# Patient Record
Sex: MALE | Race: WHITE | Employment: OTHER | URBAN - NONMETROPOLITAN AREA
[De-identification: names, ages, dates, MRNs, and addresses within clinical notes are randomized per-mention and may not be internally consistent; named-entity substitution may affect disease eponyms.]

---

## 2017-02-28 ENCOUNTER — OFFICE VISIT (OUTPATIENT)
Dept: PRIMARY CARE CLINIC | Age: 67
End: 2017-02-28

## 2017-02-28 VITALS
HEIGHT: 67 IN | WEIGHT: 207.8 LBS | RESPIRATION RATE: 14 BRPM | HEART RATE: 105 BPM | DIASTOLIC BLOOD PRESSURE: 70 MMHG | OXYGEN SATURATION: 98 % | TEMPERATURE: 97.5 F | SYSTOLIC BLOOD PRESSURE: 110 MMHG | BODY MASS INDEX: 32.62 KG/M2

## 2017-02-28 DIAGNOSIS — M10.9 ACUTE GOUT INVOLVING TOE OF RIGHT FOOT, UNSPECIFIED CAUSE: Primary | ICD-10-CM

## 2017-02-28 PROCEDURE — 99213 OFFICE O/P EST LOW 20 MIN: CPT | Performed by: FAMILY MEDICINE

## 2017-02-28 PROCEDURE — G8417 CALC BMI ABV UP PARAM F/U: HCPCS | Performed by: FAMILY MEDICINE

## 2017-02-28 PROCEDURE — G8484 FLU IMMUNIZE NO ADMIN: HCPCS | Performed by: FAMILY MEDICINE

## 2017-02-28 PROCEDURE — 3017F COLORECTAL CA SCREEN DOC REV: CPT | Performed by: FAMILY MEDICINE

## 2017-02-28 PROCEDURE — 4040F PNEUMOC VAC/ADMIN/RCVD: CPT | Performed by: FAMILY MEDICINE

## 2017-02-28 PROCEDURE — 1123F ACP DISCUSS/DSCN MKR DOCD: CPT | Performed by: FAMILY MEDICINE

## 2017-02-28 PROCEDURE — G8427 DOCREV CUR MEDS BY ELIG CLIN: HCPCS | Performed by: FAMILY MEDICINE

## 2017-02-28 PROCEDURE — 1036F TOBACCO NON-USER: CPT | Performed by: FAMILY MEDICINE

## 2017-02-28 RX ORDER — PREDNISONE 20 MG/1
TABLET ORAL
Qty: 10 TABLET | Refills: 0 | Status: SHIPPED | OUTPATIENT
Start: 2017-02-28 | End: 2017-06-22 | Stop reason: ALTCHOICE

## 2017-02-28 ASSESSMENT — ENCOUNTER SYMPTOMS
BACK PAIN: 0
GASTROINTESTINAL NEGATIVE: 1
EYES NEGATIVE: 1
COLOR CHANGE: 1
RESPIRATORY NEGATIVE: 1

## 2017-06-22 ENCOUNTER — OFFICE VISIT (OUTPATIENT)
Dept: PRIMARY CARE CLINIC | Age: 67
End: 2017-06-22
Payer: MEDICARE

## 2017-06-22 ENCOUNTER — APPOINTMENT (OUTPATIENT)
Dept: GENERAL RADIOLOGY | Age: 67
End: 2017-06-22
Payer: MEDICARE

## 2017-06-22 ENCOUNTER — HOSPITAL ENCOUNTER (EMERGENCY)
Age: 67
Discharge: HOME | End: 2017-06-22
Attending: EMERGENCY MEDICINE
Payer: MEDICARE

## 2017-06-22 VITALS
HEIGHT: 67 IN | WEIGHT: 209 LBS | SYSTOLIC BLOOD PRESSURE: 112 MMHG | OXYGEN SATURATION: 98 % | HEART RATE: 90 BPM | DIASTOLIC BLOOD PRESSURE: 74 MMHG | BODY MASS INDEX: 32.8 KG/M2 | TEMPERATURE: 98.9 F

## 2017-06-22 VITALS
DIASTOLIC BLOOD PRESSURE: 74 MMHG | TEMPERATURE: 97.9 F | RESPIRATION RATE: 12 BRPM | OXYGEN SATURATION: 98 % | SYSTOLIC BLOOD PRESSURE: 123 MMHG | HEART RATE: 73 BPM | BODY MASS INDEX: 32.58 KG/M2 | WEIGHT: 208 LBS

## 2017-06-22 DIAGNOSIS — I48.91 ATRIAL FIBRILLATION, UNSPECIFIED TYPE (HCC): Primary | ICD-10-CM

## 2017-06-22 DIAGNOSIS — R42 DIZZY: Primary | ICD-10-CM

## 2017-06-22 DIAGNOSIS — I49.9 IRREGULAR HEARTBEAT: ICD-10-CM

## 2017-06-22 LAB
ABSOLUTE EOS #: 0.1 K/UL (ref 0–0.4)
ABSOLUTE LYMPH #: 1.7 K/UL (ref 1–4.8)
ABSOLUTE MONO #: 0.6 K/UL (ref 0.1–1.2)
ANION GAP SERPL CALCULATED.3IONS-SCNC: 14 MMOL/L (ref 9–17)
BASOPHILS # BLD: 0 %
BASOPHILS ABSOLUTE: 0 K/UL (ref 0–0.2)
BNP INTERPRETATION: ABNORMAL
BUN BLDV-MCNC: 19 MG/DL (ref 8–23)
BUN/CREAT BLD: 24 (ref 9–20)
CALCIUM SERPL-MCNC: 9 MG/DL (ref 8.6–10.4)
CHLORIDE BLD-SCNC: 101 MMOL/L (ref 98–107)
CO2: 24 MMOL/L (ref 20–31)
CREAT SERPL-MCNC: 0.78 MG/DL (ref 0.7–1.2)
DIFFERENTIAL TYPE: NORMAL
EOSINOPHILS RELATIVE PERCENT: 2 %
GFR AFRICAN AMERICAN: >60 ML/MIN
GFR NON-AFRICAN AMERICAN: >60 ML/MIN
GFR SERPL CREATININE-BSD FRML MDRD: ABNORMAL ML/MIN/{1.73_M2}
GFR SERPL CREATININE-BSD FRML MDRD: ABNORMAL ML/MIN/{1.73_M2}
GLUCOSE BLD-MCNC: 129 MG/DL (ref 70–99)
HCT VFR BLD CALC: 45.4 % (ref 41–53)
HEMOGLOBIN: 15.5 G/DL (ref 13.5–17.5)
INR BLD: 1
LYMPHOCYTES # BLD: 23 %
MCH RBC QN AUTO: 29.2 PG (ref 26–34)
MCHC RBC AUTO-ENTMCNC: 34.2 G/DL (ref 31–37)
MCV RBC AUTO: 85.5 FL (ref 80–100)
MONOCYTES # BLD: 8 %
MYOGLOBIN: 81 NG/ML (ref 28–72)
MYOGLOBIN: 93 NG/ML (ref 28–72)
PARTIAL THROMBOPLASTIN TIME: 24.8 SEC (ref 27–35)
PDW BLD-RTO: 13.6 % (ref 11–14.5)
PLATELET # BLD: 170 K/UL (ref 140–450)
PLATELET ESTIMATE: NORMAL
PMV BLD AUTO: 7.7 FL (ref 6–12)
POTASSIUM SERPL-SCNC: 4 MMOL/L (ref 3.7–5.3)
PRO-BNP: 473 PG/ML
PROTHROMBIN TIME: 10.3 SEC (ref 9.4–11.3)
RBC # BLD: 5.31 M/UL (ref 4.5–5.9)
RBC # BLD: NORMAL 10*6/UL
SEG NEUTROPHILS: 67 %
SEGMENTED NEUTROPHILS ABSOLUTE COUNT: 4.8 K/UL (ref 1.8–7.7)
SODIUM BLD-SCNC: 139 MMOL/L (ref 135–144)
TROPONIN INTERP: NORMAL
TROPONIN INTERP: NORMAL
TROPONIN T: <0.03 NG/ML
TROPONIN T: <0.03 NG/ML
WBC # BLD: 7.3 K/UL (ref 3.5–11)
WBC # BLD: NORMAL 10*3/UL

## 2017-06-22 PROCEDURE — 36415 COLL VENOUS BLD VENIPUNCTURE: CPT

## 2017-06-22 PROCEDURE — 84484 ASSAY OF TROPONIN QUANT: CPT

## 2017-06-22 PROCEDURE — 1036F TOBACCO NON-USER: CPT | Performed by: NURSE PRACTITIONER

## 2017-06-22 PROCEDURE — G8427 DOCREV CUR MEDS BY ELIG CLIN: HCPCS | Performed by: NURSE PRACTITIONER

## 2017-06-22 PROCEDURE — 99212 OFFICE O/P EST SF 10 MIN: CPT | Performed by: NURSE PRACTITIONER

## 2017-06-22 PROCEDURE — 1123F ACP DISCUSS/DSCN MKR DOCD: CPT | Performed by: NURSE PRACTITIONER

## 2017-06-22 PROCEDURE — G8417 CALC BMI ABV UP PARAM F/U: HCPCS | Performed by: NURSE PRACTITIONER

## 2017-06-22 PROCEDURE — 80048 BASIC METABOLIC PNL TOTAL CA: CPT

## 2017-06-22 PROCEDURE — 99285 EMERGENCY DEPT VISIT HI MDM: CPT

## 2017-06-22 PROCEDURE — 83874 ASSAY OF MYOGLOBIN: CPT

## 2017-06-22 PROCEDURE — 4040F PNEUMOC VAC/ADMIN/RCVD: CPT | Performed by: NURSE PRACTITIONER

## 2017-06-22 PROCEDURE — 71020 XR CHEST STANDARD TWO VW: CPT | Performed by: RADIOLOGY

## 2017-06-22 PROCEDURE — 85610 PROTHROMBIN TIME: CPT

## 2017-06-22 PROCEDURE — 85730 THROMBOPLASTIN TIME PARTIAL: CPT

## 2017-06-22 PROCEDURE — 83880 ASSAY OF NATRIURETIC PEPTIDE: CPT

## 2017-06-22 PROCEDURE — 71020 XR CHEST STANDARD TWO VW: CPT

## 2017-06-22 PROCEDURE — 3017F COLORECTAL CA SCREEN DOC REV: CPT | Performed by: NURSE PRACTITIONER

## 2017-06-22 PROCEDURE — 93005 ELECTROCARDIOGRAM TRACING: CPT

## 2017-06-22 PROCEDURE — 85025 COMPLETE CBC W/AUTO DIFF WBC: CPT

## 2017-06-22 ASSESSMENT — ENCOUNTER SYMPTOMS
EYE DISCHARGE: 0
SHORTNESS OF BREATH: 0
CONSTIPATION: 0
EYE REDNESS: 0
BACK PAIN: 0
CHEST TIGHTNESS: 0
DIARRHEA: 0
SORE THROAT: 0
NAUSEA: 0
COUGH: 0
WHEEZING: 0
ABDOMINAL PAIN: 0
EYE PAIN: 0
COLOR CHANGE: 0
RHINORRHEA: 0

## 2017-06-26 LAB
EKG ATRIAL RATE: 125 BPM
EKG ATRIAL RATE: 416 BPM
EKG Q-T INTERVAL: 372 MS
EKG Q-T INTERVAL: 392 MS
EKG QRS DURATION: 78 MS
EKG QRS DURATION: 78 MS
EKG QTC CALCULATION (BAZETT): 425 MS
EKG QTC CALCULATION (BAZETT): 434 MS
EKG R AXIS: -13 DEGREES
EKG R AXIS: -20 DEGREES
EKG T AXIS: 13 DEGREES
EKG T AXIS: 25 DEGREES
EKG VENTRICULAR RATE: 71 BPM
EKG VENTRICULAR RATE: 82 BPM

## 2017-06-28 ENCOUNTER — OFFICE VISIT (OUTPATIENT)
Dept: CARDIOLOGY | Age: 67
End: 2017-06-28
Payer: MEDICARE

## 2017-06-28 VITALS
WEIGHT: 206 LBS | HEIGHT: 67 IN | SYSTOLIC BLOOD PRESSURE: 140 MMHG | DIASTOLIC BLOOD PRESSURE: 80 MMHG | HEART RATE: 83 BPM | BODY MASS INDEX: 32.33 KG/M2

## 2017-06-28 DIAGNOSIS — I48.91 ATRIAL FIBRILLATION, UNSPECIFIED TYPE (HCC): Primary | ICD-10-CM

## 2017-06-28 DIAGNOSIS — R06.02 SOB (SHORTNESS OF BREATH): ICD-10-CM

## 2017-06-28 DIAGNOSIS — R94.31 ABNORMAL EKG: ICD-10-CM

## 2017-06-28 DIAGNOSIS — R42 DIZZINESS: ICD-10-CM

## 2017-06-28 PROCEDURE — 1036F TOBACCO NON-USER: CPT | Performed by: INTERNAL MEDICINE

## 2017-06-28 PROCEDURE — 3017F COLORECTAL CA SCREEN DOC REV: CPT | Performed by: INTERNAL MEDICINE

## 2017-06-28 PROCEDURE — G8427 DOCREV CUR MEDS BY ELIG CLIN: HCPCS | Performed by: INTERNAL MEDICINE

## 2017-06-28 PROCEDURE — 4040F PNEUMOC VAC/ADMIN/RCVD: CPT | Performed by: INTERNAL MEDICINE

## 2017-06-28 PROCEDURE — G8417 CALC BMI ABV UP PARAM F/U: HCPCS | Performed by: INTERNAL MEDICINE

## 2017-06-28 PROCEDURE — 93000 ELECTROCARDIOGRAM COMPLETE: CPT | Performed by: INTERNAL MEDICINE

## 2017-06-28 PROCEDURE — 99204 OFFICE O/P NEW MOD 45 MIN: CPT | Performed by: INTERNAL MEDICINE

## 2017-06-28 PROCEDURE — 1123F ACP DISCUSS/DSCN MKR DOCD: CPT | Performed by: INTERNAL MEDICINE

## 2017-07-06 ENCOUNTER — TELEPHONE (OUTPATIENT)
Dept: CARDIOLOGY | Age: 67
End: 2017-07-06

## 2017-07-06 ENCOUNTER — PROCEDURE VISIT (OUTPATIENT)
Dept: CARDIOLOGY | Age: 67
End: 2017-07-06
Payer: MEDICARE

## 2017-07-06 DIAGNOSIS — I48.91 ATRIAL FIBRILLATION, UNSPECIFIED TYPE (HCC): Primary | ICD-10-CM

## 2017-07-06 LAB
LEFT VENTRICULAR EJECTION FRACTION HIGH VALUE: NORMAL %
LEFT VENTRICULAR EJECTION FRACTION MODE: NORMAL
LV EF: 55 %
LV EF: 55 %
LVEF MODALITY: NORMAL

## 2017-07-06 PROCEDURE — 93306 TTE W/DOPPLER COMPLETE: CPT | Performed by: INTERNAL MEDICINE

## 2017-07-11 ENCOUNTER — PROCEDURE VISIT (OUTPATIENT)
Dept: CARDIOLOGY | Age: 67
End: 2017-07-11
Payer: MEDICARE

## 2017-07-11 DIAGNOSIS — I48.91 ATRIAL FIBRILLATION, UNSPECIFIED TYPE (HCC): Primary | ICD-10-CM

## 2017-07-11 PROCEDURE — 93015 CV STRESS TEST SUPVJ I&R: CPT | Performed by: INTERNAL MEDICINE

## 2017-07-11 RX ORDER — AMINOPHYLLINE DIHYDRATE 25 MG/ML
100 INJECTION, SOLUTION INTRAVENOUS ONCE
Status: COMPLETED | OUTPATIENT
Start: 2017-07-11 | End: 2017-07-11

## 2017-07-11 RX ADMIN — AMINOPHYLLINE DIHYDRATE 100 MG: 25 INJECTION, SOLUTION INTRAVENOUS at 09:42

## 2017-07-12 ENCOUNTER — TELEPHONE (OUTPATIENT)
Dept: CARDIOLOGY | Age: 67
End: 2017-07-12

## 2017-07-13 DIAGNOSIS — R42 DIZZINESS: ICD-10-CM

## 2017-07-13 DIAGNOSIS — R06.02 SOB (SHORTNESS OF BREATH): ICD-10-CM

## 2017-07-13 DIAGNOSIS — I48.91 ATRIAL FIBRILLATION, UNSPECIFIED TYPE (HCC): ICD-10-CM

## 2017-07-13 DIAGNOSIS — R94.31 ABNORMAL EKG: ICD-10-CM

## 2018-01-02 ENCOUNTER — OFFICE VISIT (OUTPATIENT)
Dept: PRIMARY CARE CLINIC | Age: 68
End: 2018-01-02
Payer: MEDICARE

## 2018-01-02 VITALS
OXYGEN SATURATION: 98 % | HEART RATE: 88 BPM | WEIGHT: 209.8 LBS | SYSTOLIC BLOOD PRESSURE: 118 MMHG | TEMPERATURE: 97.7 F | DIASTOLIC BLOOD PRESSURE: 82 MMHG | BODY MASS INDEX: 32.93 KG/M2 | HEIGHT: 67 IN

## 2018-01-02 DIAGNOSIS — J20.9 BRONCHITIS WITH BRONCHOSPASM: Primary | ICD-10-CM

## 2018-01-02 DIAGNOSIS — R05.9 COUGH: ICD-10-CM

## 2018-01-02 PROCEDURE — 99213 OFFICE O/P EST LOW 20 MIN: CPT | Performed by: NURSE PRACTITIONER

## 2018-01-02 RX ORDER — PREDNISONE 20 MG/1
20 TABLET ORAL DAILY
Qty: 5 TABLET | Refills: 0 | Status: SHIPPED | OUTPATIENT
Start: 2018-01-02 | End: 2018-01-07

## 2018-01-02 RX ORDER — AZITHROMYCIN 250 MG/1
TABLET, FILM COATED ORAL
Qty: 1 PACKET | Refills: 0 | Status: SHIPPED | OUTPATIENT
Start: 2018-01-02 | End: 2018-01-12

## 2018-01-02 RX ORDER — DEXTROMETHORPHAN HYDROBROMIDE AND PROMETHAZINE HYDROCHLORIDE 15; 6.25 MG/5ML; MG/5ML
5 SYRUP ORAL 4 TIMES DAILY PRN
Qty: 180 ML | Refills: 0 | Status: SHIPPED | OUTPATIENT
Start: 2018-01-02 | End: 2018-11-27

## 2018-01-02 ASSESSMENT — ENCOUNTER SYMPTOMS
VOMITING: 0
COUGH: 1
SINUS PRESSURE: 1
DIARRHEA: 0
NAUSEA: 0
RHINORRHEA: 1
SORE THROAT: 1
ABDOMINAL PAIN: 0
WHEEZING: 1
VOICE CHANGE: 1
SHORTNESS OF BREATH: 0

## 2018-01-02 NOTE — PROGRESS NOTES
smoked. He has never used smokeless tobacco.      Objective:    Vitals:    01/02/18 1116   BP: 118/82   Pulse: 88   Temp: 97.7 °F (36.5 °C)   SpO2: 98%     Body mass index is 32.86 kg/m². Review of Systems   Constitutional: Positive for appetite change, chills and fever. HENT: Positive for congestion, postnasal drip, rhinorrhea, sinus pressure, sore throat and voice change. Respiratory: Positive for cough and wheezing. Negative for shortness of breath. Cardiovascular: Negative. Gastrointestinal: Negative for abdominal pain, diarrhea, nausea and vomiting. Skin: Negative for rash. Neurological: Positive for headaches. Physical Exam   Constitutional: He is oriented to person, place, and time. He appears well-developed and well-nourished. HENT:   Head: Normocephalic. Right Ear: Tympanic membrane is scarred. Left Ear: Tympanic membrane is scarred. Nose: Mucosal edema and rhinorrhea present. Right sinus exhibits frontal sinus tenderness. Right sinus exhibits no maxillary sinus tenderness. Left sinus exhibits frontal sinus tenderness. Left sinus exhibits no maxillary sinus tenderness. Mouth/Throat: Uvula is midline and mucous membranes are normal. Posterior oropharyngeal erythema present. Eyes: Pupils are equal, round, and reactive to light. Neck: Normal range of motion. Neck supple. Cardiovascular: Normal rate, regular rhythm and normal heart sounds. Pulmonary/Chest: Effort normal and breath sounds normal.   Lymphadenopathy:        Head (right side): Tonsillar adenopathy present. Head (left side): Tonsillar adenopathy present. Neurological: He is alert and oriented to person, place, and time. Skin: Skin is warm and dry. Psychiatric: He has a normal mood and affect. His behavior is normal. Thought content normal.   Nursing note and vitals reviewed. Assessment and Plan:    1.  Bronchitis with bronchospasm  azithromycin (ZITHROMAX) 250 MG tablet    predniSONE (DELTASONE) 20 MG tablet   2. Cough  promethazine-dextromethorphan (PROMETHAZINE-DM) 6.25-15 MG/5ML syrup     Take full course of antibiotic and prednisone. Take Promethazine-DM as needed for cough at bedtime. I recommended that he use mucinex to help with congestion and cough. I also recommended Flonase and an antihistamine for sinus symptoms. he was also encouraged to use tylenol or ibuprofen for fever. Increase water intake. Use cool mist humidifier at bedtime. Use nasal saline flush as needed. Good hand hygiene. he was instructed to return if there is no improvement or symptoms worsen.        Electronically signed by Devika Romero NP on 1/2/18 at 11:46 AM

## 2018-11-27 ENCOUNTER — OFFICE VISIT (OUTPATIENT)
Dept: PRIMARY CARE CLINIC | Age: 68
End: 2018-11-27
Payer: MEDICARE

## 2018-11-27 VITALS
OXYGEN SATURATION: 98 % | HEIGHT: 67 IN | BODY MASS INDEX: 33.4 KG/M2 | WEIGHT: 212.8 LBS | SYSTOLIC BLOOD PRESSURE: 132 MMHG | DIASTOLIC BLOOD PRESSURE: 78 MMHG | HEART RATE: 80 BPM | TEMPERATURE: 97.8 F

## 2018-11-27 DIAGNOSIS — M10.9 ACUTE GOUT INVOLVING TOE OF RIGHT FOOT, UNSPECIFIED CAUSE: Primary | ICD-10-CM

## 2018-11-27 PROCEDURE — 99213 OFFICE O/P EST LOW 20 MIN: CPT | Performed by: NURSE PRACTITIONER

## 2018-11-27 RX ORDER — PREDNISONE 20 MG/1
20 TABLET ORAL 2 TIMES DAILY
Qty: 10 TABLET | Refills: 0 | Status: SHIPPED | OUTPATIENT
Start: 2018-11-27 | End: 2018-12-02

## 2018-11-27 ASSESSMENT — PATIENT HEALTH QUESTIONNAIRE - PHQ9
1. LITTLE INTEREST OR PLEASURE IN DOING THINGS: 0
SUM OF ALL RESPONSES TO PHQ9 QUESTIONS 1 & 2: 0
SUM OF ALL RESPONSES TO PHQ QUESTIONS 1-9: 0
2. FEELING DOWN, DEPRESSED OR HOPELESS: 0
SUM OF ALL RESPONSES TO PHQ QUESTIONS 1-9: 0

## 2018-11-27 ASSESSMENT — ENCOUNTER SYMPTOMS: RESPIRATORY NEGATIVE: 1

## 2018-12-21 ENCOUNTER — HOSPITAL ENCOUNTER (OUTPATIENT)
Age: 68
Setting detail: SPECIMEN
Discharge: HOME | End: 2018-12-21
Payer: MEDICARE

## 2018-12-21 ENCOUNTER — OFFICE VISIT (OUTPATIENT)
Dept: DERMATOLOGY | Age: 68
End: 2018-12-21
Payer: MEDICARE

## 2018-12-21 ENCOUNTER — OFFICE VISIT (OUTPATIENT)
Dept: PRIMARY CARE CLINIC | Age: 68
End: 2018-12-21

## 2018-12-21 VITALS
BODY MASS INDEX: 33.94 KG/M2 | RESPIRATION RATE: 16 BRPM | WEIGHT: 216.27 LBS | DIASTOLIC BLOOD PRESSURE: 88 MMHG | SYSTOLIC BLOOD PRESSURE: 134 MMHG | HEIGHT: 67 IN | TEMPERATURE: 96.9 F | HEART RATE: 88 BPM

## 2018-12-21 VITALS
HEART RATE: 90 BPM | HEIGHT: 67 IN | DIASTOLIC BLOOD PRESSURE: 88 MMHG | RESPIRATION RATE: 18 BRPM | WEIGHT: 216.2 LBS | TEMPERATURE: 96.9 F | SYSTOLIC BLOOD PRESSURE: 134 MMHG | OXYGEN SATURATION: 98 % | BODY MASS INDEX: 33.93 KG/M2

## 2018-12-21 DIAGNOSIS — D48.5 NEOPLASM OF UNCERTAIN BEHAVIOR OF SKIN: Primary | ICD-10-CM

## 2018-12-21 DIAGNOSIS — L91.8 SKIN TAG: ICD-10-CM

## 2018-12-21 PROCEDURE — 11100 PR BIOPSY OF SKIN LESION: CPT | Performed by: DERMATOLOGY

## 2018-12-21 NOTE — PATIENT INSTRUCTIONS

## 2018-12-26 ENCOUNTER — TELEPHONE (OUTPATIENT)
Dept: DERMATOLOGY | Age: 68
End: 2018-12-26

## 2018-12-26 LAB — DERMATOLOGY PATHOLOGY REPORT: NORMAL

## 2019-09-07 ENCOUNTER — OFFICE VISIT (OUTPATIENT)
Dept: PRIMARY CARE CLINIC | Age: 69
End: 2019-09-07
Payer: MEDICARE

## 2019-09-07 VITALS
DIASTOLIC BLOOD PRESSURE: 74 MMHG | TEMPERATURE: 98 F | OXYGEN SATURATION: 98 % | BODY MASS INDEX: 32.23 KG/M2 | SYSTOLIC BLOOD PRESSURE: 126 MMHG | HEART RATE: 74 BPM | WEIGHT: 205.8 LBS

## 2019-09-07 DIAGNOSIS — S05.01XA ABRASION OF RIGHT CORNEA, INITIAL ENCOUNTER: Primary | ICD-10-CM

## 2019-09-07 DIAGNOSIS — T15.91XA FOREIGN BODY OF RIGHT EYE, INITIAL ENCOUNTER: ICD-10-CM

## 2019-09-07 PROCEDURE — 99213 OFFICE O/P EST LOW 20 MIN: CPT | Performed by: FAMILY MEDICINE

## 2019-09-07 PROCEDURE — 65220 REMOVE FOREIGN BODY FROM EYE: CPT | Performed by: FAMILY MEDICINE

## 2019-09-07 RX ORDER — CIPROFLOXACIN HYDROCHLORIDE 3.5 MG/ML
1 SOLUTION/ DROPS TOPICAL EVERY 4 HOURS
Qty: 1 BOTTLE | Refills: 0 | Status: SHIPPED | OUTPATIENT
Start: 2019-09-07 | End: 2019-09-14

## 2019-09-07 ASSESSMENT — ENCOUNTER SYMPTOMS
BLURRED VISION: 0
DOUBLE VISION: 0
EYE REDNESS: 1
FOREIGN BODY SENSATION: 1
EYE DISCHARGE: 1
EYE PAIN: 1

## 2019-09-07 NOTE — PATIENT INSTRUCTIONS
your injured eye. Rubbing can make it worse. · Use the prescribed eyedrops or ointment as directed. Be sure the dropper or bottle tip is clean. · To put in eyedrops or ointment:  ? Tilt your head back, and pull your lower eyelid down with one finger. ? Drop or squirt the medicine inside the lower lid. ? Close your eye for 30 to 60 seconds to let the drops or ointment move around. ? Do not touch the ointment or dropper tip to your eyelashes or any other surface. · Do not use a contact lens in your hurt eye until your doctor says you can. Also, do not wear eye makeup until your eye heals. · Do not drive if you are wearing an eye patch. You cannot  distances well. · For the first 24 to 48 hours, limit reading and other tasks that require a lot of eye movement. · Bright light may hurt. It may help to wear dark glasses. · To prevent eye injuries in the future, wear safety glasses or goggles when you work with machines or tools, mow the lawn, or ride a bike or motorcycle. When should you call for help? Call 911 anytime you think you may need emergency care. For example, call if:    · You suddenly cannot see or can barely see.    Call your doctor now or seek immediate medical care if:    · You have signs of infection in the eye, such as:  ? Pus or thick discharge coming from the eye.  ? Redness or swelling around the eye.  ? A fever.     · You have new or increasing eye pain.     · It feels like sand is in your eye when you blink.    Watch closely for changes in your health, and be sure to contact your doctor if:    · You are not getting better after 1 day (24 hours). Where can you learn more? Go to https://[employer]Free. org and sign in to your [employer] account. Enter G051 in the FarmLink box to learn more about \"Object in the Eye: Care Instructions. \"     If you do not have an account, please click on the \"Sign Up Now\" link.   Current as of: September 23, 2018  Content Version: 12.1  © 0573-0071 Healthwise, Incorporated. Care instructions adapted under license by [facility] ([facility]). If you have questions about a medical condition or this instruction, always ask your healthcare professional. [address] any warranty or liability for your use of this information. Patient Education        Corneal Scratches: Care Instructions  Your Care Instructions    The cornea is the clear surface that covers the front of the eye. When a speck of dirt, a wood chip, an insect, or another object flies into your eye, it can cause a painful scratch on the cornea. Wearing contact lenses too long or rubbing your eyes can also scratch the cornea. Small scratches usually heal in a day or two. Deeper scratches may take longer. If you have had a foreign object removed from your eye or you have a corneal scratch, you will need to watch for infection and vision problems while your eye heals. Follow-up care is a key part of your treatment and safety. Be sure to make and go to all appointments, and call your doctor if you are having problems. It's also a good idea to know your test results and keep a list of the medicines you take. How can you care for yourself at home? · The doctor probably used a medicine during your exam to numb your eye. When it wears off in 30 to 60 minutes, your eye pain may come back. Take pain medicines exactly as directed. ? If the doctor gave you a prescription medicine for pain, take it as prescribed. ? If you are not taking a prescription pain medicine, ask your doctor if you can take an over-the-counter medicine. ? Do not take two or more pain medicines at the same time unless the doctor told you to. Many pain medicines have acetaminophen, which is Tylenol. Too much acetaminophen (Tylenol) can be harmful. · Do not rub your injured eye. Rubbing can make it worse. · Use the prescribed eyedrops or ointment as directed.  Be sure the dropper or bottle tip

## 2019-09-07 NOTE — PROGRESS NOTES
[address]  [address], [address] [place]  [phone]      Oscar Fisher is a 78 y.o. male who is c/o of Eye Pain (right eye thinks metal piece in eye few days ago )      HPI:     Eye Pain    The right eye is affected. This is a new problem. The current episode started in the past 7 days (2-3 days ago). The problem has been gradually worsening. Injury mechanism: Was welding/grinding a couple days ago, and feels he got something in his eye. The pain is mild. He does not wear contacts. Associated symptoms include an eye discharge (watering), eye redness (mild) and a foreign body sensation. Pertinent negatives include no blurred vision or double vision. Treatments tried: flushing his eye, OTC eye drops. Subjective:      Past Medical History:   Diagnosis Date    Chronic kidney disease     Hyperlipidemia     PTSD (post-traumatic stress disorder)       Past Surgical History:   Procedure Laterality Date    APPENDECTOMY         Social History     Tobacco Use    Smoking status: Never Smoker    Smokeless tobacco: Never Used   Substance Use Topics    Alcohol use: Yes     Alcohol/week: 0.0 standard drinks     Comment: occas      Current Outpatient Medications   Medication Sig Dispense Refill    Fish Oil-Cholecalciferol (FISH OIL + D3 PO) Take by mouth      aspirin 325 MG EC tablet Take 325 mg by mouth daily      vitamin D (ERGOCALCIFEROL) 61554 UNITS CAPS capsule Take 50,000 Units by mouth once a week      VITAMIN B1-B12 IM Inject  into the muscle.        Current Facility-Administered Medications   Medication Dose Route Frequency Provider Last Rate Last Dose    phenylephrine (ALAINA-SYNEPHRINE) 10 % ophthalmic solution 1 drop  1 drop Both Eyes Once Sukumar Birmingham MD        tropicamide (MYDRIACYL) 1 % ophthalmic solution 1 drop  1 drop Both Eyes Once Sukumar Birmingham MD         No Known Allergies    Review of Systems   Eyes: Positive for pain, discharge (watering)

## 2019-09-09 ENCOUNTER — OFFICE VISIT (OUTPATIENT)
Dept: OPHTHALMOLOGY | Age: 69
End: 2019-09-09
Payer: MEDICARE

## 2019-09-09 DIAGNOSIS — T15.01XA CORNEAL FOREIGN BODY, RIGHT, INITIAL ENCOUNTER: Primary | ICD-10-CM

## 2019-09-09 PROCEDURE — 99203 OFFICE O/P NEW LOW 30 MIN: CPT | Performed by: OPHTHALMOLOGY

## 2019-09-09 PROCEDURE — 65222 REMOVE FOREIGN BODY FROM EYE: CPT | Performed by: OPHTHALMOLOGY

## 2019-09-09 RX ORDER — ERYTHROMYCIN 5 MG/G
OINTMENT OPHTHALMIC
Qty: 1 TUBE | Refills: 1 | Status: SHIPPED | OUTPATIENT
Start: 2019-09-09 | End: 2019-09-19

## 2019-09-09 ASSESSMENT — TONOMETRY
OS_IOP_MMHG: 22
IOP_METHOD: NON-CONTACT AIR PUFF
OD_IOP_MMHG: 19

## 2019-09-09 ASSESSMENT — VISUAL ACUITY
OS_CC+: 2
CORRECTION_TYPE: GLASSES
OS_CC: 20/20
OD_PH_CC: 20/20
METHOD: SNELLEN - LINEAR

## 2019-09-09 ASSESSMENT — SLIT LAMP EXAM - LIDS: COMMENTS: MILD BLEPHARITIS, MILD MGD

## 2019-09-18 ENCOUNTER — OFFICE VISIT (OUTPATIENT)
Dept: OPHTHALMOLOGY | Age: 69
End: 2019-09-18
Payer: MEDICARE

## 2019-09-18 DIAGNOSIS — T15.01XA CORNEAL FOREIGN BODY, RIGHT, INITIAL ENCOUNTER: Primary | ICD-10-CM

## 2019-09-18 DIAGNOSIS — H43.813 DEGENERATION OF POSTERIOR VITREOUS BODY OF BOTH EYES: ICD-10-CM

## 2019-09-18 DIAGNOSIS — H40.003 GLAUCOMA SUSPECT OF BOTH EYES: ICD-10-CM

## 2019-09-18 DIAGNOSIS — H25.813 COMBINED FORMS OF AGE-RELATED CATARACT OF BOTH EYES: ICD-10-CM

## 2019-09-18 PROCEDURE — 99214 OFFICE O/P EST MOD 30 MIN: CPT | Performed by: OPHTHALMOLOGY

## 2019-09-18 PROCEDURE — 92250 FUNDUS PHOTOGRAPHY W/I&R: CPT | Performed by: OPHTHALMOLOGY

## 2019-09-18 RX ORDER — PHENYLEPHRINE HYDROCHLORIDE 100 MG/ML
1 SOLUTION/ DROPS OPHTHALMIC ONCE
Status: DISCONTINUED | OUTPATIENT
Start: 2019-09-18 | End: 2020-01-14 | Stop reason: ALTCHOICE

## 2019-09-18 RX ORDER — TROPICAMIDE 10 MG/ML
1 SOLUTION/ DROPS OPHTHALMIC ONCE
Status: DISCONTINUED | OUTPATIENT
Start: 2019-09-18 | End: 2020-01-14 | Stop reason: ALTCHOICE

## 2019-09-18 ASSESSMENT — TONOMETRY
IOP_METHOD: NON-CONTACT AIR PUFF
OS_IOP_MMHG: 22
OD_IOP_MMHG: 19

## 2019-09-18 ASSESSMENT — VISUAL ACUITY
METHOD: SNELLEN - LINEAR
OS_CC: 20/20
OS_CC+: -1
CORRECTION_TYPE: GLASSES
OD_CC+: -1

## 2019-09-18 ASSESSMENT — SLIT LAMP EXAM - LIDS
COMMENTS: MILD BLEPHARITIS. MILD MGD
COMMENTS: MILD BLEPHARITIS. MILD MGD

## 2019-09-18 ASSESSMENT — CONF VISUAL FIELD
OD_NORMAL: 1
OS_NORMAL: 1

## 2019-09-18 ASSESSMENT — ENCOUNTER SYMPTOMS
RESPIRATORY NEGATIVE: 0
EYES NEGATIVE: 0
ALLERGIC/IMMUNOLOGIC NEGATIVE: 0
GASTROINTESTINAL NEGATIVE: 0

## 2019-09-18 NOTE — PROGRESS NOTES
caution while operating a   motor vehicle or performing other critical visual tasks. Follow.     Electronically signed by Obdulio Valerio MD on 9/18/2019 at 4:36 PM

## 2019-11-15 ENCOUNTER — OFFICE VISIT (OUTPATIENT)
Dept: PRIMARY CARE CLINIC | Age: 69
End: 2019-11-15
Payer: MEDICARE

## 2019-11-15 VITALS
HEIGHT: 67 IN | HEART RATE: 100 BPM | SYSTOLIC BLOOD PRESSURE: 122 MMHG | DIASTOLIC BLOOD PRESSURE: 74 MMHG | BODY MASS INDEX: 32.65 KG/M2 | WEIGHT: 208 LBS | OXYGEN SATURATION: 98 % | TEMPERATURE: 97.5 F

## 2019-11-15 DIAGNOSIS — J01.90 ACUTE BACTERIAL SINUSITIS: Primary | ICD-10-CM

## 2019-11-15 DIAGNOSIS — R05.9 COUGH: ICD-10-CM

## 2019-11-15 DIAGNOSIS — J02.9 SORE THROAT: ICD-10-CM

## 2019-11-15 DIAGNOSIS — B96.89 ACUTE BACTERIAL SINUSITIS: Primary | ICD-10-CM

## 2019-11-15 LAB — S PYO AG THROAT QL: NORMAL

## 2019-11-15 PROCEDURE — 87880 STREP A ASSAY W/OPTIC: CPT | Performed by: NURSE PRACTITIONER

## 2019-11-15 PROCEDURE — 99213 OFFICE O/P EST LOW 20 MIN: CPT | Performed by: NURSE PRACTITIONER

## 2019-11-15 RX ORDER — AMOXICILLIN 500 MG/1
500 CAPSULE ORAL 3 TIMES DAILY
Qty: 30 CAPSULE | Refills: 0 | Status: SHIPPED | OUTPATIENT
Start: 2019-11-15 | End: 2020-01-14 | Stop reason: ALTCHOICE

## 2019-11-15 ASSESSMENT — ENCOUNTER SYMPTOMS
SINUS PRESSURE: 1
SORE THROAT: 0
SINUS COMPLAINT: 1
GASTROINTESTINAL NEGATIVE: 1
RHINORRHEA: 1
COUGH: 1
SINUS PAIN: 1

## 2019-11-15 ASSESSMENT — PATIENT HEALTH QUESTIONNAIRE - PHQ9
SUM OF ALL RESPONSES TO PHQ QUESTIONS 1-9: 0
2. FEELING DOWN, DEPRESSED OR HOPELESS: 0
1. LITTLE INTEREST OR PLEASURE IN DOING THINGS: 0
SUM OF ALL RESPONSES TO PHQ9 QUESTIONS 1 & 2: 0
SUM OF ALL RESPONSES TO PHQ QUESTIONS 1-9: 0

## 2020-01-10 ENCOUNTER — APPOINTMENT (OUTPATIENT)
Dept: CT IMAGING | Age: 70
End: 2020-01-10
Payer: MEDICARE

## 2020-01-10 ENCOUNTER — HOSPITAL ENCOUNTER (EMERGENCY)
Age: 70
Discharge: ANOTHER HOSPITAL | End: 2020-01-11
Attending: EMERGENCY MEDICINE
Payer: MEDICARE

## 2020-01-10 LAB
ABSOLUTE EOS #: 0.13 K/UL (ref 0–0.44)
ABSOLUTE IMMATURE GRANULOCYTE: 0.03 K/UL (ref 0–0.3)
ABSOLUTE LYMPH #: 2.67 K/UL (ref 1.1–3.7)
ABSOLUTE MONO #: 0.55 K/UL (ref 0.1–1.2)
ALBUMIN SERPL-MCNC: 4.1 G/DL (ref 3.5–5.2)
ALBUMIN/GLOBULIN RATIO: 1.3 (ref 1–2.5)
ALP BLD-CCNC: 79 U/L (ref 40–129)
ALT SERPL-CCNC: 25 U/L (ref 5–41)
ANION GAP SERPL CALCULATED.3IONS-SCNC: 17 MMOL/L (ref 9–17)
AST SERPL-CCNC: 29 U/L
BASOPHILS # BLD: 0 % (ref 0–2)
BASOPHILS ABSOLUTE: 0.03 K/UL (ref 0–0.2)
BILIRUB SERPL-MCNC: 0.44 MG/DL (ref 0.3–1.2)
BUN BLDV-MCNC: 15 MG/DL (ref 8–23)
BUN/CREAT BLD: 14 (ref 9–20)
CALCIUM SERPL-MCNC: 9.2 MG/DL (ref 8.6–10.4)
CHLORIDE BLD-SCNC: 100 MMOL/L (ref 98–107)
CO2: 21 MMOL/L (ref 20–31)
CREAT SERPL-MCNC: 1.09 MG/DL (ref 0.7–1.2)
DIFFERENTIAL TYPE: ABNORMAL
EOSINOPHILS RELATIVE PERCENT: 2 % (ref 1–4)
GFR AFRICAN AMERICAN: >60 ML/MIN
GFR NON-AFRICAN AMERICAN: >60 ML/MIN
GFR SERPL CREATININE-BSD FRML MDRD: ABNORMAL ML/MIN/{1.73_M2}
GFR SERPL CREATININE-BSD FRML MDRD: ABNORMAL ML/MIN/{1.73_M2}
GLUCOSE BLD-MCNC: 150 MG/DL (ref 70–99)
HCT VFR BLD CALC: 44 % (ref 40.7–50.3)
HEMOGLOBIN: 15.5 G/DL (ref 13–17)
IMMATURE GRANULOCYTES: 0 %
INR BLD: 1
LYMPHOCYTES # BLD: 39 % (ref 24–43)
MCH RBC QN AUTO: 29.4 PG (ref 25.2–33.5)
MCHC RBC AUTO-ENTMCNC: 35.2 G/DL (ref 25.2–33.5)
MCV RBC AUTO: 83.3 FL (ref 82.6–102.9)
MONOCYTES # BLD: 8 % (ref 3–12)
NRBC AUTOMATED: 0 PER 100 WBC
PARTIAL THROMBOPLASTIN TIME: 23.7 SEC (ref 27–35)
PDW BLD-RTO: 12.7 % (ref 11.8–14.4)
PLATELET # BLD: 172 K/UL (ref 138–453)
PLATELET ESTIMATE: ABNORMAL
PMV BLD AUTO: 9.6 FL (ref 8.1–13.5)
POTASSIUM SERPL-SCNC: 3.7 MMOL/L (ref 3.7–5.3)
PROTHROMBIN TIME: 10.4 SEC (ref 9.4–11.3)
RBC # BLD: 5.28 M/UL (ref 4.21–5.77)
RBC # BLD: ABNORMAL 10*6/UL
SEG NEUTROPHILS: 50 % (ref 36–65)
SEGMENTED NEUTROPHILS ABSOLUTE COUNT: 3.45 K/UL (ref 1.5–8.1)
SODIUM BLD-SCNC: 138 MMOL/L (ref 135–144)
TOTAL PROTEIN: 7.2 G/DL (ref 6.4–8.3)
TROPONIN INTERP: NORMAL
TROPONIN T: NORMAL NG/ML
TROPONIN, HIGH SENSITIVITY: 18 NG/L (ref 0–22)
WBC # BLD: 6.9 K/UL (ref 3.5–11.3)
WBC # BLD: ABNORMAL 10*3/UL

## 2020-01-10 PROCEDURE — 84484 ASSAY OF TROPONIN QUANT: CPT

## 2020-01-10 PROCEDURE — 96376 TX/PRO/DX INJ SAME DRUG ADON: CPT

## 2020-01-10 PROCEDURE — 85025 COMPLETE CBC W/AUTO DIFF WBC: CPT

## 2020-01-10 PROCEDURE — 85610 PROTHROMBIN TIME: CPT

## 2020-01-10 PROCEDURE — 6360000002 HC RX W HCPCS

## 2020-01-10 PROCEDURE — 96365 THER/PROPH/DIAG IV INF INIT: CPT

## 2020-01-10 PROCEDURE — 80053 COMPREHEN METABOLIC PANEL: CPT

## 2020-01-10 PROCEDURE — 99291 CRITICAL CARE FIRST HOUR: CPT

## 2020-01-10 PROCEDURE — 93005 ELECTROCARDIOGRAM TRACING: CPT | Performed by: EMERGENCY MEDICINE

## 2020-01-10 PROCEDURE — 85730 THROMBOPLASTIN TIME PARTIAL: CPT

## 2020-01-10 PROCEDURE — 36415 COLL VENOUS BLD VENIPUNCTURE: CPT

## 2020-01-10 PROCEDURE — 70450 CT HEAD/BRAIN W/O DYE: CPT

## 2020-01-10 PROCEDURE — 6360000002 HC RX W HCPCS: Performed by: EMERGENCY MEDICINE

## 2020-01-10 RX ORDER — NICARDIPINE HYDROCHLORIDE 2.5 MG/ML
INJECTION INTRAVENOUS
Status: DISCONTINUED
Start: 2020-01-10 | End: 2020-01-11 | Stop reason: HOSPADM

## 2020-01-10 RX ORDER — 0.9 % SODIUM CHLORIDE 0.9 %
50 INTRAVENOUS SOLUTION INTRAVENOUS ONCE
Status: DISCONTINUED | OUTPATIENT
Start: 2020-01-10 | End: 2020-01-11 | Stop reason: HOSPADM

## 2020-01-10 RX ORDER — SODIUM CHLORIDE 0.9 % (FLUSH) 0.9 %
10 SYRINGE (ML) INJECTION PRN
Status: DISCONTINUED | OUTPATIENT
Start: 2020-01-10 | End: 2020-01-11 | Stop reason: HOSPADM

## 2020-01-10 RX ORDER — DEXTROSE MONOHYDRATE 25 G/50ML
12.5 INJECTION, SOLUTION INTRAVENOUS
Status: ACTIVE | OUTPATIENT
Start: 2020-01-10 | End: 2020-01-10

## 2020-01-10 RX ORDER — SODIUM CHLORIDE 0.9 % (FLUSH) 0.9 %
10 SYRINGE (ML) INJECTION EVERY 12 HOURS SCHEDULED
Status: DISCONTINUED | OUTPATIENT
Start: 2020-01-10 | End: 2020-01-11 | Stop reason: HOSPADM

## 2020-01-10 RX ADMIN — ALTEPLASE 9 MG: KIT at 23:45

## 2020-01-10 RX ADMIN — ALTEPLASE 80.8 MG: KIT at 23:50

## 2020-01-11 ENCOUNTER — APPOINTMENT (OUTPATIENT)
Dept: MRI IMAGING | Age: 70
DRG: 024 | End: 2020-01-11
Payer: MEDICARE

## 2020-01-11 ENCOUNTER — ANESTHESIA EVENT (OUTPATIENT)
Dept: INTERVENTIONAL RADIOLOGY/VASCULAR | Age: 70
DRG: 024 | End: 2020-01-11
Payer: MEDICARE

## 2020-01-11 ENCOUNTER — APPOINTMENT (OUTPATIENT)
Dept: CT IMAGING | Age: 70
End: 2020-01-11
Payer: MEDICARE

## 2020-01-11 ENCOUNTER — APPOINTMENT (OUTPATIENT)
Dept: CT IMAGING | Age: 70
DRG: 024 | End: 2020-01-11
Payer: MEDICARE

## 2020-01-11 ENCOUNTER — HOSPITAL ENCOUNTER (INPATIENT)
Dept: INTERVENTIONAL RADIOLOGY/VASCULAR | Age: 70
Discharge: HOME | DRG: 024 | End: 2020-01-13
Payer: MEDICARE

## 2020-01-11 ENCOUNTER — ANESTHESIA (OUTPATIENT)
Dept: INTERVENTIONAL RADIOLOGY/VASCULAR | Age: 70
DRG: 024 | End: 2020-01-11
Payer: MEDICARE

## 2020-01-11 ENCOUNTER — HOSPITAL ENCOUNTER (INPATIENT)
Age: 70
LOS: 2 days | Discharge: HOME | DRG: 024 | End: 2020-01-13
Attending: EMERGENCY MEDICINE | Admitting: PSYCHIATRY & NEUROLOGY
Payer: MEDICARE

## 2020-01-11 VITALS — SYSTOLIC BLOOD PRESSURE: 116 MMHG | DIASTOLIC BLOOD PRESSURE: 78 MMHG | TEMPERATURE: 93.7 F | OXYGEN SATURATION: 95 %

## 2020-01-11 VITALS
BODY MASS INDEX: 34.46 KG/M2 | RESPIRATION RATE: 16 BRPM | HEART RATE: 105 BPM | WEIGHT: 220 LBS | TEMPERATURE: 98.1 F | SYSTOLIC BLOOD PRESSURE: 164 MMHG | OXYGEN SATURATION: 96 % | DIASTOLIC BLOOD PRESSURE: 84 MMHG

## 2020-01-11 PROBLEM — I63.9 ACUTE CEREBROVASCULAR ACCIDENT (CVA) (HCC): Status: ACTIVE | Noted: 2020-01-11

## 2020-01-11 LAB
ANION GAP SERPL CALCULATED.3IONS-SCNC: 16 MMOL/L (ref 9–17)
BUN BLDV-MCNC: 13 MG/DL (ref 8–23)
BUN/CREAT BLD: ABNORMAL (ref 9–20)
CALCIUM SERPL-MCNC: 8.1 MG/DL (ref 8.6–10.4)
CHLORIDE BLD-SCNC: 106 MMOL/L (ref 98–107)
CHOLESTEROL/HDL RATIO: 3.6
CHOLESTEROL: 137 MG/DL
CO2: 19 MMOL/L (ref 20–31)
CREAT SERPL-MCNC: 0.81 MG/DL (ref 0.7–1.2)
GFR AFRICAN AMERICAN: >60 ML/MIN
GFR NON-AFRICAN AMERICAN: >60 ML/MIN
GFR SERPL CREATININE-BSD FRML MDRD: ABNORMAL ML/MIN/{1.73_M2}
GFR SERPL CREATININE-BSD FRML MDRD: ABNORMAL ML/MIN/{1.73_M2}
GLUCOSE BLD-MCNC: 157 MG/DL (ref 70–99)
HCT VFR BLD CALC: 43 % (ref 40.7–50.3)
HDLC SERPL-MCNC: 38 MG/DL
HEMOGLOBIN: 14.8 G/DL (ref 13–17)
LDL CHOLESTEROL: 75 MG/DL (ref 0–130)
MCH RBC QN AUTO: 29.5 PG (ref 25.2–33.5)
MCHC RBC AUTO-ENTMCNC: 34.4 G/DL (ref 28.4–34.8)
MCV RBC AUTO: 85.8 FL (ref 82.6–102.9)
MRSA, DNA, NASAL: NORMAL
NRBC AUTOMATED: 0 PER 100 WBC
PDW BLD-RTO: 12.9 % (ref 11.8–14.4)
PLATELET # BLD: 225 K/UL (ref 138–453)
PMV BLD AUTO: 9.8 FL (ref 8.1–13.5)
POTASSIUM SERPL-SCNC: 4.1 MMOL/L (ref 3.7–5.3)
RBC # BLD: 5.01 M/UL (ref 4.21–5.77)
SODIUM BLD-SCNC: 141 MMOL/L (ref 135–144)
SPECIMEN DESCRIPTION: NORMAL
TRIGL SERPL-MCNC: 118 MG/DL
VLDLC SERPL CALC-MCNC: ABNORMAL MG/DL (ref 1–30)
WBC # BLD: 11.2 K/UL (ref 3.5–11.3)

## 2020-01-11 PROCEDURE — C1760 CLOSURE DEV, VASC: HCPCS

## 2020-01-11 PROCEDURE — 2500000003 HC RX 250 WO HCPCS: Performed by: NURSE ANESTHETIST, CERTIFIED REGISTERED

## 2020-01-11 PROCEDURE — 87086 URINE CULTURE/COLONY COUNT: CPT

## 2020-01-11 PROCEDURE — 2000000003 HC NEURO ICU R&B

## 2020-01-11 PROCEDURE — 93005 ELECTROCARDIOGRAM TRACING: CPT | Performed by: STUDENT IN AN ORGANIZED HEALTH CARE EDUCATION/TRAINING PROGRAM

## 2020-01-11 PROCEDURE — 97530 THERAPEUTIC ACTIVITIES: CPT

## 2020-01-11 PROCEDURE — 3700000001 HC ADD 15 MINUTES (ANESTHESIA)

## 2020-01-11 PROCEDURE — 80061 LIPID PANEL: CPT

## 2020-01-11 PROCEDURE — 2580000003 HC RX 258: Performed by: PSYCHIATRY & NEUROLOGY

## 2020-01-11 PROCEDURE — 6360000004 HC RX CONTRAST MEDICATION: Performed by: EMERGENCY MEDICINE

## 2020-01-11 PROCEDURE — 99292 CRITICAL CARE ADDL 30 MIN: CPT | Performed by: PSYCHIATRY & NEUROLOGY

## 2020-01-11 PROCEDURE — 92523 SPEECH SOUND LANG COMPREHEN: CPT

## 2020-01-11 PROCEDURE — 2709999900 HC NON-CHARGEABLE SUPPLY

## 2020-01-11 PROCEDURE — 70496 CT ANGIOGRAPHY HEAD: CPT

## 2020-01-11 PROCEDURE — 85027 COMPLETE CBC AUTOMATED: CPT

## 2020-01-11 PROCEDURE — C1887 CATHETER, GUIDING: HCPCS

## 2020-01-11 PROCEDURE — 0042T CT BRAIN PERFUSION: CPT

## 2020-01-11 PROCEDURE — 70551 MRI BRAIN STEM W/O DYE: CPT

## 2020-01-11 PROCEDURE — 2580000003 HC RX 258: Performed by: FAMILY MEDICINE

## 2020-01-11 PROCEDURE — 80048 BASIC METABOLIC PNL TOTAL CA: CPT

## 2020-01-11 PROCEDURE — 97162 PT EVAL MOD COMPLEX 30 MIN: CPT

## 2020-01-11 PROCEDURE — 6360000002 HC RX W HCPCS: Performed by: NURSE ANESTHETIST, CERTIFIED REGISTERED

## 2020-01-11 PROCEDURE — 92610 EVALUATE SWALLOWING FUNCTION: CPT

## 2020-01-11 PROCEDURE — 6370000000 HC RX 637 (ALT 250 FOR IP): Performed by: PSYCHIATRY & NEUROLOGY

## 2020-01-11 PROCEDURE — 99285 EMERGENCY DEPT VISIT HI MDM: CPT

## 2020-01-11 PROCEDURE — 2500000003 HC RX 250 WO HCPCS: Performed by: PSYCHIATRY & NEUROLOGY

## 2020-01-11 PROCEDURE — 2580000003 HC RX 258: Performed by: STUDENT IN AN ORGANIZED HEALTH CARE EDUCATION/TRAINING PROGRAM

## 2020-01-11 PROCEDURE — 6370000000 HC RX 637 (ALT 250 FOR IP): Performed by: STUDENT IN AN ORGANIZED HEALTH CARE EDUCATION/TRAINING PROGRAM

## 2020-01-11 PROCEDURE — C1769 GUIDE WIRE: HCPCS

## 2020-01-11 PROCEDURE — 3700000000 HC ANESTHESIA ATTENDED CARE

## 2020-01-11 PROCEDURE — 03CG3ZZ EXTIRPATION OF MATTER FROM INTRACRANIAL ARTERY, PERCUTANEOUS APPROACH: ICD-10-PCS | Performed by: PSYCHIATRY & NEUROLOGY

## 2020-01-11 PROCEDURE — 83036 HEMOGLOBIN GLYCOSYLATED A1C: CPT

## 2020-01-11 PROCEDURE — C1757 CATH, THROMBECTOMY/EMBOLECT: HCPCS

## 2020-01-11 PROCEDURE — 70450 CT HEAD/BRAIN W/O DYE: CPT

## 2020-01-11 PROCEDURE — C1894 INTRO/SHEATH, NON-LASER: HCPCS

## 2020-01-11 PROCEDURE — 61645 PERQ ART M-THROMBECT &/NFS: CPT | Performed by: PSYCHIATRY & NEUROLOGY

## 2020-01-11 PROCEDURE — 2580000003 HC RX 258: Performed by: NURSE ANESTHETIST, CERTIFIED REGISTERED

## 2020-01-11 PROCEDURE — 2500000003 HC RX 250 WO HCPCS

## 2020-01-11 PROCEDURE — 87641 MR-STAPH DNA AMP PROBE: CPT

## 2020-01-11 PROCEDURE — 6360000004 HC RX CONTRAST MEDICATION: Performed by: PSYCHIATRY & NEUROLOGY

## 2020-01-11 PROCEDURE — 99291 CRITICAL CARE FIRST HOUR: CPT | Performed by: PSYCHIATRY & NEUROLOGY

## 2020-01-11 PROCEDURE — 99222 1ST HOSP IP/OBS MODERATE 55: CPT | Performed by: PSYCHIATRY & NEUROLOGY

## 2020-01-11 RX ORDER — ACETAMINOPHEN 325 MG/1
650 TABLET ORAL EVERY 4 HOURS PRN
Status: CANCELLED | OUTPATIENT
Start: 2020-01-11

## 2020-01-11 RX ORDER — SODIUM CHLORIDE 9 MG/ML
INJECTION, SOLUTION INTRAVENOUS CONTINUOUS
Status: CANCELLED | OUTPATIENT
Start: 2020-01-11

## 2020-01-11 RX ORDER — 0.9 % SODIUM CHLORIDE 0.9 %
1000 INTRAVENOUS SOLUTION INTRAVENOUS ONCE
Status: COMPLETED | OUTPATIENT
Start: 2020-01-11 | End: 2020-01-11

## 2020-01-11 RX ORDER — PHENYLEPHRINE HYDROCHLORIDE 100 MG/ML
1 SOLUTION/ DROPS OPHTHALMIC ONCE
Status: COMPLETED | OUTPATIENT
Start: 2020-01-11 | End: 2020-01-11

## 2020-01-11 RX ORDER — SODIUM CHLORIDE 9 MG/ML
INJECTION, SOLUTION INTRAVENOUS CONTINUOUS
Status: DISCONTINUED | OUTPATIENT
Start: 2020-01-11 | End: 2020-01-12

## 2020-01-11 RX ORDER — SODIUM CHLORIDE 0.9 % (FLUSH) 0.9 %
10 SYRINGE (ML) INJECTION PRN
Status: DISCONTINUED | OUTPATIENT
Start: 2020-01-11 | End: 2020-01-13 | Stop reason: HOSPADM

## 2020-01-11 RX ORDER — TROPICAMIDE 10 MG/ML
1 SOLUTION/ DROPS OPHTHALMIC ONCE
Status: COMPLETED | OUTPATIENT
Start: 2020-01-11 | End: 2020-01-11

## 2020-01-11 RX ORDER — SODIUM CHLORIDE 0.9 % (FLUSH) 0.9 %
10 SYRINGE (ML) INJECTION EVERY 12 HOURS SCHEDULED
Status: DISCONTINUED | OUTPATIENT
Start: 2020-01-11 | End: 2020-01-13 | Stop reason: HOSPADM

## 2020-01-11 RX ORDER — ONDANSETRON 2 MG/ML
4 INJECTION INTRAMUSCULAR; INTRAVENOUS EVERY 8 HOURS PRN
Status: CANCELLED | OUTPATIENT
Start: 2020-01-11

## 2020-01-11 RX ORDER — ASPIRIN 81 MG/1
81 TABLET, CHEWABLE ORAL DAILY
Status: DISCONTINUED | OUTPATIENT
Start: 2020-01-11 | End: 2020-01-13 | Stop reason: HOSPADM

## 2020-01-11 RX ORDER — ACETAMINOPHEN 325 MG/1
650 TABLET ORAL EVERY 4 HOURS PRN
Status: DISCONTINUED | OUTPATIENT
Start: 2020-01-11 | End: 2020-01-13 | Stop reason: HOSPADM

## 2020-01-11 RX ORDER — METOPROLOL TARTRATE 5 MG/5ML
INJECTION INTRAVENOUS
Status: COMPLETED
Start: 2020-01-11 | End: 2020-01-11

## 2020-01-11 RX ORDER — CEFAZOLIN SODIUM 1 G/3ML
INJECTION, POWDER, FOR SOLUTION INTRAMUSCULAR; INTRAVENOUS PRN
Status: DISCONTINUED | OUTPATIENT
Start: 2020-01-11 | End: 2020-01-11 | Stop reason: SDUPTHER

## 2020-01-11 RX ORDER — ASPIRIN 81 MG/1
81 TABLET, CHEWABLE ORAL DAILY
Status: DISCONTINUED | OUTPATIENT
Start: 2020-01-11 | End: 2020-01-11

## 2020-01-11 RX ORDER — IODIXANOL 270 MG/ML
72 INJECTION, SOLUTION INTRAVASCULAR
Status: COMPLETED | OUTPATIENT
Start: 2020-01-11 | End: 2020-01-11

## 2020-01-11 RX ORDER — METOPROLOL TARTRATE 5 MG/5ML
INJECTION INTRAVENOUS PRN
Status: DISCONTINUED | OUTPATIENT
Start: 2020-01-11 | End: 2020-01-11 | Stop reason: SDUPTHER

## 2020-01-11 RX ORDER — ONDANSETRON 2 MG/ML
4 INJECTION INTRAMUSCULAR; INTRAVENOUS EVERY 8 HOURS PRN
Status: DISCONTINUED | OUTPATIENT
Start: 2020-01-11 | End: 2020-01-13 | Stop reason: HOSPADM

## 2020-01-11 RX ORDER — DEXTROSE MONOHYDRATE 25 G/50ML
12.5 INJECTION, SOLUTION INTRAVENOUS
Status: ACTIVE | OUTPATIENT
Start: 2020-01-11 | End: 2020-01-11

## 2020-01-11 RX ORDER — SODIUM CHLORIDE, SODIUM LACTATE, POTASSIUM CHLORIDE, CALCIUM CHLORIDE 600; 310; 30; 20 MG/100ML; MG/100ML; MG/100ML; MG/100ML
INJECTION, SOLUTION INTRAVENOUS CONTINUOUS PRN
Status: DISCONTINUED | OUTPATIENT
Start: 2020-01-11 | End: 2020-01-11 | Stop reason: SDUPTHER

## 2020-01-11 RX ORDER — FENTANYL CITRATE 50 UG/ML
INJECTION, SOLUTION INTRAMUSCULAR; INTRAVENOUS PRN
Status: DISCONTINUED | OUTPATIENT
Start: 2020-01-11 | End: 2020-01-11 | Stop reason: SDUPTHER

## 2020-01-11 RX ADMIN — SODIUM CHLORIDE: 9 INJECTION, SOLUTION INTRAVENOUS at 08:42

## 2020-01-11 RX ADMIN — SODIUM CHLORIDE 5 MG/HR: 9 INJECTION, SOLUTION INTRAVENOUS at 06:30

## 2020-01-11 RX ADMIN — FENTANYL CITRATE 25 MCG: 50 INJECTION INTRAMUSCULAR; INTRAVENOUS at 03:46

## 2020-01-11 RX ADMIN — SODIUM CHLORIDE, POTASSIUM CHLORIDE, SODIUM LACTATE AND CALCIUM CHLORIDE: 600; 310; 30; 20 INJECTION, SOLUTION INTRAVENOUS at 03:08

## 2020-01-11 RX ADMIN — METOPROLOL TARTRATE 25 MG: 25 TABLET ORAL at 21:03

## 2020-01-11 RX ADMIN — METOPROLOL TARTRATE 2.5 MG: 1 INJECTION, SOLUTION INTRAVENOUS at 04:58

## 2020-01-11 RX ADMIN — FENTANYL CITRATE 50 MCG: 50 INJECTION INTRAMUSCULAR; INTRAVENOUS at 03:14

## 2020-01-11 RX ADMIN — IOPAMIDOL 75 ML: 755 INJECTION, SOLUTION INTRAVENOUS at 00:37

## 2020-01-11 RX ADMIN — SODIUM CHLORIDE 1000 ML: 9 INJECTION, SOLUTION INTRAVENOUS at 02:48

## 2020-01-11 RX ADMIN — PHENYLEPHRINE HYDROCHLORIDE 1 DROP: 100 SOLUTION/ DROPS OPHTHALMIC at 09:06

## 2020-01-11 RX ADMIN — SODIUM CHLORIDE, PRESERVATIVE FREE 10 ML: 5 INJECTION INTRAVENOUS at 21:04

## 2020-01-11 RX ADMIN — CEFAZOLIN 2000 MG: 1 INJECTION, POWDER, FOR SOLUTION INTRAMUSCULAR; INTRAVENOUS at 03:35

## 2020-01-11 RX ADMIN — ASPIRIN 81 MG: 81 TABLET, CHEWABLE ORAL at 21:03

## 2020-01-11 RX ADMIN — TROPICAMIDE 1 DROP: 10 SOLUTION/ DROPS OPHTHALMIC at 09:07

## 2020-01-11 RX ADMIN — METOPROLOL TARTRATE 2.5 MG: 1 INJECTION, SOLUTION INTRAVENOUS at 04:55

## 2020-01-11 RX ADMIN — SODIUM CHLORIDE 4 MG/HR: 9 INJECTION, SOLUTION INTRAVENOUS at 10:38

## 2020-01-11 RX ADMIN — IODIXANOL 72 ML: 270 INJECTION, SOLUTION INTRAVASCULAR at 04:51

## 2020-01-11 RX ADMIN — IOHEXOL 50 ML: 350 INJECTION, SOLUTION INTRAVENOUS at 02:35

## 2020-01-11 RX ADMIN — SODIUM CHLORIDE, PRESERVATIVE FREE 10 ML: 5 INJECTION INTRAVENOUS at 09:07

## 2020-01-11 RX ADMIN — METOPROLOL TARTRATE 5 MG: 5 INJECTION, SOLUTION INTRAVENOUS at 05:45

## 2020-01-11 RX ADMIN — METOPROLOL TARTRATE 25 MG: 25 TABLET ORAL at 12:40

## 2020-01-11 RX ADMIN — FENTANYL CITRATE 25 MCG: 50 INJECTION INTRAMUSCULAR; INTRAVENOUS at 03:30

## 2020-01-11 ASSESSMENT — PULMONARY FUNCTION TESTS
PIF_VALUE: 0
PIF_VALUE: 1
PIF_VALUE: 0
PIF_VALUE: 1
PIF_VALUE: 0
PIF_VALUE: 1
PIF_VALUE: 0
PIF_VALUE: 1
PIF_VALUE: 0

## 2020-01-11 ASSESSMENT — ENCOUNTER SYMPTOMS: SHORTNESS OF BREATH: 0

## 2020-01-11 ASSESSMENT — PAIN SCALES - GENERAL: PAINLEVEL_OUTOF10: 0

## 2020-01-11 NOTE — ED PROVIDER NOTES
diagnosis with the patient and or their family/guardian. I have answered their questions and givendischarge instructions. They voiced understanding of these instructions and did not have any further questions or complaints. DIAGNOSTIC RESULTS     EKG: All EKG's are interpreted by the Emergency Department Physician who either signs or Co-signs this chart inthe absence of a cardiologist.    Twelve-lead EKG shows atrial fibrillation with a rapid ventricular response of 114 bpm.  Normal QRS duration normal QTC. Normal axis. No acute ST elevations depressions or T wave inversions interpretation is a new onset atrial fibrillation with rapid ventricular response. RADIOLOGY:   I directly visualized the following plain film images and reviewed the radiologistinterpretations of radiologic studies:  Ct Head Wo Contrast    Result Date: 1/11/2020  No acute intracranial abnormality. Ct Head Wo Contrast    Result Date: 1/10/2020  EXAMINATION: CT OF THE HEAD WITHOUT CONTRAST  1/10/2020 10:52 pm TECHNIQUE: CT of the head was performed without the administration of intravenous contrast. Dose modulation, iterative reconstruction, and/or weight based adjustment of the mA/kV was utilized to reduce the radiation dose to as low as reasonably achievable. COMPARISON: None HISTORY: ORDERING SYSTEM PROVIDED HISTORY: stroke TECHNOLOGIST PROVIDED HISTORY: stroke FINDINGS: BRAIN/VENTRICLES: There is no acute intracranial hemorrhage, mass effect or midline shift. No abnormal extra-axial fluid collection. The gray-white differentiation is maintained without evidence of an acute infarct. There is no evidence of hydrocephalus. ORBITS: The visualized portion of the orbits demonstrate no acute abnormality. SINUSES: The visualized paranasal sinuses and mastoid air cells demonstrate no acute abnormality. SOFT TISSUES/SKULL:  No acute abnormality of the visualized skull or soft tissues. No acute intracranial abnormality.  Critical Protein 7.2 6.4 - 8.3 g/dL    Alb 4.1 3.5 - 5.2 g/dL    Albumin/Globulin Ratio 1.3 1.0 - 2.5    GFR Non-African American >60 >60 mL/min    GFR African American >60 >60 mL/min    GFR Comment          GFR Staging NOT REPORTED    Protime-INR   Result Value Ref Range    Protime 10.4 9.4 - 11.3 sec    INR 1.0    APTT   Result Value Ref Range    PTT 23.7 (L) 27.0 - 35.0 sec   Troponin   Result Value Ref Range    Troponin, High Sensitivity 18 0 - 22 ng/L    Troponin T NOT REPORTED <0.03 ng/mL    Troponin Interp NOT REPORTED    EKG 12 Lead   Result Value Ref Range    Ventricular Rate 114 BPM    Atrial Rate 115 BPM    QRS Duration 74 ms    Q-T Interval 328 ms    QTc Calculation (Bazett) 452 ms    R Axis 15 degrees    T Axis 34 degrees       EMERGENCY DEPARTMENT COURSE:   Vitals:    Vitals:    01/10/20 2353 01/11/20 0001 01/11/20 0008 01/11/20 0032   BP: (!) 176/99 (!) 175/85 (!) 164/84    Pulse: 108 104 105    Resp: 16 16 16 16   Temp:       TempSrc:       SpO2: 98% 97% 96% 96%   Weight:         -------------------------  BP: (!) 164/84, Temp: 98.1 °F (36.7 °C), Pulse: 105, Resp: 16      CONSULTS:  Interventional neurology, radiologist x2, accepting emergency medicine physician    PROCEDURES:  None    CRITICAL CARE: There was a high probability of clinically significant/life threatening deterioration in this patient's condition which required my urgent intervention. Total critical care time was 60 minutes. This excludes any time for separately reportable procedures. FINAL IMPRESSION      1. Arterial ischemic stroke, MCA (middle cerebral artery), left, acute ([facility] [address]    2. New onset atrial fibrillation [facility])          DISPOSITION/PLAN   DISPOSITION Decision To Transfer 01/10/2020 11:26:12 PM      PATIENT REFERRED TO:  No follow-up provider specified.     DISCHARGE MEDICATIONS:  Discharge Medication List as of 1/11/2020  1:01 AM          Control    (Please note that portions of this note were completed with avoice

## 2020-01-11 NOTE — ANESTHESIA PRE PROCEDURE
Department of Anesthesiology  Preprocedure Note       Name:  Ángela Yip   Age:  78 y.o.  :  1950                                          MRN:  [id number]         Date:  2020      Surgeon: * No surgeons listed *    Procedure: IR ANGIOGRAM CAROTID CEREBRAL BILATERAL    Medications prior to admission:   Prior to Admission medications    Medication Sig Start Date End Date Taking? Authorizing Provider   amoxicillin (AMOXIL) 500 MG capsule Take 1 capsule by mouth 3 times daily 11/15/19   Ashlee Chavez, APRN - CNP   Fish Oil-Cholecalciferol (FISH OIL + D3 PO) Take by mouth    Historical Provider, MD   aspirin 325 MG EC tablet Take 325 mg by mouth daily    Historical Provider, MD   vitamin D (ERGOCALCIFEROL) 26380 UNITS CAPS capsule Take 50,000 Units by mouth once a week    Historical Provider, MD   VITAMIN B1-B12 IM Inject  into the muscle. Historical Provider, MD       Current medications:    No current facility-administered medications for this encounter. Current Outpatient Medications   Medication Sig Dispense Refill    amoxicillin (AMOXIL) 500 MG capsule Take 1 capsule by mouth 3 times daily 30 capsule 0    Fish Oil-Cholecalciferol (FISH OIL + D3 PO) Take by mouth      aspirin 325 MG EC tablet Take 325 mg by mouth daily      vitamin D (ERGOCALCIFEROL) 05572 UNITS CAPS capsule Take 50,000 Units by mouth once a week      VITAMIN B1-B12 IM Inject  into the muscle.        Facility-Administered Medications Ordered in Other Encounters   Medication Dose Route Frequency Provider Last Rate Last Dose    0.9 % sodium chloride bolus  1,000 mL Intravenous Once David Rueda  mL/hr at 20 0248 1,000 mL at 20 0248    sodium chloride flush 0.9 % injection 10 mL  10 mL Intravenous 2 times per day Ernestina Miser, DO        sodium chloride flush 0.9 % injection 10 mL  10 mL Intravenous PRN Ernestina Miser, DO        dextrose 50 % IV solution  12.5 g Intravenous Once PRN Ernestina Miser, DO        fentaNYL (SUBLIMAZE) injection    PRN JIMBO Pantoja - CRNA   50 mcg at 01/11/20 0314       Allergies:  No Known Allergies    Problem List:    Patient Active Problem List   Diagnosis Code    Skin tag L91.8    Corneal foreign body, right, initial encounter T15. 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    Combined forms of age-related cataract of both eyes H25.813    Acute cerebrovascular accident (CVA) ([facility] [address] I63.9       Past Medical History:        Diagnosis Date    Chronic kidney disease     Hyperlipidemia     PTSD (post-traumatic stress disorder)        Past Surgical History:        Procedure Laterality Date    APPENDECTOMY         Social History:    Social History     Tobacco Use    Smoking status: Never Smoker    Smokeless tobacco: Never Used   Substance Use Topics    Alcohol use: Yes     Alcohol/week: 0.0 standard drinks     Comment: occas                                Counseling given: Not Answered      Vital Signs (Current): There were no vitals filed for this visit.                                            BP Readings from Last 3 Encounters:   01/11/20 (!) 170/98   01/11/20 (!) 156/88   01/11/20 (!) 164/84       NPO Status:                                                                                 BMI:   Wt Readings from Last 3 Encounters:   01/11/20 220 lb 0.3 oz (99.8 kg)   01/10/20 220 lb (99.8 kg)   11/15/19 208 lb (94.3 kg)     There is no height or weight on file to calculate BMI.    CBC:   Lab Results   Component Value Date    WBC 6.9 01/10/2020    RBC 5.28 01/10/2020    HGB 15.5 01/10/2020    HCT 44.0 01/10/2020    MCV 83.3 01/10/2020    RDW 12.7 01/10/2020     01/10/2020       CMP:   Lab Results   Component Value Date     01/10/2020    K 3.7 01/10/2020     01/10/2020    CO2 21 01/10/2020    BUN 15 01/10/2020    CREATININE 1.09 01/10/2020    GFRAA >60 01/10/2020    LABGLOM Mildly dilated right ventricle with normal function. 6.  Aortic root diameter is at the upper limit of normal (4.0 cm). 7.  Thickened mitral valve leaflets. Mild to moderate mitral regurgitation. 8.  Sclerotic aortic valve with mild aortic regurgitation. 9.  Mild tricuspid regurgitation. RVSP is 39 mm Hg. 10. No pericardial effusion. Anesthesia Plan      MAC and general     ASA 3 - emergent     (MAC, GA backup)  Induction: intravenous. Anesthetic plan and risks discussed with patient and Unable to obtain due to emergent nature.       Plan discussed with CRNA and surgical team.                  Geraldine Sewell MD   1/11/2020

## 2020-01-11 NOTE — ANESTHESIA POSTPROCEDURE EVALUATION
Department of Anesthesiology  Postprocedure Note    Patient: Héctor Up  MRN: [id number]  YOB: 1950  Date of evaluation: 1/11/2020  Time:  6:46 AM     Procedure Summary     Date:  01/11/20 Room / Location:  [facility]    Anesthesia Start:  0308 Anesthesia Stop:  1411    Procedure:  IR ANGIOGRAM CAROTID CEREBRAL BILATERAL Diagnosis:  (emergent thrombectomy with anesthesia)    Scheduled Providers:   Responsible Provider:  Dana Jarvis MD    Anesthesia Type:  MAC, general ASA Status:  3 - Emergent          Anesthesia Type: No value filed. Merlyn Phase I:      Merlyn Phase II:      Last vitals: Reviewed and per EMR flowsheets.        Anesthesia Post Evaluation    Patient location during evaluation: ICU  Patient participation: complete - patient participated  Level of consciousness: awake and alert  Pain score: 0  Airway patency: patent  Nausea & Vomiting: no nausea and no vomiting  Complications: no  Cardiovascular status: hypertensive  Respiratory status: acceptable and spontaneous ventilation  Hydration status: stable

## 2020-01-11 NOTE — FLOWSHEET NOTE
[facility] CARE DEPARTMENT - [address]     Emergency/Trauma Note    PATIENT NAME: Khushboo Stark    Shift date: 1/10/2020    Shift day: Friday   Shift # 3    Room # ED13  Name: Khushboo Stark            Age: 78 y.o. Gender: male          Spiritism: No Temple on file   Place of Jew: Unknown    Trauma/Incident type: Stroke Alert  Admit Date & Time: 1/11/2020  1:53 AM  TRAUMA NAME: N/A    PATIENT/EVENT DESCRIPTION:  Khushboo Stark is a 78 y.o. male who arrived as a transfer from [facility] and was paged out as a \"Stroke Alert. \" Patient was awake and talking when  visited. While  was in ED16, patient followed commands, however, was unable to lift his right arm and was only able to mumble words when attempting to speak. Patient was taken to IR Lab for procedure. Pt to be admitted to 0522/0522-01. SPIRITUAL ASSESSMENT/INTERVENTION:   responded to page and gathered patient information outside room.  visited patient in room and met his spouse, Lavell Edwards.  provided comfort, support, and hospitality to her in room.  escorted patient's adult children to hospital room.  assisted family in receiving information about patient's condition and his upcoming procedure from care team.  escorted patient's family members to IR Waiting Room and informed staff of their location.  made follow-up visit and escorted family to bedside in [address]. Patient's family members were receptive and thankful for 's support and care. PATIENT BELONGINGS:  No belongings noted    ANY BELONGINGS OF SIGNIFICANT VALUE NOTED:  N/A    REGISTRATION STAFF NOTIFIED? Yes      WHAT IS YOUR SPIRITUAL CARE PLAN FOR THIS PATIENT?:  [facility] can make follow-up visit, per request. Juan J Blankenship can be reached 24/7 via [employer].     Electronically signed by Catracho Alexander on 1/11/2020 at 5:54 AM.  [address] [place]  [phone]

## 2020-01-11 NOTE — VIRTUAL HEALTH
Limb Ataxia:  1  8. Sensory:  1  9. Best Language:  2  10. Dysarthria:  2  11. Extinction and Inattention: 0     Total: 11      Imaging:  Images were personally reviewed including:  CT brain without contrast: No acute bleeding noted. Assessment  Complete ED by Aiden Pham  Differential DDx:  1. Acute ischemic .prepistaxis    Recommendations:  1. NIH 11  2. Recommend emergent aspect of [place]    Patient is an IV-tPA candidate:    No history of recent bleeding, no history of brain bleed/aneurysm/brain tumor, or recent surgery/trauma. IV-tPA Consent: I have informed the patient and/or family of all the associated risks including 6% of sich/death and 1-3% of angioedema, benefits, and alternatives to IV t-PA. The patient and/or family voluntarily consent to the administration of IV t-PA. Infuse 0.9 mg/kg (maximum dose 90 mg) over 60 minutes, with 10% of the dose given as a bolus over 1 minute. --Transfer patient to [place].  --If the patient develops severe headache, acute hypertension, nausea, or vomiting or has a worsening neurological examination, discontinue the infusion (if IV rtPA is being administered) and obtain emergent CT scan. --Measure blood pressure and perform neurological assessments every 15 minutes during and after IV rtPA infusion for 2 hours, then every 30 minutes for 6 hours, then hourly until 24 hours after IV rtPA treatment. --Increase the frequency of blood pressure measurements if systolic blood pressure is >180 mmHg or if diastolic blood pressure is >105 mmHg; administer antihypertensive medications to maintain blood pressure at or below these levels  --Delay placement of nasogastric tubes, indwelling bladder catheters, or intra-arterial pressure catheters if the patient can be safely managed without them. --Obtain a follow-up CT or MRI scan at 24 hours after IV rtPA before starting anticoagulants or antiplatelet agents.   No antiplatelet agent personally, reviewing the chart, perform high complexity decision making and speaking with the nursing staff regarding recommendations      Adelaida Armstrong MD   Stroke, Neurocritical Care And/or [address] [place] [address] Stroke Network  [address] [place]  Electronically signed 1/11/2020 at 12:02 AM

## 2020-01-11 NOTE — H&P
Financial resource strain: Not on file    Food insecurity:     Worry: Not on file     Inability: Not on file    Transportation needs:     Medical: Not on file     Non-medical: Not on file   Tobacco Use    Smoking status: Never Smoker    Smokeless tobacco: Never Used   Substance and Sexual Activity    Alcohol use: Yes     Alcohol/week: 0.0 standard drinks     Comment: occas    Drug use: No    Sexual activity: Yes   Lifestyle    Physical activity:     Days per week: Not on file     Minutes per session: Not on file    Stress: Not on file   Relationships    Social connections:     Talks on phone: Not on file     Gets together: Not on file     Attends Druze service: Not on file     Active member of club or organization: Not on file     Attends meetings of clubs or organizations: Not on file     Relationship status: Not on file    Intimate partner violence:     Fear of current or ex partner: Not on file     Emotionally abused: Not on file     Physically abused: Not on file     Forced sexual activity: Not on file   Other Topics Concern    Not on file   Social History Narrative    Not on file       Family History:   No family history on file. Allergies:    Patient has no known allergies. Medications Prior to Admission:    Not in a hospital admission.     Current Medications:  Current Facility-Administered Medications: 0.9 % sodium chloride bolus, 1,000 mL, Intravenous, Once  sodium chloride flush 0.9 % injection 10 mL, 10 mL, Intravenous, 2 times per day  sodium chloride flush 0.9 % injection 10 mL, 10 mL, Intravenous, PRN  dextrose 50 % IV solution, 12.5 g, Intravenous, Once PRN  phenylephrine (ALAINA-SYNEPHRINE) 10 % ophthalmic solution 1 drop, 1 drop, Both Eyes, Once  tropicamide (MYDRIACYL) 1 % ophthalmic solution 1 drop, 1 drop, Both Eyes, Once    REVIEW OF SYSTEMS     CONSTITUTIONAL: negative for fatigue and malaise   EYES: negative for double vision and photophobia    HEENT: negative for tinnitus XII: Tongue protrusion full and midline    Sensation:   Right Upper Extremity:  abnormal - decreased  Left Upper Extremity:  normal  Right Lower Extremity:  abnormal - decreased  Left Lower Extremity:  normal  Cerebellar: Heel to shin intact bilaterally       DTRs:  +2/4 Left Bicep, Triceps, Brachioradialis  + 2/4 Right Bicep, Triceps, Brachioradialis  + 2 / 4 Left Patellar, Achilles  + 2/ 4 Right Patellar, Achillis   Plantar Response Left : downgoing   Plantar Response Right downgoing   Clonus: absent   Izaguirre: Left hand    Muscle Strength:  + 5 / 5 Strength to LUE flexion, Extension  + 0 / 5 Strength to RUE flexion , Extension  + 5 / 5 Strength to flexion & extension of Left Hip leg plantar and dorsi flexion  + 4 / 5 Strength to flexion & extension of Right Hip leg plantar and dorsi flexion  Drift:  absent  Tone:  Normal on LUE and LL    SKIN: no rash, no erythema, cap refill < 2 sec  ---------------------------------------------------------------------------------------------------------------------------------------------------------------------------------------------------------------------------------------------  NIH Stroke Scale Total (if not done complete detailed one below):  11  1a. Level of consciousness:  0 - alert; keenly responsive  1b. Level of consciousness questions:  0 - answers both questions correctly  1c. Level of consciousness questions:  0 - performs both tasks correctly  2. Best Gaze:  0 - normal  3. Visual:  0 - no visual loss  4. Facial Palsy:  2 - partial paralysis (total or near total paralysis of the lower face)  5a. Motor left arm:  0 - no drift, limb holds 90 (or 45) degrees for full 10 seconds  5b. Motor right arm:  4 - no movement  6a. Motor left le - no drift; leg holds 30 degree position for full 5 seconds  6b. Motor right le - no drift; leg holds 30 degree position for full 5 seconds  7. Limb Ataxia:  0 - absent  8.     Sensory:  1 - mild to moderate [id number] revascularization    Patient care will be discussed with attending, will reevaluate patient along with attending. PLAN/MEDICAL DECISION MAKING:    NEUROLOGIC:  1. CVA s/p tPA   L M2 MCA occlusion s/p MT and TPA  - NIH 11  - NELLIE for stenting   - Imaging CT head 11 am; MRI  - AEDs none  - Goal -140 SBP  - Neuro checks per protocol  S/P TPA---be cautious for bleeding risk    CARDIOVASCULAR:  - Goal -140 SBP  - Continue telemetry  --Cardene  Question of new-onset Afib  Echo  In atrial fibrillation with RVR at outside facility---new onset  Start [facility] when appropriate  Cardiology consultation?      PULMONARY:  - Vent Settings:  - Daily ABG:     RENAL/FLUID/ELECTROLYTE:  - BUN 15/ Creatinine 1.09  - Urine output monitor ml/kg/hr  - IVF: 100 ns /hr  - Replace electrolytes PRN  - Daily BMP    GI/NUTRITION:  NUTRITION:  Diet NPO Effective Now  - Bowel regimen:   - GI prophylaxis:      ID:  - Tmax afebrile  - Continue to monitor for fevers  - Daily CBC    HEME:   - H&H 15.5  - Platelets 823  - Daily CBC    ENDOCRINE:  - Continue to monitor blood glucose, goal <180  -     OTHER:  - PT/OT/ST     PROPHYLAXIS:  Stress ulcer: none    DVT PROPHYLAXIS:  - SCD sleeves - Thigh High   - NATALI stockings - Thigh High  -restart aspirin and AC 24 hr post TPA      DISPOSITION: Faye Arango  Neuro Critical Care Service   Pager [phone]  1/11/2020     2:56 AM

## 2020-01-11 NOTE — CONSULTS
Department of Endovascular Neurosurgery                                         Resident Consult Note    Reason for Consult: Aphasia, right-sided weakness  Requesting Physician: Dr. Wilson [facility]  Endovascular Neurosurgeon:   []Dr. Luis Fernando Flanagan  []Dr. Fiona Joy  []Dr. Wendi Obrien  []Dr. Frida Slaughter  [x]Dr. Marlin Jones    History Obtained From:  patient, family member - spouse, electronic medical record, staff    CHIEF COMPLAINT:       Aphasia, right-sided weakness    HISTORY OF PRESENT ILLNESS:       The patient is a 78 y.o. male who presents with aphasia and right-sided weakness with last known well at 31 75 62. Patient was taken to [place] ER where CT brain was done was negative. Patient was given IV TPA and was transferred to [facility] for further evaluation and management. Patient SBP 160s, glucose 150. Patient was found to have L M2 occlusion . Patient was taken for mechanical thrombectomy. PAST MEDICAL HISTORY :       Past Medical History:        Diagnosis Date    Chronic kidney disease     Hyperlipidemia     PTSD (post-traumatic stress disorder)        Past Surgical History:        Procedure Laterality Date    APPENDECTOMY         Social History:   Social History     Socioeconomic History    Marital status:      Spouse name: Not on file    Number of children: Not on file    Years of education: Not on file    Highest education level: Not on file   Occupational History    Not on file   Social Needs    Financial resource strain: Not on file    Food insecurity:     Worry: Not on file     Inability: Not on file    Transportation needs:     Medical: Not on file     Non-medical: Not on file   Tobacco Use    Smoking status: Never Smoker    Smokeless tobacco: Never Used   Substance and Sexual Activity    Alcohol use:  Yes     Alcohol/week: 0.0 standard drinks     Comment: occas    Drug use: No    Sexual activity: Yes   Lifestyle    Physical activity:     Days per week: Not on file nausea, vomiting   GENITOURINARY: negative for incontinence   MUSCULOSKELETAL: negative for neck or back pain   NEUROLOGICAL: negative for seizures   PSYCHIATRIC: negative for fatigue     Review of systems otherwise negative. PHYSICAL EXAM:       BP (!) 170/98   Pulse 102   Temp 98.8 °F (37.1 °C) (Oral)   Resp 21   Wt 220 lb 0.3 oz (99.8 kg)   SpO2 98%   BMI 34.46 kg/m²     CONSTITUTIONAL:  Well developed, well nourished, alert and oriented x 3, in no acute distress. GCS 15, nontoxic. + dysarthria, +aphasia.  EOMI   HEAD:  normocephalic, atraumatic    EYES:  PERRLA, EOMI.   ENT:  moist mucous membranes   NECK:  supple, symmetric, no midline tenderness to palpation    BACK:  without midline tenderness, step-offs or deformities    LUNGS:  Equal air entry bilaterally   CARDIOVASCULAR:  normal s1 / s2   ABDOMEN:  Soft, no rigidity   NEUROLOGIC:    Mental status   Alert and oriented; intact memory with no confusion, speech or language problems; no hallucinations or delusions     Cranial nerves   II - visual fields intact to confrontation                                                III, IV, VI - extra-ocular muscles full: no pupillary defect; no HARSH, no nystagmus, no ptosis                                                                      V - normal facial sensation                                                               VII - normal facial symmetry                                                             VIII - intact hearing                                                                             IX, X - symmetrical palate                                                                  XI - symmetrical shoulder shrug                                                       XII - midline tongue without atrophy or fasciculation     Motor function  Normal muscle bulk and tone; normal power 5/5  RUE 1/5  RLE 4+/5     Sensory function Intact to touch, pin, vibration, proprioception  Decreased 01/10/2020     01/10/2020    MCV 83.3 01/10/2020    MCH 29.4 01/10/2020    MCHC 35.2 01/10/2020    RDW 12.7 01/10/2020    LYMPHOPCT 39 01/10/2020    MONOPCT 8 01/10/2020    BASOPCT 0 01/10/2020    MONOSABS 0.55 01/10/2020    LYMPHSABS 2.67 01/10/2020    EOSABS 0.13 01/10/2020    BASOSABS 0.03 01/10/2020    DIFFTYPE NOT REPORTED 01/10/2020     BMP:    Lab Results   Component Value Date     01/10/2020    K 3.7 01/10/2020     01/10/2020    CO2 21 01/10/2020    BUN 15 01/10/2020    LABALBU 4.1 01/10/2020    CREATININE 1.09 01/10/2020    CALCIUM 9.2 01/10/2020    GFRAA >60 01/10/2020    LABGLOM >60 01/10/2020    GLUCOSE 150 01/10/2020       Radiology Review:  AS ABOVE      ASSESSMENT AND PLAN:       Patient Active Problem List   Diagnosis    Skin tag    Corneal foreign body, right, initial encounter    Glaucoma suspect of both eyes    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 male who presents with expressive aphasia, sided weakness. DDx: L frontal stroke secondary to L MCA occlusion    - Discussed with Dr. Sho Rollins  -Patient was taken for mechanical thrombectomy for left MCA M2 occlusion. 1.  S/p IV tPA  2. -170 mm Hg  3. Glucose  with accucheck q 6h x 24  4. No anticoagulation, DVT med prophlyaxis, antiplatelets, no ASA or Plavix x 24 hours  5. Repeat CT or MRI 24 hours post IV tPA  6. CTA head and neck-completed in ED, no need for carotid US  7. Cardiac Echo  8. Fasting Lipid Panel  9. Hypercoag work up  10. DVT prophylaxis  11. GI prophylaxis  12. PT/OT/ST and PMR consult  13. IVF  14. Admit to the neurocritical care unit for Intravenous Medication to Control the Blood pressure monitoring  15.  Continuous cardiac, respiratory, hemodynamic and neurological monitoring              - Hydrate with 1000cc bolus then IVF NS @ 75cc/hr    - Telemetry    - Neuro checks per protocol  - We recommend SBP <180/105  - Blood glucose goal less than 180  - Please avoid dextrose containing solutions    Additional recommendations may follow    Please contact [facility] with any changes in patients neurologic status. Thank you for your consult.        Edis Larsen MD   1/11/2020  2:52 AM

## 2020-01-11 NOTE — PROGRESS NOTES
dry and hard foods at home. However, due to dysarthria and weakness it is recommended that pt remain on soft diet at this time. Recommend small sips and bites, only feed when alert and awake and upright at 90 degrees for all PO intake. Recommend close monitoring for overt/clinical s/s of aspiration and D/C PO intake and complete Modified Barium Swallow Study should they occur. Results and recommendations reported to RN. Dysphagia Diagnosis: Swallow function appears grossly intact  Dysphagia Outcome Severity Scale: Level 6: Within functional limits/Modified independence     Treatment Plan  Requires SLP Intervention: Yes  Duration/Frequency of Treatment: 1-2 x week  D/C Recommendations: To be determined       Recommended Diet and Intervention  Diet Solids Recommendation: Dysphagia Soft and Bite-Sized (Dysphagia III)  Liquid Consistency Recommendation: Thin  Recommended Form of Meds: PO     Therapeutic Interventions: Diet tolerance monitoring;Patient/Family education    Compensatory Swallowing Strategies  Compensatory Swallowing Strategies: Small bites/sips;Eat/Feed slowly;Upright as possible for all oral intake    Treatment/Goals  Dysphagia Goals: The patient will tolerate recommended diet without observed clinical signs of aspiration    General  Chart Reviewed: Yes  Behavior/Cognition: Alert; Cooperative  Temperature Spikes Noted: No  Respiratory Status: O2 via nasual cannula  O2 Device: Nasal cannula  Communication Observation: Functional  Dentition: Edentulous  Patient Positioning: Upright in bed  Consistencies Administered: Dysphagia Soft and Bite-Sized (Dysphagia III); Nectar - teaspoon;Nectar - straw; Thin - straw;Dysphagia Pureed (Dysphagia I)           Vision/Hearing  Vision  Vision: Within Functional Limits  Hearing  Hearing: Within functional limits    Oral Motor Deficits  Oral/Motor  Oral Motor: Exceptions to [facility]  Labial ROM: Reduced right  Labial Symmetry: Abnormal symmetry right  Labial Strength:

## 2020-01-11 NOTE — ED NOTES
Pt transfer from [place] ED via [facility]. LKW around 31 75 62. Pt had left sided facial droop, RUE and RLE weakness, slurred speech, and expressive aphasia. Pt was given 9 mg tPa bolus followed by 80.9 mg infusion that was finished at 0055. NIH was 7. Speech and RUE weakness improving PTA. Pt has new onset Afib.       Sigifredo Ballesteros RN  01/11/20 2081

## 2020-01-11 NOTE — ANESTHESIA PROCEDURE NOTES
Arterial Line:    An arterial line was placed using surface landmarks, in the OR for the following indication(s): continuous blood pressure monitoring. A 20 gauge (size), 1 and 1/4 inch (length), Arrow (type) catheter was placed, Seldinger technique used, into the left radial artery, secured by tape and Tegaderm. Anesthesia type: Local    Events:  patient tolerated procedure well with no complications.   1/11/2020 3:16 AM1/11/2020 3:18 AM  Anesthesiologist: Chen Mann MD  Performed: Anesthesiologist   Preanesthetic Checklist  Completed: patient identified, site marked, surgical consent, pre-op evaluation, timeout performed, IV checked, risks and benefits discussed, monitors and equipment checked, anesthesia consent given, oxygen available and patient being monitored

## 2020-01-11 NOTE — SEDATION DOCUMENTATION
Post Procedure Transfer from IR Table  [x] Tubes and Lines intact     Post Procedure Neuro-Checks/NIHSS/Vitals  [x] Completed post procedure  [x] Completed bedside handoff  [x] Frequency ordered  [x] Verbal communication of frequency      Post Procedure Puncture Site Checks  [x] Completed post procedure  [x] Completed bedside handoff  [x] Frequency ordered  [x] Verbal communication of frequency    Post Procedure Pulse  Checks  [x] Completed post procedure  [x] Completed bedside handoff  [x] Frequency ordered  [x] Verbal communication of frequency    Order Set  [x] Post Neuro-Endo Procedure  [] Stroke  [] t-PA     B/P control  [x] Verbal Communication 100-130 with pressure more 120 or less   [x] Order in Care Path    Medication Review  [x] Given during procedure  [x] Current drips/meds/fluids    [x] Physician Notified of All changes in Assessment  Some improvement noted post procedure, pt able to lift right arm slightly better. Speech continues slurred and getting better sentances but still stumbles on some words. Family  [x] Location Post Procedure   spoke to wife and pt children  Showed pictures of procedure.

## 2020-01-11 NOTE — SEDATION DOCUMENTATION
Pt awake oriented, slurred and expressive speech, paresis to RUE and drift to RLE, reports intact sensation, feet cool to touch with strong pulses on right and weaker pulse on left foot. Pt is emotional, no difficulty's maintaining airway. -160 range.

## 2020-01-11 NOTE — ED PROVIDER NOTES
service: Not on file     Active member of club or organization: Not on file     Attends meetings of clubs or organizations: Not on file     Relationship status: Not on file    Intimate partner violence:     Fear of current or ex partner: Not on file     Emotionally abused: Not on file     Physically abused: Not on file     Forced sexual activity: Not on file   Other Topics Concern    Not on file   Social History Narrative    Not on file       No family history on file. Allergies:  Patient has no known allergies. Home Medications:  Prior to Admission medications    Medication Sig Start Date End Date Taking? Authorizing Provider   amoxicillin (AMOXIL) 500 MG capsule Take 1 capsule by mouth 3 times daily 11/15/19   Ashlee Chavez, APRN - CNP   Fish Oil-Cholecalciferol (FISH OIL + D3 PO) Take by mouth    Historical Provider, MD   aspirin 325 MG EC tablet Take 325 mg by mouth daily    Historical Provider, MD   vitamin D (ERGOCALCIFEROL) 06539 UNITS CAPS capsule Take 50,000 Units by mouth once a week    Historical Provider, MD   VITAMIN B1-B12 IM Inject  into the muscle. Historical Provider, MD       REVIEW OF SYSTEMS    (2-9 systems for level 4, 10 or more for level 5)      Review of Systems   Constitutional: Negative for chills and fever. HENT: Negative for sore throat and trouble swallowing. Eyes: Negative for photophobia. Respiratory: Negative for cough, chest tightness, shortness of breath and wheezing. Cardiovascular: Negative for chest pain and palpitations. Gastrointestinal: Negative for abdominal pain, nausea and vomiting. Endocrine: Negative for polyuria. Genitourinary: Negative for dysuria and flank pain. Musculoskeletal: Negative for back pain and neck pain. Skin: Negative for rash and wound. Neurological: Positive for speech difficulty. Negative for syncope, weakness, light-headedness and headaches.         Right hemiparesis   Psychiatric/Behavioral: Negative for agitation and confusion. PHYSICAL EXAM   (up to 7 for level 4, 8 or more for level 5)      INITIAL VITALS:   BP (!) 141/89   Pulse 73   Temp 97.7 °F (36.5 °C) (Oral)   Resp 19   Wt 220 lb 0.3 oz (99.8 kg)   SpO2 97%   BMI 34.46 kg/m²       CONSTITUTIONAL: awake, alert, cooperative, no apparent distress  HEAD: atraumatic, normocephalic  EYES: sclera clear, pupils equal and reactive to light  ENT: ears are symmetric, nares patent   HEENT: moist mucous membranes  NECK: Supple, symmetrical, trachea midline  LUNGS: no respiratory distress, no audible wheezing  CARDIOVASCULAR: +S1/S2  ABDOMEN: soft, nontender, nondistended, no guarding, no rebound tenderness   MUSCULOSKELETAL: full range of motion noted  NEUROLOGIC: Awake, alert, oriented to name, place and time. Right upper extremity drift with 2 out of 5 muscle strength. 3 out of 5 muscle strength in right lower extremity. Impaired finger-to-nose and heel-to-shin due to weakness of the right upper and lower extremities. 4-5 muscle strength in left upper and left lower extremity. Pupils equal and reactive bilaterally. No obvious gaze preference or visual defect. Dysarthria and aphasia present. Right-sided facial droop present. Sensation intact in all extremities. Slight inattention with difficulty performing more complicated tests.   EXTREMITIES: peripheral pulses normal, no pedal edema, no calf tenderness  SKIN: normal coloration and turgor    DIFFERENTIAL  DIAGNOSIS     PLAN (LABS / IMAGING / EKG):  Orders Placed This Encounter   Procedures    MRSA DNA Probe, Nasal    Urine culture via catheter    CT Head WO Contrast    CT BRAIN PERFUSION    IR ANGIOGRAM CAROTID C EREBRAL BILATERAL    MRI BRAIN WO CONTRAST    CT HEAD WO CONTRAST    Basic metabolic panel    CBC    Hemoglobin A1C    Lipid Panel    HEMOGLOBIN AND HEMATOCRIT, BLOOD    Basic Metabolic Panel    CBC    BASIC METABOLIC PANEL    DIET DENTAL SOFT; Dental Soft    Vital signs during and post alteplase (tPA)    Telemetry monitoring    Notify physician prior to alteplase (tPA)    Notify physician during and post alteplase (tPA)    Notify physician during and post alteplase (tPA)    Bedrest during and post alteplase (tPA)    Elevate HOB during and post alteplase (tPA)    Adv Diet as Anthony (nurse communication)    NIH Stroke Scale (NIHSS) during and post alteplase (tPA)    Implement alteplase (tPA) hemorrhage/bleeding precautions    Avoid antiplatelet and antithrombotic medications for 24 hours post administration of alteplase (tPA)    No Anticoagulants for 24 hours after alteplase (tPA)    Implement suspected intracerebral hemorrhage (ICH) guidelines    Pulse Oximetry    Assess  Arterial/Venous Catheter Site and Distal Pulses    Avoid Active Movement in Bed    AVOID antiplatelet and antithrombotic medications for 24 hours after administration of IV thrombolytic infusion    AVOID arterial sticks and venipuncture during the first 24 hours after IV thrombolytic infusion    AVOID indwelling urethral catheterization during IV thrombolytic infusion and for at least 30 minutes after infusion    AVOID nasogastric tube insertion during the first 24 hours after IV thrombolytic infusion    Bedrest    Bleeding precautions    Do not bend leg with puncture site    Implement Hemorrhage Precautions    Implement suspected intracerebral hemorrhage (ICH) guidelines    Intake and output    Neuro checks    Neuro checks    No lifting over 10 lbs for 24 hours    Notify physician    Stroke education    Swallow assessment by nursing before diet started    Telemetry monitoring    Vital signs (specify frequency)    Full code    Inpatient consult to Stroke Team    Inpatient consult to [facility] to Dose: Other - See Comments:  The pharmacist will review this patient's medication profile to evaluate IV medications and change all base solutions to 0.9% sodium chloride if possible based on compatibility and product availability. This. ..    Inpatient consult to Cardiology    Inpatient consult to PM&R - Physiatry    OT eval and treat    PT eval and treat    Initiate Oxygen Therapy Protocol    Initiate Oxygen Therapy Protocol    Pulse Oximetry Spot Check    Assess for aphasia/dysphagia/severe dysarthria/brainstem stroke    EKG 12 Lead    Echocardiogram complete 2D with doppler with color    Initiate minimum 2 IV lines for alteplase (tPA) infusion    PATIENT STATUS (FROM ED OR OR/PROCEDURAL) Inpatient    Transfer patient    Fall precautions    Fall precautions       MEDICATIONS ORDERED:  Orders Placed This Encounter   Medications    DISCONTD: iohexol (OMNIPAQUE 350) solution 75 mL    0.9 % sodium chloride bolus    iohexol (OMNIPAQUE 350) solution 50 mL    sodium chloride flush 0.9 % injection 10 mL    sodium chloride flush 0.9 % injection 10 mL    dextrose 50 % IV solution    metoprolol (LOPRESSOR) 5 MG/5ML injection     Sherlyn Pereira: cabinet override    DISCONTD: niCARdipine (CARDENE) 20 mg in 0.9 % sodium chloride 200 mL infusion    DISCONTD: niCARdipine (CARDENE) 25 mg in sodium chloride 0.9 % 250 mL infusion    DISCONTD: 0.9 % sodium chloride infusion    acetaminophen (TYLENOL) tablet 650 mg    ondansetron (ZOFRAN) injection 4 mg    phenylephrine (ALAINA-SYNEPHRINE) 10 % ophthalmic solution 1 drop    tropicamide (MYDRIACYL) 1 % ophthalmic solution 1 drop    metoprolol tartrate (LOPRESSOR) tablet 25 mg    DISCONTD: aspirin chewable tablet 81 mg    aspirin chewable tablet 81 mg    atorvastatin (LIPITOR) tablet 40 mg    enoxaparin (LOVENOX) injection 40 mg         DIAGNOSTIC RESULTS / EMERGENCY DEPARTMENT COURSE / MDM     LABS:  Results for orders placed or performed during the hospital encounter of 01/11/20   MRSA DNA Probe, Nasal   Result Value Ref Range    Specimen Description . NASAL SWAB     MRSA, DNA, Nasal  NEGATIVE:  MRSA DNA not 17 mmol/L    GFR Non-African American >60 >60 mL/min    GFR African American >60 >60 mL/min    GFR Comment          GFR Staging NOT REPORTED    EKG 12 Lead   Result Value Ref Range    Ventricular Rate 90 BPM    Atrial Rate 102 BPM    QRS Duration 86 ms    Q-T Interval 362 ms    QTc Calculation (Bazett) 442 ms    R Axis 2 degrees    T Axis 28 degrees         RADIOLOGY:  CT HEAD WO CONTRAST   Final Result   Left insular cortex region infarct. No hemorrhagic transformation following   tPA. MRI BRAIN WO CONTRAST   Final Result   1. Multiple small areas of acute stroke in the posterior left middle   cerebral artery territory involving the left frontal and parietal lobes, most   pronounced in the frontal insular region. Additional punctate focus of acute   stroke in the right centrum semiovale. 2.  Small focus of gradient blooming in the left insula could represent trace   petechial hemorrhage. No large acute intracranial hemorrhage. No mass   effect or midline shift. 3.  Mild chronic small vessel ischemic disease. IR ANGIOGRAM CAROTID C EREBRAL BILATERAL   Final Result      CT BRAIN PERFUSION   Final Result   No perfusion mismatch. Findings were discussed with Dr. Leeanna Parkinson At 2:50 am on 1/11/2020. CT Head WO Contrast   Final Result   No acute intracranial abnormality. EMERGENCY DEPARTMENT COURSE:   Patient status post TPA for right-sided deficits. Patient transferred from outlying facility. Vital signs stable, blood pressure 140s over 80s on arrival.  No continuous infusion of blood pressure medication on arrival.  No change in mental status. CTAs performed prior to arrival.  Patient was sent to CT scan for perfusion study and plan to possibly take patient to endovascular lab. Patient awaiting transfer to OR. DISPOSITION: admission under neuro critical care service.       PROCEDURES:  None    CONSULTS:  IP CONSULT TO STROKE TEAM  IP CONSULT TO CRITICAL CARE  IP CONSULT TO PHARMACY  IP CONSULT TO CARDIOLOGY  IP CONSULT TO PHYSICAL MEDICINE REHAB    CRITICAL CARE:  None    FINAL IMPRESSION      1.  Cerebrovascular accident (CVA), unspecified mechanism ([facility] [address]          DISPOSITION / PLAN     DISPOSITION Admitted 01/11/2020 02:56:05 AM      PATIENT REFERRED TO:  Alveda Mohs, DO [address]  [place] [address]  [phone]            DISCHARGE MEDICATIONS:  Current Discharge Medication List          Darryl Pham MD  Emergency Medicine Resident    (Please note that portions of thisnote were completed with a voice recognition program.  Efforts were made to edit the dictations but occasionally words are mis-transcribed.)       Darryl Pham MD  01/13/20 [id number]

## 2020-01-11 NOTE — OP NOTE
[facility]    NEUROENDOVASCULAR SERVICE: POST-OP NOTE: 1/11/2020    Pt Name: Abbie Romero  MRN: [id number]  YOB: 1950  Date of Procedure: 1/11/2020  Primary Care Physician: No primary care provider on file. Pre-Procedural Diagnosis: Right-sided weakness  Post-Procedural Diagnosis: Left MCA M2 occlusion      Procedure Performed:Cerebral angiogram with mechanical thrombectomy of the left MCA. Surgeon:   Bridgett Davis MD    Fellow:  Eri Delgadillo MD     1st Assistant:  Ester Gallardo    PRE-PROCEDURAL EXAM:  Prestroke baseline mRS MODIFIED DIMAS SCORE: 0 - No symptoms at all. Neurological exam performed and unchanged from initial H&P or consult  CT head ASPECT score: 9    Anesthesia: MAC anesthesia  Complications: none    Intra-Operative EXAM:  Neurological exam performed and unchanged from initial H&P or consult    EBL: < Minimal      Cc            Specimens: Were not Obtained  Contrast:     Visipaque 270 low osmolar 72 Cc             Fluoro: 19.8 min    Findings:  Please see dictated Radiology note for further details  1. Left middle cerebral artery M2 occlusion. 2. This was treated with Tiger stent retriever for 2 passes (16 and 21), and Trevo 3x20 mm stent retriever with 1 pass resulted in TICI 2C recanalization. TICI score: 2c      POST-PROCEDURAL EXAM :   Stable neurological Exam  Neurological exam has performed a slightly improved compared to prior procedure. He is moving right upper extremity against gravity. Closure:  right Angioseal 8   F        POST-PROCEDURAL MONITORING : see orders  Disposition: Neuro ICU      Recommendations:  1. Obtain CT head tomorrow at 10 AM  3. Back to NSICU. 4. Do not bend right leg for 3 hours. 5. Groin checks per protocol. 6. Neuro checks per icu protocol. 7. Peripheral pulse checks per protocol. 8. ICU management per NSICU team.   9. SBP goal 110-140  10.  Please start aspirin 325 mg daily at 24 hours after stability CT head or MRI brain. 11. Upon discharge follow up with Dr. Ramez Vigil in 2 weeks and Dr. Brain Rice in 3 months  12. Patient was enrolled into [place] study.       MD Guero Beatty MD   Pager [phone]  Stroke, [facility]  [address]  Electronically signed 1/11/2020 at 5:22 AM

## 2020-01-11 NOTE — PLAN OF CARE
DATE: 01/11/20      AWAKE & FOLLOWING COMMANDS:  [] No   [x] Yes    INTUBATED:   [x] No   [] Yes    SEDATION/ANALGESIA:    [] Propofol gtt  [] Precedex gtt [] Versed gtt   [x] No Sedation or Not Applicable  Pain medications:     VASOPRESSORS:  [x] No    [] Yes  [] Levophed [] Dopamine [] Vasopressin  [] Dobutamine [] Phenylephrine [] Epinephrine    CENTRAL LINES:  [x] No    [] Yes:  Location: , Date placed: , Indication:    ONEAL CATHETER: [x] No    [] Yes:  Location: urethral, Date placed: 1/11/20, Indication:     FEEDING: Able to take PO?  [] No:  [] NG/OG [] PEG  [] NPO for:   [] Tube Feeds    [x] Yes:  Diet: general    DVT Prophylaxis:  [] Lovenox   [] Heparin subQ   [] Anticoagulation   [x] Contraindicated secondary to:     Stress Ulcer Prophylaxis:  [] PPI  [] H2 Receptor Blocker  [] Not indicated    Blood Glucose:  [x] Blood glucose <180 consistently  [] Insulin sliding scale   [] Home Medications addressed    HOB >30 Degrees:  [x] Yes  [] No, contraindicated due to     Cassandra Thornton MD, [facility]  PGY-2 Neurology   1/11/2020 at 11:53 AM

## 2020-01-11 NOTE — PROGRESS NOTES
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    Date of Eval: 2020   Evaluating Therapist: ESTHER Christianson      Primary Complaint: The patient is a 78 y.o. male presented as a transfer from [facility]. Presented there with slurred speech and right upper extremity and right lower extremity weakness, slurred speech, expressive aphasia with an onset at 22:25 tonight. Was given tPA that completed at 0055 with minimal improvement from initial NIH of 11, to current NIH of 9. Decision made by NELLIE team to take patient to lab as well for mechanical thrombectomy. He has a PMH of CKD, HLD, HTN, Neuropathy, and new onset AFIB.       CTA read as occlusion of the left middle cerebral artery proximal to the mid and branch at the level of the sylvian fissure. CT head and CT brain perfusion without perfusion mismatch or abnormality. Pain: 0/10       Assessment: Pt presents with mild cognitive deficits characterized by impaired recall and word generation. Pt. Presents with moderate dysarthria characterized by slurred speech, moderate O/M deficits at this time. ST to follow up and provide treatment to address noted deficits. Education provided. ST recommends speech therapy 3-5 x week at this time. Recommendations:  Requires SLP Intervention: Yes  Duration/Frequency of Treatment: 3-5 x week  D/C Recommendations: To be determined       Plan:   Goals:  Short-term Goals  Goal 1: Pt will recall 3-5 units with distractions in 4/5 opportunities given minimal prompts.   Goal 2: Pt will be provided memory compensatory strategies to aid in recall. Goal 3: Pt will complete concrete and abstract word generation tasks with 90% accuracy given minimal prompts. Goal 4: Pt will repeat 6-7 word sentences utilizing clear speech strategies to increase intelligibility. Goal 5: Pt will complete o/m program for dysarthria. Patient/family involved in developing goals and treatment plan: yes    Subjective:    General  Chart Reviewed: Yes  Family / Caregiver Present: Yes(wife at bedside at the end, provided education)  Social/Functional History  Lives With: Spouse  Vision  Vision: Within Functional Limits  Hearing  Hearing: Within functional limits           Objective:     Oral/Motor  Oral Motor: Exceptions to [facility]  Labial ROM: Reduced right  Labial Symmetry: Abnormal symmetry right  Labial Strength: Reduced              Expression  Primary Mode of Expression: Verbal              Motor Speech  Motor Speech: Exceptions to [facility]  Intelligibility: Moderate  Dysarthria : Moderate         Cognition:      Orientation  Overall Orientation Status: Within Normal Limits  Attention  Attention: Within Functional Limits  Memory  Memory: Exceptions to [facility]  Short-term Memory: Mild  Abstract Reasoning  Abstract Reasoning: Exceptions to [facility]  Divergent Thinking: Moderate  Safety/Judgement  Safety/Judgement: Within Functional Limits   Verbal Sequencing: WFL  Thought Organization: WFL  Word Generation: Moderate         Prognosis:  Speech Therapy Prognosis  Prognosis: Good  Individuals consulted  Consulted and agree with results and recommendations: Patient; Family member;RN  Family member consulted: wife at bedside at the end    Education:  Patient Education: yes  Patient Education Response: Verbalizes understanding  Safety Devices in place: Yes       Therapy Time:   Individual Concurrent Group Co-treatment   Time In 0820         Time Out 0830         Minutes [address]  1/11/2020 9:05 AM

## 2020-01-11 NOTE — CONSULTS
Endovascular Neurosurgery Consult    Pt Name: Gayle Gaviria  MRN: [id number]  YOB: 1950  Date of evaluation: 1/11/2020  Primary Care Physician: No primary care provider on file. Reason for evaluation: Right-sided weakness    SUBJECTIVE:   History of Chief Complaint:    Gayle Gaviria is a 78 y.o. male who presented with speech difficulty/slurred speech/right-sided weakness witnessed by family with onset of symptoms at 10:25 PM.     His systolic blood pressure at 160s, glucose at 150. He received TPA in outside hospital.    He underwent vessel images showing left M2 occlusion. His NIH stroke scale before the procedure as be detailed below was at 14.     Allergies  has No Known Allergies. Medications  Prior to Admission medications    Medication Sig Start Date End Date Taking? Authorizing Provider   amoxicillin (AMOXIL) 500 MG capsule Take 1 capsule by mouth 3 times daily 11/15/19   JIMBO Singh - CNP   Fish Oil-Cholecalciferol (FISH OIL + D3 PO) Take by mouth    Historical Provider, MD   aspirin 325 MG EC tablet Take 325 mg by mouth daily    Historical Provider, MD   vitamin D (ERGOCALCIFEROL) 63593 UNITS CAPS capsule Take 50,000 Units by mouth once a week    Historical Provider, MD   VITAMIN B1-B12 IM Inject  into the muscle. Historical Provider, MD    Scheduled Meds:   sodium chloride flush  10 mL Intravenous 2 times per day    phenylephrine  1 drop Both Eyes Once    tropicamide  1 drop Both Eyes Once     Continuous Infusions:  PRN Meds:.sodium chloride flush, dextrose  Past Medical History   has a past medical history of Chronic kidney disease, Hyperlipidemia, and PTSD (post-traumatic stress disorder). Past Surgical History   has a past surgical history that includes Appendectomy. Social History   reports that he has never smoked. He has never used smokeless tobacco.   reports current alcohol use. reports no history of drug use.   Family History  family history is not on file.    Review of Systems:  CONSTITUTIONAL:  negative for fevers, chills, fatigue and malaise    EYES:  negative for double vision, blurred vision and photophobia     HEENT:  negative for tinnitus, epistaxis and sore throat    RESPIRATORY:  negative for cough, shortness of breath, wheezing    CARDIOVASCULAR:  negative for chest pain, palpitations, syncope, edema    GASTROINTESTINAL:  negative for nausea, vomiting    GENITOURINARY:  negative for incontinence    MUSCULOSKELETAL:  negative for neck or back pain    NEUROLOGICAL:  Negative for weakness and tingling  negative for headaches and dizziness    PSYCHIATRIC:  negative for anxiety      Review of systems otherwise negative. OBJECTIVE:   Vitals: BP (!) 170/98   Pulse 102   Temp 98.8 °F (37.1 °C) (Oral)   Resp 21   Wt 220 lb 0.3 oz (99.8 kg)   SpO2 98%   BMI 34.46 kg/m²     NIH Stroke Scale Total:  1a.  Level of consciousness:  0  1b. Level of consciousness questions: 2   1c. Level of consciousness questions:  0   2. Best Gaze:  0   3. Visual:  0   4. Facial Palsy:  1   5a. Motor left arm:  0  5b. Motor right arm:  4  6a. Motor left le  6b. Motor right le  7. Limb Ataxia:  0   8. Sensory: 2  9. Best Language:  2  10. Dysarthria:  2  11. Extinction and Inattention: 1     Total: 14    LABS:     Recent Labs     01/10/20  2302   WBC 6.9   HGB 15.5   HCT 44.0         K 3.7      CO2 21   BUN 15   CREATININE 1.09   CALCIUM 9.2   INR 1.0   AST 29   ALT 25   BILITOT 0.44     Recent Labs     01/10/20  2302   ALKPHOS 79   ALT 25   AST 29   BILITOT 0.44   LABALBU 4.1       RADIOLOGY:   Images were personally reviewed including:  CT brain without contrast:ASPECT at 9. CTA imaging: Left MCA M2 occlusion      IMPRESSIONS:     Acute ischemic stroke status post left TPA. PLANS:   Keep n.p.o.  1 L fluid. Proceed for emergent thrombectomy. Cerebral angiogram with IV moderate sedation.  Risks and benefits discussed including but not limited to bruising, stroke, sah, death, retroperitoneal hematoma, femoral pseudoaneurysm, lower ext and renal as well as peripheral vasc compromise discussed - informed consent obtained at bedside from the patient. Thank you for the interesting evaluation.        Soren Bowman MD  Pager [phone]  Stroke, [facility]  [address]  Electronically signed 1/11/2020 at 5:18 AM

## 2020-01-11 NOTE — ED NOTES
Dr. Lupillo Mera on phone with interventionalist at [address].       Tomas Nicholas RN  01/10/20 1179

## 2020-01-12 ENCOUNTER — APPOINTMENT (OUTPATIENT)
Dept: CT IMAGING | Age: 70
DRG: 024 | End: 2020-01-12
Payer: MEDICARE

## 2020-01-12 LAB
ANION GAP SERPL CALCULATED.3IONS-SCNC: 11 MMOL/L (ref 9–17)
BUN BLDV-MCNC: 12 MG/DL (ref 8–23)
BUN/CREAT BLD: ABNORMAL (ref 9–20)
CALCIUM SERPL-MCNC: 8.4 MG/DL (ref 8.6–10.4)
CHLORIDE BLD-SCNC: 106 MMOL/L (ref 98–107)
CO2: 20 MMOL/L (ref 20–31)
CREAT SERPL-MCNC: 0.7 MG/DL (ref 0.7–1.2)
CULTURE: NO GROWTH
EKG ATRIAL RATE: 115 BPM
EKG Q-T INTERVAL: 328 MS
EKG QRS DURATION: 74 MS
EKG QTC CALCULATION (BAZETT): 452 MS
EKG R AXIS: 15 DEGREES
EKG T AXIS: 34 DEGREES
EKG VENTRICULAR RATE: 114 BPM
ESTIMATED AVERAGE GLUCOSE: 97 MG/DL
GFR AFRICAN AMERICAN: >60 ML/MIN
GFR NON-AFRICAN AMERICAN: >60 ML/MIN
GFR SERPL CREATININE-BSD FRML MDRD: ABNORMAL ML/MIN/{1.73_M2}
GFR SERPL CREATININE-BSD FRML MDRD: ABNORMAL ML/MIN/{1.73_M2}
GLUCOSE BLD-MCNC: 101 MG/DL (ref 70–99)
HBA1C MFR BLD: 5 % (ref 4–6)
HCT VFR BLD CALC: 39.9 % (ref 40.7–50.3)
HEMOGLOBIN: 13.6 G/DL (ref 13–17)
Lab: NORMAL
POTASSIUM SERPL-SCNC: 3.8 MMOL/L (ref 3.7–5.3)
SODIUM BLD-SCNC: 137 MMOL/L (ref 135–144)
SPECIMEN DESCRIPTION: NORMAL

## 2020-01-12 PROCEDURE — 6370000000 HC RX 637 (ALT 250 FOR IP): Performed by: STUDENT IN AN ORGANIZED HEALTH CARE EDUCATION/TRAINING PROGRAM

## 2020-01-12 PROCEDURE — 2580000003 HC RX 258: Performed by: STUDENT IN AN ORGANIZED HEALTH CARE EDUCATION/TRAINING PROGRAM

## 2020-01-12 PROCEDURE — 6360000002 HC RX W HCPCS: Performed by: STUDENT IN AN ORGANIZED HEALTH CARE EDUCATION/TRAINING PROGRAM

## 2020-01-12 PROCEDURE — 85018 HEMOGLOBIN: CPT

## 2020-01-12 PROCEDURE — 97530 THERAPEUTIC ACTIVITIES: CPT

## 2020-01-12 PROCEDURE — 2060000000 HC ICU INTERMEDIATE R&B

## 2020-01-12 PROCEDURE — 99233 SBSQ HOSP IP/OBS HIGH 50: CPT | Performed by: PSYCHIATRY & NEUROLOGY

## 2020-01-12 PROCEDURE — 97162 PT EVAL MOD COMPLEX 30 MIN: CPT

## 2020-01-12 PROCEDURE — 80048 BASIC METABOLIC PNL TOTAL CA: CPT

## 2020-01-12 PROCEDURE — 70450 CT HEAD/BRAIN W/O DYE: CPT

## 2020-01-12 PROCEDURE — 2000000003 HC NEURO ICU R&B

## 2020-01-12 PROCEDURE — 97110 THERAPEUTIC EXERCISES: CPT

## 2020-01-12 PROCEDURE — 85014 HEMATOCRIT: CPT

## 2020-01-12 RX ORDER — ATORVASTATIN CALCIUM 40 MG/1
40 TABLET, FILM COATED ORAL NIGHTLY
Status: DISCONTINUED | OUTPATIENT
Start: 2020-01-12 | End: 2020-01-13 | Stop reason: HOSPADM

## 2020-01-12 RX ADMIN — ASPIRIN 81 MG: 81 TABLET, CHEWABLE ORAL at 09:56

## 2020-01-12 RX ADMIN — SODIUM CHLORIDE, PRESERVATIVE FREE 10 ML: 5 INJECTION INTRAVENOUS at 19:56

## 2020-01-12 RX ADMIN — ENOXAPARIN SODIUM 40 MG: 40 INJECTION SUBCUTANEOUS at 11:10

## 2020-01-12 RX ADMIN — METOPROLOL TARTRATE 25 MG: 25 TABLET ORAL at 09:59

## 2020-01-12 RX ADMIN — DESMOPRESSIN ACETATE 40 MG: 0.2 TABLET ORAL at 19:54

## 2020-01-12 RX ADMIN — SODIUM CHLORIDE, PRESERVATIVE FREE 10 ML: 5 INJECTION INTRAVENOUS at 09:57

## 2020-01-12 RX ADMIN — METOPROLOL TARTRATE 25 MG: 25 TABLET ORAL at 19:54

## 2020-01-12 ASSESSMENT — PAIN SCALES - GENERAL
PAINLEVEL_OUTOF10: 0
PAINLEVEL_OUTOF10: 0

## 2020-01-12 NOTE — PROGRESS NOTES
79 20 --   01/11/20 2306 -- 76 22 --   01/11/20 2305 -- 73 19 --   01/11/20 2304 -- 78 15 --   01/11/20 2303 -- 77 14 --   01/11/20 2302 132/87 80 23 --   01/11/20 2301 -- 77 15 --   01/11/20 2300 -- 80 18 --   01/11/20 2259 -- 77 17 --   01/11/20 2258 -- 78 17 --   01/11/20 2257 -- 78 19 --   01/11/20 2256 -- 76 19 --   01/11/20 2255 -- 78 23 (!) 81 %   01/11/20 2254 -- 80 18 --   01/11/20 2253 -- 83 16 --   01/11/20 2252 -- 78 17 --   01/11/20 2251 -- 80 15 --   01/11/20 2250 -- 80 17 --   01/11/20 2249 -- 77 12 --   01/11/20 2248 -- 79 15 --   01/11/20 2247 -- 77 16 --   01/11/20 2246 -- 78 14 --   01/11/20 2245 -- 82 19 --   01/11/20 2244 -- 77 16 --   01/11/20 2243 (!) 95/52 80 19 --   01/11/20 2242 -- 78 12 --   01/11/20 2241 -- 78 13 --   01/11/20 2240 -- 79 13 --   01/11/20 2239 -- 76 19 --   01/11/20 2238 -- 80 16 --   01/11/20 2237 -- 76 15 --   01/11/20 2236 -- 78 19 --   01/11/20 2235 -- 76 17 --   01/11/20 2234 -- 75 20 --   01/11/20 2233 -- 76 23 --   01/11/20 2232 -- 75 21 --   01/11/20 2231 -- 77 21 --   01/11/20 2230 -- 77 22 --   01/11/20 2229 -- 76 19 --   01/11/20 2228 -- 75 17 --   01/11/20 2227 -- 77 20 --   01/11/20 2226 -- 80 18 --   01/11/20 2225 -- 78 19 --   01/11/20 2224 -- 80 19 --   01/11/20 2223 117/67 79 20 --   01/11/20 2222 -- 78 24 --   01/11/20 2221 -- 78 16 --   01/11/20 2220 -- 77 25 --   01/11/20 2219 -- 79 21 --   01/11/20 2218 -- 80 25 --   01/11/20 2217 -- 79 22 --   01/11/20 2216 -- 78 24 --   01/11/20 2215 -- 82 21 --   01/11/20 2214 -- 78 21 --   01/11/20 2213 -- 78 22 --   01/11/20 2212 -- 76 24 --   01/11/20 2211 -- 76 19 --   01/11/20 2210 -- 78 22 --   01/11/20 2209 -- 76 19 --   01/11/20 2208 -- 78 19 --   01/11/20 2207 -- 75 17 --   01/11/20 2206 -- 79 23 --   01/11/20 2205 -- 76 20 --   01/11/20 2204 -- 78 21 --   01/11/20 2203 127/65 78 14 --   01/11/20 2202 -- 78 18 --   01/11/20 2201 -- 79 18 --   01/11/20 2200 -- 77 19 --   01/11/20 2159 -- 78 17 --   01/11/20 2158 -- 76 22 --   01/11/20 2157 -- 78 17 --   01/11/20 2156 -- 80 16 --   01/11/20 2155 -- 78 15 --   01/11/20 2154 -- 79 16 --   01/11/20 2153 -- 81 16 --   01/11/20 2152 -- 80 15 --   01/11/20 2151 -- 83 15 --   01/11/20 2150 -- 80 15 --   01/11/20 2149 -- 82 17 --   01/11/20 2148 (!) 142/64 84 17 --   01/11/20 2147 -- 82 18 --   01/11/20 2146 -- 86 21 --   01/11/20 2145 -- 83 14 --   01/11/20 2144 -- 84 20 --   01/11/20 2143 -- 82 17 --   01/11/20 2142 -- 84 20 --   01/11/20 2141 -- 84 15 --   01/11/20 2140 -- 83 15 --   01/11/20 2139 -- 84 17 --   01/11/20 2138 -- 84 17 --   01/11/20 2137 -- 86 17 --   01/11/20 2136 -- 87 16 --   01/11/20 2135 -- 86 18 --   01/11/20 2134 -- 88 17 --   01/11/20 2133 135/67 86 20 --   01/11/20 2131 -- 89 18 --   01/11/20 2130 -- 88 16 --   01/11/20 2129 -- 91 18 --   01/11/20 2128 -- 91 16 --   01/11/20 2127 -- 93 13 --   01/11/20 2126 -- 92 18 --   01/11/20 2125 -- 89 16 --   01/11/20 2124 -- 91 17 --   01/11/20 2123 -- 90 20 --   01/11/20 2122 -- 90 20 --   01/11/20 2121 -- 88 20 --   01/11/20 2120 -- 86 18 --   01/11/20 2119 -- 89 17 --   01/11/20 2118 -- 90 20 --   01/11/20 2117 128/81 93 20 --   01/11/20 2116 -- 95 17 --   01/11/20 2115 -- 93 20 --   01/11/20 2114 -- 96 16 --   01/11/20 2113 -- 93 17 --   01/11/20 2112 -- 93 20 --   01/11/20 2111 -- 92 20 --   01/11/20 2110 -- 93 21 --   01/11/20 2109 -- 92 19 --   01/11/20 2108 -- 94 16 --   01/11/20 2107 -- 97 25 --   01/11/20 2106 -- 92 19 --   01/11/20 2105 -- 88 20 --   01/11/20 2104 -- 88 20 --   01/11/20 2103 126/63 86 21 --   01/11/20 2102 116/74 89 18 --   01/11/20 2101 -- 87 16 --   01/11/20 2100 -- 88 15 --   01/11/20 2059 -- 90 16 --   01/11/20 2058 -- 88 17 --   01/11/20 2057 -- 93 15 --   01/11/20 2056 -- 86 12 --   01/11/20 2055 -- 86 14 --   01/11/20 2054 -- 86 16 --   01/11/20 2053 -- 86 18 --   01/11/20 2052 -- 86 16 --   01/11/20 2051 -- 86 15 --   01/11/20 2050 -- 87 18 --   01/11/20 2049 -- 87 17 -- 01/11/20 2048 117/73 87 13 --   01/11/20 2047 -- 86 15 --   01/11/20 2046 -- 88 12 --   01/11/20 2045 -- 89 12 --   01/11/20 2044 -- 85 11 --   01/11/20 2043 -- 88 14 --   01/11/20 2042 -- 89 17 --   01/11/20 2041 -- 88 14 --   01/11/20 2040 -- 87 14 --   01/11/20 2039 -- 85 13 --   01/11/20 2038 -- 87 15 --   01/11/20 2037 -- 89 15 --   01/11/20 2036 -- 88 14 --   01/11/20 2035 -- 88 12 --   01/11/20 2034 -- 90 16 --   01/11/20 2033 119/76 91 15 --   01/11/20 2032 -- 89 11 --   01/11/20 2031 -- 89 12 --   01/11/20 2030 -- 87 12 --   01/11/20 2029 -- 100 23 --   01/11/20 2028 -- 91 16 --   01/11/20 2027 -- 90 14 --   01/11/20 2026 -- 87 15 --   01/11/20 2025 -- 88 16 --   01/11/20 2024 -- 86 17 --   01/11/20 2023 -- 87 14 --   01/11/20 2022 -- 87 15 --   01/11/20 2021 -- 89 21 --   01/11/20 2020 -- 88 14 --   01/11/20 2019 -- 88 16 --   01/11/20 2018 (!) 114/54 91 18 --   01/11/20 2017 -- 90 18 --   01/11/20 2016 -- 87 17 --   01/11/20 2015 -- 87 21 --   01/11/20 2014 -- 88 21 --   01/11/20 2013 -- 89 16 --   01/11/20 2012 -- 88 21 --   01/11/20 2011 -- 87 19 --   01/11/20 2010 -- 86 20 --   01/11/20 2009 -- 87 22 --   01/11/20 2008 -- 84 22 --   01/11/20 2007 -- 87 18 --   01/11/20 2006 -- 87 12 --   01/11/20 2005 -- 89 17 --   01/11/20 2004 -- 92 19 --   01/11/20 2003 (!) 102/46 89 16 --     Estimated body mass index is 34.46 kg/m² as calculated from the following:    Height as of 11/15/19: 5' 7\" (1.702 m).     Weight as of this encounter: 220 lb 0.3 oz (99.8 kg).  []<16 Severe malnutrition  []16-16.99 Moderate malnutrition  []17-18.49 Mild malnutrition   []18.5-24.9 Normal  [x]25-29.9 Overweight (not obese)  []30-34.9 Obese class 1 (Low Risk)  []35-39.9 Obese class 2 (Moderate Risk)  []?40 Obese class 3 (High Risk)    RECENT LABS:   Lab Results   Component Value Date    WBC 11.2 01/11/2020    HGB 14.8 01/11/2020    HCT 43.0 01/11/2020     01/11/2020    CHOL 137 01/11/2020    TRIG 118 01/11/2020    HDL 38 (L) 01/11/2020    ALT 25 01/10/2020    AST 29 01/10/2020     01/11/2020    K 4.1 01/11/2020     01/11/2020    CREATININE 0.81 01/11/2020    BUN 13 01/11/2020    CO2 19 (L) 01/11/2020    INR 1.0 01/10/2020     24 HOUR INTAKE/OUTPUT:    Intake/Output Summary (Last 24 hours) at 1/12/2020 0802  Last data filed at 1/12/2020 0630  Gross per 24 hour   Intake 1758 ml   Output 200 ml   Net 1558 ml       IMAGING:   Ct Head Wo Contrast    Result Date: 1/12/2020  EXAMINATION: CT OF THE HEAD WITHOUT CONTRAST  1/11/2020 2:14 am TECHNIQUE: CT of the head was performed without the administration of intravenous contrast. Dose modulation, iterative reconstruction, and/or weight based adjustment of the mA/kV was utilized to reduce the radiation dose to as low as reasonably achievable. COMPARISON: Noncontrast CT head 1/10/2020 HISTORY: ORDERING SYSTEM PROVIDED HISTORY: stroke TECHNOLOGIST PROVIDED HISTORY: stroke Reason for Exam: post TPA Acuity: Unknown Type of Exam: Unknown FINDINGS: BRAIN/VENTRICLES: No acute intracranial hemorrhage, mass effect or midline shift. No abnormal extra-axial fluid collection. Gray-white differentiation is maintained without evidence of an acute infarct. No evidence of hydrocephalus. Basal cisterns are patent. ORBITS: Visualized portion of the orbits demonstrate no acute abnormality. SINUSES: Visualized paranasal sinuses and mastoid air cells demonstrate no acute abnormality. SOFT TISSUES/SKULL:  No acute abnormality of the visualized skull or soft tissues. No acute intracranial abnormality. Ct Head Wo Contrast    Result Date: 1/12/2020  EXAMINATION: CT OF THE HEAD WITHOUT CONTRAST  1/12/2020 6:06 am TECHNIQUE: CT of the head was performed without the administration of intravenous contrast. Dose modulation, iterative reconstruction, and/or weight based adjustment of the mA/kV was utilized to reduce the radiation dose to as low as reasonably achievable.  COMPARISON: MRI 01/11/2020 HISTORY: ORDERING SYSTEM PROVIDED HISTORY: post 24hr tpa TECHNOLOGIST PROVIDED HISTORY: post 24hr tpa FINDINGS: BRAIN/VENTRICLES: Mild ventricular prominence age-related. Unchanged. No midline shift. Basal cisterns normally outlined. There is left insular cortex region area of hypoattenuation corresponding to infarcts depicted on prior MRI. No hemorrhagic transformation. ORBITS: The visualized portion of the orbits demonstrate no acute abnormality. SINUSES: The visualized paranasal sinuses and mastoid air cells demonstrate no acute abnormality. SOFT TISSUES/SKULL:  No acute abnormality of the visualized skull or soft tissues. Left insular cortex region infarct. No hemorrhagic transformation following tPA. Ct Head Wo Contrast    Result Date: 1/10/2020  EXAMINATION: CT OF THE HEAD WITHOUT CONTRAST  1/10/2020 10:52 pm TECHNIQUE: CT of the head was performed without the administration of intravenous contrast. Dose modulation, iterative reconstruction, and/or weight based adjustment of the mA/kV was utilized to reduce the radiation dose to as low as reasonably achievable. COMPARISON: None HISTORY: ORDERING SYSTEM PROVIDED HISTORY: stroke TECHNOLOGIST PROVIDED HISTORY: stroke FINDINGS: BRAIN/VENTRICLES: There is no acute intracranial hemorrhage, mass effect or midline shift. No abnormal extra-axial fluid collection. The gray-white differentiation is maintained without evidence of an acute infarct. There is no evidence of hydrocephalus. ORBITS: The visualized portion of the orbits demonstrate no acute abnormality. SINUSES: The visualized paranasal sinuses and mastoid air cells demonstrate no acute abnormality. SOFT TISSUES/SKULL:  No acute abnormality of the visualized skull or soft tissues. No acute intracranial abnormality. Critical results were called by Dr. Benigno Mauro to [address] [place] [address] on 1/10/2020 at 23:07.      Ir Angiogram Carotid C Erebral Bilateral    Result Date: 1/11/2020  Date of procedure:1/11/2019 Patient arrived to the angio suite at:0308 Puncture obtained at:0346 Vascular access was removed: 0502 Diagnosis: Left MCA M2 occlusion Procedures performed: 1. Diagnostic cerebral angiography. 2. Emergent mechanical thrombectomy for the treatment of acute ischemic stroke. 3. Selective microcatheter angiography of the left MCA x 4. 4. Slow IA continuous infusion of nicardipine over 1 hr. Vessels catheterized: 1. Left common carotid artery 2. Left internal carotid artery 3. Left middle cerebral artery 4. Right common femoral artery Neurointerventionist: Roney Ramirez MD [place]: Sharon Gallardo MD Fluoro time:  19.8 minutes Contrast amount: 72 cc of Visipaque-270 Consent: After explaining the risks and benefits to the patient and family, including but not limited to stroke, vessel injury,dissection, tear, X-ray dye allergic reaction, an informed consent was obtained. Indication and Clinical History: 65-year-old man who presented with right-sided weakness/dyspnea/hypertension through. He is status post TPA. CTA had and neck showed left M2 occlusion. He is undergoing emergent thrombectomy. Patient was involved into TIGER study. Anesthesia: Local anesthesia with lidocaine. MAC anesthesia. Procedure and Findings: The patient's right groin was prepped and draped in standard sterile fashion, and under monitored anesthesia care, an 8 [place] intravascular 10 cm sheath was placed within the right common femoral artery,establishing arterial access. An 8 Fr 95cm Flowgate  balloon guide catheter was triaxially advanced over an 035 guidewire and 6 Fr Berenstein catheter to the level of aortic arch, and used to selectively catheterize the left common carotid artery. Digital subtraction angiography of the left carotid bifurcation was performed in frontal and lateral projections. Arterial phase images revealed normal carotid bifurcation with no hemodynamically significant stenosis. There was normal antegrade all catheters, digital subtraction angiography of the right common femoral artery was performed in a frontal oblique projection via injection of contrast through the intravascular sheath. Arterial phase images revealed a puncture site above the femoral bifurcation. 8 Amharic Angio-Seal was used to close the groin. No complications were experienced, and the patient was discharged from the neuro interventional suite to the neurological intensive care unit for further management. Impression: 1. Left superior division M2 MCA occlusion consistent with acute thromboembolism. This is also accompanied with 2 MCA M3 distal emboli noted. 2.  The above mentioned occlusion was treated with 2 passes of mechanical thrombectomy using the TIGERTRIEVER revascularization device and proximal flow arrest using Balloon Guide Catheter (BGC) (1nd and 2rd pass was in conjunction of contact aspiration using manual aspiration 60 cc syringe). The third pas using TREVO stent retriever 4 MCA M3 branch. This resulted into TICI 2C revascularization. 3.   Right fetal PCA noted as normal variant Dr. Dany Alva dictated this invasive procedure. Karen Bob was present for all procedural and imaging components of this case. Examination was reviewed and reported findings confirmed and edited by Karen Bob.  supervised and   interpreted this procedure. Ct Brain Perfusion    Result Date: 1/12/2020  EXAMINATION: CTA OF THE HEAD WITH CONTRAST WITH PERFUSION 1/11/2020 2:14 am TECHNIQUE: CTA of the head/brain was performed with the administration of intravenous contrast. Multiplanar reformatted images are provided for review. MIP images are provided for review. Dose modulation, iterative reconstruction, and/or weight based adjustment of the mA/kV was utilized to reduce the radiation dose to as low as reasonably achievable.  COMPARISON: CT and CTA head and neck 1/11/2020 HISTORY: ORDERING SYSTEM PROVIDED HISTORY: stroke TECHNOLOGIST PROVIDED HISTORY: stroke Reason for Exam: post TPA Acuity: Unknown Type of Exam: Unknown FINDINGS: Mildly elevated mean transit time involving the left frontal, parietal, and temporal lobes, in the left MCA vascular territory distribution. Relative cerebral blood flow and relative cerebral blood volume are maintained. No perfusion mismatch. No perfusion mismatch. Findings were discussed with Dr. Richy Foreman At 2:50 am on 1/11/2020. Cta Head Neck W Contrast    Result Date: 1/12/2020  EXAMINATION: CTA OF THE HEAD AND NECK WITH CONTRAST 1/11/2020 12:36 am: TECHNIQUE: CTA of the head and neck was performed with the administration of intravenous contrast. Multiplanar reformatted images are provided for review. MIP images are provided for review. Stenosis of the internal carotid arteries measured using NASCET criteria. Dose modulation, iterative reconstruction, and/or weight based adjustment of the mA/kV was utilized to reduce the radiation dose to as low as reasonably achievable. COMPARISON: Noncontrast CT head 1/10/2020 HISTORY: ORDERING SYSTEM PROVIDED HISTORY: stroke TECHNOLOGIST PROVIDED HISTORY: stroke Reason for Exam: Stroke, TPA in process Acuity: Acute Type of Exam: Subsequent/Follow-up FINDINGS: CTA NECK: AORTIC ARCH/ARCH VESSELS: Normal, three-vessel aortic arch anatomy. Calcified and noncalcified atherosclerotic plaque of the aortic arch and proximal arch vessels. No dissection or arterial injury. No significant stenosis of the brachiocephalic or subclavian arteries. CAROTID ARTERIES: Scattered, noncalcified atherosclerotic plaque of the mid to distal common carotid arteries without focal stenosis. Calcified and noncalcified atherosclerotic plaque of the right carotid bifurcation and proximal right internal carotid artery. Mild, less than 50%, stenosis of the right internal carotid artery by NASCET criteria.   Calcified and noncalcified atherosclerotic plaque of the left carotid bifurcation and proximal left internal carotid artery. Mild, less than 50%, stenosis of the left internal carotid artery by NASCET criteria. No arterial injury or dissection. VERTEBRAL ARTERIES: Vertebral arteries arise from their respective subclavian arteries. Left vertebral artery is dominant with a developmentally hypoplastic right vertebral artery. Mild narrowing of the left vertebral artery origin. SOFT TISSUES: Lung apices are clear. No cervical or superior mediastinal lymphadenopathy. Larynx and pharynx are unremarkable. No acute abnormality of the salivary and thyroid glands. BONES: No acute osseous abnormality. CTA HEAD: ANTERIOR CIRCULATION: Atherosclerotic calcification of the cavernous internal carotid arteries without focal stenosis. Occlusion of a left middle cerebral artery proximal to mid M2 branch, at the level of the sylvian fissure. No significant stenosis of the intracranial internal carotid, anterior cerebral, or right middle cerebral arteries. No aneurysm. POSTERIOR CIRCULATION: Intracranial dominance of the left vertebral artery. No focal stenosis of the intracranial vertebral arteries or basilar artery. No flow-limiting stenosis of the posterior cerebral arteries. Left P1 segment is hypoplastic with fetal type origin of the left posterior cerebral artery. No aneurysm. OTHER: No dural venous sinus thrombosis on this non-dedicated study. BRAIN: No mass effect or midline shift. No extra-axial fluid collection. Gray-white differentiation is maintained. Mild, less than 50%, stenosis of the internal carotid arteries by NASCET criteria. Occlusion of a left middle cerebral artery proximal to mid M2 branch, at the level of the sylvian fissure. Findings were discussed with Geoffrey Grijalva at 1:26 a.m. on 01/11/2020.      Mri Brain Wo Contrast    Result Date: 1/11/2020  EXAMINATION: MRI OF THE BRAIN WITHOUT CONTRAST  1/11/2020 4:47 pm TECHNIQUE: Multiplanar multisequence MRI of the brain was performed without the administration

## 2020-01-12 NOTE — PLAN OF CARE
DATE: 01/12/20      AWAKE & FOLLOWING COMMANDS:  [] No   [x] Yes    INTUBATED:   [x] No   [] Yes    SEDATION/ANALGESIA:    [] Propofol gtt  [] Precedex gtt [] Versed gtt   [x] No Sedation or Not Applicable  Pain medications:     VASOPRESSORS:  [x] No    [] Yes  [] Levophed [] Dopamine [] Vasopressin  [] Dobutamine [] Phenylephrine [] Epinephrine    CENTRAL LINES:  [x] No    [] Yes:  Location: , Date placed: , Indication:    ONEAL CATHETER: [x] No    [] Yes:  Location: urethral, Date placed: 1/11/20, Indication:     FEEDING: Able to take PO?  [] No:  [] NG/OG [] PEG  [] NPO for:   [] Tube Feeds    [x] Yes:  Diet: general    DVT Prophylaxis:  [x] Lovenox   [] Heparin subQ   [] Anticoagulation   [] Contraindicated secondary to:     Stress Ulcer Prophylaxis:  [] PPI  [] H2 Receptor Blocker  [x] Not indicated    Blood Glucose:  [x] Blood glucose <180 consistently  [] Insulin sliding scale   [] Home Medications addressed    HOB >30 Degrees:  [x] Yes  [] No, contraindicated due to     Roberta Terrazas MD, [facility]  PGY-2 Neurology   1/12/2020 at 9:55 AM

## 2020-01-12 NOTE — PROGRESS NOTES
01/10/20  2302 01/11/20  0831 01/12/20  0919   WBC 6.9 11.2  --    HGB 15.5 14.8 13.6    225  --      BMP:    Recent Labs     01/10/20  2302 01/11/20  0831 01/12/20  0919    141 137   K 3.7 4.1 3.8    106 106   CO2 21 19* 20   BUN 15 13 12   CREATININE 1.09 0.81 0.70   GLUCOSE 150* 157* 101*     Hepatic:   Recent Labs     01/10/20  2302   AST 29   ALT 25   BILITOT 0.44   ALKPHOS 79     Troponin: No results for input(s): TROPONINI in the last 72 hours. BNP: No results for input(s): BNP in the last 72 hours. Lipids:   Recent Labs     01/11/20 0831   CHOL 137   HDL 38*     INR:   Recent Labs     01/10/20  2302   INR 1.0       Assessment and Recommendations: This is a 65-year-old male with past medical history including chronic kidney disease, hyperlipidemia, PTSD. He presented to outside hospital with speech difficulty/aphasia and right-sided weakness. He was given TPA in outside hospital and transferred for large vessel occlusion/left M2 occlusion. He subsequently underwent emergent thrombectomy. He was enrolled to \[facility] [place] [facility]\"" study. S/p cerebral angiogram in January 11, 2020  Impression:   1.  Left superior division M2 MCA occlusion consistent with acute thromboembolism. This is also accompanied with 2 MCA M3 distal emboli noted. 2.  The above mentioned occlusion was treated with 2 passes of mechanical thrombectomy using the TIGERTRIEVER revascularization device and proximal flow arrest using Balloon Guide Catheter (BGC) (1nd and 2rd pass was in conjunction of contact aspiration    using manual aspiration 60 cc syringe). The third pas using TREVO stent retriever 4 MCA M3 branch. This resulted into TICI 2C revascularization. 3.   Right fetal PCA noted as normal variant         MRI brain obtained. There is acute ischemic stroke noted in left MCA territory. He is doing good this morning. No new weakness. His speech difficulty and weakness has improved.     1. Continue aspirin 81 mg once daily.  2. Continue atorvastatin 40 mg once daily. 3. DVT prophylaxis. 4. Evaluate for cardioembolic etiology for stroke. 5. Follow-up in neuro endovascular clinic in 2 weeks with Dr. Leobardo Dawn and in 3 months with Dr. Rick Trujillo.     Please contact neuro endovascular team for any questions or concerns    Soren Bowman MD  Stroke, [facility]  [id number] [address] [place]  Electronically signed 1/12/2020 at 1:27 PM

## 2020-01-12 NOTE — PROGRESS NOTES
Physical Therapy    Facility/Department: [address]  Initial Assessment    NAME: Nick Ludn  : 1950  MRN: [id number]    Date of Service: 2020     CHIEF COMPLAINT      CVA     HPI    History Obtained From: patient and chart review     The patient is a 78 y.o. male presented as a transfer from [facility]. Presented there with slurred speech and right upper extremity and right lower extremity weakness, slurred speech, expressive aphasia with an onset at 22:25 tonight. Was given tPA that completed at 0055 with minimal improvement from initial NIH of 11, to current NIH of 9. Decision made by [facility] team to take patient to lab as well for mechanical thrombectomy. He has a PMH of CKD, HLD, HTN, Neuropathy, and new onset AFIB.       CTA read as occlusion of the left middle cerebral artery proximal to the mid and branch at the level of the sylvian fissure. CT head and CT brain perfusion without perfusion mismatch or abnormality.     Patient taken for angiography earlier this morning, found to have left superior division M2 MCA occlusion consistent with acute thromboembolism, multiple passes of mechanical thrombectomy with TICI 2c revascularization. Repeat CT head at 11 AM for concern of bleeding risk. Plan for MRI today,, do not bend leg for 3 hours, SBP goal 1  per  Neuro endovascular surgery     Discharge Recommendations:  Patient would benefit from continued therapy after discharge   PT Equipment Recommendations  Equipment Needed: No    Assessment   Body structures, Functions, Activity limitations: Decreased functional mobility ; Decreased endurance;Decreased balance  Assessment: Pt required Min A to chair. Pt exhibited slight decrease in strength and balance. Specific instructions for Next Treatment: Gait and balance activities.   Prognosis: Good  PT Education: Plan of Care;General Safety;PT Role  Barriers to Learning: No  REQUIRES PT FOLLOW UP: Yes  Activity Tolerance  Activity Tolerance:

## 2020-01-13 VITALS
SYSTOLIC BLOOD PRESSURE: 123 MMHG | TEMPERATURE: 98.4 F | WEIGHT: 220.02 LBS | DIASTOLIC BLOOD PRESSURE: 78 MMHG | RESPIRATION RATE: 20 BRPM | BODY MASS INDEX: 34.46 KG/M2 | OXYGEN SATURATION: 95 % | HEART RATE: 64 BPM

## 2020-01-13 LAB
ANION GAP SERPL CALCULATED.3IONS-SCNC: 15 MMOL/L (ref 9–17)
BUN BLDV-MCNC: 16 MG/DL (ref 8–23)
BUN/CREAT BLD: ABNORMAL (ref 9–20)
CALCIUM SERPL-MCNC: 8.7 MG/DL (ref 8.6–10.4)
CHLORIDE BLD-SCNC: 105 MMOL/L (ref 98–107)
CO2: 20 MMOL/L (ref 20–31)
CREAT SERPL-MCNC: 0.8 MG/DL (ref 0.7–1.2)
EKG ATRIAL RATE: 102 BPM
EKG Q-T INTERVAL: 362 MS
EKG QRS DURATION: 86 MS
EKG QTC CALCULATION (BAZETT): 442 MS
EKG R AXIS: 2 DEGREES
EKG T AXIS: 28 DEGREES
EKG VENTRICULAR RATE: 90 BPM
GFR AFRICAN AMERICAN: >60 ML/MIN
GFR NON-AFRICAN AMERICAN: >60 ML/MIN
GFR SERPL CREATININE-BSD FRML MDRD: ABNORMAL ML/MIN/{1.73_M2}
GFR SERPL CREATININE-BSD FRML MDRD: ABNORMAL ML/MIN/{1.73_M2}
GLUCOSE BLD-MCNC: 122 MG/DL (ref 70–99)
HCT VFR BLD CALC: 43 % (ref 40.7–50.3)
HEMOGLOBIN: 14.7 G/DL (ref 13–17)
MCH RBC QN AUTO: 29.8 PG (ref 25.2–33.5)
MCHC RBC AUTO-ENTMCNC: 34.2 G/DL (ref 28.4–34.8)
MCV RBC AUTO: 87.2 FL (ref 82.6–102.9)
NRBC AUTOMATED: 0 PER 100 WBC
PDW BLD-RTO: 12.8 % (ref 11.8–14.4)
PLATELET # BLD: 197 K/UL (ref 138–453)
PMV BLD AUTO: 9.6 FL (ref 8.1–13.5)
POTASSIUM SERPL-SCNC: 3.7 MMOL/L (ref 3.7–5.3)
RBC # BLD: 4.93 M/UL (ref 4.21–5.77)
SODIUM BLD-SCNC: 140 MMOL/L (ref 135–144)
WBC # BLD: 8 K/UL (ref 3.5–11.3)

## 2020-01-13 PROCEDURE — 99233 SBSQ HOSP IP/OBS HIGH 50: CPT | Performed by: PSYCHIATRY & NEUROLOGY

## 2020-01-13 PROCEDURE — 6370000000 HC RX 637 (ALT 250 FOR IP): Performed by: STUDENT IN AN ORGANIZED HEALTH CARE EDUCATION/TRAINING PROGRAM

## 2020-01-13 PROCEDURE — 92507 TX SP LANG VOICE COMM INDIV: CPT

## 2020-01-13 PROCEDURE — 6360000002 HC RX W HCPCS: Performed by: STUDENT IN AN ORGANIZED HEALTH CARE EDUCATION/TRAINING PROGRAM

## 2020-01-13 PROCEDURE — 2580000003 HC RX 258: Performed by: STUDENT IN AN ORGANIZED HEALTH CARE EDUCATION/TRAINING PROGRAM

## 2020-01-13 PROCEDURE — 93306 TTE W/DOPPLER COMPLETE: CPT

## 2020-01-13 PROCEDURE — 80048 BASIC METABOLIC PNL TOTAL CA: CPT

## 2020-01-13 PROCEDURE — 97110 THERAPEUTIC EXERCISES: CPT

## 2020-01-13 PROCEDURE — 36415 COLL VENOUS BLD VENIPUNCTURE: CPT

## 2020-01-13 PROCEDURE — 85027 COMPLETE CBC AUTOMATED: CPT

## 2020-01-13 PROCEDURE — 93010 ELECTROCARDIOGRAM REPORT: CPT | Performed by: INTERNAL MEDICINE

## 2020-01-13 PROCEDURE — 99239 HOSP IP/OBS DSCHRG MGMT >30: CPT | Performed by: PSYCHIATRY & NEUROLOGY

## 2020-01-13 PROCEDURE — 97116 GAIT TRAINING THERAPY: CPT

## 2020-01-13 RX ORDER — ATORVASTATIN CALCIUM 40 MG/1
40 TABLET, FILM COATED ORAL NIGHTLY
Qty: 30 TABLET | Refills: 3 | Status: SHIPPED | OUTPATIENT
Start: 2020-01-13 | End: 2021-02-03 | Stop reason: SDUPTHER

## 2020-01-13 RX ADMIN — SODIUM CHLORIDE, PRESERVATIVE FREE 10 ML: 5 INJECTION INTRAVENOUS at 08:01

## 2020-01-13 RX ADMIN — ASPIRIN 81 MG: 81 TABLET, CHEWABLE ORAL at 07:59

## 2020-01-13 RX ADMIN — METOPROLOL TARTRATE 25 MG: 25 TABLET ORAL at 07:59

## 2020-01-13 RX ADMIN — RIVAROXABAN 20 MG: 20 TABLET, FILM COATED ORAL at 18:22

## 2020-01-13 RX ADMIN — ENOXAPARIN SODIUM 40 MG: 40 INJECTION SUBCUTANEOUS at 07:59

## 2020-01-13 ASSESSMENT — ENCOUNTER SYMPTOMS
SHORTNESS OF BREATH: 0
PHOTOPHOBIA: 0
VOMITING: 0
CHEST TIGHTNESS: 0
COUGH: 0
TROUBLE SWALLOWING: 0
NAUSEA: 0
ABDOMINAL PAIN: 0
WHEEZING: 0
SORE THROAT: 0
BACK PAIN: 0

## 2020-01-13 NOTE — DISCHARGE SUMMARY
earlier this morning, found to have left superior division M2 MCA occlusion consistent with acute thromboembolism, multiple passes of mechanical thrombectomy with TICI 2c revascularization. 1/11-patient did well overnight, MRI showed multiple areas of stroke in the posterior left middle cerebral artery territory, small focus of gradient blooming left insula possible trace petechial hemorrhage. 1/13: Start Xarelto in the evening, discontinue antiplatelets        Last 24h:    No acute events overnight. CT showed no bleeding on repeat 24-hour post TPA. MRI as above, blood pressures within goal range, afebrile. Patient believes he is speaking more clearly, with less right upper extremity weakness. PROCEDURES:    MT with TICI 2C    PHYSICAL EXAMINATION        Discharge Vitals:  weight is 220 lb 0.3 oz (99.8 kg). His oral temperature is 98.4 °F (36.9 °C). His blood pressure is 123/78 and his pulse is 64. His respiration is 20 and oxygen saturation is 95%. CONSTITUTIONAL:  Well developed, well nourished, alert and oriented x 3, in no acute distress. GCS 15. Nontoxic. Mild dysarthria no expressive or receptive aphasia   HEAD:  normocephalic, atraumatic    EYES:  PERRLA, EOMI.   ENT:  moist mucous membranes   NECK:  supple, symmetric   LUNGS:  Equal air entry bilaterally   CARDIOVASCULAR:  normal s1 / s2, RRR, distal pulses intact   ABDOMEN:  Soft, no rigidity   NEUROLOGIC:  Mental Status:  A & O x3,awake             Cranial Nerves:    cranial nerves II-XII are grossly intact with mild nasolabial fold flattening on the right side.   Motor Exam:    Drift:  absent  Tone:  normal     Motor exam is symmetrical 5 out of 5 all extremities bilaterally with exception of mild weakness of RUE     Sensory:    Touch:    Right Upper Extremity:  normal  Left Upper Extremity:  normal  Right Lower Extremity:  normal  Left Lower Extremity:  normal            LABS/IMAGING     Recent Labs     01/10/20  2302 01/11/20  0831 Procedures performed: 1. Diagnostic cerebral angiography. 2. Emergent mechanical thrombectomy for the treatment of acute ischemic stroke. 3. Selective microcatheter angiography of the left MCA x 4. 4. Slow IA continuous infusion of nicardipine over 1 hr. Vessels catheterized: 1. Left common carotid artery 2. Left internal carotid artery 3. Left middle cerebral artery 4. Right common femoral artery Neurointerventionist: Guero Cage MD [place]: Maame Hay MD Fluoro time:  19.8 minutes Contrast amount: 72 cc of Visipaque-270 Consent: After explaining the risks and benefits to the patient and family, including but not limited to stroke, vessel injury, dissection, tear, X-ray dye allergic reaction, an informed consent was obtained. Indication and Clinical History: 34-year-old man who presented with right-sided weakness/dyspnea/hypertension through. He is status post TPA. CTA had and neck showed left M2 occlusion. He is undergoing emergent thrombectomy. Patient was enrolled into TIGER study. Anesthesia: Local anesthesia with lidocaine. MAC anesthesia. Procedure and Findings: The patient's right groin was prepped and draped in standard sterile fashion, and under monitored anesthesia care, an 8 [place] intravascular 10 cm sheath was placed within the right common femoral artery, establishing arterial access. An 8 Fr 95cm Flowgate balloon guide catheter was triaxially advanced over an 035 guidewire and 6 Fr Berenstein catheter to the level of aortic arch, and used to selectively catheterize the left common carotid artery. Digital subtraction angiography of the left carotid bifurcation was performed in frontal and lateral projections. Arterial phase images revealed normal carotid bifurcation with no hemodynamically significant stenosis. There was normal antegrade opacification of the carotid bifurcation and a normal  course and caliber of both the external and internal carotid arteries.  Under roadmap guidance, the left ICA was selectively catheterized; the Linda Rakers was removed with the FlowGate positioned in the proximal ICA. DSA imaging was performed in frontal and lateral views. 10mg nicardipine was added to the guide catheter's heparin/saline bag to reduce the likelihood of vasospasm which was later on discontinued secondary to dropping of blood pressure below the specified goal. The superior division of the MCA was noted to be occluded, TICI score of zero with 2 distal occlusions noted. First pass of mechanical thrombectomy:(using Tiger-retriever 17) Following angiographic evaluation, a Rebar 18 0.021 microcatheter was coaxially introduced through the 8 [place] BGC over a synchro 2 0.014 microwire, and advanced through the M2 MCA thrombus. Digital subtraction angiography was then performed through the  microcatheter, confirming its position within the inferior division of the left MCA. Jessenia Bucy 0.17revascularization device was then deployed across the occlusion spanning the left M2 MCA and left proximal MCA. The stent retriever was allowed to integrate the thrombus for 5 minutes with massage technique. After 5 minutes, the Flowgate Balloon Guide was inflated; manual aspiration was applied from the Flowgate and the Jessenia Bucy revascularization device was removed from the body. No thrombus was visualized on the stent retriever. Post-treatment control angiography of the intracranial left internal resulting into a TICI I flow restoration. Second pass of mechanical thrombectomy:(using TIGERTRIEVER 0.21) A Rebar 0.021 microcatheter  was coaxially introduced through the 8 [place] BGC over a synchro 2 0.014 microwire, and advanced through the M2 MCA thrombus. Digital subtraction angiography was then performed through the microcatheter, confirming its position within the inferior division of the left MCA . A TIGERTRIEVER revascularization device was then deployed across the occlusion spanning the left M2 MCA and left proximal MCA.  The TECHNOLOGIST PROVIDED HISTORY: stroke Reason for Exam: post TPA Acuity: Unknown Type of Exam: Unknown FINDINGS: Mildly elevated mean transit time involving the left frontal, parietal, and temporal lobes, in the left MCA vascular territory distribution. Relative cerebral blood flow and relative cerebral blood volume are maintained. No perfusion mismatch. No perfusion mismatch. Findings were discussed with Dr. Brii Ybarra At 2:50 am on 1/11/2020. Cta Head Neck W Contrast    Result Date: 1/12/2020  EXAMINATION: CTA OF THE HEAD AND NECK WITH CONTRAST 1/11/2020 12:36 am: TECHNIQUE: CTA of the head and neck was performed with the administration of intravenous contrast. Multiplanar reformatted images are provided for review. MIP images are provided for review. Stenosis of the internal carotid arteries measured using NASCET criteria. Dose modulation, iterative reconstruction, and/or weight based adjustment of the mA/kV was utilized to reduce the radiation dose to as low as reasonably achievable. COMPARISON: Noncontrast CT head 1/10/2020 HISTORY: ORDERING SYSTEM PROVIDED HISTORY: stroke TECHNOLOGIST PROVIDED HISTORY: stroke Reason for Exam: Stroke, TPA in process Acuity: Acute Type of Exam: Subsequent/Follow-up FINDINGS: CTA NECK: AORTIC ARCH/ARCH VESSELS: Normal, three-vessel aortic arch anatomy. Calcified and noncalcified atherosclerotic plaque of the aortic arch and proximal arch vessels. No dissection or arterial injury. No significant stenosis of the brachiocephalic or subclavian arteries. CAROTID ARTERIES: Scattered, noncalcified atherosclerotic plaque of the mid to distal common carotid arteries without focal stenosis. Calcified and noncalcified atherosclerotic plaque of the right carotid bifurcation and proximal right internal carotid artery. Mild, less than 50%, stenosis of the right internal carotid artery by NASCET criteria.   Calcified and noncalcified atherosclerotic plaque of the left carotid bifurcation and proximal left internal carotid artery. Mild, less than 50%, stenosis of the left internal carotid artery by NASCET criteria. No arterial injury or dissection. VERTEBRAL ARTERIES: Vertebral arteries arise from their respective subclavian arteries. Left vertebral artery is dominant with a developmentally hypoplastic right vertebral artery. Mild narrowing of the left vertebral artery origin. SOFT TISSUES: Lung apices are clear. No cervical or superior mediastinal lymphadenopathy. Larynx and pharynx are unremarkable. No acute abnormality of the salivary and thyroid glands. BONES: No acute osseous abnormality. CTA HEAD: ANTERIOR CIRCULATION: Atherosclerotic calcification of the cavernous internal carotid arteries without focal stenosis. Occlusion of a left middle cerebral artery proximal to mid M2 branch, at the level of the sylvian fissure. No significant stenosis of the intracranial internal carotid, anterior cerebral, or right middle cerebral arteries. No aneurysm. POSTERIOR CIRCULATION: Intracranial dominance of the left vertebral artery. No focal stenosis of the intracranial vertebral arteries or basilar artery. No flow-limiting stenosis of the posterior cerebral arteries. Left P1 segment is hypoplastic with fetal type origin of the left posterior cerebral artery. No aneurysm. OTHER: No dural venous sinus thrombosis on this non-dedicated study. BRAIN: No mass effect or midline shift. No extra-axial fluid collection. Gray-white differentiation is maintained. Mild, less than 50%, stenosis of the internal carotid arteries by NASCET criteria. Occlusion of a left middle cerebral artery proximal to mid M2 branch, at the level of the sylvian fissure. Findings were discussed with Mak Rodriguez at 1:26 a.m. on 01/11/2020.      Mri Brain Wo Contrast    Result Date: 1/11/2020  EXAMINATION: MRI OF THE BRAIN WITHOUT CONTRAST  1/11/2020 4:47 pm TECHNIQUE: Multiplanar multisequence MRI of the brain was performed without the administration of intravenous contrast. COMPARISON: CT angiogram head and neck done earlier same day. No prior brain MRI available for comparison. HISTORY: ORDERING SYSTEM PROVIDED HISTORY: stroke TECHNOLOGIST PROVIDED HISTORY: stroke FINDINGS: INTRACRANIAL STRUCTURES/VENTRICLES: Multiple small areas of acute stroke in the posterior left middle cerebral artery territory involving the left frontal and parietal lobes, most pronounced in the frontal insular region. There is an additional punctate focus of acute stroke in the right centrum semiovale. Small focus of gradient blooming in the left insula could represent trace petechial hemorrhage. Generalized cerebral and cerebellar volume loss. The axial FLAIR images demonstrate bilateral periventricular and deep white matter signal hyperintensities, commonly seen in the setting of chronic small vessel ischemic disease. Punctate focus of gradient blooming in the left parietal deep white matter demonstrates no correlating diffusion restriction and may represent a small cavernoma or amyloid angiopathy. No mass effect or midline shift. No evidence of a large acute intracranial hemorrhage. The ventricles and sulci are normal in size and configuration. The sellar/suprasellar regions appear unremarkable. The normal signal voids within the major intracranial vessels appear maintained. ORBITS: The visualized portion of the orbits demonstrate no acute abnormality. SINUSES: The paranasal sinuses are clear. Small amount of fluid in the bilateral mastoid air cells. BONES/SOFT TISSUES: The bone marrow signal intensity appears normal. The soft tissues demonstrate no acute abnormality. 1.  Multiple small areas of acute stroke in the posterior left middle cerebral artery territory involving the left frontal and parietal lobes, most pronounced in the frontal insular region. Additional punctate focus of acute stroke in the right centrum semiovale.  2. Small focus of gradient blooming in the left insula could represent trace petechial hemorrhage. No large acute intracranial hemorrhage. No mass effect or midline shift. 3.  Mild chronic small vessel ischemic disease.          DISCHARGE INSTRUCTIONS     Discharge Medications:        Medication List      START taking these medications    atorvastatin 40 MG tablet  Commonly known as:  LIPITOR  Take 1 tablet by mouth nightly     rivaroxaban 20 MG Tabs tablet  Commonly known as:  XARELTO  Take 1 tablet by mouth daily (with breakfast)        CONTINUE taking these medications    amoxicillin 500 MG capsule  Commonly known as:  AMOXIL  Take 1 capsule by mouth 3 times daily     FISH OIL + D3 PO     VITAMIN B1-B12 IM     vitamin D 1.25 MG (19273 UT) Caps capsule  Commonly known as:  ERGOCALCIFEROL        STOP taking these medications    aspirin 325 MG EC tablet           Where to Get Your Medications      These medications were sent to [facility]  Andressa Castro [phone]  [address] [place], [place] [id number]    Phone:  [phone]   · atorvastatin 40 MG tablet  · rivaroxaban 20 MG Tabs tablet       Diet: DIET DENTAL SOFT; Dental Soft diet as tolerated  Activity: Provided in AVS  Follow-up:  Neurology, endovascular   Time Spent for discharge: 30 minutes    Nery Avalos MD  1/13/2020, 6:00 PM

## 2020-01-13 NOTE — PROGRESS NOTES
Physical Therapy  Facility/Department: [address]  Daily Treatment Note  NAME: Aaron Black  : 1950  MRN: [id number]    Date of Service: 2020    Discharge Recommendations:  Patient would benefit from continued therapy after discharge   PT Equipment Recommendations  Equipment Needed: No    Assessment   Body structures, Functions, Activity limitations: Decreased functional mobility ; Decreased endurance;Decreased balance  Assessment: Pt able to amb 40ft x2 with RW and MIN A for safety, unsteady 1 LOB noted , distance limited by fatigue. Pt demo poor safety awareness throughout and is very impulsive. Pt will continue to require assist with functional mobility to prevent fall. He would benefit from more PT to address deficit. Specific instructions for Next Treatment: Gait and balance activities. Prognosis: Good  PT Education: General Safety;Gait Training;Functional Mobility Training;Transfer Training;Home Exercise Program  Barriers to Learning: No  REQUIRES PT FOLLOW UP: Yes  Activity Tolerance  Activity Tolerance: Patient Tolerated treatment well;Patient limited by fatigue     Patient Diagnosis(es): The encounter diagnosis was Cerebrovascular accident (CVA), unspecified mechanism ([facility] [address] has a past medical history of Chronic kidney disease, Hyperlipidemia, and PTSD (post-traumatic stress disorder). has a past surgical history that includes Appendectomy. Restrictions  Restrictions/Precautions  Restrictions/Precautions: Fall Risk  Required Braces or Orthoses?: No  Subjective   General  Response To Previous Treatment: Patient with no complaints from previous session. Family / Caregiver Present: Yes((wife)  Subjective  Subjective: RN and pt agreeable to PT. Pt alert in bed upon arrival eager to get OOB. denies pain, pleasant and cooperatuive throughout. General Comment  Comments: Pt retired to [employer] with call light in reach, breafast set before him.   Pain Screening  Patient Currently in Pain: Denies  Vital Signs  Patient Currently in Pain: Denies       Orientation  Orientation  Overall Orientation Status: Within Normal Limits  Cognition      Objective   Bed mobility  Rolling to Right: Stand by assistance  Supine to Sit: Contact guard assistance  Sit to Supine: (Pt retired to chair.)  Scooting: Stand by assistance  Comment: Pt very impulsive ; Cues provided for safety. Transfers  Sit to Stand: Minimal Assistance  Stand to sit: Minimal Assistance  Bed to Chair: Minimal assistance  Ambulation  Ambulation?: Yes  Ambulation 1  Surface: level tile  Device: Rolling Walker  Assistance: Minimal assistance  Quality of Gait: Slightly unsteady with 1 LOB at begining . Leans to right. Gait Deviations: Slow Yi  Distance: 40ft x2  Comments: Limited by fatigue and decrease endurance. Balance  Sitting - Static: Good  Sitting - Dynamic: Good;-  Standing - Static: Fair;+  Standing - Dynamic: Fair  Comments: Standing balance with rolling walker  Exercises  Comments: Seated LE exercise program: Long Arc Quads, hip abduction/adduction, heel/toe raises, and marches. Reps: 15      Goals  Short term goals  Time Frame for Short term goals: 14 visits  Short term goal 1: Independent bed mobility  Short term goal 2: Independent transfers  Short term goal 3: Independent ambulation with least restricitve device 300 ft  Short term goal 4: Pt to improve dynamic balance to Good. Short term goal 5: Pt to tolerate 45 minutes of activity to improve endurance and safety with mobility  Patient Goals   Patient goals : Go home soon. Plan    Plan  Times per week: 5-6x/week  Specific instructions for Next Treatment: Gait and balance activities.   Current Treatment Recommendations: Strengthening, Gait Training, Balance Training, Neuromuscular Re-education, Functional Mobility Training, Endurance Training, Transfer Training, Safety Education & Training  Safety Devices  Type of devices: Left in chair, Call light within reach, Chair alarm in place, Nurse notified, Gait belt, Patient at risk for falls     Therapy Time   Individual Concurrent Group Co-treatment   Time In 0845         Time Out 0909         Minutes 24                 Dony Serrato, CEDRIC

## 2020-01-13 NOTE — RESEARCH
[address]    Patient Name: Abbie Romero  MRN: [id number]  [place]: 1950  Date of evaluation: 01/12/2020  Time of evaluation:1327  Reason for evaluation: 24-Hour Post-Procedure Follow-Up    Protocol:  Protocol No: CLN-TI-001  NCT: [id number]  SLIME: [id number]  IRB: 2018-15  Site PI: Riley Sarkar MD    24 hour post procedure assessment done by Dr. Dariel Maria and Dr. Waldo Correa.    NIHSS per them is 1. Questions:  Please contact the Research Nurse at [phone] or Dr. Waldo Correa via [employer] with any questions.     Yan Wagner RN  Clinical Research Nurse  Clinical Research Services  [address] [place] [address]  P: [phone]  F: [phone]

## 2020-01-13 NOTE — FLOWSHEET NOTE
Assessment:  Patient was being taken for some basic tests to see if he is ready to go home. Family is very pleased with the progression of the patient and how well he was taken care of. Family is very hopeful that everything will improve and patient will be able to continue on the road to recovery.  prayed with family. Intervention:   provided space for family to express feelings, thoughts, and concerns. Determined that support is available. Outcome:  Family hopeful and pleased with care. 01/13/20 1600   Encounter Summary   Services provided to: Family   Referral/Consult From: [facility] Family members   Place of [address] [place] [address] Visiting   ([phone])   Complexity of Encounter Moderate   Length of Encounter 15 minutes   Routine   Type Initial   Assessment Calm; Approachable;Coping; Hopeful   Intervention Active listening;Explored feelings, thoughts, concerns;Explored coping resources; Discussed illness/injury and it's impact; Discussed belief system/Rastafarian practices/debbie;Prayer   Outcome Expressed gratitude;Expressed feelings/needs/concerns     Electronically signed by Dion Hughes on 1/13/2020 at 5:14 PM

## 2020-01-13 NOTE — CARE COORDINATION
Discharge [facility] [place], [place] Case Management Department  Written by: Hilary Olivas RN    Patient Name: Noe Burdick  Attending Provider: Sin James MD  Admit Date: 2020  1:53 AM  MRN: [id number]  Account: [de-identified]                     : 1950  Discharge Date: 20      Disposition: home with Oni Briones RN

## 2020-01-13 NOTE — CARE COORDINATION
Met with patient and wife to discuss transition planning. Pt would like to return home with home care. [place] of choice given, [facility], notified of planned discharge today.

## 2020-01-13 NOTE — RESEARCH
[address]    Patient Name: Liat Tyson  MRN: [id number]  [place]: 1950  Date of evaluation: 2020  Time of evaluation: 1352  Reason for evaluation: 48-Hour Post-Procedure Follow-Up and Discharge    Protocol:  Protocol No: CLN-TI-001  NCT: [id number]  SLIME: [id number]  IRB: 2018-15  Site PI: Octaviano Oro MD    Pt is sitting in chair at bedside. Pt is awake and alert, respiration even and unlabored. Pt's family present. Pt denies any current problems other than some slurred speech. Pt states that he has been walking around unit without any assistance. Discussed the TIGER study again with pt and family. Pt states that he wishes to stay in study. Discussed follow up and stated that either myself or another Research Nurse will be at his scheduled follow up appointments for the study. Provided pt and family my business card to contact if there is any questions or concerns related to the study. Pt states that there is a possibility of him being discharged home today pending he gets his Echocardiogram today. My Assessment:   Pt is awake, alert and oriented to person, place and time. NIH Stroke Scale:    1a.  Level of consciousness:  0 - alert; keenly responsive   1b. Level of consciousness questions:  0 - answers both questions correctly   1c. Level of consciousness questions:  0 - performs both tasks correctly   2. Best Gaze:  0 - normal   3. Visual:  0 - no visual loss   4. Facial Palsy:  0 - normal symmetric movement   5a. Motor left arm:  0 - no drift, limb holds 90 (or 45) degrees for full 10 seconds   5b. Motor right arm:  0 - no drift, limb holds 90 (or 45) degrees for full 10 seconds   6a. Motor left le - no drift; leg holds 30 degree position for full 5 seconds    6b. Motor right le - no drift; leg holds 30 degree position for full 5 seconds   7. Limb Ataxia:  0 - absent   8. Sensory:  0 - normal; no sensory loss   9.     Best Language:  0 - no

## 2020-01-13 NOTE — PROGRESS NOTES
Speech Language Pathology  Speech Language Pathology  [facility]    Speech Language Treatment Note    Date: 1/13/2020  Patients Name: Didier Pelletier  MRN: [id number]  Diagnosis:   Patient Active Problem List   Diagnosis Code    Skin tag L91.8    Corneal foreign body, right, initial encounter T15. 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    Combined forms of age-related cataract of both eyes H25.813    Acute cerebrovascular accident (CVA) ([address] I63.9    Acute ischemic left MCA stroke (HCC) F11.575    History of atrial fibrillation Z86.79    Tissue plasminogen activator (tPA) administered at other facility within 24 hours before current admission Z92.82    Nihss score 10 R29.710       Pain: 0/10    Speech and Language Treatment  Treatment time: 9:40-9:55    Subjective: [x] Alert [x] Cooperative     [] Confused     [] Agitated    [] Lethargic      Objective/Assessment:    Verbal Expression: Abstract word generation: 100% accuracy x4 exercises     Speech: O/m exercises for dysarthria completed x10 reps x1 with min prompts. Facial droop no longer present, mild slurred speech observed. Pt and wife both agree that mild slurred speech is still present. Verbal and written education provided re: facial exercises and clear speech strategies   6 word sentences utilizing clear speech strategies x9/10 with min prompts    Recall: Immediate recall of up to 5 units: 5/5, 5/5 with min prompts  Delayed recall of 3 units: 1/3, 2/3 with mod prmpts    Plan:  [x] Continue  services    [] Discharge from [address] [place] [address] Dr:      Discharge recommendations: [] Inpatient Rehab   [] [place]   [] Outpatient Therapy  [] Follow up at trauma clinic   [x] Other: Home independently      Treatment completed by: Elliot Bailey M.A.  CCC-SLP

## 2020-01-13 NOTE — PROGRESS NOTES
ENDOVASCULAR NEUROSURGERY PROGRESS NOTE  2020 8:34 AM  Subjective:   Admit Date: 2020  PCP: No primary care provider on file. Patient is doing well this am. No new symptoms. CT head yesterday stable no hemorrhagic transformation     Objective:   Vitals: BP (!) 141/89   Pulse 73   Temp 97.7 °F (36.5 °C) (Oral)   Resp 19   Wt 220 lb 0.3 oz (99.8 kg)   SpO2 97%   BMI 34.46 kg/m²   General appearance: He was sitting on the bed, NAD. HEENT: Atraumatic. Neck: Neck is supple. Lungs: No respiratory distress noted. Abdomen: Soft nontender. Extremities: No lower limb edema noted. Groin: Groin looks clean, no hematoma noted. General appearance: Lying in bed, NAD,  Lungs: CTAB  Heart: RRR  Abdomen: soft, NTND, bowel sounds normal    Neuro exam: Follows simple commands. CN II-XII: no gross facial assymmetry, dysarthria, pupils 2 mm reactive to light bilaterally, EOMI, VFF. Dylan spontaneously against gravity. NIH Stroke Scale Total:  1a.  Level of consciousness:  0  1b. Level of consciousness questions:  0   1c. Level of consciousness questions:  0   2. Best Gaze:  0   3. Visual:  0   4. Facial Palsy:  0   5a. Motor left arm:  0  5b. Motor right arm:  0  6a. Motor left le  6b. Motor right le  7. Limb Ataxia:  0   8. Sensory:  0   9. Best Language:  0  10. Dysarthria:  1  11.   Extinction and Inattention: 0     Total: 1      MRS : 1    Medications and labs:   Scheduled Meds:   atorvastatin  40 mg Oral Nightly    enoxaparin  40 mg Subcutaneous Daily    sodium chloride flush  10 mL Intravenous 2 times per day    metoprolol tartrate  25 mg Oral BID    aspirin  81 mg Oral Daily     Continuous Infusions:  CBC:   Recent Labs     01/10/20  2302 20  0831 20  0919   WBC 6.9 11.2  --    HGB 15.5 14.8 13.6    225  --      BMP:    Recent Labs     01/10/20  2302 20  0831 20  0919    141 137   K 3.7 4.1 3.8    106 106   CO2 Tameka Caldera    Please contact neuro endovascular team for any questions or concerns    Alla Swift MD  Stroke, [facility]  [id number]  Electronically signed 1/13/2020 at 8:34 AM

## 2020-01-13 NOTE — RESEARCH
[address]    Patient Name: Donis Guillen  MRN: [id number]  [place]: 1950  Date of evaluation: 1/10/2020  Time of evaluation: 2900  Reason for evaluation: Ischemic Stroke; TIGER: Treatment with Intent to Generate Endovascular Reperfusion    Protocol:  Protocol No: CLN-TI-001  NCT: [id number]  SLIME: [id number]  IRB: 2018-15  Site PI: Crispin Boast, MD    Inclusion/Exclusion Criteria:  Donis Guillen met initial inclusion and exclusion criteria and all required standard of care tests have been performed per Dr. Isaías De La Torre. Treatment/Alternatives/Risks:  Dr. Isaías De La Torre verified the study treatments, risks, alternative treatments, and expected outcomes with the patient. Consent:  Pt and family were provided with a copy of the informed consent form for review by Dr. Isaías De La Torre and Dr. Mouna Sheffield. Pt and wife read the consent and all of their questions were answered by Dr. Isaías De La Torre. Pt and LAR wishes to participate in the study. Informed consent signed by LAR at [facility] and a copy of the signed consent form was given to Jesus Sharpe (Wife and LAR) for their records. NIH Stroke Scale Total: 11    Pre-Stroke Modified Whitney Scale:  0: No symptoms at all   1: No significant disability despite symptoms; able to carry out all usual duties and activities  2: Slight disability; unable to carry out all previous activities, but able to look after own affairs without assistance  3:Moderate disability; requiring some help, but able to walk without assistance  4: Moderately severe disability; unable to walk and attend to bodily needs without assistance  5:Severe disability; bedridden, incontinent and requiring constant nursing care and attention  *Pre-Stroke Modified Whitney Scale determined by pt's wife.     MRS Score: 0    Enrollment:  Subjects are considered enrolled in the TIGER Study only after having met all inclusion/exclusion criteria, being properly consented, and having a Tigertriever device first introduced through the

## 2020-01-13 NOTE — PROGRESS NOTES
care unit for further management. Impression: 1. Left superior division M2 MCA occlusion consistent with acute thromboembolism. This is also accompanied with 2 MCA M3 distal emboli noted. 2.  The above mentioned occlusion was treated with 2 passes of mechanical thrombectomy using the TIGERTRIEVER revascularization device and proximal flow arrest using Balloon Guide Catheter (BGC) (1nd and 2rd pass was in conjunction of contact aspiration using manual aspiration 60 cc syringe). The third pas using TREVO stent retriever 4 MCA M3 branch. This resulted into TICI 2C revascularization. 3.   Right fetal PCA noted as normal variant Dr. Kristina Collins dictated this invasive procedure. Christoph Courtney was present for all procedural and imaging components of this case. Examination was reviewed and reported findings confirmed and edited by Christoph Courtney.  supervised and   interpreted this procedure. Ct Brain Perfusion    Result Date: 1/12/2020  EXAMINATION: CTA OF THE HEAD WITH CONTRAST WITH PERFUSION 1/11/2020 2:14 am TECHNIQUE: CTA of the head/brain was performed with the administration of intravenous contrast. Multiplanar reformatted images are provided for review. MIP images are provided for review. Dose modulation, iterative reconstruction, and/or weight based adjustment of the mA/kV was utilized to reduce the radiation dose to as low as reasonably achievable. COMPARISON: CT and CTA head and neck 1/11/2020 HISTORY: ORDERING SYSTEM PROVIDED HISTORY: stroke TECHNOLOGIST PROVIDED HISTORY: stroke Reason for Exam: post TPA Acuity: Unknown Type of Exam: Unknown FINDINGS: Mildly elevated mean transit time involving the left frontal, parietal, and temporal lobes, in the left MCA vascular territory distribution. Relative cerebral blood flow and relative cerebral blood volume are maintained. No perfusion mismatch. No perfusion mismatch. Findings were discussed with Dr. Dipesh Araujo At 2:50 am on 1/11/2020.      Frances Coronado frontal insular region. There is an additional punctate focus of acute stroke in the right centrum semiovale. Small focus of gradient blooming in the left insula could represent trace petechial hemorrhage. Generalized cerebral and cerebellar volume loss. The axial FLAIR images demonstrate bilateral periventricular and deep white matter signal hyperintensities, commonly seen in the setting of chronic small vessel ischemic disease. Punctate focus of gradient blooming in the left parietal deep white matter demonstrates no correlating diffusion restriction and may represent a small cavernoma or amyloid angiopathy. No mass effect or midline shift. No evidence of a large acute intracranial hemorrhage. The ventricles and sulci are normal in size and configuration. The sellar/suprasellar regions appear unremarkable. The normal signal voids within the major intracranial vessels appear maintained. ORBITS: The visualized portion of the orbits demonstrate no acute abnormality. SINUSES: The paranasal sinuses are clear. Small amount of fluid in the bilateral mastoid air cells. BONES/SOFT TISSUES: The bone marrow signal intensity appears normal. The soft tissues demonstrate no acute abnormality. 1.  Multiple small areas of acute stroke in the posterior left middle cerebral artery territory involving the left frontal and parietal lobes, most pronounced in the frontal insular region. Additional punctate focus of acute stroke in the right centrum semiovale. 2.  Small focus of gradient blooming in the left insula could represent trace petechial hemorrhage. No large acute intracranial hemorrhage. No mass effect or midline shift. 3.  Mild chronic small vessel ischemic disease. Labs and Images reviewed with:  [x] Dr. Gibran Hernandez. Rayna    [] Dr. Massiel Pizarro  [] Dr. Emely Fitch  [] There are no new interval images to review.      PHYSICAL EXAM       CONSTITUTIONAL:  Well developed, well nourished, alert and oriented x 3, work up not indicated  - Continue to monitor for fevers  - Daily CBC    HEME:   - H&H repeat today  - Platelets stable yesterday  - Daily CBC    ENDOCRINE:  - Continue to monitor blood glucose, goal <180      OTHER:  - PT/OT/ST PMR consulted  - Code Status: FULL    PROPHYLAXIS:  Stress ulcer: not indicated    DVT PROPHYLAXIS:  - SCD sleeves - Thigh High   - NATALI stockings - Thigh High  - Hold AC and AP until confirmed that MRI is not showing significant bleed    -  DISPOSITION:  [] To remain ICU:   [] OK for out of ICU from Neuro Critical Care standpoint    We will continue to follow along. For any changes in exam or patient status please contact Neuro Critical Care.       Romario Mccabe MD  Neuro Critical Care  Pager [phone]  1/13/2020     1:59 PM no

## 2020-01-13 NOTE — DISCHARGE INSTR - COC
Continuity of Care Form    Patient Name: Thalia Casarez   :  1950  MRN:  [id number]    Admit date:  2020  Discharge date:  ***    Code Status Order: Full Code   Advance Directives:     Admitting Physician:  Jose Rolon MD  PCP: No primary care provider on file. Discharging Nurse: Frandy Cho Unit/Room#: 0522/0522-01  Discharging Unit Phone Number: [phone]    Emergency Contact:   Extended Emergency Contact Information  Primary Emergency Contact: Kamille Worthy  Address: [address] [place] [address], [facility] Yadiel Jones [address] Phone: [phone]  Relation: Spouse  Secondary Emergency Contact: Sabino Mueller, [facility] Phone: [phone]  Relation: Child    Past Surgical History:  Past Surgical History:   Procedure Laterality Date    APPENDECTOMY         Immunization History: There is no immunization history on file for this patient. Active Problems:  Patient Active Problem List   Diagnosis Code    Skin tag L91.8    Corneal foreign body, right, initial encounter T15. 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    Combined forms of age-related cataract of both eyes H25.813    Acute cerebrovascular accident (CVA) ([address] I63.9    Acute ischemic left MCA stroke (HCC) L50.238    History of atrial fibrillation Z86.79    Tissue plasminogen activator (tPA) administered at other facility within 24 hours before current admission Z92.82    Nihss score 10 R29.710       Isolation/Infection:   Isolation          No Isolation        Patient Infection Status     None to display          Nurse Assessment:  Last Vital Signs: /78   Pulse 64   Temp 98.4 °F (36.9 °C) (Oral)   Resp 20   Wt 220 lb 0.3 oz (99.8 kg)   SpO2 95%   BMI 34.46 kg/m²     Last documented pain score (0-10 scale): Pain Level: 0  Last Weight:   Wt Readings from Last 1 Encounters:   20 220 lb 0.3 oz (99.8 kg) Mental Status:  oriented, alert, coherent, logical, thought processes intact and able to concentrate and follow conversation    IV Access:  - None    Nursing Mobility/ADLs:  Walking   Assisted  Transfer  Independent  Bathing  Independent  Dressing  Independent  [address]  Independent  Med Delivery   whole    Wound Care Documentation and Therapy:        Elimination:  Continence:   · Bowel: Yes  · Bladder: Yes  Urinary Catheter: None   Colostomy/Ileostomy/Ileal Conduit: No       Date of Last BM: 1/13/2020      Intake/Output Summary (Last 24 hours) at 1/13/2020 1815  Last data filed at 1/13/2020 1200  Gross per 24 hour   Intake 480 ml   Output --   Net 480 ml     I/O last 3 completed shifts: In: 480 [P.O.:480]  Out: -     Safety Concerns:     None    Impairments/Disabilities:      None    Nutrition Therapy:  Current Nutrition Therapy:   - Oral Diet:  General    Routes of Feeding: Oral  Liquids: No Restrictions  Daily Fluid Restriction: no  Last Modified Barium Swallow with Video (Video Swallowing Test): not done    Treatments at the Time of Hospital Discharge:   Respiratory Treatments: none  Oxygen Therapy:  is not on home oxygen therapy.   Ventilator:    - No ventilator support    Rehab Therapies: Physical Therapy and Occupational Therapy  Weight Bearing Status/Restrictions: No weight bearing restirctions  Other Medical Equipment (for information only, NOT a DME order):  walker  Other Treatments: na    Patient's personal belongings (please select all that are sent with patient):  Bonita    RN SIGNATURE:  Electronically signed by Luz Dallas RN on 1/13/20 at 6:33 PM    CASE MANAGEMENT/SOCIAL WORK SECTION    Inpatient Status Date: ***    Readmission Risk Assessment Score:  Readmission Risk              Risk of Unplanned Readmission:        9           Discharging to Facility/ Agency   · Name: [address] [place] [id number]         Phone:

## 2020-01-14 ENCOUNTER — TELEPHONE (OUTPATIENT)
Dept: FAMILY MEDICINE CLINIC | Age: 70
End: 2020-01-14

## 2020-01-14 ENCOUNTER — TELEPHONE (OUTPATIENT)
Dept: PHARMACY | Facility: CLINIC | Age: 70
End: 2020-01-14

## 2020-01-14 PROCEDURE — 1111F DSCHRG MED/CURRENT MED MERGE: CPT | Performed by: PHARMACIST

## 2020-01-14 RX ORDER — CHLORAL HYDRATE 500 MG
1000 CAPSULE ORAL 2 TIMES DAILY
COMMUNITY
End: 2021-02-03 | Stop reason: SDUPTHER

## 2020-01-14 NOTE — TELEPHONE ENCOUNTER
CLINICAL PHARMACY NOTE  Post-Discharge Transitions of Care (SYD)    Non-face-to-face services provided:  Assessment and support for treatment adherence and medication management- Medication reconciliation completed      Subjective/Objective:  Abbie Romero is a 78 y.o. y.o. male. Patient was discharged from [facility] on 1/13/2020 with a diagnosis of CVA. Patient outreach to review discharge medications and provide medication review and management. Spoke with daughter [facility]    No Known Allergies     Discharge Medications (as per discharging medication list found in AVS): There are NEW medications for you. START taking them after you leave the hospital  Medication Sig Notes    apixaban (ELIQUIS) 5 MG TABS tablet Take 1 tablet by mouth 2 times daily Picking up today, just got filled due to insurance issue    atorvastatin (LIPITOR) 40 MG tablet Take 1 tablet by mouth nightly      You told us you were taking these medications at home, but the amount or how often you take this medication has CHANGED  N/a    These are medications you told us you were taking at home, CONTINUE taking them after you leave the hospital   Medication Sig Notes    amoxicillin (AMOXIL) 500 MG capsule Take 1 capsule by mouth 3 times daily Finished in hospital    Fish Oil-Cholecalciferol (FISH OIL + D3 PO) Take by mouth     vitamin D (ERGOCALCIFEROL) 86554 UNITS CAPS capsule Take 50,000 Units by mouth once a week     VITAMIN B1-B12 IM Inject  into the muscle. These are the medications you have told us you were taking at home, STOP taking them after you leave the hospital   Medication Notes    Aspirin 325 mg Replaced with xarelto     Additional Medications:  N/a     Estimated Creatinine Clearance: 97 mL/min (based on SCr of 0.8 mg/dL). Assessment/Plan:  - Medication reconciliation completed. Number of medications reviewed: 6    - Pt is taking medications as directed by discharging physician.    Number of discrepancies: 1. Instructions per discharge list provided except per below documentation. Identified medication discrepancies/issues:   · Category 1 (0):  1.   · Category 2 (0):  1.   · Category 3 (0):  1.   · Category 4 (1):  1.  Reviewed/updated medication list    - [facility] active medication list updated:  · Medications Added (0):    · Medications Removed (0):    · Medications Changed (0):      - Identified Potential Medication Interactions: No clinically significant interactions identified via Lexicomp Interaction Analysis as category D or higher.    - Renal Dosing: No renal adjustments necessary.    - Follow up appointment date (7 days for more severe illness, 14 days for others):    · Patient reminded of upcoming appointment    Future Appointments   Date Time Provider Alexandria Kaur   1/17/2020  8:20 AM [facility]   2/7/2020  1:30 PM Hayden Angel MD Neuro Endo [facility]   2/13/2020  5:04 AM [facility]   4/16/2020  2:00 PM Diana Bal MD Neuro Endo [facility]     Thank you,  Felice Shrestha, PharmD  [address]  Phone: [phone], or toll free [phone], option 7     CLINICAL PHARMACY NOTE   POST-DISCHARGE TELEPHONE FOLLOW-UP ADDENDUM  For Pharmacy Admin Tracking Only  TCM Call Made?: Yes  [facility] Select Patient?: Yes  Total # of Interventions Recommended: 1 - Updated Order #: 1  Total # Interventions Accepted: 1  Intervention Severity:   - Level 1 Intervention Present?: No   - Level 2 #: 0   - Level 3 #: 0  Outreach Status: Review Complete  Care Coordinator Outreach to Patient?: No  Provider Contacted?: No  Time Spent (min): 15

## 2020-01-15 NOTE — RESEARCH
[address]    Patient Name: Good Walls  MRN: [id number]  [place]: 1950  Date of evaluation: 1/15/2020  Time of evaluation: ~0320 by Dr. Evy Ignacio and Dr. Maeve Ponce  Reason for evaluation: Ischemic Stroke; TIGER: Treatment with Intent to Generate Endovascular Reperfusion    Protocol:  Protocol No: CLN-TI-001  NCT: [id number]  SLIME: [id number]  IRB: 2018-15  Site PI: Heriberto Lowe MD    Inclusion/Exclusion Criteria:  Good Walls met initial inclusion and exclusion criteria and all required standard of care tests have been performed. Treatment/Alternatives/Risks:  Dr. Maeve Ponce verified the study treatments, risks, alternative treatments, and expected outcomes with the patient. Consent:  Pt and wife was provided with a copy of the informed consent form and provided ample time to read and review it. They read the consent and all of their questions were answered. Pt and his wife wish to participate in the study. Informed consent signed by pt's wife (LAR) and a copy of the signed consent form was given to wife for their records. NIH Stroke Scale: (obtained by Dr. Evy Ignacio)  1a. Level of consciousness:  0 - alert; keenly responsive  1b. Level of consciousness questions:  2 - answers neither question correctly  1c. Level of consciousness questions:  0 - performs both tasks correctly  2. Best Gaze:  0 - normal  3. Visual:  0 - no visual loss  4. Facial Palsy:  1 - minor paralysis (flattened nasolabial fold, asymmetric on smiling)  5a. Motor left arm:  0 - no drift, limb holds 90 (or 45) degrees for full 10 seconds  5b. Motor right arm:  4 - no movement  6a. Motor left le - no drift; leg holds 30 degree position for full 5 seconds  6b. Motor right le - no drift; leg holds 30 degree position for full 5 seconds  7. Limb Ataxia:  0 - absent  8. Sensory:  2 - severe to total sensory loss; patient is not aware of being touched in face, arm, leg  9.     Best Language:  2 - severe

## 2020-01-15 NOTE — CARE COORDINATION
..Stroke Follow Up Phone Call    Called phone number on record for discharge follow up - No answer.      [x]Attempt #1    []Attempt #2 (final attempt)      Electronically signed by James Randall RN on 1/15/20 at 3:08 PM

## 2020-01-16 ENCOUNTER — APPOINTMENT (OUTPATIENT)
Dept: MRI IMAGING | Age: 70
DRG: 065 | End: 2020-01-16
Payer: MEDICARE

## 2020-01-16 ENCOUNTER — APPOINTMENT (OUTPATIENT)
Dept: CT IMAGING | Age: 70
End: 2020-01-16
Payer: OTHER GOVERNMENT

## 2020-01-16 ENCOUNTER — APPOINTMENT (OUTPATIENT)
Dept: GENERAL RADIOLOGY | Age: 70
End: 2020-01-16
Payer: OTHER GOVERNMENT

## 2020-01-16 ENCOUNTER — HOSPITAL ENCOUNTER (EMERGENCY)
Age: 70
Discharge: ANOTHER HOSPITAL | End: 2020-01-16
Attending: EMERGENCY MEDICINE
Payer: OTHER GOVERNMENT

## 2020-01-16 ENCOUNTER — HOSPITAL ENCOUNTER (INPATIENT)
Age: 70
LOS: 4 days | Discharge: HOME HEALTH | DRG: 065 | End: 2020-01-20
Attending: EMERGENCY MEDICINE | Admitting: PSYCHIATRY & NEUROLOGY
Payer: MEDICARE

## 2020-01-16 VITALS
HEART RATE: 81 BPM | TEMPERATURE: 97.7 F | DIASTOLIC BLOOD PRESSURE: 84 MMHG | SYSTOLIC BLOOD PRESSURE: 133 MMHG | RESPIRATION RATE: 18 BRPM | HEIGHT: 67 IN | WEIGHT: 218 LBS | BODY MASS INDEX: 34.21 KG/M2 | OXYGEN SATURATION: 97 %

## 2020-01-16 PROBLEM — I63.9 STROKE DETERMINED BY CLINICAL ASSESSMENT (HCC): Status: ACTIVE | Noted: 2020-01-16

## 2020-01-16 LAB
ABSOLUTE EOS #: 0.08 K/UL (ref 0–0.44)
ABSOLUTE IMMATURE GRANULOCYTE: 0 K/UL (ref 0–0.3)
ABSOLUTE LYMPH #: 1.64 K/UL (ref 1.1–3.7)
ABSOLUTE MONO #: 0.62 K/UL (ref 0.1–1.2)
ANION GAP SERPL CALCULATED.3IONS-SCNC: 12 MMOL/L (ref 9–17)
BASOPHILS # BLD: 1 % (ref 0–2)
BASOPHILS ABSOLUTE: 0.08 K/UL (ref 0–0.2)
BUN BLDV-MCNC: 17 MG/DL (ref 8–23)
BUN/CREAT BLD: 18 (ref 9–20)
CALCIUM SERPL-MCNC: 9.5 MG/DL (ref 8.6–10.4)
CHLORIDE BLD-SCNC: 105 MMOL/L (ref 98–107)
CO2: 24 MMOL/L (ref 20–31)
CREAT SERPL-MCNC: 0.94 MG/DL (ref 0.7–1.2)
DIFFERENTIAL TYPE: ABNORMAL
EKG ATRIAL RATE: 129 BPM
EKG Q-T INTERVAL: 380 MS
EKG QRS DURATION: 76 MS
EKG QTC CALCULATION (BAZETT): 441 MS
EKG R AXIS: 10 DEGREES
EKG T AXIS: -4 DEGREES
EKG VENTRICULAR RATE: 81 BPM
EOSINOPHILS RELATIVE PERCENT: 1 % (ref 1–4)
GFR AFRICAN AMERICAN: >60 ML/MIN
GFR NON-AFRICAN AMERICAN: >60 ML/MIN
GFR SERPL CREATININE-BSD FRML MDRD: ABNORMAL ML/MIN/{1.73_M2}
GFR SERPL CREATININE-BSD FRML MDRD: ABNORMAL ML/MIN/{1.73_M2}
GLUCOSE BLD-MCNC: 120 MG/DL (ref 70–99)
HCT VFR BLD CALC: 43.1 % (ref 40.7–50.3)
HCT VFR BLD CALC: 43.5 % (ref 40.7–50.3)
HCT VFR BLD CALC: 44.7 % (ref 40.7–50.3)
HEMOGLOBIN: 14.5 G/DL (ref 13–17)
HEMOGLOBIN: 14.7 G/DL (ref 13–17)
HEMOGLOBIN: 15.1 G/DL (ref 13–17)
IMMATURE GRANULOCYTES: 0 %
INR BLD: 1.1
LYMPHOCYTES # BLD: 21 % (ref 24–43)
MCH RBC QN AUTO: 29.1 PG (ref 25.2–33.5)
MCH RBC QN AUTO: 29.2 PG (ref 25.2–33.5)
MCH RBC QN AUTO: 29.8 PG (ref 25.2–33.5)
MCHC RBC AUTO-ENTMCNC: 32.9 G/DL (ref 28.4–34.8)
MCHC RBC AUTO-ENTMCNC: 33.6 G/DL (ref 25.2–33.5)
MCHC RBC AUTO-ENTMCNC: 34.7 G/DL (ref 28.4–34.8)
MCV RBC AUTO: 86 FL (ref 82.6–102.9)
MCV RBC AUTO: 86.9 FL (ref 82.6–102.9)
MCV RBC AUTO: 88.3 FL (ref 82.6–102.9)
MONOCYTES # BLD: 8 % (ref 3–12)
NRBC AUTOMATED: 0 PER 100 WBC
PARTIAL THROMBOPLASTIN TIME: 22.6 SEC (ref 20.5–30.5)
PARTIAL THROMBOPLASTIN TIME: 25.5 SEC (ref 27–35)
PARTIAL THROMBOPLASTIN TIME: 27.8 SEC (ref 20.5–30.5)
PDW BLD-RTO: 13.1 % (ref 11.8–14.4)
PDW BLD-RTO: 13.1 % (ref 11.8–14.4)
PDW BLD-RTO: 13.2 % (ref 11.8–14.4)
PLATELET # BLD: 191 K/UL (ref 138–453)
PLATELET # BLD: 201 K/UL (ref 138–453)
PLATELET # BLD: 206 K/UL (ref 138–453)
PLATELET ESTIMATE: ABNORMAL
PMV BLD AUTO: 10.1 FL (ref 8.1–13.5)
PMV BLD AUTO: 10.2 FL (ref 8.1–13.5)
PMV BLD AUTO: 9.9 FL (ref 8.1–13.5)
POTASSIUM SERPL-SCNC: 4.5 MMOL/L (ref 3.7–5.3)
PROTHROMBIN TIME: 11 SEC (ref 9.4–11.3)
RBC # BLD: 4.96 M/UL (ref 4.21–5.77)
RBC # BLD: 5.06 M/UL (ref 4.21–5.77)
RBC # BLD: 5.06 M/UL (ref 4.21–5.77)
RBC # BLD: ABNORMAL 10*6/UL
SEG NEUTROPHILS: 69 % (ref 36–65)
SEGMENTED NEUTROPHILS ABSOLUTE COUNT: 5.38 K/UL (ref 1.5–8.1)
SODIUM BLD-SCNC: 141 MMOL/L (ref 135–144)
TROPONIN INTERP: NORMAL
TROPONIN INTERP: NORMAL
TROPONIN T: NORMAL NG/ML
TROPONIN T: NORMAL NG/ML
TROPONIN, HIGH SENSITIVITY: 14 NG/L (ref 0–22)
TROPONIN, HIGH SENSITIVITY: 15 NG/L (ref 0–22)
WBC # BLD: 7 K/UL (ref 3.5–11.3)
WBC # BLD: 7.7 K/UL (ref 3.5–11.3)
WBC # BLD: 7.8 K/UL (ref 3.5–11.3)
WBC # BLD: ABNORMAL 10*3/UL

## 2020-01-16 PROCEDURE — 93005 ELECTROCARDIOGRAM TRACING: CPT | Performed by: EMERGENCY MEDICINE

## 2020-01-16 PROCEDURE — 82803 BLOOD GASES ANY COMBINATION: CPT

## 2020-01-16 PROCEDURE — 85025 COMPLETE CBC W/AUTO DIFF WBC: CPT

## 2020-01-16 PROCEDURE — 6360000004 HC RX CONTRAST MEDICATION: Performed by: EMERGENCY MEDICINE

## 2020-01-16 PROCEDURE — 99285 EMERGENCY DEPT VISIT HI MDM: CPT

## 2020-01-16 PROCEDURE — 80048 BASIC METABOLIC PNL TOTAL CA: CPT

## 2020-01-16 PROCEDURE — 85730 THROMBOPLASTIN TIME PARTIAL: CPT

## 2020-01-16 PROCEDURE — 82565 ASSAY OF CREATININE: CPT

## 2020-01-16 PROCEDURE — 85027 COMPLETE CBC AUTOMATED: CPT

## 2020-01-16 PROCEDURE — 83605 ASSAY OF LACTIC ACID: CPT

## 2020-01-16 PROCEDURE — 82435 ASSAY OF BLOOD CHLORIDE: CPT

## 2020-01-16 PROCEDURE — 93005 ELECTROCARDIOGRAM TRACING: CPT

## 2020-01-16 PROCEDURE — 70498 CT ANGIOGRAPHY NECK: CPT

## 2020-01-16 PROCEDURE — 70551 MRI BRAIN STEM W/O DYE: CPT

## 2020-01-16 PROCEDURE — 1200000000 HC SEMI PRIVATE

## 2020-01-16 PROCEDURE — 85014 HEMATOCRIT: CPT

## 2020-01-16 PROCEDURE — 84484 ASSAY OF TROPONIN QUANT: CPT

## 2020-01-16 PROCEDURE — 36415 COLL VENOUS BLD VENIPUNCTURE: CPT

## 2020-01-16 PROCEDURE — 6360000002 HC RX W HCPCS: Performed by: EMERGENCY MEDICINE

## 2020-01-16 PROCEDURE — 85610 PROTHROMBIN TIME: CPT

## 2020-01-16 PROCEDURE — 82947 ASSAY GLUCOSE BLOOD QUANT: CPT

## 2020-01-16 PROCEDURE — 84295 ASSAY OF SERUM SODIUM: CPT

## 2020-01-16 PROCEDURE — 2580000003 HC RX 258: Performed by: STUDENT IN AN ORGANIZED HEALTH CARE EDUCATION/TRAINING PROGRAM

## 2020-01-16 PROCEDURE — 70450 CT HEAD/BRAIN W/O DYE: CPT

## 2020-01-16 PROCEDURE — 99231 SBSQ HOSP IP/OBS SF/LOW 25: CPT | Performed by: PSYCHIATRY & NEUROLOGY

## 2020-01-16 PROCEDURE — 6370000000 HC RX 637 (ALT 250 FOR IP): Performed by: EMERGENCY MEDICINE

## 2020-01-16 PROCEDURE — 82330 ASSAY OF CALCIUM: CPT

## 2020-01-16 PROCEDURE — 84132 ASSAY OF SERUM POTASSIUM: CPT

## 2020-01-16 PROCEDURE — 99222 1ST HOSP IP/OBS MODERATE 55: CPT | Performed by: PSYCHIATRY & NEUROLOGY

## 2020-01-16 PROCEDURE — 71045 X-RAY EXAM CHEST 1 VIEW: CPT

## 2020-01-16 RX ORDER — ATORVASTATIN CALCIUM 80 MG/1
40 TABLET, FILM COATED ORAL NIGHTLY
Status: DISCONTINUED | OUTPATIENT
Start: 2020-01-16 | End: 2020-01-20 | Stop reason: HOSPADM

## 2020-01-16 RX ORDER — SODIUM CHLORIDE 0.9 % (FLUSH) 0.9 %
10 SYRINGE (ML) INJECTION PRN
Status: DISCONTINUED | OUTPATIENT
Start: 2020-01-16 | End: 2020-01-20 | Stop reason: HOSPADM

## 2020-01-16 RX ORDER — HEPARIN SODIUM 10000 [USP'U]/100ML
10.12 INJECTION, SOLUTION INTRAVENOUS CONTINUOUS
Status: DISCONTINUED | OUTPATIENT
Start: 2020-01-16 | End: 2020-01-20

## 2020-01-16 RX ORDER — SODIUM CHLORIDE 9 MG/ML
INJECTION, SOLUTION INTRAVENOUS CONTINUOUS
Status: DISCONTINUED | OUTPATIENT
Start: 2020-01-16 | End: 2020-01-19

## 2020-01-16 RX ORDER — SODIUM CHLORIDE 0.9 % (FLUSH) 0.9 %
10 SYRINGE (ML) INJECTION EVERY 12 HOURS SCHEDULED
Status: DISCONTINUED | OUTPATIENT
Start: 2020-01-16 | End: 2020-01-20 | Stop reason: HOSPADM

## 2020-01-16 RX ORDER — HEPARIN SODIUM 1000 [USP'U]/ML
4000 INJECTION, SOLUTION INTRAVENOUS; SUBCUTANEOUS PRN
Status: DISCONTINUED | OUTPATIENT
Start: 2020-01-16 | End: 2020-01-17

## 2020-01-16 RX ORDER — ASPIRIN 325 MG
325 TABLET ORAL DAILY
Status: DISCONTINUED | OUTPATIENT
Start: 2020-01-16 | End: 2020-01-16 | Stop reason: HOSPADM

## 2020-01-16 RX ORDER — HEPARIN SODIUM 1000 [USP'U]/ML
2000 INJECTION, SOLUTION INTRAVENOUS; SUBCUTANEOUS PRN
Status: DISCONTINUED | OUTPATIENT
Start: 2020-01-16 | End: 2020-01-16

## 2020-01-16 RX ORDER — ASPIRIN 300 MG/1
300 SUPPOSITORY RECTAL DAILY
Status: DISCONTINUED | OUTPATIENT
Start: 2020-01-17 | End: 2020-01-20 | Stop reason: HOSPADM

## 2020-01-16 RX ORDER — HEPARIN SODIUM 1000 [USP'U]/ML
2000 INJECTION, SOLUTION INTRAVENOUS; SUBCUTANEOUS PRN
Status: DISCONTINUED | OUTPATIENT
Start: 2020-01-16 | End: 2020-01-17

## 2020-01-16 RX ORDER — HEPARIN SODIUM 10000 [USP'U]/100ML
15 INJECTION, SOLUTION INTRAVENOUS CONTINUOUS
Status: DISCONTINUED | OUTPATIENT
Start: 2020-01-16 | End: 2020-01-16

## 2020-01-16 RX ORDER — LABETALOL HYDROCHLORIDE 5 MG/ML
5 INJECTION, SOLUTION INTRAVENOUS EVERY 10 MIN PRN
Status: DISCONTINUED | OUTPATIENT
Start: 2020-01-16 | End: 2020-01-20 | Stop reason: HOSPADM

## 2020-01-16 RX ORDER — ASPIRIN 81 MG/1
81 TABLET ORAL DAILY
Status: DISCONTINUED | OUTPATIENT
Start: 2020-01-17 | End: 2020-01-20 | Stop reason: HOSPADM

## 2020-01-16 RX ORDER — HEPARIN SODIUM 1000 [USP'U]/ML
4000 INJECTION, SOLUTION INTRAVENOUS; SUBCUTANEOUS PRN
Status: DISCONTINUED | OUTPATIENT
Start: 2020-01-16 | End: 2020-01-16

## 2020-01-16 RX ADMIN — HEPARIN SODIUM AND DEXTROSE 15 UNITS/KG/HR: 10000; 5 INJECTION INTRAVENOUS at 19:46

## 2020-01-16 RX ADMIN — SODIUM CHLORIDE: 9 INJECTION, SOLUTION INTRAVENOUS at 21:55

## 2020-01-16 RX ADMIN — ASPIRIN 325 MG: 325 TABLET ORAL at 13:11

## 2020-01-16 RX ADMIN — SODIUM CHLORIDE, PRESERVATIVE FREE 10 ML: 5 INJECTION INTRAVENOUS at 21:55

## 2020-01-16 RX ADMIN — IOPAMIDOL 100 ML: 755 INJECTION, SOLUTION INTRAVENOUS at 13:58

## 2020-01-16 ASSESSMENT — ENCOUNTER SYMPTOMS
ABDOMINAL PAIN: 0
SHORTNESS OF BREATH: 0
SORE THROAT: 0

## 2020-01-16 NOTE — VIRTUAL HEALTH
level: Not on file   Occupational History    Not on file   Social Needs    Financial resource strain: Not on file    Food insecurity:     Worry: Not on file     Inability: Not on file    Transportation needs:     Medical: Not on file     Non-medical: Not on file   Tobacco Use    Smoking status: Never Smoker    Smokeless tobacco: Never Used   Substance and Sexual Activity    Alcohol use: Yes     Alcohol/week: 0.0 standard drinks     Comment: occas    Drug use: No    Sexual activity: Yes   Lifestyle    Physical activity:     Days per week: Not on file     Minutes per session: Not on file    Stress: Not on file   Relationships    Social connections:     Talks on phone: Not on file     Gets together: Not on file     Attends Druze service: Not on file     Active member of club or organization: Not on file     Attends meetings of clubs or organizations: Not on file     Relationship status: Not on file    Intimate partner violence:     Fear of current or ex partner: Not on file     Emotionally abused: Not on file     Physically abused: Not on file     Forced sexual activity: Not on file   Other Topics Concern    Not on file   Social History Narrative    Not on file     Family History  History reviewed. No pertinent family history. OBJECTIVE  BP (!) 148/71   Pulse 79   Temp 97.7 °F (36.5 °C) (Tympanic)   Resp 18   Ht 5' 7\" (1.702 m)   Wt 218 lb (98.9 kg)   SpO2 99%   BMI 34.14 kg/m²     NIH Stroke Scale  Level of Consciousness (1a. ): Alert  LOC Questions (1b. ):  Answers both correctly  LOC Commands (1c. ): Performs both tasks correctly  Best Gaze (2. ): Normal  Visual (3. ): (!) Complete hemianopia  Facial Palsy (4. ): Normal symmetrical movement  Motor Arm, Left (5a. ): No drift  Motor Arm, Right (5b. ): No drift  Motor Leg, Left (6a. ): No drift  Motor Leg, Right (6b. ): No drift  Limb Ataxia (7. ): Absent  Sensory (8. ): Normal  Best Language (9. ): No aphasia  Dysarthria (10. ):

## 2020-01-16 NOTE — ED NOTES
Pt to ed from home, [facility] transfer, patient lkw at 1115 today with onset of blurred vision, worse on the left and dizziness. Pt arrived to ed alert and oriented, pt denies any dizziness or blurred vision at this time. Pt denies chest pain or sob. Pt denies any numbness or tingling. Patient walks normally with a walker. Patient reports he was here on 1/10/20 when they removed a blood clot from his brain and had TPA administration.       Renetta Shannon RN  01/16/20 [address] Elenita Dickerson  01/16/20 5013

## 2020-01-16 NOTE — ED NOTES
Bed: 16  Expected date:   Expected time:   Means of arrival:   Comments:  K&P     Cher Pruett RN  01/16/20 9851

## 2020-01-16 NOTE — ED NOTES
Patient prepped from MRI, patient family updated on plan of care. Going to get dinner and will be back.       Karie Connelly RN  01/16/20 0789

## 2020-01-16 NOTE — CONSULTS
below):    1a.  Level of consciousness:  0 - alert; keenly responsive  1b. Level of consciousness questions:  0 - answers both questions correctly  1c. Level of consciousness questions:  0 - performs both tasks correctly  2. Best Gaze:  0 - normal  3. Visual:  0 - no visual loss  4. Facial Palsy:  0 - normal symmetric movement  5a. Motor left arm:  0 - no drift, limb holds 90 (or 45) degrees for full 10 seconds  5b. Motor right arm:  0 - no drift, limb holds 90 (or 45) degrees for full 10 seconds  6a. Motor left le - no drift; leg holds 30 degree position for full 5 seconds  6b. Motor right le - no drift; leg holds 30 degree position for full 5 seconds  7. Limb Ataxia:  0 - absent  8. Sensory:  0 - normal; no sensory loss  9. Best Language:  0 - no aphasia, normal  10. Dysarthria:  1 - mild to moderate, patient slurs at least some words and at worst, can be understood with some difficulty  11.   Extinction and Inattention:  0 - no abnormality    NIH SS is one      PAST MEDICAL HISTORY :       Past Medical History:        Diagnosis Date    Chronic kidney disease     Hyperlipidemia     Ischemic stroke ([facility] [address] 01/10/2020    PTSD (post-traumatic stress disorder)     Research study patient 2020    Study = [place]    Stroke [facility])        Past Surgical History:        Procedure Laterality Date    APPENDECTOMY         Social History:   Social History     Socioeconomic History    Marital status:      Spouse name: Not on file    Number of children: Not on file    Years of education: Not on file    Highest education level: Not on file   Occupational History    Not on file   Social Needs    Financial resource strain: Not on file    Food insecurity:     Worry: Not on file     Inability: Not on file    Transportation needs:     Medical: Not on file     Non-medical: Not on file   Tobacco Use    Smoking status: Never Smoker    Smokeless tobacco: Never Used   Substance and Sexual Activity    Alcohol use: Yes     Alcohol/week: 0.0 standard drinks     Comment: occas    Drug use: No    Sexual activity: Yes   Lifestyle    Physical activity:     Days per week: Not on file     Minutes per session: Not on file    Stress: Not on file   Relationships    Social connections:     Talks on phone: Not on file     Gets together: Not on file     Attends Orthodox service: Not on file     Active member of club or organization: Not on file     Attends meetings of clubs or organizations: Not on file     Relationship status: Not on file    Intimate partner violence:     Fear of current or ex partner: Not on file     Emotionally abused: Not on file     Physically abused: Not on file     Forced sexual activity: Not on file   Other Topics Concern    Not on file   Social History Narrative    Not on file       Family History:   History reviewed. No pertinent family history. Allergies:  Patient has no known allergies. Home Medications:  Prior to Admission medications    Medication Sig Start Date End Date Taking? Authorizing Provider   apixaban (ELIQUIS) 5 MG TABS tablet Take 1 tablet by mouth 2 times daily 1/14/20   Aries Enriquez MD   Omega-3 Fatty Acids (FISH OIL) 1000 MG CAPS Take 1,000 mg by mouth 2 times daily    Historical Provider, MD   atorvastatin (LIPITOR) 40 MG tablet Take 1 tablet by mouth nightly 1/13/20   Jane Cardoza MD   vitamin D (ERGOCALCIFEROL) 71299 UNITS CAPS capsule Take 50,000 Units by mouth once a week    Historical Provider, MD   VITAMIN B1-B12 IM Inject into the muscle every 30 days     Historical Provider, MD       Current Medications:   No current facility-administered medications for this encounter.      REVIEW OF SYSTEMS:       CONSTITUTIONAL: negative for fatigue and malaise   EYES: negative for double vision and photophobia    HEENT: negative for tinnitus and sore throat   RESPIRATORY: negative for cough, shortness of breath   CARDIOVASCULAR: negative for chest pain, palpitations   GASTROINTESTINAL: negative for nausea, vomiting   GENITOURINARY: negative for incontinence   MUSCULOSKELETAL: negative for neck or back pain   NEUROLOGICAL: negative for seizures   PSYCHIATRIC: negative for fatigue     Review of systems otherwise negative.     PHYSICAL EXAM:       BP (!) 156/93   Pulse 67   Temp 97.9 °F (36.6 °C) (Oral)   Resp 13   Ht 5' 7\" (1.702 m)   Wt 218 lb (98.9 kg)   SpO2 97%   BMI 34.14 kg/m²     NEUROLOGIC EXAMINATION  GENERAL  Appears comfortable and in no distress   HEENT  NC/ AT   cardiovascular  S1 and S2 heard; palpation of pulses: radial pulse    NECK  Supple and no bruits heard   MENTAL STATUS:  Alert, oriented, intact memory, no confusion, mild dysarthria, normal language, no hallucination or delusion   CRANIAL NERVES: II     -      PERRLA,   III,IV,VI -  EOMs full, no afferent defect, no HARSH, no ptosis  V     -     Normal facial sensation   VII    -     Normal facial symmetry  VIII   -     Intact hearing   IX,X -     Symmetrical palate  XI    -     Symmetrical shoulder shrug  XII   -     Midline tongue, no atrophy    MOTOR FUNCTION:  significant for good strength of grade 5/5 in bilateral proximal and distal muscle groups of both upper and lower extremities with normal bulk, normal tone and no involuntary movements, no tremor   SENSORY FUNCTION:  Normal touch, normal pin, normal vibration, normal proprioception   CEREBELLAR FUNCTION:  Intact fine motor control over upper limbs   REFLEX FUNCTION:  Symmetric, no perverted reflex, no Babinski sign   STATION and GAIT  Not tested       LABS AND IMAGING:     CBC with Differential:    Lab Results   Component Value Date    WBC 7.8 01/16/2020    RBC 4.96 01/16/2020    HGB 14.5 01/16/2020    HCT 43.1 01/16/2020     01/16/2020    MCV 86.9 01/16/2020    MCH 29.2 01/16/2020    MCHC 33.6 01/16/2020    RDW 13.1 01/16/2020    LYMPHOPCT 21 01/16/2020    MONOPCT 8 01/16/2020    BASOPCT 1 01/16/2020    MONOSABS 0.62 aspirin 81 mg daily   -Lipitor 40 mg nightly    - PT, OT, Speech eval    - Hydrate with  IVF NS @ 75cc/hr    - Telemetry    - Neuro checks per protocol  - We recommend   - Blood glucose goal less than 180  - Please avoid dextrose containing solutions    The patient will be admitted to the hospital for anticoagulation for A. fib, cardiology consult for RAUL. Additional recommendations may follow  Please contact [facility] with any changes in patients neurologic status. Thank you for your consult.        Jean Claude Richmond MD   1/16/2020  5:39 PM

## 2020-01-16 NOTE — H&P
Department of Neurology                                           Resident H&P Note        History Obtained From:  patient, electronic medical record, medical staff    CHIEF COMPLAINT:       Dizziness, dysarthria, left-sided hemianopsia. HISTORY OF PRESENT ILLNESS:       The patient is a 78 y.o. male who presents with Dizziness, dysarthria, left-sided hemianopsia. Last known well at 9 AM.  NIHSS of 1 for mild dysarthria. On Eliquis and aspirin for anticoagulation. Not candidate for TPA because of anticoagulation. CT head was negative for acute changes. CTA showed possible right PCA P2 occlusion. The patient was loaded with aspirin 324 mg. The patient stated that he started to have sudden dizziness associated with dysarthria and left blurry vision. The patient has past medical history of hyperlipidemia, hypertension, chronic kidney disease and new onset A. fib on Eliquis. Patient was admitted to the hospital recently 1 week ago due to right-sided weakness secondary to left MCA thrombus s/p TPA and mechanical thrombectomy with TICI 2c the patient was discharged with only mild dysarthria. LDL was 75 on Lipitor 40 mg. CTA showed left MCA M2 occlusion. MRI of the brain showed multiple areas of stroke in the left middle cerebral artery territory . Hemoglobin A1c of 5. Echo with ejection fraction 55% within normal limits. No RAUL was done. New onset atrial fibrillation, was started on Eliquis 5 mg twice daily. Will get MRI of the brain without contrast.  Will discontinue Eliquis and start heparin drip for possible stroke on Eliquis. We will continue aspirin. Will consult cardiology for anticoagulation and RAUL.    NIH Stroke Scale Total (if not done complete detailed one below):    1a.  Level of consciousness:  0 - alert; keenly responsive  1b. Level of consciousness questions:  0 - answers both questions correctly  1c.   Level of consciousness questions:  0 - performs both tasks correctly  2. Best Gaze:  0 - normal  3. Visual:  0 - no visual loss  4. Facial Palsy:  0 - normal symmetric movement  5a. Motor left arm:  0 - no drift, limb holds 90 (or 45) degrees for full 10 seconds  5b. Motor right arm:  0 - no drift, limb holds 90 (or 45) degrees for full 10 seconds  6a. Motor left le - no drift; leg holds 30 degree position for full 5 seconds  6b. Motor right le - no drift; leg holds 30 degree position for full 5 seconds  7. Limb Ataxia:  0 - absent  8. Sensory:  0 - normal; no sensory loss  9. Best Language:  0 - no aphasia, normal  10. Dysarthria:  1 - mild to moderate, patient slurs at least some words and at worst, can be understood with some difficulty  11. Extinction and Inattention:  0 - no abnormality    NIH SS is one      PAST MEDICAL HISTORY :       Past Medical History:        Diagnosis Date    Chronic kidney disease     Hyperlipidemia     Ischemic stroke ([facility] [address] 01/10/2020    PTSD (post-traumatic stress disorder)     Research study patient 2020    Study = [place]    Stroke [facility])        Past Surgical History:        Procedure Laterality Date    APPENDECTOMY         Social History:   Social History     Socioeconomic History    Marital status:      Spouse name: Not on file    Number of children: Not on file    Years of education: Not on file    Highest education level: Not on file   Occupational History    Not on file   Social Needs    Financial resource strain: Not on file    Food insecurity:     Worry: Not on file     Inability: Not on file    Transportation needs:     Medical: Not on file     Non-medical: Not on file   Tobacco Use    Smoking status: Never Smoker    Smokeless tobacco: Never Used   Substance and Sexual Activity    Alcohol use:  Yes     Alcohol/week: 0.0 standard drinks     Comment: occas    Drug use: No    Sexual activity: Yes   Lifestyle    Physical activity:     Days per week: 01/16/2020    K 4.5 01/16/2020     01/16/2020    CO2 24 01/16/2020    BUN 17 01/16/2020    LABALBU 4.1 01/10/2020    CREATININE 0.94 01/16/2020    CALCIUM 9.5 01/16/2020    GFRAA >60 01/16/2020    LABGLOM >60 01/16/2020    GLUCOSE 120 01/16/2020       Radiology Review:  [facility]  CTA h&N  MRI brain  MRA H/N      ASSESSMENT AND PLAN:       Patient Active Problem List   Diagnosis    Skin tag    Corneal foreign body, right, initial encounter    Glaucoma suspect of both eyes    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 Last Known Well (date and time): 9 AM    2. Candidate for IV tPA therapy     Yes []     No  [x] due to the following exclusion criteria: On anticoagulation    3. Candidate for Thrombectomy    Yes []      No [x] due to the following exclusion criteria: Low NIH of 1. The patient is a 78 y.o. male who presents with Dizziness, dysarthria, left-sided hemianopsia. Last known well at 9 AM.  NIHSS of 1 for mild dysarthria. On Eliquis and aspirin for anticoagulation. Not candidate for TPA because of anticoagulation. CT head was negative for acute changes. CTA showed possible right PCA P2 occlusion.   The patient was loaded with aspirin 324 mg.    - Discussed with Dr Thompson [place] was unremarkable  - CTA H/N right PCA P2 occlusion   - MRI Brain WO pending   -Cardiology consult for RAUL and anticoagulation(stroke on Eliquis)              -We will start heparin drip   -Continue aspirin 81 mg daily   -Lipitor 40 mg nightly    - PT, OT, Speech eval    - Hydrate with  IVF NS @ 75cc/hr    - Telemetry    - Neuro checks per protocol  - We recommend   - Blood glucose goal less than 180  - Please avoid dextrose containing solutions    The patient will be admitted to the hospital for anticoagulation for A. fib, cardiology consult for RAUL. Additional recommendations may follow after discussing the case with attending.         Leandra Carson MD   1/16/2020  5:52 PM

## 2020-01-16 NOTE — ED NOTES
Patient  States not having vision as blurry but still having to looking to the left to be able to read.      Bolivar Hart RN  01/16/20 [id number]

## 2020-01-16 NOTE — ED PROVIDER NOTES
[facility]  [address]  [place] [address]  Phone: [phone]      Pt Name: Abbie Romero  [id number]  Edna 1950  Date of evaluation: 2020      CHIEF COMPLAINT       Chief Complaint   Patient presents with    Eye Problem     blurry if head rotated to left side    Dizziness       HISTORY OF PRESENT ILLNESS   69-year-old male presents to the emergency department today after acute onset of vision deficit as well as dizziness which she describes as a lightheaded sensation. He feels like his vision is blurry as the way that he describes it. It started acutely at 1115. He did complain of a headache in route but denies 1 now. No neck stiffness. No weakness. No problems with speech. He was recently transferred to [address] [place] [address]. [facility] after he sustained a left MCA stroke. He had an intervention done at [address] [place] [address]. [facility] where he now does not have any deficits. He is on Eliquis. REVIEW OF SYSTEMS     Review of Systems   All other systems reviewed and are negative. PAST MEDICAL HISTORY    has a past medical history of Chronic kidney disease, Hyperlipidemia, Ischemic stroke ([facility] [address] PTSD (post-traumatic stress disorder), Research study patient, and Stroke ([facility] [address] SURGICAL HISTORY      has a past surgical history that includes Appendectomy. CURRENT MEDICATIONS       Previous Medications    APIXABAN (ELIQUIS) 5 MG TABS TABLET    Take 1 tablet by mouth 2 times daily    ATORVASTATIN (LIPITOR) 40 MG TABLET    Take 1 tablet by mouth nightly    OMEGA-3 FATTY ACIDS (FISH OIL) 1000 MG CAPS    Take 1,000 mg by mouth 2 times daily    VITAMIN B1-B12 IM    Inject into the muscle every 30 days     VITAMIN D (ERGOCALCIFEROL) 34363 UNITS CAPS CAPSULE    Take 50,000 Units by mouth once a week       ALLERGIES     has No Known Allergies. FAMILY HISTORY     He indicated that his mother is . He indicated that his father is . family history is not on file.     SOCIAL showing atrial fibrillation with a rate of 81. Initially looks like A. fib flutter to me. No acute ST segment changes. Rate axis and intervals are otherwise normal.    RADIOLOGY:   Ct Head Wo Contrast    Result Date: 1/16/2020  EXAMINATION: CT OF THE HEAD WITHOUT CONTRAST  1/16/2020 11:55 am TECHNIQUE: CT of the head was performed without the administration of intravenous contrast. Dose modulation, iterative reconstruction, and/or weight based adjustment of the mA/kV was utilized to reduce the radiation dose to as low as reasonably achievable. COMPARISON: Head CT 01/12/2020, MRI brain 01/11/2020, head CT 01/10/2020 HISTORY: ORDERING SYSTEM PROVIDED HISTORY: vision loss TECHNOLOGIST PROVIDED HISTORY: vision loss Reason for Exam: 1/10/2020 patient came to ER with stroke like symptoms, he was sent to [facility] for thrombectomy today Patient has vision blurring and dizziness FINDINGS: BRAIN/VENTRICLES: Continued evolution of subacute infarction in the left frontal lobe and left insular cortex with loss of gray-white matter differentiation. There is small area of low attenuation within left parietal lobe subcortical white matter. No associated hemorrhage. Diffuse parenchymal volume loss with enlargement of the ventricles and cerebral sulci. No abnormal extra-axial fluid collection. No hydrocephalus. Mild periventricular white matter low attenuation compatible with chronic microvascular ischemic changes. ORBITS: The visualized portion of the orbits demonstrate no acute abnormality. SINUSES: The visualized paranasal sinuses and mastoid air cells demonstrate no acute abnormality. SOFT TISSUES/SKULL: No acute abnormality of the visualized skull or soft tissues. 1. Continued evolution of subacute infarctions in the left MCA territory involving the left lateral frontal lobe and insular cortex as well as left parietal lobe subcortical white matter. No hemorrhagic transformation. 2. Diffuse parenchymal volume loss. Mild chronic microvascular ischemic changes. Findings were discussed with Mehran Johns at 12:16 pm on 1/16/2020. Ct Head Wo Contrast    Result Date: 1/12/2020  EXAMINATION: CT OF THE HEAD WITHOUT CONTRAST  1/11/2020 2:14 am TECHNIQUE: CT of the head was performed without the administration of intravenous contrast. Dose modulation, iterative reconstruction, and/or weight based adjustment of the mA/kV was utilized to reduce the radiation dose to as low as reasonably achievable. COMPARISON: Noncontrast CT head 1/10/2020 HISTORY: ORDERING SYSTEM PROVIDED HISTORY: stroke TECHNOLOGIST PROVIDED HISTORY: stroke Reason for Exam: post TPA Acuity: Unknown Type of Exam: Unknown FINDINGS: BRAIN/VENTRICLES: No acute intracranial hemorrhage, mass effect or midline shift. No abnormal extra-axial fluid collection. Gray-white differentiation is maintained without evidence of an acute infarct. No evidence of hydrocephalus. Basal cisterns are patent. ORBITS: Visualized portion of the orbits demonstrate no acute abnormality. SINUSES: Visualized paranasal sinuses and mastoid air cells demonstrate no acute abnormality. SOFT TISSUES/SKULL:  No acute abnormality of the visualized skull or soft tissues. No acute intracranial abnormality. Ct Head Wo Contrast    Result Date: 1/12/2020  EXAMINATION: CT OF THE HEAD WITHOUT CONTRAST  1/12/2020 6:06 am TECHNIQUE: CT of the head was performed without the administration of intravenous contrast. Dose modulation, iterative reconstruction, and/or weight based adjustment of the mA/kV was utilized to reduce the radiation dose to as low as reasonably achievable. COMPARISON: MRI 01/11/2020 HISTORY: ORDERING SYSTEM PROVIDED HISTORY: post 24hr tpa TECHNOLOGIST PROVIDED HISTORY: post 24hr tpa FINDINGS: BRAIN/VENTRICLES: Mild ventricular prominence age-related. Unchanged. No midline shift. Basal cisterns normally outlined.   There is left insular cortex region area of hypoattenuation microcatheter angiography of the left MCA x 4. 4. Slow IA continuous infusion of nicardipine over 1 hr. Vessels catheterized: 1. Left common carotid artery 2. Left internal carotid artery 3. Left middle cerebral artery 4. Right common femoral artery Neurointerventionist: Marijo Goldmann, MD [place]: Briana Yun MD Fluoro time:  19.8 minutes Contrast amount: 72 cc of Visipaque-270 Consent: After explaining the risks and benefits to the patient and family, including but not limited to stroke, vessel injury, dissection, tear, X-ray dye allergic reaction, an informed consent was obtained. Indication and Clinical History: 54-year-old man who presented with right-sided weakness/dyspnea/hypertension through. He is status post TPA. CTA had and neck showed left M2 occlusion. He is undergoing emergent thrombectomy. Patient was enrolled into TIGER study. Anesthesia: Local anesthesia with lidocaine. MAC anesthesia. Procedure and Findings: The patient's right groin was prepped and draped in standard sterile fashion, and under monitored anesthesia care, an 8 [place] intravascular 10 cm sheath was placed within the right common femoral artery, establishing arterial access. An 8 Fr 95cm Flowgate balloon guide catheter was triaxially advanced over an 035 guidewire and 6 Fr Berenstein catheter to the level of aortic arch, and used to selectively catheterize the left common carotid artery. Digital subtraction angiography of the left carotid bifurcation was performed in frontal and lateral projections. Arterial phase images revealed normal carotid bifurcation with no hemodynamically significant stenosis. There was normal antegrade opacification of the carotid bifurcation and a normal  course and caliber of both the external and internal carotid arteries. Under roadmap guidance, the left ICA was selectively catheterized; the Berenstein was removed with the FlowGate positioned in the proximal ICA.  DSA imaging was performed in frontal and was met. The Flowgate Balloon Guide was inflated; continuous manual aspiration was applied from the [address]; the [place] was removed from the body. Small red thrombus was visualized on the stent retriever. Post-treatment control angiography of the intracranial left internal carotid circulation through the Flowgate catheter revealed recanalization of the left M2 MCA and its distal branches to a TICI IIb flow restoration. There were persistent m3/m4 occlusions previously seen on the bypass run that were still present on the followup run Third pass of mechanical thrombectomy:(Using Trevo stent retriever) A Trevo XP microcatheter  was coaxially introduced through the 8 [place] BGC over a synchro 2 0.014 microwire, and advanced through the M3 MCA thrombus (distal occlusion at M3) Digital subtraction angiography was then performed through the microcatheter, confirming its position within the superior division angular branch of the left MCA . A Trevo revascularization device was then deployed across the occlusion spanning the left M3 MCA and left proximal MCA. The stent retriever was allowed to integrate the  thrombus for 5 minutes. After 5 minutes, the Trevo stent retriever was gently pulled back until resistance was met. The Flowgate Balloon Guide was inflated; continuous manual aspiration was applied from the Flowgate; the Trevo was removed from the body. Small red thrombus was visualized on the stent retriever. Post-treatment control angiography of the intracranial left internal carotid circulation through the Flowgate catheter revealed recanalization of the left M3 MCA and its distal branches to a TICI  IIc flow restoration. There is increased blush noted after recanalization in the left MCA parietal territory concerning for increased flow/hyperperfusion.  Following removal of all catheters, digital subtraction angiography of the right common femoral artery was performed in a frontal oblique projection via injection of contrast through the intravascular sheath. Arterial phase images revealed a puncture site above the femoral bifurcation. 8 [place] Angio-Seal was used to close the groin. No complications were experienced, and the patient was discharged from the neuro interventional suite to the neurological intensive care unit for further management. Impression: 1. Left superior division M2 MCA occlusion consistent with acute thromboembolism. This is also accompanied with 2 MCA M3/m4 distal emboli noted. 2.  The above mentioned occlusion was treated with 1 pass of mechanical thrombectomy using the TIGERTRIEVER 17 revascularization device and 2nd pass TIGERTRIEVER 21 and proximal flow arrest using Balloon Guide Catheter (BGC). The third pass using TREVO stent retriever MCA M3 branch. This resulted into TICI 2C revascularization. 3.   Right fetal PCA noted as normal variant Dr. Dori Quan dictated this invasive procedure. Dr. Sotero Hurtado was present for all procedural and imaging components of this case. Examination was reviewed and reported findings confirmed and edited by Dr. Sotero Hurtado. Dr. Sotero Hurtado supervised and   interpreted this procedure. Kim Mcclain MD Final report electronically signed by Kim Mcclain M.D. on 1/13/2020 12:12 AM    Xr Chest Portable    Result Date: 1/16/2020  EXAMINATION: ONE XRAY VIEW OF THE CHEST 1/16/2020 12:21 pm COMPARISON: 06/22/2017 HISTORY: ORDERING SYSTEM PROVIDED HISTORY: stroke TECHNOLOGIST PROVIDED HISTORY: stroke Reason for Exam: Stroke like symptoms, dizziness. Acuity: Acute Type of Exam: Initial FINDINGS: Cardiomediastinal silhouette and pulmonary vasculature are within normal limits. No focal airspace consolidation, pneumothorax, or pleural effusion. Right pleural thickening again noted. No free air beneath the diaphragm. No acute osseous abnormality. No acute intrathoracic process.      Ct Brain Perfusion    Result Date: 1/12/2020  EXAMINATION: CTA OF THE HEAD WITH CONTRAST WITH PERFUSION 1/11/2020 2:14 am TECHNIQUE: CTA of the head/brain was performed with the administration of intravenous contrast. Multiplanar reformatted images are provided for review. MIP images are provided for review. Dose modulation, iterative reconstruction, and/or weight based adjustment of the mA/kV was utilized to reduce the radiation dose to as low as reasonably achievable. COMPARISON: CT and CTA head and neck 1/11/2020 HISTORY: ORDERING SYSTEM PROVIDED HISTORY: stroke TECHNOLOGIST PROVIDED HISTORY: stroke Reason for Exam: post TPA Acuity: Unknown Type of Exam: Unknown FINDINGS: Mildly elevated mean transit time involving the left frontal, parietal, and temporal lobes, in the left MCA vascular territory distribution. Relative cerebral blood flow and relative cerebral blood volume are maintained. No perfusion mismatch. No perfusion mismatch. Findings were discussed with Dr. Nestor Grayson At 2:50 am on 1/11/2020. Cta Head Neck W Contrast    Result Date: 1/12/2020  EXAMINATION: CTA OF THE HEAD AND NECK WITH CONTRAST 1/11/2020 12:36 am: TECHNIQUE: CTA of the head and neck was performed with the administration of intravenous contrast. Multiplanar reformatted images are provided for review. MIP images are provided for review. Stenosis of the internal carotid arteries measured using NASCET criteria. Dose modulation, iterative reconstruction, and/or weight based adjustment of the mA/kV was utilized to reduce the radiation dose to as low as reasonably achievable. COMPARISON: Noncontrast CT head 1/10/2020 HISTORY: ORDERING SYSTEM PROVIDED HISTORY: stroke TECHNOLOGIST PROVIDED HISTORY: stroke Reason for Exam: Stroke, TPA in process Acuity: Acute Type of Exam: Subsequent/Follow-up FINDINGS: CTA NECK: AORTIC ARCH/ARCH VESSELS: Normal, three-vessel aortic arch anatomy. Calcified and noncalcified atherosclerotic plaque of the aortic arch and proximal arch vessels. No dissection or arterial injury.   No significant stenosis of the sinuses are clear. Small amount of fluid in the bilateral mastoid air cells. BONES/SOFT TISSUES: The bone marrow signal intensity appears normal. The soft tissues demonstrate no acute abnormality. 1.  Multiple small areas of acute stroke in the posterior left middle cerebral artery territory involving the left frontal and parietal lobes, most pronounced in the frontal insular region. Additional punctate focus of acute stroke in the right centrum semiovale. 2.  Small focus of gradient blooming in the left insula could represent trace petechial hemorrhage. No large acute intracranial hemorrhage. No mass effect or midline shift. 3.  Mild chronic small vessel ischemic disease.        LABS:  Results for orders placed or performed during the hospital encounter of 01/16/20   CBC Auto Differential   Result Value Ref Range    WBC 7.8 3.5 - 11.3 k/uL    RBC 4.96 4.21 - 5.77 m/uL    Hemoglobin 14.5 13.0 - 17.0 g/dL    Hematocrit 43.1 40.7 - 50.3 %    MCV 86.9 82.6 - 102.9 fL    MCH 29.2 25.2 - 33.5 pg    MCHC 33.6 (H) 25.2 - 33.5 g/dL    RDW 13.1 11.8 - 14.4 %    Platelets 233 045 - 398 k/uL    MPV 10.2 8.1 - 13.5 fL    NRBC Automated 0.0 0.0 per 100 WBC    Differential Type NOT REPORTED     WBC Morphology NOT REPORTED     RBC Morphology NOT REPORTED     Platelet Estimate NOT REPORTED     Monocytes 8 3 - 12 %    Lymphocytes 21 (L) 24 - 43 %    Seg Neutrophils 69 (H) 36 - 65 %    Eosinophils % 1 1 - 4 %    Basophils 1 0 - 2 %    Immature Granulocytes 0 0 %    Absolute Mono # 0.62 0.10 - 1.20 k/uL    Absolute Lymph # 1.64 1.10 - 3.70 k/uL    Segs Absolute 5.38 1.50 - 8.10 k/uL    Absolute Eos # 0.08 0.00 - 0.44 k/uL    Basophils Absolute 0.08 0.00 - 0.20 k/uL    Absolute Immature Granulocyte 0.00 0.00 - 0.30 k/uL   Basic Metabolic Panel w/ Reflex to MG   Result Value Ref Range    Glucose 120 (H) 70 - 99 mg/dL    BUN 17 8 - 23 mg/dL    CREATININE 0.94 0.70 - 1.20 mg/dL    Bun/Cre Ratio 18 9 - 20    Calcium 9.5 8.6 - 10.4 procedures. PROCEDURES:  None    FINAL IMPRESSION      1. Bilateral hemianopia          DISPOSITION/PLAN   DISPOSITION Decision To Transfer 01/16/2020 12:44:05 PM      Condition on Disposition  Guarded    PATIENT REFERRED TO:  No follow-up provider specified.     DISCHARGE MEDICATIONS:  [unfilled]    (Please note that portions of this note were completed with a voice recognitionprogram.  Efforts were made to edit the dictations but occasionally words are mis-transcribed.)    Calixto Rinaldi MD, F.A.C.E.P, F.A.A.E.M  Emergency Physician Attending          Calixto Rinaldi MD  01/16/20 [id number]

## 2020-01-16 NOTE — ED PROVIDER NOTES
[facility] ED  Emergency Department Encounter  Emergency Medicine Resident     Patient Name: Carla Mera  MRN: [id number]  [place]: 1950  Date of evaluation: 1/16/20  PCP:  No primary care provider on file. CHIEF COMPLAINT       Chief Complaint   Patient presents with    Cerebrovascular Accident       HISTORY OF PRESENT ILLNESS  (Location/Symptom, Timing/Onset, Context/Setting, Quality, Duration, Modifying Factors, Severity.)      Carla Mera is a 78 y.o. male who presents as a transfer from [facility] as a stroke alert. Patient's last known well was 11:15 this morning. Patient went to the emergency department because he was experiencing dizziness and blurred vision. Patient reports that his blurry vision has resolved. Patient has no complaints at this time. Patient is on Eliquis daily. Prior to transfer, patient was given 325 mg of aspirin but was not started on heparin. Patient was not given TPA. Patient had a recent CVA on 1/11/2020 and was admitted to the neurosurgical ICU for which he received TPA and had a mechanical thrombectomy. Patient was found to have new onset A. fib at that time. PAST MEDICAL / SURGICAL / SOCIAL / FAMILY HISTORY      has a past medical history of Chronic kidney disease, Hyperlipidemia, Ischemic stroke ([facility] [address] PTSD (post-traumatic stress disorder), Research study patient, and Stroke ([facility] [address] has a past surgical history that includes Appendectomy.     Social History     Socioeconomic History    Marital status:      Spouse name: Not on file    Number of children: Not on file    Years of education: Not on file    Highest education level: Not on file   Occupational History    Not on file   Social Needs    Financial resource strain: Not on file    Food insecurity:     Worry: Not on file     Inability: Not on file    Transportation needs:     Medical: Not on file     Non-medical: Not on file   Tobacco Use    Smoking status: Never Smoker    Smokeless tobacco: Never Used   Substance and Sexual Activity    Alcohol use: Yes     Alcohol/week: 0.0 standard drinks     Comment: occas    Drug use: No    Sexual activity: Yes   Lifestyle    Physical activity:     Days per week: Not on file     Minutes per session: Not on file    Stress: Not on file   Relationships    Social connections:     Talks on phone: Not on file     Gets together: Not on file     Attends Mandaeism service: Not on file     Active member of club or organization: Not on file     Attends meetings of clubs or organizations: Not on file     Relationship status: Not on file    Intimate partner violence:     Fear of current or ex partner: Not on file     Emotionally abused: Not on file     Physically abused: Not on file     Forced sexual activity: Not on file   Other Topics Concern    Not on file   Social History Narrative    Not on file       History reviewed. No pertinent family history. Allergies:  Patient has no known allergies. Home Medications:  Prior to Admission medications    Medication Sig Start Date End Date Taking? Authorizing Provider   apixaban (ELIQUIS) 5 MG TABS tablet Take 1 tablet by mouth 2 times daily 1/14/20   Tan Kelsey MD   Omega-3 Fatty Acids (FISH OIL) 1000 MG CAPS Take 1,000 mg by mouth 2 times daily    Historical Provider, MD   atorvastatin (LIPITOR) 40 MG tablet Take 1 tablet by mouth nightly 1/13/20   Rajesh Ríos MD   vitamin D (ERGOCALCIFEROL) 17339 UNITS CAPS capsule Take 50,000 Units by mouth once a week    Historical Provider, MD   VITAMIN B1-B12 IM Inject into the muscle every 30 days     Historical Provider, MD       REVIEW OF SYSTEMS    (2-9 systems for level 4, 10 or more for level 5)      Review of Systems   Constitutional: Negative for chills and fever. HENT: Negative for sore throat. Eyes: Negative for visual disturbance. Respiratory: Negative for shortness of breath. Cardiovascular: Negative for chest pain. attentive, follows simple and complex commands, no aphasia, mild dysarthria  Cranial nerves:  no gross visual field deficit bilaterally  visual acuity grossly intact bilaterally  PERRL, no afferent pupillary defect  no ptosis, no nystagmus, EOMI  facial sensation grossly intact and symmetric bilaterally  symmetric facial expressions b/l, no facial droop  hearing grossly intact b/l  symmetric movement of the palate b/l  sternocleidomastoid and trapezius muscle strength symmetric and strong b/l  symmetric tongue movement b/l  Motor: strength testing 5/5 in b/l biceps, triceps, hand interossei, iliopsoas, hamstrings, ankle plantar and dorsiflexors. No pronator drift in b/l upper or lower extremities. Coordination: dysmetria noted on finger to nose testing b/l, right worse than left. Reflexes: b/l brachioradialis and patellar 2+/4 and equal b/l  Gait: testing deferred  Sensation: light touch intact to all four distal limbs    Psychiatric:         Speech: Speech normal.         Behavior: Behavior is cooperative. WORK-UP     PLAN (LABS / IMAGING /EKG):  No orders of the defined types were placed in this encounter. MEDICATIONS ORDERED:  No orders of the defined types were placed in this encounter. DIAGNOSTIC RESULTS / EMERGENCY DEPARTMENT COURSE /MDM / DIFFERENTIAL DIAGNOSIS     LABS:  No results found for this visit on 01/16/20. RADIOLOGY:  Ct Head Wo Contrast    Result Date: 1/16/2020  EXAMINATION: CT OF THE HEAD WITHOUT CONTRAST  1/16/2020 11:55 am TECHNIQUE: CT of the head was performed without the administration of intravenous contrast. Dose modulation, iterative reconstruction, and/or weight based adjustment of the mA/kV was utilized to reduce the radiation dose to as low as reasonably achievable.  COMPARISON: Head CT 01/12/2020, MRI brain 01/11/2020, head CT 01/10/2020 HISTORY: ORDERING SYSTEM PROVIDED HISTORY: vision loss TECHNOLOGIST PROVIDED HISTORY: vision loss Reason for Exam: 1/10/2020 patient came to ER with stroke like symptoms, he was sent to [facility] for thrombectomy today Patient has vision blurring and dizziness FINDINGS: BRAIN/VENTRICLES: Continued evolution of subacute infarction in the left frontal lobe and left insular cortex with loss of gray-white matter differentiation. There is small area of low attenuation within left parietal lobe subcortical white matter. No associated hemorrhage. Diffuse parenchymal volume loss with enlargement of the ventricles and cerebral sulci. No abnormal extra-axial fluid collection. No hydrocephalus. Mild periventricular white matter low attenuation compatible with chronic microvascular ischemic changes. ORBITS: The visualized portion of the orbits demonstrate no acute abnormality. SINUSES: The visualized paranasal sinuses and mastoid air cells demonstrate no acute abnormality. SOFT TISSUES/SKULL: No acute abnormality of the visualized skull or soft tissues. 1. Continued evolution of subacute infarctions in the left MCA territory involving the left lateral frontal lobe and insular cortex as well as left parietal lobe subcortical white matter. No hemorrhagic transformation. 2. Diffuse parenchymal volume loss. Mild chronic microvascular ischemic changes. Findings were discussed with Kiersten Hilario at 12:16 pm on 1/16/2020. Xr Chest Portable    Result Date: 1/16/2020  EXAMINATION: ONE XRAY VIEW OF THE CHEST 1/16/2020 12:21 pm COMPARISON: 06/22/2017 HISTORY: ORDERING SYSTEM PROVIDED HISTORY: stroke TECHNOLOGIST PROVIDED HISTORY: stroke Reason for Exam: Stroke like symptoms, dizziness. Acuity: Acute Type of Exam: Initial FINDINGS: Cardiomediastinal silhouette and pulmonary vasculature are within normal limits. No focal airspace consolidation, pneumothorax, or pleural effusion. Right pleural thickening again noted. No free air beneath the diaphragm. No acute osseous abnormality. No acute intrathoracic process.      Hunzepad 139 patent status post previous thrombectomy. No aneurysm. POSTERIOR CIRCULATION: No significant stenosis of the vertebral, basilar, or left posterior cerebral arteries. New occlusion of distal right P2 branch (series 2, images 212-213). No aneurysm. Left posterior communicating artery is present. OTHER: No dural venous sinus thrombosis on this non-dedicated study. BRAIN: No mass effect or midline shift. No extra-axial fluid collection. The gray-white differentiation is maintained. 1. New occlusion of distal P2 branch of the right posterior cerebral artery. 2. Previously demonstrated left M2 branch occlusion is now patent status post recent thrombectomy. 3. No hemodynamically significant stenosis or occlusion in the cervical arterial circulation. Mild less than 50% stenosis of the internal carotid arteries by NASCET criteria bilaterally. Critical results were called by Dr. Alicia Powell MD to Justin Geiger on 1/16/2020 at 14:35. EKG  None    All EKG's are interpreted by the Emergency Department Physician who either signs or Co-signs this chart in the absence of a cardiologist.    NIH Stroke Scale  Interval: Baseline  Level of Consciousness (1a. ): Alert  LOC Questions (1b. ): Answers both correctly  LOC Commands (1c. ): Performs both tasks correctly  Best Gaze (2. ): Normal  Visual (3. ): No visual loss  Facial Palsy (4. ): Normal symmetrical movement  Motor Arm, Left (5a. ): No drift  Motor Arm, Right (5b. ): No drift  Motor Leg, Left (6a. ): No drift  Motor Leg, Right (6b. ): No drift  Limb Ataxia (7. ): (!) Present in one limb  Sensory (8. ): Normal  Best Language (9. ): No aphasia  Dysarthria (10. ): Mild to moderate dysarthria  Extinction and Inattention (11): No abnormality  Total: 2      DIFFERENTIAL DIAGNOSIS:  CVA, TIA, complex migraine    EMERGENCY DEPARTMENT COURSE & MDM:  78 y.o. male presents with a chief complaint of blurry vision and dizziness. Vitals are stable.   Patient is

## 2020-01-16 NOTE — ED TRIAGE NOTES
Patient with vision blurring and dizziness patient turns head to left and can see object straight on board.  Patient using walker because  Tends to lean ti rt sie which has been since stroke last week

## 2020-01-16 NOTE — FLOWSHEET NOTE
Assessment: Patient is calm but wife is present and anxious but in control. Intervention: Engaged in conversation. Patient expressed gratitude for visit prayer. Plan: Chaplains remain available for spiritual and emotional support. 01/16/20 1207   Encounter Summary   Services provided to: Patient and family together   Referral/Consult From: [address] [place] [address]; Children   Continue Visiting   (1/16/20)   Complexity of Encounter High   Length of Encounter 15 minutes   Crisis   Type Stroke Alert   Assessment Approachable; Anxious   Intervention Active listening;Prayer   Outcome Comfort;Expressed gratitude;Engaged in conversation

## 2020-01-16 NOTE — ED NOTES
Patient family at bedside , requesting updated. Patient ambulatory to bathroom with steady gait.       Garcia Cabrera RN  01/16/20 0215

## 2020-01-17 ENCOUNTER — TELEPHONE (OUTPATIENT)
Dept: NEUROLOGY | Age: 70
End: 2020-01-17

## 2020-01-17 ENCOUNTER — APPOINTMENT (OUTPATIENT)
Dept: CARDIAC CATH/INVASIVE PROCEDURES | Age: 70
DRG: 065 | End: 2020-01-17
Payer: MEDICARE

## 2020-01-17 ENCOUNTER — TELEPHONE (OUTPATIENT)
Dept: FAMILY MEDICINE CLINIC | Age: 70
End: 2020-01-17

## 2020-01-17 LAB
ALLEN TEST: ABNORMAL
ANION GAP: NORMAL MMOL/L (ref 7–16)
FIO2: ABNORMAL
GFR NON-AFRICAN AMERICAN: NORMAL ML/MIN
GFR SERPL CREATININE-BSD FRML MDRD: NORMAL ML/MIN
GFR SERPL CREATININE-BSD FRML MDRD: NORMAL ML/MIN/{1.73_M2}
GLUCOSE BLD-MCNC: 95 MG/DL (ref 74–100)
HCO3 VENOUS: 28.4 MMOL/L (ref 22–29)
LV EF: 55 %
LVEF MODALITY: NORMAL
MODE: ABNORMAL
NEGATIVE BASE EXCESS, VEN: ABNORMAL (ref 0–2)
O2 DEVICE/FLOW/%: ABNORMAL
O2 SAT, VEN: 57 % (ref 60–85)
PARTIAL THROMBOPLASTIN TIME: 49.5 SEC (ref 20.5–30.5)
PARTIAL THROMBOPLASTIN TIME: 63 SEC (ref 20.5–30.5)
PARTIAL THROMBOPLASTIN TIME: 80.9 SEC (ref 20.5–30.5)
PATIENT TEMP: ABNORMAL
PCO2, VEN: 40.9 MM HG (ref 41–51)
PH VENOUS: 7.45 (ref 7.32–7.43)
PO2, VEN: 28.4 MM HG (ref 30–50)
POC CHLORIDE: 99 MMOL/L (ref 98–107)
POC CREATININE: NORMAL MG/DL (ref 0.51–1.19)
POC HEMATOCRIT: NORMAL % (ref 41–53)
POC HEMOGLOBIN: NORMAL G/DL (ref 13.5–17.5)
POC IONIZED CALCIUM: 1.25 MMOL/L (ref 1.15–1.33)
POC LACTIC ACID: 1.17 MMOL/L (ref 0.56–1.39)
POC PCO2 TEMP: ABNORMAL MM HG
POC PH TEMP: ABNORMAL
POC PO2 TEMP: ABNORMAL MM HG
POC POTASSIUM: NORMAL MMOL/L (ref 3.5–4.5)
POC SODIUM: NORMAL MMOL/L (ref 138–146)
POSITIVE BASE EXCESS, VEN: 4 (ref 0–3)
SAMPLE SITE: ABNORMAL
TOTAL CO2, VENOUS: 30 MMOL/L (ref 23–30)

## 2020-01-17 PROCEDURE — 97530 THERAPEUTIC ACTIVITIES: CPT

## 2020-01-17 PROCEDURE — 2060000000 HC ICU INTERMEDIATE R&B

## 2020-01-17 PROCEDURE — 85730 THROMBOPLASTIN TIME PARTIAL: CPT

## 2020-01-17 PROCEDURE — 97535 SELF CARE MNGMENT TRAINING: CPT | Performed by: OCCUPATIONAL THERAPIST

## 2020-01-17 PROCEDURE — 6360000002 HC RX W HCPCS

## 2020-01-17 PROCEDURE — 6370000000 HC RX 637 (ALT 250 FOR IP): Performed by: STUDENT IN AN ORGANIZED HEALTH CARE EDUCATION/TRAINING PROGRAM

## 2020-01-17 PROCEDURE — B24BZZ4 ULTRASONOGRAPHY OF HEART WITH AORTA, TRANSESOPHAGEAL: ICD-10-PCS | Performed by: INTERNAL MEDICINE

## 2020-01-17 PROCEDURE — 93312 ECHO TRANSESOPHAGEAL: CPT

## 2020-01-17 PROCEDURE — 36415 COLL VENOUS BLD VENIPUNCTURE: CPT

## 2020-01-17 PROCEDURE — 6370000000 HC RX 637 (ALT 250 FOR IP): Performed by: PSYCHIATRY & NEUROLOGY

## 2020-01-17 PROCEDURE — 97162 PT EVAL MOD COMPLEX 30 MIN: CPT

## 2020-01-17 PROCEDURE — 93325 DOPPLER ECHO COLOR FLOW MAPG: CPT

## 2020-01-17 PROCEDURE — 97166 OT EVAL MOD COMPLEX 45 MIN: CPT | Performed by: OCCUPATIONAL THERAPIST

## 2020-01-17 PROCEDURE — 92523 SPEECH SOUND LANG COMPREHEN: CPT

## 2020-01-17 PROCEDURE — 2709999900 HC NON-CHARGEABLE SUPPLY

## 2020-01-17 PROCEDURE — 99223 1ST HOSP IP/OBS HIGH 75: CPT | Performed by: PSYCHIATRY & NEUROLOGY

## 2020-01-17 PROCEDURE — 6360000002 HC RX W HCPCS: Performed by: STUDENT IN AN ORGANIZED HEALTH CARE EDUCATION/TRAINING PROGRAM

## 2020-01-17 RX ORDER — BUTALBITAL, ACETAMINOPHEN AND CAFFEINE 50; 325; 40 MG/1; MG/1; MG/1
1 TABLET ORAL EVERY 8 HOURS PRN
Status: DISCONTINUED | OUTPATIENT
Start: 2020-01-17 | End: 2020-01-20 | Stop reason: HOSPADM

## 2020-01-17 RX ADMIN — ATORVASTATIN CALCIUM 40 MG: 80 TABLET, FILM COATED ORAL at 19:58

## 2020-01-17 RX ADMIN — HEPARIN SODIUM AND DEXTROSE 17 UNITS/KG/HR: 10000; 5 INJECTION INTRAVENOUS at 09:38

## 2020-01-17 RX ADMIN — BUTALBITAL, ACETAMINOPHEN AND CAFFEINE 1 TABLET: 50; 325; 40 TABLET ORAL at 22:38

## 2020-01-17 RX ADMIN — ATORVASTATIN CALCIUM 40 MG: 80 TABLET, FILM COATED ORAL at 00:45

## 2020-01-17 ASSESSMENT — PAIN SCALES - GENERAL
PAINLEVEL_OUTOF10: 0
PAINLEVEL_OUTOF10: 2

## 2020-01-17 ASSESSMENT — PAIN DESCRIPTION - PAIN TYPE: TYPE: ACUTE PAIN

## 2020-01-17 NOTE — TELEPHONE ENCOUNTER
Nurse called from [address] wanting to know if you will be the provider following the patient's care?

## 2020-01-17 NOTE — PROGRESS NOTES
warmth. Pulses 2+ and symmetric   Skin: Skin  turgor normal, no rashes or lesions     Mental status  Speech Alert. Oriented to person, place, and time. Speech is fluent without paraphasic errors  Good repetition and naming  Can do 1 step, 2 step, and cross-body commands  Can spell world backwards. Language appropriate. No hallucinations or delusions. No SI/HI. Cranial nerves   II - VFF, visual threat intact  III, IV, VI - extra-ocular muscles full. No nystagmus. Pupils symmetric and responsive.    V - sensation symmetric         VII -  No facial droop or asymmetric NLF  VIII - intact hearing to conversational tone          IX, X - symmetrical palate elevation   XI - 5/5 strength symmetric  XII - tongue midline   Motor function  Strength: grossly 5/5 in b/l              Deltoid, biceps, triceps, wrist flexion, wrist extension             Hip flexion/extension, knee flexion/extension, plantar flexion  Bulk: grossly normal no atrophy  Tone: symmetric b/l arms and legs  Abnormal movements: No abnormal movements or tremor   Sensory function Symmetric to touch in all extremities bilaterally   Cerebellar No dysmetria or dysdiadochocinesia    Reflex function DTR:        2+ b/l symmetric in biceps, brachioradialis, patellar, calcaneal  Babinski b/l plantar downgoing   Gait                  Not assessed     DATA      Lab Results   Component Value Date    WBC 7.0 01/16/2020    HGB 14.7 01/16/2020    HCT 44.7 01/16/2020     01/16/2020    CHOL 137 01/11/2020    TRIG 118 01/11/2020    HDL 38 (L) 01/11/2020    ALT 25 01/10/2020    AST 29 01/10/2020     01/16/2020    K 4.5 01/16/2020     01/16/2020    CREATININE 0.94 01/16/2020    BUN 17 01/16/2020    CO2 24 01/16/2020    INR 1.1 01/16/2020    LABA1C 5.0 01/11/2020         ASSESSMENT:  78 y.o.  male presented with dizziness, dysarthria, left-sided hemianopsia, LK W: 9 AM 1/16 , NIH SS: 1 for mild dysarthria, on Eliquis and aspirin, not candidate for

## 2020-01-17 NOTE — RESEARCH
[address]    Patient Name: Didier Pelletier  MRN: [id number]  [place]: 1950  Date of evaluation: 2020  Time of evaluation: 200  Reason for evaluation: Unscheduled Visit    Protocol:  Protocol No: CLN-TI-001  NCT: [id number]  SLIME: [id number]  IRB: 2018-15  Site PI: Ld Verduzco MD  Treating: Dr. Barney Sandifer, MD     Modified [place] Scale:  0: No symptoms at all   1: No significant disability despite symptoms; able to carry out all usual duties and activities  2: Slight disability; unable to carry out all previous activities, but able to look after own affairs without assistance  3:Moderate disability; requiring some help, but able to walk without assistance  4: Moderately severe disability; unable to walk and attend to bodily needs without assistance  5:Severe disability; bedridden, incontinent and requiring constant nursing care and attention      MRS Score: 3 Patient has been walking with a walk to prevent falls. NIH Stroke Scale:   1a.  Level of consciousness:  0 - alert; keenly responsive  1b. Level of consciousness questions:  0 - answers both questions correctly    1c. Level of consciousness questions:  0 - performs both tasks correctly  2. Best Gaze:  0 - normal  3. Visual:  1 - partial hemianopia  4. Facial Palsy:  0 - normal symmetric movement  5a. Motor left arm:  0 - no drift, limb holds 90 (or 45) degrees for full 10 seconds  5b. Motor right arm:  0 - no drift, limb holds 90 (or 45) degrees for full 10 seconds  6a. Motor left le - no drift; leg holds 30 degree position for full 5 seconds  6b. Motor right le - no drift; leg holds 30 degree position for full 5 seconds  7. Limb Ataxia:  0 - absent  8. Sensory:  0 - normal; no sensory loss  9. Best Language:  0 - no aphasia, normal  10. Dysarthria:  0 - normal  11. Extinction and Inattention:  0 - no abnormality    NIH Stroke Scale Total: 1    .      Continued Enrollment:  Subject wishes to remain involved in the study at this time. Questions:  Please contact the Research Nurse at [phone] or Dr. Yury Pool or Ana Courtney via [address] [place] [address] with any questions.     Nicki Noriega RN  Clinical Research Nurse  Clinical Research Services  [address] [place] [address],  S Navid Zhao  P: [phone]  F: [phone]

## 2020-01-17 NOTE — ED NOTES
Spoke with Dr Devi Galloway about heparin verification, neuro/endovascular repaged. Patient resting on cot, NAD noted.       Jeanette Winston RN  01/16/20 3650

## 2020-01-17 NOTE — PROGRESS NOTES
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 [address] on their problem list.      Date of Eval: 2020   Evaluating Therapist: ESTHER Hong    RECENT RESULTS  CT OF HEAD/MRI:   Impression   New right PCA distribution infarct.  No hemorrhagic conversion.       Multifocal areas of evolving left MCA distribution infarct, there are few   scattered new foci of restricted diffusion identified suggestion of mild   progression of the left MCA infarct.       Mild chronic small ischemic disease and age related involutional change. Primary Complaint: The patient is a 78 y.o. male who presents with Dizziness, dysarthria, left-sided hemianopsia. Last known well at 9 AM.  NIHSS of 1 for mild dysarthria. On Eliquis and aspirin for anticoagulation. Not candidate for TPA because of anticoagulation. CT head was negative for acute changes. CTA showed possible right PCA P2 occlusion. The patient was loaded with aspirin 324 mg. The patient stated that he started to have sudden dizziness associated with dysarthria and left blurry vision. The patient has past medical history of hyperlipidemia, hypertension, chronic kidney disease and new onset A. fib on Eliquis.   Patient was admitted to the hospital recently 1 week ago due to right-sided weakness secondary to left MCA

## 2020-01-17 NOTE — CARE COORDINATION
Case Management Initial Discharge Plan  Aida Pink,           Pt off unit for testing, pt's wife Rika Sadler at bedside. Met with:spouse/SO to discuss discharge plans. Information verified: address, contacts, phone number, , insurance Yes  PCP: JIMBO Calderon CNP  Date of last visit: was due to see today. CM called to cancel appt. Spoke with [employer] Provider[facility]    Discharge Planning    Living Arrangements:    Lives with wife  Support Systems:   Spouse, family members    Home has 1 stories  1 stairs to climb to get into front door, no stairs to climb to reach second floor  Location of bedroom/bathroom in home main    Patient able to perform ADL's:Independent    Current Services (outpatient & in home) 1 Kathrine Blake black/ [facility] Denis  DME equipment: Rolator (currently uses), Cane, w/c (not currently using)  DME provider: n/a      Potential Assistance Needed:   [address] [place] [address]    Patient agreeable to home care: Yes Current with [facility] would like to continue   Freedom of choice provided:  yes    Prior SNF/Rehab Placement and Facility: None  Agreeable to SNF/Rehab: No  [place] of choice provided: n/a   Evaluation: no    Expected Discharge date:     Patient expects to be discharged to:   Home  Follow Up Appointment: Best Day/ Time:  anytime or day per pt's wife    Transportation provider: Self  Transportation arrangements needed for discharge: No Pt's wife informs she will provide    Readmission Risk              Risk of Unplanned Readmission:        11             Does patient have a readmission risk score greater than 14?: No  If yes, follow-up appointment must be made within 7 days of discharge. Goals of Care: Further stroke prevention      Discharge Plan: home independently, lives with wife. Pt's PCP appt cancelled for today rescheduled for 2020, spoke with Deacon Seymour at [facility].     Pt's wife informs they are agreeable to resume home care if needed. She informs current with [facility] but had recently been d/c'd by both PT/OT, continues with nurse visits. Await PT/OT marisabel  Pt's wife informs they had a challenge obtaining Eliquis at d/c as their home Rx was not able to fill because the med required a PA. She informs he was d/c late in the day after 1900 and received a dose at d/c. They then went to [facility] the next morning and was able to receive the Eliquis. Encouraged pt's wife to request meds to be filled prior to d/c at [facility] to ensure PA can be initiated if needed to prevent delays in obtaining Rx. Spoke with Diane at [facility] confirms pt is current with their services, informed pt has been admitted and would like to resume services if needed at d/c.   E-referral faxed to Sarika via [facility] Pt's wife provides [facility] card [id number], faxed to registration. 30-day readmit: previous adm. 1/11/2020  due to right-sided weakness secondary to left MCA thrombus s/p TPA and mechanical thrombectomy with TICI 2c the patient was discharged with only mild dysarthria on Eliquis. Readmitted  1/16/2020 with dizziness, dysarthria and left-sided hemianopsia. Consult cardiology for anticoagulation and RAUL.             Electronically signed by Cesilia Tidwell RN on 1/17/20 at 10:52 AM

## 2020-01-17 NOTE — PROGRESS NOTES
PATIENT HEALTH QUESTIONNAIRE-9                                            (PHQ-9)    Over the past 2 weeks, how often have you been bothered by any of the following problems?     -------------------------------------------------------------------------------------------------------------------  1. Little interest or pleasure in doing things   0x 1 2 3  -------------------------------------------------------------------------------------------------------------------  2. Feeling down, depressed or hopeless   0x 1 2 3  -------------------------------------------------------------------------------------------------------------------  3. Trouble falling or staying asleep, or sleeping too much  0x 1 2 3  -------------------------------------------------------------------------------------------------------------------  4. Feeling tired or having little energy   0x 1 2 3  -------------------------------------------------------------------------------------------------------------------  5. Poor appetite or overeating    0x 1 2 3  -------------------------------------------------------------------------------------------------------------------  6. Feeling bad about yourself--or that you are      A failure or have let yourself or family down. 0x 1 2 3   ------------------------------------------------------------------------------------------------------------------  7. Trouble concentrating on things, such as reading        the newspaper or watching T.V.     0x 1 2 3  ------------------------------------------------------------------------------------------------------------------  8. Moving or speaking so slowly that other people could noticed?         Or the opposite-- being so fidgety or restless that you have been  0x 1 2 3      moving around a lot more than

## 2020-01-17 NOTE — TELEPHONE ENCOUNTER
Patient should be followed by Primary care physician.   He is back in the hospital for new posterior circulation stroke from afib despite being on  Anticoagulation  Since 1-    Shabnam Sierra MD

## 2020-01-17 NOTE — PROGRESS NOTES
Occupational Therapy   Occupational Therapy Initial Assessment  Date: 2020   Patient Name: Maik Bazzi  MRN: [id number]     : 1950    Date of Service: 2020    Discharge Recommendations:  Further therapy recommended at discharge. Assessment   Performance deficits / Impairments: Decreased functional mobility ; Decreased endurance;Decreased balance;Decreased ADL status; Decreased high-level IADLs  Prognosis: Good  Decision Making: Medium Complexity  OT Education: OT Role;Plan of Care;Family Education  REQUIRES OT FOLLOW UP: Yes  Activity Tolerance  Activity Tolerance: Patient Tolerated treatment well  Safety Devices  Safety Devices in place: Yes  Type of devices: Left in chair;Gait belt;Call light within reach; Chair alarm in place; All fall risk precautions in place  Restraints  Initially in place: No         Patient Diagnosis(es): The encounter diagnosis was Cerebrovascular accident (CVA), unspecified mechanism ([facility] has a past medical history of Atrial fibrillation ([facility] Chronic kidney disease, CKD (chronic kidney disease), Hyperlipidemia, Ischemic stroke ([facility] PTSD (post-traumatic stress disorder), Research study patient, Stroke [facility]), and Stroke ([facility] has a past surgical history that includes Appendectomy and transesophageal echocardiogram (2020).          Restrictions  Restrictions/Precautions  Restrictions/Precautions: General Precautions, Fall Risk, Up as Tolerated  Required Braces or Orthoses?: No    Subjective   General  Patient assessed for rehabilitation services?: Yes  Family / Caregiver Present: Yes(wife)  Patient Currently in Pain: Denies  Vital Signs  Patient Currently in Pain: Denies     Social/Functional History  Social/Functional History  Lives With: Spouse  Type of Home: House  Home Layout: One level  Home Access: Stairs to enter without rails  Entrance Stairs - Number of Steps: 1  Bathroom Shower/Tub: Tub/Shower unit, Walk-in shower  Bathroom Accessibility: Modifier : CK (01/17/20 1505)    Goals  Short term goals  Time Frame for Short term goals: By discharge pt willNatalia Doan term goal 1: demo independent ADL routine   Short term goal 2: demo independent bed mobility/transfers  Short term goal 3: tolerate 10+ minutes of standing balance to engage in functional tasks with supervision  Short term goal 4: demo functional mobility with use of AD prn with supervision       Therapy Time   Individual Concurrent Group Co-treatment   Time In  1:35         Time Out  1:55         Minutes  20      co eval with PT         Hodge Manny, OTR/L

## 2020-01-17 NOTE — ED NOTES
Pt to mri with tech via cot, NAD noted. Okay for patient to go to MRI per Dr. Phipps May. Report given to [facility] Questions answered.       Cipriano Yin RN  01/16/20 3450

## 2020-01-17 NOTE — PROGRESS NOTES
awareness  Assessment: Pt performed transfers with stand by assistance. Pt ambulated 200 ft with contact guard assistance and RW. Pt would benefit from continued skilled physical therapy to improve gait, balance, and endurance. Prognosis: Good  Decision Making: Medium Complexity  PT Education: Goals;PT Role;General Safety;Plan of Care;Transfer Training;Functional Mobility Training;Gait Training  REQUIRES PT FOLLOW UP: Yes  Activity Tolerance  Activity Tolerance: Patient limited by fatigue;Patient limited by endurance       Patient Diagnosis(es): The encounter diagnosis was Cerebrovascular accident (CVA), unspecified mechanism ([facility] has a past medical history of [facility] Chronic kidney disease, CKD (chronic kidney disease), Hyperlipidemia, Ischemic stroke ([facility] PTSD (post-traumatic stress disorder), Research study patient, Stroke [facility]), and Stroke ([facility] has a past surgical history that includes Appendectomy and transesophageal echocardiogram (01/17/2020). Restrictions  Restrictions/Precautions  Restrictions/Precautions: General Precautions, Fall Risk, Up as Tolerated  Required Braces or Orthoses?: No  Vision/Hearing  Vision: Within Functional Limits  Vision Exceptions: Wears glasses at all times  Hearing: Within functional limits     Subjective  General  Patient assessed for rehabilitation services?: Yes  Family / Caregiver Present: Yes(Wife)  Follows Commands: Within Functional Limits  General Comment  Comments: Pt is sitting EOB eating lunch upon arrival. Pt is agreeable to physical therapy.    Pain Screening  Patient Currently in Pain: Denies  Vital Signs  Patient Currently in Pain: Denies       Orientation  Orientation  Overall Orientation Status: Within Functional Limits  Social/Functional History  Social/Functional History  Lives With: Spouse  Type of Home: House  Home Layout: One level  Home Access: Stairs to enter without rails  Entrance Stairs - Number of Steps: 1  Bathroom Good  Sitting - Dynamic: Good  Standing - Static: Good  Standing - Dynamic: Good-  Comments: Balance assessed with RW        Plan   Plan  Times per week: 5 times per week   Current Treatment Recommendations: Strengthening, Gait Training, Patient/Caregiver Education & Training, Stair training, Balance Training, Neuromuscular Re-education, Functional Mobility Training, Endurance Training, Transfer Training, Safety Education & Training  Safety Devices  Type of devices: All fall risk precautions in place, Left in chair, Call light within reach, Chair alarm in place, Gait belt                                                 AM-PAC Score  AM-PAC Inpatient Mobility Raw Score : 19 (01/17/20 1429)  AM-PAC Inpatient T-Scale Score : 45.44 (01/17/20 1429)  Mobility Inpatient CMS 0-100% Score: 41.77 (01/17/20 1429)  Mobility Inpatient CMS G-Code Modifier : CK (01/17/20 1429)          Goals  Short term goals  Time Frame for Short term goals: 10 visits   Short term goal 1: Pt will be able to perform bed mobility independently. Short term goal 2: Pt will be able to perform transfers independently. Short term goal 3: Pt will be able to ambulate 300 ft independently with least restrictive device. Short term goal 4: Pt will be able to tolerate 30 minutes of activity to improve endurance. Patient Goals   Patient goals : To go home       Therapy Time   Individual Concurrent Group Co-treatment   Time In 1330         Time Out 1355         Minutes 25         Timed Code Treatment Minutes: 12 Minutes       GINGER [place] This treatment/evaluation completed by signing SPT. Signing PT agrees with treatment and documentation.

## 2020-01-17 NOTE — PROGRESS NOTES
[address]  Occupational Therapy Not Seen Note     Patient not available for Occupational Therapy due to:     [x] Testing: RAUL     [] Hemodialysis     [] Blood Transfusion in Progress     []Refusal by Patient:      [] Surgery/Procedure:     [] Strict Bedrest     [] Sedation     [] Spine Precautions      [] Pt being transferred to palliative care at this time. Spoke with pt/family and OT services to be defered.     [] Pt independent with functional mobility and functional tasks.  Pt with no OT acute care needs at this time, will defer OT eval.     [] Other    Next Scheduled Treatment: 1/17/2020 PM, or 1/18/2020     Signature: Kaelyn Reed, OTR/L

## 2020-01-18 LAB
PARTIAL THROMBOPLASTIN TIME: 55.2 SEC (ref 20.5–30.5)
PARTIAL THROMBOPLASTIN TIME: 68.5 SEC (ref 20.5–30.5)
PARTIAL THROMBOPLASTIN TIME: 69.2 SEC (ref 20.5–30.5)
PLATELET # BLD: 169 K/UL (ref 138–453)

## 2020-01-18 PROCEDURE — 97116 GAIT TRAINING THERAPY: CPT

## 2020-01-18 PROCEDURE — 2580000003 HC RX 258: Performed by: STUDENT IN AN ORGANIZED HEALTH CARE EDUCATION/TRAINING PROGRAM

## 2020-01-18 PROCEDURE — 6370000000 HC RX 637 (ALT 250 FOR IP): Performed by: STUDENT IN AN ORGANIZED HEALTH CARE EDUCATION/TRAINING PROGRAM

## 2020-01-18 PROCEDURE — 2060000000 HC ICU INTERMEDIATE R&B

## 2020-01-18 PROCEDURE — 6360000002 HC RX W HCPCS: Performed by: STUDENT IN AN ORGANIZED HEALTH CARE EDUCATION/TRAINING PROGRAM

## 2020-01-18 PROCEDURE — 6370000000 HC RX 637 (ALT 250 FOR IP): Performed by: PSYCHIATRY & NEUROLOGY

## 2020-01-18 PROCEDURE — 36415 COLL VENOUS BLD VENIPUNCTURE: CPT

## 2020-01-18 PROCEDURE — 97110 THERAPEUTIC EXERCISES: CPT

## 2020-01-18 PROCEDURE — 99233 SBSQ HOSP IP/OBS HIGH 50: CPT | Performed by: PSYCHIATRY & NEUROLOGY

## 2020-01-18 PROCEDURE — 85049 AUTOMATED PLATELET COUNT: CPT

## 2020-01-18 PROCEDURE — 85730 THROMBOPLASTIN TIME PARTIAL: CPT

## 2020-01-18 RX ORDER — CALCIUM CARBONATE 200(500)MG
500 TABLET,CHEWABLE ORAL 3 TIMES DAILY PRN
Status: DISCONTINUED | OUTPATIENT
Start: 2020-01-18 | End: 2020-01-20 | Stop reason: HOSPADM

## 2020-01-18 RX ADMIN — ANTACID TABLETS 500 MG: 500 TABLET, CHEWABLE ORAL at 20:05

## 2020-01-18 RX ADMIN — SODIUM CHLORIDE 1000 ML: 9 INJECTION, SOLUTION INTRAVENOUS at 18:18

## 2020-01-18 RX ADMIN — ATORVASTATIN CALCIUM 40 MG: 80 TABLET, FILM COATED ORAL at 20:06

## 2020-01-18 RX ADMIN — Medication 81 MG: at 09:04

## 2020-01-18 RX ADMIN — HEPARIN SODIUM AND DEXTROSE 15 UNITS/KG/HR: 10000; 5 INJECTION INTRAVENOUS at 18:20

## 2020-01-18 RX ADMIN — BUTALBITAL, ACETAMINOPHEN AND CAFFEINE 1 TABLET: 50; 325; 40 TABLET ORAL at 09:04

## 2020-01-18 RX ADMIN — SODIUM CHLORIDE, PRESERVATIVE FREE 10 ML: 5 INJECTION INTRAVENOUS at 09:05

## 2020-01-18 RX ADMIN — HEPARIN SODIUM AND DEXTROSE 14.96 UNITS/KG/HR: 10000; 5 INJECTION INTRAVENOUS at 01:29

## 2020-01-18 ASSESSMENT — PAIN SCALES - GENERAL
PAINLEVEL_OUTOF10: 0
PAINLEVEL_OUTOF10: 3
PAINLEVEL_OUTOF10: 0

## 2020-01-18 NOTE — PLAN OF CARE
Problem: HEMODYNAMIC STATUS  Goal: Patient has stable vital signs and fluid balance  1/18/2020 1621 by Shruthi Castillo RN  Outcome: Ongoing  1/18/2020 0443 by Jesus Ray RN  Outcome: Ongoing     Problem: ACTIVITY INTOLERANCE/IMPAIRED MOBILITY  Goal: Mobility/activity is maintained at optimum level for patient  1/18/2020 1621 by Shruthi Castillo RN  Outcome: Ongoing  1/18/2020 0443 by Jesus Ray RN  Outcome: Ongoing     Problem: COMMUNICATION IMPAIRMENT  Goal: Ability to express needs and understand communication  1/18/2020 1621 by Shruthi Castillo RN  Outcome: Ongoing  1/18/2020 0443 by Jesus Ray RN  Outcome: Ongoing

## 2020-01-18 NOTE — PROGRESS NOTES
NEUROLOGY INPATIENT PROGRESS NOTE- ADDENDUM    1/18/2020         I have discussed the case of Toby Becerril including pertinent history and exam findings with the resident, John Mclain MD. I have seen and examined the patient and the key elements of the encounter have been performed by me. I agree with the assessment, plan and orders as documented by the resident, John Mclain MD with changes made to the note. Briefly, this is a  78 y.o. male with hx of HTN, HLD, CKD, new onset A Fib; recently discharged after rx for Lt MCA infarct sec to Lt MCA thrombus s/p iv TPA & [facility] with TICI 2c (1/11/2020) was admitted on 1/16/2020 with c/o blurred vision, dizziness, dysarthria with the last known well at 9 AM on 1/16/2020. CT head negative for acute changes. Patient was evaluated by stroke team.  Ganesh Singer not a candidate for TPA as he was already on Eliquis and also recent history of TPA. Patient was loaded with aspirin 324 mg. Stroke team evaluated the patient. Recommended stopping Eliquis and starting heparin drip for possible Eliquis failure. Cardiology consulted for anticoagulation and RAUL.     RAUL done today with EF 55%. No thrombus are valvular vegetation identified. Tiny PFO without any hemodynamic significance. Mild to moderate MR and AI.        CT head 1/16/2020: Continued evolution of subacute infarctions in left MCA territory involving left lateral frontal lobe and insular cortex as well as left parietal lobe subcortical white matter. No hemorrhagic transformation.     CTA head and neck 1/16/2020: New occlusion of distal P2 branch of right PCA. Previously demonstrated left M2 branch occlusion is now patent status post recent thrombectomy. No significant stenosis or occlusion in cervical arterial circulation.     MRI brain 1/16/2020: New right PCA distribution infarct. No hemorrhagic conversion.   Multifocal areas of evolving left MCA distribution infarct, there are few scattered new

## 2020-01-18 NOTE — PROGRESS NOTES
 Stroke [facility]) 01/16/2020    MRI Brain WO: New right PCA distribution infarct, multifocal left MCA distribution infarcts    Stroke ([facility] 01/11/2020    large vessel occlusion/left M2 occlusion. He subsequently underwent emergent thrombectomy.  /  S/P TPA DONE       Past Surgical History:   Procedure Laterality Date    APPENDECTOMY      TRANSESOPHAGEAL ECHOCARDIOGRAM  01/17/2020       PHYSICAL EXAM:      Blood pressure 137/73, pulse 100, temperature 97.9 °F (36.6 °C), temperature source Oral, resp. rate 20, height 5' 7\" (1.702 m), weight 218 lb (98.9 kg), SpO2 99 %. General Examination    General Resting comfortably in bed   Head Normocephalic, without obvious abnormality. Edentulous     Neck Supple, symmetrical. Good ROM. Lungs Respirations unlabored, no wheezing   Chest Wall No deformity   Heart RRR, no murmur   Abdomen Soft. Non-tender, non-distended   Extremities No cyanosis or edema or warmth. Pulses 2+ and symmetric   Skin: Skin  turgor normal, no rashes or lesions     Mental status  Speech Alert. Oriented to person, place, and time. Speech is fluent without paraphasic errors  Good repetition and naming  Language appropriate. No hallucinations or delusions. No SI/HI. Cranial nerves   II - VFF, visual threat intact  III, IV, VI - extra-ocular muscles full. No nystagmus. Pupils symmetric and responsive.    V - sensation symmetric         VII -  No facial droop or asymmetric NLF  VIII - intact hearing to conversational tone          IX, X - symmetrical palate elevation   XI - 5/5 strength symmetric  XII - tongue midline   Motor function  Strength: grossly 5/5 in b/l              Deltoid, biceps, triceps, wrist flexion, wrist extension             Hip flexion/extension, knee flexion/extension, plantar flexion  Bulk: grossly normal no atrophy  Tone: symmetric b/l arms and legs  Abnormal movements: No abnormal movements or tremor   Sensory function Symmetric to touch in all extremities bilaterally male with: Acute right PCA distribution ischemic infarct, new onset A. Fib. PLAN:    1. Continue heparin infusion, Aspirin, lipitor  2. Awaiting Cardiology recommendation for anticoagulation  3. PT OT  4. Discharge planning.  Home with home care      Marilyn Maynard MD  PGY-2, Internal medicine resident  [place], [place]  1/18/2020 9:39 AM

## 2020-01-19 LAB
EKG ATRIAL RATE: 340 BPM
EKG Q-T INTERVAL: 394 MS
EKG QRS DURATION: 86 MS
EKG QTC CALCULATION (BAZETT): 449 MS
EKG R AXIS: -10 DEGREES
EKG T AXIS: 2 DEGREES
EKG VENTRICULAR RATE: 78 BPM
PARTIAL THROMBOPLASTIN TIME: 61.3 SEC (ref 20.5–30.5)

## 2020-01-19 PROCEDURE — 6370000000 HC RX 637 (ALT 250 FOR IP): Performed by: STUDENT IN AN ORGANIZED HEALTH CARE EDUCATION/TRAINING PROGRAM

## 2020-01-19 PROCEDURE — 6370000000 HC RX 637 (ALT 250 FOR IP): Performed by: PSYCHIATRY & NEUROLOGY

## 2020-01-19 PROCEDURE — 36415 COLL VENOUS BLD VENIPUNCTURE: CPT

## 2020-01-19 PROCEDURE — 85730 THROMBOPLASTIN TIME PARTIAL: CPT

## 2020-01-19 PROCEDURE — 2580000003 HC RX 258: Performed by: STUDENT IN AN ORGANIZED HEALTH CARE EDUCATION/TRAINING PROGRAM

## 2020-01-19 PROCEDURE — 99233 SBSQ HOSP IP/OBS HIGH 50: CPT | Performed by: PSYCHIATRY & NEUROLOGY

## 2020-01-19 PROCEDURE — 2060000000 HC ICU INTERMEDIATE R&B

## 2020-01-19 PROCEDURE — 6360000002 HC RX W HCPCS: Performed by: STUDENT IN AN ORGANIZED HEALTH CARE EDUCATION/TRAINING PROGRAM

## 2020-01-19 RX ORDER — ACETAMINOPHEN 325 MG/1
650 TABLET ORAL EVERY 6 HOURS PRN
Status: DISCONTINUED | OUTPATIENT
Start: 2020-01-19 | End: 2020-01-20 | Stop reason: HOSPADM

## 2020-01-19 RX ADMIN — Medication 81 MG: at 08:16

## 2020-01-19 RX ADMIN — SODIUM CHLORIDE, PRESERVATIVE FREE 10 ML: 5 INJECTION INTRAVENOUS at 08:16

## 2020-01-19 RX ADMIN — SODIUM CHLORIDE: 9 INJECTION, SOLUTION INTRAVENOUS at 05:30

## 2020-01-19 RX ADMIN — HEPARIN SODIUM AND DEXTROSE 15 UNITS/KG/HR: 10000; 5 INJECTION INTRAVENOUS at 13:00

## 2020-01-19 RX ADMIN — BUTALBITAL, ACETAMINOPHEN AND CAFFEINE 1 TABLET: 50; 325; 40 TABLET ORAL at 18:02

## 2020-01-19 RX ADMIN — SODIUM CHLORIDE, PRESERVATIVE FREE 10 ML: 5 INJECTION INTRAVENOUS at 20:53

## 2020-01-19 RX ADMIN — ATORVASTATIN CALCIUM 40 MG: 80 TABLET, FILM COATED ORAL at 20:51

## 2020-01-19 ASSESSMENT — PAIN SCALES - GENERAL
PAINLEVEL_OUTOF10: 0
PAINLEVEL_OUTOF10: 3
PAINLEVEL_OUTOF10: 0

## 2020-01-19 NOTE — PROGRESS NOTES
2020       Medications:     atorvastatin  40 mg Oral Nightly    sodium chloride flush  10 mL Intravenous 2 times per day    aspirin  81 mg Oral Daily    Or    aspirin  300 mg Rectal Daily     PRN Meds include: calcium carbonate, butalbital-acetaminophen-caffeine, sodium chloride flush, magnesium hydroxide, labetalol    Objective:   BP (!) 152/92   Pulse 97   Temp 98.7 °F (37.1 °C) (Oral)   Resp 22   Ht 5' 7\" (1.702 m)   Wt 218 lb (98.9 kg)   SpO2 100%   BMI 34.14 kg/m²     Blood pressure range: Systolic (99NPA), NGX:561 , Min:119 , TYK:771   ; Diastolic (61IUP), UE, Min:73, Max:92      ROS:  CONSTITUTIONAL: negative for fatigue and malaise   EYES: negative for double vision and photophobia    HEENT: negative for tinnitus and sore throat   RESPIRATORY: negative for cough, shortness of breath   CARDIOVASCULAR: negative for chest pain, palpitations, or syncope   GASTROINTESTINAL: negative for abdominal pain, nausea, vomiting, diarrhea, or constipation    GENITOURINARY: negative for incontinence or retention    MUSCULOSKELETAL: negative for neck or back pain, negative for extremity pain   NEUROLOGICAL: Negative for seizures, headaches, weakness, numbness, confusion, aphasia, dysarthria   PSYCHIATRIC: negative for agitation, hallucination, SI/HI   SKIN Negative for spontaneous contusions, rashes, or lesions        NEUROLOGIC EXAMINATION  GENERAL  Appears comfortable and in no distress   HEENT  NC/ AT   HEART  S1 and S2 heard; palpation of pulses: radial pulse    NECK  Supple and no bruits heard   MENTAL STATUS:  Alert, oriented, intact memory, no confusion, normal speech, normal language, no hallucination or delusion   CRANIAL NERVES: II     -      Visual fields intact to confrontation  III,IV,VI -  PERR, EOMs full, no ptosis  V     -     Normal facial sensation   VII    -     Normal facial symmetry  VIII   -     Intact hearing   IX,X -     Symmetrical palate  XI    -     Symmetrical shoulder shrug  XII   -     Midline tongue, no atrophy    MOTOR FUNCTION: RUE: Significant for good strength of grade 5/5 in proximal and distal muscle groups   LUE: Significant for good strength of grade 5/5 in proximal and distal muscle groups   RLE: Significant for good strength of grade 5/5 in proximal and distal muscle groups   LLE: Significant for good strength of grade 5/5 in proximal and distal muscle groups      Normal bulk, normal tone and no involuntary movements, no tremor   SENSORY FUNCTION:  Normal touch, normal pin, normal vibration, normal proprioception   CEREBELLAR FUNCTION:  Intact fine motor control over upper limbs and lower limbs   REFLEX FUNCTION:  Symmetric in upper and lower extremities, no Babinski sign   STATION and GAIT  Not tested     Data:    Lab Results:   CBC:   Recent Labs     01/16/20  1218 01/16/20  1810 01/16/20  2229 01/18/20  0518   WBC 7.8 7.7 7.0  --    HGB 14.5 15.1 14.7  --     206 201 169     BMP:    Recent Labs     01/16/20  1218 01/16/20  1556     --    K 4.5  --      --    CO2 24  --    BUN 17  --    CREATININE 0.94 CANNOT BE CALCULATED   GLUCOSE 120*  --          Lab Results   Component Value Date    CHOL 137 01/11/2020    LDLCHOLESTEROL 75 01/11/2020    HDL 38 (L) 01/11/2020    TRIG 118 01/11/2020    ALT 25 01/10/2020    AST 29 01/10/2020    INR 1.1 01/16/2020    LABA1C 5.0 01/11/2020       No results found for: PHENYTOIN, PHENYTOIN, VALPROATE, CBMZ    IMAGING    MRI brain without contrast, 1/16/2020    New right PCA distribution infarct.  No hemorrhagic conversion.       Multifocal areas of evolving left MCA distribution infarct, there are few   scattered new foci of restricted diffusion identified suggestion of mild   progression of the left MCA infarct.       Mild chronic small ischemic disease and age related involutional change. CT head neck with contrast, 1/16/2020  1. New occlusion of distal P2 branch of the right posterior cerebral artery.    2. Previously demonstrated left M2 branch occlusion is now patent status post   recent thrombectomy. 3. No hemodynamically significant stenosis or occlusion in the cervical   arterial circulation.  Mild less than 50% stenosis of the internal carotid   arteries by NASCET criteria bilaterally. MRI brain without contrast, 1/11/2020  1.  Multiple small areas of acute stroke in the posterior left middle   cerebral artery territory involving the left frontal and parietal lobes, most   pronounced in the frontal insular region.  Additional punctate focus of acute   stroke in the right centrum semiovale.       2.  Small focus of gradient blooming in the left insula could represent trace   petechial hemorrhage.  No large acute intracranial hemorrhage.  No mass   effect or midline shift.       3.  Mild chronic small vessel ischemic disease. RAUL.  EF 55%, no thrombus or valvular vegetation, tiny PFO without any hemodynamic significance. Assessment and Plan    39-year-old male with acute right PCA ischemic stroke, right P2 occlusion, left MCA stroke 1 week ago was discharged on Eliquis. Eliquis failure, currently on heparin and aspirin, cardiology recommending Coumadin along with aspirin. RAUL no vegetation or thrombus, with tiny PFO, venous Dopplers ordered. LDL 75, HbA1c 5. On Lipitor 40. Patient and family want to be educated more on Coumadin, will provide information and discuss with cardiology.     Fawad Jackson MD  Neurology Resident PGY-2  1/19/2020  6:54 AM

## 2020-01-19 NOTE — PLAN OF CARE
Problem: HEMODYNAMIC STATUS  Goal: Patient has stable vital signs and fluid balance  Outcome: Ongoing     Problem: ACTIVITY INTOLERANCE/IMPAIRED MOBILITY  Goal: Mobility/activity is maintained at optimum level for patient  1/19/2020 1049 by Alyson Shah RN  Outcome: Ongoing  1/19/2020 0533 by Loyda Vieira RN  Outcome: Met This Shift     Problem: COMMUNICATION IMPAIRMENT  Goal: Ability to express needs and understand communication  1/19/2020 1049 by Alyson Shah RN  Outcome: Ongoing  1/19/2020 0533 by Loyda Vieira RN  Outcome: Met This Shift

## 2020-01-19 NOTE — CONSULTS
[facility]  In Patient Cardiology Consult             Cardiology   Consult Note          History Obtained From:  patient    HISTORY OF PRESENT ILLNESS:      The patient is a 78 y.o. male seen for CVA, A/C recommendations. He has history of atrial fibrillation and refused A/C and took only ASA until recently when he had L MCA infarct sec to Lt MCA thrombus s/p iv TPA & [facility] thrombectomy on (1/11/2020). And was placed on Eliquis with compliance according to pt's wife. He presented again on 1/16/2020 with c/o blurred vision, dizziness, dysarthria. CT head negative for acute changes. Patient was evaluated by stroke team.  [place] not a candidate for TPA.  Patient was loaded with aspirin 324 mg and started on  heparin drip for possible Eliquis failure. RAUL on 1/17/20 showed no clots or vegetations with very  small PFO. Past Medical History:    Past Medical History:   Diagnosis Date    [facility] ([facility] 01/2020    Chronic kidney disease     CKD (chronic kidney disease)     Hyperlipidemia     Ischemic stroke ([facility] 01/10/2020    PTSD (post-traumatic stress disorder)     Research study patient 01/11/2020    Study = [place]    Stroke [facility]) 01/16/2020    MRI Brain WO: New right PCA distribution infarct, multifocal left MCA distribution infarcts    Stroke ([facility] 01/11/2020    large vessel occlusion/left M2 occlusion. He subsequently underwent emergent thrombectomy.  /  S/P TPA DONE     Past Surgical History:    Past Surgical History:   Procedure Laterality Date    APPENDECTOMY      TRANSESOPHAGEAL ECHOCARDIOGRAM  01/17/2020     Home Medications:  Prior to Admission medications    Medication Sig Start Date End Date Taking?  Authorizing Provider   apixaban (ELIQUIS) 5 MG TABS tablet Take 1 tablet by mouth 2 times daily 1/14/20   Teresa Sánchez MD   Omega-3 Fatty Acids (FISH OIL) 1000 MG CAPS Take 1,000 mg by mouth 2 times daily    Historical Provider, MD   atorvastatin (LIPITOR) 40 MG tablet Take 1 tablet by mouth nightly 1/13/20   Monty Hoskins MD   vitamin D (ERGOCALCIFEROL) 84331 UNITS CAPS capsule Take 50,000 Units by mouth once a week    Historical Provider, MD   VITAMIN B1-B12 IM Inject into the muscle every 30 days     Historical Provider, MD     Current Inpatient Medications:   atorvastatin  40 mg Oral Nightly    sodium chloride flush  10 mL Intravenous 2 times per day    aspirin  81 mg Oral Daily    Or    aspirin  300 mg Rectal Daily     Allergies:  Patient has no known allergies. Social History:    Social History     Socioeconomic History    Marital status:      Spouse name: Not on file    Number of children: Not on file    Years of education: Not on file    Highest education level: Not on file   Occupational History    Not on file   Social Needs    Financial resource strain: Not on file    Food insecurity:     Worry: Not on file     Inability: Not on file    Transportation needs:     Medical: Not on file     Non-medical: Not on file   Tobacco Use    Smoking status: Never Smoker    Smokeless tobacco: Never Used   Substance and Sexual Activity    Alcohol use:  Yes     Alcohol/week: 0.0 standard drinks     Comment: occas    Drug use: No    Sexual activity: Yes   Lifestyle    Physical activity:     Days per week: Not on file     Minutes per session: Not on file    Stress: Not on file   Relationships    Social connections:     Talks on phone: Not on file     Gets together: Not on file     Attends Druze service: Not on file     Active member of club or organization: Not on file     Attends meetings of clubs or organizations: Not on file     Relationship status: Not on file    Intimate partner violence:     Fear of current or ex partner: Not on file     Emotionally abused: Not on file     Physically abused: Not on file     Forced sexual activity: Not on file   Other Topics Concern    Not on file   Social History Narrative    Not on file     Family History:

## 2020-01-19 NOTE — PROGRESS NOTES
Writer had thorough discussion with the patient and his wife concerning their concerns over their [place] care going forward. They [facility] diagnosis, and therapy going forward. The patient is requesting that Neurology gets in contact with his primary care out of [place], [place] and discusses his needs. Pt's PCP is Glenn Arnold in [address] [phone].   This will be passed on to day shift

## 2020-01-19 NOTE — PROGRESS NOTES
Patient, his wife, and his daughter stating they do not want coumadin for anti-coagulation, they originally requested pradaxa this AM, and are now requesting Eliquis.

## 2020-01-19 NOTE — PROGRESS NOTES
Received call from nursing staff that the patient/family has concerns regarding starting coumadin and maybe considering Pradaxa. Coumadin was recommended due to possible failure of Eliquis and for easier monitoring of therapeutic levels. Pradaxa can be considered as well, however, we cannot monitor its efficacy and it is a similar class to Eliquis. If the patient/family do not want to start Coumadin, alternatively can start Pradaxa 150 mg bid.      Discussed with Dr. Rosalia Chua and Leandro Peterson, [address] [address]  Cardiology Fellow

## 2020-01-20 VITALS
DIASTOLIC BLOOD PRESSURE: 99 MMHG | WEIGHT: 218 LBS | SYSTOLIC BLOOD PRESSURE: 130 MMHG | RESPIRATION RATE: 21 BRPM | OXYGEN SATURATION: 96 % | BODY MASS INDEX: 34.21 KG/M2 | HEART RATE: 80 BPM | TEMPERATURE: 97.8 F | HEIGHT: 67 IN

## 2020-01-20 LAB
PARTIAL THROMBOPLASTIN TIME: 55.1 SEC (ref 20.5–30.5)
PLATELET # BLD: 159 K/UL (ref 138–453)

## 2020-01-20 PROCEDURE — 85049 AUTOMATED PLATELET COUNT: CPT

## 2020-01-20 PROCEDURE — 97110 THERAPEUTIC EXERCISES: CPT

## 2020-01-20 PROCEDURE — 97116 GAIT TRAINING THERAPY: CPT

## 2020-01-20 PROCEDURE — 6360000002 HC RX W HCPCS: Performed by: STUDENT IN AN ORGANIZED HEALTH CARE EDUCATION/TRAINING PROGRAM

## 2020-01-20 PROCEDURE — 6370000000 HC RX 637 (ALT 250 FOR IP): Performed by: STUDENT IN AN ORGANIZED HEALTH CARE EDUCATION/TRAINING PROGRAM

## 2020-01-20 PROCEDURE — 36415 COLL VENOUS BLD VENIPUNCTURE: CPT

## 2020-01-20 PROCEDURE — 2580000003 HC RX 258: Performed by: STUDENT IN AN ORGANIZED HEALTH CARE EDUCATION/TRAINING PROGRAM

## 2020-01-20 PROCEDURE — 93970 EXTREMITY STUDY: CPT

## 2020-01-20 PROCEDURE — 99239 HOSP IP/OBS DSCHRG MGMT >30: CPT | Performed by: STUDENT IN AN ORGANIZED HEALTH CARE EDUCATION/TRAINING PROGRAM

## 2020-01-20 PROCEDURE — 99233 SBSQ HOSP IP/OBS HIGH 50: CPT | Performed by: STUDENT IN AN ORGANIZED HEALTH CARE EDUCATION/TRAINING PROGRAM

## 2020-01-20 PROCEDURE — 85730 THROMBOPLASTIN TIME PARTIAL: CPT

## 2020-01-20 RX ORDER — ASPIRIN 81 MG/1
81 TABLET ORAL DAILY
Qty: 30 TABLET | Refills: 3 | Status: ON HOLD | OUTPATIENT
Start: 2020-01-21 | End: 2020-08-10 | Stop reason: HOSPADM

## 2020-01-20 RX ORDER — DABIGATRAN ETEXILATE 150 MG/1
150 CAPSULE, COATED PELLETS ORAL 2 TIMES DAILY
Qty: 60 CAPSULE | Refills: 0 | Status: ON HOLD | OUTPATIENT
Start: 2020-01-20 | End: 2020-08-10 | Stop reason: HOSPADM

## 2020-01-20 RX ADMIN — HEPARIN SODIUM AND DEXTROSE 14.66 UNITS/KG/HR: 10000; 5 INJECTION INTRAVENOUS at 04:25

## 2020-01-20 RX ADMIN — ACETAMINOPHEN 650 MG: 325 TABLET ORAL at 17:14

## 2020-01-20 RX ADMIN — Medication 81 MG: at 08:54

## 2020-01-20 RX ADMIN — SODIUM CHLORIDE, PRESERVATIVE FREE 10 ML: 5 INJECTION INTRAVENOUS at 08:54

## 2020-01-20 ASSESSMENT — PAIN SCALES - GENERAL: PAINLEVEL_OUTOF10: 3

## 2020-01-20 NOTE — PROGRESS NOTES
[facility]  Progress Note                   Date:   1/20/2020  Patient name: Didier Pelletier  Date of admission:  1/16/2020  3:46 PM  MRN:   [id number]  YOB: 1950  PCP: JIMBO Mosqueda CNP    Reason for Admission: Stroke determined by clinical assessment [facility]) [I63.9]  Stroke determined by clinical assessment ([facility] [address] [I63.9]    Subjective:       Clinical Changes /Abnormalities: Patient seen and examined in room with wife present. Denies chest pain or SOB. Reports he had his vascular testing completed today awaiting results    Review of Systems    Medications:   Scheduled Meds:   atorvastatin  40 mg Oral Nightly    sodium chloride flush  10 mL Intravenous 2 times per day    aspirin  81 mg Oral Daily    Or    aspirin  300 mg Rectal Daily     Continuous Infusions:   heparin (porcine) 14.661 Units/kg/hr (01/20/20 0551)     CBC:   Recent Labs     01/18/20  0518 01/20/20  0459    159     BMP:  No results for input(s): NA, K, CL, CO2, BUN, CREATININE, GLUCOSE in the last 72 hours. Hepatic:No results for input(s): AST, ALT, ALB, BILITOT, ALKPHOS in the last 72 hours. Troponin: No results for input(s): TROPHS in the last 72 hours. BNP: No results for input(s): BNP in the last 72 hours. Lipids: No results for input(s): CHOL, HDL in the last 72 hours. Invalid input(s): LDLCALCU  INR: No results for input(s): INR in the last 72 hours. Echo 1/13/2020  Summary  Normal LV size and wall thickness. No obvious wall motion abnormality seen. Normal LV systolic function with LVEF >55%. Dilated RV with preserved function function. Moderately dilated LA and RA.  AV is sclerotic opes well. Mild AR  Normal aortic root dimension. No significant pericardial effusion. No obvious intra-cardiac mass or shunt noted. RAUL 1/17/2020  Summary  The study was performed by the attending, fellow, and the sonographer. A RAUL was performed without complications.   Left ventricle is normal in size with normal systolic function. Estimated LVEF is 55%. No valvular vegetations or thombus identified. Tiny PFO without any hemodynamic significance. Mild to moderate MR and AI. Mild tricuspid regurgitation. Aortic arch shows mild plaque formation. Objective:   Vitals: /72   Pulse 84   Temp 97 °F (36.1 °C) (Oral)   Resp 17   Ht 5' 7\" (1.702 m)   Wt 218 lb (98.9 kg)   SpO2 97%   BMI 34.14 kg/m²   General appearance: alert and cooperative with exam  HEENT: Head: Normocephalic, no lesions, without obvious abnormality. Neck:no JVD, trachea midline, no adenopathy  Lungs: Clear to auscultation  Heart: Iregular rate and rhythm, s1/s2 auscultated, no murmurs Afib 60's- 70's  Abdomen: soft, non-tender, bowel sounds active  Extremities: no edema  Neurologic: not done        Assessment / Acute Cardiac Problems:   1. A Fib  2. CVA  3. Possible Eliquis failure  4. 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     Combined forms of age-related cataract of both eyes     Acute cerebrovascular accident (CVA) ([address]     Cerebrovascular accident (CVA) ([address]     History of atrial fibrillation     Tissue plasminogen activator (tPA) administered at other facility within 24 hours before current admission     Nihss score 10     Stroke determined by clinical assessment [facility])     Atrial fibrillation ([facility] [address]     Acute ischemic stroke ([facility] [address]     History of ischemic stroke in prior three months     Left homonymous hemianopsia      Plan of Treatment:   1. A Fib persistent rate controlled. Patient not agreeable to coumadin but agrees to Pradaxa. Risk benefits reviewed. Patient concerns regarding coumadin stem from his mother's bad experience  2. Awaiting results of LE venous scans per Dr Alaina Plascencia note. If DVT will consider closure of PFO.     3. If scan is negative okay to discharge from cardiology standpoint on oral AC.   4. Follow in [facility] in 2-3 weeks.     Electronically signed by JIMBO Carpio CNP on 1/20/2020 at 12:00 PM  [address]  [phone]

## 2020-01-20 NOTE — CARE COORDINATION
Discharge [facility] Case Management Department  Written by: Anthony Peguero RN    Patient Name: Didier Pelletier  Attending Provider: Jagjit Harrington,*  Admit Date: 2020  3:46 PM  MRN: [id number]  Account: [de-identified]                     : 1950  Discharge Date: 20      Disposition: home with Trent Ordonez RN

## 2020-01-20 NOTE — CARE COORDINATION
Met with patient and wife regarding planned discharge today. Plan remains home with Adeel, voicemail left with intake at [facility] notifying of planned d/c today. Pt is also discharging on Pradaxa and is able to afford the cost of the medication, it is being filled at the patients home pharmacy.

## 2020-01-20 NOTE — DISCHARGE INSTR - COC
Continuity of Care Form    Patient Name: Good Walls   :  1950  MRN:  [id number]    Admit date:  2020  Discharge date:  ***    Code Status Order: Full Code   Advance Directives:     Admitting Physician:  Lisha Navarro MD  PCP: Shaji White, APRN - CNP    Discharging Nurse: [facility] Unit/Room#: 9133/6113-47  Discharging Unit Phone Number: ***    Emergency Contact:   Extended Emergency Contact Information  Primary Emergency Contact: Kamille Worthy  Address: [address] [place] [address], [facility] Yadiel Jones [address] Phone: [phone]  Relation: Spouse  Secondary Emergency Contact: Lyndsay Fisher, [facility] Phone: [phone]  Relation: Child    Past Surgical History:  Past Surgical History:   Procedure Laterality Date    APPENDECTOMY      TRANSESOPHAGEAL ECHOCARDIOGRAM  2020       Immunization History: There is no immunization history on file for this patient. Active Problems:  Patient Active Problem List   Diagnosis Code    Skin tag L91.8    Corneal foreign body, right, initial encounter T15. 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 H43.813    Combined forms of age-related cataract of both eyes H25.813    Acute cerebrovascular accident (CVA) ([address] I63.9    Cerebrovascular accident (CVA) ([address] I63.9    History of atrial fibrillation Z86.79    Tissue plasminogen activator (tPA) administered at other facility within 24 hours before current admission Z92.82    Nihss score 10 R29.710    Stroke determined by clinical assessment ([facility] [address] [address] I63.9    Atrial fibrillation (HCC) I48.91    Acute ischemic stroke (HCC) I63.9    History of ischemic stroke in prior three months Z86.73    Left homonymous hemianopsia H53.462       Isolation/Infection:   Isolation          No Isolation        Patient Infection Status     None to display          Nurse Assessment:  Last Vital Signs: BP (!) 130/99 Pulse 80   Temp 97.8 °F (36.6 °C) (Oral)   Resp 21   Ht 5' 7\" (1.702 m)   Wt 218 lb (98.9 kg)   SpO2 96%   BMI 34.14 kg/m²     Last documented pain score (0-10 scale): Pain Level: 0  Last Weight:   Wt Readings from Last 1 Encounters:   01/16/20 218 lb (98.9 kg)     Mental Status:  oriented and alert    IV Access:  - None    Nursing Mobility/ADLs:  Walking   Assisted  Transfer  Assisted  Bathing  Assisted  Dressing  Assisted  Toileting  Assisted  Feeding  Independent  Med Admin  Assisted  Med Delivery   whole    Wound Care Documentation and Therapy:        Elimination:  Continence:   · Bowel: Yes  · Bladder: Yes  Urinary Catheter: None   Colostomy/Ileostomy/Ileal Conduit: No       Date of Last BM: ***    Intake/Output Summary (Last 24 hours) at 1/20/2020 1625  Last data filed at 1/20/2020 0551  Gross per 24 hour   Intake 149.5 ml   Output --   Net 149.5 ml     I/O last 3 completed shifts: In: 149.5 [I.V.:149.5]  Out: -     Safety Concerns:     None    Impairments/Disabilities:      None    Nutrition Therapy:  Current Nutrition Therapy:   - Oral Diet:  General    Routes of Feeding: Oral  Liquids: Thin Liquids  Daily Fluid Restriction: no  Last Modified Barium Swallow with Video (Video Swallowing Test): not done    Treatments at the Time of Hospital Discharge:   Respiratory Treatments: ***  Oxygen Therapy:  is not on home oxygen therapy.   Ventilator:    - No ventilator support    Rehab Therapies: Physical Therapy, Occupational Therapy and Speech/Language Therapy  Weight Bearing Status/Restrictions: No weight bearing restirctions  Other Medical Equipment (for information only, NOT a DME order):  walker  Other Treatments: ***    Patient's personal belongings (please select all that are sent with patient):  None    RN SIGNATURE:  Electronically signed by Kendy Torrez RN on 1/20/20 at 4:41 PM    CASE MANAGEMENT/SOCIAL WORK SECTION    Inpatient Status Date: ***    Readmission Risk Assessment Score:  Readmission

## 2020-01-20 NOTE — PROGRESS NOTES
Concurrent Group Co-treatment   Time In 0322         Time Out 0348         Minutes 815 Replaced by [facility], [facility]

## 2020-01-20 NOTE — PROGRESS NOTES
NEUROLOGY INPATIENT PROGRESS NOTE    1/20/2020         Subjective: Jose Maria Barajas is a  78 y.o. male admitted on 1/16/2020 with Stroke determined by clinical assessment [facility]) [I63.9]  Stroke determined by clinical assessment [facility]) [I63.9]    Briefly, this is a  78 y.o. male admitted on 1/16/2020 with right PCA stroke, started on heparin due to Eliquis failure. Pmh left mca stroke one week ago, afib, ptsd, glaucoma. Today patient is seen and chart reviewed, no acute events overnight. no nausea vomiting, tolerating diet, working with therapy. Patient and family deciding against Coumadin, would prefer Pradaxa instead. Cardiology updated. No current facility-administered medications on file prior to encounter. Current Outpatient Medications on File Prior to Encounter   Medication Sig Dispense Refill    apixaban (ELIQUIS) 5 MG TABS tablet Take 1 tablet by mouth 2 times daily 60 tablet 5    Omega-3 Fatty Acids (FISH OIL) 1000 MG CAPS Take 1,000 mg by mouth 2 times daily      atorvastatin (LIPITOR) 40 MG tablet Take 1 tablet by mouth nightly 30 tablet 3    vitamin D (ERGOCALCIFEROL) 76433 UNITS CAPS capsule Take 50,000 Units by mouth once a week      VITAMIN B1-B12 IM Inject into the muscle every 30 days          Allergies: Tyler MAGGY Worthy has No Known Allergies. Past Medical History:   Diagnosis Date    Atrial fibrillation ([facility] 01/2020    Chronic kidney disease     CKD (chronic kidney disease)     Hyperlipidemia     Ischemic stroke ([facility] 01/10/2020    PTSD (post-traumatic stress disorder)     Research study patient 01/11/2020    Study = TIGER    Stroke [facility]) 01/16/2020    MRI Brain WO: New right PCA distribution infarct, multifocal left MCA distribution infarcts    Stroke ([facility] 01/11/2020    large vessel occlusion/left M2 occlusion.   He subsequently underwent emergent thrombectomy.  /  S/P TPA DONE       Past Surgical History:   Procedure Laterality Date    APPENDECTOMY      stenosis or occlusion in the cervical   arterial circulation.  Mild less than 50% stenosis of the internal carotid   arteries by NASCET criteria bilaterally. MRI brain without contrast, 1/11/2020  1.  Multiple small areas of acute stroke in the posterior left middle   cerebral artery territory involving the left frontal and parietal lobes, most   pronounced in the frontal insular region.  Additional punctate focus of acute   stroke in the right centrum semiovale.       2.  Small focus of gradient blooming in the left insula could represent trace   petechial hemorrhage.  No large acute intracranial hemorrhage.  No mass   effect or midline shift.       3.  Mild chronic small vessel ischemic disease. RAUL.  EF 55%, no thrombus or valvular vegetation, tiny PFO without any hemodynamic significance. Assessment and Plan    78-year-old male with acute right PCA ischemic stroke, right P2 occlusion, left MCA stroke 1 week ago was discharged on Eliquis. Eliquis failure, currently on heparin and aspirin, cardiology initially recommending Coumadin along with aspirin. However patient and family hesitant. They would prefer pradaxa instead, cardiology updated, see their note. Patient and family informed pradaxa is in the same class as eliquis. RAUL no vegetation or thrombus, with tiny PFO, venous Dopplers ordered. LDL 75, HbA1c 5. On Lipitor 40. Plan for discharge today on one month pradaxa supply once le venous dopplers result.      Benjamin Wilson MD  Neurology Resident PGY-2  1/20/2020  9:01 AM

## 2020-01-21 ENCOUNTER — CARE COORDINATION (OUTPATIENT)
Dept: CASE MANAGEMENT | Age: 70
End: 2020-01-21

## 2020-01-21 ENCOUNTER — TELEPHONE (OUTPATIENT)
Dept: PHARMACY | Facility: CLINIC | Age: 70
End: 2020-01-21

## 2020-01-21 ENCOUNTER — TELEPHONE (OUTPATIENT)
Dept: NEUROSURGERY | Age: 70
End: 2020-01-21

## 2020-01-21 PROCEDURE — 1111F DSCHRG MED/CURRENT MED MERGE: CPT | Performed by: PHARMACIST

## 2020-01-24 ENCOUNTER — OFFICE VISIT (OUTPATIENT)
Dept: FAMILY MEDICINE CLINIC | Age: 70
End: 2020-01-24
Payer: MEDICARE

## 2020-01-24 VITALS
HEIGHT: 67 IN | WEIGHT: 201 LBS | DIASTOLIC BLOOD PRESSURE: 88 MMHG | HEART RATE: 106 BPM | BODY MASS INDEX: 31.55 KG/M2 | TEMPERATURE: 97 F | OXYGEN SATURATION: 98 % | SYSTOLIC BLOOD PRESSURE: 136 MMHG

## 2020-01-24 PROCEDURE — 1111F DSCHRG MED/CURRENT MED MERGE: CPT | Performed by: NURSE PRACTITIONER

## 2020-01-24 PROCEDURE — 99495 TRANSJ CARE MGMT MOD F2F 14D: CPT | Performed by: NURSE PRACTITIONER

## 2020-01-24 ASSESSMENT — ENCOUNTER SYMPTOMS
COUGH: 0
RESPIRATORY NEGATIVE: 1
GASTROINTESTINAL NEGATIVE: 1
SHORTNESS OF BREATH: 0

## 2020-01-24 ASSESSMENT — PATIENT HEALTH QUESTIONNAIRE - PHQ9
2. FEELING DOWN, DEPRESSED OR HOPELESS: 0
SUM OF ALL RESPONSES TO PHQ9 QUESTIONS 1 & 2: 0
1. LITTLE INTEREST OR PLEASURE IN DOING THINGS: 0
SUM OF ALL RESPONSES TO PHQ QUESTIONS 1-9: 0
SUM OF ALL RESPONSES TO PHQ QUESTIONS 1-9: 0

## 2020-01-24 NOTE — PROGRESS NOTES
(ERGOCALCIFEROL) 08047 UNITS CAPS capsule  Take 50,000 Units by mouth once a week                   Medications marked \"taking\" at this time  Outpatient Medications Marked as Taking for the 1/24/20 encounter (Office Visit) with JIMBO Alston - CNP   Medication Sig Dispense Refill    dabigatran (PRADAXA) 150 MG capsule Take 1 capsule by mouth 2 times daily 60 capsule 0    aspirin 81 MG EC tablet Take 1 tablet by mouth daily 30 tablet 3    Omega-3 Fatty Acids (FISH OIL) 1000 MG CAPS Take 1,000 mg by mouth 2 times daily      atorvastatin (LIPITOR) 40 MG tablet Take 1 tablet by mouth nightly 30 tablet 3    vitamin D (ERGOCALCIFEROL) 08148 UNITS CAPS capsule Take 50,000 Units by mouth once a week      VITAMIN B1-B12 IM Inject into the muscle every 30 days           Medications patient taking as of now reconciled against medications ordered at time of hospital discharge: Yes    Chief Complaint   Patient presents with    [facility] [phone] [facility] [address]       HPI patient is here for trans care visit for CVA. I have reviewed the patient's medical history in detail and updated the computerized patient record. He present to the ER on 1/16/2019 with visual disturbance and dizziness. Prior to him coming to the ER he has recently been discharged from [facility] for CVA. Head CT revealed that He had another stroke and was then admitted. Labs were stable along with chest X-ray. He was discharged on Monday. He was already on Eliquis from his previous stay at [facility]. He had no deficits from his strokes. He is able to move all his extremities. He is supposed to be setting up OT and PT. They  Are not sure if he has speech. He does not have his teeth in today. They state they would like him to have this. They states that he has not been eating or drink as much. Wife is here with him today. She states that he sees a PCP at the [place].  They already have an appmt with his PCP alert and oriented to person, place, and time. Cranial Nerves: Cranial nerves are intact. Motor: Motor function is intact. No weakness. Comments: He is here in a wheelchair today. He uses a walker at home. Psychiatric:         Behavior: Behavior normal.         Thought Content: Thought content normal.         Judgment: Judgment normal.             Assessment/Plan:  1. Cerebrovascular accident (CVA), unspecified mechanism ([facility] [address]    - ID DISCHARGE MEDS RECONCILED W/ CURRENT OUTPATIENT MED LIST      Follow up with all appmts. Answered all of the patient's questions. Agrees with plan of care. Follow up PRN.      Medical Decision Making: moderate complexity

## 2020-01-28 ENCOUNTER — CARE COORDINATION (OUTPATIENT)
Dept: CASE MANAGEMENT | Age: 70
End: 2020-01-28

## 2020-02-04 ENCOUNTER — CARE COORDINATION (OUTPATIENT)
Dept: CASE MANAGEMENT | Age: 70
End: 2020-02-04

## 2020-02-04 NOTE — CARE COORDINATION
[facility] Transitions Follow Up Call    2020    Patient: Liat Tyson  Patient : 1950   MRN: [id number]  Reason for Admission: CVA  Discharge Date: 20 RARS: Readmission Risk Score: 12         Spoke with: Liat Tyson and wife Pedro West    Was able to contact [place] for final outreach. He stated that he was doing well. He said that he accidentally drank out of a glass without a straw. He stated he has not had any shortness of breath, cough or fever. Informed him to [facility] for s/s of chest infection. He then handed the phone to his wife Pedro West. She stated he seems to be getting a little stronger. PT to continue. She has not noticed any confusion, increase weakness, or complaints of numbness or tingling. Informed of final outreach and she was in agreement. Episode ended. Care Transitions Subsequent and Final Call    Subsequent and Final Calls  Do you have any ongoing symptoms?:  No  Have your medications changed?:  No  Do you have any questions related to your medications?:  No  Do you currently have any active services?:  Yes  Are you currently active with any services?:  Home Health  Do you have any needs or concerns that I can assist you with?:  No  Care Transitions Interventions                          Other Interventions:             Follow Up  Future Appointments   Date Time Provider Alexandria Kaur   2020  1:30 PM Ruiz Vegas MD Neuro Endo [facility]   2020  3:24 AM Apolonia Morales MD [facility]   2020  2:00 PM Carlos Mcginnis MD Neuro Endo Emre Saldana RN

## 2020-02-07 ENCOUNTER — OFFICE VISIT (OUTPATIENT)
Dept: NEUROLOGY | Age: 70
End: 2020-02-07
Payer: MEDICARE

## 2020-02-07 VITALS
HEIGHT: 67 IN | OXYGEN SATURATION: 98 % | SYSTOLIC BLOOD PRESSURE: 113 MMHG | HEART RATE: 82 BPM | DIASTOLIC BLOOD PRESSURE: 72 MMHG | WEIGHT: 201 LBS | BODY MASS INDEX: 31.55 KG/M2

## 2020-02-07 PROCEDURE — 99214 OFFICE O/P EST MOD 30 MIN: CPT | Performed by: STUDENT IN AN ORGANIZED HEALTH CARE EDUCATION/TRAINING PROGRAM

## 2020-02-07 NOTE — PROGRESS NOTES
medical history of Atrial fibrillation ([facility] [address] Chronic kidney disease, CKD (chronic kidney disease), Hyperlipidemia, Ischemic stroke ([facility] [address] PTSD (post-traumatic stress disorder), Research study patient, Stroke [facility]), and Stroke ([facility] [address] Past Surgical History   has a past surgical history that includes Appendectomy and transesophageal echocardiogram (01/17/2020). Social History   reports that he has never smoked. He has never used smokeless tobacco.   reports current alcohol use. reports no history of drug use. Family History  family history is not on file. Review of Systems:  CONSTITUTIONAL:  negative for fevers, chills, fatigue and malaise    EYES:  negative for double vision, blurred vision and photophobia     HEENT:  negative for tinnitus, epistaxis and sore throat    RESPIRATORY:  negative for cough, shortness of breath, wheezing    CARDIOVASCULAR:  negative for chest pain, palpitations, syncope, edema    GASTROINTESTINAL:  negative for nausea, vomiting    GENITOURINARY:  negative for incontinence    MUSCULOSKELETAL:  negative for neck or back pain    NEUROLOGICAL:  Negative for weakness and tingling  negative for headaches and dizziness    PSYCHIATRIC:  negative for anxiety      Review of systems otherwise negative. OBJECTIVE:   Vitals: /72 (Site: Left Upper Arm, Position: Sitting, Cuff Size: Large Adult)   Pulse 82   Ht 5' 7\" (1.702 m)   Wt 201 lb (91.2 kg)   SpO2 98%   BMI 31.48 kg/m²   General appearance: Lying in bed, NAD. HEENT: Atraumatic. Neck: Neck is supple. Lungs: No respiratory distress noted. Abdomen: Soft nontender. Extremities: No lower limb edema noted. Neurologic: He is awake, following simple commands appropriately, able to name simple objects, intact comprehension, no dysarthria noted.   CN: He has intact extraocular muscles movements, no facial droop noted, intact sensation on the face on trigeminal distribution bilaterally, palate is symmetric, tongue is midline upon protrusion. MOTOR: He has good strength in both upper and lower extremities, moving both upper and lower extremities against gravity with no drift. SENSORY: Intact sensation to simple touch bilaterally in both upper and lower extremities. COORDINATION: Intact finger-nose test bilaterally with no dysmetria noted. NIH Stroke Scale Total:  1a.  Level of consciousness:  0  1b. Level of consciousness questions:  0   1c. Level of consciousness questions:  0   2. Best Gaze:  0   3. Visual:  0   4. Facial Palsy:  0   5a. Motor left arm:  0  5b. Motor right arm:  0  6a. Motor left le  6b. Motor right le  7. Limb Ataxia:  0 - absent  8. Sensory:  0 - normal; no sensory loss  9. Best Language: 0  10. Dysarthria: 0  11. Extinction and Inattention: 0     Total: 0    LABS:   No results for input(s): WBC, HGB, HCT, PLT, NA, K, CL, CO2, BUN, CREATININE, MG, PHOS, CALCIUM, PTT, INR, AST, ALT, BILITOT, BILIDIR, NITRU, COLORU, BACTERIA in the last 72 hours. Invalid input(s): PT, WBCU, RBCU, LEUKOCYTESUA  No results for input(s): ALKPHOS, ALT, AST, BILITOT, BILIDIR, LABALBU, AMYLASE, LIPASE in the last 72 hours. RADIOLOGY:   Images were personally reviewed including:    Angiogram in 2020   Impression:   1.  Left superior division M2 MCA occlusion consistent with acute thromboembolism. This is also accompanied with 2 MCA M3/m4 distal emboli noted. 2.  The above mentioned occlusion was treated with 1 pass of mechanical thrombectomy using the TIGERTRIEVER 17 revascularization device and 2nd pass TIGERTRIEVER 21 and proximal flow arrest using Balloon Guide Catheter (BGC). The third pass using TREVO    stent retriever MCA M3 branch. This resulted into TICI 2C revascularization.    3.   Right fetal PCA noted as normal variant       MRI brain in 2020  New right PCA distribution infarct.  No hemorrhagic conversion.       Multifocal areas of evolving left MCA distribution infarct, there are few   scattered new foci of restricted diffusion identified suggestion of mild   progression of the left MCA infarct.       Mild chronic small ischemic disease and age related involutional change.           IMPRESSIONS:     Left MCA and right PCA embolic strokes. Currently patient is on dabigatran 150 mg twice daily. Stroke etiology is likely cardioembolic. PLANS:   Continue dabigatran 150 mg twice daily. Continue aspirin 81 mg once daily. Atorvastatin 40 mg once daily. [place] [address] [place] [facility] study. Follow-up with Dr. Rick Trujillo in April 2020. Thank you for the interesting evaluation. Soren Bowman MD  Pager [phone]  Stroke, [facility] Stroke [facility]  [address]  Electronically signed 2/7/2020 at 1:55 PM         I reviewed the [address] [place] [address] note and agree with the documented findings and plan of care. Any areas of disagreement are noted on the chart. I agree with the chief complaint, past medical history, past surgical history, allergies, medications, social and family history as documented unless otherwise noted below. I have personally seen and evaluated the patient and images. I find the patient's history and physical exam are consistent with the [address] [place] [address] documentation. I agree with the care provided, treatment rendered, disposition and follow-up plan. 77 yo gentleman with afib, ckd and htn with iv tpa and mechanical thrombectomy of left m2 mca occlusion with tici 2c recanalization with good recovery nih 0. Followup with Dr. Rick Trujillo 4-. Cont pradaxa and statins    Marijo Goldmann, MD  Office [phone].  Cell [phone]  Stroke, [facility]roke [facility]  [facility]

## 2020-02-13 ENCOUNTER — OFFICE VISIT (OUTPATIENT)
Dept: NEUROLOGY | Age: 70
End: 2020-02-13
Payer: MEDICARE

## 2020-02-13 VITALS
WEIGHT: 197.4 LBS | HEART RATE: 80 BPM | DIASTOLIC BLOOD PRESSURE: 64 MMHG | BODY MASS INDEX: 30.98 KG/M2 | SYSTOLIC BLOOD PRESSURE: 120 MMHG | OXYGEN SATURATION: 98 % | HEIGHT: 67 IN

## 2020-02-13 PROBLEM — I65.23 BILATERAL CAROTID ARTERY STENOSIS: Status: ACTIVE | Noted: 2020-02-13

## 2020-02-13 PROBLEM — I63.9 ISCHEMIC STROKE (HCC): Status: ACTIVE | Noted: 2020-01-10

## 2020-02-13 PROBLEM — Z86.73 H/O ISCHEMIC LEFT MCA STROKE: Status: ACTIVE | Noted: 2020-02-13

## 2020-02-13 PROBLEM — Z91.89 AT HIGH RISK FOR STROKE: Status: ACTIVE | Noted: 2020-02-13

## 2020-02-13 PROBLEM — I63.9 STROKE (HCC): Status: ACTIVE | Noted: 2020-01-16

## 2020-02-13 PROBLEM — I63.431 CEREBROVASCULAR ACCIDENT (CVA) DUE TO EMBOLISM OF RIGHT POSTERIOR CEREBRAL ARTERY (HCC): Status: ACTIVE | Noted: 2020-02-13

## 2020-02-13 PROBLEM — R26.89 BALANCE PROBLEM: Status: ACTIVE | Noted: 2020-02-13

## 2020-02-13 PROBLEM — R26.9 GAIT DIFFICULTY: Status: ACTIVE | Noted: 2020-02-13

## 2020-02-13 PROBLEM — R41.3 MEMORY PROBLEM: Status: ACTIVE | Noted: 2020-02-13

## 2020-02-13 PROBLEM — I67.82 CHRONIC CEREBRAL ISCHEMIA: Status: ACTIVE | Noted: 2020-02-13

## 2020-02-13 PROBLEM — E78.5 HYPERLIPIDEMIA: Status: ACTIVE | Noted: 2020-02-13

## 2020-02-13 PROBLEM — F43.10 PTSD (POST-TRAUMATIC STRESS DISORDER): Status: ACTIVE | Noted: 2020-02-13

## 2020-02-13 PROBLEM — Z91.81 RISK FOR FALLS: Status: ACTIVE | Noted: 2020-02-13

## 2020-02-13 PROCEDURE — 99205 OFFICE O/P NEW HI 60 MIN: CPT | Performed by: PSYCHIATRY & NEUROLOGY

## 2020-02-13 PROCEDURE — 99214 OFFICE O/P EST MOD 30 MIN: CPT | Performed by: PSYCHIATRY & NEUROLOGY

## 2020-02-13 PROCEDURE — 93880 EXTRACRANIAL BILAT STUDY: CPT

## 2020-02-13 ASSESSMENT — ENCOUNTER SYMPTOMS
VOMITING: 0
EYE ITCHING: 0
CONSTIPATION: 0
BOWEL INCONTINENCE: 0
FACIAL SWELLING: 0
VISUAL CHANGE: 0
COUGH: 0
BACK PAIN: 0
EYE REDNESS: 0
WHEEZING: 0
EYE DISCHARGE: 0
SINUS PRESSURE: 0
DIARRHEA: 0
ABDOMINAL DISTENTION: 0
COLOR CHANGE: 0
SHORTNESS OF BREATH: 0
CHEST TIGHTNESS: 0
ABDOMINAL PAIN: 0
BLOOD IN STOOL: 0
APNEA: 0
SORE THROAT: 0
VOICE CHANGE: 0
CHANGE IN BOWEL HABIT: 0
SWOLLEN GLANDS: 0
CHOKING: 0
PHOTOPHOBIA: 0
EYE PAIN: 0
NAUSEA: 0
TROUBLE SWALLOWING: 0

## 2020-02-13 NOTE — PROGRESS NOTES
[facility]  Neurology  [address] [address]  XAXEK:[phone]   Fax: [phone]    SUBJECTIVE:     PATIENT ID:  Camden Wild is a  RIGHT  HANDED 78 y.o. male. Neurologic Problem   The patient's primary symptoms include clumsiness, a loss of balance and memory loss. The patient's pertinent negatives include no altered mental status, focal sensory loss, focal weakness, near-syncope, slurred speech, syncope, visual change or weakness. This is a recurrent problem. The current episode started more than 1 month ago. The neurological problem developed suddenly. The problem is unchanged. There was no focality noted. Associated symptoms include dizziness and light-headedness. Pertinent negatives include no abdominal pain, auditory change, aura, back pain, bladder incontinence, bowel incontinence, chest pain, confusion, diaphoresis, fatigue, fever, headaches, nausea, neck pain, palpitations, shortness of breath, vertigo or vomiting. Past treatments include bed rest, sleep and aspirin. The treatment provided no relief. His past medical history is significant for a CVA and dementia. There is no history of a bleeding disorder, a clotting disorder, head trauma, liver disease, mood changes or seizures. Cerebrovascular Accident   This is a new problem. Episode onset: RECURRENT    LEFT  MCA   AND   RIGHT  PCA    EMBOLIC  STROKES  IN  JAN. 2020. The problem has been gradually improving. Pertinent negatives include no abdominal pain, anorexia, arthralgias, change in bowel habit, chest pain, chills, congestion, coughing, diaphoresis, fatigue, fever, headaches, joint swelling, myalgias, nausea, neck pain, numbness, rash, sore throat, swollen glands, urinary symptoms, vertigo, visual change, vomiting or weakness. Nothing aggravates the symptoms. Treatments tried: ASA,  PRADAXA   The treatment provided moderate relief.              History obtained from  The patient    AND      HIS  WIFE and other  available   medical  records   were  Also  reviewed. The  Duration,  Quality,  Severity,  Location,  Timing,  Context,  Modifying  Factors   Of   The   Chief   Complaint   And  Present  Illness       Was   Reviewed   In   Chronological   [facility].                                             PATIENT'S  MAIN  CONCERNS INCLUDE :                     1)     H/O    RECURRENCE     EMBOLIC   STROKES  IN     JAN. 2020                                   AND  HOSPITALIZATION     IN     [place]                                         -  PATIENT  ALSO  RECEIVED  tPA                                                                2)          MRI  BRAIN    ON    1/11/2020      SHOWED                           A)    MULTIPLE     STROKES  IN  LEFT  FRONTAL    AND  PARIETAL  LOBES                          B)        CHRONIC  CEREBRAL  ISCHEMIA                          3)    MRI    BRAIN   ON     1/16/ 2020        SHOWED                                ADDITIONAL      NEW     RIGHT  PCA  STROKE                                     AND  MANAGEMENT   OF   THE  SAME                                               3)      CTA  BRAIN    JAN. 2020     SHOWED                              A)       H/O    LEFT   MCA   OCCULUSION                              B)       RIGHT  PCA  OCCULUSION                               C)   BILATERAL  CAROTID  STENOSIS      50  %                                     -   NEEDS  MONITORING                        4)        CHRONIC   ATRIAL   FIBRILLATION                                   -    PATIENT  TO  FOLLOW  WITH  CARDIOLOGY                      5)        PATIENT      DISCHARGED  ON     ASA    81  MG       DAILY                                    AND  PRADAXA                            6)     HIGH  RISK  FOR  TIA /   STROKE                      7)      PATIENT  RECOVERED   WELL   FROM    HIS   RECENT                           RECURRENT  STROKES                       8)     POST  STROKE RESIDUAL      SYMPTOMS    INCLUDE                           A)    GAIT  DIFFICULTY                           B)  BALANCE  PROBLEMS                                -   PATIENT  USING  WALKER                              -   GETTING  THERAPY                            C)    RESIDUAL   LEFT  VISUAL  FIELD  DEFECTS                          D)    MEMORY  PROBLEMS                                        -   NEEDS  MONITORING                     9)      HIGH  RISK  FOR       FALLS                10)     MULTIPLE  CO  MORBID  MEDICAL  CONDITIONS                             -   TO  FOLLOW  WITH  HIS PCP                             11)      VARIOUS  RISK   FACTORS   WERE  REVIEWED   AND   DISCUSSED. PATIENT   HAS  MULTIPLE   MEDICAL, NEUROLOGICAL                      PROBLEMS . PATIENT'S   MANAGEMENT  IS  CHALLENGING.                12)                         IN  VIEW  OF  THE  PATIENT'S    MULTIPLE   NEUROLOGICAL                           SYMPTOMS  AND  CONCERNS    FOR  PROLONGED   DURATION,                           AND    MULTIPLE   CO MORBID  MEDICAL  CONDITIONS,                           PATIENT    NEEDS     FOLLOW  UP   NEURO  DIAGNOSTIC  EVALUATIONS                     AS  CLINICALLY      INDICATED.                                         PRECIPITATING  FACTORS: including  fever/infection, exertion/relaxation, position change, stress, weather change,                        medications/alcohol, time of day/darkness/light  Are  absent                                                              MODIFYING  FACTORS:  fever/infection, exertion/relaxation, position change, stress, weather change,                        medications/alcohol, time of day/darkness/light  Are  absent             Patient   Indicates   The  Presence   And  The  Absence  Of  The  Following    Associated  And   Additional  Neurological    Symptoms:                                Balance  And coordination   problems present           Gait problems     present            Headaches      absent              Migraines           absent           Memory problemspresent              Confusion        absent            Paresthesia   numbness          absent           Seizures  And  Starring  Episodes           absent           Syncope,  Near  syncopal episodes         absent           Speech   problems           absent             Swallowing   Problems      absent            Dizziness,  Light headedness           present              Vertigo        absent             Generalized   Weakness    absent              focal  Weakness     absent             Tremors         absent              Sleep  Problems     absent             History  Of   Recent  Head  Injury     absent             History  Of   Recent  TIA     absent             History  Of   Recent    Stroke     present             Neck  Pain   and   Neck muscle  Spasms  absent               Radiating  down   And   Weakness           absent            Lower back   Pain  And     Spasms  absent              Radiating    Down   And   Weakness          absent                H/OFALLS        absent               History  Of   Visual  Symptoms    absent                  Associated   Diplopia       absent                                               Also   Additional   Symptoms   Present    As  Documented    In   The   detailed                  Review  Of  Systems   And    Please   Refer   To    Them for   Additional    Information. Any components  That are either  Unobtainable  Or  Limited  In   HPI, ROS  And/or PFSH   Are                   Due   ToPatient's  Medical  Problems,  Clinical  Condition   and/or lack of                                other    Alternate   resources.           RECORDS   REVIEWED:    historical medical records       INFORMATION   REVIEWED:     MEDICAL   HISTORY,SURGICAL   HISTORY,     MEDICATIONS   LIST,   ALLERGIES AND  DRUG  INTOLERANCES, Use    Smoking status: Never Smoker    Smokeless tobacco: Never Used   Substance and Sexual Activity    Alcohol use:  Yes     Alcohol/week: 0.0 standard drinks     Comment: occas    Drug use: No    Sexual activity: Yes   Lifestyle    Physical activity:     Days per week: Not on file     Minutes per session: Not on file    Stress: Not on file   Relationships    Social connections:     Talks on phone: Not on file     Gets together: Not on file     Attends Hinduism service: Not on file     Active member of club or organization: Not on file     Attends meetings of clubs or organizations: Not on file     Relationship status: Not on file    Intimate partner violence:     Fear of current or ex partner: Not on file     Emotionally abused: Not on file     Physically abused: Not on file     Forced sexual activity: Not on file   Other Topics Concern    Not on file   Social History Narrative    Not on file       Vitals:    02/13/20 0945   BP: 120/64   Pulse: 80   SpO2: 98%         Wt Readings from Last 3 Encounters:   02/13/20 197 lb 6.4 oz (89.5 kg)   02/07/20 201 lb (91.2 kg)   01/24/20 201 lb (91.2 kg)         BP Readings from Last 3 Encounters:   02/13/20 120/64   02/07/20 113/72   01/24/20 136/88       Hematology and Coagulation  Lab Results   Component Value Date    WBC 7.0 01/16/2020    RBC 5.06 01/16/2020    HGB 14.7 01/16/2020    HCT 44.7 01/16/2020    MCV 88.3 01/16/2020    MCH 29.1 01/16/2020    MCHC 32.9 01/16/2020    RDW 13.2 01/16/2020     01/20/2020    MPV 9.9 01/16/2020         Chemistries  Lab Results   Component Value Date     01/16/2020    K 4.5 01/16/2020     01/16/2020    CO2 24 01/16/2020    BUN 17 01/16/2020    CREATININE CANNOT BE CALCULATED 01/16/2020    CREATININE 0.94 01/16/2020    CALCIUM 9.5 01/16/2020    PROT 7.2 01/10/2020    LABALBU 4.1 01/10/2020    BILITOT 0.44 01/10/2020    ALKPHOS 79 01/10/2020    AST 29 01/10/2020    ALT 25 01/10/2020     Lab Results   Component facial asymmetry, speech difficulty, weakness, numbness and headaches. Hematological: Negative for adenopathy. Does not bruise/bleed easily. Psychiatric/Behavioral: Positive for decreased concentration and memory loss. Negative for agitation, behavioral problems, confusion, dysphoric mood, hallucinations, self-injury, sleep disturbance and suicidal ideas. The patient is not nervous/anxious and is not hyperactive. OBJECTIVE:    Physical Exam  Constitutional:       Appearance: He is well-developed. HENT:      Head: Normocephalic and atraumatic. No raccoon eyes or Monte's sign. Right Ear: External ear normal.      Left Ear: External ear normal.      Nose: Nose normal.   Eyes:      Conjunctiva/sclera: Conjunctivae normal.      Pupils: Pupils are equal, round, and reactive to light. Neck:      Musculoskeletal: Normal range of motion and neck supple. Normal range of motion. No neck rigidity or muscular tenderness. Thyroid: No thyroid mass or thyromegaly. Vascular: No carotid bruit. Trachea: No tracheal deviation. Meningeal: Brudzinski's sign and Kernig's sign absent. Cardiovascular:      Rate and Rhythm: Normal rate and regular rhythm. Pulmonary:      Effort: Pulmonary effort is normal.   Musculoskeletal:         General: No tenderness. Skin:     General: Skin is warm. Coloration: Skin is not pale. Findings: No erythema or rash. Nails: There is no clubbing. Psychiatric:         Attention and Perception: He is attentive. Mood and Affect: Mood is not anxious or depressed. Affect is not labile, blunt or inappropriate. Speech: He is communicative. Speech is not rapid and pressured, delayed, slurred or tangential.         Behavior: Behavior is not agitated, slowed, aggressive, withdrawn, hyperactive or combative. Behavior is cooperative. Thought Content:  Thought content is not paranoid or delusional. Thought content does not include No rigidity. No  Spasticity. Bradykinesia   absent               No  Asterixis. Sustention  Tremor , Resting   Tremor   absent                    No   other  Abnormal  Movements noted           D) SENSORY :               Light   touch, pinprick,   position  And  Vibration DECREASED       E) REFLEXES:                   Deep  Tendon  Reflexes   DECREASED                   No  pathological  Reflexes  Bilaterally.                                   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    Combined forms of age-related cataract of both eyes    Acute cerebrovascular accident (CVA) ([address]    Cerebrovascular accident (CVA) ([address]    History of atrial fibrillation    Tissue plasminogen activator (tPA) administered at other facility within 24 hours before current admission    Nihss score 10    Stroke determined by clinical assessment ([address]    Atrial fibrillation ([address]    Acute ischemic stroke ([address]    History of ischemic stroke in prior three months    Left homonymous hemianopsia    Stroke ([address]    PTSD (post-traumatic stress disorder)    Ischemic stroke ([address]    Hyperlipidemia    H/O ischemic left MCA stroke    Bilateral carotid artery stenosis    Chronic cerebral ischemia    Memory problem    Gait difficulty    Balance problem    At high risk for stroke    Risk for falls    Cerebrovascular accident (CVA) due to embolism of right posterior cerebral artery ([address]             MRI OF THE BRAIN WITHOUT CONTRAST  1/16/2020 7:13 pm       TECHNIQUE:   Multiplanar multisequence MRI of the brain was performed without the   administration of intravenous contrast.       COMPARISON:   CT head neck 01/16/2020, MRI brain performed 01/11/2020       HISTORY:   ORDERING SYSTEM PROVIDED HISTORY: Dysarthria, CTA with R P2 occlusion   TECHNOLOGIST PROVIDED HISTORY:   Dysarthria, CTA with R P2 occlusion       FINDINGS:   INTRACRANIAL STRUCTURES/VENTRICLES: Redemonstration of the evolving acute   strokes identified involving left MCA distribution involving the left   posterior insular region.  Multifocal areas of left MCA infarct identified   left frontal lobe cortex left parietal cortex and scattered foci involving   subcortical white matter left parietal lobe which have mildly progressed   since the previous examination with a few new foci of infarct.  Focus of   restricted diffusion identified involving right posterior frontal centrum   semiovale minimally progressed when compared to previous examination.  New   predominant cortical based restricted diffusion identified involving the   right PCA distribution.  Many of these infarcts demonstrate mild T2   prolongation.       No hemorrhagic conversion identified.  Mild generalized involutional changes   with prominence of ventricles and sulci.  Mild periventricular subcortical T2   prolongation suggestive chronic small vessel ischemic disease.       No shift of midline structures.  Basal cisterns are patent.       ORBITS: The visualized portion of the orbits demonstrate no acute abnormality.       SINUSES: Mild ethmoid sinus mucosal thickening.       BONES/SOFT TISSUES: The bone marrow signal intensity appears normal. The soft   tissues demonstrate no acute abnormality.           Impression   New right PCA distribution infarct.  No hemorrhagic conversion.       Multifocal areas of evolving left MCA distribution infarct, there are few   scattered new foci of restricted diffusion identified suggestion of mild   progression of the left MCA infarct.       Mild chronic small ischemic disease and age related involutional change.             CT OF THE HEAD WITHOUT CONTRAST  1/16/2020 11:55 am       TECHNIQUE:   CT of the head was performed without the administration of intravenous   contrast. Dose modulation, iterative reconstruction, and/or weight based   adjustment of the mA/kV was utilized to reduce the radiation dose to as low   as reasonably achievable.       COMPARISON:   Head CT 01/12/2020, MRI brain 01/11/2020, head CT 01/10/2020       HISTORY:   ORDERING SYSTEM PROVIDED HISTORY: vision loss   TECHNOLOGIST PROVIDED HISTORY:   vision loss       Reason for Exam: 1/10/2020 patient came to ER with stroke like symptoms, he   was sent to [facility] for thrombectomy today Patient has vision blurring and   dizziness       FINDINGS:   BRAIN/VENTRICLES: Continued evolution of subacute infarction in the left   frontal lobe and left insular cortex with loss of gray-white matter   differentiation.  There is small area of low attenuation within left parietal   lobe subcortical white matter.  No associated hemorrhage.       Diffuse parenchymal volume loss with enlargement of the ventricles and   cerebral sulci.  No abnormal extra-axial fluid collection.  No hydrocephalus. Mild periventricular white matter low attenuation compatible with chronic   microvascular ischemic changes.       ORBITS: The visualized portion of the orbits demonstrate no acute abnormality.       SINUSES: The visualized paranasal sinuses and mastoid air cells demonstrate   no acute abnormality.       SOFT TISSUES/SKULL: No acute abnormality of the visualized skull or soft   tissues.           Impression   1. Continued evolution of subacute infarctions in the left MCA territory   involving the left lateral frontal lobe and insular cortex as well as left   parietal lobe subcortical white matter.  No hemorrhagic transformation. 2. Diffuse parenchymal volume loss.  Mild chronic microvascular ischemic   changes.    Findings were posterior cerebral arteries.  Left P1   segment is hypoplastic with fetal type origin of the left posterior cerebral   artery.  No aneurysm.       OTHER: No dural venous sinus thrombosis on this non-dedicated study.       BRAIN: No mass effect or midline shift. No extra-axial fluid collection. Gray-white differentiation is maintained.           Impression   Mild, less than 50%, stenosis of the internal carotid arteries by NASCET   criteria.       Occlusion of a left middle cerebral artery proximal to mid M2 branch, at the   level of the sylvian fissure.              CTA OF THE HEAD AND NECK WITH CONTRAST 1/16/2020 1:42 pm:       TECHNIQUE:   CTA of the head and neck was performed with the administration of intravenous   contrast. Multiplanar reformatted images are provided for review.  MIP images   are provided for review. Stenosis of the internal carotid arteries measured   using NASCET criteria.  Dose modulation, iterative reconstruction, and/or   weight based adjustment of the mA/kV was utilized to reduce the radiation   dose to as low as reasonably achievable.       COMPARISON:   Head CT 01/16/2020, 01/12/2020.  CTA head 01/11/2020.       HISTORY:   ORDERING SYSTEM PROVIDED HISTORY: stroke   TECHNOLOGIST PROVIDED HISTORY:   stroke   Reason for Exam: vision disturbance today, recent 1/10/2020 had stoke like   symptoms was transported to [facility] to do a thrombectomy       FINDINGS:       CTA NECK:       AORTIC ARCH/ARCH VESSELS: No dissection or arterial injury.  No significant   stenosis of the brachiocephalic or subclavian arteries.       CAROTID ARTERIES: Mild noncalcified atherosclerotic plaque in the left common   carotid artery without significant stenosis.  The right common carotid artery   is without significant stenosis.  Calcified atherosclerotic plaque in the   carotid bulbs eccentrically resulting in less than 50% stenosis by NASCET   criteria.  No dissection.       VERTEBRAL ARTERIES: No dissection, INSUFFICIENCY ,         *   COGNITIVE  &   MEMORY PROBLEMS  AND  DEMENTIAS        *  GAIT  DIFFICULTIES  &   BALANCE PROBLMES   AND  FALL                VARIOUS  RISK   FACTORS   WERE  REVIEWED   AND   DISCUSSED. *  PATIENT   HAS  MULTIPLE   MEDICAL, NEUROLOGICAL                 PROBLEMS . PATIENT'S   MANAGEMENT  IS  CHALLENGING. PLAN:                         Rachelle Flaming  Of  The  Diagnoses,  The  Management & Treatment  Options            AND    Care  plan  Were          Reviewed and   Discussed   With  patient. * Goals  And  Expectations  Of  The  Therapy  Discussed   And  Reviewed. *   Benefits   And   Side  Effect  Profile  Of  Medication/s   Were   Discussed                * Need   For  Further   Follow up For  The  Various  Problems Were  discussed. * Results  Of  The  Previous  Diagnostic tests were reviewed and  discussed                 Medical  Decision  Making  Was  Made  Based on the   Complexity  Of  Above  Mentioned  Diagnoses,    Data reviewed             And    Risk  Of  Significant   Co morbidities and   complicating   Factors. Medical  Decision  Was   High     Complexity   Due   To  The  Patient's  Multiple  Symptoms,  Advancing   Disease,  Complex  Treatment  Regimen,  Multiple medications           and   Risk  Of   Side  Effects,  Difficulty  In  Medication  Management  And  Diagnostic  Challenges   In  View  Of  The  Associated   Co  Morbid  Conditions   And  Problems. * FALL   PRECAUTIONS. THESE  REVIEWED   AND  DISCUSSED    *  USE   WALKING  ASSISTANCE  DEVICES      /   WALKER          *    SUPERVISED   CARE    *   ABSOLUTELY   NO  DRIVING      *   BE  CAREFUL  WITH  ACTIVITIES           *   ADEQUATE   FLUIDINTAKE   AND  ELECTROLYTE  BALANCE             * KEEP  DAIRY  OF   THE  NEUROLOGICAL  SYMPTOMS        RECORDING THE    DURATION  AND  FREQUENCY.       *  AVOID    CONDITIONS  AND  FACTORS   THAT MAKE                  NEUROLOGICAL  SYMPTOMS  WORSE.              *TO  MAINTAIN  REGULAR  SLEEP  WAKE  CYCLES. *   TO  HAVE  ADEQUATE  REST  AND   SLEEP    HOURS.              *    AVOID  ANY USAGE OF    TOBACCO,              EXCESSIVE  ALCOHOL  AND   ILLEGAL   SUBSTANCES        *  CONTINUE   MEDICATIONS    PRESCRIBED      AS    RECOMMENDED       *   Compliance   With  Medications   And  Instructions        * CURRENTLY    TOLERATING  THE  PRESCRIBED   MEDICATIONS. WITHOUT  ANY  SIGNIFICANT  SIDE  EFFECTS   &  GETTING BENEFIT. *    Antiplatelet  therapy    As   Recommended  Was   Discussed      *    Prophylactic  Use   Of     Vitamin   B   Complex,  Folic  Acid,    Vitamin  B12    Multivitamin,       Calcium  With  magnesium  And  Vit D    Supplementations   Over  The  Counter  Discussed                   *  PATIENT  IS  ALSO   COUNSELED   TO  KEEP    ACTIVITIES:         A)   SIMPLE      B)  ORGANIZED      C)  WRITEDOWN                 *  EVALUATIONS  AND  FOLLOW UP:                * PHYSICAL  THERAPY   / OCCUPATIONAL THERAPY                                  *CARDIOLOGY              *   OPTHALMOLOGY                        IN  VIEW  OF  THE  PATIENT'S    MULTIPLE   NEUROLOGICAL                           SYMPTOMS  AND  CONCERNS    FOR  PROLONGED   DURATION,                           AND    MULTIPLE   CO MORBID  MEDICAL  CONDITIONS,                           PATIENT    NEEDS     FOLLOW  UP   NEURO  DIAGNOSTIC  EVALUATIONS                     AS  CLINICALLY      INDICATED.                                 Orders Placed This Encounter   Procedures    US Transcranial Doppler Complete   Viry Morgan MD, Cardiology, Kristian Dias MD, Internal Medicine, [place]                               *  PATIENT  TO  RETURN  THE  CLINIC  AFTER   ABOVE,                       [address] , INCLUDING  MEDICAL  MANAGEMENT,                        AS   CLINICALLY    INDICATED. *PATIENT   TO  FOLLOW  UP  WITH   PRIMARY  CARE         OTHER  CONSULTANTS  AS  BEFORE. *  Maintain   Healthy  Life Style    With   Periodic  Monitoring  Of      Any  Medical  Conditions  Including   Elevated  Blood  Pressure,  Lipid  Profile,     Blood  Sugar levels  AndHeart  Disease. *   Period   Screening  For  Cancers  Involving  Breast,  Colon,    lungs  And  Other  Organs  As  Applicable,  In consultation   With  Your  Primary Care Providers. *Second  Neurological  Opinion  And  Evaluations  In  [facility]  Setting  If  Patient  Is  Interested. * Please   Contact   Neurology  Clinic   Early   If   Are  Any  New  Neurological   And  Any neurological  Concerns. *  If  The  Patient remains  Neurologically  Stable   Return   To  [facility] Neurology Department   IN     1-2      MONTHS  TIME   FOR  FURTHER              FOLLOW UP.                       *   The  Neurological   Findings,  Possible  Diagnosis,  Differential diagnoses                    And  Options  For    Further   Investigations                   And  management   Are  Discussed  Comprehensively. Medications   And  Prescription   Risks  And  Side effects  Are   Also  Discussed. *  If   There is  Any  Significant  Worsening   Of  Current  Symptoms  And  Or                  If patient  Develops   Any additional  New  NeurologicalSymptoms                  Or  Significant  Concerns   Should  Call  911 or  Go  To  Emergency  Department  For  Further  Immediate  Evaluation. The   Above  Were  Reviewed  With  patient   And   HIS  WIFE                          questions  Answered  In  Detail.                       More   Than  50% of face  To face Time Was  Spent  On  Counseling                    And   Coordination  Of  Care   Of   Patient's  multiple   Neurological  Problems                         And   Comorbid  Medical   Conditions. Electronically signed by Jackie Armas MD    Board Certified in  Neurology &  In  [facility] ([facility])      DISCLAIMER:   Although every effort was made to ensure the accuracy of this  electronictranscription, some errors in transcription may have occurred. GENERAL PATIENT INSTRUCTIONS:      A Healthy Lifestyle: Care Instructions   Your Care Instructions   A healthy lifestyle can help you feel good, stay at ahealthy weight, and have plenty of energy for both work and play. A healthy lifestyle is something you can share with your whole family.  A healthy lifestyle also can lower your risk for serious health problems, such ashigh blood pressure, heart disease, and diabetes.  You can follow a few steps listed below to improve your health and the health of your family.  Follow-up careis a key part of your treatment and safety. Be sure to make and go to all appointments, and call your doctor if you are having problems. Its also a good idea to know your test results and keep a list of the medicines you take.  How can you care for yourself at home?  Do not eat too much sugar, fat, or fast foods. You can still have dessert and treats nowand then. The goal is moderation.  Start small to improve your eating habits. Pay attention to portion sizes, drink less juice and soda pop, and eat more fruits and vegetables.  Eat a healthy amount of food. A 3-ounce serving of meat, for example, is about the size of a deck of cards. Fill the rest of your plate with vegetables and whole grains.  Limit theamount of soda and sports drinks you have every day. Drink more water when you are thirsty.  Eat at least 5 servings of fruits and vegetables every day.  It may seem like a lot, but it is not hard to reach this goal. Aserving or helping is 1 piece of fruit, 1 cup of vegetables, or 2 cups of leafy, raw vegetables. Have an apple or some carrot sticks as an afternoon snack instead of a candy bar. Try to have fruits and/or vegetables at everymeal.   Make exercise part of your daily routine. You may want to start with simple activities, such as walking, bicycling, or slow swimming. Try marietta active 30 to 60 minutes every day. You do not need to do all 30 to 60 minutes all at once. For example, you can exercise 3 times a day for 10 or 20 minutes. Moderate exercise is safe for most people, but it is always agood idea to talk to your doctor before starting an exercise program.   Keep moving. Noreene Screws the lawn, work in the garden, or [employer]). Take the stairs instead of the elevator at work.  If you smoke, quit. Peoplewho smoke have an increased risk for heart attack, stroke, cancer, and other lung illnesses. Quitting is hard, but there are ways to boost your chance of quitting tobacco for good.  Use nicotine gum, patches, or lozenges.  Ask your doctor about stop-smoking programs and medicines.  Keep trying.  In addition to reducing your risk of diseases in the future, you will notice some benefits soon after you stop using tobacco. If you have shortness of breath or asthma symptoms, they will likely getbetter within a few weeks after you quit.  Limit how much alcohol you drink. Moderate amounts of alcohol (up to 2 drinks a day for men, 1drink a day for women) are okay. But drinking too much can lead to liver problems, high blood pressure, and other health problems.  health   If you have a family, there are many things you can do together to improve your health.  Eat meals together as a family as often as possible.  Eat healthy foods. This includes fruits, vegetables, lean meats and dairy, and whole grains.  Include your family in your fitness plan.  Most peoplethink of activities such as jogging or tennis as the way to fitness, but there are many ways you and your family can be more active. Anything that makes you breathe hard and gets your heart pumping is exercise. Here are sometips:   Walk to do errands or to take your child to school or the bus.  Go for a family bike ride after dinner instead of watching TV.  Where can you learn more?  Go [email or web address] and sign in to your [employer] account. Enter W442 in the Search HealthInformation box to learn more about \"A Healthy Lifestyle: Care Instructions. \"     If you do not have anaccount, please click on the \"Sign Up Now\" link.  Current as of: July 26, 2016   Content Version: 11.2   © 6862-7484 [employer]. Care instructions adapted under license by [facility] ([facility]). If you have questions about a medical condition or this instruction, always ask your healthcare professional. [employer] disclaims any warranty or liability for your use of this information.

## 2020-02-13 NOTE — PATIENT INSTRUCTIONS
[facility] NEUROLOGY    Due to the complex nature of our neurological testing, It is the policy of the Neurology Department not to release the results of your testing over the phone. Once all testing is completed and we have all the results back, Dr. Gennaro Nance will then personally go over all the results with you and answer any questions that you may have during a follow up appointment. If you are unable to keep this appointment, please notify the [address] [place] [address] @ [phone], as soon as possible. Please bring your prescription bottles to all appointments. * FALL   PRECAUTIONS. *  USE   WALKING  ASSISTANCE  DEVICES     QUAD  CANE  /   WALKER    *      WEIGHT   LOSS. *   ADEQUATE   FLUID  INTAKE   AND  ELECTROLYTE  BALANCE           * KEEP  DAIRY  OF   THE  NEUROLOGICAL  SYMPTOMS          *  TO  MAINTAIN  REGULAR  SLEEP  WAKE  CYCLES. *   TO  HAVE  ADEQUATE  REST  AND   SLEEP    HOURS.        *    AVOID  USAGE OF   TOBACCO,  EXCESSIVE  ALCOHOL                AND   ILLEGAL   SUBSTANCES,  IF  ANY          *  Maintain   Healthy  Life Style    With   Periodic  Monitoring  Of      Any  Medical  Conditions  Including   Elevated  Blood  Pressure,  Lipid  Profile,     Blood  Sugar levels  And   Heart  Disease. *   Period   Screening  For  Cancers  Involving  Breast,  Colon,   lungs  And  Other  Organs  As  Applicable,  In consultation   With  Your  Primary Care Providers. *  If   There is  Any  Significant  Worsening   Of  Current  Symptoms  And  Or  If    Any additional  New  Neurological  Symptoms                 Or  Significant  Concerns   Should  Call  911 or  Go  To  Emergency  Department  For  Further  Immediate  Evaluation.

## 2020-02-21 ENCOUNTER — HOSPITAL ENCOUNTER (OUTPATIENT)
Dept: NEUROLOGY | Age: 70
Discharge: HOME | End: 2020-02-21
Payer: MEDICARE

## 2020-02-21 PROCEDURE — 93892 TCD EMBOLI DETECT W/O INJ: CPT

## 2020-02-21 PROCEDURE — 93886 INTRACRANIAL COMPLETE STUDY: CPT

## 2020-02-21 NOTE — PROGRESS NOTES
TCD Completed with [facility]. PCP: No primary care provider on file. Ordering: Ruiz Salter Neurologist    Interpreting Physician: Ruiz Lutz.  Ann Marie Salter    Electronically signed by Nichole Florentino RN on 2/21/2020 at 9:31 AM

## 2020-02-26 ENCOUNTER — OFFICE VISIT (OUTPATIENT)
Dept: CARDIOLOGY | Age: 70
End: 2020-02-26
Payer: MEDICARE

## 2020-02-26 ENCOUNTER — HOSPITAL ENCOUNTER (EMERGENCY)
Age: 70
Discharge: ANOTHER HOSPITAL | End: 2020-02-27
Attending: EMERGENCY MEDICINE
Payer: MEDICARE

## 2020-02-26 VITALS
SYSTOLIC BLOOD PRESSURE: 120 MMHG | DIASTOLIC BLOOD PRESSURE: 80 MMHG | HEART RATE: 103 BPM | HEIGHT: 67 IN | BODY MASS INDEX: 31.55 KG/M2 | WEIGHT: 201 LBS

## 2020-02-26 PROCEDURE — 93005 ELECTROCARDIOGRAM TRACING: CPT | Performed by: INTERNAL MEDICINE

## 2020-02-26 PROCEDURE — 99214 OFFICE O/P EST MOD 30 MIN: CPT

## 2020-02-26 PROCEDURE — 99285 EMERGENCY DEPT VISIT HI MDM: CPT

## 2020-02-26 PROCEDURE — 99214 OFFICE O/P EST MOD 30 MIN: CPT | Performed by: INTERNAL MEDICINE

## 2020-02-26 PROCEDURE — 93000 ELECTROCARDIOGRAM COMPLETE: CPT | Performed by: INTERNAL MEDICINE

## 2020-02-27 ENCOUNTER — APPOINTMENT (OUTPATIENT)
Dept: CT IMAGING | Age: 70
End: 2020-02-27
Payer: MEDICARE

## 2020-02-27 ENCOUNTER — HOSPITAL ENCOUNTER (INPATIENT)
Age: 70
LOS: 1 days | Discharge: HOME | DRG: 392 | End: 2020-02-27
Attending: FAMILY MEDICINE | Admitting: INTERNAL MEDICINE
Payer: MEDICARE

## 2020-02-27 VITALS
BODY MASS INDEX: 30.28 KG/M2 | TEMPERATURE: 97.7 F | HEIGHT: 67 IN | WEIGHT: 192.9 LBS | HEART RATE: 82 BPM | DIASTOLIC BLOOD PRESSURE: 80 MMHG | SYSTOLIC BLOOD PRESSURE: 120 MMHG | RESPIRATION RATE: 20 BRPM

## 2020-02-27 VITALS
TEMPERATURE: 98.6 F | HEART RATE: 73 BPM | DIASTOLIC BLOOD PRESSURE: 71 MMHG | OXYGEN SATURATION: 99 % | RESPIRATION RATE: 17 BRPM | SYSTOLIC BLOOD PRESSURE: 147 MMHG

## 2020-02-27 PROBLEM — R13.10 DYSPHAGIA: Status: ACTIVE | Noted: 2020-02-27

## 2020-02-27 LAB
ABSOLUTE EOS #: 0.14 K/UL (ref 0–0.44)
ABSOLUTE IMMATURE GRANULOCYTE: <0.03 K/UL (ref 0–0.3)
ABSOLUTE LYMPH #: 1.72 K/UL (ref 1.1–3.7)
ABSOLUTE MONO #: 0.48 K/UL (ref 0.1–1.2)
ALBUMIN SERPL-MCNC: 4.1 G/DL (ref 3.5–5.2)
ALBUMIN/GLOBULIN RATIO: 1.3 (ref 1–2.5)
ALP BLD-CCNC: 82 U/L (ref 40–129)
ALT SERPL-CCNC: 17 U/L (ref 5–41)
ANION GAP SERPL CALCULATED.3IONS-SCNC: 13 MMOL/L (ref 9–17)
AST SERPL-CCNC: 23 U/L
BASOPHILS # BLD: 0 % (ref 0–2)
BASOPHILS ABSOLUTE: <0.03 K/UL (ref 0–0.2)
BILIRUB SERPL-MCNC: 0.86 MG/DL (ref 0.3–1.2)
BUN BLDV-MCNC: 14 MG/DL (ref 8–23)
BUN/CREAT BLD: 17 (ref 9–20)
CALCIUM SERPL-MCNC: 9.1 MG/DL (ref 8.6–10.4)
CHLORIDE BLD-SCNC: 104 MMOL/L (ref 98–107)
CO2: 24 MMOL/L (ref 20–31)
CREAT SERPL-MCNC: 0.82 MG/DL (ref 0.7–1.2)
DIFFERENTIAL TYPE: ABNORMAL
EKG ATRIAL RATE: 69 BPM
EKG Q-T INTERVAL: 376 MS
EKG QRS DURATION: 72 MS
EKG QTC CALCULATION (BAZETT): 417 MS
EKG R AXIS: -13 DEGREES
EKG T AXIS: 79 DEGREES
EKG VENTRICULAR RATE: 74 BPM
EOSINOPHILS RELATIVE PERCENT: 2 % (ref 1–4)
GFR AFRICAN AMERICAN: >60 ML/MIN
GFR NON-AFRICAN AMERICAN: >60 ML/MIN
GFR SERPL CREATININE-BSD FRML MDRD: ABNORMAL ML/MIN/{1.73_M2}
GFR SERPL CREATININE-BSD FRML MDRD: ABNORMAL ML/MIN/{1.73_M2}
GLUCOSE BLD-MCNC: 114 MG/DL (ref 70–99)
HCT VFR BLD CALC: 40.9 % (ref 40.7–50.3)
HEMOGLOBIN: 14 G/DL (ref 13–17)
IMMATURE GRANULOCYTES: 0 %
LYMPHOCYTES # BLD: 29 % (ref 24–43)
MCH RBC QN AUTO: 29.4 PG (ref 25.2–33.5)
MCHC RBC AUTO-ENTMCNC: 34.2 G/DL (ref 25.2–33.5)
MCV RBC AUTO: 85.7 FL (ref 82.6–102.9)
MONOCYTES # BLD: 8 % (ref 3–12)
NRBC AUTOMATED: 0 PER 100 WBC
PDW BLD-RTO: 13.3 % (ref 11.8–14.4)
PLATELET # BLD: 159 K/UL (ref 138–453)
PLATELET ESTIMATE: ABNORMAL
PMV BLD AUTO: 9.6 FL (ref 8.1–13.5)
POTASSIUM SERPL-SCNC: 4.2 MMOL/L (ref 3.7–5.3)
RBC # BLD: 4.77 M/UL (ref 4.21–5.77)
RBC # BLD: ABNORMAL 10*6/UL
SEG NEUTROPHILS: 60 % (ref 36–65)
SEGMENTED NEUTROPHILS ABSOLUTE COUNT: 3.55 K/UL (ref 1.5–8.1)
SODIUM BLD-SCNC: 141 MMOL/L (ref 135–144)
TOTAL PROTEIN: 7.2 G/DL (ref 6.4–8.3)
TROPONIN INTERP: NORMAL
TROPONIN INTERP: NORMAL
TROPONIN T: NORMAL NG/ML
TROPONIN T: NORMAL NG/ML
TROPONIN, HIGH SENSITIVITY: 13 NG/L (ref 0–22)
TROPONIN, HIGH SENSITIVITY: 15 NG/L (ref 0–22)
WBC # BLD: 5.9 K/UL (ref 3.5–11.3)
WBC # BLD: ABNORMAL 10*3/UL

## 2020-02-27 PROCEDURE — 84484 ASSAY OF TROPONIN QUANT: CPT

## 2020-02-27 PROCEDURE — 2060000000 HC ICU INTERMEDIATE R&B

## 2020-02-27 PROCEDURE — 99222 1ST HOSP IP/OBS MODERATE 55: CPT

## 2020-02-27 PROCEDURE — 36415 COLL VENOUS BLD VENIPUNCTURE: CPT

## 2020-02-27 PROCEDURE — 97535 SELF CARE MNGMENT TRAINING: CPT

## 2020-02-27 PROCEDURE — 2580000003 HC RX 258: Performed by: NURSE PRACTITIONER

## 2020-02-27 PROCEDURE — G0378 HOSPITAL OBSERVATION PER HR: HCPCS

## 2020-02-27 PROCEDURE — 97530 THERAPEUTIC ACTIVITIES: CPT

## 2020-02-27 PROCEDURE — 97166 OT EVAL MOD COMPLEX 45 MIN: CPT

## 2020-02-27 PROCEDURE — 93005 ELECTROCARDIOGRAM TRACING: CPT | Performed by: EMERGENCY MEDICINE

## 2020-02-27 PROCEDURE — 96372 THER/PROPH/DIAG INJ SC/IM: CPT

## 2020-02-27 PROCEDURE — 6360000002 HC RX W HCPCS: Performed by: NURSE PRACTITIONER

## 2020-02-27 PROCEDURE — 6370000000 HC RX 637 (ALT 250 FOR IP): Performed by: NURSE PRACTITIONER

## 2020-02-27 PROCEDURE — 99235 HOSP IP/OBS SAME DATE MOD 70: CPT | Performed by: INTERNAL MEDICINE

## 2020-02-27 PROCEDURE — 85025 COMPLETE CBC W/AUTO DIFF WBC: CPT

## 2020-02-27 PROCEDURE — 92610 EVALUATE SWALLOWING FUNCTION: CPT

## 2020-02-27 PROCEDURE — 70450 CT HEAD/BRAIN W/O DYE: CPT

## 2020-02-27 PROCEDURE — 97162 PT EVAL MOD COMPLEX 30 MIN: CPT

## 2020-02-27 PROCEDURE — 93880 EXTRACRANIAL BILAT STUDY: CPT

## 2020-02-27 PROCEDURE — 70490 CT SOFT TISSUE NECK W/O DYE: CPT

## 2020-02-27 PROCEDURE — 80053 COMPREHEN METABOLIC PANEL: CPT

## 2020-02-27 RX ORDER — ONDANSETRON 2 MG/ML
4 INJECTION INTRAMUSCULAR; INTRAVENOUS EVERY 6 HOURS PRN
Status: DISCONTINUED | OUTPATIENT
Start: 2020-02-27 | End: 2020-02-27 | Stop reason: HOSPADM

## 2020-02-27 RX ORDER — PROMETHAZINE HYDROCHLORIDE 25 MG/1
12.5 TABLET ORAL EVERY 6 HOURS PRN
Status: DISCONTINUED | OUTPATIENT
Start: 2020-02-27 | End: 2020-02-27 | Stop reason: HOSPADM

## 2020-02-27 RX ORDER — ASPIRIN 300 MG/1
300 SUPPOSITORY RECTAL DAILY
Status: DISCONTINUED | OUTPATIENT
Start: 2020-02-27 | End: 2020-02-27 | Stop reason: HOSPADM

## 2020-02-27 RX ORDER — ASPIRIN 81 MG/1
81 TABLET ORAL DAILY
Status: DISCONTINUED | OUTPATIENT
Start: 2020-02-27 | End: 2020-02-27 | Stop reason: HOSPADM

## 2020-02-27 RX ORDER — SODIUM CHLORIDE 0.9 % (FLUSH) 0.9 %
10 SYRINGE (ML) INJECTION PRN
Status: DISCONTINUED | OUTPATIENT
Start: 2020-02-27 | End: 2020-02-27 | Stop reason: HOSPADM

## 2020-02-27 RX ORDER — ATORVASTATIN CALCIUM 40 MG/1
40 TABLET, FILM COATED ORAL NIGHTLY
Status: DISCONTINUED | OUTPATIENT
Start: 2020-02-27 | End: 2020-02-27 | Stop reason: HOSPADM

## 2020-02-27 RX ORDER — SODIUM CHLORIDE 0.9 % (FLUSH) 0.9 %
10 SYRINGE (ML) INJECTION EVERY 12 HOURS SCHEDULED
Status: DISCONTINUED | OUTPATIENT
Start: 2020-02-27 | End: 2020-02-27 | Stop reason: HOSPADM

## 2020-02-27 RX ADMIN — SODIUM CHLORIDE, PRESERVATIVE FREE 10 ML: 5 INJECTION INTRAVENOUS at 09:21

## 2020-02-27 RX ADMIN — ENOXAPARIN SODIUM 40 MG: 40 INJECTION SUBCUTANEOUS at 08:39

## 2020-02-27 RX ADMIN — ASPIRIN 300 MG: 300 SUPPOSITORY RECTAL at 08:39

## 2020-02-27 ASSESSMENT — ENCOUNTER SYMPTOMS
BLOOD IN STOOL: 0
NAUSEA: 0
EYE DISCHARGE: 0
VOICE CHANGE: 0
NAUSEA: 0
COLOR CHANGE: 0
ABDOMINAL PAIN: 0
VOMITING: 0
SHORTNESS OF BREATH: 0
SHORTNESS OF BREATH: 0
BACK PAIN: 0
VOMITING: 0
WHEEZING: 0
TROUBLE SWALLOWING: 1

## 2020-02-27 ASSESSMENT — PAIN DESCRIPTION - LOCATION
LOCATION: HEAD
LOCATION: HEAD

## 2020-02-27 ASSESSMENT — PAIN DESCRIPTION - PAIN TYPE: TYPE: ACUTE PAIN

## 2020-02-27 ASSESSMENT — PAIN SCALES - GENERAL
PAINLEVEL_OUTOF10: 2
PAINLEVEL_OUTOF10: 2

## 2020-02-27 NOTE — PROGRESS NOTES
Physical Therapy    Facility/Department: [facility] 4B STEPDOWN  Initial Assessment    NAME: Aaron Black  : 1950  MRN: [id number]    Date of Service: 2020    Discharge Recommendations:  Patient would benefit from continued therapy after discharge   Assessment   Body structures, Functions, Activity limitations: Decreased functional mobility ; Decreased endurance;Decreased balance;Decreased strength  Assessment: The pt ambulated 75 ft with a RW x min assist., He was somewhat compulsive during mobilization requiring verbal cues throughout  Prognosis: Good  Decision Making: Medium Complexity  PT Education: Goals;PT Role;Plan of Care  REQUIRES PT FOLLOW UP: Yes  Activity Tolerance  Activity Tolerance: Patient limited by fatigue     Patient Diagnosis(es): There were no encounter diagnoses. has a past medical history of Atrial fibrillation ([facility] [address] Chronic kidney disease, CKD (chronic kidney disease), Hyperlipidemia, Ischemic stroke ([facility] [address] PTSD (post-traumatic stress disorder), Research study patient, Stroke [facility]), and Stroke ([facility] [address] has a past surgical history that includes Appendectomy and transesophageal echocardiogram (2020).     Restrictions  Restrictions/Precautions  Restrictions/Precautions: Up as Tolerated  Vision/Hearing  Vision: Impaired  Vision Exceptions: Wears glasses at all times  Hearing: Within functional limits     Subjective  General  Patient assessed for rehabilitation services?: Yes  Response To Previous Treatment: Not applicable  Family / Caregiver Present: Yes(wife present)  Follows Commands: Within Functional Limits  Pain Screening  Patient Currently in Pain: Yes  Pain Assessment  Pain Assessment: 0-10  Pain Level: 2  Pain Location: Head  Vital Signs  Patient Currently in Pain: Yes    Orientation  Orientation  Overall Orientation Status: Within Functional Limits  Social/Functional History  Social/Functional History  Lives With: Spouse  Type of Home: House  Home Layout: One level  Home Score  AM-[facility] Inpatient Mobility Raw Score : 16 (02/27/20 1453)  AM-PAC Inpatient T-Scale Score : 40.78 (02/27/20 1453)  Mobility Inpatient CMS 0-100% Score: 54.16 (02/27/20 1453)  Mobility Inpatient CMS G-Code Modifier : CK (02/27/20 1453)       Goals  Short term goals  Time Frame for Short term goals: 10 visits  Short term goal 1: transfers with SBA  Short term goal 2: amb 250 ft with a RW x SBA  Short term goal 3: ascend/descend 4 steps x SBA  Short term goal 4: exercise program x SBA  Patient Goals   Patient goals : Return home       Therapy Time   Individual Concurrent Group Co-treatment   Time In 1350         Time Out 1413         Minutes 23             1 of [address] [facility] [place], PT

## 2020-02-27 NOTE — DISCHARGE SUMMARY
Tere Mesa MD  2/27/2020  2:55 PM      Thank you Dr. Valdovinos Croatian primary care provider on file. for the opportunity to be involved in this patient's care.

## 2020-02-27 NOTE — CONSULTS
mg, Rectal, Daily  atorvastatin (LIPITOR) tablet 40 mg, 40 mg, Oral, Nightly  enoxaparin (LOVENOX) injection 40 mg, 40 mg, Subcutaneous, Daily  magnesium hydroxide (MILK OF MAGNESIA) 400 MG/5ML suspension 30 mL, 30 mL, Oral, Daily PRN  ondansetron (ZOFRAN) injection 4 mg, 4 mg, Intravenous, Q6H PRN  promethazine (PHENERGAN) tablet 12.5 mg, 12.5 mg, Oral, Q6H PRN  sodium chloride flush 0.9 % injection 10 mL, 10 mL, Intravenous, 2 times per day  sodium chloride flush 0.9 % injection 10 mL, 10 mL, Intravenous, PRN  Allergies:  Patient has no known allergies. Social History:  TOBACCO:   reports that he has never smoked. He has never used smokeless tobacco.  ETOH:   reports current alcohol use. DRUGS:   reports no history of drug use. No family history on file. REVIEW OF SYSTEMS:  CONSTITUTIONAL:  negative  EYES:  positive for  glasses and glaucoma  HEENT:  negative  RESPIRATORY:  negative  CARDIOVASCULAR:  negative  GASTROINTESTINAL:  negative  GENITOURINARY:  negative  MUSCULOSKELETAL:  negative  NEUROLOGICAL:  positive for dizziness-described as room spinning   BEHAVIOR/PSYCH:  positive for PTSD    PHYSICAL EXAM:    Vitals:  /80   Pulse 82   Temp 97.7 °F (36.5 °C) (Oral)   Resp 20   Ht 5' 7\" (1.702 m)   Wt 192 lb 14.4 oz (87.5 kg)   BMI 30.21 kg/m²      General Appearance:  Well developed, well nourished.  No acute distress     Mental Status Exam:             Level of Alertness:   awake            Orientation:   person, place, time            Memory:   normal            Fund of Knowledge:  normal            Attention/Concentration:  normal            Language:  normal    Cranial Nerves        Cranial nerve II           Visual acuity:  normal           Visual fields:  normal      Cranial nerve III           Pupils:  equal, round, reactive to light      Cranial nerves III, IV, VI           Extraocular Movements: intact      Cranial nerve V           Facial sensation:  intact      Cranial nerve VII Facial strength: intact      Cranial nerve VIII           Hearing:  intact      Cranial nerve IX           Palate:  intact      Cranial nerve XI         Shoulder shrug:  intact      Cranial nerve XII          Tongue movement:  normal    Motor:    Drift:  absent  Motor exam is symmetrical 5 out of 5 all extremities bilaterally  Tone:  normal  Abnormal Movements:  absent            Sensory:        Pinprick             Right Upper Extremity:  normal             Left Upper Extremity:  normal             Right Lower Extremity:  normal             Left Lower Extremity:  normal            Touch              Right Upper Extremity:  normal              Left Upper Extremity:  normal              Right Lower Extremity:  normal              Left Lower Extremity:  normal         Coordination:           Finger/Nose   Right:  normal              Left:  normal          Heel-Knee-Shin                Right:  normal              Left:  normal          Rapid Alternating Movements              Right:  normal              Left:  normal          Gait:                       Casual:  normal  Reflexes:             Deep Tendon Reflexes:    Right Bicep:  2+  Left Bicep:  2+  Right Brachioradialis:  2+  Left Brachioradialis:  2+  Right Knee:  2+  Left Knee:  2+  Right Ankle:  2+  Left Ankle:  2+           Plantar response:                Right:  downgoing               Left:  downgoing    DATA  Lab Results:   CBC:   Recent Labs     02/27/20  0022   WBC 5.9   HGB 14.0        BMP:    Recent Labs     02/27/20  0022      K 4.2      CO2 24   BUN 14   CREATININE 0.82   GLUCOSE 114*         Lab Results   Component Value Date    CHOL 137 01/11/2020    LDLCHOLESTEROL 75 01/11/2020    HDL 38 (L) 01/11/2020    TRIG 118 01/11/2020    ALT 17 02/27/2020    AST 23 02/27/2020    INR 1.1 01/16/2020    LABA1C 5.0 01/11/2020       No results found for: PHENYTOIN, PHENYTOIN, VALPROATE, CBMZ     IMAGING:  CT Head w/o Contrast 2/27/2020:  Generalized atrophy, and encephalomalacial changes within the right temporal occipital region, and posterior left frontal lobe, related to the  recent infarcts.  No acute intraparenchymal hemorrhage, intra-axial mass, or acute territorial infarct is present. CT Soft Tissue Neck w/o Contrast 2/27/2020:  No acute abnormality of the soft tissue structures of the neck.   No foreign body visualized. IMPRESSION/RECOMMENDATIONS:   This is a 78year old male with past medical history significant for CVAs and a.fib on pradaxa. He presented to the ED following an episode of dry mouth and dysphagia following eating a jalapeno popper. Concern for stroke is very low. CT shows no acute infarcts. Patient has no focal neurological deficits. This patient is okay to be discharged from a neurology perspective.      Claudette Levine  2/27/2020

## 2020-02-27 NOTE — PROGRESS NOTES
Occupational Therapy   Occupational Therapy Initial Assessment  Date: 2020   Patient Name: Jose Maria Barajas  MRN: [id number]     : 1950    Copied from H&P:  Jose Maria Barajas is a 78 y.o. male who presents today to the emergency department for evaluation of new onset difficulty swallowing. The patient states that around 4:00 he started to notice the problem. He tried to drink some soda at that time. It did not have much other symptoms. Subsequently he did eat dinner at [email or web address]. He continues to have difficulty swallowing. He did not have any associated vomiting with this. He was able to eat jalapeno poppers as well as a hot dog. He denies any shortness of breath. He is able to handle secretions and is not drooling or having to spit. No history of similar. He has not had any fevers. No history of angioedema. Patient is known to me from previous emergency department visit. Significant for history of new onset atrial fibrillation and subsequent left middle cerebral artery occlusion. Patient underwent TPA therapy and subsequent mechanical thrombectomy with TICI 2c revascularization. Subsequent MRI showed multiple areas of stroke in the left posterior middle cerebral artery territory. Patient was subsequently discharged home. Patient returned to the emergency department on 2020 with new onset vision changes and was subsequently diagnosed with a right-sided PCA stroke. Patient had previously been on Eliquis. Patient was switched to Pradaxa during that hospitalization. He is taking 150 mg twice daily he has not had any doses. He also takes aspirin 81 mg daily. Date of Service: 2020    Discharge Recommendations:  Patient would benefit from continued therapy after discharge       Assessment   Performance deficits / Impairments: Decreased functional mobility ; Decreased ADL status; Decreased endurance  Assessment: Pt would benefit from continued acute care and post acute care OT to address minimal deficits in ADL/ functional activities, endurance and functional transfers/ mobility following admission. Pt required Vcs for safety and to decrease speed throughout session. Prognosis: Good  Decision Making: Medium Complexity  OT Education: OT Role;Plan of Care  REQUIRES OT FOLLOW UP: Yes  Activity Tolerance  Activity Tolerance: Patient Tolerated treatment well  Safety Devices  Safety Devices in place: Yes  Type of devices: Left in bed;Call light within reach;Nurse notified;Gait belt;Patient at risk for falls  Restraints  Initially in place: No         Patient Diagnosis(es): There were no encounter diagnoses. has a past medical history of Atrial fibrillation ([facility] [address] Chronic kidney disease, CKD (chronic kidney disease), Hyperlipidemia, Ischemic stroke ([facility] PTSD (post-traumatic stress disorder), Research study patient, Stroke [facility]), and Stroke ([facility] has a past surgical history that includes Appendectomy and transesophageal echocardiogram (01/17/2020). Restrictions  Restrictions/Precautions  Restrictions/Precautions: Up as Tolerated    Subjective   General  Patient assessed for rehabilitation services?: Yes  Family / Caregiver Present: Yes(wife)  Patient Currently in Pain: Yes  Pain Assessment  Pain Assessment: 0-10  Pain Level: 2  Pain Type: Acute pain  Pain Location: Head  Vital Signs  Patient Currently in Pain: Yes     Social/Functional History  Social/Functional History  Lives With: Spouse  Type of Home: House  Home Layout: One level  Home Access: Stairs to enter without rails  Entrance Stairs - Number of Steps: 1 ZOE  Bathroom Shower/Tub: Walk-in shower  Bathroom Toilet: Standard  Bathroom Equipment: Grab bars in shower, Built-in shower seat(grab bar needs placed in shower )  Home Equipment: 4 wheeled walker, Quad cane(rollator walker )  Ambulation Assistance: Independent(Pt reports cane in home, occassionally no device.  Reports use of rollator in community )  Transfer Assistance:

## 2020-02-27 NOTE — H&P
[facility]    HISTORY AND PHYSICAL EXAMINATION            Date:   2/27/2020  Patient name:  Shelby Grayson  Date of admission:  2/27/2020  5:29 AM  MRN:   [id number]  Account:  [de-identified]  YOB: 1950  PCP:    No primary care provider on file. Room:   [address] [place] [address]  Code Status:    Full Code    Chief Complaint:     Difficulty swallowing     History Obtained From:     patient, spouse    History of Present Illness:   Shelby Grayson is a 78 y.o. male who presents today to the emergency department for evaluation of new onset difficulty swallowing. Problem started around 4 pm after dinner at [employer] which include jalapeno poppers as well as a hot dog. He denies choking. He was able to handle secretions. He denies any arm or leg weakness or slurring of speech. He has history of CVA in the past and denies any similar symptoms. He denies history of angioedema. Patient is on Pradaxa and report adherence. He also takes aspirin 81 mg daily. Symptom resolved on arrival to the ED. He passed swallow study. He is able to eat with no problem. He has no new complain during my evaluation. Past Medical History:     Past Medical History:   Diagnosis Date    Atrial fibrillation ([address] 01/2020    Chronic kidney disease     CKD (chronic kidney disease)     Hyperlipidemia     Ischemic stroke ([address] 01/10/2020    PTSD (post-traumatic stress disorder)     Research study patient 01/11/2020    Study = [place]    Stroke [facility]) 01/16/2020    MRI Brain WO: New right PCA distribution infarct, multifocal left MCA distribution infarcts    Stroke ([facility] 01/11/2020    large vessel occlusion/left M2 occlusion.   He subsequently underwent emergent thrombectomy.  /  S/P TPA DONE        Past Surgical History:     Past Surgical History:   Procedure Laterality Date    APPENDECTOMY      TRANSESOPHAGEAL ECHOCARDIOGRAM  01/17/2020        Medications Prior to Admission:     Prior to Admission medications    Medication Sig Start Date End Date Taking? Authorizing Provider   Cholecalciferol (VITAMIN D3) 25 MCG (1000 UT) CAPS Take 2,000 Units by mouth daily    Historical Provider, MD   dabigatran (PRADAXA) 150 MG capsule Take 1 capsule by mouth 2 times daily 1/20/20   Sonia Valdez MD   aspirin 81 MG EC tablet Take 1 tablet by mouth daily 1/21/20   Sonia Valdez MD   Omega-3 Fatty Acids (FISH OIL) 1000 MG CAPS Take 1,000 mg by mouth 2 times daily    Historical Provider, MD   atorvastatin (LIPITOR) 40 MG tablet Take 1 tablet by mouth nightly 1/13/20   Sonia Valdez MD   VITAMIN B1-B12 IM Inject into the muscle every 30 days     Historical Provider, MD        Allergies:     Patient has no known allergies. Social History:     Tobacco:    reports that he has never smoked. He has never used smokeless tobacco.  Alcohol:      reports current alcohol use. Drug Use:  reports no history of drug use. Family History:     No family history on file. Review of Systems:     Positive and Negative as described in HPI. Review of Systems   Constitutional: Negative for appetite change and fever. HENT: Negative for congestion and ear discharge. Eyes: Negative for discharge and visual disturbance. Respiratory: Negative for shortness of breath and wheezing. Cardiovascular: Negative for chest pain, palpitations and leg swelling. Gastrointestinal: Negative for abdominal pain, blood in stool, nausea and vomiting. Endocrine: Negative for cold intolerance and heat intolerance. Genitourinary: Negative for dysuria and frequency. Musculoskeletal: Negative for arthralgias, back pain and joint swelling. Skin: Negative for color change and rash. Neurological: Negative for dizziness, tremors, seizures and light-headedness. Psychiatric/Behavioral: Negative for agitation and confusion.        Physical Exam:   /80   Pulse 82   Temp 97.7 °F (36.5 °C) (Oral) Resp 20   Ht 5' 7\" (1.702 m)   Wt 192 lb 14.4 oz (87.5 kg)   BMI 30.21 kg/m²   Temp (24hrs), Av.2 °F (36.8 °C), Min:97.7 °F (36.5 °C), Max:98.6 °F (37 °C)    No results for input(s): POCGLU in the last 72 hours. No intake or output data in the 24 hours ending 20 1441    Physical Exam  Vitals signs reviewed. Constitutional:       General: He is not in acute distress. Appearance: Normal appearance. He is not ill-appearing or diaphoretic. HENT:      Head: Normocephalic and atraumatic. Nose: No congestion or rhinorrhea. Eyes:      Extraocular Movements: Extraocular movements intact. Conjunctiva/sclera: Conjunctivae normal.      Pupils: Pupils are equal, round, and reactive to light. Neck:      Musculoskeletal: Normal range of motion and neck supple. Cardiovascular:      Rate and Rhythm: Normal rate and regular rhythm. Pulses: Normal pulses. Heart sounds: Normal heart sounds. No murmur. Pulmonary:      Effort: Pulmonary effort is normal.      Breath sounds: Normal breath sounds. No wheezing or rales. Abdominal:      General: Bowel sounds are normal. There is no distension. Palpations: Abdomen is soft. Tenderness: There is no abdominal tenderness. Musculoskeletal: Normal range of motion. General: No swelling or tenderness. Skin:     General: Skin is warm and dry. Findings: No rash. Neurological:      General: No focal deficit present. Mental Status: He is alert and oriented to person, place, and time. Psychiatric:         Mood and Affect: Mood normal.         Thought Content:  Thought content normal.         Judgment: Judgment normal.         Investigations:      Laboratory Testing:  Recent Results (from the past 24 hour(s))   EKG 12 Lead    Collection Time: 20 12:16 AM   Result Value Ref Range    Ventricular Rate 74 BPM    Atrial Rate 69 BPM    QRS Duration 72 ms    Q-T Interval 376 ms    QTc Calculation (Bazett) 417 ms    R Axis

## 2020-02-27 NOTE — ED PROVIDER NOTES
[facility] ED  [address]  Phone: [phone]  eMERGENCY dEPARTMENT eNCOUnter      Pt Name: Didier Pelletier  MRN: [id number]  [place] 1950  Date of evaluation: 2/27/20      CHIEF COMPLAINT     Chief Complaint   Patient presents with    Dysphagia     pt ate jalepeno poppers at [employer] around 1600 and now having difficulty swallowing. Pt denies having any airway obstruction. HISTORY OF PRESENT ILLNESS    Didier Pelletier is a 78 y.o. male who presents today to the emergency department for evaluation of new onset difficulty swallowing. The patient states that around 4:00 he started to notice the problem. He tried to drink some soda at that time. It did not have much other symptoms. Subsequently he did eat dinner at [employer]. He continues to have difficulty swallowing. He did not have any associated vomiting with this. He was able to eat jalapeno poppers as well as a hot dog. He denies any shortness of breath. He is able to handle secretions and is not drooling or having to spit. No history of similar. He has not had any fevers. No history of angioedema. Patient is known to me from previous emergency department visit. Significant for history of new onset atrial fibrillation and subsequent left middle cerebral artery occlusion. Patient underwent TPA therapy and subsequent mechanical thrombectomy with TICI 2c revascularization. Subsequent MRI showed multiple areas of stroke in the left posterior middle cerebral artery territory. Patient was subsequently discharged home. Patient returned to the emergency department on 1/16/2020 with new onset vision changes and was subsequently diagnosed with a right-sided PCA stroke. Patient had previously been on Eliquis. Patient was switched to Pradaxa during that hospitalization. He is taking 150 mg twice daily he has not had any doses. He also takes aspirin 81 mg daily.     REVIEW OF SYSTEMS     Review of Systems Constitutional: Negative for fever. HENT: Positive for trouble swallowing. Negative for congestion, drooling and voice change. Respiratory: Negative for shortness of breath. Cardiovascular: Negative for chest pain. Gastrointestinal: Negative for nausea and vomiting. Neurological: Negative for syncope. Psychiatric/Behavioral: Negative for confusion. All other systems reviewed and are negative. PAST MEDICAL HISTORY    has a past medical history of Atrial fibrillation ([facility] Chronic kidney disease, CKD (chronic kidney disease), Hyperlipidemia, Ischemic stroke ([facility] PTSD (post-traumatic stress disorder), Research study patient, Stroke [facility]), and Stroke ([facility] SURGICAL HISTORY      has a past surgical history that includes Appendectomy and transesophageal echocardiogram (2020). CURRENT MEDICATIONS       Previous Medications    ASPIRIN 81 MG EC TABLET    Take 1 tablet by mouth daily    ATORVASTATIN (LIPITOR) 40 MG TABLET    Take 1 tablet by mouth nightly    CHOLECALCIFEROL (VITAMIN D3) 25 MCG (1000 UT) CAPS    Take 2,000 Units by mouth daily    DABIGATRAN (PRADAXA) 150 MG CAPSULE    Take 1 capsule by mouth 2 times daily    OMEGA-3 FATTY ACIDS (FISH OIL) 1000 MG CAPS    Take 1,000 mg by mouth 2 times daily    VITAMIN B1-B12 IM    Inject into the muscle every 30 days        ALLERGIES     has No Known Allergies. FAMILY HISTORY     He indicated that his mother is . He indicated that his father is . family history is not on file. SOCIAL HISTORY      reports that he has never smoked. He has never used smokeless tobacco. He reports current alcohol use. He reports that he does not use drugs. PHYSICAL EXAM     INITIAL VITALS:  tympanic temperature is 98.6 °F (37 °C). His blood pressure is 147/71 (abnormal) and his pulse is 73. His respiration is 17 and oxygen saturation is 99%. Physical Exam  Vitals signs reviewed.    Constitutional:       Appearance: Normal an EKG and basic labs. Patient will be monitored closely for oxygen saturation. CT of the brain did not show any acute findings. No evidence of airway compromise or swelling or mass or foreign body on the soft tissue neck survey. Patient has a normal white count as well. On clinical assessment I feel that infection is not likely likely the cause. Given patient's significant previous stroke history I discussed the case with the interventional neurologist at [facility] Dr. Dang Crabtree who agrees that this could be be a neurological presentation especially given his recent posterior circulation stroke. At this time he wants to continue the Pradaxa 150 mg twice daily as well as the aspirin. He did not request any further anticoagulants. Patient's blood pressure is well controlled at this time. He requested the patient can be admitted directly to the floor but that we discussed this with the hospitalist service. Also states if stroke causes ruled out GI could subsequently be consulted. I discussed the case with the hospitalist nurse practitioner Domo Mera, CNP was kind enough to accept the patient on behalf of Dr. Caro Matthews. Patient remained in stable condition while in the emergency department. I have reviewed the disposition diagnosis with the patient and or their family/guardian. I have answered their questions and givendischarge instructions. They voiced understanding of these instructions and did not have any further questions or complaints. DIAGNOSTIC RESULTS     EKG: All EKG's are interpreted by the Emergency Department Physician who either signs or Co-signs this chart inthe absence of a cardiologist.    Lead EKG shows atrial fibrillation with ventricular rate of 74 bpm.  Normal QRS and QTc. Normal axis. Underlying motion artifact noted in leads V3 and V4.   No acute ST elevations depressions or T wave inversions interpretation is rate controlled atrial fibrillation. RADIOLOGY:   I directly visualized the following plain film images and reviewed the radiologistinterpretations of radiologic studies:  Ct Head Wo Contrast    Result Date: 2/27/2020  EXAMINATION: CT OF THE HEAD WITHOUT CONTRAST  2/27/2020 12:49 am TECHNIQUE: CT of the head was performed without the administration of intravenous contrast. Dose modulation, iterative reconstruction, and/or weight based adjustment of the mA/kV was utilized to reduce the radiation dose to as low as reasonably achievable. COMPARISON: MR brain dated January 16 2020 HISTORY: ORDERING SYSTEM PROVIDED HISTORY: dysphagia, recent stroke TECHNOLOGIST PROVIDED HISTORY: dysphagia, recent stroke Reason for Exam: Trouble swallowing after eating dinner, hx of stroke Acuity: Acute Type of Exam: Initial FINDINGS: BRAIN/VENTRICLES: There is no acute intracranial hemorrhage, mass effect or midline shift. No abnormal extra-axial fluid collection. The gray-white differentiation is maintained without evidence of an acute infarct. There is no evidence of hydrocephalus. Encephalomalacial changes are seen involving the posterior left frontal lobe, and right temporal occipital lobe, related to the recent infarcts. Lacunar infarcts are seen within the right periventricular white matter region, related to the recent infarct. No new areas of acute infarct are demonstrated. Generalized atrophy and senescent white matter changes are present. ORBITS: The visualized portion of the orbits demonstrate no acute abnormality. SINUSES: The visualized paranasal sinuses and mastoid air cells demonstrate no acute abnormality. SOFT TISSUES/SKULL:  No acute abnormality of the visualized skull or soft tissues. 1. Generalized atrophy, and encephalomalacial changes within the right temporal occipital region, and posterior left frontal lobe, related to the recent infarcts.   No acute intraparenchymal hemorrhage, intra-axial mass, or acute territorial infarct hospital encounter of 02/26/20   CBC Auto Differential   Result Value Ref Range    WBC 5.9 3.5 - 11.3 k/uL    RBC 4.77 4.21 - 5.77 m/uL    Hemoglobin 14.0 13.0 - 17.0 g/dL    Hematocrit 40.9 40.7 - 50.3 %    MCV 85.7 82.6 - 102.9 fL    MCH 29.4 25.2 - 33.5 pg    MCHC 34.2 (H) 25.2 - 33.5 g/dL    RDW 13.3 11.8 - 14.4 %    Platelets 278 081 - 705 k/uL    MPV 9.6 8.1 - 13.5 fL    NRBC Automated 0.0 0.0 per 100 WBC    Differential Type NOT REPORTED     WBC Morphology NOT REPORTED     RBC Morphology NOT REPORTED     Platelet Estimate NOT REPORTED     Seg Neutrophils 60 36 - 65 %    Lymphocytes 29 24 - 43 %    Monocytes 8 3 - 12 %    Eosinophils % 2 1 - 4 %    Basophils 0 0 - 2 %    Immature Granulocytes 0 0 %    Segs Absolute 3.55 1.50 - 8.10 k/uL    Absolute Lymph # 1.72 1.10 - 3.70 k/uL    Absolute Mono # 0.48 0.10 - 1.20 k/uL    Absolute Eos # 0.14 0.00 - 0.44 k/uL    Basophils Absolute <0.03 0.00 - 0.20 k/uL    Absolute Immature Granulocyte <0.03 0.00 - 0.30 k/uL   Comprehensive Metabolic Panel   Result Value Ref Range    Glucose 114 (H) 70 - 99 mg/dL    BUN 14 8 - 23 mg/dL    CREATININE 0.82 0.70 - 1.20 mg/dL    Bun/Cre Ratio 17 9 - 20    Calcium 9.1 8.6 - 10.4 mg/dL    Sodium 141 135 - 144 mmol/L    Potassium 4.2 3.7 - 5.3 mmol/L    Chloride 104 98 - 107 mmol/L    CO2 24 20 - 31 mmol/L    Anion Gap 13 9 - 17 mmol/L    Alkaline Phosphatase 82 40 - 129 U/L    ALT 17 5 - 41 U/L    AST 23 <40 U/L    Total Bilirubin 0.86 0.3 - 1.2 mg/dL    Total Protein 7.2 6.4 - 8.3 g/dL    Alb 4.1 3.5 - 5.2 g/dL    Albumin/Globulin Ratio 1.3 1.0 - 2.5    GFR Non-African American >60 >60 mL/min    GFR African American >60 >60 mL/min    GFR Comment          GFR Staging NOT REPORTED    Troponin   Result Value Ref Range    Troponin, High Sensitivity 15 0 - 22 ng/L    Troponin T NOT REPORTED <0.03 ng/mL    Troponin Interp NOT REPORTED    EKG 12 Lead   Result Value Ref Range    Ventricular Rate 74 BPM    Atrial Rate 69 BPM    QRS Duration 72 ms    Q-T Interval 376 ms    QTc Calculation (Bazett) 417 ms    R Axis -13 degrees    T Axis 79 degrees       EMERGENCY DEPARTMENT COURSE:   Vitals:    Vitals:    02/27/20 0209 02/27/20 0239 02/27/20 0336 02/27/20 0338   BP:  (!) 161/87  (!) 147/71   Pulse: 68 62 73    Resp:   17    Temp:       TempSrc:       SpO2: 98% 97% 99%      -------------------------  BP: (!) 147/71, Temp: 98.6 °F (37 °C), Pulse: 73, Resp: 17      CONSULTS:  Interventional neurology/stroke team, hospitalist service    PROCEDURES:  None    FINAL IMPRESSION      1. Dysphagia, unspecified type          DISPOSITION/PLAN   DISPOSITION Decision To Transfer 02/27/2020 01:40:07 AM      PATIENT REFERRED TO:  No follow-up provider specified.     DISCHARGE MEDICATIONS:  New Prescriptions    No medications on file         (Please note that portions of this note were completed with avoice recognition program.  Efforts were made to edit the dictations but occasionally words are mis-transcribed.)    Magnus Rudd MD, [facility]  Attending Emergency Medicine Physician        Magnus Rudd MD  02/27/20 8252

## 2020-02-27 NOTE — PROGRESS NOTES
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; Combined forms of age-related cataract of both eyes; Acute cerebrovascular accident (CVA) ([address] Cerebrovascular accident (CVA) ([address] History of atrial fibrillation; Tissue plasminogen activator (tPA) administered at other facility within 24 hours before current admission; Nihss score 10; Stroke determined by clinical assessment ([facility] Atrial fibrillation ([facility] Acute ischemic stroke ([facility] History of ischemic stroke in prior three months; Left homonymous hemianopsia; Stroke [facility]); PTSD (post-traumatic stress disorder); Ischemic stroke ([facility] Hyperlipidemia; H/O ischemic left MCA stroke; Bilateral carotid artery stenosis; Chronic cerebral ischemia; Memory problem; Gait difficulty; Balance problem; At high risk for stroke; Risk for falls; Cerebrovascular accident (CVA) due to embolism of right posterior cerebral artery ([address] Bilateral hemianopia; and Dysphagia on their problem list.      Date of Eval: 2020  Evaluating Therapist: Tiana Marr    Current Diet level:  Current Diet : NPO  Current Liquid Diet : NPO      Primary Complaint Khushboo Stark is a 78 y.o. male who presents today to the emergency department for evaluation of new onset difficulty swallowing. The patient states that around 4:00 he started to notice the problem. He tried to drink some soda at that time. It did not have much other symptoms. Subsequently he did eat dinner at [employer]. He continues to have difficulty swallowing. He did not have any associated vomiting with this. He was able to eat jalapeno poppers as well as a hot dog. He denies any shortness of breath.   He is able to handle secretions and is not drooling or having to spit. No history of similar. He has not had any fevers. No history of angioedema. Patient is known to me from previous emergency department visit. Significant for history of new onset atrial fibrillation and subsequent left middle cerebral artery occlusion. Patient underwent TPA therapy and subsequent mechanical thrombectomy with TICI 2c revascularization. Subsequent MRI showed multiple areas of stroke in the left posterior middle cerebral artery territory. Patient was subsequently discharged home. Patient returned to the emergency department on 1/16/2020 with new onset vision changes and was subsequently diagnosed with a right-sided PCA stroke. Patient had previously been on Eliquis. Patient was switched to Pradaxa during that hospitalization. He is taking 150 mg twice daily he has not had any doses. He also takes aspirin 81 mg daily. Pain:  Pain Assessment  Pain Assessment: 0-10  Pain Level: 2  Pain Type: Acute pain  Pain Location: Head    Reason for Referral  Didier Pelletier was referred for a bedside swallow evaluation to assess the efficiency of his swallow function, identify signs and symptoms of aspiration and make recommendations regarding safe dietary consistencies, effective compensatory strategies, and safe eating environment. Impression  Pt. Presents with inconsistent latent dry cough with thin liquid. No s/s of aspiration with nectar thick liquid, puree, soft and regular solids. Pt. Is edentulous. Mild extended mastication noted with solids. Spoke with wife, pt. Has been noted to cough after only water at home. ST recommends regular with thin liquid diet. Monitor closely for s/s of aspiration. If they occur, d/c all PO and recommend MBSS to r/o/confirm aspiration. Results and recommendations reported to RN.             Treatment Plan  Requires SLP Intervention: Yes  Duration/Frequency of Treatment: 2-3 x week   D/C Therapy Time  SLP Individual Minutes  Time In: 5172  Time Out: 1330  Minutes: [address] Bl[address]imir Mustafa M.A.  [facility]-SLP  2/27/2020 2:14 PM

## 2020-02-28 ENCOUNTER — CARE COORDINATION (OUTPATIENT)
Dept: CASE MANAGEMENT | Age: 70
End: 2020-02-28

## 2020-02-28 PROCEDURE — 1111F DSCHRG MED/CURRENT MED MERGE: CPT | Performed by: NURSE PRACTITIONER

## 2020-02-28 NOTE — CARE COORDINATION
[facility] Transitions Initial Follow Up Call    Call within 2 business days of discharge: Yes    Patient: Khushboo Stark Patient : 1950   MRN: [id number]  Reason for Admission: Dysphagia  Discharge Date: 20 RARS: Readmission Risk Score: 13      Last Discharge [employer]       Complaint Diagnosis Description Type Department Provider    20   Admission (Discharged) KIRK 4B Thom Larsen MD           [facility] [place]: STADELAIDA    Was able to contact [facility] for initial transitional outreach. He stated he was doing \"not to bad\". He stated that he ate breakfast today and had no difficulties with swallowing. He denied shortness of breath or chest pain. When met with wife , she was voicing that she would like Tyler to got to out patient therapy since home therapy was completed. Told Tyler to talk with PCP on 3/3 about ordering out patient therapy. [facility]    Reviewed medications and he stated all medications in home. 1111F order completed. [facility] called and updated Kayy Meekslisa about admission and discharge. Re explained CTN role and he was receptive to further outreach. He is familiar with transitions. Contact information verified.     Non-face-to-face services provided:  Scheduled appointment with PCP-3/3  Scheduled appointment with Specialist-3/30 neuro  Obtained and reviewed discharge summary and/or continuity of care documents  Assessment and support for treatment adherence and medication management-reviewed    Care Transitions 24 Hour Call    Do you have any ongoing symptoms?:  No  Do you have a copy of your discharge instructions?:  Yes  Do you have all of your prescriptions and are they filled?:  Yes  Have you been contacted by a [facility] Pharmacist?:  No  Have you scheduled your follow up appointment?:  Yes  How are you going to get to your appointment?:  Car - family or friend to transport  Were you discharged with any Home Care or Post Acute Services:  Yes  Post Acute

## 2020-03-03 ENCOUNTER — OFFICE VISIT (OUTPATIENT)
Dept: INTERNAL MEDICINE | Age: 70
End: 2020-03-03
Payer: MEDICARE

## 2020-03-03 ENCOUNTER — TELEPHONE (OUTPATIENT)
Dept: INTERNAL MEDICINE | Age: 70
End: 2020-03-03

## 2020-03-03 VITALS
RESPIRATION RATE: 16 BRPM | BODY MASS INDEX: 30.92 KG/M2 | WEIGHT: 197 LBS | HEART RATE: 78 BPM | HEIGHT: 67 IN | DIASTOLIC BLOOD PRESSURE: 68 MMHG | SYSTOLIC BLOOD PRESSURE: 120 MMHG

## 2020-03-03 PROCEDURE — 99204 OFFICE O/P NEW MOD 45 MIN: CPT

## 2020-03-03 PROCEDURE — 99204 OFFICE O/P NEW MOD 45 MIN: CPT | Performed by: INTERNAL MEDICINE

## 2020-03-03 RX ORDER — EPINEPHRINE 0.3 MG/.3ML
0.3 INJECTION SUBCUTANEOUS ONCE
Qty: 0.3 ML | Refills: 0 | Status: SHIPPED | OUTPATIENT
Start: 2020-03-03 | End: 2022-03-04 | Stop reason: ALTCHOICE

## 2020-03-03 NOTE — PROGRESS NOTES
Outpatient Medications:     Cholecalciferol (VITAMIN D3) 25 MCG (1000 UT) CAPS, Take 2,000 Units by mouth daily, Disp: , Rfl:     dabigatran (PRADAXA) 150 MG capsule, Take 1 capsule by mouth 2 times daily, Disp: 60 capsule, Rfl: 0    aspirin 81 MG EC tablet, Take 1 tablet by mouth daily, Disp: 30 tablet, Rfl: 3    Omega-3 Fatty Acids (FISH OIL) 1000 MG CAPS, Take 1,000 mg by mouth 2 times daily, Disp: , Rfl:     atorvastatin (LIPITOR) 40 MG tablet, Take 1 tablet by mouth nightly, Disp: 30 tablet, Rfl: 3    VITAMIN B1-B12 IM, Inject into the muscle every 30 days , Disp: , Rfl:     EPINEPHrine (EPIPEN 2-FARAZ) 0.3 MG/0.3ML SOAJ injection, Inject 0.3 mLs into the muscle once for 1 dose Use as directed for allergic reaction, Disp: 0.3 mL, Rfl: 0    The patient has No Known Allergies. Past Medical History  Jessica Fowler  has a past medical history of Atrial fibrillation ([facility] [address] Chronic kidney disease, CKD (chronic kidney disease), Hyperlipidemia, Ischemic stroke ([facility] [address] PTSD (post-traumatic stress disorder), Research study patient, Stroke [facility]), and Stroke ([facility] [address] Past Surgical History  The patient  has a past surgical history that includes Appendectomy and transesophageal echocardiogram (01/17/2020). Family History  This patient's family history is not on file. Social History  Jessica Fowler  reports that he has never smoked. He has never used smokeless tobacco. He reports current alcohol use. He reports that he does not use drugs.     Health Maintenance:    Health Maintenance   Topic Date Due    Hepatitis C screen  1950    DTaP/Tdap/Td vaccine (1 - Tdap) 07/06/1960    Shingles Vaccine (1 of 2) 07/06/1999    Colon cancer screen colonoscopy  07/06/1999    Pneumococcal 65+ years Vaccine (1 of 1 - PPSV23) 07/06/2014    Annual Wellness Visit (AWV)  06/19/2019    Flu vaccine (1) 09/01/2019    Lipid screen  01/11/2021    Diabetes screen  01/11/2023    Hepatitis A vaccine  Aged Out    Hepatitis B vaccine  Aged 6/3/2020). Patient given educational materials - see patient instructions. Discussed use, benefit, and side effects of prescribed medications. All patient questions answered. Pt voiced understanding. Reviewed health maintenance. Electronically signed Casie Sánchez MD on 3/3/2020 at 4:03 PM      This note has been created using the Epic electronic health record, and dictated in part by [employer] dictation system. Despite the documenting physician's best efforts, there may be errors in spelling, grammar or syntax.

## 2020-03-03 NOTE — TELEPHONE ENCOUNTER
Patients wife called and stated that they forgot to ask for a Rx for an epipen in case of a reaction.      ROSENDO

## 2020-03-04 ENCOUNTER — CARE COORDINATION (OUTPATIENT)
Dept: CASE MANAGEMENT | Age: 70
End: 2020-03-04

## 2020-03-04 NOTE — CARE COORDINATION
[facility] Transitions Follow Up Call    3/4/2020    Patient: Didier Pelletier  Patient : 1950   MRN: [id number]  Reason for Admission: Dysphagia  Discharge Date: 20 RARS: Readmission Risk Score: 13         1st attempt to reach patient for Care Transitions. [facility] requesting return call. Contact information provided.  [phone]    Follow Up  Future Appointments   Date Time Provider Alexandria Kaur   3/30/2020 06:49 AM Rodriguez Kumar MD [facility]   2020  2:00 PM [facility]   2020  9:00 AM SCHEDULE, Clinton Lucero [facility] 112 LAB [facility] [place]   2020 11:00 AM Claudean Butters, MD D[facility]   2020 11:15 AM [facility]       Lien Shirley RN

## 2020-03-06 ENCOUNTER — CARE COORDINATION (OUTPATIENT)
Dept: CASE MANAGEMENT | Age: 70
End: 2020-03-06

## 2020-03-06 NOTE — CARE COORDINATION
[facility] Transition  3/6/2020    Patient: Carla Mera  Patient : 1950   MRN: [id number]       Incoming call from spouse inquiring if patient could take [employer] while on Pradaxa. Instructed her to check with their local pharmacy who has access to all medications patient is on in order to check for any drug interaction. She verbalizes that she will call [facility] to check.       Follow Up  Future Appointments   Date Time Provider Alexandria Kaur   3/30/2020 29:25 AM [facility]   [facility]  2:00 PM [facility]   2020  9:00 AM SCHEDULE, [place] [address] LAB [id number] [facility] LAB [place]   2020 11:00 AM Mini Duggan MD [facility]   2020 11:15 AM MD HAYES Larson [facility]       Devang Butler RN

## 2020-03-09 ENCOUNTER — CARE COORDINATION (OUTPATIENT)
Dept: CASE MANAGEMENT | Age: 70
End: 2020-03-09

## 2020-03-16 ENCOUNTER — CARE COORDINATION (OUTPATIENT)
Dept: CASE MANAGEMENT | Age: 70
End: 2020-03-16

## 2020-03-27 ENCOUNTER — TELEPHONE (OUTPATIENT)
Dept: RESEARCH | Age: 70
End: 2020-03-27

## 2020-04-16 ENCOUNTER — TELEMEDICINE (OUTPATIENT)
Dept: NEUROLOGY | Age: 70
End: 2020-04-16
Payer: MEDICARE

## 2020-04-16 VITALS — HEIGHT: 67 IN | BODY MASS INDEX: 29.82 KG/M2 | WEIGHT: 190 LBS

## 2020-04-16 PROCEDURE — 99214 OFFICE O/P EST MOD 30 MIN: CPT | Performed by: PSYCHIATRY & NEUROLOGY

## 2020-04-16 RX ORDER — LATANOPROST 50 UG/ML
1 SOLUTION/ DROPS OPHTHALMIC NIGHTLY
COMMUNITY
Start: 2020-04-15

## 2020-04-16 ASSESSMENT — ENCOUNTER SYMPTOMS
PHOTOPHOBIA: 0
VOICE CHANGE: 0
COLOR CHANGE: 0
NAUSEA: 0
SHORTNESS OF BREATH: 0
COUGH: 0
VOMITING: 0

## 2020-04-16 NOTE — PROGRESS NOTES
benefit, and side effects of prescribed medications. Personally reviewed imaging with patients and all questions answered. Pt voiced understanding. Patient agreed with treatment plan. Follow up as directed above. If you have any questions, please do not hesitate to call me. I look forward to following Tyler Worthy      Sincerely,    Goldie Jessica  Electronically signed by Goldie Jessica MD on 4/16/2020 at 2:00 PM     This is a telehealth visit that was performed with the originating site at Patient Location: Patient Home and Provider Location of [place], [place]. Verbal consent to participate in video visit was obtained. Pursuant to the emergency declaration under the [facility], [facility] waiver authority and the [employer] Act, this Virtual Visit was conducted, with patient's consent, to reduce the patient's risk of exposure to COVID-19 and provide continuity of care for an established/new patient. Services were provided through a video synchronous discussion virtually to substitute for in-person clinic visit. I discussed with the patient the nature of our telehealth visits via interactive/real-time audio/video that:  - I would evaluate the patient and recommend diagnostics and treatments based on my assessment  - Our sessions are not being recorded and that personal health information is protected  - Our team would provide follow up care in person if/when the patient needs it.

## 2020-05-20 ENCOUNTER — HOSPITAL ENCOUNTER (OUTPATIENT)
Dept: LAB | Age: 70
Discharge: HOME | End: 2020-05-20
Payer: MEDICARE

## 2020-05-20 LAB
ABSOLUTE EOS #: 0.19 K/UL (ref 0–0.44)
ABSOLUTE IMMATURE GRANULOCYTE: <0.03 K/UL (ref 0–0.3)
ABSOLUTE LYMPH #: 1.99 K/UL (ref 1.1–3.7)
ABSOLUTE MONO #: 0.57 K/UL (ref 0.1–1.2)
ALBUMIN SERPL-MCNC: 4 G/DL (ref 3.5–5.2)
ALBUMIN/GLOBULIN RATIO: 1.3 (ref 1–2.5)
ALP BLD-CCNC: 104 U/L (ref 40–129)
ALT SERPL-CCNC: 19 U/L (ref 5–41)
ANION GAP SERPL CALCULATED.3IONS-SCNC: 12 MMOL/L (ref 9–17)
ANION GAP SERPL CALCULATED.3IONS-SCNC: 12 MMOL/L (ref 9–17)
AST SERPL-CCNC: 21 U/L
BASOPHILS # BLD: 1 % (ref 0–2)
BASOPHILS ABSOLUTE: 0.04 K/UL (ref 0–0.2)
BILIRUB SERPL-MCNC: 0.53 MG/DL (ref 0.3–1.2)
BUN BLDV-MCNC: 15 MG/DL (ref 8–23)
BUN BLDV-MCNC: 15 MG/DL (ref 8–23)
BUN/CREAT BLD: 20 (ref 9–20)
BUN/CREAT BLD: 20 (ref 9–20)
CALCIUM SERPL-MCNC: 8.8 MG/DL (ref 8.6–10.4)
CALCIUM SERPL-MCNC: 8.8 MG/DL (ref 8.6–10.4)
CHLORIDE BLD-SCNC: 108 MMOL/L (ref 98–107)
CHLORIDE BLD-SCNC: 108 MMOL/L (ref 98–107)
CHOLESTEROL/HDL RATIO: 2.5
CHOLESTEROL/HDL RATIO: 2.5
CHOLESTEROL: 96 MG/DL
CHOLESTEROL: 96 MG/DL
CO2: 26 MMOL/L (ref 20–31)
CO2: 26 MMOL/L (ref 20–31)
CREAT SERPL-MCNC: 0.76 MG/DL (ref 0.7–1.2)
CREAT SERPL-MCNC: 0.76 MG/DL (ref 0.7–1.2)
DIFFERENTIAL TYPE: ABNORMAL
EOSINOPHILS RELATIVE PERCENT: 3 % (ref 1–4)
ESTIMATED AVERAGE GLUCOSE: 94 MG/DL
GFR AFRICAN AMERICAN: >60 ML/MIN
GFR AFRICAN AMERICAN: >60 ML/MIN
GFR NON-AFRICAN AMERICAN: >60 ML/MIN
GFR NON-AFRICAN AMERICAN: >60 ML/MIN
GFR SERPL CREATININE-BSD FRML MDRD: ABNORMAL ML/MIN/{1.73_M2}
GLUCOSE BLD-MCNC: 95 MG/DL (ref 70–99)
GLUCOSE BLD-MCNC: 95 MG/DL (ref 70–99)
HBA1C MFR BLD: 4.9 % (ref 4.8–5.9)
HCT VFR BLD CALC: 44.1 % (ref 40.7–50.3)
HCT VFR BLD CALC: 44.1 % (ref 40.7–50.3)
HDLC SERPL-MCNC: 39 MG/DL
HDLC SERPL-MCNC: 39 MG/DL
HEMOGLOBIN: 14.8 G/DL (ref 13–17)
HEMOGLOBIN: 14.8 G/DL (ref 13–17)
IMMATURE GRANULOCYTES: 0 %
LDL CHOLESTEROL: 34 MG/DL (ref 0–130)
LDL CHOLESTEROL: 34 MG/DL (ref 0–130)
LYMPHOCYTES # BLD: 27 % (ref 24–43)
MCH RBC QN AUTO: 29.5 PG (ref 25.2–33.5)
MCH RBC QN AUTO: 29.5 PG (ref 25.2–33.5)
MCHC RBC AUTO-ENTMCNC: 33.6 G/DL (ref 25.2–33.5)
MCHC RBC AUTO-ENTMCNC: 33.6 G/DL (ref 25.2–33.5)
MCV RBC AUTO: 87.8 FL (ref 82.6–102.9)
MCV RBC AUTO: 87.8 FL (ref 82.6–102.9)
MONOCYTES # BLD: 8 % (ref 3–12)
NRBC AUTOMATED: 0 PER 100 WBC
NRBC AUTOMATED: 0 PER 100 WBC
PDW BLD-RTO: 13.8 % (ref 11.8–14.4)
PDW BLD-RTO: 13.8 % (ref 11.8–14.4)
PLATELET # BLD: 168 K/UL (ref 138–453)
PLATELET # BLD: 168 K/UL (ref 138–453)
PLATELET ESTIMATE: ABNORMAL
PMV BLD AUTO: 10.1 FL (ref 8.1–13.5)
PMV BLD AUTO: 10.1 FL (ref 8.1–13.5)
POTASSIUM SERPL-SCNC: 4.1 MMOL/L (ref 3.7–5.3)
POTASSIUM SERPL-SCNC: 4.1 MMOL/L (ref 3.7–5.3)
PROSTATE SPECIFIC ANTIGEN: 3.93 UG/L
RBC # BLD: 5.02 M/UL (ref 4.21–5.77)
RBC # BLD: 5.02 M/UL (ref 4.21–5.77)
RBC # BLD: ABNORMAL 10*6/UL
SEG NEUTROPHILS: 61 % (ref 36–65)
SEGMENTED NEUTROPHILS ABSOLUTE COUNT: 4.49 K/UL (ref 1.5–8.1)
SODIUM BLD-SCNC: 146 MMOL/L (ref 135–144)
SODIUM BLD-SCNC: 146 MMOL/L (ref 135–144)
TOTAL PROTEIN: 7.1 G/DL (ref 6.4–8.3)
TRIGL SERPL-MCNC: 114 MG/DL
TRIGL SERPL-MCNC: 114 MG/DL
TSH SERPL DL<=0.05 MIU/L-ACNC: 3.23 MIU/L (ref 0.3–5)
VITAMIN D 25-HYDROXY: 32.8 NG/ML (ref 30–100)
VLDLC SERPL CALC-MCNC: ABNORMAL MG/DL (ref 1–30)
VLDLC SERPL CALC-MCNC: ABNORMAL MG/DL (ref 1–30)
WBC # BLD: 7.3 K/UL (ref 3.5–11.3)
WBC # BLD: 7.3 K/UL (ref 3.5–11.3)
WBC # BLD: ABNORMAL 10*3/UL

## 2020-05-20 PROCEDURE — 85027 COMPLETE CBC AUTOMATED: CPT

## 2020-05-20 PROCEDURE — 85025 COMPLETE CBC W/AUTO DIFF WBC: CPT

## 2020-05-20 PROCEDURE — 80053 COMPREHEN METABOLIC PANEL: CPT

## 2020-05-20 PROCEDURE — 80061 LIPID PANEL: CPT

## 2020-05-20 PROCEDURE — 80048 BASIC METABOLIC PNL TOTAL CA: CPT

## 2020-05-20 PROCEDURE — G0103 PSA SCREENING: HCPCS

## 2020-05-20 PROCEDURE — 84443 ASSAY THYROID STIM HORMONE: CPT

## 2020-05-20 PROCEDURE — 84153 ASSAY OF PSA TOTAL: CPT

## 2020-05-20 PROCEDURE — 36415 COLL VENOUS BLD VENIPUNCTURE: CPT

## 2020-05-20 PROCEDURE — 82306 VITAMIN D 25 HYDROXY: CPT

## 2020-05-20 PROCEDURE — 83036 HEMOGLOBIN GLYCOSYLATED A1C: CPT

## 2020-06-04 ENCOUNTER — OFFICE VISIT (OUTPATIENT)
Dept: INTERNAL MEDICINE | Age: 70
End: 2020-06-04
Payer: MEDICARE

## 2020-06-04 VITALS
DIASTOLIC BLOOD PRESSURE: 72 MMHG | SYSTOLIC BLOOD PRESSURE: 126 MMHG | WEIGHT: 191 LBS | HEART RATE: 78 BPM | RESPIRATION RATE: 16 BRPM | TEMPERATURE: 96.7 F | HEIGHT: 67 IN | BODY MASS INDEX: 29.98 KG/M2

## 2020-06-04 PROCEDURE — 99214 OFFICE O/P EST MOD 30 MIN: CPT | Performed by: INTERNAL MEDICINE

## 2020-06-04 PROCEDURE — 99214 OFFICE O/P EST MOD 30 MIN: CPT

## 2020-06-17 ENCOUNTER — OFFICE VISIT (OUTPATIENT)
Dept: NEUROLOGY | Age: 70
End: 2020-06-17
Payer: MEDICARE

## 2020-06-17 VITALS
SYSTOLIC BLOOD PRESSURE: 120 MMHG | HEIGHT: 67 IN | TEMPERATURE: 97.8 F | WEIGHT: 191.8 LBS | HEART RATE: 68 BPM | DIASTOLIC BLOOD PRESSURE: 80 MMHG | BODY MASS INDEX: 30.1 KG/M2 | OXYGEN SATURATION: 98 %

## 2020-06-17 PROCEDURE — 99212 OFFICE O/P EST SF 10 MIN: CPT | Performed by: PSYCHIATRY & NEUROLOGY

## 2020-06-17 PROCEDURE — 99215 OFFICE O/P EST HI 40 MIN: CPT | Performed by: PSYCHIATRY & NEUROLOGY

## 2020-06-17 ASSESSMENT — ENCOUNTER SYMPTOMS
SORE THROAT: 0
CHEST TIGHTNESS: 0
EYE PAIN: 0
TROUBLE SWALLOWING: 0
VOMITING: 0
BACK PAIN: 0
CONSTIPATION: 0
VOICE CHANGE: 0
BLOOD IN STOOL: 0
BOWEL INCONTINENCE: 0
SHORTNESS OF BREATH: 0
PHOTOPHOBIA: 0
VISUAL CHANGE: 0
COLOR CHANGE: 0
COUGH: 0
EYE DISCHARGE: 0
SINUS PRESSURE: 0
CHANGE IN BOWEL HABIT: 0
APNEA: 0
ABDOMINAL PAIN: 0
EYE REDNESS: 0
WHEEZING: 0
SWOLLEN GLANDS: 0
ABDOMINAL DISTENTION: 0
EYE ITCHING: 0
FACIAL SWELLING: 0
DIARRHEA: 0
CHOKING: 0
NAUSEA: 0

## 2020-06-17 NOTE — PROGRESS NOTES
[facility]  Neurology  [address] [place] [address]  EJZOT:[phone]   Fax: [phone]    SUBJECTIVE:     PATIENT ID:  Leyda Grace is a  RIGHT  HANDED 78 y.o. male. Neurologic Problem   The patient's primary symptoms include clumsiness, a loss of balance and memory loss. The patient's pertinent negatives include no altered mental status, focal sensory loss, focal weakness, near-syncope, slurred speech, syncope, visual change or weakness. This is a recurrent problem. The current episode started more than 1 month ago. The neurological problem developed suddenly. The problem is unchanged. There was no focality noted. Associated symptoms include dizziness and light-headedness. Pertinent negatives include no abdominal pain, auditory change, aura, back pain, bladder incontinence, bowel incontinence, chest pain, confusion, diaphoresis, fatigue, fever, headaches, nausea, neck pain, palpitations, shortness of breath, vertigo or vomiting. Past treatments include bed rest, sleep and aspirin. The treatment provided no relief. His past medical history is significant for a CVA and dementia. There is no history of a bleeding disorder, a clotting disorder, head trauma, liver disease, mood changes or seizures. Cerebrovascular Accident   This is a new problem. Episode onset: RECURRENT    LEFT  MCA   AND   RIGHT  PCA    EMBOLIC  STROKES  IN  JAN. 2020. The problem has been gradually improving. Pertinent negatives include no abdominal pain, anorexia, arthralgias, change in bowel habit, chest pain, chills, congestion, coughing, diaphoresis, fatigue, fever, headaches, joint swelling, myalgias, nausea, neck pain, numbness, rash, sore throat, swollen glands, urinary symptoms, vertigo, visual change, vomiting or weakness. Nothing aggravates the symptoms. Treatments tried: ASA,  PRADAXA   The treatment provided moderate relief.              History obtained from  The patient    AND      HIS  WIFE and other  available   medical  records   were  Also  reviewed. The  Duration,  Quality,  Severity,  Location,  Timing,  Context,  Modifying  Factors   Of   The   Chief   Complaint   And  Present  Illness       Was   Reviewed   In   Chronological   [place].                                             PATIENT'S  MAIN  CONCERNS INCLUDE :                     1)     H/O    RECURRENCE     EMBOLIC   STROKES  IN     JAN. 2020                                   AND  HOSPITALIZATION     IN     [place]                                         -  PATIENT  ALSO  RECEIVED  tPA                                                                2)          MRI  BRAIN    ON    1/11/2020      SHOWED                           A)    MULTIPLE     STROKES  IN  LEFT  FRONTAL    AND  PARIETAL  LOBES                          B)        CHRONIC  CEREBRAL  ISCHEMIA                          3)    MRI    BRAIN   ON     1/16/ 2020        SHOWED                                ADDITIONAL      NEW     RIGHT  PCA  STROKE                                     AND  MANAGEMENT   OF   THE  SAME                                               3)      CTA  BRAIN    JAN. 2020     SHOWED                              A)       H/O    LEFT   MCA   OCCULUSION                              B)       RIGHT  PCA  OCCULUSION                               C)   BILATERAL  CAROTID  STENOSIS      50  %                                     -   NEEDS  MONITORING                        4)        CHRONIC   ATRIAL   FIBRILLATION                                   -    PATIENT  TO  FOLLOW  WITH  CARDIOLOGY                      5)        PATIENT       ON     ASA    81  MG       DAILY                                    AND  PRADAXA                            6)     HIGH  RISK  FOR  TIA /   STROKE                      7)      PATIENT  RECOVERED   WELL   FROM    HIS   RECENT                           RECURRENT  STROKES                       8)     POST  STROKE          SYMPTOMS K 4.1 05/20/2020     05/20/2020     05/20/2020    CO2 26 05/20/2020    CO2 26 05/20/2020    BUN 15 05/20/2020    BUN 15 05/20/2020    CREATININE 0.76 05/20/2020    CREATININE 0.76 05/20/2020    CALCIUM 8.8 05/20/2020    CALCIUM 8.8 05/20/2020    PROT 7.1 05/20/2020    LABALBU 4.0 05/20/2020    BILITOT 0.53 05/20/2020    ALKPHOS 104 05/20/2020    AST 21 05/20/2020    ALT 19 05/20/2020     Lab Results   Component Value Date    ALKPHOS 104 05/20/2020    ALT 19 05/20/2020    AST 21 05/20/2020    PROT 7.1 05/20/2020    BILITOT 0.53 05/20/2020    LABALBU 4.0 05/20/2020     Lab Results   Component Value Date    BUN 15 05/20/2020    BUN 15 05/20/2020    CREATININE 0.76 05/20/2020    CREATININE 0.76 05/20/2020     Lab Results   Component Value Date    CALCIUM 8.8 05/20/2020    CALCIUM 8.8 05/20/2020     Lab Results   Component Value Date    AST 21 05/20/2020    ALT 19 05/20/2020       Lab Results   Component Value Date    URICACID 7.7 05/22/2016         Review of Systems   Constitutional: Negative for appetite change, chills, diaphoresis, fatigue, fever and unexpected weight change. HENT: Negative for congestion, dental problem, drooling, ear discharge, ear pain, facial swelling, hearing loss, mouth sores, nosebleeds, postnasal drip, sinus pressure, sore throat, tinnitus, trouble swallowing and voice change. Eyes: Negative for photophobia, pain, discharge, redness, itching and visual disturbance. Respiratory: Negative for apnea, cough, choking, chest tightness, shortness of breath and wheezing. Cardiovascular: Negative for chest pain, palpitations, leg swelling and near-syncope. Gastrointestinal: Negative for abdominal distention, abdominal pain, anorexia, blood in stool, bowel incontinence, change in bowel habit, constipation, diarrhea, nausea and vomiting. Endocrine: Negative for cold intolerance, heat intolerance, polydipsia, polyphagia and polyuria.    Genitourinary: Negative for bladder  Internal carotid artery has a mild, <50% stenosis based on velocities.    The vertebral artery is patent with antegrade flow.        Signature        ----------------------------------------------------------------    Electronically signed by Ashkan Tan RVT(Sonographer)    on 02/27/2020 12:00 PM    ----------------------------------------------------------------        ----------------------------------------------------------------    Electronically signed by Riccardo Cano(Interpreting    physician) on 02/27/2020 04:10 PM    ----------------------------------------------------------------       Findings:        Right Impression:                     Left Impression:    The common carotid artery is normal.  The common carotid artery is normal.        The carotid bulb has a smooth         The carotid bulb is normal.    fibrocalcific plaque causing a <50%    stenosis.                              The internal carotid artery has a                                          smooth calcific plaque causing a    The internal carotid artery has a     <50% stenosis based on velocities.    smooth fibrocalcific plaque causing a    <50% stenosis based on velocities.    The external carotid artery is                                          normal.    The external carotid artery is    normal.                               The vertebral artery is patent with                                          antegrade flow.    The vertebral artery is patent with    antegrade flow.             MRI OF THE BRAIN WITHOUT CONTRAST  1/16/2020 7:13 pm       TECHNIQUE:   Multiplanar multisequence MRI of the brain was performed without the   administration of intravenous contrast.       COMPARISON:   CT head neck 01/16/2020, MRI brain performed 01/11/2020       HISTORY:   ORDERING SYSTEM PROVIDED HISTORY: Dysarthria, CTA with R P2 occlusion   TECHNOLOGIST PROVIDED HISTORY:   Dysarthria, CTA with R P2 occlusion       FINDINGS: INTRACRANIAL STRUCTURES/VENTRICLES: Redemonstration of the evolving acute   strokes identified involving left MCA distribution involving the left   posterior insular region.  Multifocal areas of left MCA infarct identified   left frontal lobe cortex left parietal cortex and scattered foci involving   subcortical white matter left parietal lobe which have mildly progressed   since the previous examination with a few new foci of infarct.  Focus of   restricted diffusion identified involving right posterior frontal centrum   semiovale minimally progressed when compared to previous examination.  New   predominant cortical based restricted diffusion identified involving the   right PCA distribution.  Many of these infarcts demonstrate mild T2   prolongation.       No hemorrhagic conversion identified.  Mild generalized involutional changes   with prominence of ventricles and sulci.  Mild periventricular subcortical T2   prolongation suggestive chronic small vessel ischemic disease.       No shift of midline structures.  Basal cisterns are patent.       ORBITS: The visualized portion of the orbits demonstrate no acute abnormality.       SINUSES: Mild ethmoid sinus mucosal thickening.       BONES/SOFT TISSUES: The bone marrow signal intensity appears normal. The soft   tissues demonstrate no acute abnormality.           Impression   New right PCA distribution infarct.  No hemorrhagic conversion.       Multifocal areas of evolving left MCA distribution infarct, there are few   scattered new foci of restricted diffusion identified suggestion of mild   progression of the left MCA infarct.       Mild chronic small ischemic disease and age related involutional change.             CT OF THE HEAD WITHOUT CONTRAST  1/16/2020 11:55 am       TECHNIQUE:   CT of the head was performed without the administration of intravenous   contrast. Dose modulation, iterative reconstruction, and/or weight based   adjustment of the mA/kV was utilized to reduce the radiation dose to as low   as reasonably achievable.       COMPARISON:   Head CT 01/12/2020, MRI brain 01/11/2020, head CT 01/10/2020       HISTORY:   ORDERING SYSTEM PROVIDED HISTORY: vision loss   TECHNOLOGIST PROVIDED HISTORY:   vision loss       Reason for Exam: 1/10/2020 patient came to ER with stroke like symptoms, he   was sent to [facility] for thrombectomy today Patient has vision blurring and   dizziness       FINDINGS:   BRAIN/VENTRICLES: Continued evolution of subacute infarction in the left   frontal lobe and left insular cortex with loss of gray-white matter   differentiation.  There is small area of low attenuation within left parietal   lobe subcortical white matter.  No associated hemorrhage.       Diffuse parenchymal volume loss with enlargement of the ventricles and   cerebral sulci.  No abnormal extra-axial fluid collection.  No hydrocephalus. Mild periventricular white matter low attenuation compatible with chronic   microvascular ischemic changes.       ORBITS: The visualized portion of the orbits demonstrate no acute abnormality.       SINUSES: The visualized paranasal sinuses and mastoid air cells demonstrate   no acute abnormality.       SOFT TISSUES/SKULL: No acute abnormality of the visualized skull or soft   tissues.           Impression   1. Continued evolution of subacute infarctions in the left MCA territory   involving the left lateral frontal lobe and insular cortex as well as left   parietal lobe subcortical white matter.  No hemorrhagic transformation. 2. Diffuse parenchymal volume loss.  Mild chronic microvascular ischemic   changes. Findings were discussed with Sarah Artis at 12:16 pm on 1/16/2020.               CTA OF THE HEAD WITH CONTRAST WITH PERFUSION 1/11/2020 2:14 am       TECHNIQUE:   CTA of the head/brain was performed with the administration of intravenous   contrast. Multiplanar reformatted images are provided for review.   MIP images   are plaque of the mid   to distal common carotid arteries without focal stenosis.  Calcified and   noncalcified atherosclerotic plaque of the right carotid bifurcation and   proximal right internal carotid artery.  Mild, less than 50%, stenosis of the   right internal carotid artery by NASCET criteria.  Calcified and noncalcified   atherosclerotic plaque of the left carotid bifurcation and proximal left   internal carotid artery.  Mild, less than 50%, stenosis of the left internal   carotid artery by NASCET criteria.  No arterial injury or dissection.       VERTEBRAL ARTERIES: Vertebral arteries arise from their respective subclavian   arteries.  Left vertebral artery is dominant with a developmentally   hypoplastic right vertebral artery.  Mild narrowing of the left vertebral   artery origin.       SOFT TISSUES: Lung apices are clear.  No cervical or superior mediastinal   lymphadenopathy.  Larynx and pharynx are unremarkable.  No acute abnormality   of the salivary and thyroid glands.       BONES: No acute osseous abnormality.           CTA HEAD:       ANTERIOR CIRCULATION: Atherosclerotic calcification of the cavernous internal   carotid arteries without focal stenosis.  Occlusion of a left middle cerebral   artery proximal to mid M2 branch, at the level of the sylvian fissure.  No   significant stenosis of the intracranial internal carotid, anterior cerebral,   or right middle cerebral arteries. No aneurysm.       POSTERIOR CIRCULATION: Intracranial dominance of the left vertebral artery. No focal stenosis of the intracranial vertebral arteries or basilar artery. No flow-limiting stenosis of the posterior cerebral arteries.  Left P1   segment is hypoplastic with fetal type origin of the left posterior cerebral   artery.  No aneurysm.       OTHER: No dural venous sinus thrombosis on this non-dedicated study.       BRAIN: No mass effect or midline shift. No extra-axial fluid collection.    Gray-white differentiation is maintained.           Impression   Mild, less than 50%, stenosis of the internal carotid arteries by NASCET   criteria.       Occlusion of a left middle cerebral artery proximal to mid M2 branch, at the   level of the sylvian fissure.              CTA OF THE HEAD AND NECK WITH CONTRAST 1/16/2020 1:42 pm:       TECHNIQUE:   CTA of the head and neck was performed with the administration of intravenous   contrast. Multiplanar reformatted images are provided for review.  MIP images   are provided for review. Stenosis of the internal carotid arteries measured   using NASCET criteria. Dose modulation, iterative reconstruction, and/or   weight based adjustment of the mA/kV was utilized to reduce the radiation   dose to as low as reasonably achievable.       COMPARISON:   Head CT 01/16/2020, 01/12/2020.  CTA head 01/11/2020.       HISTORY:   ORDERING SYSTEM PROVIDED HISTORY: stroke   TECHNOLOGIST PROVIDED HISTORY:   stroke   Reason for Exam: vision disturbance today, recent 1/10/2020 had stoke like   symptoms was transported to [facility] to do a thrombectomy       FINDINGS:       CTA NECK:       AORTIC ARCH/ARCH VESSELS: No dissection or arterial injury.  No significant   stenosis of the brachiocephalic or subclavian arteries.       CAROTID ARTERIES: Mild noncalcified atherosclerotic plaque in the left common   carotid artery without significant stenosis.  The right common carotid artery   is without significant stenosis.  Calcified atherosclerotic plaque in the   carotid bulbs eccentrically resulting in less than 50% stenosis by NASCET   criteria.  No dissection.       VERTEBRAL ARTERIES: No dissection, arterial injury, or significant stenosis.    Mild stenosis at the origin of the left vertebral artery.  The left vertebral   artery is dominant.  Hypoplastic right vertebral artery.       SOFT TISSUES: The lung apices are clear.  No cervical or superior mediastinal   lymphadenopathy.  The larynx and pharynx parietal lobes, most pronounced in the frontal insular region. There is an additional punctate focus of acute stroke in the right centrum   semiovale.  Small focus of gradient blooming in the left insula could   represent trace petechial hemorrhage. Generalized cerebral and cerebellar   volume loss.  The axial FLAIR images demonstrate bilateral periventricular   and deep white matter signal hyperintensities, commonly seen in the setting   of chronic small vessel ischemic disease.  Punctate focus of gradient   blooming in the left parietal deep white matter demonstrates no correlating   diffusion restriction and may represent a small cavernoma or amyloid   angiopathy.       No mass effect or midline shift. No evidence of a large acute intracranial   hemorrhage.  The ventricles and sulci are normal in size and configuration. The sellar/suprasellar regions appear unremarkable.  The normal signal voids   within the major intracranial vessels appear maintained.       ORBITS: The visualized portion of the orbits demonstrate no acute abnormality.       SINUSES: The paranasal sinuses are clear.  Small amount of fluid in the   bilateral mastoid air cells.       BONES/SOFT TISSUES: The bone marrow signal intensity appears normal. The soft   tissues demonstrate no acute abnormality.           Impression   1.  Multiple small areas of acute stroke in the posterior left middle   cerebral artery territory involving the left frontal and parietal lobes, most   pronounced in the frontal insular region.  Additional punctate focus of acute   stroke in the right centrum semiovale.       2.  Small focus of gradient blooming in the left insula could represent trace   petechial hemorrhage.  No large acute intracranial hemorrhage.  No mass   effect or midline shift.       3.  Mild chronic small vessel ischemic disease.                 VISITING DIAGNOSIS:      ICD-10-CM    1.  Cerebrovascular accident (CVA) due to embolism of right diagnoses                    And  Options  For    Further   Investigations                   And  management   Are  Discussed  Comprehensively. Medications   And  Prescription   Risks  And  Side effects  Are   Also  Discussed. *  If   There is  Any  Significant  Worsening   Of  Current  Symptoms  And  Or                  If patient  Develops   Any additional  New  NeurologicalSymptoms                  Or  Significant  Concerns   Should  Call  911 or  Go  To  Emergency  Department  For  Further  Immediate  Evaluation. The   Above  Were  Reviewed  With  patient   And   HIS  WIFE                          questions  Answered  In  Detail. More   Than  50% of face  To face Time   Was  Spent  On  Counseling                    And   Coordination  Of  Care   Of   Patient's  multiple   Neurological  Problems                         And   Comorbid  Medical   Conditions. Electronically signed by Yazmin Loyola MD    Board Certified in  Neurology &  In  [employer] ([employer])      DISCLAIMER:   Although every effort was made to ensure the accuracy of this  electronictranscription, some errors in transcription may have occurred. GENERAL PATIENT INSTRUCTIONS:      A Healthy Lifestyle: Care Instructions   Your Care Instructions   A healthy lifestyle can help you feel good, stay at ahealthy weight, and have plenty of energy for both work and play. A healthy lifestyle is something you can share with your whole family.  A healthy lifestyle also can lower your risk for serious health problems, such ashigh blood pressure, heart disease, and diabetes.  You can follow a few steps listed below to improve your health and the health of your family.  Follow-up careis a key part of your treatment and safety.  Be sure to make and go to all appointments, and call your doctor if you

## 2020-06-17 NOTE — PATIENT INSTRUCTIONS
[facility] health department says you can return to your normal activities. Stay home except to get medical care  People who are mildly ill with COVID-19 are able to isolate at home during their illness. You should restrict activities outside your home, except for getting medical care. Do not go to work, school, or public areas. Avoid using public transportation, ride-sharing, or taxis. Separate yourself from other people and animals in your home  People: As much as possible, you should stay in a specific room and away from other people in your home. Also, you should use a separate bathroom, if available. Animals: You should restrict contact with pets and other animals while you are sick with COVID-19, just like you would around other people. Although there have not been reports of pets or other animals becoming sick with COVID-19, it is still recommended that people sick with COVID-19 limit contact with animals until more information is known about the virus. When possible, have another member of your household care for your animals while you are sick. If you are sick with COVID-19, avoid contact with your pet, including petting, snuggling, being kissed or licked, and sharing food. If you must care for your pet or be around animals while you are sick, wash your hands before and after you interact with pets and wear a facemask. Call ahead before visiting your doctor  If you have a medical appointment, call the healthcare provider and tell them that you have or may have COVID-19. This will help the healthcare providers office take steps to keep other people from getting infected or exposed. Wear a facemask  You should wear a facemask when you are around other people (e.g., sharing a room or vehicle) or pets and before you enter a healthcare providers office.  If you are not able to wear a facemask (for example, because it causes trouble breathing), then people who live with you should not stay in the same room with you, or they should wear a facemask if they enter your room. Cover your coughs and sneezes  Cover your mouth and nose with a tissue when you cough or sneeze. Throw used tissues in a lined trash can. Immediately wash your hands with soap and water for at least 20 seconds or, if soap and water are not available, clean your hands with an alcohol-based hand  that contains at least 60% alcohol. Clean your hands often  Wash your hands often with soap and water for at least 20 seconds, especially after blowing your nose, coughing, or sneezing; going to the bathroom; and before eating or preparing food. If soap and water are not readily available, use an alcohol-based hand  with at least 60% alcohol, covering all surfaces of your hands and rubbing them together until they feel dry. Soap and water are the best option if hands are visibly dirty. Avoid touching your eyes, nose, and mouth with unwashed hands. Avoid sharing personal household items  You should not share dishes, drinking glasses, cups, eating utensils, towels, or bedding with other people or pets in your home. After using these items, they should be washed thoroughly with soap and water. Clean all high-touch surfaces everyday  High touch surfaces include counters, tabletops, doorknobs, bathroom fixtures, toilets, phones, keyboards, tablets, and bedside tables. Also, clean any surfaces that may have blood, stool, or body fluids on them. Use a household cleaning spray or wipe, according to the label instructions. Labels contain instructions for safe and effective use of the cleaning product including precautions you should take when applying the product, such as wearing gloves and making sure you have good ventilation during use of the product. Monitor your symptoms  Seek prompt medical attention if your illness is worsening (e.g., difficulty breathing).  Before seeking care, call your healthcare provider and tell them that you have, or are being evaluated for, COVID-19. Put on a facemask before you enter the facility. These steps will help the healthcare providers office to keep other people in the office or waiting room from getting infected or exposed. Ask your healthcare provider to call the local or state health department. Persons who are placed under active monitoring or facilitated self-monitoring should follow instructions provided by their local health department or occupational health professionals, as appropriate. When working with your local health department check their available hours. If you have a medical emergency and need to call 911, notify the dispatch personnel that you have, or are being evaluated for COVID-19. If possible, put on a facemask before emergency medical services arrive. Discontinuing home isolation  Patients with confirmed COVID-19 should remain under home isolation precautions until the risk of secondary transmission to others is thought to be low. The decision to discontinue home isolation precautions should be made on a case-by-case basis, in consultation with healthcare providers and [facility] and [facility] health departments. * FALL   PRECAUTIONS. *   ADEQUATE   FLUID  INTAKE   AND  ELECTROLYTE  BALANCE           * KEEP  DAIRY  OF   THE  NEUROLOGICAL  SYMPTOMS          *  TO  MAINTAIN  REGULAR  SLEEP  WAKE  CYCLES. *   TO  HAVE  ADEQUATE  REST  AND   SLEEP    HOURS.        *    AVOID  USAGE OF   TOBACCO,  EXCESSIVE  ALCOHOL                AND   ILLEGAL   SUBSTANCES,  IF  ANY          *  Maintain   Healthy  Life Style    With   Periodic  Monitoring  Of      Any  Medical  Conditions  Including   Elevated  Blood  Pressure,  Lipid  Profile,     Blood  Sugar levels  And   Heart  Disease. *   Period   Screening  For  Cancers  Involving  Breast,  Colon,   lungs  And  Other  Organs  As  Applicable,  In consultation   With  Your  Primary Care Providers.                     *  If   There is  Any  Significant  Worsening   Of  Current  Symptoms  And  Or  If    Any additional  New  Neurological  Symptoms                 Or  Significant  Concerns   Should  Call  911 or  Go  To  Emergency  Department  For  Further  Immediate  Evaluation.

## 2020-07-11 ENCOUNTER — HOSPITAL ENCOUNTER (EMERGENCY)
Age: 70
Discharge: HOME | End: 2020-07-11
Attending: EMERGENCY MEDICINE
Payer: MEDICARE

## 2020-07-11 ENCOUNTER — APPOINTMENT (OUTPATIENT)
Dept: CT IMAGING | Age: 70
End: 2020-07-11
Payer: MEDICARE

## 2020-07-11 VITALS
DIASTOLIC BLOOD PRESSURE: 63 MMHG | HEART RATE: 68 BPM | BODY MASS INDEX: 29.98 KG/M2 | OXYGEN SATURATION: 98 % | TEMPERATURE: 97.3 F | WEIGHT: 191 LBS | SYSTOLIC BLOOD PRESSURE: 122 MMHG | RESPIRATION RATE: 18 BRPM | HEIGHT: 67 IN

## 2020-07-11 LAB
-: NORMAL
ABSOLUTE EOS #: 0.08 K/UL (ref 0–0.44)
ABSOLUTE IMMATURE GRANULOCYTE: <0.03 K/UL (ref 0–0.3)
ABSOLUTE LYMPH #: 0.96 K/UL (ref 1.1–3.7)
ABSOLUTE MONO #: 0.39 K/UL (ref 0.1–1.2)
AMORPHOUS: NORMAL
ANION GAP SERPL CALCULATED.3IONS-SCNC: 14 MMOL/L (ref 9–17)
BACTERIA: NORMAL
BASOPHILS # BLD: 0 % (ref 0–2)
BASOPHILS ABSOLUTE: <0.03 K/UL (ref 0–0.2)
BILIRUBIN URINE: NEGATIVE
BUN BLDV-MCNC: 15 MG/DL (ref 8–23)
BUN/CREAT BLD: 23 (ref 9–20)
CALCIUM SERPL-MCNC: 9 MG/DL (ref 8.6–10.4)
CASTS UA: NORMAL /LPF (ref 0–2)
CHLORIDE BLD-SCNC: 100 MMOL/L (ref 98–107)
CO2: 24 MMOL/L (ref 20–31)
COLOR: ABNORMAL
COMMENT UA: ABNORMAL
CREAT SERPL-MCNC: 0.66 MG/DL (ref 0.7–1.2)
CRYSTALS, UA: NORMAL /HPF
DIFFERENTIAL TYPE: ABNORMAL
EOSINOPHILS RELATIVE PERCENT: 1 % (ref 1–4)
EPITHELIAL CELLS UA: NORMAL /HPF (ref 0–5)
GFR AFRICAN AMERICAN: >60 ML/MIN
GFR NON-AFRICAN AMERICAN: >60 ML/MIN
GFR SERPL CREATININE-BSD FRML MDRD: ABNORMAL ML/MIN/{1.73_M2}
GFR SERPL CREATININE-BSD FRML MDRD: ABNORMAL ML/MIN/{1.73_M2}
GLUCOSE BLD-MCNC: 110 MG/DL (ref 70–99)
GLUCOSE URINE: NEGATIVE
HCT VFR BLD CALC: 38.6 % (ref 40.7–50.3)
HEMOGLOBIN: 13.6 G/DL (ref 13–17)
IMMATURE GRANULOCYTES: 0 %
KETONES, URINE: NEGATIVE
LEUKOCYTE ESTERASE, URINE: ABNORMAL
LYMPHOCYTES # BLD: 17 % (ref 24–43)
MCH RBC QN AUTO: 30 PG (ref 25.2–33.5)
MCHC RBC AUTO-ENTMCNC: 35.2 G/DL (ref 25.2–33.5)
MCV RBC AUTO: 85 FL (ref 82.6–102.9)
MONOCYTES # BLD: 7 % (ref 3–12)
MUCUS: NORMAL
NITRITE, URINE: NEGATIVE
NRBC AUTOMATED: 0 PER 100 WBC
OTHER OBSERVATIONS UA: NORMAL
PDW BLD-RTO: 13 % (ref 11.8–14.4)
PH UA: 6 (ref 5–6)
PLATELET # BLD: 147 K/UL (ref 138–453)
PLATELET ESTIMATE: ABNORMAL
PMV BLD AUTO: 10.1 FL (ref 8.1–13.5)
POTASSIUM SERPL-SCNC: 3.5 MMOL/L (ref 3.7–5.3)
PROTEIN UA: NEGATIVE
RBC # BLD: 4.54 M/UL (ref 4.21–5.77)
RBC # BLD: ABNORMAL 10*6/UL
RBC UA: NORMAL /HPF (ref 0–4)
RENAL EPITHELIAL, UA: NORMAL /HPF
SEG NEUTROPHILS: 75 % (ref 36–65)
SEGMENTED NEUTROPHILS ABSOLUTE COUNT: 4.34 K/UL (ref 1.5–8.1)
SODIUM BLD-SCNC: 138 MMOL/L (ref 135–144)
SPECIFIC GRAVITY UA: 1.01 (ref 1.01–1.02)
TRICHOMONAS: NORMAL
TURBIDITY: ABNORMAL
URINE HGB: ABNORMAL
UROBILINOGEN, URINE: NORMAL
WBC # BLD: 5.8 K/UL (ref 3.5–11.3)
WBC # BLD: ABNORMAL 10*3/UL
WBC UA: NORMAL /HPF (ref 0–4)
YEAST: NORMAL

## 2020-07-11 PROCEDURE — 6370000000 HC RX 637 (ALT 250 FOR IP): Performed by: EMERGENCY MEDICINE

## 2020-07-11 PROCEDURE — 85025 COMPLETE CBC W/AUTO DIFF WBC: CPT

## 2020-07-11 PROCEDURE — 74176 CT ABD & PELVIS W/O CONTRAST: CPT

## 2020-07-11 PROCEDURE — 80048 BASIC METABOLIC PNL TOTAL CA: CPT

## 2020-07-11 PROCEDURE — 51702 INSERT TEMP BLADDER CATH: CPT

## 2020-07-11 PROCEDURE — 81001 URINALYSIS AUTO W/SCOPE: CPT

## 2020-07-11 PROCEDURE — 99283 EMERGENCY DEPT VISIT LOW MDM: CPT

## 2020-07-11 PROCEDURE — 36415 COLL VENOUS BLD VENIPUNCTURE: CPT

## 2020-07-11 RX ORDER — TAMSULOSIN HYDROCHLORIDE 0.4 MG/1
0.4 CAPSULE ORAL ONCE
Status: COMPLETED | OUTPATIENT
Start: 2020-07-11 | End: 2020-07-11

## 2020-07-11 RX ORDER — TAMSULOSIN HYDROCHLORIDE 0.4 MG/1
0.4 CAPSULE ORAL DAILY
Qty: 7 CAPSULE | Refills: 0 | Status: ON HOLD | OUTPATIENT
Start: 2020-07-11 | End: 2020-07-13 | Stop reason: HOSPADM

## 2020-07-11 RX ADMIN — TAMSULOSIN HYDROCHLORIDE 0.4 MG: 0.4 CAPSULE ORAL at 08:27

## 2020-07-11 ASSESSMENT — PAIN SCALES - GENERAL: PAINLEVEL_OUTOF10: 10

## 2020-07-11 ASSESSMENT — PAIN DESCRIPTION - PAIN TYPE: TYPE: ACUTE PAIN

## 2020-07-11 ASSESSMENT — ENCOUNTER SYMPTOMS
VOMITING: 0
NAUSEA: 0
SHORTNESS OF BREATH: 0

## 2020-07-11 ASSESSMENT — PAIN DESCRIPTION - LOCATION: LOCATION: GROIN

## 2020-07-11 NOTE — ED PROVIDER NOTES
[facility] ED  [address]  Phone: [phone]  eMERGENCY dEPARTMENT eNCOUnter      Pt Name: Carmen Lala  MRN: [id number]  [place] 1950  Date of evaluation: 7/11/20      CHIEF COMPLAINT     Chief Complaint   Patient presents with    Urinary Retention     pt states unable to urinate since 0400, woke up with groin pain. Pt has hx of kidney stones and 2 strokes in january. HISTORY OF PRESENT ILLNESS    Carmen Lala is a 69 y.o. male who presents today with complaints of of groin pain and inability to urinate. Has had a history of kidney stones in the past which caused him to have urinary obstruction at that time. He states that he tried to go and and best up the stones but he has a congenital horseshoe kidney and this caused some difficulty with that procedure. This was many years ago. Patient does also endorse a history of strokes back in January. Patient states that he tried to get into the shower to try and loosen up his back and to be able to void without any LOC. Has not noticed any fevers no chest pain or shortness of breath no nausea or vomiting. REVIEW OF SYSTEMS     Review of Systems   Constitutional: Positive for diaphoresis. Negative for fever. Respiratory: Negative for shortness of breath. Cardiovascular: Negative for chest pain. Gastrointestinal: Negative for nausea and vomiting. Genitourinary: Positive for difficulty urinating. Neurological: Negative for syncope. All other systems reviewed and are negative. PAST MEDICAL HISTORY    has a past medical history of Atrial fibrillation ([facility] [address] Chronic kidney disease, CKD (chronic kidney disease), Hyperlipidemia, Ischemic stroke ([facility] [address] PTSD (post-traumatic stress disorder), Stroke [facility]), and Stroke ([facility] [address] SURGICAL HISTORY      has a past surgical history that includes Appendectomy and transesophageal echocardiogram (01/17/2020).     CURRENT MEDICATIONS       Previous Medications ASPIRIN 81 MG EC TABLET    Take 1 tablet by mouth daily    ATORVASTATIN (LIPITOR) 40 MG TABLET    Take 1 tablet by mouth nightly    CHOLECALCIFEROL (VITAMIN D3) 25 MCG (1000 UT) CAPS    Take 2,000 Units by mouth daily    DABIGATRAN (PRADAXA) 150 MG CAPSULE    Take 1 capsule by mouth 2 times daily    EPINEPHRINE (EPIPEN 2-FARAZ) 0.3 MG/0.3ML SOAJ INJECTION    Inject 0.3 mLs into the muscle once for 1 dose Use as directed for allergic reaction    LATANOPROST (XALATAN) 0.005 % OPHTHALMIC SOLUTION    Place 1 drop into both eyes nightly    OMEGA-3 FATTY ACIDS (FISH OIL) 1000 MG CAPS    Take 1,000 mg by mouth 2 times daily    VITAMIN B1-B12 IM    Inject into the muscle every 30 days        ALLERGIES     has No Known Allergies. FAMILY HISTORY     He indicated that his mother is . He indicated that his father is . family history is not on file. SOCIAL HISTORY      reports that he has never smoked. He has never used smokeless tobacco. He reports current alcohol use. He reports that he does not use drugs. PHYSICAL EXAM     INITIAL VITALS:  height is 5' 7\" (1.702 m) and weight is 191 lb (86.6 kg). His tympanic temperature is 97.3 °F (36.3 °C). His pulse is 103. His respiration is 18 and oxygen saturation is 99%. Physical Exam  Vitals signs reviewed. Constitutional:       Appearance: Normal appearance. Comments: I am seeing the patient after Feng catheter has been placed. He is in no acute distress he states he feels much more comfortable. HENT:      Head: Normocephalic and atraumatic. Cardiovascular:      Rate and Rhythm: Normal rate and regular rhythm. Pulses: Normal pulses. Heart sounds: Normal heart sounds. Pulmonary:      Effort: Pulmonary effort is normal.      Breath sounds: Normal breath sounds. Abdominal:      Palpations: Abdomen is soft. Tenderness: There is no abdominal tenderness. There is no guarding or rebound.    Genitourinary:     Comments: [facility] nursing staff present for  examination. Feng catheter is in place. Testicles are non-tender non-erythemic no crepitus over the scrotum. No palpable hernia. Musculoskeletal: Normal range of motion. Right lower leg: No edema. Left lower leg: No edema. Skin:     General: Skin is warm and dry. Capillary Refill: Capillary refill takes less than 2 seconds. Findings: No rash. Neurological:      Mental Status: He is alert and oriented to person, place, and time. Mental status is at baseline. Psychiatric:         Mood and Affect: Mood normal.         Behavior: Behavior normal.       DIFFERENTIAL DIAGNOSIS / MDM / EMERGENCY DEPARTMENT COURSE:     Patient bladder scan showed over 700 mL's. Feng catheter was placed by nursing staff patient had relief of symptoms. Patient without evidence of UTI on urinalysis however there are RBCs. This may be traumatic placement from the Feng catheter however given his history of previous obstruction and  anomaly risks and benefits of CT scan renal survey were discussed. Patient is agreeable to proceed. Case signed out to the oncoming physician at approximately 8:00 in the morning due to change of shift. Awaiting CT results. I have reviewed the disposition diagnosis with the patient and or their family/guardian. I have answered their questions and givendischarge instructions. They voiced understanding of these instructions and did not have any further questions or complaints. DIAGNOSTIC RESULTS     EKG: All EKG's are interpreted by the Emergency Department Physician who either signs or Co-signs this chart inthe absence of a cardiologist.        RADIOLOGY:   I directly visualized the following plain film images and reviewed the radiologistinterpretations of radiologic studies:    [address]    (Results Pending)        No results found.       LABS:  Results for orders placed or performed during the hospital encounter of Esterase, Urine TRACE (A) NEGATIVE    Urinalysis Comments NOT REPORTED    Microscopic Urinalysis   Result Value Ref Range    -          WBC, UA 0 TO 4 0 - 4 /HPF    RBC, UA 10 TO 25 0 - 4 /HPF    Casts UA NOT REPORTED 0 - 2 /LPF    Crystals, UA NOT REPORTED None /HPF    Epithelial Cells UA None 0 - 5 /HPF    Renal Epithelial, UA NOT REPORTED 0 /HPF    Bacteria, UA None None    Mucus, UA NOT REPORTED None    Trichomonas, UA NOT REPORTED None    Amorphous, UA NOT REPORTED None    Other Observations UA NOT REPORTED NOT REQ. Yeast, UA NOT REPORTED None       EMERGENCY DEPARTMENT COURSE:   Vitals:    Vitals:    07/11/20 0640 07/11/20 0642   Pulse: 103    Resp: 18    Temp:  97.3 °F (36.3 °C)   TempSrc:  Tympanic   SpO2: 99%    Weight: 191 lb (86.6 kg)    Height: 5' 7\" (1.702 m)      -------------------------   , Temp: 97.3 °F (36.3 °C), Pulse: 103, Resp: 18      CONSULTS:  None    PROCEDURES:  None    FINAL IMPRESSION      1.  Acute urinary retention          DISPOSITION/PLAN   DISPOSITION Discharge - Pending Orders Complete 07/11/2020 08:02:01 AM      PATIENT REFERRED TO:  [facility]  [address] [phone]  In 2 days        DISCHARGE MEDICATIONS:  New Prescriptions    TAMSULOSIN (FLOMAX) 0.4 MG CAPSULE    Take 1 capsule by mouth daily for 7 doses         (Please note that portions of this note were completed with avoice recognition program.  Efforts were made to edit the dictations but occasionally words are mis-transcribed.)    Jewel Councilman, MD, [facility]  Attending Emergency Medicine Physician       Jewel Councilman, MD  07/11/20 6711

## 2020-07-12 ENCOUNTER — HOSPITAL ENCOUNTER (INPATIENT)
Age: 70
LOS: 1 days | Discharge: HOME | DRG: 726 | End: 2020-07-15
Attending: EMERGENCY MEDICINE | Admitting: FAMILY MEDICINE
Payer: OTHER GOVERNMENT

## 2020-07-12 PROBLEM — R31.9 HEMATURIA: Status: ACTIVE | Noted: 2020-07-12

## 2020-07-12 LAB
-: ABNORMAL
ABSOLUTE EOS #: 0.03 K/UL (ref 0–0.44)
ABSOLUTE IMMATURE GRANULOCYTE: <0.03 K/UL (ref 0–0.3)
ABSOLUTE LYMPH #: 1.22 K/UL (ref 1.1–3.7)
ABSOLUTE MONO #: 0.5 K/UL (ref 0.1–1.2)
AMORPHOUS: ABNORMAL
ANION GAP SERPL CALCULATED.3IONS-SCNC: 14 MMOL/L (ref 9–17)
BACTERIA: ABNORMAL
BASOPHILS # BLD: 0 % (ref 0–2)
BASOPHILS ABSOLUTE: <0.03 K/UL (ref 0–0.2)
BILIRUBIN URINE: NEGATIVE
BUN BLDV-MCNC: 14 MG/DL (ref 8–23)
BUN/CREAT BLD: 22 (ref 9–20)
CALCIUM SERPL-MCNC: 8.9 MG/DL (ref 8.6–10.4)
CASTS UA: ABNORMAL /LPF (ref 0–2)
CASTS UA: ABNORMAL /LPF (ref 0–2)
CHLORIDE BLD-SCNC: 101 MMOL/L (ref 98–107)
CO2: 22 MMOL/L (ref 20–31)
COLOR: ABNORMAL
COMMENT UA: ABNORMAL
CREAT SERPL-MCNC: 0.65 MG/DL (ref 0.7–1.2)
CRYSTALS, UA: ABNORMAL /HPF
DIFFERENTIAL TYPE: ABNORMAL
EOSINOPHILS RELATIVE PERCENT: 0 % (ref 1–4)
EPITHELIAL CELLS UA: ABNORMAL /HPF (ref 0–5)
GFR AFRICAN AMERICAN: >60 ML/MIN
GFR NON-AFRICAN AMERICAN: >60 ML/MIN
GFR SERPL CREATININE-BSD FRML MDRD: ABNORMAL ML/MIN/{1.73_M2}
GFR SERPL CREATININE-BSD FRML MDRD: ABNORMAL ML/MIN/{1.73_M2}
GLUCOSE BLD-MCNC: 126 MG/DL (ref 70–99)
GLUCOSE URINE: NEGATIVE
HCT VFR BLD CALC: 40.7 % (ref 40.7–50.3)
HEMOGLOBIN: 14.3 G/DL (ref 13–17)
IMMATURE GRANULOCYTES: 0 %
INR BLD: 1.1
KETONES, URINE: NEGATIVE
LEUKOCYTE ESTERASE, URINE: NEGATIVE
LYMPHOCYTES # BLD: 14 % (ref 24–43)
MCH RBC QN AUTO: 29.7 PG (ref 25.2–33.5)
MCHC RBC AUTO-ENTMCNC: 35.1 G/DL (ref 25.2–33.5)
MCV RBC AUTO: 84.6 FL (ref 82.6–102.9)
MONOCYTES # BLD: 6 % (ref 3–12)
MUCUS: ABNORMAL
NITRITE, URINE: NEGATIVE
NRBC AUTOMATED: 0 PER 100 WBC
OTHER OBSERVATIONS UA: ABNORMAL
PARTIAL THROMBOPLASTIN TIME: 30.3 SEC (ref 23.9–33.8)
PDW BLD-RTO: 12.9 % (ref 11.8–14.4)
PH UA: 6 (ref 5–6)
PLATELET # BLD: 146 K/UL (ref 138–453)
PLATELET ESTIMATE: ABNORMAL
PMV BLD AUTO: 9.6 FL (ref 8.1–13.5)
POTASSIUM SERPL-SCNC: 3.7 MMOL/L (ref 3.7–5.3)
PROTEIN UA: ABNORMAL
PROTHROMBIN TIME: 14 SEC (ref 11.5–14.2)
RBC # BLD: 4.81 M/UL (ref 4.21–5.77)
RBC # BLD: ABNORMAL 10*6/UL
RBC UA: >100 /HPF (ref 0–4)
RENAL EPITHELIAL, UA: ABNORMAL /HPF
SEG NEUTROPHILS: 80 % (ref 36–65)
SEGMENTED NEUTROPHILS ABSOLUTE COUNT: 7.07 K/UL (ref 1.5–8.1)
SODIUM BLD-SCNC: 137 MMOL/L (ref 135–144)
SPECIFIC GRAVITY UA: 1.03 (ref 1.01–1.02)
TRICHOMONAS: ABNORMAL
TURBIDITY: ABNORMAL
URINE HGB: ABNORMAL
UROBILINOGEN, URINE: NORMAL
WBC # BLD: 8.9 K/UL (ref 3.5–11.3)
WBC # BLD: ABNORMAL 10*3/UL
WBC UA: ABNORMAL /HPF (ref 0–4)
YEAST: ABNORMAL

## 2020-07-12 PROCEDURE — 6370000000 HC RX 637 (ALT 250 FOR IP): Performed by: EMERGENCY MEDICINE

## 2020-07-12 PROCEDURE — 2580000003 HC RX 258: Performed by: SPECIALIST

## 2020-07-12 PROCEDURE — 81001 URINALYSIS AUTO W/SCOPE: CPT

## 2020-07-12 PROCEDURE — 6370000000 HC RX 637 (ALT 250 FOR IP): Performed by: NURSE PRACTITIONER

## 2020-07-12 PROCEDURE — 36415 COLL VENOUS BLD VENIPUNCTURE: CPT

## 2020-07-12 PROCEDURE — G0378 HOSPITAL OBSERVATION PER HR: HCPCS

## 2020-07-12 PROCEDURE — 6370000000 HC RX 637 (ALT 250 FOR IP)

## 2020-07-12 PROCEDURE — 85610 PROTHROMBIN TIME: CPT

## 2020-07-12 PROCEDURE — 51702 INSERT TEMP BLADDER CATH: CPT

## 2020-07-12 PROCEDURE — 85025 COMPLETE CBC W/AUTO DIFF WBC: CPT

## 2020-07-12 PROCEDURE — 85730 THROMBOPLASTIN TIME PARTIAL: CPT

## 2020-07-12 PROCEDURE — 2580000003 HC RX 258: Performed by: NURSE PRACTITIONER

## 2020-07-12 PROCEDURE — 99284 EMERGENCY DEPT VISIT MOD MDM: CPT

## 2020-07-12 PROCEDURE — 80048 BASIC METABOLIC PNL TOTAL CA: CPT

## 2020-07-12 RX ORDER — TAMSULOSIN HYDROCHLORIDE 0.4 MG/1
0.4 CAPSULE ORAL DAILY
Status: DISCONTINUED | OUTPATIENT
Start: 2020-07-13 | End: 2020-07-15 | Stop reason: HOSPADM

## 2020-07-12 RX ORDER — LIDOCAINE HYDROCHLORIDE 20 MG/ML
JELLY TOPICAL
Status: COMPLETED
Start: 2020-07-12 | End: 2020-07-12

## 2020-07-12 RX ORDER — SODIUM CHLORIDE 9 MG/ML
1000 INJECTION, SOLUTION INTRAVENOUS CONTINUOUS
Status: DISCONTINUED | OUTPATIENT
Start: 2020-07-12 | End: 2020-07-13

## 2020-07-12 RX ORDER — NICOTINE 21 MG/24HR
1 PATCH, TRANSDERMAL 24 HOURS TRANSDERMAL DAILY PRN
Status: DISCONTINUED | OUTPATIENT
Start: 2020-07-12 | End: 2020-07-15 | Stop reason: HOSPADM

## 2020-07-12 RX ORDER — PHENAZOPYRIDINE HYDROCHLORIDE 100 MG/1
200 TABLET, FILM COATED ORAL ONCE
Status: COMPLETED | OUTPATIENT
Start: 2020-07-12 | End: 2020-07-12

## 2020-07-12 RX ORDER — SODIUM CHLORIDE 9 MG/ML
INJECTION, SOLUTION INTRAVENOUS CONTINUOUS
Status: DISCONTINUED | OUTPATIENT
Start: 2020-07-12 | End: 2020-07-15 | Stop reason: HOSPADM

## 2020-07-12 RX ORDER — ONDANSETRON 2 MG/ML
4 INJECTION INTRAMUSCULAR; INTRAVENOUS EVERY 6 HOURS PRN
Status: DISCONTINUED | OUTPATIENT
Start: 2020-07-12 | End: 2020-07-15 | Stop reason: HOSPADM

## 2020-07-12 RX ORDER — ATORVASTATIN CALCIUM 40 MG/1
40 TABLET, FILM COATED ORAL NIGHTLY
Status: DISCONTINUED | OUTPATIENT
Start: 2020-07-12 | End: 2020-07-15 | Stop reason: HOSPADM

## 2020-07-12 RX ORDER — SODIUM CHLORIDE 0.9 % (FLUSH) 0.9 %
10 SYRINGE (ML) INJECTION EVERY 12 HOURS SCHEDULED
Status: DISCONTINUED | OUTPATIENT
Start: 2020-07-12 | End: 2020-07-15 | Stop reason: HOSPADM

## 2020-07-12 RX ORDER — SODIUM CHLORIDE 0.9 % (FLUSH) 0.9 %
10 SYRINGE (ML) INJECTION PRN
Status: DISCONTINUED | OUTPATIENT
Start: 2020-07-12 | End: 2020-07-15 | Stop reason: HOSPADM

## 2020-07-12 RX ORDER — ACETAMINOPHEN 650 MG/1
650 SUPPOSITORY RECTAL EVERY 6 HOURS PRN
Status: DISCONTINUED | OUTPATIENT
Start: 2020-07-12 | End: 2020-07-15 | Stop reason: HOSPADM

## 2020-07-12 RX ORDER — PROMETHAZINE HYDROCHLORIDE 25 MG/1
12.5 TABLET ORAL EVERY 6 HOURS PRN
Status: DISCONTINUED | OUTPATIENT
Start: 2020-07-12 | End: 2020-07-15 | Stop reason: HOSPADM

## 2020-07-12 RX ORDER — LIDOCAINE HYDROCHLORIDE 20 MG/ML
JELLY TOPICAL PRN
Status: DISCONTINUED | OUTPATIENT
Start: 2020-07-12 | End: 2020-07-15 | Stop reason: HOSPADM

## 2020-07-12 RX ORDER — POLYETHYLENE GLYCOL 3350 17 G/17G
17 POWDER, FOR SOLUTION ORAL DAILY PRN
Status: DISCONTINUED | OUTPATIENT
Start: 2020-07-12 | End: 2020-07-15 | Stop reason: HOSPADM

## 2020-07-12 RX ORDER — CHLORAL HYDRATE 500 MG
1000 CAPSULE ORAL 2 TIMES DAILY
Status: DISCONTINUED | OUTPATIENT
Start: 2020-07-12 | End: 2020-07-15 | Stop reason: HOSPADM

## 2020-07-12 RX ORDER — LATANOPROST 50 UG/ML
1 SOLUTION/ DROPS OPHTHALMIC NIGHTLY
Status: DISCONTINUED | OUTPATIENT
Start: 2020-07-12 | End: 2020-07-15 | Stop reason: HOSPADM

## 2020-07-12 RX ORDER — ACETAMINOPHEN 325 MG/1
650 TABLET ORAL EVERY 6 HOURS PRN
Status: DISCONTINUED | OUTPATIENT
Start: 2020-07-12 | End: 2020-07-15 | Stop reason: HOSPADM

## 2020-07-12 RX ORDER — VITAMIN B COMPLEX
2000 TABLET ORAL DAILY
Status: DISCONTINUED | OUTPATIENT
Start: 2020-07-13 | End: 2020-07-15 | Stop reason: HOSPADM

## 2020-07-12 RX ADMIN — PHENAZOPYRIDINE HYDROCHLORIDE 200 MG: 100 TABLET ORAL at 19:06

## 2020-07-12 RX ADMIN — LATANOPROST 1 DROP: 50 SOLUTION OPHTHALMIC at 23:05

## 2020-07-12 RX ADMIN — ACETAMINOPHEN 650 MG: 325 TABLET ORAL at 23:05

## 2020-07-12 RX ADMIN — SODIUM CHLORIDE: 9 INJECTION, SOLUTION INTRAVENOUS at 23:01

## 2020-07-12 RX ADMIN — LIDOCAINE HYDROCHLORIDE: 20 JELLY TOPICAL at 18:30

## 2020-07-12 RX ADMIN — SODIUM CHLORIDE 1000 ML: 9 INJECTION, SOLUTION INTRAVENOUS at 21:48

## 2020-07-12 RX ADMIN — ATORVASTATIN CALCIUM 40 MG: 40 TABLET, FILM COATED ORAL at 23:05

## 2020-07-12 ASSESSMENT — PAIN SCALES - GENERAL
PAINLEVEL_OUTOF10: 7
PAINLEVEL_OUTOF10: 0
PAINLEVEL_OUTOF10: 2

## 2020-07-12 ASSESSMENT — PAIN DESCRIPTION - PROGRESSION: CLINICAL_PROGRESSION: NOT CHANGED

## 2020-07-12 ASSESSMENT — ENCOUNTER SYMPTOMS
CONSTIPATION: 0
TROUBLE SWALLOWING: 0
VOMITING: 0
WHEEZING: 0
NAUSEA: 0
SHORTNESS OF BREATH: 0
ABDOMINAL PAIN: 0
BLOOD IN STOOL: 0
SORE THROAT: 0
BACK PAIN: 0
DIARRHEA: 0

## 2020-07-12 ASSESSMENT — PAIN DESCRIPTION - LOCATION
LOCATION: HEAD
LOCATION: ABDOMEN

## 2020-07-12 ASSESSMENT — PAIN DESCRIPTION - PAIN TYPE
TYPE: ACUTE PAIN
TYPE: ACUTE PAIN

## 2020-07-12 ASSESSMENT — PAIN DESCRIPTION - ONSET: ONSET: ON-GOING

## 2020-07-12 ASSESSMENT — PAIN DESCRIPTION - DESCRIPTORS
DESCRIPTORS: HEADACHE
DESCRIPTORS: PRESSURE

## 2020-07-12 ASSESSMENT — PAIN DESCRIPTION - ORIENTATION: ORIENTATION: LOWER;MID

## 2020-07-12 ASSESSMENT — PAIN DESCRIPTION - FREQUENCY: FREQUENCY: CONTINUOUS

## 2020-07-12 NOTE — ED PROVIDER NOTES
[facility]  eMERGENCY dEPARTMENT eNCOUnter      Pt Name: Rachel Talbot  MRN: [id number]  [place] 1950  Date of evaluation: 7/12/2020      CHIEF COMPLAINT       Chief Complaint   Patient presents with    Urinary Catheter Problem     ? plugged    Hematuria         HISTORY OF PRESENT ILLNESS    Rachel Talbot is a 69 y.o. male who presents with chief complaint of a decreased urine output from his Feng cath was placed yesterday I am he did have some hematuria his labs including scan were unremarkable at that time patient is on aspirin and Pradaxa for previous strokes. There is been no fevers chills no nausea vomiting or diarrhea  Patient does have a history of A. fib    REVIEW OF SYSTEMS         Review of Systems   Constitutional: Negative for chills and fever. HENT: Negative for congestion, dental problem, sore throat and trouble swallowing. Eyes: Negative for visual disturbance. Respiratory: Negative for shortness of breath and wheezing. Cardiovascular: Negative for chest pain, palpitations and leg swelling. Gastrointestinal: Negative for abdominal pain, blood in stool, constipation, diarrhea, nausea and vomiting. Genitourinary: Positive for difficulty urinating and hematuria. Negative for dysuria and testicular pain. Musculoskeletal: Negative for back pain, joint swelling and neck pain. Skin: Negative for rash. Neurological: Negative for dizziness, syncope, weakness and headaches. Hematological: Negative for adenopathy. Does not bruise/bleed easily. Psychiatric/Behavioral: Negative for confusion and suicidal ideas. PAST MEDICAL HISTORY    has a past medical history of Atrial fibrillation ([facility] [address] Chronic kidney disease, CKD (chronic kidney disease), Hyperlipidemia, Ischemic stroke ([facility] [address] PTSD (post-traumatic stress disorder), Stroke [facility]), and Stroke ([facility] [address]     SURGICAL HISTORY      has a past surgical history that includes Appendectomy and transesophageal Abdominal:      General: Bowel sounds are normal.      Palpations: Abdomen is soft. Genitourinary:     Comments: Patient has a Feng catheter in place with a little bit of blood in the catheter is not draining testes are nontender penis is otherwise unremarkable  Musculoskeletal: Normal range of motion. Skin:     General: Skin is warm and dry. Neurological:      Mental Status: He is alert and oriented to person, place, and time. Psychiatric:         Behavior: Behavior normal.           DIFFERENTIAL DIAGNOSIS/ MDM:     Clogged Feng and most likely secondary to bleeding will attempt to irrigate that is unsuccessful replaced with a larger Feng    DIAGNOSTIC RESULTS     EKG: All EKG's are interpreted by the Emergency Department Physician who either signs or Co-signs this chart in the absence of a cardiologist.        RADIOLOGY:   I directly visualized the following  images and reviewed the radiologist interpretations:          ED BEDSIDE ULTRASOUND:       LABS:  Labs Reviewed   URINE RT REFLEX TO CULTURE           EMERGENCY DEPARTMENT COURSE:   Vitals:    Vitals:    07/12/20 1755 07/12/20 1815   BP: (!) 152/77 136/63   Pulse: 95 106   Resp: 18 18   Temp: 99 °F (37.2 °C)    SpO2: 96% 95%   Weight: 191 lb (86.6 kg)    Height: 5' 7\" (1.702 m)      -------------------------  BP: 136/63, Temp: 99 °F (37.2 °C), Pulse: 106, Resp: 18        Re-evaluation Notes    Feng is draining bloody urine patient is more comfortable urinalysis sent for study  CRITICAL CARE:   None        CONSULTS:      PROCEDURES:  None    FINAL IMPRESSION    No diagnosis found. DISPOSITION/PLAN   DISPOSITION care is passed to the oncoming physician at the end of my shift in stable condition    Condition on Disposition    Stable    PATIENT REFERRED TO:  No follow-up provider specified.     DISCHARGE MEDICATIONS:  New Prescriptions    No medications on file       (Please note that portions of this note were completed with a voice recognition program.  Efforts were made to edit the dictations but occasionally words are mis-transcribed.)    Soto MD, F.A.A.E.M.   Attending Emergency Physician                          Chanelle Esquivel MD  07/12/20 3730

## 2020-07-12 NOTE — ED TRIAGE NOTES
Patient had a recent kumari catheter inserted last night. Patient has had bleeding around catheter. feeling of severe pressure in lower abdomen. Patient with bloody urine.

## 2020-07-13 PROBLEM — F43.10 PTSD (POST-TRAUMATIC STRESS DISORDER): Status: RESOLVED | Noted: 2020-02-13 | Resolved: 2020-07-13

## 2020-07-13 PROBLEM — N20.0 KIDNEY STONE ON LEFT SIDE: Status: ACTIVE | Noted: 2020-07-13

## 2020-07-13 PROBLEM — L91.8 SKIN TAG: Status: RESOLVED | Noted: 2018-12-21 | Resolved: 2020-07-13

## 2020-07-13 PROBLEM — Q63.1 HORSESHOE KIDNEY: Status: ACTIVE | Noted: 2020-07-13

## 2020-07-13 LAB
ANION GAP SERPL CALCULATED.3IONS-SCNC: 14 MMOL/L (ref 9–17)
BUN BLDV-MCNC: 10 MG/DL (ref 8–23)
BUN/CREAT BLD: 16 (ref 9–20)
CALCIUM SERPL-MCNC: 8.9 MG/DL (ref 8.6–10.4)
CHLORIDE BLD-SCNC: 105 MMOL/L (ref 98–107)
CO2: 25 MMOL/L (ref 20–31)
CREAT SERPL-MCNC: 0.61 MG/DL (ref 0.7–1.2)
GFR AFRICAN AMERICAN: >60 ML/MIN
GFR NON-AFRICAN AMERICAN: >60 ML/MIN
GFR SERPL CREATININE-BSD FRML MDRD: ABNORMAL ML/MIN/{1.73_M2}
GFR SERPL CREATININE-BSD FRML MDRD: ABNORMAL ML/MIN/{1.73_M2}
GLUCOSE BLD-MCNC: 119 MG/DL (ref 70–99)
HCT VFR BLD CALC: 42.2 % (ref 40.7–50.3)
HEMOGLOBIN: 14.8 G/DL (ref 13–17)
INR BLD: 1.1
MCH RBC QN AUTO: 29.9 PG (ref 25.2–33.5)
MCHC RBC AUTO-ENTMCNC: 35.1 G/DL (ref 25.2–33.5)
MCV RBC AUTO: 85.3 FL (ref 82.6–102.9)
NRBC AUTOMATED: 0 PER 100 WBC
PDW BLD-RTO: 12.9 % (ref 11.8–14.4)
PLATELET # BLD: 152 K/UL (ref 138–453)
PMV BLD AUTO: 9.7 FL (ref 8.1–13.5)
POTASSIUM SERPL-SCNC: 3.8 MMOL/L (ref 3.7–5.3)
PROTHROMBIN TIME: 14.2 SEC (ref 11.5–14.2)
RBC # BLD: 4.95 M/UL (ref 4.21–5.77)
SODIUM BLD-SCNC: 144 MMOL/L (ref 135–144)
WBC # BLD: 9.8 K/UL (ref 3.5–11.3)

## 2020-07-13 PROCEDURE — G0378 HOSPITAL OBSERVATION PER HR: HCPCS

## 2020-07-13 PROCEDURE — 36415 COLL VENOUS BLD VENIPUNCTURE: CPT

## 2020-07-13 PROCEDURE — 6360000002 HC RX W HCPCS

## 2020-07-13 PROCEDURE — 6360000002 HC RX W HCPCS: Performed by: FAMILY MEDICINE

## 2020-07-13 PROCEDURE — 6370000000 HC RX 637 (ALT 250 FOR IP): Performed by: UROLOGY

## 2020-07-13 PROCEDURE — 6370000000 HC RX 637 (ALT 250 FOR IP)

## 2020-07-13 PROCEDURE — 93005 ELECTROCARDIOGRAM TRACING: CPT | Performed by: NURSE PRACTITIONER

## 2020-07-13 PROCEDURE — 6370000000 HC RX 637 (ALT 250 FOR IP): Performed by: FAMILY MEDICINE

## 2020-07-13 PROCEDURE — 2580000003 HC RX 258: Performed by: NURSE PRACTITIONER

## 2020-07-13 PROCEDURE — 96375 TX/PRO/DX INJ NEW DRUG ADDON: CPT

## 2020-07-13 PROCEDURE — 99223 1ST HOSP IP/OBS HIGH 75: CPT | Performed by: FAMILY MEDICINE

## 2020-07-13 PROCEDURE — 80048 BASIC METABOLIC PNL TOTAL CA: CPT

## 2020-07-13 PROCEDURE — 85610 PROTHROMBIN TIME: CPT

## 2020-07-13 PROCEDURE — 6370000000 HC RX 637 (ALT 250 FOR IP): Performed by: NURSE PRACTITIONER

## 2020-07-13 PROCEDURE — 85027 COMPLETE CBC AUTOMATED: CPT

## 2020-07-13 PROCEDURE — APPSS30 APP SPLIT SHARED TIME 16-30 MINUTES: Performed by: NURSE PRACTITIONER

## 2020-07-13 PROCEDURE — 96374 THER/PROPH/DIAG INJ IV PUSH: CPT

## 2020-07-13 PROCEDURE — 94761 N-INVAS EAR/PLS OXIMETRY MLT: CPT

## 2020-07-13 RX ORDER — LIDOCAINE HYDROCHLORIDE 20 MG/ML
JELLY TOPICAL ONCE
Status: COMPLETED | OUTPATIENT
Start: 2020-07-13 | End: 2020-07-13

## 2020-07-13 RX ORDER — KETOROLAC TROMETHAMINE 30 MG/ML
30 INJECTION, SOLUTION INTRAMUSCULAR; INTRAVENOUS ONCE
Status: COMPLETED | OUTPATIENT
Start: 2020-07-13 | End: 2020-07-13

## 2020-07-13 RX ORDER — MORPHINE SULFATE 2 MG/ML
2 INJECTION, SOLUTION INTRAMUSCULAR; INTRAVENOUS EVERY 4 HOURS PRN
Status: DISCONTINUED | OUTPATIENT
Start: 2020-07-13 | End: 2020-07-13

## 2020-07-13 RX ORDER — PHENAZOPYRIDINE HYDROCHLORIDE 100 MG/1
TABLET, FILM COATED ORAL
Status: DISPENSED
Start: 2020-07-13 | End: 2020-07-13

## 2020-07-13 RX ORDER — KETOROLAC TROMETHAMINE 30 MG/ML
INJECTION, SOLUTION INTRAMUSCULAR; INTRAVENOUS
Status: COMPLETED
Start: 2020-07-13 | End: 2020-07-13

## 2020-07-13 RX ORDER — ATROPA BELLADONNA AND OPIUM 16.2; 3 MG/1; MG/1
30 SUPPOSITORY RECTAL EVERY 6 HOURS SCHEDULED
Status: DISCONTINUED | OUTPATIENT
Start: 2020-07-13 | End: 2020-07-15 | Stop reason: HOSPADM

## 2020-07-13 RX ORDER — PHENAZOPYRIDINE HYDROCHLORIDE 100 MG/1
200 TABLET, FILM COATED ORAL ONCE
Status: COMPLETED | OUTPATIENT
Start: 2020-07-13 | End: 2020-07-13

## 2020-07-13 RX ORDER — PHENAZOPYRIDINE HYDROCHLORIDE 100 MG/1
TABLET, FILM COATED ORAL
Status: COMPLETED
Start: 2020-07-13 | End: 2020-07-13

## 2020-07-13 RX ORDER — TAMSULOSIN HYDROCHLORIDE 0.4 MG/1
0.4 CAPSULE ORAL DAILY
Qty: 30 CAPSULE | Refills: 11 | Status: SHIPPED | OUTPATIENT
Start: 2020-07-13 | End: 2020-08-31

## 2020-07-13 RX ORDER — MORPHINE SULFATE 2 MG/ML
2 INJECTION, SOLUTION INTRAMUSCULAR; INTRAVENOUS
Status: DISCONTINUED | OUTPATIENT
Start: 2020-07-13 | End: 2020-07-15 | Stop reason: HOSPADM

## 2020-07-13 RX ORDER — CEPHALEXIN 500 MG/1
500 CAPSULE ORAL 3 TIMES DAILY
Qty: 9 CAPSULE | Refills: 0 | Status: SHIPPED | OUTPATIENT
Start: 2020-07-13 | End: 2020-07-16

## 2020-07-13 RX ORDER — ASPIRIN 81 MG/1
81 TABLET ORAL DAILY
Status: DISCONTINUED | OUTPATIENT
Start: 2020-07-13 | End: 2020-07-15 | Stop reason: HOSPADM

## 2020-07-13 RX ORDER — DABIGATRAN ETEXILATE 75 MG/1
150 CAPSULE, COATED PELLETS ORAL 2 TIMES DAILY
Status: DISCONTINUED | OUTPATIENT
Start: 2020-07-13 | End: 2020-07-15 | Stop reason: HOSPADM

## 2020-07-13 RX ORDER — ATROPA BELLADONNA AND OPIUM 16.2; 6 MG/1; MG/1
60 SUPPOSITORY RECTAL EVERY 8 HOURS PRN
Status: DISCONTINUED | OUTPATIENT
Start: 2020-07-13 | End: 2020-07-13

## 2020-07-13 RX ORDER — ATROPA BELLADONNA AND OPIUM 16.2; 3 MG/1; MG/1
30 SUPPOSITORY RECTAL EVERY 8 HOURS PRN
Status: DISCONTINUED | OUTPATIENT
Start: 2020-07-13 | End: 2020-07-13

## 2020-07-13 RX ADMIN — PHENAZOPYRIDINE HYDROCHLORIDE 200 MG: 100 TABLET, FILM COATED ORAL at 09:28

## 2020-07-13 RX ADMIN — DABIGATRAN ETEXILATE MESYLATE 150 MG: 75 CAPSULE ORAL at 21:50

## 2020-07-13 RX ADMIN — ATORVASTATIN CALCIUM 40 MG: 40 TABLET, FILM COATED ORAL at 21:50

## 2020-07-13 RX ADMIN — VITAMIN D, TAB 1000IU (100/BT) 2000 UNITS: 25 TAB at 08:45

## 2020-07-13 RX ADMIN — ACETAMINOPHEN 650 MG: 325 TABLET ORAL at 05:05

## 2020-07-13 RX ADMIN — ASPIRIN 81 MG: 81 TABLET, COATED ORAL at 21:50

## 2020-07-13 RX ADMIN — KETOROLAC TROMETHAMINE 30 MG: 30 INJECTION, SOLUTION INTRAMUSCULAR; INTRAVENOUS at 14:23

## 2020-07-13 RX ADMIN — LATANOPROST 1 DROP: 50 SOLUTION OPHTHALMIC at 21:50

## 2020-07-13 RX ADMIN — LIDOCAINE HYDROCHLORIDE: 20 JELLY TOPICAL at 13:17

## 2020-07-13 RX ADMIN — SODIUM CHLORIDE: 9 INJECTION, SOLUTION INTRAVENOUS at 05:06

## 2020-07-13 RX ADMIN — MORPHINE SULFATE 2 MG: 2 INJECTION, SOLUTION INTRAMUSCULAR; INTRAVENOUS at 11:21

## 2020-07-13 RX ADMIN — SODIUM CHLORIDE: 9 INJECTION, SOLUTION INTRAVENOUS at 22:09

## 2020-07-13 RX ADMIN — SODIUM CHLORIDE: 9 INJECTION, SOLUTION INTRAVENOUS at 14:29

## 2020-07-13 RX ADMIN — KETOROLAC TROMETHAMINE 30 MG: 30 INJECTION, SOLUTION INTRAMUSCULAR at 14:23

## 2020-07-13 RX ADMIN — ATROPA BELLADONNA AND OPIUM 30 MG: 16.2; 3 SUPPOSITORY RECTAL at 17:32

## 2020-07-13 RX ADMIN — PHENAZOPYRIDINE HYDROCHLORIDE 200 MG: 100 TABLET ORAL at 09:28

## 2020-07-13 RX ADMIN — ATROPA BELLADONNA AND OPIUM 30 MG: 16.2; 3 SUPPOSITORY RECTAL at 13:17

## 2020-07-13 RX ADMIN — TAMSULOSIN HYDROCHLORIDE 0.4 MG: 0.4 CAPSULE ORAL at 08:45

## 2020-07-13 ASSESSMENT — PAIN SCALES - GENERAL
PAINLEVEL_OUTOF10: 0
PAINLEVEL_OUTOF10: 8
PAINLEVEL_OUTOF10: 0
PAINLEVEL_OUTOF10: 4
PAINLEVEL_OUTOF10: 4
PAINLEVEL_OUTOF10: 8

## 2020-07-13 ASSESSMENT — PAIN DESCRIPTION - PROGRESSION

## 2020-07-13 ASSESSMENT — PAIN DESCRIPTION - FREQUENCY: FREQUENCY: CONTINUOUS

## 2020-07-13 ASSESSMENT — PAIN DESCRIPTION - PAIN TYPE: TYPE: ACUTE PAIN

## 2020-07-13 ASSESSMENT — PAIN DESCRIPTION - ONSET: ONSET: ON-GOING

## 2020-07-13 ASSESSMENT — PAIN DESCRIPTION - LOCATION: LOCATION: PENIS

## 2020-07-13 ASSESSMENT — PAIN DESCRIPTION - DESCRIPTORS: DESCRIPTORS: DISCOMFORT;SHARP

## 2020-07-13 NOTE — FLOWSHEET NOTE
Assessment: Patient is being released today. He is looking forward to being at home. His wife is present for support. Patient is an [employer]  having served 14 months in [employer] [facility]. He was given an appreciation pin. Intervention: Engaged in conversation. Patient expressed appreciation for visit and offer of continued prayer.     Plan: Chaplains are available on site or on call 24/7 for spiritual and emotional support.     07/13/20 1152   Encounter Summary   Services provided to: Patient and family together   Referral/Consult From: [address] [place] [address] Family members   Continue Visiting   (7/13/20)   Complexity of Encounter Moderate   Length of Encounter 15 minutes   Spiritual/Hinduism   Type Spiritual support   Assessment Approachable   Intervention Active listening;Sustaining presence/ Ministry of presence   Outcome Expressed gratitude;Engaged in conversation

## 2020-07-13 NOTE — ED NOTES
Continuous irrigation initated. Aniya urine appearing in catheter. Patient states he is still having some pressure.       Boyce Homans, RN  07/12/20 2045

## 2020-07-13 NOTE — H&P
HOSPITALIST ADMISSION H&P      REASON FOR ADMISSION:  Hematuria, urinary retention  ESTIMATED LENGTH OF STAY:<2 midnights, 1-2 days    ATTENDING/ADMITTING PHYSICIAN: Bill Reynolds MD  PCP: Sultana Lemus MD    HISTORY OF PRESENT ILLNESS:      The patient is a 69 y.o. male patient of Margo LOVE MD who presents from the ER with c/o low urine output and hematuria through his kumari catheter which was placed in ER on 07/11/2020 due to acute urinary retention. He c/o associated suprapubic pressure. He denies nausea, emesis, or fevers/chills. He had a CT of the abdomen and pelvis done in ER on 07/11/2020, which showed horseshoe kidney with left-sided nephrolithiasis. No evidence for urinary obstruction. Prostatomegaly. Generalized bladder wall thickening likely secondary to outlet obstruction. In ER, urinalysis showed hematuria without acute infection. His kumari catheter was changed out and continuous bladder irrigation was initiated. His pradaxa and aspirin were held overnight. Afebrile, WBC 8.9. Hemoglobin stable at 14.3. Permanent atrial fibrillation, had refused anticoagulation previously, then and multiple ischemic strokes in January 2020 --> pradaxa and aspirin    Wounds and LDAs present prior to admission: triple lumen kumari catheter placed in ER     See below for additional PMH. Patient qqrv-[id number]-[id number]-available records reviewed, including, but not limited to ER records, imaging results, lab results, office records, personal records, and OARRS -- no signs of abuse or diversion.      Past Medical History:   Diagnosis Date    Atrial fibrillation ([facility] [address] [address] 01/2020    Chronic kidney disease     CKD (chronic kidney disease)     Hyperlipidemia     Ischemic stroke ([address] 01/10/2020    PTSD (post-traumatic stress disorder)     Stroke ([address] 01/16/2020    MRI Brain WO: New right PCA distribution infarct, multifocal left MCA distribution infarcts    Stroke ([facility] [address] 01/11/2020    large vessel occlusion/left M2 occlusion. He subsequently underwent emergent thrombectomy.  /  S/P TPA DONE         Past Surgical History:   Procedure Laterality Date    APPENDECTOMY      TRANSESOPHAGEAL ECHOCARDIOGRAM  01/17/2020       Medications Prior to Admission:    Medications Prior to Admission: tamsulosin (FLOMAX) 0.4 MG capsule, Take 1 capsule by mouth daily for 7 doses  latanoprost (XALATAN) 0.005 % ophthalmic solution, Place 1 drop into both eyes nightly  Cholecalciferol (VITAMIN D3) 25 MCG (1000 UT) CAPS, Take 2,000 Units by mouth daily  dabigatran (PRADAXA) 150 MG capsule, Take 1 capsule by mouth 2 times daily  aspirin 81 MG EC tablet, Take 1 tablet by mouth daily  Omega-3 Fatty Acids (FISH OIL) 1000 MG CAPS, Take 1,000 mg by mouth 2 times daily  atorvastatin (LIPITOR) 40 MG tablet, Take 1 tablet by mouth nightly  VITAMIN B1-B12 IM, Inject into the muscle every 30 days   EPINEPHrine (EPIPEN 2-FARAZ) 0.3 MG/0.3ML SOAJ injection, Inject 0.3 mLs into the muscle once for 1 dose Use as directed for allergic reaction    Allergies:    Patient has no known allergies. Social History:    reports that he has never smoked. He has never used smokeless tobacco. He reports current alcohol use. He reports that he does not use drugs. Family History:   family history is not on file. REVIEW OF SYSTEMS:  See HPI and problem list; otherwise no other new complaints with respect to eyes, ENT, neck, pulmonary, coronary, chest, GI, , endocrine, musculoskeletal, immune system/connective tissue disease, hematologic, neurologic, psychiatric, skin, lymphatics, or malignancies. Code status discussed with patient/family--wishes for Full Code at this time.     PHYSICAL EXAM:  Vitals:  /62   Pulse 77   Temp 98.1 °F (36.7 °C) (Tympanic)   Resp 16   Ht 5' 7\" (1.702 m)   Wt 191 lb (86.6 kg)   SpO2 96%   BMI 29.91 kg/m²     General: awake, alert and cooperative  HEENT: Mucosa Pink, Moist, External nose normal, Normocephalic, Atraumatic and Neck with full ROM  Neck: Supple, Carotid Pulses Present, No Masses, Tenderness, Nodularity and No Lymphadenopathy  Chest/Lungs: Clear to Auscultation without Rales, Rhonchi, or Wheezes and Respirations even and unlabored  Cardiac: rate controlled, Irregularly Irregular and Pedal Pulses Palpable Bilaterally  GI/Abdomen: no CVA tenderness, Bowel Sounds Present and Soft, Non-tender, without Guarding or Rebound Tenderness  : urine dark yellow with mild sediment, Not examined and kumari catheter present  Extremities/Musculoskeletal: All four extremities without edema  Skin: No Cyanosis, No rash and Skin warm and dry  Neuro: previous CVAs, Alert and Oriented, to Person, to Time, to Place and to Situation  Psychiatric: Normal mood and affect and anxious      LABS:    CBC with Differential:    Lab Results   Component Value Date    WBC 9.8 07/13/2020    RBC 4.95 07/13/2020    HGB 14.8 07/13/2020    HCT 42.2 07/13/2020     07/13/2020    MCV 85.3 07/13/2020    MCH 29.9 07/13/2020    MCHC 35.1 07/13/2020    RDW 12.9 07/13/2020    LYMPHOPCT 14 07/12/2020    MONOPCT 6 07/12/2020    BASOPCT 0 07/12/2020    MONOSABS 0.50 07/12/2020    LYMPHSABS 1.22 07/12/2020    EOSABS 0.03 07/12/2020    BASOSABS <0.03 07/12/2020    DIFFTYPE NOT REPORTED 07/12/2020     CMP:    Lab Results   Component Value Date     07/13/2020    K 3.8 07/13/2020     07/13/2020    CO2 25 07/13/2020    BUN 10 07/13/2020    CREATININE 0.61 07/13/2020    GFRAA >60 07/13/2020    LABGLOM >60 07/13/2020    GLUCOSE 119 07/13/2020    PROT 7.1 05/20/2020    LABALBU 4.0 05/20/2020    CALCIUM 8.9 07/13/2020    BILITOT 0.53 05/20/2020    ALKPHOS 104 05/20/2020    AST 21 05/20/2020    ALT 19 05/20/2020       ASSESSMENT:      Patient Active Problem List   Diagnosis    Skin tag    Corneal foreign body, right, initial encounter    Glaucoma suspect of both eyes    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    Combined forms of age-related cataract of both eyes    [facility] ([facility]A) [facility] [address] [facility]    History of atrial fibrillation    Tissue plasminogen activator (tPA) administered at other facility within 24 hours before current admission    Nihss score 10    Atrial fibrillation (HCC)    Acute ischemic stroke ([facility] [address]    History of ischemic stroke in prior three months    Left homonymous hemianopsia    PTSD (post-traumatic stress disorder)    Hyperlipidemia    Stroke, recent, without late effect    Bilateral carotid artery stenosis    Chronic cerebral ischemia    Memory problem    Gait difficulty    Balance problem    At high risk for stroke    Risk for falls    Cerebrovascular accident (CVA) due to embolism of right posterior cerebral artery (HCC)    Bilateral hemianopia    Dysphagia    Hematuria       PLAN:    1. Urinary retention -- improved with kumari change out in ER -- continuous bladder irrigation to prevent clotting, urology consulted   2. Hematuria -- pradaxa and aspirin held, continuous bladder irrigation, monitor hemoglobin, need to resume home pradaxa and aspirin as soon as okay with urology  3. Home medications reviewed  4. See orders     Addendum created for attending physician to add their attestation.      Note that over 50 minutes was spent in evaluation of the patient, review of the chart and pertinent records, discussion with family/staff, etc    JIMBO Dominguez, FNP-BC  7:52 AM  7/13/2020

## 2020-07-13 NOTE — PLAN OF CARE
Problem: Pain:  Goal: Pain level will decrease  Description: Pain level will decrease  Outcome: Ongoing  Goal: Control of acute pain  Description: Control of acute pain  Outcome: Ongoing  Goal: Control of chronic pain  Description: Control of chronic pain  Outcome: Ongoing     Problem: SAFETY  Goal: Free from accidental physical injury  Outcome: Ongoing  Goal: Free from intentional harm  Outcome: Ongoing     Problem: DAILY CARE  Goal: Daily care needs are met  Outcome: Ongoing

## 2020-07-13 NOTE — CONSULTS
club or organization: Not on file     Attends meetings of clubs or organizations: Not on file     Relationship status: Not on file    Intimate partner violence     Fear of current or ex partner: Not on file     Emotionally abused: Not on file     Physically abused: Not on file     Forced sexual activity: Not on file   Other Topics Concern    Not on file   Social History Narrative    Not on file       Family History:  History reviewed. No pertinent family history. REVIEW OF SYSTEMS:  Constitutional: negative  Eyes: negative  Respiratory: negative  Cardiovascular: negative  Gastrointestinal: negative  Genitourinary: see HPI  Musculoskeletal: negative  Skin: negative   Neurological: negative  Hematological/Lymphatic: negative  Psychological: negative        Physical Exam:      Patient Vitals for the past 24 hrs:   BP Temp Temp src Pulse Resp SpO2 Height Weight   07/13/20 0941 -- -- -- -- 16 98 % -- --   07/13/20 0742 128/62 98.1 °F (36.7 °C) Tympanic 77 16 96 % -- --   07/13/20 0641 -- -- -- -- -- -- -- 191 lb (86.6 kg)   07/13/20 0347 (!) 147/73 96.6 °F (35.9 °C) Tympanic 96 16 95 % -- --   07/12/20 2255 (!) 173/90 97.2 °F (36.2 °C) Tympanic 100 16 96 % 5' 7\" (1.702 m) 188 lb (85.3 kg)   07/12/20 2153 (!) 172/98 -- -- 98 16 96 % -- --   07/12/20 2040 (!) 161/72 -- -- 113 18 99 % -- --   07/12/20 1930 (!) 122/58 -- -- 83 18 97 % -- --   07/12/20 1815 136/63 -- -- 106 18 95 % -- --   07/12/20 1755 (!) 152/77 99 °F (37.2 °C) -- 95 18 96 % 5' 7\" (1.702 m) 191 lb (86.6 kg)     Constitutional: Patient in no acute distress; Neuro: alert and oriented to person place and time. Psych: Mood and affect normal.  Skin: Normal  Lungs: Respiratory effort normal, CTA  Cardiovascular:  Normal peripheral pulses; Regular rate   Abdomen: Soft, non-tender, non-distended with no CVA, flank pain, hepatosplenomegaly or hernia. Kidneys normal.  Bladder non-tender and not distended.   Lymphatics: no palpable lymphadenopathy  Minimal/no edema in bilateral lower extremities        LABS:   Recent Labs     07/11/20  0701 07/12/20  2101 07/13/20  0509   WBC 5.8 8.9 9.8   HGB 13.6 14.3 14.8   HCT 38.6* 40.7 42.2   MCV 85.0 84.6 85.3    146 152     Recent Labs     07/11/20  0701 07/12/20  2101 07/13/20  0509    137 144   K 3.5* 3.7 3.8    101 105   CO2 24 22 25   BUN 15 14 10   CREATININE 0.66* 0.65* 0.61*     Lab Results   Component Value Date    PSA 3.93 05/20/2020       Additional Lab/Culture results: none    Urinalysis:   Recent Labs     07/12/20  1859   COLORU NOT REPORTED   PHUR 6.0   WBCUA None   RBCUA >100   MUCUS 2+*   TRICHOMONAS NOT REPORTED   YEAST NOT REPORTED   BACTERIA None   SPECGRAV 1.030*   LEUKOCYTESUR NEGATIVE   UROBILINOGEN Normal   BILIRUBINUR NEGATIVE        -----------------------------------------------------------------  Imaging Reviewed during this encounter:     Horacio Cruz MD independently reviewed the images and verified the radiology reports from:    Horseshoe kidney.  Left-sided nephrolithiasis.  No evidence for urinary    obstruction.         Prostatomegaly.  Generalized bladder wall thickening likely secondary to    outlet obstruction.           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

## 2020-07-13 NOTE — ED PROVIDER NOTES
ADDENDUM:      Care of this patient was assumed from Dr. Sukhdev Baeza. The patient was seen for Urinary Catheter Problem (? plugged) and Hematuria  . The patient's initial evaluation and plan have been discussed with the prior provider who initially evaluated the patient. Nursing Notes, Past Medical Hx, Past Surgical Hx, Social Hx, Allergies, and Family Hx were all reviewed. Diagnostic Results     EKG   None obtained    RADIOLOGY:   Non-plain film images such as CT, Ultrasound and MRI are read by the radiologist. [place] radiographic images are visualized and the radiologist interpretations are reviewed as follows:     Ct Abdomen Pelvis Wo Contrast    Result Date: 7/11/2020  EXAMINATION: CT OF THE ABDOMEN AND PELVIS WITHOUT CONTRAST 7/11/2020 8:11 am TECHNIQUE: CT of the abdomen and pelvis was performed without the administration of intravenous contrast. Multiplanar reformatted images are provided for review. Dose modulation, iterative reconstruction, and/or weight based adjustment of the mA/kV was utilized to reduce the radiation dose to as low as reasonably achievable. COMPARISON: None. HISTORY: ORDERING SYSTEM PROVIDED HISTORY: Pain TECHNOLOGIST PROVIDED HISTORY: Pain Reason for Exam: General abdomen discomfort. Urinary retention. Patient has a history of kidney stones in the past, with lithotripsy. No GI related symptoms. Appendectomy. Acuity: Acute Type of Exam: Initial FINDINGS: Lower Chest: Visualized lung bases are clear without focal airspace consolidation, sizeable effusion, or pneumothorax. No definite pulmonary nodules or masses. Base of the heart is normal in size without pericardial fluid collection. Organs: The liver, gallbladder, pancreas, and spleen are grossly within normal limits. No evidence for intrahepatic or extrahepatic biliary ductal dilatation. GI/Bowel: There is no evidence for bowel obstruction or inflammation. No free intraperitoneal air or fluid.   Small bowel loops are normal in caliber. No evidence for hiatal hernia. Appendix is absent. Pelvis: No evidence for free fluid. Peritoneum/Retroperitoneum: Adrenal glands are normal in size and configuration. Horseshoe kidney. Left-sided nephrolithiasis. Urinary bladder is thick-walled and partially decompressed by Feng catheter. The prostate is enlarged. Generalized bladder wall thickening is likely secondary to outlet obstruction. Vasculature: The aorta is normal in caliber. No definite lymphadenopathy identified. Bones/Soft Tissues: No evidence for acute fracture or dislocation. No lytic or blastic lesions. Horseshoe kidney. Left-sided nephrolithiasis. No evidence for urinary obstruction. Prostatomegaly. Generalized bladder wall thickening likely secondary to outlet obstruction. LABS:   Results for orders placed or performed during the hospital encounter of 07/12/20   Urinalysis Reflex to Culture    Specimen: Urine, straight catheter   Result Value Ref Range    Color, UA NOT REPORTED YELLOW    Turbidity UA NOT REPORTED CLEAR    Glucose, Ur NEGATIVE NEGATIVE    Bilirubin Urine NEGATIVE NEGATIVE    Ketones, Urine NEGATIVE NEGATIVE    Specific Gravity, UA 1.030 (H) 1.010 - 1.025    Urine Hgb 3+ (A) NEGATIVE    pH, UA 6.0 5.0 - 6.0    Protein, UA 3+ (A) NEGATIVE    Urobilinogen, Urine Normal Normal    Nitrite, Urine NEGATIVE NEGATIVE    Leukocyte Esterase, Urine NEGATIVE NEGATIVE    Urinalysis Comments NOT REPORTED    Microscopic Urinalysis   Result Value Ref Range    -          WBC, UA None 0 - 4 /HPF    RBC, UA >100 0 - 4 /HPF    Casts UA 5 TO 10 0 - 2 /LPF    Casts UA HYALINE 0 - 2 /LPF    Crystals, UA NOT REPORTED None /HPF    Epithelial Cells UA 5 TO 10 0 - 5 /HPF    Renal Epithelial, UA NOT REPORTED 0 /HPF    Bacteria, UA None None    Mucus, UA 2+ (A) None    Trichomonas, UA NOT REPORTED None    Amorphous, UA 4+ (A) None    Other Observations UA NOT REPORTED NOT REQ.     Yeast, UA NOT REPORTED None   CBC Auto mg    0.9 % sodium chloride infusion    atorvastatin (LIPITOR) tablet 40 mg    Vitamin D (CHOLECALCIFEROL) tablet 2,000 Units    latanoprost (XALATAN) 0.005 % ophthalmic solution 1 drop    fish oil capsule 1,000 mg    tamsulosin (FLOMAX) capsule 0.4 mg    0.9 % sodium chloride infusion    sodium chloride flush 0.9 % injection 10 mL    sodium chloride flush 0.9 % injection 10 mL    OR Linked Order Group     acetaminophen (TYLENOL) tablet 650 mg     acetaminophen (TYLENOL) suppository 650 mg    polyethylene glycol (GLYCOLAX) packet 17 g    OR Linked Order Group     promethazine (PHENERGAN) tablet 12.5 mg     ondansetron (ZOFRAN) injection 4 mg    nicotine (NICODERM CQ) 21 MG/24HR 1 patch     During the emergency department course, patient was given Pyridium 200 mg orally and Feng catheter was changed to the larger size after which patient was able to void freely and irrigation was also performed. Medical Decision Making      Patient had recurrence of urinary retention and three-way Feng catheter irrigation was performed until his hematuria resolved. Patient is feeling much better and resting comfortably. IV normal saline infusion was started. Patient will benefit from a hospitalization. CBC and basic metabolic panel were obtained. I discussed the case with Yo Vazquez CNP covering for hospitalist group and she kindly accepted the patient to be admitted on medical floor and consult urology as an inpatient. Disposition     FINAL IMPRESSION      1. Acute urinary retention    2.  Gross hematuria          DISPOSITION/PLAN   DISPOSITION Admitted 07/12/2020 09:57:53 PM      PATIENT REFERRED TO:  Elliot Garcia MD  [address] [place] [address]  [phone]            DISCHARGE MEDICATIONS:  Current Discharge Medication List                (Please note that portions of this note were completed with a voice recognition program.  Efforts were made to edit the dictations but occasionally words are mis-transcribed.)    Carballo MD, F.A.C.E.P.   Attending Emergency Medicine Physician               Tez Ordaz MD  07/13/20 [id number]

## 2020-07-13 NOTE — PLAN OF CARE
Problem: Pain:  Goal: Pain level will decrease  Description: Pain level will decrease  7/13/2020 1945 by Julius Bran RN  Outcome: Ongoing  7/13/2020 1632 by Joce Flaherty RN  Outcome: Ongoing  Goal: Control of acute pain  Description: Control of acute pain  7/13/2020 1945 by Julius Bran RN  Outcome: Ongoing  7/13/2020 1632 by Joce Flaherty RN  Outcome: Ongoing  Goal: Control of chronic pain  Description: Control of chronic pain  7/13/2020 1945 by Julius Bran RN  Outcome: Ongoing  7/13/2020 1632 by Joce Flaherty RN  Outcome: Ongoing     Problem: SAFETY  Goal: Free from accidental physical injury  7/13/2020 1945 by Julius Bran RN  Outcome: Ongoing  7/13/2020 1632 by Joce Flaherty RN  Outcome: Ongoing  Goal: Free from intentional harm  7/13/2020 1945 by Julius Bran RN  Outcome: Ongoing  7/13/2020 1632 by Joce Flaherty RN  Outcome: Ongoing     Problem: DAILY CARE  Goal: Daily care needs are met  7/13/2020 1945 by Julius Bran RN  Outcome: Ongoing  7/13/2020 1632 by Joce Flaherty RN  Outcome: Ongoing     Problem: Infection:  Goal: Will remain free from infection  Description: Will remain free from infection  7/13/2020 1945 by Julius Bran RN  Outcome: Ongoing  7/13/2020 1632 by Joce Flaherty RN  Outcome: Ongoing     Problem: Skin Integrity:  Goal: Skin integrity will stabilize  Description: Skin integrity will stabilize  7/13/2020 1945 by Julius Bran RN  Outcome: Ongoing  7/13/2020 1632 by Joce Flaherty RN  Outcome: Ongoing     Problem: Discharge Planning:  Goal: Patients continuum of care needs are met  Description: Patients continuum of care needs are met  7/13/2020 1945 by Julius Bran RN  Outcome: Ongoing  7/13/2020 1632 by Joce Flaherty RN  Outcome: Ongoing

## 2020-07-14 LAB
ABSOLUTE EOS #: 0.09 K/UL (ref 0–0.44)
ABSOLUTE IMMATURE GRANULOCYTE: 0.03 K/UL (ref 0–0.3)
ABSOLUTE LYMPH #: 1.1 K/UL (ref 1.1–3.7)
ABSOLUTE MONO #: 0.76 K/UL (ref 0.1–1.2)
ANION GAP SERPL CALCULATED.3IONS-SCNC: 11 MMOL/L (ref 9–17)
BASOPHILS # BLD: 0 % (ref 0–2)
BASOPHILS ABSOLUTE: <0.03 K/UL (ref 0–0.2)
BUN BLDV-MCNC: 10 MG/DL (ref 8–23)
BUN/CREAT BLD: 14 (ref 9–20)
CALCIUM SERPL-MCNC: 8.2 MG/DL (ref 8.6–10.4)
CHLORIDE BLD-SCNC: 107 MMOL/L (ref 98–107)
CO2: 26 MMOL/L (ref 20–31)
CREAT SERPL-MCNC: 0.72 MG/DL (ref 0.7–1.2)
DIFFERENTIAL TYPE: ABNORMAL
EKG ATRIAL RATE: 111 BPM
EKG Q-T INTERVAL: 346 MS
EKG QRS DURATION: 76 MS
EKG QTC CALCULATION (BAZETT): 430 MS
EKG R AXIS: 0 DEGREES
EKG T AXIS: 8 DEGREES
EKG VENTRICULAR RATE: 93 BPM
EOSINOPHILS RELATIVE PERCENT: 1 % (ref 1–4)
GFR AFRICAN AMERICAN: >60 ML/MIN
GFR NON-AFRICAN AMERICAN: >60 ML/MIN
GFR SERPL CREATININE-BSD FRML MDRD: ABNORMAL ML/MIN/{1.73_M2}
GFR SERPL CREATININE-BSD FRML MDRD: ABNORMAL ML/MIN/{1.73_M2}
GLUCOSE BLD-MCNC: 93 MG/DL (ref 70–99)
HCT VFR BLD CALC: 36.2 % (ref 40.7–50.3)
HEMOGLOBIN: 12.3 G/DL (ref 13–17)
IMMATURE GRANULOCYTES: 0 %
LYMPHOCYTES # BLD: 13 % (ref 24–43)
MCH RBC QN AUTO: 29.9 PG (ref 25.2–33.5)
MCHC RBC AUTO-ENTMCNC: 34 G/DL (ref 25.2–33.5)
MCV RBC AUTO: 87.9 FL (ref 82.6–102.9)
MONOCYTES # BLD: 9 % (ref 3–12)
NRBC AUTOMATED: 0 PER 100 WBC
PDW BLD-RTO: 13 % (ref 11.8–14.4)
PLATELET # BLD: 117 K/UL (ref 138–453)
PLATELET ESTIMATE: ABNORMAL
PMV BLD AUTO: 9.7 FL (ref 8.1–13.5)
POTASSIUM SERPL-SCNC: 4 MMOL/L (ref 3.7–5.3)
RBC # BLD: 4.12 M/UL (ref 4.21–5.77)
RBC # BLD: ABNORMAL 10*6/UL
SEG NEUTROPHILS: 77 % (ref 36–65)
SEGMENTED NEUTROPHILS ABSOLUTE COUNT: 6.64 K/UL (ref 1.5–8.1)
SODIUM BLD-SCNC: 144 MMOL/L (ref 135–144)
WBC # BLD: 8.6 K/UL (ref 3.5–11.3)
WBC # BLD: ABNORMAL 10*3/UL

## 2020-07-14 PROCEDURE — 80048 BASIC METABOLIC PNL TOTAL CA: CPT

## 2020-07-14 PROCEDURE — 6370000000 HC RX 637 (ALT 250 FOR IP): Performed by: NURSE PRACTITIONER

## 2020-07-14 PROCEDURE — 6370000000 HC RX 637 (ALT 250 FOR IP): Performed by: UROLOGY

## 2020-07-14 PROCEDURE — 6360000002 HC RX W HCPCS: Performed by: FAMILY MEDICINE

## 2020-07-14 PROCEDURE — 85025 COMPLETE CBC W/AUTO DIFF WBC: CPT

## 2020-07-14 PROCEDURE — 2580000003 HC RX 258: Performed by: NURSE PRACTITIONER

## 2020-07-14 PROCEDURE — 94760 N-INVAS EAR/PLS OXIMETRY 1: CPT

## 2020-07-14 PROCEDURE — 99231 SBSQ HOSP IP/OBS SF/LOW 25: CPT | Performed by: FAMILY MEDICINE

## 2020-07-14 PROCEDURE — 96376 TX/PRO/DX INJ SAME DRUG ADON: CPT

## 2020-07-14 PROCEDURE — 36415 COLL VENOUS BLD VENIPUNCTURE: CPT

## 2020-07-14 PROCEDURE — G0378 HOSPITAL OBSERVATION PER HR: HCPCS

## 2020-07-14 RX ORDER — OXYBUTYNIN CHLORIDE 5 MG/1
10 TABLET, EXTENDED RELEASE ORAL DAILY
Status: DISCONTINUED | OUTPATIENT
Start: 2020-07-14 | End: 2020-07-15 | Stop reason: HOSPADM

## 2020-07-14 RX ORDER — TROSPIUM CHLORIDE 20 MG/1
20 TABLET, FILM COATED ORAL
Status: DISCONTINUED | OUTPATIENT
Start: 2020-07-14 | End: 2020-07-15 | Stop reason: HOSPADM

## 2020-07-14 RX ORDER — TROSPIUM CHLORIDE ER 60 MG/1
60 CAPSULE ORAL DAILY
Status: DISCONTINUED | OUTPATIENT
Start: 2020-07-14 | End: 2020-07-14 | Stop reason: DRUGHIGH

## 2020-07-14 RX ADMIN — ATROPA BELLADONNA AND OPIUM 30 MG: 16.2; 3 SUPPOSITORY RECTAL at 16:36

## 2020-07-14 RX ADMIN — TAMSULOSIN HYDROCHLORIDE 0.4 MG: 0.4 CAPSULE ORAL at 09:38

## 2020-07-14 RX ADMIN — ACETAMINOPHEN 650 MG: 325 TABLET ORAL at 02:52

## 2020-07-14 RX ADMIN — LATANOPROST 1 DROP: 50 SOLUTION OPHTHALMIC at 21:59

## 2020-07-14 RX ADMIN — ATROPA BELLADONNA AND OPIUM 30 MG: 16.2; 3 SUPPOSITORY RECTAL at 00:50

## 2020-07-14 RX ADMIN — ACETAMINOPHEN 650 MG: 325 TABLET ORAL at 16:36

## 2020-07-14 RX ADMIN — Medication 10 ML: at 21:57

## 2020-07-14 RX ADMIN — MORPHINE SULFATE 2 MG: 2 INJECTION, SOLUTION INTRAMUSCULAR; INTRAVENOUS at 21:29

## 2020-07-14 RX ADMIN — SODIUM CHLORIDE: 9 INJECTION, SOLUTION INTRAVENOUS at 13:10

## 2020-07-14 RX ADMIN — OXYBUTYNIN CHLORIDE 10 MG: 5 TABLET, EXTENDED RELEASE ORAL at 12:00

## 2020-07-14 RX ADMIN — DABIGATRAN ETEXILATE MESYLATE 150 MG: 75 CAPSULE ORAL at 21:57

## 2020-07-14 RX ADMIN — TROSPIUM CHLORIDE 20 MG: 20 TABLET, FILM COATED ORAL at 16:36

## 2020-07-14 RX ADMIN — DABIGATRAN ETEXILATE MESYLATE 150 MG: 75 CAPSULE ORAL at 09:38

## 2020-07-14 RX ADMIN — ATROPA BELLADONNA AND OPIUM 30 MG: 16.2; 3 SUPPOSITORY RECTAL at 06:31

## 2020-07-14 RX ADMIN — MORPHINE SULFATE 2 MG: 2 INJECTION, SOLUTION INTRAMUSCULAR; INTRAVENOUS at 13:10

## 2020-07-14 RX ADMIN — ATORVASTATIN CALCIUM 40 MG: 40 TABLET, FILM COATED ORAL at 21:57

## 2020-07-14 RX ADMIN — VITAMIN D, TAB 1000IU (100/BT) 2000 UNITS: 25 TAB at 09:38

## 2020-07-14 RX ADMIN — ASPIRIN 81 MG: 81 TABLET, COATED ORAL at 09:38

## 2020-07-14 RX ADMIN — ATROPA BELLADONNA AND OPIUM 30 MG: 16.2; 3 SUPPOSITORY RECTAL at 23:07

## 2020-07-14 RX ADMIN — SODIUM CHLORIDE: 9 INJECTION, SOLUTION INTRAVENOUS at 19:21

## 2020-07-14 RX ADMIN — SODIUM CHLORIDE: 9 INJECTION, SOLUTION INTRAVENOUS at 05:23

## 2020-07-14 RX ADMIN — ATROPA BELLADONNA AND OPIUM 30 MG: 16.2; 3 SUPPOSITORY RECTAL at 12:01

## 2020-07-14 ASSESSMENT — PAIN DESCRIPTION - DESCRIPTORS
DESCRIPTORS: SPASM
DESCRIPTORS: SHARP
DESCRIPTORS: SPASM
DESCRIPTORS: SPASM

## 2020-07-14 ASSESSMENT — PAIN DESCRIPTION - FREQUENCY
FREQUENCY: INTERMITTENT

## 2020-07-14 ASSESSMENT — PAIN SCALES - GENERAL
PAINLEVEL_OUTOF10: 0
PAINLEVEL_OUTOF10: 4
PAINLEVEL_OUTOF10: 10
PAINLEVEL_OUTOF10: 1
PAINLEVEL_OUTOF10: 4
PAINLEVEL_OUTOF10: 7
PAINLEVEL_OUTOF10: 2
PAINLEVEL_OUTOF10: 5
PAINLEVEL_OUTOF10: 6
PAINLEVEL_OUTOF10: 0
PAINLEVEL_OUTOF10: 3
PAINLEVEL_OUTOF10: 10
PAINLEVEL_OUTOF10: 0

## 2020-07-14 ASSESSMENT — PAIN DESCRIPTION - PROGRESSION
CLINICAL_PROGRESSION: NOT CHANGED

## 2020-07-14 ASSESSMENT — PAIN DESCRIPTION - LOCATION
LOCATION: OTHER (COMMENT)
LOCATION: OTHER (COMMENT)
LOCATION: GROIN
LOCATION: OTHER (COMMENT)

## 2020-07-14 ASSESSMENT — PAIN DESCRIPTION - PAIN TYPE
TYPE: ACUTE PAIN

## 2020-07-14 ASSESSMENT — PAIN DESCRIPTION - ORIENTATION: ORIENTATION: RIGHT;LEFT

## 2020-07-14 ASSESSMENT — PAIN DESCRIPTION - ONSET: ONSET: ON-GOING

## 2020-07-14 ASSESSMENT — PAIN - FUNCTIONAL ASSESSMENT: PAIN_FUNCTIONAL_ASSESSMENT: ACTIVITIES ARE NOT PREVENTED

## 2020-07-14 NOTE — PROGRESS NOTES
Writer continues to message Dr Vladislav Davis to keep updated with pt's symptoms. Pt continues to have severe intermittent bladder spasms that have no responded to any current meds or treatments such as warm compress, standing/walking, and/or repositioning. Orders received to start [address] and to discontinue kumari catheter at 0800 tomorrow. Pt aware of new orders.  Electronically signed by Eunice Putnam RN on 7/14/2020 at 3:10 PM

## 2020-07-14 NOTE — FLOWSHEET NOTE
Dr Stacey Greenfield updated with urine color and pt's frequent bladder spasms. Orders received to stop CBI and update Dr in a couple of hours.

## 2020-07-14 NOTE — PROGRESS NOTES
(1.702 m)   Wt 192 lb 3.2 oz (87.2 kg)   SpO2 98%   BMI 30.10 kg/m²   General appearance: alert, appears stated age and cooperative  Skin: Skin color, texture, turgor normal. No rashes or lesions  Lungs: clear to auscultation bilaterally  Heart: regular rate and rhythm, S1, S2 normal, no murmur, click, rub or gallop  Abdomen: soft, non-tender; bowel sounds normal; no masses,  no organomegaly  Extremities: extremities normal, atraumatic, no cyanosis or edema  Neurologic: Mental status: Alert, oriented, thought content appropriate    Prophylaxis:   DVT with  [] lovenox        [] heparin        [] Scd        [x] none:     Radiology:  No results found. Assessment :   1. Hematuria/enlarged prostate/ bladder irrigation/add  oxybutrin  2. Continue bladder irrigation     Plan:   1. See order  2.       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     Combined forms of age-related cataract of both eyes     Acute cerebrovascular accident (CVA) ([address]     Tissue plasminogen activator (tPA) administered at other facility within 24 hours before current admission     Permanent atrial fibrillation     Acute ischemic stroke ([address]     Left homonymous hemianopsia     Hyperlipidemia     Stroke, recent, without late effect     Bilateral carotid artery stenosis     Chronic cerebral ischemia     Memory problem     Gait difficulty     Balance problem     At high risk for stroke     Risk for falls     Cerebrovascular accident (CVA) due to embolism of right posterior cerebral artery ([address]     Bilateral hemianopia     Dysphagia     Hematuria     PTSD (post-traumatic stress disorder)     Acute urinary retention     Horseshoe kidney     Kidney stone on left side      Anticipated Disposition upon discharge: [] Home                                                                         [] Home with Home Health [] [facility]                                                                         [] [address] ,[address]      Patient is admitted as inpatient status because of co-morbidities listed above, severity of signs and symptoms as outlined, requirement for current medical therapies and most importantly because of direct risk to patient if care not provided in a hospital setting.           Dwayne Camargo MD  Rounding Hospitalist

## 2020-07-14 NOTE — PLAN OF CARE
Problem: Pain:  Goal: Pain level will decrease  Description: Pain level will decrease  Outcome: Ongoing  Goal: Control of acute pain  Description: Control of acute pain  Outcome: Ongoing  Goal: Control of chronic pain  Description: Control of chronic pain  Outcome: Ongoing     Problem: SAFETY  Goal: Free from accidental physical injury  Outcome: Ongoing  Goal: Free from intentional harm  Outcome: Ongoing     Problem: DAILY CARE  Goal: Daily care needs are met  Outcome: Ongoing     Problem: Infection:  Goal: Will remain free from infection  Description: Will remain free from infection  Outcome: Ongoing     Problem: Skin Integrity:  Goal: Skin integrity will stabilize  Description: Skin integrity will stabilize  Outcome: Ongoing     Problem: Discharge Planning:  Goal: Patients continuum of care needs are met  Description: Patients continuum of care needs are met  Outcome: Ongoing     Problem: Urinary Elimination:  Goal: Signs and symptoms of infection will decrease  Description: Signs and symptoms of infection will decrease  Outcome: Ongoing  Goal: Complications related to the disease process, condition or treatment will be avoided or minimized  Description: Complications related to the disease process, condition or treatment will be avoided or minimized  Outcome: Ongoing

## 2020-07-15 VITALS
SYSTOLIC BLOOD PRESSURE: 128 MMHG | DIASTOLIC BLOOD PRESSURE: 69 MMHG | HEART RATE: 94 BPM | BODY MASS INDEX: 30.01 KG/M2 | TEMPERATURE: 98.1 F | OXYGEN SATURATION: 96 % | RESPIRATION RATE: 16 BRPM | HEIGHT: 67 IN | WEIGHT: 191.2 LBS

## 2020-07-15 LAB
ABSOLUTE EOS #: 0.12 K/UL (ref 0–0.44)
ABSOLUTE IMMATURE GRANULOCYTE: 0.03 K/UL (ref 0–0.3)
ABSOLUTE LYMPH #: 1.09 K/UL (ref 1.1–3.7)
ABSOLUTE MONO #: 0.56 K/UL (ref 0.1–1.2)
ANION GAP SERPL CALCULATED.3IONS-SCNC: 9 MMOL/L (ref 9–17)
BASOPHILS # BLD: 0 % (ref 0–2)
BASOPHILS ABSOLUTE: <0.03 K/UL (ref 0–0.2)
BUN BLDV-MCNC: 9 MG/DL (ref 8–23)
BUN/CREAT BLD: 14 (ref 9–20)
CALCIUM SERPL-MCNC: 8.6 MG/DL (ref 8.6–10.4)
CHLORIDE BLD-SCNC: 110 MMOL/L (ref 98–107)
CO2: 26 MMOL/L (ref 20–31)
CREAT SERPL-MCNC: 0.64 MG/DL (ref 0.7–1.2)
DIFFERENTIAL TYPE: ABNORMAL
EOSINOPHILS RELATIVE PERCENT: 2 % (ref 1–4)
GFR AFRICAN AMERICAN: >60 ML/MIN
GFR NON-AFRICAN AMERICAN: >60 ML/MIN
GFR SERPL CREATININE-BSD FRML MDRD: ABNORMAL ML/MIN/{1.73_M2}
GFR SERPL CREATININE-BSD FRML MDRD: ABNORMAL ML/MIN/{1.73_M2}
GLUCOSE BLD-MCNC: 82 MG/DL (ref 70–99)
HCT VFR BLD CALC: 33.8 % (ref 40.7–50.3)
HEMOGLOBIN: 11.3 G/DL (ref 13–17)
IMMATURE GRANULOCYTES: 0 %
LYMPHOCYTES # BLD: 15 % (ref 24–43)
MCH RBC QN AUTO: 29.5 PG (ref 25.2–33.5)
MCHC RBC AUTO-ENTMCNC: 33.4 G/DL (ref 25.2–33.5)
MCV RBC AUTO: 88.3 FL (ref 82.6–102.9)
MONOCYTES # BLD: 8 % (ref 3–12)
NRBC AUTOMATED: 0 PER 100 WBC
PDW BLD-RTO: 13 % (ref 11.8–14.4)
PLATELET # BLD: 121 K/UL (ref 138–453)
PLATELET ESTIMATE: ABNORMAL
PMV BLD AUTO: 9.8 FL (ref 8.1–13.5)
POTASSIUM SERPL-SCNC: 3.6 MMOL/L (ref 3.7–5.3)
RBC # BLD: 3.83 M/UL (ref 4.21–5.77)
RBC # BLD: ABNORMAL 10*6/UL
SEG NEUTROPHILS: 75 % (ref 36–65)
SEGMENTED NEUTROPHILS ABSOLUTE COUNT: 5.27 K/UL (ref 1.5–8.1)
SODIUM BLD-SCNC: 145 MMOL/L (ref 135–144)
WBC # BLD: 7.1 K/UL (ref 3.5–11.3)
WBC # BLD: ABNORMAL 10*3/UL

## 2020-07-15 PROCEDURE — 2060000000 HC ICU INTERMEDIATE R&B

## 2020-07-15 PROCEDURE — 6370000000 HC RX 637 (ALT 250 FOR IP): Performed by: NURSE PRACTITIONER

## 2020-07-15 PROCEDURE — 85025 COMPLETE CBC W/AUTO DIFF WBC: CPT

## 2020-07-15 PROCEDURE — 6370000000 HC RX 637 (ALT 250 FOR IP): Performed by: FAMILY MEDICINE

## 2020-07-15 PROCEDURE — 51702 INSERT TEMP BLADDER CATH: CPT

## 2020-07-15 PROCEDURE — 2580000003 HC RX 258: Performed by: NURSE PRACTITIONER

## 2020-07-15 PROCEDURE — 6370000000 HC RX 637 (ALT 250 FOR IP): Performed by: UROLOGY

## 2020-07-15 PROCEDURE — 6360000002 HC RX W HCPCS: Performed by: FAMILY MEDICINE

## 2020-07-15 PROCEDURE — 80048 BASIC METABOLIC PNL TOTAL CA: CPT

## 2020-07-15 PROCEDURE — 94760 N-INVAS EAR/PLS OXIMETRY 1: CPT

## 2020-07-15 PROCEDURE — 36415 COLL VENOUS BLD VENIPUNCTURE: CPT

## 2020-07-15 PROCEDURE — 99238 HOSP IP/OBS DSCHRG MGMT 30/<: CPT | Performed by: FAMILY MEDICINE

## 2020-07-15 PROCEDURE — 96376 TX/PRO/DX INJ SAME DRUG ADON: CPT

## 2020-07-15 PROCEDURE — G0378 HOSPITAL OBSERVATION PER HR: HCPCS

## 2020-07-15 RX ORDER — TROSPIUM CHLORIDE 20 MG/1
20 TABLET, FILM COATED ORAL
Qty: 60 TABLET | Refills: 0 | Status: SHIPPED | OUTPATIENT
Start: 2020-07-15 | End: 2020-08-19

## 2020-07-15 RX ORDER — OXYBUTYNIN CHLORIDE 10 MG/1
10 TABLET, EXTENDED RELEASE ORAL DAILY
Qty: 30 TABLET | Refills: 0 | Status: SHIPPED | OUTPATIENT
Start: 2020-07-16 | End: 2020-08-17 | Stop reason: SDUPTHER

## 2020-07-15 RX ORDER — HYDROCODONE BITARTRATE AND ACETAMINOPHEN 5; 325 MG/1; MG/1
1 TABLET ORAL EVERY 6 HOURS PRN
Qty: 10 TABLET | Refills: 0 | Status: SHIPPED | OUTPATIENT
Start: 2020-07-15 | End: 2020-07-18

## 2020-07-15 RX ORDER — POTASSIUM CHLORIDE 20 MEQ/1
40 TABLET, EXTENDED RELEASE ORAL ONCE
Status: COMPLETED | OUTPATIENT
Start: 2020-07-15 | End: 2020-07-15

## 2020-07-15 RX ADMIN — TROSPIUM CHLORIDE 20 MG: 20 TABLET, FILM COATED ORAL at 06:48

## 2020-07-15 RX ADMIN — MORPHINE SULFATE 2 MG: 2 INJECTION, SOLUTION INTRAMUSCULAR; INTRAVENOUS at 10:19

## 2020-07-15 RX ADMIN — POTASSIUM CHLORIDE 40 MEQ: 20 TABLET, EXTENDED RELEASE ORAL at 08:31

## 2020-07-15 RX ADMIN — MORPHINE SULFATE 2 MG: 2 INJECTION, SOLUTION INTRAMUSCULAR; INTRAVENOUS at 00:42

## 2020-07-15 RX ADMIN — ACETAMINOPHEN 650 MG: 325 TABLET ORAL at 12:43

## 2020-07-15 RX ADMIN — DABIGATRAN ETEXILATE MESYLATE 150 MG: 75 CAPSULE ORAL at 08:31

## 2020-07-15 RX ADMIN — ATROPA BELLADONNA AND OPIUM 30 MG: 16.2; 3 SUPPOSITORY RECTAL at 06:50

## 2020-07-15 RX ADMIN — VITAMIN D, TAB 1000IU (100/BT) 2000 UNITS: 25 TAB at 08:31

## 2020-07-15 RX ADMIN — PROMETHAZINE HYDROCHLORIDE 12.5 MG: 25 TABLET ORAL at 02:13

## 2020-07-15 RX ADMIN — ASPIRIN 81 MG: 81 TABLET, COATED ORAL at 08:31

## 2020-07-15 RX ADMIN — TAMSULOSIN HYDROCHLORIDE 0.4 MG: 0.4 CAPSULE ORAL at 08:31

## 2020-07-15 RX ADMIN — ATROPA BELLADONNA AND OPIUM 30 MG: 16.2; 3 SUPPOSITORY RECTAL at 11:31

## 2020-07-15 RX ADMIN — ACETAMINOPHEN 650 MG: 325 TABLET ORAL at 00:29

## 2020-07-15 RX ADMIN — OXYBUTYNIN CHLORIDE 10 MG: 5 TABLET, EXTENDED RELEASE ORAL at 08:31

## 2020-07-15 RX ADMIN — SODIUM CHLORIDE: 9 INJECTION, SOLUTION INTRAVENOUS at 03:30

## 2020-07-15 ASSESSMENT — PAIN SCALES - GENERAL
PAINLEVEL_OUTOF10: 3
PAINLEVEL_OUTOF10: 8
PAINLEVEL_OUTOF10: 9
PAINLEVEL_OUTOF10: 4
PAINLEVEL_OUTOF10: 8
PAINLEVEL_OUTOF10: 5
PAINLEVEL_OUTOF10: 0

## 2020-07-15 ASSESSMENT — PAIN DESCRIPTION - FREQUENCY
FREQUENCY: INTERMITTENT

## 2020-07-15 ASSESSMENT — PAIN SCALES - WONG BAKER
WONGBAKER_NUMERICALRESPONSE: 0

## 2020-07-15 ASSESSMENT — PAIN DESCRIPTION - LOCATION
LOCATION: PENIS
LOCATION: GROIN;PENIS
LOCATION: GROIN;PENIS

## 2020-07-15 ASSESSMENT — PAIN DESCRIPTION - DESCRIPTORS
DESCRIPTORS: PRESSURE;SPASM
DESCRIPTORS: SPASM
DESCRIPTORS: SPASM

## 2020-07-15 ASSESSMENT — PAIN DESCRIPTION - ONSET: ONSET: ON-GOING

## 2020-07-15 ASSESSMENT — PAIN DESCRIPTION - PAIN TYPE
TYPE: ACUTE PAIN

## 2020-07-15 NOTE — FLOWSHEET NOTE
Messaged  Julio NP originnally at 2232 r/t pt c/o left chest pain to shoulder increased pain with inspiration stating an 8 on pain scale. Motrin given but requesting something stronger. Ekg showed non spec ST and T wave abnormality with prolonged QTand informed Cal[place] NP. Mag resulted in 1.5 and k was 3.4 and coverage given. Communicated she would consult cardiology when she spoke to me on the phone. Pt had fallen asleep but when awakened for potassium and mag replacement, requesting ''shot given'' in ED. Pantera Frias NP. Monitor reads NSR. Patient has no SOB, diaphoresis. States he recently relocated here from [address] [place] [address] as his son is involved with drugs and he wanted to leave to start anew with his son. States his son is currently in intermediate for drug related problems.

## 2020-07-15 NOTE — PROGRESS NOTES
This RN removed pt's kumari catheter as directed at 0830 to do voiding trial.  Pt unable to void at this time, c/o significant pain and pressure, with scant amt blood coming from penis. Dr. Hannah Noe nurse, [facility], informed-instructed to reinsert kumari, and another voiding trial will be done after pt is seen in office outpt on Monday. Pt is also to be discharged on both Sanctura and Ditropan XL. Gurdeep Bustillos RN/Dr. Tiffanie Maxwell as well as pt et wife informed.   Afshan Kraus RN

## 2020-07-15 NOTE — DISCHARGE SUMMARY
Discharge Summary    Marcio Sauceda Patient Status:  Observation    1950 MRN [id number]   Location [address] Day # 0 PCP Carole Rodriguez MD     Date of Admission: 2020    Date of Discharge: 7/15/2020    Admitting Diagnosis: Hematuria [R31.9]    Discharge Diagnosis: Principal Problem:    Hematuria  Active Problems:    Acute urinary retention    Horseshoe kidney    Kidney stone on left side  Resolved Problems:    * No resolved hospital problems. *      Reason for Admission/HPI: Presented with gross hematuria was seen in the ER previous day and was discharged with kumari catheter for urinary retrention  H/o horse shoe kidney and left non obstructing nephrolithiasis  Hospital Course: Patient was on continuous bladder irrigation per urology ,kumari was. Discontinued the next morning but had large clots so was reinserted by urology. Has had bladder spasms,kumari was discontinued this morning and has been able to void,no more blood in urine.   Consultations: Urology    Procedures: Bladder irrigation    Complications: None    Disposition: Home    Discharge Condition: good    Discharge Medications:    Xiomara Milton   Home Medication Instructions [facility]    Printed on:07/15/20 2188   Medication Information                      aspirin 81 MG EC tablet  Take 1 tablet by mouth daily             atorvastatin (LIPITOR) 40 MG tablet  Take 1 tablet by mouth nightly             cephALEXin (KEFLEX) 500 MG capsule  Take 1 capsule by mouth 3 times daily for 3 days             Cholecalciferol (VITAMIN D3) 25 MCG (1000 UT) CAPS  Take 2,000 Units by mouth daily             dabigatran (PRADAXA) 150 MG capsule  Take 1 capsule by mouth 2 times daily             EPINEPHrine (EPIPEN 2-FARAZ) 0.3 MG/0.3ML SOAJ injection  Inject 0.3 mLs into the muscle once for 1 dose Use as directed for allergic reaction             latanoprost (XALATAN) 0.005 % ophthalmic solution  Place 1 drop into both eyes nightly             Omega-3 Fatty Acids (FISH OIL) 1000 MG CAPS  Take 1,000 mg by mouth 2 times daily             tamsulosin (FLOMAX) 0.4 MG capsule  Take 1 capsule by mouth daily             VITAMIN B1-B12 IM  Inject into the muscle every 30 days                  Follow up Visits:  Follow-up with urology as scheduled,f/u with PCP in 1 week      Total Time spent with patient and coordinating care:   Justin Gilbert MD  7/15/2020  9:41 AM

## 2020-07-15 NOTE — PROGRESS NOTES
SW attempted to meet with patient to complete Psychosocial Assessment. Patient unavailable at this time. SW will continue to monitor needs and assist as appropriate.          FRANNIE Winslow  7/15/2020

## 2020-07-16 ENCOUNTER — CARE COORDINATION (OUTPATIENT)
Dept: CASE MANAGEMENT | Age: 70
End: 2020-07-16

## 2020-07-16 PROCEDURE — 1111F DSCHRG MED/CURRENT MED MERGE: CPT | Performed by: INTERNAL MEDICINE

## 2020-07-16 NOTE — CARE COORDINATION
[facility] Transitions Initial Follow Up Call    Call within 2 business days of discharge: Yes    Patient: Geraldine Pérez Patient : 1950   MRN: <[id number]>  Reason for Admission: acute urinary retention, gross hematuria  Discharge Date: 7/15/20 RARS: Readmission Risk Score: 19      Last Discharge [address] [address]       Complaint Diagnosis Description Type Department Provider    20 Urinary Catheter Problem; Hematuria Acute urinary retention . .. ED to Hosp-Admission (Discharged) (ADMITTED) Jorge Alberto Mederos. .. Spoke with: Jonathan Montero and his wife Bobby Delgado    Call to pt and his wife who states he is doing \"real good\". States kumari is draining well without any blood. States urine remains clear, yellow. States understanding to monitor for odor, cloudiness or blood  States understanding he is to resume blood thinners. Conf[facility]  at [phone]  States he slept well after talking tylenol  States he has epi pen for possible reaction to peppers or the dysphagia was result of CVA- will discuss this with Dr Salinas Sample next week       Challenges to be reviewed by the provider   Additional needs identified to be addressed with provider No      Discussed COVID-19 related testing which was not done at this time. Test results were not done. Patient informed of results, if available? Not discussed          Method of communication with provider : none    Advance Care Planning:   Does patient have an Advance Directive:  reviewed and current. Was this a readmission? No  Patient stated reason for admission: n/   Patients top risk factors for readmission: lack of knowledge about disease and medical condition    Care Transition Nurse (CTN) contacted the patient by telephone to perform post hospital discharge assessment. Verified name and  with patient as identifiers. Provided introduction to self, and explanation of the CTN role.      CTN reviewed discharge instructions, medical action plan and red flags with patient who verbalized understanding. Patient given an opportunity to ask questions and does not have any further questions or concerns at this time. Were discharge instructions available to patient? Yes. Reviewed appropriate site of care based on symptoms and resources available to patient including: PCP, Specialist, When to call 911 and ctn. The patient agrees to contact the PCP office for questions related to their healthcare. Medication reconciliation was performed with patient, who verbalizes understanding of administration of home medications. Advised obtaining a 90-day supply of all daily and as-needed medications. Covid Risk Education    Patient has following risk factors of: no known risk factors. Education provided regarding infection prevention, and signs and symptoms of COVID-19 and when to seek medical attention with patient who verbalized understanding. Discussed exposure protocols and quarantine From CDC: Are you at higher risk for severe illness?   and given an opportunity for questions and concerns. The patient agrees to contact the COVID-19 hotline [phone] or PCP office for questions related to COVID-19. For more information on steps you can take to protect yourself, see CDC's How to Protect Yourself     Discussed follow-up appointments. If no appointment was previously scheduled, appointment scheduling offered: already scheduled. Is follow up appointment scheduled within 7 days of discharge? Yes  Non-[facility] follow up appointment(s): n/a     Plan for follow-up call in 7-10 days based on severity of symptoms and risk factors. Plan for next call: symptom management-urinary retention/ blood in urine and routine follow up   CTN provided contact information for future needs.         Facility: [facility]     Non-face-to-face services provided:  Scheduled appointment with PCP-7/22 at 1130  Scheduled appointment with [facility] for cy[facility]

## 2020-07-20 ENCOUNTER — TELEPHONE (OUTPATIENT)
Dept: UROLOGY | Age: 70
End: 2020-07-20

## 2020-07-20 ENCOUNTER — CARE COORDINATION (OUTPATIENT)
Dept: CASE MANAGEMENT | Age: 70
End: 2020-07-20

## 2020-07-20 ENCOUNTER — PROCEDURE VISIT (OUTPATIENT)
Dept: UROLOGY | Age: 70
End: 2020-07-20
Payer: MEDICARE

## 2020-07-20 ENCOUNTER — HOSPITAL ENCOUNTER (OUTPATIENT)
Dept: LAB | Age: 70
Discharge: HOME | End: 2020-07-20
Payer: MEDICARE

## 2020-07-20 VITALS
WEIGHT: 184 LBS | BODY MASS INDEX: 28.88 KG/M2 | SYSTOLIC BLOOD PRESSURE: 132 MMHG | HEART RATE: 100 BPM | HEIGHT: 67 IN | OXYGEN SATURATION: 98 % | DIASTOLIC BLOOD PRESSURE: 84 MMHG

## 2020-07-20 LAB — PROSTATE SPECIFIC ANTIGEN: 35.88 UG/L

## 2020-07-20 PROCEDURE — 52000 CYSTOURETHROSCOPY: CPT | Performed by: UROLOGY

## 2020-07-20 PROCEDURE — 84153 ASSAY OF PSA TOTAL: CPT

## 2020-07-20 PROCEDURE — 51702 INSERT TEMP BLADDER CATH: CPT | Performed by: UROLOGY

## 2020-07-20 PROCEDURE — 36415 COLL VENOUS BLD VENIPUNCTURE: CPT

## 2020-07-20 NOTE — TELEPHONE ENCOUNTER
[address]           TRM:3/8/2160           Surgical/Procedure Planned: Cystosopy, greenlight PVP      Date & Location: pending clear. Outpatient   Planned Length of OR: 1 hr     Sedation: general        Estimated Cardiac Risk for Non-Cardiac Surgery/Procedure     Low______ Moderate__x____ High______    Medication Instructions - Clarification needed by this date:   -Insulin:  -Other medications:    ASA 81  mg Hold _7__ Days    Pradaxa  Hold __3_ Days    Resume medications:  NO     Lovenox indicated: __x___Yes . Start lovenox 1mg/kg SQ BID after stopping pradaxa. Hold dose in the AM of surgery. Resume pradaxa ASAP after surgery to reduce risk of stroke.  Patient had stroke when off anticoagulation    Provider:Dr. Sadiq Begum of Provider Giving Orders for Medication holds:    Gibson Barrett MD    _____________________________________________

## 2020-07-20 NOTE — CARE COORDINATION
Patient wife had called over the weekend inquiring if patient should be holding blood thinners for cysto scheduled for [facility] advising clarifying with urologist about this.  Encouraged call back if needed but to defer decision regarding blood thinners to urology instructions

## 2020-07-20 NOTE — PROGRESS NOTES
Cystoscopy Operative Note    Patient:  Scarlet Chu  MRN: [id number]  YOB: 1950    Date: 07/20/20  Surgeon: Arthur Braun MD  Anesthesia: Urethral 2% Xylocaine   Indications: BPH  Hx of retention was seen in ED and discharged with catheter. Presented again with gross hematuria  Catheter placed for irrigation  Hx of BPH at baseline--LUTS    Also with horseshoe kidney- > 3 cm of stones not obstructing left side. Position: Supine    Findings:   The patient was prepped and draped in the usual sterile fashion. The flexible cystoscope was advanced through the urethra and into the bladder. The bladder was thoroughly inspected and the following was noted:    Residual Urine: Significant  Urethra: No abnormalities of the urethra are noted. Prostate: obstructing lateral lobe. ++clots. Visualization poor  Bladder: No tumors or CIS noted. No bladder diverticulum. visualization poor. Trabeculations present  Ureters: Clear efflux from both ureters. Orifices with normal configuration and location. The cystoscope was removed. The patient tolerated the procedure well. Could not void. Catheter placed  Significant bladder spasms  Discussed stone treatment and BPH treatment. We could give more time to pass a voiding trial vs outlet surgery (greenlight). Pt has spasms that are significant and does not tolerate the catheter  He is on blood thinners and needs cardiology clearance. After addressing his bladder outlet, we will discuss stone treatment further.

## 2020-07-20 NOTE — PATIENT INSTRUCTIONS
you feel tired. · Be active. Walking is a good choice. · Allow your body to heal. Don't move quickly or lift anything heavy until you are feeling better. · Ask your doctor when you can drive again. · Many people are able to return to work within a few days after surgery. · Do not put anything in your rectum, such as an enema or suppository, for 4 to 6 weeks after the surgery. · You may shower and take baths when your doctor says it is okay. · Ask your doctor when it is okay for you to have sex. Diet  · You can eat your normal diet. If your stomach is upset, try bland, low-fat foods like plain rice, broiled chicken, toast, and yogurt. · If your bowel movements are not regular right after surgery, try to avoid constipation and straining. Drink plenty of water. Your doctor may suggest fiber, a stool softener, or a mild laxative. Medicines  · Your doctor will tell you if and when you can restart your medicines. He or she will also give you instructions about taking any new medicines. · If you take aspirin or some other blood thinner, be sure to talk to your doctor. He or she will tell you if and when to start taking those medicines again. Make sure that you understand exactly what your doctor wants you to do. · Be safe with medicines. Read and follow all instructions on the label. ? If the doctor gave you a prescription medicine for pain, take it as prescribed. ? If you are not taking a prescription pain medicine, ask your doctor if you can take an over-the-counter medicine. · Take your antibiotics as directed. Do not stop taking them just because you feel better. You need to take the full course of antibiotics. Follow-up care is a key part of your treatment and safety. Be sure to make and go to all appointments, and call your doctor if you are having problems. It's also a good idea to know your test results and keep a list of the medicines you take. When should you call for help?    Call  911 anytime you think you may need emergency care. For example, call if:  · You passed out (lost consciousness). · You have chest pain, are short of breath, or cough up blood. Call your doctor now or seek immediate medical care if:  · You have pain that does not get better after you take pain medicine. · You have new or more blood clots in your urine. (It is normal for the urine to be pink for a few days.)  · You can't pass urine. · You have symptoms of a urinary tract infection. These may include:  ? Pain or burning when you urinate. ? A frequent need to urinate without being able to pass much urine. ? Pain in the flank, which is just below the rib cage and above the waist on either side of the back. ? Blood in your urine. ? A fever. · You are sick to your stomach or can't keep down fluids. · You have signs of a blood clot in your leg (called a deep vein thrombosis), such as:  ? Pain in your calf, back of the knee, thigh, or groin. ? Redness or swelling in your leg. Watch closely for changes in your health, and be sure to contact your doctor if you have problems. Where can you learn more? Go to https://[employer] org and sign in to your [employer] account. Enter L785 in the [facility] box to learn more about \"Laser Transurethral Resection of the Prostate (TURP): What to Expect at Home. \"     If you do not have an account, please click on the \"Sign Up Now\" link. Current as of: February 11, 2020               Content Version: 12.5  © 2006-2020 Healthwise, Inc[employer]. Care instructions adapted under license by [facility] ([facility]). If you have questions about a medical condition or this instruction, always ask your healthcare professional. [address] [place] [address] any warranty or liability for your use of this information. Patient Education        Laser Transurethral Resection of the Prostate (TURP): Before Your Surgery  What is laser TURP?      Transurethral resection of the prostate (TURP) is surgery to reduce or remove prostate tissue. It is done when an overgrown prostate gland is pressing on the urethra and causing problems with a man's urine stream.  The prostate gland is a small organ just below a man's bladder. It makes most of the fluid in semen. The urethra is the tube that carries urine from the bladder out of the body through the penis. It passes through the prostate. When the prostate gets too large, it can press on the urethra. Your doctor will give you medicine to make you sleep or feel relaxed. You will be kept comfortable. If you are awake during the surgery, you will get medicine to numb you from the chest down. The doctor puts a thin, lighted tube into your urethra. This is called a scope. It goes in through the opening in your penis. Then the doctor puts a small surgical laser through the scope. The laser is used to remove the part of the prostate that is blocking urine flow. When the doctor is finished, he or she takes out the scope. This surgery may make it easier for you to urinate. You may have better control when you start and stop your urine stream. And you may feel like you get more relief when you urinate. You may go home from the hospital on the day of surgery. You may be able to go back to work or most of your usual routine after a few days. But for about 2 weeks, you will need to avoid heavy lifting and activities that might put extra pressure on your bladder. Follow-up care is a key part of your treatment and safety. Be sure to make and go to all appointments, and call your doctor if you are having problems. It's also a good idea to know your test results and keep a list of the medicines you take. How do you prepare for surgery? Surgery can be stressful. This information will help you understand what you can expect. And it will help you safely prepare for surgery.   Preparing for surgery  · If you plan to father a child, talk to your doctor about saving your sperm before the surgery. After TURP, there is a chance that your semen may go into your bladder instead of out through your penis. · You may need to empty your bowels with an enema or laxative. Your doctor will tell you how to do this. · Be sure you have someone to take you home. Anesthesia and pain medicine will make it unsafe for you to drive or get home on your own. · Understand exactly what surgery is planned, along with the risks, benefits, and other options. · If you take aspirin or some other blood thinner, be sure to talk to your doctor. He or she will tell you if you should stop taking it before your procedure. Make sure that you understand exactly what your doctor wants you to do. · Tell your doctor ALL the medicines, vitamins, supplements, and herbal remedies you take. Some may increase the risk of problems during your surgery. Your doctor will tell you if you should stop taking any of them before the surgery and how soon to do it. · Make sure your doctor and the hospital have a copy of your advance directive. If you don't have one, you may want to prepare one. It lets others know your health care wishes. It's a good thing to have before any type of surgery or procedure. What happens on the day of surgery? · Follow the instructions exactly about when to stop eating and drinking. If you don't, your surgery may be canceled. If your doctor told you to take your medicines on the day of surgery, take them with only a sip of water. · Take a bath or shower before you come in for your surgery. Do not apply lotions, perfumes, deodorants, or nail polish. · Do not shave the surgical site yourself. · Take off all jewelry and piercings. And take out contact lenses, if you wear them. At the hospital or surgery center    · Bring a picture ID. · The area for surgery is often marked to make sure there are no errors. · You will be kept comfortable and safe by your anesthesia provider.  The

## 2020-07-22 ENCOUNTER — TELEPHONE (OUTPATIENT)
Dept: UROLOGY | Age: 70
End: 2020-07-22

## 2020-07-22 ENCOUNTER — OFFICE VISIT (OUTPATIENT)
Dept: INTERNAL MEDICINE | Age: 70
End: 2020-07-22
Payer: MEDICARE

## 2020-07-22 VITALS
DIASTOLIC BLOOD PRESSURE: 70 MMHG | HEIGHT: 67 IN | BODY MASS INDEX: 28.66 KG/M2 | WEIGHT: 182.6 LBS | SYSTOLIC BLOOD PRESSURE: 124 MMHG | RESPIRATION RATE: 16 BRPM | TEMPERATURE: 97.3 F | HEART RATE: 78 BPM

## 2020-07-22 PROCEDURE — 1111F DSCHRG MED/CURRENT MED MERGE: CPT | Performed by: INTERNAL MEDICINE

## 2020-07-22 PROCEDURE — 99495 TRANSJ CARE MGMT MOD F2F 14D: CPT | Performed by: INTERNAL MEDICINE

## 2020-07-22 NOTE — TELEPHONE ENCOUNTER
Patient is scheduled for cardiac clearance with Dr. Leonard Fry on 7/29/2020. Please call patient's wife back to discuss this and when surgery will be scheduled.

## 2020-07-22 NOTE — TELEPHONE ENCOUNTER
[facility]    Pre-Operative Evaluation/Consultation    Name:  Elvira Lopez                                         Age:  69 y.o. MRN:  [id number]       :  1950   Date:  2020         Sex: male    There were no encounter diagnoses.     Surgeon:  Dr. Armando Villanueva   Procedure (Planned):  Cystoscopy, greenlight PVP or prostate  Date Scheduled surgery: pending     Attending : No att. 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    Combined forms of age-related cataract of both eyes    Acute cerebrovascular accident (CVA) ([address]    Tissue plasminogen activator (tPA) administered at other facility within 24 hours before current admission    Permanent atrial fibrillation    Acute ischemic stroke ([address]    Left homonymous hemianopsia    Hyperlipidemia    Stroke, recent, without late effect    Bilateral carotid artery stenosis    Chronic cerebral ischemia    Memory problem    Gait difficulty    Balance problem    At high risk for stroke    Risk for falls    Cerebrovascular accident (CVA) due to embolism of right posterior cerebral artery ([address]    Bilateral hemianopia    Dysphagia    Hematuria    PTSD (post-traumatic stress disorder)    Acute urinary retention    Horseshoe kidney    Kidney stone on left side     Past Medical History:   Diagnosis Date    Atrial fibrillation ([address] 2020    Chronic kidney disease     CKD (chronic kidney disease)     History of ischemic stroke in prior three months     Hyperlipidemia     Ischemic stroke ([address] 01/10/2020    Nihss score 10     PTSD (post-traumatic stress disorder)     from war, Agent Orange     Skin tag 2018    Stroke ([address] oxybutynin (DITROPAN-XL) 10 MG extended release tablet Take 1 tablet by mouth daily 7/16/20   Kristine Mueller MD   trospium (SANCTURA) 20 MG tablet Take 1 tablet by mouth 2 times daily (before meals) 7/15/20   Kristine Mueller MD   tamsulosin (FLOMAX) 0.4 MG capsule Take 1 capsule by mouth daily 7/13/20   Adry Del Cid MD   latanoprost (XALATAN) 0.005 % ophthalmic solution Place 1 drop into both eyes nightly 4/15/20   Historical Provider, MD   EPINEPHrine (EPIPEN 2-FARAZ) 0.3 MG/0.3ML SOAJ injection Inject 0.3 mLs into the muscle once for 1 dose Use as directed for allergic reaction  Patient not taking: Reported on 7/16/2020 3/3/20 6/4/20  Aaron Bond MD   Cholecalciferol (VITAMIN D3) 25 MCG (1000 UT) CAPS Take 2,000 Units by mouth daily    Historical Provider, MD   dabigatran (PRADAXA) 150 MG capsule Take 1 capsule by mouth 2 times daily 1/20/20   Padmaja Bradley MD   aspirin 81 MG EC tablet Take 1 tablet by mouth daily 1/21/20   Padmaja Bradley MD   Omega-3 Fatty Acids (FISH OIL) 1000 MG CAPS Take 1,000 mg by mouth 2 times daily    Historical Provider, MD   atorvastatin (LIPITOR) 40 MG tablet Take 1 tablet by mouth nightly 1/13/20   Padmaja Bradley MD   VITAMIN B1-B12 IM Inject into the muscle every 30 days     Historical Provider, MD     Vital Signs (Current) [unfilled]    Weight:   Wt Readings from Last 1 Encounters:   07/20/20 184 lb (83.5 kg)     Height:   Ht Readings from Last 1 Encounters:   07/20/20 5' 7\" (1.702 m)      BMI:  There is no height or weight on file to calculate BMI. Estimated body mass index is 28.82 kg/m² as calculated from the following:    Height as of 7/20/20: 5' 7\" (1.702 m). Weight as of 7/20/20: 184 lb (83.5 kg). body mass index is unknown because there is no height or weight on file. Cardiac Clearance: Dr. Viky Hernandez   Appointment for surgery Clearance scheduled for:None     Preoperative Testing:   These are the current and completed labs:  CBC: Lab Results   Component Value Date    WBC 7.1 07/15/2020    RBC 3.83 07/15/2020    HGB 11.3 07/15/2020    HCT 33.8 07/15/2020    MCV 88.3 07/15/2020    RDW 13.0 07/15/2020     07/15/2020     CMP:   Lab Results   Component Value Date     07/15/2020    K 3.6 07/15/2020     07/15/2020    CO2 26 07/15/2020    BUN 9 07/15/2020    CREATININE 0.64 07/15/2020    GFRAA >60 07/15/2020    LABGLOM >60 07/15/2020    GLUCOSE 82 07/15/2020    PROT 7.1 05/20/2020    CALCIUM 8.6 07/15/2020    BILITOT 0.53 05/20/2020    ALKPHOS 104 05/20/2020    AST 21 05/20/2020    ALT 19 05/20/2020     POC Tests: No results for input(s): POCGLU, POCNA, POCK, POCCL, POCBUN, POCHEMO, POCHCT in the last 72 hours.   Coags    Lab Results   Component Value Date    PROTIME 14.2 07/13/2020    INR 1.1 07/13/2020    APTT 30.3 59/26/5230     HCG (If Applicable) No results found for: PREGTESTUR, PREGSERUM, HCG, HCGQUANT   ABGs No results found for: PHART, PO2ART, LYP1SJV, ZCI8BJV, BEART, V2JIHHOB   Type & Screen (If Applicable)  No results found for: Harjnider Taylor    Additional ordered pre-operative testing:  []CBC    []ABG      [] BMP   []URINALYSIS   []CMP    []HCG   []COAGS PT/INR  []T&C  []LFTs   []TYPE AND SCREEN    [] EKG  [] Chest X-Ray  [] Other Radiology    [] Sent to Hospitalist None  [] Sent to Anesthesia for your review: None   [] Additional Orders: None     Comments:None   Requests: None    Signed: Evy Enamorado LPN 4/93/7784 6:17 AM

## 2020-07-22 NOTE — PROGRESS NOTES
Post-Discharge Transitional Care Management Services or Hospital Follow Up      Reva Garcia   YOB: 1950    Date of Office Visit:  7/22/2020  Date of Hospital Admission: 7/12/20  Date of Hospital Discharge: 7/15/20  Readmission Risk Score(high >=14%.  Medium >=10%):Readmission Risk Score: 19      Care management risk score Rising risk (score 2-5) and Complex Care (Scores >=6): 8     Non face to face  following discharge, date last encounter closed (first attempt may have been earlier): 7/16/2020 10:30 AM 7/16/2020 10:30 AM    Call initiated 2 business days of discharge: Yes     Patient Active Problem List   Diagnosis    Corneal foreign body, right, initial encounter    Glaucoma suspect of both eyes    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    Combined forms of age-related cataract of both eyes    Acute cerebrovascular accident (CVA) ([address]    Tissue plasminogen activator (tPA) administered at other facility within 24 hours before current admission    Permanent atrial fibrillation    Acute ischemic stroke ([address]    Left homonymous hemianopsia    Hyperlipidemia    Stroke, recent, without late effect    Bilateral carotid artery stenosis    Chronic cerebral ischemia    Memory problem    Gait difficulty    Balance problem    At high risk for stroke    Risk for falls    Cerebrovascular accident (CVA) due to embolism of right posterior cerebral artery ([address]    Bilateral hemianopia    Dysphagia    Hematuria    PTSD (post-traumatic stress disorder)    Acute urinary retention    Horseshoe kidney    Kidney stone on left side       No Known Allergies    Medications listed as ordered at the time of discharge from hospital   [place]   Home Medication Instructions [id number]    Printed on:07/22/20 1623   Medication Information                      aspirin 81 MG EC tablet  Take 1 tablet by mouth daily             atorvastatin (LIPITOR) 40 MG tablet  Take 1 tablet by mouth nightly             Cholecalciferol (VITAMIN D3) 25 MCG (1000 UT) CAPS  Take 2,000 Units by mouth daily             dabigatran (PRADAXA) 150 MG capsule  Take 1 capsule by mouth 2 times daily             EPINEPHrine (EPIPEN 2-FARAZ) 0.3 MG/0.3ML SOAJ injection  Inject 0.3 mLs into the muscle once for 1 dose Use as directed for allergic reaction             latanoprost (XALATAN) 0.005 % ophthalmic solution  Place 1 drop into both eyes nightly             Omega-3 Fatty Acids (FISH OIL) 1000 MG CAPS  Take 1,000 mg by mouth 2 times daily             oxybutynin (DITROPAN-XL) 10 MG extended release tablet  Take 1 tablet by mouth daily             tamsulosin (FLOMAX) 0.4 MG capsule  Take 1 capsule by mouth daily             trospium (SANCTURA) 20 MG tablet  Take 1 tablet by mouth 2 times daily (before meals)             VITAMIN B1-B12 IM  Inject into the muscle every 30 days                    Medications marked \"taking\" at this time  Outpatient Medications Marked as Taking for the 7/22/20 encounter (Office Visit) with David Perla MD   Medication Sig Dispense Refill    oxybutynin (DITROPAN-XL) 10 MG extended release tablet Take 1 tablet by mouth daily 30 tablet 0    trospium (SANCTURA) 20 MG tablet Take 1 tablet by mouth 2 times daily (before meals) 60 tablet 0    tamsulosin (FLOMAX) 0.4 MG capsule Take 1 capsule by mouth daily 30 capsule 11    latanoprost (XALATAN) 0.005 % ophthalmic solution Place 1 drop into both eyes nightly      Cholecalciferol (VITAMIN D3) 25 MCG (1000 UT) CAPS Take 2,000 Units by mouth daily      dabigatran (PRADAXA) 150 MG capsule Take 1 capsule by mouth 2 times daily 60 capsule 0    aspirin 81 MG EC tablet Take 1 tablet by mouth daily 30 tablet 3    Omega-3 Fatty Acids (FISH OIL) 1000 MG CAPS Take 1,000 mg by mouth 2 times daily      atorvastatin (LIPITOR) 40 MG tablet Take 1 tablet by mouth nightly 30 tablet 3    VITAMIN B1-B12 IM Inject into the muscle every 30 days Medications patient taking as of now reconciled against medications ordered at time of hospital discharge: Yes    Chief Complaint   Patient presents with    Follow-Up from Hospital     hematuria       HPI    Inpatient course: Discharge summary reviewed- see chart. Interval history/Current status: Was treated in the hospital for hematuria, underwent a cystoscopy, and is planned for another surgery as well. However the urologist wanted a cardiac clearance because he is on blood thinners for A. fib. Urine catheter was inserted, and patient says that it is showing clear urine not bloody urine today. Review of Systems    Vitals:    07/22/20 1135   BP: 124/70   Site: Left Upper Arm   Position: Sitting   Cuff Size: Medium Adult   Pulse: 78   Resp: 16   Temp: 97.3 °F (36.3 °C)   TempSrc: Infrared   Weight: 182 lb 9.6 oz (82.8 kg)   Height: 5' 7\" (1.702 m)     Body mass index is 28.6 kg/m². Wt Readings from Last 3 Encounters:   07/22/20 182 lb 9.6 oz (82.8 kg)   07/20/20 184 lb (83.5 kg)   07/15/20 191 lb 3.2 oz (86.7 kg)     BP Readings from Last 3 Encounters:   07/22/20 124/70   07/20/20 132/84   07/15/20 128/69       Physical Exam        Assessment/Plan:  1. Hematuria, unspecified type  Will make a sooner appointment with a cardiologist in order to clear him for surgery, and to decide on aspirin anticoagulation. Has follow-up with urology afterwards. Will reassess next visit. PSA extremely elevated. 2.  A. fib: Currently on aspirin and Pradaxa. Regular rate and rhythm.   Had CVA that required TPA and thrombectomy in January 2020.  - [place] DISCHARGE MEDS RECONCILED W/ CURRENT OUTPATIENT MED LIST        Medical Decision Making: moderate complexity

## 2020-07-22 NOTE — TELEPHONE ENCOUNTER
Lets get Cardiac Clearance please.     JIMBO Lance - ADRIAN,MSN,7/22/2020 3:51 PM  Chief Anesthetist  [facility]

## 2020-07-23 ENCOUNTER — CARE COORDINATION (OUTPATIENT)
Dept: CASE MANAGEMENT | Age: 70
End: 2020-07-23

## 2020-07-29 ENCOUNTER — OFFICE VISIT (OUTPATIENT)
Dept: CARDIOLOGY | Age: 70
End: 2020-07-29
Payer: MEDICARE

## 2020-07-29 ENCOUNTER — TELEPHONE (OUTPATIENT)
Dept: CARDIOLOGY | Age: 70
End: 2020-07-29

## 2020-07-29 ENCOUNTER — TELEPHONE (OUTPATIENT)
Dept: UROLOGY | Age: 70
End: 2020-07-29

## 2020-07-29 VITALS
WEIGHT: 182 LBS | DIASTOLIC BLOOD PRESSURE: 69 MMHG | HEART RATE: 101 BPM | HEIGHT: 67 IN | SYSTOLIC BLOOD PRESSURE: 114 MMHG | BODY MASS INDEX: 28.56 KG/M2

## 2020-07-29 PROCEDURE — 93010 ELECTROCARDIOGRAM REPORT: CPT | Performed by: INTERNAL MEDICINE

## 2020-07-29 PROCEDURE — 93005 ELECTROCARDIOGRAM TRACING: CPT | Performed by: INTERNAL MEDICINE

## 2020-07-29 PROCEDURE — 99214 OFFICE O/P EST MOD 30 MIN: CPT | Performed by: INTERNAL MEDICINE

## 2020-07-29 PROCEDURE — 99214 OFFICE O/P EST MOD 30 MIN: CPT

## 2020-07-29 NOTE — PROGRESS NOTES
Today's Date: 7/29/2020  Patient's Name: Chencho Dobbins  Patient's age: 69 y.o., 1950    Subjective:  Chencho Dobbins  is here for follow up on afib and CVA for preop cardiac clearance for prostatic surgery. BP at home 120/60s and HR in 90s-100s. No chest pain, no dyspnea, no PND, no syncope or pre-syncope, no orthopnea. Past Medical History:   has a past medical history of Atrial fibrillation ([facility] Chronic kidney disease, CKD (chronic kidney disease), History of ischemic stroke in prior three months, Hyperlipidemia, Ischemic stroke ([facility] Nihss score 10, PTSD (post-traumatic stress disorder), Skin tag, Stroke ([facility] and Stroke ([facility] Past Surgical History:   has a past surgical history that includes Appendectomy and transesophageal echocardiogram (01/17/2020). Home Medications:  Prior to Admission medications    Medication Sig Start Date End Date Taking?  Authorizing Provider   oxybutynin (DITROPAN-XL) 10 MG extended release tablet Take 1 tablet by mouth daily 7/16/20  Yes Javier Rogers MD   trospium (SANCTURA) 20 MG tablet Take 1 tablet by mouth 2 times daily (before meals) 7/15/20  Yes Javier Rogers MD   tamsulosin (FLOMAX) 0.4 MG capsule Take 1 capsule by mouth daily 7/13/20  Yes Donna Rodriguez MD   latanoprost (XALATAN) 0.005 % ophthalmic solution Place 1 drop into both eyes nightly 4/15/20  Yes Historical Provider, MD   EPINEPHrine (EPIPEN 2-FARAZ) 0.3 MG/0.3ML SOAJ injection Inject 0.3 mLs into the muscle once for 1 dose Use as directed for allergic reaction 3/3/20 7/29/20 Yes Rosie Nickerson MD   Cholecalciferol (VITAMIN D3) 25 MCG (1000 UT) CAPS Take 2,000 Units by mouth daily   Yes Historical Provider, MD   dabigatran (PRADAXA) 150 MG capsule Take 1 capsule by mouth 2 times daily 1/20/20  Yes Hailey John MD   aspirin 81 MG EC tablet Take 1 tablet by mouth daily 1/21/20  Yes Hailey John MD   Omega-3 Fatty Acids (FISH OIL) 1000 MG CAPS Take 1,000 mg by mouth 2 times daily Yes Historical Provider, MD   atorvastatin (LIPITOR) 40 MG tablet Take 1 tablet by mouth nightly 1/13/20  Yes Susannah Yoder MD   VITAMIN B1-B12 IM Inject into the muscle every 30 days    Yes Historical Provider, MD       Allergies:  Patient has no known allergies. Social History:   reports that he has never smoked. He has never used smokeless tobacco. He reports current alcohol use. He reports that he does not use drugs. Family History: family history is not on file. No h/o sudden cardiac death. No for premature CAD    REVIEW OF SYSTEMS:    · Constitutional: there has been no unanticipated weight loss. There's been No change in energy level, No change in activity level. · Eyes: No visual changes or diplopia. No scleral icterus. · ENT: No Headaches, hearing loss or vertigo. No mouth sores or sore throat. · Cardiovascular: see above  · Respiratory: see above  · Gastrointestinal: No abdominal pain, appetite loss, blood in stools. · Genitourinary: No dysuria, trouble voiding, or hematuria. · Musculoskeletal:  No gait disturbance, No weakness or joint complaints. · Integumentary: No rash or pruritis. · Neurological: No headache or diplopia. No tingling  · Psychiatric: No anxiety, or depression. · Endocrine: No temperature intolerance. · Hematologic/Lymphatic: No abnormal bruising or bleeding, blood clots or swollen lymph nodes. · Allergic/Immunologic: No nasal congestion or hives. PHYSICAL EXAM:      /69   Pulse 101   Ht 5' 7\" (1.702 m)   Wt 182 lb (82.6 kg)   BMI 28.51 kg/m²    Constitutional and General Appearance: alert, cooperative, no distress and appears stated age  [de-identified]: PERRL, no cervical lymphadenopathy. No masses palpable. Normal oral mucosa  Respiratory:  · Normal excursion and expansion without use of accessory muscles  · Resp Auscultation: Good respiratory effort. No for increased work of breathing.  On auscultation: clear to auscultation bilaterally  Cardiovascular:  · Heart tones are crisp and normal. irregular S1 and S2.  · Jugular venous pulsation Normal  · The carotid upstroke is normal in amplitude and contour without delay or bruit   Abdomen:   · soft  · Bowel sounds present  Extremities:  ·  No edema  Neurological:  · Alert and oriented. Cardiac Data:  EKG: Afib with RVR    Labs:     FASTING LIPID PANEL:  Lab Results   Component Value Date    HDL 39 05/20/2020    HDL 39 05/20/2020    TRIG 114 05/20/2020    TRIG 114 05/20/2020             Assessment / Acute Cardiac Problems:      1-[facility] and previously refused A/C had CVA requiring tPA and mechanical thrombectomy 1/11/2020 was discharged on eliquies and presented back 1/16/2020 with possible TIA. Eliquis changed to pradaxa. 2-H/o dizziness consistent with orthostatic intolerance. 3-PTSD [address] with exposure to agent orange. 4-Suspected sleep apnea. 5-stress test 7/11/2017 showed no ischemia or infarction LV ejection fraction 61%. 6-transthoracic echocardiogram 1/11/2020 showed LV ejection fraction greater than 55%. Dilated RV with preserved function. Moderately dilated left atrium and right atrium. Mild aortic valve insufficiency. 7-esophageal echocardiogram 1/16/2020 showed LV ejection fraction 55%. Small PFO without any hemodynamically significant to moderate mitral regurgitation and aortic insufficiency. Mild tricuspid regurgitation. Aortic arch showed mild plaque formation. 8-Lower ext venous doppler negative for DVT 1/20/20.     Plan of Treatment:      1-Continue low dose ASA and pradaxa. 2-Continue simvastatin  3-Afib is rate not well controlled at home and here with normal BP. I advised him to start low-dose beta-blocker for better rate control but he is reluctant to do that. Continue to monitor heart rate and blood pressure at home. 4-I gave him clearance to proceed with prostatic surgery with low cardiac risk.   He can hold his aspirin for 7 days and Pradaxa for 3 days and resume both afterwards. 5-Follow up in 6 months.       Electronically signed by Angie Laurent MD on 7/29/2020 at 8:50 [facility]  [phone]

## 2020-07-29 NOTE — TELEPHONE ENCOUNTER
Zoe from Urology called wondering if the cardiologist still wants to do Lovenox bridging for the upcoming procedure since Dr Tiara Dodd wrote yes for the Lovenox on the medication hold form on 7/20/20 but there is no mention of it on the office notes from today.

## 2020-07-29 NOTE — TELEPHONE ENCOUNTER
Patient notified. Patient verbalized understanding and stated he will continue to monitor his BP and HR.

## 2020-07-29 NOTE — TELEPHONE ENCOUNTER
Pt wife called to ask if it is really needed to start another medication for the pt HR. Home readings are listed below,    106/65-80  121/  798/56-89  96/61-64  133/90-92---122/91-92  378/76-98  601/17-36  666/77-74  142/47-62  114/    Pt wife would take BP while pt was laying down in bed and then other times while sitting in chair. Wife advised to have pt sit in chair for at least 10 mins and then take the BP while sitting in chair and not to go from bed to chair to get a more consistent reading and then to call to report.  Wife wanted to remind office that pt has surgery on 8/10/20.     [phone]    Last Appt:  7/29/2020  Next Appt:   2/3/2021  Med verified in [address] [place] [address] Rd

## 2020-07-29 NOTE — TELEPHONE ENCOUNTER
Patient is getting [facility] Huma Bartlett on 8/10/2020- patient ha sbeen cleared by cardiology and they recommend Lovenox bridging. Per Dr. Héctor Méndez- \"Start lovenox 1mg/kg SQ BID after stopping pradaxa. Hold dose in the AM of surgery. Resume pradaxa ASAP after surgery to reduce risk of stroke. Patient had stroke when off anticoagulation\". Are you able to order this for the patient?

## 2020-07-29 NOTE — LETTER
[facility]  [place] [address]  Dept: [phone]  Loc: [phone]      Angela Gosselin, MD        7/29/2020 (void after 30 days)        Re:  Junito De Jesus            1950        Surgeon:      Procedure/Surgery:  Prostatic surgery    Paris Worthy is at low cardiac risk, but acceptable for the planned surgical procedure. He may proceed accordingly. Special Instructions:    []  Patient is on ASA and Pradaxa. They will need to stop this medication ASA for 7 days and Pradaxa for 3 days prior to surgery and resume after.         Sincerely,      Angela Gosselin, MD

## 2020-07-30 ENCOUNTER — PRE-PROCEDURE TELEPHONE (OUTPATIENT)
Dept: PREADMISSION TESTING | Age: 70
End: 2020-07-30

## 2020-07-30 ENCOUNTER — CARE COORDINATION (OUTPATIENT)
Dept: CASE MANAGEMENT | Age: 70
End: 2020-07-30

## 2020-07-30 NOTE — TELEPHONE ENCOUNTER
Patient wife notified and explained all instructions to her and also told her that if she needs assistance how to give injections she can stop in and I will walk her through it.   She voiced understanding

## 2020-07-30 NOTE — TELEPHONE ENCOUNTER
Spoke with patient and his wife. Set up PAT covid test for Wednesday 8/5/20 at 0830 at the [facility]. Directions and quarantine instructions given. Patient and his wife did voice understanding.

## 2020-07-30 NOTE — TELEPHONE ENCOUNTER
Last dose of Pradaxa should be on the evening of August 6. He should take Lovenox twice a day on August 7 and August 8 and August 9. The morning of the surgery which is August 10 he should not take any blood thinner. Then he should restart his Pradaxa as soon as possible after surgery, preferably immediately after surgery if the urologist is okay with it, and then he can continue taking it on the evening of his surgery and then afterwards. Lovenox already prescribed to right aid [place], but we need to make sure that he is able to give himself the injection. He previously had a stroke when anticoagulation was stopped so we should really inform the hospital/the urologist that he needs to go back on the Pradaxa ASAP after surgery.

## 2020-08-03 ENCOUNTER — NURSE ONLY (OUTPATIENT)
Dept: UROLOGY | Age: 70
End: 2020-08-03
Payer: MEDICARE

## 2020-08-03 ENCOUNTER — APPOINTMENT (OUTPATIENT)
Dept: GENERAL RADIOLOGY | Age: 70
DRG: 699 | End: 2020-08-03
Payer: OTHER GOVERNMENT

## 2020-08-03 ENCOUNTER — TELEPHONE (OUTPATIENT)
Dept: UROLOGY | Age: 70
End: 2020-08-03

## 2020-08-03 ENCOUNTER — HOSPITAL ENCOUNTER (INPATIENT)
Age: 70
LOS: 1 days | Discharge: HOME | DRG: 699 | End: 2020-08-05
Attending: EMERGENCY MEDICINE | Admitting: INTERNAL MEDICINE
Payer: OTHER GOVERNMENT

## 2020-08-03 VITALS
OXYGEN SATURATION: 94 % | HEART RATE: 134 BPM | DIASTOLIC BLOOD PRESSURE: 82 MMHG | BODY MASS INDEX: 28.56 KG/M2 | RESPIRATION RATE: 16 BRPM | TEMPERATURE: 98.4 F | HEIGHT: 67 IN | SYSTOLIC BLOOD PRESSURE: 136 MMHG | WEIGHT: 182 LBS

## 2020-08-03 PROBLEM — N39.0 UTI (URINARY TRACT INFECTION): Status: ACTIVE | Noted: 2020-08-03

## 2020-08-03 LAB
-: ABNORMAL
ABSOLUTE EOS #: <0.03 K/UL (ref 0–0.44)
ABSOLUTE IMMATURE GRANULOCYTE: 0.05 K/UL (ref 0–0.3)
ABSOLUTE LYMPH #: 1.12 K/UL (ref 1.1–3.7)
ABSOLUTE MONO #: 1.25 K/UL (ref 0.1–1.2)
ALBUMIN SERPL-MCNC: 3.5 G/DL (ref 3.5–5.2)
ALBUMIN/GLOBULIN RATIO: 1 (ref 1–2.5)
ALP BLD-CCNC: 113 U/L (ref 40–129)
ALT SERPL-CCNC: 11 U/L (ref 5–41)
AMORPHOUS: ABNORMAL
ANION GAP SERPL CALCULATED.3IONS-SCNC: 11 MMOL/L (ref 9–17)
AST SERPL-CCNC: 16 U/L
BACTERIA: ABNORMAL
BASOPHILS # BLD: 0 % (ref 0–2)
BASOPHILS ABSOLUTE: <0.03 K/UL (ref 0–0.2)
BILIRUB SERPL-MCNC: 1.52 MG/DL (ref 0.3–1.2)
BILIRUBIN DIRECT: 0.4 MG/DL
BILIRUBIN URINE: NEGATIVE
BILIRUBIN, INDIRECT: 1.12 MG/DL (ref 0–1)
BNP INTERPRETATION: ABNORMAL
BUN BLDV-MCNC: 12 MG/DL (ref 8–23)
BUN/CREAT BLD: 15 (ref 9–20)
C-REACTIVE PROTEIN: 214.5 MG/L (ref 0–5)
CALCIUM SERPL-MCNC: 9.2 MG/DL (ref 8.6–10.4)
CASTS UA: ABNORMAL /LPF (ref 0–2)
CHLORIDE BLD-SCNC: 101 MMOL/L (ref 98–107)
CK MB: 1.2 NG/ML
CO2: 22 MMOL/L (ref 20–31)
COLOR: ABNORMAL
COMMENT UA: ABNORMAL
CREAT SERPL-MCNC: 0.79 MG/DL (ref 0.7–1.2)
CRYSTALS, UA: ABNORMAL /HPF
D-DIMER QUANTITATIVE: 0.69 MG/L FEU (ref 0–0.59)
DIFFERENTIAL TYPE: ABNORMAL
EKG ATRIAL RATE: 93 BPM
EKG Q-T INTERVAL: 306 MS
EKG QRS DURATION: 84 MS
EKG QTC CALCULATION (BAZETT): 446 MS
EKG R AXIS: -4 DEGREES
EKG T AXIS: 43 DEGREES
EKG VENTRICULAR RATE: 128 BPM
EOSINOPHILS RELATIVE PERCENT: 0 % (ref 1–4)
EPITHELIAL CELLS UA: ABNORMAL /HPF (ref 0–5)
FERRITIN: 538 UG/L (ref 30–400)
GFR AFRICAN AMERICAN: >60 ML/MIN
GFR NON-AFRICAN AMERICAN: >60 ML/MIN
GFR SERPL CREATININE-BSD FRML MDRD: ABNORMAL ML/MIN/{1.73_M2}
GFR SERPL CREATININE-BSD FRML MDRD: ABNORMAL ML/MIN/{1.73_M2}
GLOBULIN: 3.6 G/DL (ref 1.5–3.8)
GLUCOSE BLD-MCNC: 151 MG/DL (ref 70–99)
GLUCOSE URINE: NEGATIVE
HCT VFR BLD CALC: 39.8 % (ref 40.7–50.3)
HEMOGLOBIN: 13.9 G/DL (ref 13–17)
IMMATURE GRANULOCYTES: 0 %
INR BLD: 1.5
KETONES, URINE: ABNORMAL
LACTATE DEHYDROGENASE: 210 U/L (ref 135–225)
LACTIC ACID, SEPSIS WHOLE BLOOD: NORMAL MMOL/L (ref 0.5–1.9)
LACTIC ACID, SEPSIS WHOLE BLOOD: NORMAL MMOL/L (ref 0.5–1.9)
LACTIC ACID, SEPSIS: 1.2 MMOL/L (ref 0.5–1.9)
LACTIC ACID, SEPSIS: 1.8 MMOL/L (ref 0.5–1.9)
LEUKOCYTE ESTERASE, URINE: ABNORMAL
LIPASE: 10 U/L (ref 13–60)
LYMPHOCYTES # BLD: 9 % (ref 24–43)
MAGNESIUM: 2 MG/DL (ref 1.6–2.6)
MCH RBC QN AUTO: 29.3 PG (ref 25.2–33.5)
MCHC RBC AUTO-ENTMCNC: 34.9 G/DL (ref 25.2–33.5)
MCV RBC AUTO: 84 FL (ref 82.6–102.9)
MONOCYTES # BLD: 10 % (ref 3–12)
MUCUS: ABNORMAL
MYOGLOBIN: 34 NG/ML (ref 28–72)
NITRITE, URINE: NEGATIVE
NRBC AUTOMATED: 0 PER 100 WBC
OTHER OBSERVATIONS UA: ABNORMAL
PARTIAL THROMBOPLASTIN TIME: 51.9 SEC (ref 23.9–33.8)
PDW BLD-RTO: 12.7 % (ref 11.8–14.4)
PH UA: 6 (ref 5–6)
PLATELET # BLD: 199 K/UL (ref 138–453)
PLATELET ESTIMATE: ABNORMAL
PMV BLD AUTO: 9.8 FL (ref 8.1–13.5)
POTASSIUM SERPL-SCNC: 3.6 MMOL/L (ref 3.7–5.3)
PRO-BNP: 619 PG/ML
PROTEIN UA: ABNORMAL
PROTHROMBIN TIME: 17.3 SEC (ref 11.5–14.2)
RBC # BLD: 4.74 M/UL (ref 4.21–5.77)
RBC # BLD: ABNORMAL 10*6/UL
RBC UA: >100 /HPF (ref 0–4)
RENAL EPITHELIAL, UA: ABNORMAL /HPF
SARS-COV-2, PCR: NORMAL
SARS-COV-2, RAPID: NOT DETECTED
SARS-COV-2: NORMAL
SEG NEUTROPHILS: 81 % (ref 36–65)
SEGMENTED NEUTROPHILS ABSOLUTE COUNT: 10.57 K/UL (ref 1.5–8.1)
SODIUM BLD-SCNC: 134 MMOL/L (ref 135–144)
SOURCE: NORMAL
SPECIFIC GRAVITY UA: 1.02 (ref 1.01–1.02)
T4 TOTAL: 4.9 UG/DL (ref 4.5–10.9)
TOTAL CK: 57 U/L (ref 39–308)
TOTAL PROTEIN: 7.1 G/DL (ref 6.4–8.3)
TRICHOMONAS: ABNORMAL
TROPONIN INTERP: NORMAL
TROPONIN INTERP: NORMAL
TROPONIN T: NORMAL NG/ML
TROPONIN T: NORMAL NG/ML
TROPONIN, HIGH SENSITIVITY: 13 NG/L (ref 0–22)
TROPONIN, HIGH SENSITIVITY: 15 NG/L (ref 0–22)
TSH SERPL DL<=0.05 MIU/L-ACNC: 1.6 MIU/L (ref 0.3–5)
TURBIDITY: ABNORMAL
URINE HGB: ABNORMAL
UROBILINOGEN, URINE: ABNORMAL
WBC # BLD: 13 K/UL (ref 3.5–11.3)
WBC # BLD: ABNORMAL 10*3/UL
WBC UA: ABNORMAL /HPF (ref 0–4)
YEAST: ABNORMAL

## 2020-08-03 PROCEDURE — 81001 URINALYSIS AUTO W/SCOPE: CPT

## 2020-08-03 PROCEDURE — 85730 THROMBOPLASTIN TIME PARTIAL: CPT

## 2020-08-03 PROCEDURE — 84484 ASSAY OF TROPONIN QUANT: CPT

## 2020-08-03 PROCEDURE — 96367 TX/PROPH/DG ADDL SEQ IV INF: CPT

## 2020-08-03 PROCEDURE — 2580000003 HC RX 258: Performed by: EMERGENCY MEDICINE

## 2020-08-03 PROCEDURE — 83735 ASSAY OF MAGNESIUM: CPT

## 2020-08-03 PROCEDURE — 99213 OFFICE O/P EST LOW 20 MIN: CPT

## 2020-08-03 PROCEDURE — 82553 CREATINE MB FRACTION: CPT

## 2020-08-03 PROCEDURE — 84443 ASSAY THYROID STIM HORMONE: CPT

## 2020-08-03 PROCEDURE — G0378 HOSPITAL OBSERVATION PER HR: HCPCS

## 2020-08-03 PROCEDURE — 87040 BLOOD CULTURE FOR BACTERIA: CPT

## 2020-08-03 PROCEDURE — 85025 COMPLETE CBC W/AUTO DIFF WBC: CPT

## 2020-08-03 PROCEDURE — 83615 LACTATE (LD) (LDH) ENZYME: CPT

## 2020-08-03 PROCEDURE — 6370000000 HC RX 637 (ALT 250 FOR IP): Performed by: INTERNAL MEDICINE

## 2020-08-03 PROCEDURE — 99222 1ST HOSP IP/OBS MODERATE 55: CPT | Performed by: INTERNAL MEDICINE

## 2020-08-03 PROCEDURE — 85610 PROTHROMBIN TIME: CPT

## 2020-08-03 PROCEDURE — 82728 ASSAY OF FERRITIN: CPT

## 2020-08-03 PROCEDURE — 85379 FIBRIN DEGRADATION QUANT: CPT

## 2020-08-03 PROCEDURE — 83690 ASSAY OF LIPASE: CPT

## 2020-08-03 PROCEDURE — 83605 ASSAY OF LACTIC ACID: CPT

## 2020-08-03 PROCEDURE — 99285 EMERGENCY DEPT VISIT HI MDM: CPT

## 2020-08-03 PROCEDURE — 80076 HEPATIC FUNCTION PANEL: CPT

## 2020-08-03 PROCEDURE — 2580000003 HC RX 258: Performed by: INTERNAL MEDICINE

## 2020-08-03 PROCEDURE — 86140 C-REACTIVE PROTEIN: CPT

## 2020-08-03 PROCEDURE — 6360000002 HC RX W HCPCS: Performed by: INTERNAL MEDICINE

## 2020-08-03 PROCEDURE — 80048 BASIC METABOLIC PNL TOTAL CA: CPT

## 2020-08-03 PROCEDURE — 83874 ASSAY OF MYOGLOBIN: CPT

## 2020-08-03 PROCEDURE — 36415 COLL VENOUS BLD VENIPUNCTURE: CPT

## 2020-08-03 PROCEDURE — 83880 ASSAY OF NATRIURETIC PEPTIDE: CPT

## 2020-08-03 PROCEDURE — 6360000002 HC RX W HCPCS: Performed by: EMERGENCY MEDICINE

## 2020-08-03 PROCEDURE — 99214 OFFICE O/P EST MOD 30 MIN: CPT | Performed by: UROLOGY

## 2020-08-03 PROCEDURE — 82550 ASSAY OF CK (CPK): CPT

## 2020-08-03 PROCEDURE — 84436 ASSAY OF TOTAL THYROXINE: CPT

## 2020-08-03 PROCEDURE — U0002 COVID-19 LAB TEST NON-CDC: HCPCS

## 2020-08-03 PROCEDURE — 96375 TX/PRO/DX INJ NEW DRUG ADDON: CPT

## 2020-08-03 PROCEDURE — 96365 THER/PROPH/DIAG IV INF INIT: CPT

## 2020-08-03 PROCEDURE — 71045 X-RAY EXAM CHEST 1 VIEW: CPT

## 2020-08-03 PROCEDURE — 2500000003 HC RX 250 WO HCPCS: Performed by: INTERNAL MEDICINE

## 2020-08-03 PROCEDURE — 93005 ELECTROCARDIOGRAM TRACING: CPT | Performed by: EMERGENCY MEDICINE

## 2020-08-03 RX ORDER — ONDANSETRON 2 MG/ML
4 INJECTION INTRAMUSCULAR; INTRAVENOUS EVERY 6 HOURS PRN
Status: DISCONTINUED | OUTPATIENT
Start: 2020-08-03 | End: 2020-08-05 | Stop reason: HOSPADM

## 2020-08-03 RX ORDER — SODIUM CHLORIDE 0.9 % (FLUSH) 0.9 %
10 SYRINGE (ML) INJECTION EVERY 12 HOURS SCHEDULED
Status: DISCONTINUED | OUTPATIENT
Start: 2020-08-03 | End: 2020-08-05 | Stop reason: HOSPADM

## 2020-08-03 RX ORDER — SODIUM CHLORIDE 0.9 % (FLUSH) 0.9 %
10 SYRINGE (ML) INJECTION PRN
Status: DISCONTINUED | OUTPATIENT
Start: 2020-08-03 | End: 2020-08-05 | Stop reason: HOSPADM

## 2020-08-03 RX ORDER — ACETAMINOPHEN 325 MG/1
650 TABLET ORAL EVERY 4 HOURS PRN
Status: DISCONTINUED | OUTPATIENT
Start: 2020-08-03 | End: 2020-08-05 | Stop reason: HOSPADM

## 2020-08-03 RX ORDER — SODIUM CHLORIDE 9 MG/ML
INJECTION, SOLUTION INTRAVENOUS CONTINUOUS
Status: DISCONTINUED | OUTPATIENT
Start: 2020-08-03 | End: 2020-08-05 | Stop reason: HOSPADM

## 2020-08-03 RX ORDER — 0.9 % SODIUM CHLORIDE 0.9 %
30 INTRAVENOUS SOLUTION INTRAVENOUS ONCE
Status: COMPLETED | OUTPATIENT
Start: 2020-08-03 | End: 2020-08-03

## 2020-08-03 RX ADMIN — FAMOTIDINE 20 MG: 10 INJECTION, SOLUTION INTRAVENOUS at 19:55

## 2020-08-03 RX ADMIN — SODIUM CHLORIDE: 9 INJECTION, SOLUTION INTRAVENOUS at 19:55

## 2020-08-03 RX ADMIN — SODIUM CHLORIDE 1000 ML: 9 INJECTION, SOLUTION INTRAVENOUS at 14:58

## 2020-08-03 RX ADMIN — ACETAMINOPHEN 650 MG: 325 TABLET ORAL at 19:55

## 2020-08-03 RX ADMIN — CEFEPIME HYDROCHLORIDE 2 G: 2 INJECTION, POWDER, FOR SOLUTION INTRAVENOUS at 18:24

## 2020-08-03 RX ADMIN — CEFTRIAXONE 1 G: 1 INJECTION, POWDER, FOR SOLUTION INTRAMUSCULAR; INTRAVENOUS at 15:00

## 2020-08-03 RX ADMIN — SODIUM CHLORIDE 1983 ML: 9 INJECTION, SOLUTION INTRAVENOUS at 17:10

## 2020-08-03 ASSESSMENT — PAIN SCALES - GENERAL: PAINLEVEL_OUTOF10: 0

## 2020-08-03 ASSESSMENT — ENCOUNTER SYMPTOMS
COUGH: 0
SHORTNESS OF BREATH: 0

## 2020-08-03 NOTE — PROGRESS NOTES
JUSTUS Hernandez MD        [address] [address]  Dept: [phone]  Dept Fax: [phone]  Loc: [phone]      [facility] Office Note - New Patient    Patient:  Geraldine Pérez  YOB: 1950  Date: 8/3/2020    The patient is a 69 y.o. male who presents today for evaluation of the following problems:   Chief Complaint   Patient presents with    Urinary Catheter Problem     pre-op caht exchange and urine     referred/consultation requested by TALITA LOVE MD.    HISTORY OF PRESENT ILLNESS:     Acute cystitis  Onset was  Hours ago  Overall, the problem(s) are worse. Severity is described as severe. Associated Symptoms: No dysuria, no gross hematuria. Current Pain Severity: 0    Pt with fevers/tachycardic  Was scheduled for greenlight next week        Secondary Diagnosis:    Kidney stones- horseshoe kidney with large stone burden. No flank pain    Elevated psa- was secondary to retention    Gross hematuria- last cystoscopy enlarged prostate , difficult to visualize        Requested/reviewed records from [facility] office and/or outside physician/EMR    (Patient's old records have been requested, reviewed and pertinent findings summarized in today's note.)    Procedures Today: N/A      Last several PSA's:  Lab Results   Component Value Date    PSA 35.88 (H) 07/20/2020    PSA 3.93 05/20/2020       Last total testosterone:  No results found for: TESTOSTERONE    Urinalysis today:  No results found for this visit on 08/03/20.     Last BUN and creatinine:  Lab Results   Component Value Date    BUN 9 07/15/2020     Lab Results   Component Value Date    CREATININE 0.64 (L) 07/15/2020       Additional Lab/Culture results: none    Imaging Reviewed during this Office Visit:   Gaye Edwards MD independently reviewed the images and verified the radiology reports from:    No results found.    PAST MEDICAL, FAMILY AND SOCIAL HISTORY:  Past Medical History:   Diagnosis Date    Atrial fibrillation ([facility] [address] 01/2020    Chronic kidney disease     CKD (chronic kidney disease)     History of ischemic stroke in prior three months     Hyperlipidemia     [facility] 01/10/2020    Nihss score 10     PTSD (post-traumatic stress disorder)     from war, Agent Orange     Skin tag 12/21/2018    Stroke [facility] 01/16/2020    MRI Brain WO: New right PCA distribution infarct, multifocal left MCA distribution infarcts    Stroke ([facility] 01/11/2020    large vessel occlusion/left M2 occlusion. He subsequently underwent emergent thrombectomy.  /  S/P TPA DONE     Past Surgical History:   Procedure Laterality Date    APPENDECTOMY      TRANSESOPHAGEAL ECHOCARDIOGRAM  01/17/2020     No family history on file. Outpatient Medications Marked as Taking for the 8/3/20 encounter (Nurse Only) with SCHEDULE, [facility]   Medication Sig Dispense Refill    [START ON 8/7/2020] enoxaparin (LOVENOX) 80 MG/0.8ML injection Last Pradaxa should be on the evening of August 6. Take Lovenox twice a day on August 7, August 8, on August 9. Last Lovenox should be the evening of August 9.   Resume Pradaxa ASAP after surgery 6 Syringe 0    oxybutynin (DITROPAN-XL) 10 MG extended release tablet Take 1 tablet by mouth daily 30 tablet 0    trospium (SANCTURA) 20 MG tablet Take 1 tablet by mouth 2 times daily (before meals) 60 tablet 0    tamsulosin (FLOMAX) 0.4 MG capsule Take 1 capsule by mouth daily 30 capsule 11    latanoprost (XALATAN) 0.005 % ophthalmic solution Place 1 drop into both eyes nightly      Cholecalciferol (VITAMIN D3) 25 MCG (1000 UT) CAPS Take 2,000 Units by mouth daily      dabigatran (PRADAXA) 150 MG capsule Take 1 capsule by mouth 2 times daily 60 capsule 0    aspirin 81 MG EC tablet Take 1 tablet by mouth daily 30 tablet 3    Omega-3 Fatty Acids (FISH OIL) 1000 MG CAPS Take 1,000 mg by mouth 2 times daily      atorvastatin (LIPITOR) 40 MG tablet Take 1 tablet by mouth nightly 30 tablet 3    VITAMIN B1-B12 IM Inject into the muscle every 30 days          Patient has no known allergies. Social History     Tobacco Use   Smoking Status Never Smoker   Smokeless Tobacco Never Used      (If patient a smoker, smoking cessation counseling offered)   Social History     Substance and Sexual Activity   Alcohol Use Yes    Alcohol/week: 0.0 standard drinks    Comment: occas       REVIEW OF SYSTEMS:  Constitutional: negative  Eyes: negative  Respiratory: negative  Cardiovascular: negative  Gastrointestinal: negative  Genitourinary: see HPI  Musculoskeletal: negative  Skin: negative   Neurological: negative  Hematological/Lymphatic: negative  Psychological: negative      Physical Exam:    This a 69 y.o. male  Vitals:    08/03/20 1244   BP: 136/82   Pulse: 134   Resp: 16   Temp: 98.4 °F (36.9 °C)   SpO2: 94%     Body mass index is 28.51 kg/m². Constitutional: Patient in no acute distress; Neuro: alert and oriented to person place and time. Psych: Mood and affect normal.  Skin: warm, dry  Lungs: Respiratory effort normal, Symmetric chest rise  Cardiovascular:  Normal peripheral pulses; Regular rate, radial pulses palpable  Abdomen: Soft, non-tender, non-distended with no CVA, flank pain, hepatosplenomegaly or hernia. Kidneys normal.  Bladder non-tender and not distended. Lymphatics: no palpable lymphadenopathy  Minimal/no edema in bilateral lower extremities        Assessment and Plan        1. Benign prostatic hyperplasia with urinary obstruction    2. Acute cystitis without hematuria    3. Gross hematuria    4. Kidney stones               Plan:      Catheter exchanged in office today. Was unable to obtain urine for culture  Needs evaluation in ED--need IV abx  Needs urine culture in ED.  covid testing? Is having some SOB and fevers, tachycardia  May need to delay surgery pending workup.      Prescriptions Ordered:  No orders of the defined types were placed in this encounter. Orders Placed:  No orders of the defined types were placed in this encounter.            Maikol Castro MD

## 2020-08-03 NOTE — TELEPHONE ENCOUNTER
PRATIK for patient- per. Dr. Cantu Cluster- patient should come in for cath change, Urine cx, and weiwll send him to the ER if needed when we see him.

## 2020-08-03 NOTE — TELEPHONE ENCOUNTER
Patient's wife, Jc Pizarro, reports patient is having flank pain and a fever that started yesterday. She states his vitals this morning were: Temp: 101.3, BP: 123/90, and Pulse: 111. He has had a catheter for 3 weeks and is having good output and his urine was dark and cloudy but last night was clear. Jc Pizarro is worried he may have a UTI and is scheduled for a cysto on 8/10/2020. Please call Jc Pizarro back at [phone].

## 2020-08-03 NOTE — ED PROVIDER NOTES
[facility]  EMERGENCY DEPARTMENT ENCOUNTER      Pt Name: Sirena Pena  MRN: [id number]  [place] 1950  Date of evaluation: 8/3/2020  Provider: Edenilson Hurtado MD    [address] [place] [address]     Chief Complaint   Patient presents with    Fatigue    Fever     started last night         HISTORY OF PRESENT ILLNESS   (Location/Symptom, Timing/Onset, Context/Setting,Quality, Duration, Modifying Factors, Severity)  Note limiting factors. Sirena Pena is a 69 y.o. male who presents to the emergency department with chief complaint of shaking chills and fever since last evening. Patient has an indwelling Feng catheter for the last month because of urinary retention and is scheduled for prostate surgery next week. Patient denies cough or shortness of breath and complained of a slight headache today. His Feng catheter was changed at the urology clinic but not enough urine could be obtained to run any tests on it. The history is provided by the patient, the spouse and medical records. Nursing Notes werereviewed. REVIEW OF SYSTEMS    (2-9 systems for level 4, 10 or more for level 5)     Review of Systems   Respiratory: Negative for cough and shortness of breath. All other systems reviewed and are negative. Except as noted above the remainder of the review of systems was reviewed and negative. PAST MEDICAL HISTORY     Past Medical History:   Diagnosis Date    Atrial fibrillation ([address] 01/2020    Chronic kidney disease     CKD (chronic kidney disease)     History of ischemic stroke in prior three months     Hyperlipidemia     Ischemic stroke ([address] 01/10/2020    Nihss score 10     PTSD (post-traumatic stress disorder)     from war, Agent Orange     Skin tag 12/21/2018    Stroke ([address] 01/16/2020    MRI Brain WO: New right PCA distribution infarct, multifocal left MCA distribution infarcts    Stroke ([address] 01/11/2020    large vessel occlusion/left M2 occlusion.   He subsequently underwent emergent thrombectomy.  /  S/P TPA DONE         SURGICALHISTORY       Past Surgical History:   Procedure Laterality Date    APPENDECTOMY      TRANSESOPHAGEAL ECHOCARDIOGRAM  01/17/2020         CURRENT MEDICATIONS       Previous Medications    ASPIRIN 81 MG EC TABLET    Take 1 tablet by mouth daily    ATORVASTATIN (LIPITOR) 40 MG TABLET    Take 1 tablet by mouth nightly    CHOLECALCIFEROL (VITAMIN D3) 25 MCG (1000 UT) CAPS    Take 2,000 Units by mouth daily    DABIGATRAN (PRADAXA) 150 MG CAPSULE    Take 1 capsule by mouth 2 times daily    ENOXAPARIN (LOVENOX) 80 MG/0.8ML INJECTION    Last Pradaxa should be on the evening of August 6. Take Lovenox twice a day on August 7, August 8, on August 9. Last Lovenox should be the evening of August 9. Resume Pradaxa ASAP after surgery    EPINEPHRINE (EPIPEN 2-FARAZ) 0.3 MG/0.3ML SOAJ INJECTION    Inject 0.3 mLs into the muscle once for 1 dose Use as directed for allergic reaction    LATANOPROST (XALATAN) 0.005 % OPHTHALMIC SOLUTION    Place 1 drop into both eyes nightly    OMEGA-3 FATTY ACIDS (FISH OIL) 1000 MG CAPS    Take 1,000 mg by mouth 2 times daily    OXYBUTYNIN (DITROPAN-XL) 10 MG EXTENDED RELEASE TABLET    Take 1 tablet by mouth daily    TAMSULOSIN (FLOMAX) 0.4 MG CAPSULE    Take 1 capsule by mouth daily    TROSPIUM (SANCTURA) 20 MG TABLET    Take 1 tablet by mouth 2 times daily (before meals)    VITAMIN B1-B12 IM    Inject into the muscle every 30 days        ALLERGIES     Patient has no known allergies. FAMILY HISTORY     History reviewed. No pertinent family history.        SOCIAL HISTORY       Social History     Socioeconomic History    Marital status:      Spouse name: None    Number of children: None    Years of education: None    Highest education level: None   Occupational History    None   Social Needs    Financial resource strain: None    Food insecurity     Worry: None     Inability: None    Transportation needs     Medical: None     Non-medical: None   Tobacco Use    Smoking status: Never Smoker    Smokeless tobacco: Never Used   Substance and Sexual Activity    Alcohol use: Yes     Alcohol/week: 0.0 standard drinks     Comment: occas    Drug use: No    Sexual activity: Yes   Lifestyle    Physical activity     Days per week: None     Minutes per session: None    Stress: None   Relationships    Social connections     Talks on phone: None     Gets together: None     Attends Taoism service: None     Active member of club or organization: None     Attends meetings of clubs or organizations: None     Relationship status: None    Intimate partner violence     Fear of current or ex partner: None     Emotionally abused: None     Physically abused: None     Forced sexual activity: None   Other Topics Concern    None   Social History Narrative    None       SCREENINGS    Lion Coma Scale  Eye Opening: Spontaneous  Best Verbal Response: Oriented  Best Motor Response: Obeys commands  Lion Coma Scale Score: 15        PHYSICAL EXAM    (up to 7 for level 4, 8 or more for level 5)     ED Triage Vitals [08/03/20 1323]   BP Temp Temp Source Pulse Resp SpO2 Height Weight   133/68 102.4 °F (39.1 °C) Tympanic 128 18 97 % -- --       Physical Exam  Vitals signs reviewed. Constitutional:       General: He is not in acute distress. HENT:      Head: Normocephalic. Right Ear: External ear normal.      Left Ear: External ear normal.      Nose: Nose normal.   Eyes:      Extraocular Movements: Extraocular movements intact. Neck:      Musculoskeletal: Neck supple. Cardiovascular:      Rate and Rhythm: Tachycardia present. Rhythm irregular. Pulmonary:      Effort: Pulmonary effort is normal.      Breath sounds: Normal breath sounds. No rhonchi or rales. Abdominal:      Palpations: Abdomen is soft. Tenderness: There is no abdominal tenderness. Musculoskeletal: Normal range of motion.          General: No swelling or normal limits   D-DIMER, QUANTITATIVE - Abnormal; Notable for the following components:    D-Dimer, Quant 0.69 (*)     All other components within normal limits   LIPASE - Abnormal; Notable for the following components:    Lipase 10 (*)     All other components within normal limits   HEPATIC FUNCTION PANEL - Abnormal; Notable for the following components: Total Bilirubin 1.52 (*)     Bilirubin, Direct 0.40 (*)     Bilirubin, Indirect 1.12 (*)     All other components within normal limits   URINE RT REFLEX TO CULTURE - Abnormal; Notable for the following components:    Ketones, Urine TRACE (*)     Urine Hgb 3+ (*)     Protein, UA 3+ (*)     Urobilinogen, Urine 2 mg/dL (*)     Leukocyte Esterase, Urine 2+ (*)     All other components within normal limits   MICROSCOPIC URINALYSIS - Abnormal; Notable for the following components:    Bacteria, UA 1+ (*)     All other components within normal limits   CULTURE, BLOOD 1   CULTURE, BLOOD 1   CK-MB   CK   LACTATE, SEPSIS   MAGNESIUM   MYOGLOBIN, SERUM   TSH WITHOUT REFLEX   TROPONIN   LACTATE DEHYDROGENASE   COVID-19   LACTATE, SEPSIS   T4   TROPONIN   C-REACTIVE PROTEIN   FERRITIN       All other labs were within normal range ornot returned as of this dictation. EMERGENCY DEPARTMENT COURSE and DIFFERENTIAL DIAGNOSIS/MDM:   Vitals:    Vitals:    08/03/20 1323 08/03/20 1435   BP: 133/68 (!) 143/69   Pulse: 128 117   Resp: 18 18   Temp: 102.4 °F (39.1 °C)    TempSrc: Tympanic    SpO2: 97% 97%   Weight:  183 lb (83 kg)   Height:  5' 7\" (1.702 m)            Patient has a history of paroxysmal atrial fibrillation. Urinalysis is consistent with infection. No other infectious focus is found. He is started on IV fluids and IV Rocephin. Nasopharyngeal swab is negative for COVID. His heart rate ranges between 100 and 120 for the most part in the ED with atrial fibrillation. He is on anticoagulant therapy.   Findings are discussed with Dr. Anjelica Jason who is admitting (urinary tract infection) N39.0           Summation      Patient Course: Discharged. ED Medicationsadministered this visit:    Medications   0.9 % sodium chloride IV bolus 1,983 mL (1,000 mLs Intravenous New Bag 8/3/20 1458)   cefTRIAXone (ROCEPHIN) 1 g IVPB in 50 mL D5W minibag (1 g Intravenous New Bag 8/3/20 1500)       New Prescriptions from this visit:    New Prescriptions    No medications on file       Follow-up:  Lisa Rodriguez MD  [address] [facility]  [phone]              Final Impression:   1. Urinary tract infection with hematuria, site unspecified    2. Fever, unspecified fever cause    3.  Atrial fibrillation with rapid ventricular response ([facility] [address] [address]               (Please note that portions of this note were completed with a voice recognitionprogram.  Efforts were made to edit the dictations but occasionally words are mis-transcribed.)    Olga Lidia Conn MD (electronically signed)  Attending Emergency Physician            Olga Lidia Conn MD  08/03/20 1417

## 2020-08-03 NOTE — H&P
infarcts    Stroke ([facility] [address] 01/11/2020    large vessel occlusion/left M2 occlusion. He subsequently underwent emergent thrombectomy.  /  S/P TPA DONE           Past Surgical History:   Procedure Laterality Date    APPENDECTOMY      TRANSESOPHAGEAL ECHOCARDIOGRAM  01/17/2020       Medications Prior to Admission:    Medications Prior to Admission: oxybutynin (DITROPAN-XL) 10 MG extended release tablet, Take 1 tablet by mouth daily  trospium (SANCTURA) 20 MG tablet, Take 1 tablet by mouth 2 times daily (before meals)  tamsulosin (FLOMAX) 0.4 MG capsule, Take 1 capsule by mouth daily  latanoprost (XALATAN) 0.005 % ophthalmic solution, Place 1 drop into both eyes nightly  Cholecalciferol (VITAMIN D3) 25 MCG (1000 UT) CAPS, Take 2,000 Units by mouth daily  dabigatran (PRADAXA) 150 MG capsule, Take 1 capsule by mouth 2 times daily  aspirin 81 MG EC tablet, Take 1 tablet by mouth daily  Omega-3 Fatty Acids (FISH OIL) 1000 MG CAPS, Take 1,000 mg by mouth 2 times daily  atorvastatin (LIPITOR) 40 MG tablet, Take 1 tablet by mouth nightly  [START ON 8/7/2020] enoxaparin (LOVENOX) 80 MG/0.8ML injection, Last Pradaxa should be on the evening of August 6. Take Lovenox twice a day on August 7, August 8, on August 9. Last Lovenox should be the evening of August 9. Resume Pradaxa ASAP after surgery  EPINEPHrine (EPIPEN 2-FARAZ) 0.3 MG/0.3ML SOAJ injection, Inject 0.3 mLs into the muscle once for 1 dose Use as directed for allergic reaction  VITAMIN B1-B12 IM, Inject into the muscle every 30 days     Allergies:    Patient has no known allergies. Social History:    reports that he has never smoked. He has never used smokeless tobacco. He reports current alcohol use. He reports that he does not use drugs. Family History:   family history is not on file.     REVIEW OF SYSTEMS:  See HPI and problem list; otherwise no other new complaints with respect to HEENT, neck, pulmonary, coronary, GI, , endocrine, musculoskeletal, immune system/connective tissue disease, hematologic, neuropsych, skin, lymphatics, or malignancies. PHYSICAL EXAM:  Vitals:  /69   Pulse 118   Temp 99.9 °F (37.7 °C) (Oral)   Resp 16   Ht 5' 7\" (1.702 m)   Wt 177 lb 9 oz (80.5 kg)   SpO2 98%   BMI 27.81 kg/m²     HEENT: multiple missing teeth--does not wear dentures-----, Mucosa Pink, Moist, Normocephalic and Atraumatic  Neck: Supple, No Bruits, No Masses, Tenderness, Nodularity and No Lymphadenopathy  Chest/Lungs: Clear to Auscultation without Rales, Rhonchi, or Wheezes  Cardiac: Irregularly Irregular  GI/Abdomen:  Bowel Sounds Present and Soft, Non-tender, without Guarding or Rebound Tenderness  : kumari catheter---guru colored urine  EXT/Skin: No Edema, No Cyanosis and No Clubbing  Neuro: visual impairment---see above---BLE subjective complaints of numbness---tingling [long-term peripheral neuriopathy--decline treatment], Alert and Oriented, to Person, to Time, to Place and to Situation        LABS:    CBC with Differential:    Lab Results   Component Value Date    WBC 13.0 08/03/2020    RBC 4.74 08/03/2020    HGB 13.9 08/03/2020    HCT 39.8 08/03/2020     08/03/2020    MCV 84.0 08/03/2020    MCH 29.3 08/03/2020    MCHC 34.9 08/03/2020    RDW 12.7 08/03/2020    LYMPHOPCT 9 08/03/2020    MONOPCT 10 08/03/2020    BASOPCT 0 08/03/2020    MONOSABS 1.25 08/03/2020    LYMPHSABS 1.12 08/03/2020    EOSABS <0.03 08/03/2020    BASOSABS <0.03 08/03/2020    DIFFTYPE NOT REPORTED 08/03/2020     BMP:    Lab Results   Component Value Date     08/03/2020    K 3.6 08/03/2020     08/03/2020    CO2 22 08/03/2020    BUN 12 08/03/2020    LABALBU 3.5 08/03/2020    CREATININE 0.79 08/03/2020    CALCIUM 9.2 08/03/2020    GFRAA >60 08/03/2020    LABGLOM >60 08/03/2020    GLUCOSE 151 08/03/2020       ASSESSMENT:      Patient Active Problem List   Diagnosis    Corneal foreign body, right, initial encounter    Glaucoma suspect of both eyes    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    Combined forms of age-related cataract of both eyes    Acute cerebrovascular accident (CVA) ([facility] [address]    Tissue plasminogen activator (tPA) administered at other facility within 24 hours before current admission    Permanent atrial fibrillation    Acute ischemic stroke ([facility] [address]    Left homonymous hemianopsia    Hyperlipidemia    Stroke, recent, without late effect    Bilateral carotid artery stenosis    Chronic cerebral ischemia    Memory problem    Gait difficulty    Balance problem    At high risk for stroke    Risk for falls    Cerebrovascular accident (CVA) due to embolism of right posterior cerebral artery (HCC)    Bilateral hemianopia    Dysphagia    Hematuria    PTSD (post-traumatic stress disorder)    Acute urinary retention    Horseshoe kidney    Kidney stone on left side    UTI (urinary tract infection)     LIZ PADILLA  70  WM  [facility] IM;  Roshan, Urology]  FULL CODE     PRADAXA   ASPIRIN--held    AGENT ORANGE--exposure     Formaldehyde-T-gas--GM  COVID-19-[-]    Anti-infectives:  Rocephin IV    [failed Cipro IV]   Cefepime IV    UTI--POA---8.3.2020---fever---leukocytosis----due to indwelling catheter x 3 weeks--due to urinary retention  Urinary retention---requiring kumari catheter          Planned cystoscopy---TURP--8.10.2020         CT abdomen--pelvis---7.11.2020----horseshoe kidney--left-sided nephrolithiasis---                           prostatomegaly---generalized bladder wall  thickening--likely due to urinary                           retention          BPH---pending TURP   Atrial fibrillation         EKG---8.3.2020--atrial fibrillation---128--RVR         CXR---8.3.2020---[-]         Noted---1.16.2020         EKG---7.29.2020----atrial fibrillation--101--NSSTTCs  Elevated d-dimer---8.3.2020---mild--no evidence of respiratory impairment   Cerebrovascular disease         NQM---SP---[id number] < 91% LICA < 16% PALLAVI--bilateral antegrade patent vertebrals        Acute ischemic CVA---embolism right posterior cerebral artery---multifocal                        left MCA distribution infarctions---tPA--emergent                        thrombectomy--1.16.2020        Bilateral hemianopia        Left homonymous hemianopsia                 RAUL----1.17.2020--left appendage free of clots--tiny PFO---NLVSF--NRVSF--                               mild-to-moderate MR--mild TR---mild-to-moderate AI--no valvular                               vegetations or thrombus identified---aortic arch mild plaque formation--                               LVEF ~ 55%                  2D ECHO---1.13.2020--LA dilated---globally NLVSF--RA dilated---moderately                               dilated RV--NRVSF--trivial MR--MADISON but opens well--mild AI---                               LVEF ~ 55%                  2D ECHO----7.6.2017---NENO--mild concentric LVH--NLVSF--dilated RV--                                NLVSF--aortic root upper limit normal 4.0 cm---thickened MV leaflets---                                mild-to-moderate MR--MADISON--mild AR--mild TR----RVSP ~ 39 mm Hg---                                LVEF ~ 55%  Peripheral vascular disease            Bilateral carotid stenosis---see above   50# weight loss since CVA January 2020---poor appetite--poor PO intake  Hyperlipidemia  CKD---Stage 2  Gait-balance instability---8.3.2020  Memory impairment  PTSD---[place]   GERD  BLE peripheral neuropathy--numbness--tingling--Agent Orange exposure  Slight elevation TBIL--IBIL--uncertain clinical significance   BNP ~ 619   Hyponatremia   Hypokalemia  PMH:  acute urinary retention, cataracts---OU, degeneration posterior vitreous body--OU,              dysphagia, glaucoma---OU,  hematuria, horseshoe kidney, left kidney stone, skin tag  PSH:   see above, RAUL---1.17.2020, appendectomy    Allergies:  NKDA     Code Status:  FULL CODE  OARRS---8.3.2020----[-]      PLAN:    1. UTI--POA---started on Rocephin----does have extensive stone burden and horse shoe kidney adding Cefepime----IVF  2. GERD--Pepcid  3. Home medications reviewed  4. Feng catheter due to urinary retention  5. Anticoagulation----see commentary above and plan for cysto--greenlight  6. Sodium--potassium replacement  7.    See orders     Note that over 50 minutes was spent in evaluation of the patient, review of the chart and pertinent records, discussion with family/staff, etc    John Harper MD  5:25 PM  8/3/2020

## 2020-08-04 ENCOUNTER — APPOINTMENT (OUTPATIENT)
Dept: GENERAL RADIOLOGY | Age: 70
DRG: 699 | End: 2020-08-04
Payer: OTHER GOVERNMENT

## 2020-08-04 LAB
ANION GAP SERPL CALCULATED.3IONS-SCNC: 7 MMOL/L (ref 9–17)
BUN BLDV-MCNC: 9 MG/DL (ref 8–23)
BUN/CREAT BLD: 11 (ref 9–20)
CALCIUM SERPL-MCNC: 8.4 MG/DL (ref 8.6–10.4)
CHLORIDE BLD-SCNC: 107 MMOL/L (ref 98–107)
CO2: 23 MMOL/L (ref 20–31)
CREAT SERPL-MCNC: 0.79 MG/DL (ref 0.7–1.2)
GFR AFRICAN AMERICAN: >60 ML/MIN
GFR NON-AFRICAN AMERICAN: >60 ML/MIN
GFR SERPL CREATININE-BSD FRML MDRD: ABNORMAL ML/MIN/{1.73_M2}
GFR SERPL CREATININE-BSD FRML MDRD: ABNORMAL ML/MIN/{1.73_M2}
GLUCOSE BLD-MCNC: 114 MG/DL (ref 70–99)
HCT VFR BLD CALC: 33.5 % (ref 40.7–50.3)
HEMOGLOBIN: 11.8 G/DL (ref 13–17)
MAGNESIUM: 1.9 MG/DL (ref 1.6–2.6)
MCH RBC QN AUTO: 29.7 PG (ref 25.2–33.5)
MCHC RBC AUTO-ENTMCNC: 35.2 G/DL (ref 25.2–33.5)
MCV RBC AUTO: 84.4 FL (ref 82.6–102.9)
NRBC AUTOMATED: 0 PER 100 WBC
PDW BLD-RTO: 12.7 % (ref 11.8–14.4)
PLATELET # BLD: 139 K/UL (ref 138–453)
PMV BLD AUTO: 9.5 FL (ref 8.1–13.5)
POTASSIUM SERPL-SCNC: 3.5 MMOL/L (ref 3.7–5.3)
POTASSIUM SERPL-SCNC: 3.8 MMOL/L (ref 3.7–5.3)
RBC # BLD: 3.97 M/UL (ref 4.21–5.77)
SODIUM BLD-SCNC: 137 MMOL/L (ref 135–144)
TROPONIN INTERP: NORMAL
TROPONIN T: NORMAL NG/ML
TROPONIN, HIGH SENSITIVITY: 13 NG/L (ref 0–22)
WBC # BLD: 9.2 K/UL (ref 3.5–11.3)

## 2020-08-04 PROCEDURE — 71046 X-RAY EXAM CHEST 2 VIEWS: CPT

## 2020-08-04 PROCEDURE — 83735 ASSAY OF MAGNESIUM: CPT

## 2020-08-04 PROCEDURE — 2500000003 HC RX 250 WO HCPCS: Performed by: INTERNAL MEDICINE

## 2020-08-04 PROCEDURE — 6360000002 HC RX W HCPCS

## 2020-08-04 PROCEDURE — 2060000000 HC ICU INTERMEDIATE R&B

## 2020-08-04 PROCEDURE — 6370000000 HC RX 637 (ALT 250 FOR IP)

## 2020-08-04 PROCEDURE — 84484 ASSAY OF TROPONIN QUANT: CPT

## 2020-08-04 PROCEDURE — 2580000003 HC RX 258: Performed by: INTERNAL MEDICINE

## 2020-08-04 PROCEDURE — 6370000000 HC RX 637 (ALT 250 FOR IP): Performed by: INTERNAL MEDICINE

## 2020-08-04 PROCEDURE — 93005 ELECTROCARDIOGRAM TRACING: CPT | Performed by: INTERNAL MEDICINE

## 2020-08-04 PROCEDURE — 96366 THER/PROPH/DIAG IV INF ADDON: CPT

## 2020-08-04 PROCEDURE — 84132 ASSAY OF SERUM POTASSIUM: CPT

## 2020-08-04 PROCEDURE — 2580000003 HC RX 258

## 2020-08-04 PROCEDURE — 36415 COLL VENOUS BLD VENIPUNCTURE: CPT

## 2020-08-04 PROCEDURE — 85027 COMPLETE CBC AUTOMATED: CPT

## 2020-08-04 PROCEDURE — 96376 TX/PRO/DX INJ SAME DRUG ADON: CPT

## 2020-08-04 PROCEDURE — 6360000002 HC RX W HCPCS: Performed by: INTERNAL MEDICINE

## 2020-08-04 PROCEDURE — 80048 BASIC METABOLIC PNL TOTAL CA: CPT

## 2020-08-04 PROCEDURE — 99232 SBSQ HOSP IP/OBS MODERATE 35: CPT | Performed by: INTERNAL MEDICINE

## 2020-08-04 RX ORDER — OXYBUTYNIN CHLORIDE 5 MG/1
TABLET, EXTENDED RELEASE ORAL
Status: COMPLETED
Start: 2020-08-04 | End: 2020-08-04

## 2020-08-04 RX ORDER — DABIGATRAN ETEXILATE 75 MG/1
150 CAPSULE, COATED PELLETS ORAL 2 TIMES DAILY
Status: DISCONTINUED | OUTPATIENT
Start: 2020-08-04 | End: 2020-08-05 | Stop reason: HOSPADM

## 2020-08-04 RX ORDER — ATORVASTATIN CALCIUM 40 MG/1
40 TABLET, FILM COATED ORAL NIGHTLY
Status: DISCONTINUED | OUTPATIENT
Start: 2020-08-04 | End: 2020-08-05 | Stop reason: HOSPADM

## 2020-08-04 RX ORDER — SODIUM CHLORIDE 9 MG/ML
INJECTION, SOLUTION INTRAVENOUS CONTINUOUS
Status: DISCONTINUED | OUTPATIENT
Start: 2020-08-04 | End: 2020-08-04 | Stop reason: SDUPTHER

## 2020-08-04 RX ORDER — POTASSIUM CHLORIDE 20 MEQ/1
TABLET, EXTENDED RELEASE ORAL
Status: COMPLETED
Start: 2020-08-04 | End: 2020-08-04

## 2020-08-04 RX ORDER — DABIGATRAN ETEXILATE 75 MG/1
CAPSULE, COATED PELLETS ORAL
Status: COMPLETED
Start: 2020-08-04 | End: 2020-08-04

## 2020-08-04 RX ORDER — TAMSULOSIN HYDROCHLORIDE 0.4 MG/1
0.4 CAPSULE ORAL DAILY
Status: DISCONTINUED | OUTPATIENT
Start: 2020-08-04 | End: 2020-08-05 | Stop reason: HOSPADM

## 2020-08-04 RX ORDER — ATORVASTATIN CALCIUM 40 MG/1
TABLET, FILM COATED ORAL
Status: COMPLETED
Start: 2020-08-04 | End: 2020-08-04

## 2020-08-04 RX ORDER — VITAMIN B COMPLEX
TABLET ORAL
Status: COMPLETED
Start: 2020-08-04 | End: 2020-08-04

## 2020-08-04 RX ORDER — LATANOPROST 50 UG/ML
1 SOLUTION/ DROPS OPHTHALMIC NIGHTLY
Status: DISCONTINUED | OUTPATIENT
Start: 2020-08-04 | End: 2020-08-05 | Stop reason: HOSPADM

## 2020-08-04 RX ORDER — CHLORAL HYDRATE 500 MG
1000 CAPSULE ORAL 2 TIMES DAILY
Status: DISCONTINUED | OUTPATIENT
Start: 2020-08-04 | End: 2020-08-04 | Stop reason: RX

## 2020-08-04 RX ORDER — POTASSIUM CHLORIDE 20 MEQ/1
40 TABLET, EXTENDED RELEASE ORAL ONCE
Status: COMPLETED | OUTPATIENT
Start: 2020-08-04 | End: 2020-08-04

## 2020-08-04 RX ORDER — OXYBUTYNIN CHLORIDE 5 MG/1
10 TABLET, EXTENDED RELEASE ORAL DAILY
Status: DISCONTINUED | OUTPATIENT
Start: 2020-08-04 | End: 2020-08-05 | Stop reason: HOSPADM

## 2020-08-04 RX ORDER — VITAMIN B COMPLEX
2000 TABLET ORAL DAILY
Status: DISCONTINUED | OUTPATIENT
Start: 2020-08-04 | End: 2020-08-05 | Stop reason: HOSPADM

## 2020-08-04 RX ORDER — TAMSULOSIN HYDROCHLORIDE 0.4 MG/1
CAPSULE ORAL
Status: COMPLETED
Start: 2020-08-04 | End: 2020-08-04

## 2020-08-04 RX ORDER — TROSPIUM CHLORIDE 20 MG/1
20 TABLET, FILM COATED ORAL
Status: DISCONTINUED | OUTPATIENT
Start: 2020-08-04 | End: 2020-08-05 | Stop reason: HOSPADM

## 2020-08-04 RX ORDER — CEFTRIAXONE 1 G/1
INJECTION, POWDER, FOR SOLUTION INTRAMUSCULAR; INTRAVENOUS
Status: COMPLETED
Start: 2020-08-04 | End: 2020-08-04

## 2020-08-04 RX ADMIN — ATORVASTATIN CALCIUM 40 MG: 40 TABLET, FILM COATED ORAL at 19:56

## 2020-08-04 RX ADMIN — CEFEPIME HYDROCHLORIDE 2 G: 2 INJECTION, POWDER, FOR SOLUTION INTRAVENOUS at 19:11

## 2020-08-04 RX ADMIN — DABIGATRAN ETEXILATE MESYLATE 150 MG: 75 CAPSULE ORAL at 19:58

## 2020-08-04 RX ADMIN — POTASSIUM CHLORIDE 40 MEQ: 20 TABLET, EXTENDED RELEASE ORAL at 09:49

## 2020-08-04 RX ADMIN — CEFTRIAXONE 1000 MG: 1 INJECTION, POWDER, FOR SOLUTION INTRAMUSCULAR; INTRAVENOUS at 19:55

## 2020-08-04 RX ADMIN — CEFEPIME HYDROCHLORIDE 2 G: 2 INJECTION, POWDER, FOR SOLUTION INTRAVENOUS at 05:47

## 2020-08-04 RX ADMIN — CHOLECALCIFEROL TAB 25 MCG (1000 UNIT) 2000 UNITS: 25 TAB at 19:57

## 2020-08-04 RX ADMIN — DEXTROSE MONOHYDRATE 50 ML: 5 INJECTION INTRAVENOUS at 19:56

## 2020-08-04 RX ADMIN — Medication 10 ML: at 19:59

## 2020-08-04 RX ADMIN — TAMSULOSIN HYDROCHLORIDE 0.4 MG: 0.4 CAPSULE ORAL at 20:00

## 2020-08-04 RX ADMIN — POTASSIUM CHLORIDE 40 MEQ: 20 TABLET, EXTENDED RELEASE ORAL at 20:00

## 2020-08-04 RX ADMIN — SODIUM CHLORIDE: 9 INJECTION, SOLUTION INTRAVENOUS at 05:16

## 2020-08-04 RX ADMIN — CEFTRIAXONE 1 G: 1 INJECTION, POWDER, FOR SOLUTION INTRAMUSCULAR; INTRAVENOUS at 19:57

## 2020-08-04 RX ADMIN — VITAMIN D, TAB 1000IU (100/BT) 2000 UNITS: 25 TAB at 19:57

## 2020-08-04 RX ADMIN — FAMOTIDINE 20 MG: 10 INJECTION, SOLUTION INTRAVENOUS at 19:58

## 2020-08-04 RX ADMIN — FAMOTIDINE 20 MG: 10 INJECTION, SOLUTION INTRAVENOUS at 09:49

## 2020-08-04 RX ADMIN — Medication 10 ML: at 09:51

## 2020-08-04 RX ADMIN — OXYBUTYNIN CHLORIDE 10 MG: 5 TABLET, EXTENDED RELEASE ORAL at 20:02

## 2020-08-04 ASSESSMENT — PAIN SCALES - GENERAL
PAINLEVEL_OUTOF10: 0

## 2020-08-04 NOTE — FLOWSHEET NOTE
Assessment: Patient and wife engaged easily in conversation He is scheduled for a procedure next week. Patient is doing well today and didn't want to call  at this time. Intervention: Engaged in conversation. Patient expressed appreciation for visit and offer of continued prayer. Plan: Chaplains are available on site or on call 24/7 for spiritual and emotional support. 08/04/20 1059   Encounter Summary   Services provided to: Patient and family together   Referral/Consult From: [address] [place] [address]; Family members   Continue Visiting   (8/4/20)   Complexity of Encounter Moderate   Length of Encounter 15 minutes   Spiritual/Sikh   Type Spiritual support   Assessment Approachable   Intervention Active listening;Sustaining presence/ Ministry of presence   Outcome Expressed gratitude;Engaged in conversation

## 2020-08-04 NOTE — PLAN OF CARE
Ability to identify and alter barriers to satisfying sexual function will improve  Description: Ability to identify and alter barriers to satisfying sexual function will improve  Outcome: Ongoing     Problem: Nutritional:  Goal: Ability to identify appropriate dietary choices will improve  Description: Ability to identify appropriate dietary choices will improve  Outcome: Ongoing     Problem: Respiratory:  Goal: Ability to maintain adequate ventilation will improve  Description: Ability to maintain adequate ventilation will improve  Outcome: Ongoing     Problem: Safety:  Goal: Free from accidental physical injury  Description: Free from accidental physical injury  Outcome: Ongoing  Goal: Free from intentional harm  Description: Free from intentional harm  Outcome: Ongoing     Problem: Daily Care:  Goal: Daily care needs are met  Description: Daily care needs are met  Outcome: Ongoing     Problem: Skin Integrity:  Goal: Skin integrity will stabilize  Description: Skin integrity will stabilize  Outcome: Ongoing     Problem: Falls - Risk of:  Goal: Will remain free from falls  Description: Will remain free from falls  Outcome: Ongoing  Goal: Absence of physical injury  Description: Absence of physical injury  Outcome: Ongoing

## 2020-08-04 NOTE — PROGRESS NOTES
SW completed a Psychosocial Assessment for evaluation of patient's mental health, social status, and functional capacity within the community. Patient is a 69year old male admitted due to UTI. Patient was accompanied by his wife. Patient was alert, cooperative, and engaging during assessment. Patient lives with his wife in [place]. Patient reports she has grab bars, walker, cane, and wheelchair for DME. Patient states he has not had to use his DME since his stroke in January. SW provided supportive listening while patient discussed past medical history. Patient discussed being a [place]. SW provided supportive listening. Patient states he goes to [facility] for services. Patient states he has positive support from his immediate family members and friends. Patient states he has been  48 years and spoke proud of his positive relationship and support. Patient has PCP Cheli Valencia MD, and reports no issues affording his medication. SW assessed ADLs and means of transportation. Patient states he is independent in his ADLs and no issues with transportation. SW assessed if patient had food insecurity or needed financial assistance. Patient denied food insecurity or any financial concerns. Patient states he does not know when he will be discharged however, he wishes to be discharged home when appropriate. Patient is a full code status at this time. Patient states he does not have a advance directive. Patient voiced his wife knows his wishes and he would appoint her as his POA. SW provided education and resources. Patient reports he has a living will. Patient states he needs no additional services at this time. SW provided business card. SW will continue to monitor needs and assist as appropriate.          FRANNIE Mathews  8/4/2020

## 2020-08-04 NOTE — PLAN OF CARE
Problem: Discharge Planning:  Goal: Discharged to appropriate level of care  Description: Discharged to appropriate level of care  Outcome: Met This Shift   Pt lives at home with wife. Pt denies any discharge needs at present time.

## 2020-08-04 NOTE — PROGRESS NOTES
Hospitalist Progress Note    Patient:  Dianelys Page     YOB: 1950    MRN: [id number]   Admit date: 8/3/2020     Acct: [de-identified]     PCP: SANGEETA DAVIS MD    CC--Interval History:   UTI---POA----on Rocephin IV--Cefepime IV--IVF---blood and urine cultures pending----Tmax 103F last night--normalized this AM      Scheduled for a cysto-greenlight----8.10.2020---note need to adjust anticoagulation---8.6.2020    Atrial fibrillation---chronic    Cerebrovascular disease---prior CVA--1.16.2020    Weight loss due to poor appetite and PO intake since CVA per patient report    Hyponatremia---corrected    K = 3.5 ---> potassium replacement    See note below         All other ROS negative except noted in HPI    Diet:  DIET CARDIAC;    Medications:  Scheduled Meds:   sodium chloride flush  10 mL Intravenous 2 times per day    famotidine (PEPCID) injection  20 mg Intravenous BID    cefepime  2 g Intravenous Q12H     Continuous Infusions:   sodium chloride 125 mL/hr at 08/04/20 1036     PRN Meds:sodium chloride flush, acetaminophen, ondansetron    Objective:  Labs:  CBC with Differential:    Lab Results   Component Value Date    WBC 9.2 08/04/2020    RBC 3.97 08/04/2020    HGB 11.8 08/04/2020    HCT 33.5 08/04/2020     08/04/2020    MCV 84.4 08/04/2020    MCH 29.7 08/04/2020    MCHC 35.2 08/04/2020    RDW 12.7 08/04/2020    LYMPHOPCT 9 08/03/2020    MONOPCT 10 08/03/2020    BASOPCT 0 08/03/2020    MONOSABS 1.25 08/03/2020    LYMPHSABS 1.12 08/03/2020    EOSABS <0.03 08/03/2020    BASOSABS <0.03 08/03/2020    DIFFTYPE NOT REPORTED 08/03/2020     BMP:    Lab Results   Component Value Date     08/04/2020    K 3.5 08/04/2020     08/04/2020    CO2 23 08/04/2020    BUN 9 08/04/2020    LABALBU 3.5 08/03/2020    CREATININE 0.79 08/04/2020    CALCIUM 8.4 08/04/2020    GFRAA >60 08/04/2020    LABGLOM >60 08/04/2020    GLUCOSE 114 08/04/2020           Physical Exam:  Vitals: /68   Pulse 102   Temp 98.6 °F (37 °C) (Tympanic)   Resp 18   Ht 5' 7\" (1.702 m)   Wt 176 lb 9.6 oz (80.1 kg)   SpO2 94%   BMI 27.66 kg/m²   24 hour intake/output:    Intake/Output Summary (Last 24 hours) at 8/4/2020 1124  Last data filed at 8/4/2020 0505  Gross per 24 hour   Intake 2673.46 ml   Output 1600 ml   Net 1073.46 ml     Last 3 weights: Wt Readings from Last 3 Encounters:   08/04/20 176 lb 9.6 oz (80.1 kg)   08/03/20 182 lb (82.6 kg)   07/29/20 182 lb (82.6 kg)     HEENT: Normocephalic and Atraumatic  Neck: Supple, No Masses, Tenderness, Nodularity and No Lymphadenopathy  Chest/Lungs: Clear to Auscultation without Rales, Rhonchi, or Wheezes  Cardiac: Irregularly Irregular  GI/Abdomen: Bowel Sounds Present and Soft, Non-tender, without Guarding or Rebound Tenderness  : kumari catherter pale guru--somewhat lighter and clearer  EXT/Skin: No Edema, No Cyanosis and No Clubbing  Neuro: generalized weakness---deficits above--- and Alert and Oriented      Assessment:    Active Problems:    UTI (urinary tract infection)  Resolved Problems:    * No resolved hospital problems.  Haylie CARR  70  WM  Herbie Mcnally, IM;  Hakeem Ruiz, Urology]  FULL CODE     PRADAXA   ASPIRIN--held    AGENT ORANGE--exposure     Formaldehyde-T-gas--GM  COVID-19-[-]    Anti-infectives:  Rocephin IV    [failed Cipro IV]    UTI--POA---8.3.2020---fever---leukocytosis----due to indwelling catheter x 3 weeks--due to urinary retention  Urinary retention---requiring kumari catheter          Planned cystoscopy---TURP--8.10.2020         CT abdomen--pelvis---7.11.2020----horseshoe kidney--left-sided nephrolithiasis---                           prostatomegaly---generalized bladder wall  thickening--likely due to urinary                           retention          BPH---pending TURP   Atrial fibrillation         EKG----8.4.2020---atrial fibrillation---102         EKG---8.3.2020--atrial fibrillation---128--RVR         CXR---8.3.2020---[-] posterior vitreous body--OU,              dysphagia, glaucoma---OU,  hematuria, horseshoe kidney, left kidney stone, skin tag  PSH:   see above, RAUL---1.17.2020, appendectomy    Allergies:  NKDA       Plan:  1. UTI---POA----IV antibiotics  2. Await blood and urine cultures  3. Potassium replacement  4. Atrial fibrillation---rate control  5. Weight loss---encourage PO intake  6. IVF--increased rate to 125  7.    See orders    Electronically signed by Michelle Enriquez on 8/4/2020 at 11:24 AM    Hospitalist

## 2020-08-05 ENCOUNTER — HOSPITAL ENCOUNTER (OUTPATIENT)
Dept: PREADMISSION TESTING | Age: 70
Setting detail: SPECIMEN
Discharge: HOME | End: 2020-08-09
Payer: OTHER GOVERNMENT

## 2020-08-05 VITALS
OXYGEN SATURATION: 99 % | HEART RATE: 103 BPM | TEMPERATURE: 98.8 F | RESPIRATION RATE: 20 BRPM | BODY MASS INDEX: 28.38 KG/M2 | SYSTOLIC BLOOD PRESSURE: 115 MMHG | DIASTOLIC BLOOD PRESSURE: 70 MMHG | HEIGHT: 67 IN | WEIGHT: 180.8 LBS

## 2020-08-05 PROBLEM — E43 SEVERE MALNUTRITION (HCC): Status: ACTIVE | Noted: 2020-08-05

## 2020-08-05 LAB
ABSOLUTE EOS #: 0.09 K/UL (ref 0–0.44)
ABSOLUTE IMMATURE GRANULOCYTE: <0.03 K/UL (ref 0–0.3)
ABSOLUTE LYMPH #: 0.97 K/UL (ref 1.1–3.7)
ABSOLUTE MONO #: 0.64 K/UL (ref 0.1–1.2)
ANION GAP SERPL CALCULATED.3IONS-SCNC: 11 MMOL/L (ref 9–17)
BASOPHILS # BLD: 0 % (ref 0–2)
BASOPHILS ABSOLUTE: <0.03 K/UL (ref 0–0.2)
BUN BLDV-MCNC: 8 MG/DL (ref 8–23)
BUN/CREAT BLD: 12 (ref 9–20)
CALCIUM SERPL-MCNC: 8.5 MG/DL (ref 8.6–10.4)
CHLORIDE BLD-SCNC: 107 MMOL/L (ref 98–107)
CO2: 19 MMOL/L (ref 20–31)
CREAT SERPL-MCNC: 0.65 MG/DL (ref 0.7–1.2)
DIFFERENTIAL TYPE: ABNORMAL
EKG ATRIAL RATE: 100 BPM
EKG Q-T INTERVAL: 298 MS
EKG QRS DURATION: 82 MS
EKG QTC CALCULATION (BAZETT): 388 MS
EKG R AXIS: 5 DEGREES
EKG T AXIS: 19 DEGREES
EKG VENTRICULAR RATE: 102 BPM
EOSINOPHILS RELATIVE PERCENT: 1 % (ref 1–4)
GFR AFRICAN AMERICAN: >60 ML/MIN
GFR NON-AFRICAN AMERICAN: >60 ML/MIN
GFR SERPL CREATININE-BSD FRML MDRD: ABNORMAL ML/MIN/{1.73_M2}
GFR SERPL CREATININE-BSD FRML MDRD: ABNORMAL ML/MIN/{1.73_M2}
GLUCOSE BLD-MCNC: 106 MG/DL (ref 70–99)
HCT VFR BLD CALC: 34.7 % (ref 40.7–50.3)
HEMOGLOBIN: 11.9 G/DL (ref 13–17)
IMMATURE GRANULOCYTES: 0 %
LYMPHOCYTES # BLD: 15 % (ref 24–43)
MCH RBC QN AUTO: 29 PG (ref 25.2–33.5)
MCHC RBC AUTO-ENTMCNC: 34.3 G/DL (ref 25.2–33.5)
MCV RBC AUTO: 84.4 FL (ref 82.6–102.9)
MONOCYTES # BLD: 10 % (ref 3–12)
NRBC AUTOMATED: 0 PER 100 WBC
PDW BLD-RTO: 12.8 % (ref 11.8–14.4)
PLATELET # BLD: 163 K/UL (ref 138–453)
PLATELET ESTIMATE: ABNORMAL
PMV BLD AUTO: 9.8 FL (ref 8.1–13.5)
POTASSIUM SERPL-SCNC: 3.4 MMOL/L (ref 3.7–5.3)
POTASSIUM SERPL-SCNC: 4.5 MMOL/L (ref 3.7–5.3)
RBC # BLD: 4.11 M/UL (ref 4.21–5.77)
RBC # BLD: ABNORMAL 10*6/UL
SEG NEUTROPHILS: 74 % (ref 36–65)
SEGMENTED NEUTROPHILS ABSOLUTE COUNT: 4.86 K/UL (ref 1.5–8.1)
SODIUM BLD-SCNC: 137 MMOL/L (ref 135–144)
WBC # BLD: 6.6 K/UL (ref 3.5–11.3)
WBC # BLD: ABNORMAL 10*3/UL

## 2020-08-05 PROCEDURE — 85025 COMPLETE CBC W/AUTO DIFF WBC: CPT

## 2020-08-05 PROCEDURE — 97165 OT EVAL LOW COMPLEX 30 MIN: CPT | Performed by: OCCUPATIONAL THERAPIST

## 2020-08-05 PROCEDURE — 2500000003 HC RX 250 WO HCPCS: Performed by: INTERNAL MEDICINE

## 2020-08-05 PROCEDURE — 80048 BASIC METABOLIC PNL TOTAL CA: CPT

## 2020-08-05 PROCEDURE — 94760 N-INVAS EAR/PLS OXIMETRY 1: CPT

## 2020-08-05 PROCEDURE — 6370000000 HC RX 637 (ALT 250 FOR IP): Performed by: INTERNAL MEDICINE

## 2020-08-05 PROCEDURE — 84132 ASSAY OF SERUM POTASSIUM: CPT

## 2020-08-05 PROCEDURE — 6360000002 HC RX W HCPCS: Performed by: INTERNAL MEDICINE

## 2020-08-05 PROCEDURE — 36415 COLL VENOUS BLD VENIPUNCTURE: CPT

## 2020-08-05 PROCEDURE — 97116 GAIT TRAINING THERAPY: CPT | Performed by: PHYSICAL THERAPIST

## 2020-08-05 PROCEDURE — 97161 PT EVAL LOW COMPLEX 20 MIN: CPT | Performed by: PHYSICAL THERAPIST

## 2020-08-05 PROCEDURE — 2580000003 HC RX 258: Performed by: INTERNAL MEDICINE

## 2020-08-05 PROCEDURE — 99238 HOSP IP/OBS DSCHRG MGMT 30/<: CPT | Performed by: INTERNAL MEDICINE

## 2020-08-05 RX ORDER — DOXYCYCLINE HYCLATE 100 MG
100 TABLET ORAL 2 TIMES DAILY
Qty: 10 TABLET | Refills: 0 | Status: SHIPPED | OUTPATIENT
Start: 2020-08-05 | End: 2020-08-10

## 2020-08-05 RX ORDER — GREEN TEA/HOODIA GORDONII 315-12.5MG
1 CAPSULE ORAL 3 TIMES DAILY
Qty: 30 TABLET | Refills: 0 | COMMUNITY
Start: 2020-08-05 | End: 2020-08-15

## 2020-08-05 RX ORDER — FAMOTIDINE 20 MG/1
20 TABLET, FILM COATED ORAL 2 TIMES DAILY
Qty: 60 TABLET | Refills: 0 | COMMUNITY
Start: 2020-08-05

## 2020-08-05 RX ORDER — CEFDINIR 300 MG/1
300 CAPSULE ORAL 2 TIMES DAILY
Qty: 10 CAPSULE | Refills: 0 | Status: SHIPPED | OUTPATIENT
Start: 2020-08-05 | End: 2020-08-10

## 2020-08-05 RX ADMIN — DABIGATRAN ETEXILATE MESYLATE 150 MG: 75 CAPSULE ORAL at 08:34

## 2020-08-05 RX ADMIN — CEFEPIME HYDROCHLORIDE 2 G: 2 INJECTION, POWDER, FOR SOLUTION INTRAVENOUS at 07:45

## 2020-08-05 RX ADMIN — VITAMIN D, TAB 1000IU (100/BT) 2000 UNITS: 25 TAB at 08:37

## 2020-08-05 RX ADMIN — SODIUM CHLORIDE: 9 INJECTION, SOLUTION INTRAVENOUS at 08:32

## 2020-08-05 RX ADMIN — TAMSULOSIN HYDROCHLORIDE 0.4 MG: 0.4 CAPSULE ORAL at 08:34

## 2020-08-05 RX ADMIN — FAMOTIDINE 20 MG: 10 INJECTION, SOLUTION INTRAVENOUS at 08:36

## 2020-08-05 RX ADMIN — POTASSIUM BICARBONATE 40 MEQ: 782 TABLET, EFFERVESCENT ORAL at 09:05

## 2020-08-05 RX ADMIN — OXYBUTYNIN CHLORIDE 10 MG: 5 TABLET, EXTENDED RELEASE ORAL at 08:34

## 2020-08-05 RX ADMIN — TROSPIUM CHLORIDE 20 MG: 20 TABLET, FILM COATED ORAL at 07:47

## 2020-08-05 ASSESSMENT — PAIN SCALES - GENERAL
PAINLEVEL_OUTOF10: 0

## 2020-08-05 NOTE — PLAN OF CARE
Problem: Urinary Elimination:  Goal: Ability to reestablish a normal urinary elimination pattern will improve - after catheter removal  Description: Ability to reestablish a normal urinary elimination pattern will improve  Outcome: Ongoing     Problem: Urinary Elimination:  Goal: Signs and symptoms of infection will decrease  Description: Signs and symptoms of infection will decrease  Outcome: Ongoing  Goal: Ability to reestablish a normal urinary elimination pattern will improve - after catheter removal  Description: Ability to reestablish a normal urinary elimination pattern will improve  Outcome: Ongoing  Goal: Complications related to the disease process, condition or treatment will be avoided or minimized  Description: Complications related to the disease process, condition or treatment will be avoided or minimized  Outcome: Ongoing     Problem: Cardiac:  Goal: Ability to maintain an adequate cardiac output will improve  Description: Ability to maintain an adequate cardiac output will improve  Outcome: Ongoing  Goal: Hemodynamic stability will improve  Description: Hemodynamic stability will improve  Outcome: Ongoing  Goal: Diagnostic test results will improve  Description: Diagnostic test results will improve  Outcome: Ongoing     Problem: Respiratory:  Goal: Ability to maintain adequate ventilation will improve  Description: Ability to maintain adequate ventilation will improve  Outcome: Ongoing     Problem: Daily Care:  Goal: Daily care needs are met  Description: Daily care needs are met  Outcome: Ongoing     Problem: Skin Integrity:  Goal: Skin integrity will stabilize  Description: Skin integrity will stabilize  Outcome: Ongoing     Problem: Falls - Risk of:  Goal: Will remain free from falls  Description: Will remain free from falls  Outcome: Ongoing  Goal: Absence of physical injury  Description: Absence of physical injury  Outcome: Ongoing     Problem: Pain:  Goal: Control of acute pain  Description: Control of acute pain  Outcome: Ongoing  Goal: Control of chronic pain  Description: Control of chronic pain  Outcome: Ongoing     Problem: Discharge Planning:  Goal: Discharged to appropriate level of care  Description: Discharged to appropriate level of care  Outcome: Ongoing     Problem: Nutrition  Goal: Optimal nutrition therapy  Description: Nutrition Problem #1: Severe malnutrition  Intervention: Food and/or Nutrient Delivery: Modify Current Diet  Nutritional Goals: po intake greater than 50%     Outcome: Ongoing

## 2020-08-05 NOTE — CONSULTS
Dr. Bright José  Urology Consultation      Patient:  Cyndee Saunders  MRN: [id number]  YOB: 1950    CHIEF COMPLAINT: BPH and urinary retention    HISTORY OF PRESENT ILLNESS:   The patient is a 69 y.o. male who presents with cloudy urine, leukocytosis and urinary tract infection  69-year-old male. Known to my partner for history of BPH and urinary retention. He is due for greenlight photo vaporization of the prostate surgery it was scheduled next Monday. He did present with a catheter related infection. Duration is 1 week. Location is bladder and urethra. Alleviating factors IV antibiotics. Aggravating factors is catheter associated infection. Patient's old records, notes and chart reviewed and summarized above. Past Medical History:    Past Medical History:   Diagnosis Date    Atrial fibrillation ([facility] [address] 01/2020    Chronic kidney disease     CKD (chronic kidney disease)     History of ischemic stroke in prior three months     Hyperlipidemia     Ischemic stroke ([facility] [address] 01/10/2020    Nihss score 10     PTSD (post-traumatic stress disorder)     from war, Agent Orange     Skin tag 12/21/2018    Stroke ([facility] [address] 01/16/2020    MRI Brain WO: New right PCA distribution infarct, multifocal left MCA distribution infarcts    Stroke ([facility] [address] 01/11/2020    large vessel occlusion/left M2 occlusion.   He subsequently underwent emergent thrombectomy.  /  S/P TPA DONE       Past Surgical History:    Past Surgical History:   Procedure Laterality Date    APPENDECTOMY      TRANSESOPHAGEAL ECHOCARDIOGRAM  01/17/2020       Medications:      Current Facility-Administered Medications:     atorvastatin (LIPITOR) tablet 40 mg, 40 mg, Oral, Nightly, Javier Carrera, 40 mg at 08/04/20 1956    Vitamin D (CHOLECALCIFEROL) tablet 2,000 Units, 2,000 Units, Oral, Daily, Miguelitoryroderick Carrera, 2,000 Units at 08/05/20 0808    dabigatran (PRADAXA) capsule 150 mg, 150 mg, Oral, BID, Sherryll Mira [place], 150 mg at 08/05/20 4809   Alcohol/week: 0.0 standard drinks     Comment: occas    Drug use: No    Sexual activity: Yes   Lifestyle    Physical activity     Days per week: Not on file     Minutes per session: Not on file    Stress: Not on file   Relationships    Social connections     Talks on phone: Not on file     Gets together: Not on file     Attends Mu-ism service: Not on file     Active member of club or organization: Not on file     Attends meetings of clubs or organizations: Not on file     Relationship status: Not on file    Intimate partner violence     Fear of current or ex partner: Not on file     Emotionally abused: Not on file     Physically abused: Not on file     Forced sexual activity: Not on file   Other Topics Concern    Not on file   Social History Narrative    Not on file       Family History:  History reviewed. No pertinent family history. REVIEW OF SYSTEMS:  A comprehensive 14 point review of systems was obtained. Constitutional: No fatigue  Eyes: No blurry vision  Ears, nose, mouth, throat, face: No ringing in the ears; no facial droop. Respiratory: No cough or cold. Cardiovascular: No palpitations  Gastrointestinal: No diarrhea or constipation. Genitourinary: No burning with urination  Integument/Skin: No rashes  Hematologic/Lymphatic: No easy bruising  Musculoskeletal: No muscle pains  Neurologic: No weakness in the extremities. Psychiatric: No depression or suicidal thoughts. Endocrine: No heat or cold intolerances. Allergic/Immunologic: No current seasonal allergies; no skin hives. Physical Exam:    This a 69 y.o. male   Vitals:    08/05/20 0636   BP: 102/63   Pulse: 90   Resp: 20   Temp: 99 °F (37.2 °C)   SpO2: 97%     Constitutional: Patient in no acute distress. Neuro: alert and oriented to person place and time. Head: Atraumatic and normocephalic. Neck: Trachea midline   Ext: 2+ radial pulses bilaterally.   Psych: Mood and affect normal.  Skin: No rashes or bruising present. Lungs: Respiratory effort normal.  Cardiovascular:  Regular rhythm. Abdomen: Soft, non-tender, non-distended. Neither side has CVA tenderness on exam.  Bladder non-tender and not distended. Lymphatics: no palpable lymphadenopathy. Penis normal and circumcised  Feng catheter in place    Labs:  Recent Labs     08/03/20  1328 08/04/20  0548 08/05/20  0536   WBC 13.0* 9.2 6.6   HGB 13.9 11.8* 11.9*   HCT 39.8* 33.5* 34.7*   MCV 84.0 84.4 84.4    139 163     Recent Labs     08/03/20  1328 08/04/20  0548 08/04/20  1150 08/05/20  0536   * 137  --  137   K 3.6* 3.5* 3.8 3.4*    107  --  107   CO2 22 23  --  19*   BUN 12 9  --  8   CREATININE 0.79 0.79  --  0.65*       Recent Labs     08/03/20  1334   COLORU NOT REPORTED   PHUR 6.0   WBCUA 5 TO 10   RBCUA >100   MUCUS NOT REPORTED   TRICHOMONAS NOT REPORTED   YEAST NOT REPORTED   BACTERIA 1+*   SPECGRAV 1.020   LEUKOCYTESUR 2+*   UROBILINOGEN 2 mg/dL*   BILIRUBINUR NEGATIVE       Culture Results:  @[facility]@      -----------------------------------------------------------------  Imaging Results:  No results found.     Assessment and Plan   Impression:    Patient Active Problem List   Diagnosis    Corneal foreign body, right, initial encounter    Glaucoma suspect of both eyes    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    Combined forms of age-related cataract of both eyes    [facility]vascular accident ([facility]A) ([facility] [address]    Tissue plasminogen activator (tPA) administered at other facility within 24 hours before current admission    Permanent atrial fibrillation    Acute ischemic stroke ([facility] [address]    Left homonymous hemianopsia    Hyperlipidemia    Stroke, recent, without late effect    Bilateral carotid artery stenosis    Chronic cerebral ischemia    Memory problem    Gait difficulty    Balance problem    At high risk for stroke    Risk for falls    Cerebrovascular accident (CVA) due to embolism of right posterior cerebral artery (HCC)    Bilateral hemianopia    Dysphagia    Hematuria    PTSD (post-traumatic stress disorder)    Acute urinary retention    Horseshoe kidney    Kidney stone on left side    UTI (urinary tract infection)           Plan:    exchange kumari catheter  Discharge home with PO Abx, follow up with Dr Melissa Noble Monday for Greenlight PVP      Thank you for involving us in the care of [address] [place] [address]. Should you have any questions, please do not hesitate to contact us at any time.     June Mejia M.D  9:58 AM 8/5/2020

## 2020-08-05 NOTE — PROGRESS NOTES
Education/Counseling:  No recommendation at this time   Coordination of Nutrition Care:  No recommendation at this time    Goals:  po intake greater than 50%       Nutrition Monitoring and Evaluation:   Behavioral-Environmental Outcomes:      Food/Nutrient Intake Outcomes:  Food and Nutrient Intake  Physical Signs/Symptoms Outcomes:  Chewing or Swallowing, Biochemical Data, Skin, GI Status     Discharge Planning:     Too soon to determine     Electronically signed by Dorcas Shipman RD, LD on 8/5/20 at 1:28 PM EDT    Contact: [phone]

## 2020-08-05 NOTE — PROGRESS NOTES
Occupational Therapy   Occupational Therapy Initial Assessment  Date: 2020   Patient Name: Sirena Pena  MRN: [id number]     : 1950    Date of Service: 2020    Discharge Recommendations:  Home with assist PRN  OT Equipment Recommendations  Other: Recommend outpatient PT    Assessment   Performance deficits / Impairments: Decreased balance;Decreased endurance  Treatment Diagnosis: general weakness  Prognosis: Good  Decision Making: Low Complexity  REQUIRES OT FOLLOW UP: Yes  Safety Devices  Safety Devices in place: Yes  Type of devices: Left in chair;Call light within reach;Nurse notified           Patient Diagnosis(es): The primary encounter diagnosis was Urinary tract infection with hematuria, site unspecified. Diagnoses of Fever, unspecified fever cause and Atrial fibrillation with rapid ventricular response ([facility] [address] were also pertinent to this visit. has a past medical history of Atrial fibrillation ([address] Chronic kidney disease, CKD (chronic kidney disease), History of ischemic stroke in prior three months, Hyperlipidemia, Ischemic stroke ([address] Nihss score 10, PTSD (post-traumatic stress disorder), Skin tag, Stroke ([address] and Stroke ([address] has a past surgical history that includes Appendectomy and transesophageal echocardiogram (2020).     Treatment Diagnosis: general weakness      Subjective   General  Chart Reviewed: Yes  Patient assessed for rehabilitation services?: Yes  Family / Caregiver Present: Yes  Referring Practitioner: RAY Hanson  Diagnosis: UTI  Subjective  Subjective: Patient rec'd in bed, pleasant and cooperative 69 yr old male with fatigue and fever  Patient Currently in Pain: Denies  Vital Signs  Temp: 98.8 °F (37.1 °C)  Temp Source: Tympanic  Pulse: 103  Heart Rate Source: Monitor  Resp: 20  BP: 115/70  BP Location: Right upper arm  BP Upper/Lower: Upper  MAP (mmHg): 87  Patient Position: Semi fowlers  Patient Currently in Pain: Denies  Oxygen Therapy  SpO2: 99 %  O2 ADL tasks       Therapy Time   Individual Concurrent Group Co-treatment   Time In 1250         Time Out 1305         Minutes 15         Timed Code Treatment Minutes: 0 Minutes       MAGGIE VELAZQUEZ OTR, OT

## 2020-08-05 NOTE — PROGRESS NOTES
Hospitalist Progress Note    Patient:  Jose Maria Garces     YOB: 1950    MRN: [id number]   Admit date: 8/3/2020     Acct: [de-identified]     PCP: Sugey LOVE MD    CC--Interval History:   UTI---POA-on Rocephin--Cefepime--temperature curve downward---WBC = 6.6---seen by Urology----home----8.5.2020---IV --> PO antibiotics    Atrial fibrillation----chronic---rate control----on Pradaxa----note will start bridging with Lovenox on 8.7.2020    K = 3.4---potassium replacement    CVA---2.27.2020--see residual below     Weight loss---PO encouraged    Hyponatremia----corrected    See note below    All other ROS negative except noted in HPI.     Diet:  DIET GENERAL; No Added Salt (3-4 GM)  Dietary Nutrition Supplements: Frozen Oral Supplement    Medications:  Scheduled Meds:   atorvastatin  40 mg Oral Nightly    Vitamin D  2,000 Units Oral Daily    dabigatran  150 mg Oral BID    latanoprost  1 drop Both Eyes Nightly    oxybutynin  10 mg Oral Daily    tamsulosin  0.4 mg Oral Daily    trospium  20 mg Oral BID AC    cefTRIAXone (ROCEPHIN) IV  1 g Intravenous Q24H    sodium chloride flush  10 mL Intravenous 2 times per day    famotidine (PEPCID) injection  20 mg Intravenous BID    cefepime  2 g Intravenous Q12H     Continuous Infusions:   sodium chloride 125 mL/hr at 08/05/20 0832     PRN Meds:sodium chloride flush, acetaminophen, ondansetron    Objective:  Labs:  CBC with Differential:    Lab Results   Component Value Date    WBC 6.6 08/05/2020    RBC 4.11 08/05/2020    HGB 11.9 08/05/2020    HCT 34.7 08/05/2020     08/05/2020    MCV 84.4 08/05/2020    MCH 29.0 08/05/2020    MCHC 34.3 08/05/2020    RDW 12.8 08/05/2020    LYMPHOPCT 15 08/05/2020    MONOPCT 10 08/05/2020    BASOPCT 0 08/05/2020    MONOSABS 0.64 08/05/2020    LYMPHSABS 0.97 08/05/2020    EOSABS 0.09 08/05/2020    BASOSABS <0.03 08/05/2020    DIFFTYPE NOT REPORTED 08/05/2020     BMP:    Lab Results   Component Value Date     08/05/2020 kidney--left-sided nephrolithiasis---                           prostatomegaly---generalized bladder wall  thickening--likely due to urinary                           retention          BPH---pending TURP   Atrial fibrillation         EKG----8.4.2020---atrial fibrillation---102         EKG---8.3.2020--atrial fibrillation---128--RVR         CXR---8.3.2020---[-]         Noted---1.16.2020         EKG---7.29.2020----atrial fibrillation--101--NSSTTCs  Elevated d-dimer---8.3.2020---mild--no evidence of respiratory impairment           CXR---8.4.2020--[-]  Cerebrovascular disease         RMV---MK---6.89.0429 < 23% LICA < 73% PALLAVI--bilateral antegrade patent vertebrals        Acute ischemic CVA---embolism right posterior cerebral artery---multifocal                        left MCA distribution infarctions---tPA--emergent                        thrombectomy--1.16.2020        Bilateral hemianopia        Left homonymous hemianopsia                 RAUL----1.17.2020--left appendage free of clots--tiny PFO---NLVSF--NRVSF--                               mild-to-moderate MR--mild TR---mild-to-moderate AI--no valvular                               vegetations or thrombus identified---aortic arch mild plaque formation--                               LVEF ~ 55%                  2D ECHO---1.13.2020--LA dilated---globally NLVSF--RA dilated---moderately                               dilated RV--NRVSF--trivial MR--MADISON but opens well--mild AI---                               LVEF ~ 55%                  2D ECHO----7.6.2017---NENO--mild concentric LVH--NLVSF--dilated RV--                                NLVSF--aortic root upper limit normal 4.0 cm---thickened MV leaflets---                                mild-to-moderate MR--MADISON--mild AR--mild TR----RVSP ~ 39 mm Hg---                                LVEF ~ 55%  Peripheral vascular disease            Bilateral carotid stenosis---see above   50# weight loss since CVA January 2020---poor appetite--poor PO intake  Hyperlipidemia  CKD---Stage 2  Gait-balance instability---8.3.2020  Memory impairment  PTSD---[place]   GERD  BLE peripheral neuropathy--numbness--tingling--Agent Orange exposure  Slight elevation TBIL--IBIL--uncertain clinical significance   BNP ~ 619   Hyponatremia   Hypokalemia  PMH:  acute urinary retention, cataracts---OU, degeneration posterior vitreous body--OU,              dysphagia, glaucoma---OU,  hematuria, horseshoe kidney, left kidney stone, skin tag  PSH:   see above, RAUL---1.17.2020, appendectomy    Allergies:  NKDA       Plan:  1. Home----8.5.2020  2. Home medications reviewed  3. IV ---> PO antibiotics---Omnicef and doxycycline  4. Seen by Urology  5. Potassium replacement  6. Anticoagulation---last dose of Pradaxa 8.6.2020, then starting 8.7.2020 Lovenox BID until planned cystoscopy---greenlight--8.10.2020  7. Follow up Dr. Giovana Knapp, IM  8. Follow up Dr. Jani Edwards, Urology  9.   See orders     Electronically signed by Anneliese Campbell on 8/5/2020 at 3:00 PM    Hospitalist

## 2020-08-05 NOTE — PLAN OF CARE
Nutrition Problem #1: Severe malnutrition  Intervention: Food and/or Nutrient Delivery: Modify Current Diet  Nutritional Goals: po intake greater than 50%

## 2020-08-05 NOTE — PROGRESS NOTES
Physical Therapy    Facility/Department: [facility]  PROGRESSIVE CARE  Initial Assessment    NAME: Isai Arnold  : 1950  MRN: [id number]    Date of Service: 2020    Discharge Recommendations:           Assessment   Treatment Diagnosis: UTI  Prognosis: Good  Decision Making: Low Complexity  PT Education: PT Role;Home Exercise Program  No Skilled PT: Independent with functional mobility   REQUIRES PT FOLLOW UP: No  Activity Tolerance  Activity Tolerance: Patient Tolerated treatment well       Patient Diagnosis(es): The primary encounter diagnosis was Urinary tract infection with hematuria, site unspecified. Diagnoses of Fever, unspecified fever cause and Atrial fibrillation with rapid ventricular response ([facility] [address] were also pertinent to this visit. has a past medical history of Atrial fibrillation ([address] Chronic kidney disease, CKD (chronic kidney disease), History of ischemic stroke in prior three months, Hyperlipidemia, Ischemic stroke ([address] Nihss score 10, PTSD (post-traumatic stress disorder), Skin tag, Stroke ([address] and Stroke ([facility] [address] has a past surgical history that includes Appendectomy and transesophageal echocardiogram (2020).     Restrictions     Vision/Hearing        Subjective  General  Chart Reviewed: Yes  Patient assessed for rehabilitation services?: Yes  Response To Previous Treatment: Not applicable  Family / Caregiver Present: Yes(wife present in room)  Referring Practitioner: Megan Cormier MD  Referral Date : 20  Diagnosis: UTI  Follows Commands: Within Functional Limits  Subjective  Subjective: \"I'm better today than I was earlier\"  Pain Screening  Patient Currently in Pain: Denies  Vital Signs  Patient Currently in Pain: Denies       Orientation  Orientation  Overall Orientation Status: Within Normal Limits  Social/Functional History  Social/Functional History  Lives With: Spouse  Type of Home: House  Home Layout: One level  Home Access: Stairs to enter without rails  Entrance Stairs - Number of Steps: 1  Bathroom Shower/Tub: Walk-in shower, Shower chair with back  Bathroom Toilet: Standard  Bathroom Equipment: Grab bars in shower  Receives Help From: Family  ADL Assistance: Independent  Homemaking Assistance: Independent  Homemaking Responsibilities: Yes  Meal Prep Responsibility: Secondary  Laundry Responsibility: Secondary  Cleaning Responsibility: Secondary  Shopping Responsibility: Secondary  Ambulation Assistance: Independent  Transfer Assistance: Independent  Active : Yes  Mode of Transportation: Truck  Occupation: Retired    Objective     Observation/Palpation  Posture: Good  Observation: pt rec'd in bed, call light within reach, agreeable to therapy    PROM RLE (degrees)  RLE PROM: WNL  AROM RLE (degrees)  RLE AROM: WNL  PROM LLE (degrees)  LLE PROM: WNL  AROM LLE (degrees)  LLE AROM : WNL  Strength RLE  Strength RLE: WFL  Strength LLE  Strength LLE: WFL        Bed mobility  Bridging: Independent  Rolling to Left: Independent  Rolling to Right: Independent  Supine to Sit: Independent  Sit to Supine: Independent  Scooting: Independent  Transfers  Sit to Stand: Independent  Stand to sit:  Independent  Bed to Chair: Independent  Stand Pivot Transfers: Independent  Lateral Transfers: Independent  Ambulation  Ambulation?: Yes  Ambulation 1  Device: No Device  Assistance: Independent  Gait Deviations: None  Distance: 50' x 2     Balance  Posture: Good  Sitting - Static: Good  Sitting - Dynamic: Good  Standing - Static: Good  Standing - Dynamic: Good      Plan   Plan  Times per week: NPU  Safety Devices  Type of devices: Bed alarm in place, Call light within reach, Gait belt, Left in bed, Nurse notified    Goals  Patient Goals   Patient goals : NPU     Therapy Time   Individual Concurrent Group Co-treatment   Time In 1402         Time Out 1414         Minutes 12         Timed Code Treatment Minutes: 8 Minutes     Andrew Bueno PT, DPT

## 2020-08-06 ENCOUNTER — CARE COORDINATION (OUTPATIENT)
Dept: CASE MANAGEMENT | Age: 70
End: 2020-08-06

## 2020-08-06 ENCOUNTER — TELEPHONE (OUTPATIENT)
Dept: UROLOGY | Age: 70
End: 2020-08-06

## 2020-08-06 PROCEDURE — 1111F DSCHRG MED/CURRENT MED MERGE: CPT | Performed by: INTERNAL MEDICINE

## 2020-08-06 NOTE — DISCHARGE SUMMARY
Taya [address]                 [address] [place] [address], [address] [place] [address]                               DISCHARGE SUMMARY    PATIENT NAME: Dani Quach                     :        1950  MED REC NO:   [id number]                             ROOM:       9524  ACCOUNT NO:   [de-identified]                           ADMIT DATE: 2020  PROVIDER:     Jimmy Cooney. Gio Ly MD                  DISCHARGE DATE:  2020    ATTENDING PHYSICIAN OF HOSPITALIZATION:  Qamar Mantilla MD    PERSONAL PHYSICIAN:  Mark Gilbert, Internal Medicine, [facility]. The patient has been seen by Urology with planned procedures by Dr. Lester Schrader, Urology, for 08/10/2020. DIAGNOSES:  1. Urinary tract infection, POA, 2020, fever, leukocytosis due to  indwelling Feng catheter for a 3-week period due to urinary retention. Blood culture negative, 2020, urine culture, not reflexed. 2.  Urinary retention requiring Feng catheter. Has planned cystoscopy,  GreenLight, TURP, 08/10/2020. CT scan of the abdomen and pelvis,  2020, horseshoe kidney, left-sided nephrolithiasis,  prostatomegaly, generalized bladder wall thickening likely due to  urinary retention. BPH, pending TURP as above. 3.  Atrial fibrillation. EKG, 2020, atrial fibrillation, rate  102. EKG, 2020, atrial fibrillation, rate 128, RVR corrected with  fluids. Chest x-ray, 2020, negative. EKG, 2020, atrial  fibrillation, rate 101, nonspecific ST-T changes. 4.  Elevated D-dimer, 2020, mild, no evidence of respiratory  impairment. Chest x-ray, 2020, no acute changes.   5.  Cerebrovascular disease with Doppler ultrasound, carotid vertebrals,  , less than 57% LICA, less than 48% PALLAVI, bilateral antegrade  patent vertebrals, had an acute ischemic CVA with embolism to the right  posterior cerebral artery, multifocal left MCA distribution infarction,  tPA with subsequent emergent thrombectomy, 01/16/2020. 6.  Most recent 2D echo, 01/13/2020, LA dilated globally, normal left  ventricular systolic function, RA dilated, mildly dilated RV, normal  right ventricular systolic function, trivial MR, [place] but opens well,  mild AI, LVEF approximately 55%. 7.  Peripheral vascular disease, bilateral carotid stenosis, see above. A 50-pound weight loss since the cerebrovascular accident in 01/2020  secondary to poor appetite, poor p.o. intake. 8.  Hyperlipidemia. 9.  Chronic kidney disease, stage II.  10.  Gait/balance instability. 11.  Memory impairment. 15.  PTSD, [place], [place], Asian arms exposure. 13.  GERD. 14.  Bilateral lower extremity neuropathy, numbness and tingling, Asian  arms exposure, declines intervention at this time. 15.  Slight elevation in total bilirubin, indirect bilirubin of  uncertain clinical significance, resolved. 16.  Hyponatremia, corrected. 17.  Hypokalemia, replacement therapy is given. Other medical problems set forth in the progress note of 08/05/2020,  incorporated for reference herein. HISTORY OF PRESENT ILLNESS AND HOSPITAL COURSE:  The patient is a  69-year-old white male, patient of Ravinder Chambers, Internal Medicine, [facility]; also seen by Jonny Rangel, Urology, outpatient. The  patient presented with the urinary tract infection, fever, leukocytosis  due to an indwelling Feng catheter due to urinary retention, BPH. The  patient is to undergo planned cystoscopy together with GreenLight laser,  TURP on 08/10/2020. Imaging studies as set forth above. The patient  was placed on Rocephin, note that he had failed outpatient therapy on  Cipro. Given the horseshoe kidney together with extensive stone burden,  the patient was also placed on cefepime. The patient responded well to  this regimen, able to be discharged on 08/05/2020.   Note that the  patient's urine sample did not reflex since final culture would not be  available. Atrial fibrillation initially with RVR, received IV fluids, improved,  back to his baseline. Elevated D-dimer, very mild elevation with no evidence of any other  respiratory impairment and the like. The patient has been seen by  Cardiology. Cerebrovascular disease, ischemic, stroke back in 02/2020. See note for  issues of any residuals. The patient with 50-pound weight loss since  his stroke in 01/2020, very poor appetite, poor p.o. intake, also the  patient does not wear his dentures. The patient encouraged for p.o. If  not able to improve particularly after treatment and after his TURP,  this will need to be further evaluated to make sure that there is no  other underlying cause for the patient's weight loss. The patient has  chronic kidney disease, stage II, stable. The patient's [place] vet  exposures to [employer] and also formaldehyde and T-gas from [employer]. This will need to be considered for future if other signs or symptoms  begin to appear. Hyponatremia, replacement therapy was given and corrected. Hypokalemia,  variable potassium levels, additional potassium was given. LABORATORY DATA:  Around the time of discharge, white cell count 6.6;  hemoglobin 11.9; hematocrit 34.7. Sodium 137,  potassium 3.4 to 3.8, chloride 107, CO2 of 19, BUN 8, creatinine 0.65,  glucose 106, calcium 8.5, and GFR greater than 60. DISCHARGE INSTRUCTIONS:  FOLLOWUP:  Discharged to home on 08/05/2020. DIET:  Cardiac. ACTIVITY:  As tolerated. CONDITION AT DISCHARGE:  Improved, fair. MEDICATIONS:  NEW:  Cefdinir (Omnicef) 300 mg p.o. b.i.d., 5 days; doxycycline 100 mg  p.o. twice daily, 5 days; famotidine (Pepcid) 20 mg p.o. b.i.d.;  probiotic acidophilus, one 3 times a day.     FOLLOWING MEDICATIONS CONTINUED, NO CHANGE:  Aspirin 81 mg p.o. daily to  be stopped on 08/06/2020; atorvastatin (Lipitor) 40 mg p.o. nightly;  Pradaxa 150 mg p.o. b.i.d. to be stopped on 08/05/2020; the patient is  to start Lovenox 80 mg subcutaneously twice a day starting in the  morning of 08/07 and continued until such time as the patient undergoes  his procedure on 08/10/2020; epinephrine (EpiPen) 2 packs 0.3 mL, muscle  once as directed for allergic reaction; fish oil 1000 mg p.o. b.i.d.;  latanoprost (Xalatan) 0.005% ophthalmic solution, one drop both eyes  nightly; oxybutynin (Ditropan XL) 10 mg p.o. daily; tamsulosin (Flomax)  0.4 mg p.o. daily; [place] 20 mg p.o. b.i.d. before meals; vitamin B12  every 30 days; and vitamin D3 of 2000 units p.o. daily. Follow up with the patient's personal physician, Max Jones, Internal  Medicine. Follow up with Dr. Yasmin Schneider, Urology, for planned cystoscopy  and GreenLight laser procedure for BPH, urinary retention. Any aspect of the patient's care not discussed in the chart and/or  dictation will be addressed and treated as an outpatient. The patient's medications have been reviewed including, but not limited  to, pre-hospital, hospital and discharge medications. The patient  and/or the patient's personal representatives were specifically advised  the only medications to be taken are those set forth in the discharge  orders and no other medications should be taken. Any prior medications  not on the discharge orders are specifically discontinued.         Annia Corea MD    D: 08/05/2020 17:46:42       T: 08/06/2020 2:28:20     [id number]  Job#: [id number]     Doc#: [id number]    CC:

## 2020-08-06 NOTE — TELEPHONE ENCOUNTER
Rebecca Bowen from [facility] verification department is calling regarding patient's procedure on 8/10/2020. She states the patient may be eligible for coverage from the [place] but they need a prior authorization first. Please call her back at [phone].

## 2020-08-06 NOTE — CARE COORDINATION
[facility] Transitions Initial Follow Up Call- READMISSION     Call within 2 business days of discharge: Yes    Patient: Geeta Reed Patient : 1950   MRN: [id number]  Reason for Admission: UTI with hematuria, fever (102.4), A fib with RVR   Discharge Date: 20 RARS: Readmission Risk Score: 21      Last Discharge [facility]       Complaint Diagnosis Description Type Department Provider    8/3/20 Fatigue; Fever Urinary tract infection with hematuria, site unspecified . .. ED to Hosp-Admission (Discharged) (ADMITTED) Raymundo Head YOLA Hatch; Candace Mosqueda MD           Spoke with: pt wife Sandra Oseguera    Call to pt wife who states pt is doing \"pretty good, better, back to himself\"  Writer familiar with pt/ wife from previous transition  States catheter is draining clear yellow urine- denies cloudiness, bloor or odor  Confirms pradaxa will be held after dose tonight and will start the lovenox shots tomorrow morning- confirms shot twice daily Friday, Saturday,  and Monday morning before procedure. Confirms aspirin is already on hold- and aspirin/ pradaxa will resume following procedure  States he was instructed to drink more fluids (while in the hospital) and is now doing so      Challenges to be reviewed by the provider   Additional needs identified to be addressed with provider No  none    Discussed COVID-19 related testing which was available at this time. Test results were negative. Patient informed of results, if available? Yes         Method of communication with provider : none    Advance Care Planning:   Does patient have an Advance Directive:  reviewed and current. Was this a readmission? Yes  Patient stated reason for admission: UTI, fever  Patients top risk factors for readmission: lack of knowledge about disease and medical condition    Care Transition Nurse (CTN) contacted the family by telephone to perform post hospital discharge assessment.  Verified name and  with family as identifiers. Provided introduction to self, and explanation of the CTN role. CTN reviewed discharge instructions, medical action plan and red flags with family who verbalized understanding. Family given an opportunity to ask questions and does not have any further questions or concerns at this time. Were discharge instructions available to patient? Yes. Reviewed appropriate site of care based on symptoms and resources available to patient including: PCP, Specialist, Urgent care clinics, When to call 911 and CTN. The family agrees to contact the PCP office for questions related to their healthcare. Medication reconciliation was performed with family, who verbalizes understanding of administration of home medications. Advised obtaining a 90-day supply of all daily and as-needed medications. Covid Risk Education    Patient has following risk factors of: no known risk factors. Education provided regarding infection prevention, and signs and symptoms of COVID-19 and when to seek medical attention with family who verbalized understanding. Discussed exposure protocols and quarantine From CDC: Are you at higher risk for severe illness?   and given an opportunity for questions and concerns. The family agrees to contact the COVID-19 hotline [phone] or PCP office for questions related to COVID-19. For more information on steps you can take to protect yourself, see CDC's How to Protect Yourself       Discussed follow-up appointments. If no appointment was previously scheduled, appointment scheduling offered: already scheduled. Is follow up appointment scheduled within 7 days of discharge? No- 8/17 with PCP. Will see uro on Mon 8/10 for prostate surgery   Non-[facility] follow up appointment(s): n/a     Plan for follow-up call in 5-7 days based on severity of symptoms and risk factors. Plan for next call: routine follow up, kumari catheter  CTN provided contact information for future needs.           Facility: [address]     Non-face-to-face services provided:  Scheduled appointment with PCP-8/17 at 1200  Scheduled appointment with Specialist-will get uro follow up appt after procedure 8/10  Obtained and reviewed discharge summary and/or continuity of care documents  Assessment and support for treatment adherence and medication management-1111F complete.  States understanding he is to take abx until complete    Care Transitions 24 Hour Call    Do you have any ongoing symptoms?:  No  Do you have a copy of your discharge instructions?:  Yes  Do you have all of your prescriptions and are they filled?:  Yes  Have you been contacted by a [address]?:  No  Have you scheduled your follow up appointment?:  Yes  How are you going to get to your appointment?:  Car - family or friend to transport  Were you discharged with any Home Care or Post Acute Services:  No  Post Acute Services:  [address] (Comment: [facility])  Do you feel like you have everything you need to keep you well at home?:  Yes  Care Transitions Interventions         Follow Up  Future Appointments   Date Time Provider Alexandria Pagei   8/17/2020 12:00 PM MD JULIOCESAR Smith DPP   12/1/2020  9:00 AM SCHEDULE, [address] [facility] [place]   12/8/2020  9:30 AM MD JULIOCESAR Smith MHDPP   12/22/2020  9:79 AM [facility]   2/3/2021  9:30 AM Wal[facility] Neal [facility]       Fitz Espinoza RN

## 2020-08-09 LAB
CULTURE: NORMAL
CULTURE: NORMAL
Lab: NORMAL
Lab: NORMAL
SPECIMEN DESCRIPTION: NORMAL
SPECIMEN DESCRIPTION: NORMAL

## 2020-08-10 ENCOUNTER — ANESTHESIA (OUTPATIENT)
Dept: OPERATING ROOM | Age: 70
End: 2020-08-10
Payer: MEDICARE

## 2020-08-10 ENCOUNTER — HOSPITAL ENCOUNTER (EMERGENCY)
Age: 70
Discharge: HOME | End: 2020-08-10
Attending: EMERGENCY MEDICINE
Payer: OTHER GOVERNMENT

## 2020-08-10 ENCOUNTER — HOSPITAL ENCOUNTER (OUTPATIENT)
Age: 70
Setting detail: OUTPATIENT SURGERY
Discharge: HOME | End: 2020-08-10
Attending: UROLOGY | Admitting: UROLOGY
Payer: MEDICARE

## 2020-08-10 ENCOUNTER — ANESTHESIA EVENT (OUTPATIENT)
Dept: OPERATING ROOM | Age: 70
End: 2020-08-10
Payer: MEDICARE

## 2020-08-10 VITALS
OXYGEN SATURATION: 98 % | BODY MASS INDEX: 29.99 KG/M2 | SYSTOLIC BLOOD PRESSURE: 153 MMHG | RESPIRATION RATE: 14 BRPM | HEIGHT: 65 IN | WEIGHT: 180 LBS | HEART RATE: 96 BPM | DIASTOLIC BLOOD PRESSURE: 89 MMHG | TEMPERATURE: 97.2 F

## 2020-08-10 VITALS
BODY MASS INDEX: 29.95 KG/M2 | OXYGEN SATURATION: 99 % | DIASTOLIC BLOOD PRESSURE: 82 MMHG | HEART RATE: 107 BPM | TEMPERATURE: 97.3 F | SYSTOLIC BLOOD PRESSURE: 147 MMHG | RESPIRATION RATE: 18 BRPM | WEIGHT: 180 LBS

## 2020-08-10 VITALS
TEMPERATURE: 99.2 F | RESPIRATION RATE: 11 BRPM | SYSTOLIC BLOOD PRESSURE: 88 MMHG | DIASTOLIC BLOOD PRESSURE: 54 MMHG | OXYGEN SATURATION: 99 %

## 2020-08-10 PROCEDURE — 3700000000 HC ANESTHESIA ATTENDED CARE: Performed by: UROLOGY

## 2020-08-10 PROCEDURE — 6370000000 HC RX 637 (ALT 250 FOR IP): Performed by: UROLOGY

## 2020-08-10 PROCEDURE — 2580000003 HC RX 258: Performed by: UROLOGY

## 2020-08-10 PROCEDURE — 6360000002 HC RX W HCPCS: Performed by: UROLOGY

## 2020-08-10 PROCEDURE — 2500000003 HC RX 250 WO HCPCS: Performed by: NURSE ANESTHETIST, CERTIFIED REGISTERED

## 2020-08-10 PROCEDURE — 3600000003 HC SURGERY LEVEL 3 BASE: Performed by: UROLOGY

## 2020-08-10 PROCEDURE — 7100000001 HC PACU RECOVERY - ADDTL 15 MIN: Performed by: UROLOGY

## 2020-08-10 PROCEDURE — 3700000001 HC ADD 15 MINUTES (ANESTHESIA): Performed by: UROLOGY

## 2020-08-10 PROCEDURE — 2709999900 HC NON-CHARGEABLE SUPPLY: Performed by: UROLOGY

## 2020-08-10 PROCEDURE — 3600000013 HC SURGERY LEVEL 3 ADDTL 15MIN: Performed by: UROLOGY

## 2020-08-10 PROCEDURE — 99282 EMERGENCY DEPT VISIT SF MDM: CPT

## 2020-08-10 PROCEDURE — 7100000010 HC PHASE II RECOVERY - FIRST 15 MIN: Performed by: UROLOGY

## 2020-08-10 PROCEDURE — 7100000011 HC PHASE II RECOVERY - ADDTL 15 MIN: Performed by: UROLOGY

## 2020-08-10 PROCEDURE — 6360000002 HC RX W HCPCS: Performed by: NURSE ANESTHETIST, CERTIFIED REGISTERED

## 2020-08-10 PROCEDURE — 7100000000 HC PACU RECOVERY - FIRST 15 MIN: Performed by: UROLOGY

## 2020-08-10 RX ORDER — DEXAMETHASONE SODIUM PHOSPHATE 4 MG/ML
INJECTION, SOLUTION INTRA-ARTICULAR; INTRALESIONAL; INTRAMUSCULAR; INTRAVENOUS; SOFT TISSUE PRN
Status: DISCONTINUED | OUTPATIENT
Start: 2020-08-10 | End: 2020-08-10 | Stop reason: SDUPTHER

## 2020-08-10 RX ORDER — ONDANSETRON 2 MG/ML
INJECTION INTRAMUSCULAR; INTRAVENOUS PRN
Status: DISCONTINUED | OUTPATIENT
Start: 2020-08-10 | End: 2020-08-10 | Stop reason: SDUPTHER

## 2020-08-10 RX ORDER — CIPROFLOXACIN 500 MG/1
500 TABLET, FILM COATED ORAL 2 TIMES DAILY
Qty: 10 TABLET | Refills: 0 | Status: SHIPPED | OUTPATIENT
Start: 2020-08-10 | End: 2020-08-15

## 2020-08-10 RX ORDER — HYDROCODONE BITARTRATE AND ACETAMINOPHEN 5; 325 MG/1; MG/1
1 TABLET ORAL EVERY 6 HOURS PRN
Qty: 18 TABLET | Refills: 0 | Status: SHIPPED | OUTPATIENT
Start: 2020-08-10 | End: 2020-08-13

## 2020-08-10 RX ORDER — SODIUM CHLORIDE 0.9 % (FLUSH) 0.9 %
10 SYRINGE (ML) INJECTION EVERY 12 HOURS SCHEDULED
Status: DISCONTINUED | OUTPATIENT
Start: 2020-08-10 | End: 2020-08-10 | Stop reason: HOSPADM

## 2020-08-10 RX ORDER — FENTANYL CITRATE 50 UG/ML
INJECTION, SOLUTION INTRAMUSCULAR; INTRAVENOUS PRN
Status: DISCONTINUED | OUTPATIENT
Start: 2020-08-10 | End: 2020-08-10 | Stop reason: SDUPTHER

## 2020-08-10 RX ORDER — LIDOCAINE HYDROCHLORIDE 20 MG/ML
INJECTION, SOLUTION EPIDURAL; INFILTRATION; INTRACAUDAL; PERINEURAL PRN
Status: DISCONTINUED | OUTPATIENT
Start: 2020-08-10 | End: 2020-08-10 | Stop reason: SDUPTHER

## 2020-08-10 RX ORDER — SODIUM CHLORIDE 0.9 % (FLUSH) 0.9 %
10 SYRINGE (ML) INJECTION PRN
Status: DISCONTINUED | OUTPATIENT
Start: 2020-08-10 | End: 2020-08-10 | Stop reason: HOSPADM

## 2020-08-10 RX ORDER — PROPOFOL 10 MG/ML
INJECTION, EMULSION INTRAVENOUS PRN
Status: DISCONTINUED | OUTPATIENT
Start: 2020-08-10 | End: 2020-08-10 | Stop reason: SDUPTHER

## 2020-08-10 RX ORDER — HYDROCODONE BITARTRATE AND ACETAMINOPHEN 5; 325 MG/1; MG/1
1 TABLET ORAL EVERY 6 HOURS PRN
Status: DISCONTINUED | OUTPATIENT
Start: 2020-08-10 | End: 2020-08-10 | Stop reason: HOSPADM

## 2020-08-10 RX ORDER — SODIUM CHLORIDE, SODIUM LACTATE, POTASSIUM CHLORIDE, CALCIUM CHLORIDE 600; 310; 30; 20 MG/100ML; MG/100ML; MG/100ML; MG/100ML
INJECTION, SOLUTION INTRAVENOUS CONTINUOUS
Status: DISCONTINUED | OUTPATIENT
Start: 2020-08-10 | End: 2020-08-10 | Stop reason: HOSPADM

## 2020-08-10 RX ORDER — METOPROLOL TARTRATE 5 MG/5ML
INJECTION INTRAVENOUS PRN
Status: DISCONTINUED | OUTPATIENT
Start: 2020-08-10 | End: 2020-08-10 | Stop reason: SDUPTHER

## 2020-08-10 RX ADMIN — FENTANYL CITRATE 25 MCG: 50 INJECTION, SOLUTION INTRAMUSCULAR; INTRAVENOUS at 16:16

## 2020-08-10 RX ADMIN — METOPROLOL TARTRATE 1 MG: 5 INJECTION, SOLUTION INTRAVENOUS at 16:51

## 2020-08-10 RX ADMIN — FENTANYL CITRATE 50 MCG: 50 INJECTION, SOLUTION INTRAMUSCULAR; INTRAVENOUS at 15:45

## 2020-08-10 RX ADMIN — SODIUM CHLORIDE, SODIUM LACTATE, POTASSIUM CHLORIDE, AND CALCIUM CHLORIDE: .6; .31; .03; .02 INJECTION, SOLUTION INTRAVENOUS at 14:18

## 2020-08-10 RX ADMIN — LIDOCAINE HYDROCHLORIDE 100 MG: 20 INJECTION, SOLUTION EPIDURAL; INFILTRATION; INTRACAUDAL; PERINEURAL at 15:45

## 2020-08-10 RX ADMIN — CEFTRIAXONE 1 G: 1 INJECTION, POWDER, FOR SOLUTION INTRAMUSCULAR; INTRAVENOUS at 15:45

## 2020-08-10 RX ADMIN — DEXAMETHASONE SODIUM PHOSPHATE 4 MG: 4 INJECTION, SOLUTION INTRAMUSCULAR; INTRAVENOUS at 16:21

## 2020-08-10 RX ADMIN — HYDROMORPHONE HYDROCHLORIDE 0.5 MG: 1 INJECTION, SOLUTION INTRAMUSCULAR; INTRAVENOUS; SUBCUTANEOUS at 17:42

## 2020-08-10 RX ADMIN — ONDANSETRON 4 MG: 2 INJECTION INTRAMUSCULAR; INTRAVENOUS at 16:21

## 2020-08-10 RX ADMIN — PROPOFOL 180 MG: 10 INJECTION, EMULSION INTRAVENOUS at 15:45

## 2020-08-10 RX ADMIN — HYDROCODONE BITARTRATE AND ACETAMINOPHEN 1 TABLET: 5; 325 TABLET ORAL at 18:31

## 2020-08-10 RX ADMIN — FENTANYL CITRATE 25 MCG: 50 INJECTION, SOLUTION INTRAMUSCULAR; INTRAVENOUS at 16:00

## 2020-08-10 RX ADMIN — METOPROLOL TARTRATE 1 MG: 5 INJECTION, SOLUTION INTRAVENOUS at 16:40

## 2020-08-10 ASSESSMENT — PULMONARY FUNCTION TESTS
PIF_VALUE: 5
PIF_VALUE: 2
PIF_VALUE: 5
PIF_VALUE: 1
PIF_VALUE: 8
PIF_VALUE: 4
PIF_VALUE: 4
PIF_VALUE: 5
PIF_VALUE: 8
PIF_VALUE: 4
PIF_VALUE: 3
PIF_VALUE: 5
PIF_VALUE: 5
PIF_VALUE: 4
PIF_VALUE: 4
PIF_VALUE: 1
PIF_VALUE: 2
PIF_VALUE: 8
PIF_VALUE: 2
PIF_VALUE: 4
PIF_VALUE: 5
PIF_VALUE: 4
PIF_VALUE: 1
PIF_VALUE: 1
PIF_VALUE: 4
PIF_VALUE: 6
PIF_VALUE: 5
PIF_VALUE: 3
PIF_VALUE: 1
PIF_VALUE: 4
PIF_VALUE: 2
PIF_VALUE: 4
PIF_VALUE: 5
PIF_VALUE: 2
PIF_VALUE: 1
PIF_VALUE: 2
PIF_VALUE: 5
PIF_VALUE: 2
PIF_VALUE: 2
PIF_VALUE: 4
PIF_VALUE: 9
PIF_VALUE: 5
PIF_VALUE: 4
PIF_VALUE: 2
PIF_VALUE: 3
PIF_VALUE: 2
PIF_VALUE: 4
PIF_VALUE: 6
PIF_VALUE: 4
PIF_VALUE: 3
PIF_VALUE: 1
PIF_VALUE: 4
PIF_VALUE: 2
PIF_VALUE: 1
PIF_VALUE: 3
PIF_VALUE: 2
PIF_VALUE: 1
PIF_VALUE: 4
PIF_VALUE: 4
PIF_VALUE: 3
PIF_VALUE: 2
PIF_VALUE: 4
PIF_VALUE: 4
PIF_VALUE: 5
PIF_VALUE: 2
PIF_VALUE: 1
PIF_VALUE: 2
PIF_VALUE: 3
PIF_VALUE: 2
PIF_VALUE: 5
PIF_VALUE: 4
PIF_VALUE: 4
PIF_VALUE: 6
PIF_VALUE: 2

## 2020-08-10 ASSESSMENT — ENCOUNTER SYMPTOMS
GASTROINTESTINAL NEGATIVE: 1
ALLERGIC/IMMUNOLOGIC NEGATIVE: 1
EYES NEGATIVE: 1
RESPIRATORY NEGATIVE: 1

## 2020-08-10 ASSESSMENT — PAIN DESCRIPTION - PAIN TYPE: TYPE: ACUTE PAIN

## 2020-08-10 ASSESSMENT — PAIN SCALES - GENERAL
PAINLEVEL_OUTOF10: 3
PAINLEVEL_OUTOF10: 5
PAINLEVEL_OUTOF10: 0
PAINLEVEL_OUTOF10: 5
PAINLEVEL_OUTOF10: 0
PAINLEVEL_OUTOF10: 0
PAINLEVEL_OUTOF10: 4
PAINLEVEL_OUTOF10: 7

## 2020-08-10 ASSESSMENT — PAIN DESCRIPTION - DESCRIPTORS
DESCRIPTORS: PRESSURE
DESCRIPTORS: PRESSURE

## 2020-08-10 ASSESSMENT — PAIN - FUNCTIONAL ASSESSMENT: PAIN_FUNCTIONAL_ASSESSMENT: 0-10

## 2020-08-10 ASSESSMENT — PAIN DESCRIPTION - FREQUENCY: FREQUENCY: CONTINUOUS

## 2020-08-10 NOTE — ANESTHESIA PRE PROCEDURE
Department of Anesthesiology  Preprocedure Note       Name:  Sirena Pena   Age:  69 y.o.  :  1950                                          MRN:  [id number]         Date:  8/10/2020      Surgeon: iVnny Lundberg):  Heather Fabian MD    Procedure: Procedure(s):  CYSTO w/ [facility] #[id number] Suzi Reyes)    Medications prior to admission:   Prior to Admission medications    Medication Sig Start Date End Date Taking?  Authorizing Provider   famotidine (PEPCID) 20 MG tablet Take 1 tablet by mouth 2 times daily 20  Yes Thee Landry   cefdinir (OMNICEF) 300 MG capsule Take 1 capsule by mouth 2 times daily for 5 days 8/5/20 8/10/20 Yes Thee Hernandez   doxycycline hyclate (VIBRA-TABS) 100 MG tablet Take 1 tablet by mouth 2 times daily for 5 days 8/5/20 8/10/20 Yes Thee Hernandez   Probiotic Acidophilus [facility]) TABS Take 1 tablet by mouth 3 times daily for 10 days 8/5/20 8/15/20 Yes Tameka Teresa   oxybutynin (DITROPAN-XL) 10 MG extended release tablet Take 1 tablet by mouth daily 20  Yes Farideh Schneider MD   trospium (SANCTURA) 20 MG tablet Take 1 tablet by mouth 2 times daily (before meals) 7/15/20  Yes Farideh Schneider MD   tamsulosin (FLOMAX) 0.4 MG capsule Take 1 capsule by mouth daily 20  Yes Heather Fabian MD   latanoprost (XALATAN) 0.005 % ophthalmic solution Place 1 drop into both eyes nightly 4/15/20  Yes Historical Provider, MD   Cholecalciferol (VITAMIN D3) 25 MCG (1000 UT) CAPS Take 2,000 Units by mouth daily   Yes Historical Provider, MD   Omega-3 Fatty Acids (FISH OIL) 1000 MG CAPS Take 1,000 mg by mouth 2 times daily   Yes Historical Provider, MD   atorvastatin (LIPITOR) 40 MG tablet Take 1 tablet by mouth nightly 20  Yes Clement Brooks MD   VITAMIN B1-B12 IM Inject into the muscle every 30 days    Yes Historical Provider, MD   EPINEPHrine (EPIPEN 2-FARAZ) 0.3 MG/0.3ML SOAJ injection Inject 0.3 mLs into the muscle once for 1 dose Use as directed for allergic reaction 3/3/20 7/29/20  Chhaya Norris MD   dabigatran (PRADAXA) 150 MG capsule Take 1 capsule by mouth 2 times daily 1/20/20   Park Montesinos MD   aspirin 81 MG EC tablet Take 1 tablet by mouth daily 1/21/20   Park Montesinos MD       Current medications:    Current Facility-Administered Medications   Medication Dose Route Frequency Provider Last Rate Last Dose    sodium chloride flush 0.9 % injection 10 mL  10 mL Intravenous 2 times per day Mita Reyes MD        sodium chloride flush 0.9 % injection 10 mL  10 mL Intravenous PRN Mita Reyes MD        cefTRIAXone (ROCEPHIN) 1 g IVPB in 50 mL D5W minibag  1 g Intravenous On Call to [id number] [place], MD        lactated ringers infusion   Intravenous Continuous Mita Reyes  mL/hr at 08/10/20 1418         Allergies:  No Known Allergies    Problem List:    Patient Active Problem List   Diagnosis Code    Corneal foreign body, right, initial encounter T15. [employer] Sleeper Glaucoma suspect of both eyes H40.003    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 H43.813    Combined forms of age-related cataract of both eyes H25.813    Acute cerebrovascular accident (CVA) ([facility] I63.9    Tissue plasminogen activator (tPA) administered at other facility within 24 hours before current admission Z92.82    Permanent atrial fibrillation I48.21    Acute ischemic stroke (HCC) I63.9    Left homonymous hemianopsia H53.462    Hyperlipidemia E78.5    Stroke, recent, without late effect Z86.73    Bilateral carotid artery stenosis I65.23    Chronic cerebral ischemia I67.82    Memory problem R41.3    Gait difficulty R26.9    Balance problem R26.89    At high risk for stroke Z91.89    Risk for falls Z91.81    Cerebrovascular accident (CVA) due to embolism of right posterior cerebral artery (HCC) I63.431    Bilateral hemianopia G25.3    Dysphagia R13.10    Hematuria R31.9    PTSD (post-traumatic stress disorder) F43.10    Acute urinary retention R33.8    Horseshoe kidney Q63.1    Kidney stone on left side N20.0    UTI (urinary tract infection) N39.0    Severe malnutrition (HCC) E43       Past Medical History:        Diagnosis Date    Atrial fibrillation ([facility] 01/2020    Chronic kidney disease     CKD (chronic kidney disease)     History of ischemic stroke in prior three months     Hyperlipidemia     [facility]    Nihss score 10     PTSD (post-traumatic stress disorder)     from war, [place]     Skin tag 12/21/2018    [facility]16[facility]2020    MRI Brain WO: New right PCA distribution infarct, multifocal left MCA distribution infarcts    Stroke [facility] 01/11/2020    large vessel occlusion/left M2 occlusion. He subsequently underwent emergent thrombectomy.  /  S/P TPA DONE       Past Surgical History:        Procedure Laterality Date    APPENDECTOMY      TRANSESOPHAGEAL ECHOCARDIOGRAM  01/17/2020       Social History:    Social History     Tobacco Use    Smoking status: Never Smoker    Smokeless tobacco: Never Used   Substance Use Topics    Alcohol use: Yes     Alcohol/week: 0.0 standard drinks     Comment: occas                                Counseling given: Not Answered      Vital Signs (Current):   Vitals:    08/10/20 1404   BP: 121/79   Pulse: 109   Resp: 16   Temp: 36.1 °C (97 °F)   TempSrc: Temporal   SpO2: 98%   Weight: 180 lb (81.6 kg)   Height: 5' 5\" (1.651 m)                                              BP Readings from Last 3 Encounters:   08/10/20 121/79   08/05/20 115/70   08/03/20 136/82       NPO Status: Time of last liquid consumption: 2200                        Time of last solid consumption: 2200                        Date of last liquid consumption: 08/09/20                        Date of last solid food consumption: 08/09/20    BMI:   Wt Readings from Last 3 Encounters:   08/10/20 180 lb (81.6 kg)   08/05/20 180 lb 12.8 oz (82 kg)   08/03/20 182 lb (82.6 kg)     Body mass index is 29.95 kg/m².     CBC:   Lab Results   Component Value Date    WBC 6.6 08/05/2020    RBC 4.11 08/05/2020    HGB 11.9 08/05/2020    HCT 34.7 08/05/2020    MCV 84.4 08/05/2020    RDW 12.8 08/05/2020     08/05/2020       CMP:   Lab Results   Component Value Date     08/05/2020    K 4.5 08/05/2020     08/05/2020    CO2 19 08/05/2020    BUN 8 08/05/2020    CREATININE 0.65 08/05/2020    GFRAA >60 08/05/2020    LABGLOM >60 08/05/2020    GLUCOSE 106 08/05/2020    PROT 7.1 08/03/2020    CALCIUM 8.5 08/05/2020    BILITOT 1.52 08/03/2020    ALKPHOS 113 08/03/2020    AST 16 08/03/2020    ALT 11 08/03/2020       POC Tests: No results for input(s): POCGLU, POCNA, POCK, POCCL, POCBUN, POCHEMO, POCHCT in the last 72 hours.     Coags:   Lab Results   Component Value Date    PROTIME 17.3 08/03/2020    INR 1.5 08/03/2020    APTT 51.9 08/03/2020       HCG (If Applicable): No results found for: PREGTESTUR, PREGSERUM, HCG, HCGQUANT     ABGs: No results found for: PHART, PO2ART, LIV1HCN, UAX9KCU, BEART, M8LAAPAN     Type & Screen (If Applicable):  No results found for: LABABO, LABRH    Drug/Infectious Status (If Applicable):  No results found for: HIV, HEPCAB    COVID-19 Screening (If Applicable):   Lab Results   Component Value Date    COVID19 Not Detected 08/03/2020         Anesthesia Evaluation  Patient summary reviewed no history of anesthetic complications:   Airway: Mallampati: II  TM distance: >3 FB   Neck ROM: full  Mouth opening: > = 3 FB Dental:    (+) edentulous      Pulmonary:Negative Pulmonary ROS and normal exam                               Cardiovascular:  Exercise tolerance: good (>4 METS),   (+) dysrhythmias: atrial fibrillation,         Rhythm: irregular                      Neuro/Psych:   (+) CVA:, psychiatric history: stable with treatmentdepression/anxiety             GI/Hepatic/Renal:   (+) renal disease: CRI and kidney stones,           Endo/Other:                     Abdominal:           Vascular: Anesthesia Plan      general     ASA 3           MIPS: Postoperative opioids intended and Prophylactic antiemetics administered. Anesthetic plan and risks discussed with patient.       Plan discussed with surgical team.                  JIMBO Pabon - ADRIAN   8/10/2020

## 2020-08-10 NOTE — H&P
Ismael Pineda MD  History and Physical    Patient:  Junito De Jesus  MRN: [id number]  YOB: 1950    HISTORY OF PRESENT ILLNESS:     The patient is a 69 y.o. male who presents with bph. Here for procedure. Patient's old records, notes and chart reviewed and summarized above. Ismael Pineda MD independently reviewed the images and verified the radiology reports from:    No results found. Past Medical History:    Past Medical History:   Diagnosis Date    Atrial fibrillation ([facility] 01/2020    Chronic kidney disease     CKD (chronic kidney disease)     History of ischemic stroke in prior three months     Hyperlipidemia     Ischemic stroke ([facility] [facility] 01/10/2020    Nihss score 10     PTSD (post-traumatic stress disorder)     from war, Agent Orange     Skin tag 12/21/2018    Stroke ([facility] [facility] 01/16/2020    MRI Brain WO: New right PCA distribution infarct, multifocal left MCA distribution infarcts    Stroke ([facility] [address] 01/11/2020    large vessel occlusion/left M2 occlusion. He subsequently underwent emergent thrombectomy.  /  S/P TPA DONE       Past Surgical History:    Past Surgical History:   Procedure Laterality Date    APPENDECTOMY      TRANSESOPHAGEAL ECHOCARDIOGRAM  01/17/2020       Medications Prior to Admission:    Prior to Admission medications    Medication Sig Start Date End Date Taking? Authorizing Provider   famotidine (PEPCID) 20 MG tablet Take 1 tablet by mouth 2 times daily 8/5/20   [facility]   cefdinir (OMNICEF) 300 MG capsule Take 1 capsule by mouth 2 times daily for 5 days 8/5/20 8/10/20  [facility]   doxycycline hyclate (VIBRA-TABS) 100 MG tablet Take 1 tablet by mouth 2 times daily for 5 days 8/5/20 8/10/20  [facility]   Probiotic Acidophilus [facility]) TABS Take 1 tablet by mouth 3 times daily for 10 days 8/5/20 8/15/20  [facility]   enoxaparin (LOVENOX) 80 MG/0.8ML injection Last Pradaxa should be on the evening of August 6.   Take Lovenox twice a day on August 7, August 8, on August 9. Last Lovenox should be the evening of August 9. Resume Pradaxa ASAP after surgery 8/7/20 8/9/20  Cheli Valencia MD   oxybutynin (DITROPAN-XL) 10 MG extended release tablet Take 1 tablet by mouth daily 7/16/20   Annamaria Ayala MD   trospium (SANCTURA) 20 MG tablet Take 1 tablet by mouth 2 times daily (before meals) 7/15/20   Annamaria Ayala MD   tamsulosin (FLOMAX) 0.4 MG capsule Take 1 capsule by mouth daily 7/13/20   Latasha Scott MD   latanoprost (XALATAN) 0.005 % ophthalmic solution Place 1 drop into both eyes nightly 4/15/20   Historical Provider, MD   EPINEPHrine (EPIPEN 2-FARAZ) 0.3 MG/0.3ML SOAJ injection Inject 0.3 mLs into the muscle once for 1 dose Use as directed for allergic reaction 3/3/20 7/29/20  Cheli Valencia MD   Cholecalciferol (VITAMIN D3) 25 MCG (1000 UT) CAPS Take 2,000 Units by mouth daily    Historical Provider, MD   dabigatran (PRADAXA) 150 MG capsule Take 1 capsule by mouth 2 times daily 1/20/20   Rodney Jean MD   aspirin 81 MG EC tablet Take 1 tablet by mouth daily 1/21/20   Rodney Jean MD   Omega-3 Fatty Acids (FISH OIL) 1000 MG CAPS Take 1,000 mg by mouth 2 times daily    Historical Provider, MD   atorvastatin (LIPITOR) 40 MG tablet Take 1 tablet by mouth nightly 1/13/20   Rodney Jean MD   VITAMIN B1-B12 IM Inject into the muscle every 30 days     Historical Provider, MD       Allergies:  Patient has no known allergies.     Social History:    Social History     Socioeconomic History    Marital status:      Spouse name: Not on file    Number of children: Not on file    Years of education: Not on file    Highest education level: Not on file   Occupational History    Not on file   Social Needs    Financial resource strain: Not on file    Food insecurity     Worry: Not on file     Inability: Not on file    Transportation needs     Medical: Not on file     Non-medical: Not on file   Tobacco Use    Smoking status: Never Smoker    Smokeless Urinalysis: No results for input(s): COLORU, PHUR, LABCAST, WBCUA, RBCUA, MUCUS, TRICHOMONAS, YEAST, BACTERIA, CLARITYU, SPECGRAV, LEUKOCYTESUR, UROBILINOGEN, Mardel Kevin in the last 72 hours.     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    Combined forms of age-related cataract of both eyes    [facility] ([facility]A) [facility]    Tissue plasminogen activator (tPA) administered at other facility within 24 hours before current admission    Permanent atrial fibrillation    Acute ischemic stroke ([address]    Left homonymous hemianopsia    Hyperlipidemia    Stroke, recent, without late effect    Bilateral carotid artery stenosis    Chronic cerebral ischemia    Memory problem    Gait difficulty    Balance problem    At high risk for stroke    Risk for falls    Cerebrovascular accident (CVA) due to embolism of right posterior cerebral artery ([address]    Bilateral hemianopia    Dysphagia    Hematuria    PTSD (post-traumatic stress disorder)    Acute urinary retention    Horseshoe kidney    Kidney stone on left side    UTI (urinary tract infection)    Severe malnutrition ([address]       Plan:     Consent obtained; cysto greenlight in OR today    Leann Wilson MD  10:05 PM 8/9/2020

## 2020-08-11 ENCOUNTER — CARE COORDINATION (OUTPATIENT)
Dept: CASE MANAGEMENT | Age: 70
End: 2020-08-11

## 2020-08-11 NOTE — ED PROVIDER NOTES
eMERGENCY dEPARTMENT eNCOUnter      Pt Name: Elvira Lopez  MRN: [id number]  [place] 1950  Date of evaluation: 8/10/2020      CHIEF COMPLAINT       Chief Complaint   Patient presents with    Urinary Catheter Problem          HISTORY OF PRESENT ILLNESS    Elvira Lopez is a 69 y.o. male who presents to the emergency department for evaluation of leakage around his urinary catheter. Patient had a cystoscopy today with 1 of the urologists and now comes in with the above complaint. REVIEW OF SYSTEMS       Review of Systems   Constitutional: Negative. HENT: Negative. Eyes: Negative. Respiratory: Negative. Cardiovascular: Negative. Gastrointestinal: Negative. Endocrine: Negative. Genitourinary: Positive for hematuria. Musculoskeletal: Negative. Skin: Negative. Allergic/Immunologic: Negative. Neurological: Negative. Hematological: Negative. Psychiatric/Behavioral: Negative. PAST MEDICAL HISTORY    has a past medical history of Atrial fibrillation ([facility] [address] Chronic kidney disease, CKD (chronic kidney disease), History of ischemic stroke in prior three months, Hyperlipidemia, Ischemic stroke ([facility] [address] Nihss score 10, PTSD (post-traumatic stress disorder), Skin tag, Stroke ([facility] [address] and Stroke ([facility] [address] SURGICAL HISTORY      has a past surgical history that includes Appendectomy and transesophageal echocardiogram (01/17/2020). CURRENT MEDICATIONS       Current Discharge Medication List      CONTINUE these medications which have NOT CHANGED    Details   HYDROcodone-acetaminophen (NORCO) 5-325 MG per tablet Take 1 tablet by mouth every 6 hours as needed for Pain for up to 3 days. Intended supply: 3 days.  Take lowest dose possible to manage pain  Qty: 18 tablet, Refills: 0    Comments: Reduce doses taken as pain becomes manageable  Associated Diagnoses: Postoperative pain      ciprofloxacin (CIPRO) 500 MG tablet Take 1 tablet by mouth 2 times daily for 5 days  Qty: 10 tablet, Refills: 0      famotidine (PEPCID) 20 MG tablet Take 1 tablet by mouth 2 times daily  Qty: 60 tablet, Refills: 0      cefdinir (OMNICEF) 300 MG capsule Take 1 capsule by mouth 2 times daily for 5 days  Qty: 10 capsule, Refills: 0      doxycycline hyclate (VIBRA-TABS) 100 MG tablet Take 1 tablet by mouth 2 times daily for 5 days  Qty: 10 tablet, Refills: 0      Probiotic Acidophilus (FLORANEX) TABS Take 1 tablet by mouth 3 times daily for 10 days  Qty: 30 tablet, Refills: 0      oxybutynin (DITROPAN-XL) 10 MG extended release tablet Take 1 tablet by mouth daily  Qty: 30 tablet, Refills: 0      trospium (SANCTURA) 20 MG tablet Take 1 tablet by mouth 2 times daily (before meals)  Qty: 60 tablet, Refills: 0      tamsulosin (FLOMAX) 0.4 MG capsule Take 1 capsule by mouth daily  Qty: 30 capsule, Refills: 11      latanoprost (XALATAN) 0.005 % ophthalmic solution Place 1 drop into both eyes nightly      EPINEPHrine (EPIPEN 2-FARAZ) 0.3 MG/0.3ML SOAJ injection Inject 0.3 mLs into the muscle once for 1 dose Use as directed for allergic reaction  Qty: 0.3 mL, Refills: 0      Cholecalciferol (VITAMIN D3) 25 MCG (1000 UT) CAPS Take 2,000 Units by mouth daily      Omega-3 Fatty Acids (FISH OIL) 1000 MG CAPS Take 1,000 mg by mouth 2 times daily      atorvastatin (LIPITOR) 40 MG tablet Take 1 tablet by mouth nightly  Qty: 30 tablet, Refills: 3      VITAMIN B1-B12 IM Inject into the muscle every 30 days              ALLERGIES     has No Known Allergies. FAMILY HISTORY     He indicated that his mother is . He indicated that his father is . family history is not on file. SOCIAL HISTORY      reports that he has never smoked. He has never used smokeless tobacco. He reports current alcohol use. He reports that he does not use drugs. PHYSICAL EXAM     INITIAL VITALS:  weight is 180 lb (81.6 kg). His tympanic temperature is 97.3 °F (36.3 °C). His blood pressure is 191/81 (abnormal) and his pulse is 107. His respiration is 18 and oxygen saturation is 98%. Constitutional: Alert, oriented x3, nontoxic, afebrile, answering questions appropriately, acting properly for age, in no acute distress  HEENT: Extraocular muscles intact, mucus membranes moist, TMs clear bilaterally, no posterior pharyngeal erythema or exudates, Pupils equal, round, reactive to light,   Neck: Trachea midline, Supple without lymphadenopathy, no posterior midline neck tenderness to palpation  Cardiovascular: Regular rhythm and rate no S3, S4, or murmurs  Respiratory: Clear to auscultation bilaterally no wheezes, rhonchi, rales, no respiratory distress  Gastrointestinal: Soft, nontender, nondistended, positive bowel sounds. No rebound, rigidity, or guarding. Musculoskeletal: No extremity pain or swelling  Neurologic: Moving all 4 extremities without difficulty there are no gross focal neurologic deficits  Skin: Warm and dry      DIFFERENTIAL DIAGNOSIS/ MDM:     Catheter leak most likely due to bowel bladder spasm. We were able to irrigate the catheter and it was clearing contacted Dr. Teresa Manuel who states to put the patient on Ditropan. DIAGNOSTIC RESULTS     EKG: All EKG's are interpreted by the Emergency Department Physician who either signs or Co-signs this chart in the absence of a cardiologist.        Not indicated unless otherwise documented above    LABS:  No results found for this visit on 08/10/20. Not indicated unless otherwise documented above    RADIOLOGY:   I reviewed the radiologist interpretations:  No orders to display       Not indicated unless otherwise documented above    EMERGENCY DEPARTMENT COURSE:     The patient was given the following medications:  No orders of the defined types were placed in this encounter.        Vitals:    Vitals:    08/10/20 2201   BP: (!) 191/81   Pulse: 107   Resp: 18   Temp: 97.3 °F (36.3 °C)   TempSrc: Tympanic   SpO2: 98%   Weight: 180 lb (81.6 kg)     -------------------------  BP (!)

## 2020-08-11 NOTE — CARE COORDINATION
[facility] Transitions Follow Up Call    2020    Patient: Bijan Frank  Patient : 1950   MRN: [id number]  Reason for Admission: UTI with hematuria, fever (102.4), A fib with RVR    Discharge Date: 8/10/20 RARS: Readmission Risk Score: 21         Spoke with: pt wife Alex Orlando    Call to pt wife who states pt doing \"better, really good\". States catheter comes out tomorrow (is a big catheter draining well- states he was in ER because it was blocked and had to be flushed  States urine yellow, clear without odor, cloudiness or blood  States he has not resumed pradaxa or aspirin- writer to clarify with Dannie Spence (not listed as active med in [address] [place] [address] Rd list or AVS from surgery yesterday or ER yesterday  Denies SOB, fever/ chills  Denies needs  Encouraged to call writer/ CTC or providers if questions or concerns- v/u     Call to Deedee Holm with PCP who states Dr Marina Steve out until [facility]   Call to [facility] with Dr Patrizia Sosa office informing Dr Velvet Perez note on  mentioning pt should resume anticoagulation after surgery (yesterday). [facility] will discuss with Dr Melissa Noble and return call to writer  Will continue to follow up on this     [facility] with Dr Patrizia Sosa office calls back to confirm pt should resume pradaxa and aspirin now  Call to Alex Perry and [address] he should resume the pradaxa and asprin now- states understanding  Pt states urine is not draining in catheter but is coming out around the tube into his depends- writer advises calling Dr Lashonda Chen office to see if he could be seen still today or go to ER to have them flush it. Writer expresses importance of urine flowing into catheter, not around the catheter.    Encouraged to call writer/ CTC or providers if questions or concerns- v/u     Care Transitions Subsequent and Final Call    Subsequent and Final Calls  Do you have any ongoing symptoms?:  No  Have your medications changed?:  No  Do you have any questions related to your medications?:  No  Do you currently have any active services?:  No  Are you currently active with any services?:  Home Health  Do you have any needs or concerns that I can assist you with?:  No  Identified Barriers:  Lack of Education  Care Transitions Interventions  Other Interventions:             Follow Up  Future Appointments   Date Time Provider Alexandria Kaur   8/12/2020  9:00 AM SCHEDULE, Jeromy Argueta DP   8/17/2020 12:00 PM [facility]   12/1/2020  9:00 AM SCHEDULE, [address] [facility] [place]   12/8/2020  9:30 AM [facility]   12/22/2020  1:30 AM [facility]   2/3/2021  9:30 AM Carol Ann Ortega MD [facility]       Judie Stratton RN

## 2020-08-12 ENCOUNTER — NURSE ONLY (OUTPATIENT)
Dept: UROLOGY | Age: 70
End: 2020-08-12
Payer: MEDICARE

## 2020-08-12 PROCEDURE — 99212 OFFICE O/P EST SF 10 MIN: CPT

## 2020-08-13 NOTE — OP NOTE
Patient:  Geraldine Pérez  MRN: [id number]  YOB: 1950    FACILITY: [facility]    DATE: 8/10/2020    SURGEON: Gaye Edwards MD     ASSISTANT: none    PREOPERATIVE DIAGNOSIS: bph with obstruction    POSTOPERATIVE DIAGNOSIS: bph with obstruction     PROCEDURE PERFORMED:   1. Cystourethroscopy  2. Greenlight photovaporization of prostate    ANESTHESIA: General    ESTIMATED BLOOD LOSS: 20 ml    COMPLICATIONS: None immediate    DRAINS: [address] urethral kumari    SPECIMENS: none    INDICATIONS FOR PROCEDURE:  The patient is a 69 y.o. male who presents today with urinary retention here for CYSTO w/ Greenlight Laser. After risks, benefits and alternatives of the procedure were discussed with the patient, the patient elected to proceed. OPERATIVE SUMMARY:  The risks and benefits of the procedure were explained to the patient in the preoperative area. After informed consent was obtained, the patient was taken back to the operating room. The patient was transferred to the operating table and placed in a supine position. General anesthesia was induced and the patient was placed in the dorsal lithotomy position. He was prepped and draped in a sterile fashion and a time-out was performed to confirm patient identity and procedure. Prior to induction of anesthesia the patient was administered preoperative antibiotics and EPC cuffs were on and functioning. Our continuous flow sheath with obturator and lens was inserted through the patient's urethra and into the bladder. Upon entering the bladder we located both ureteral orifices, they were at a safe distance from the vesical neck. There were no stones noted in the bladder. On evaluation of the prostate the patient was noted to have trilobar hyperplasia. The GreenLight fiber was then inserted after we removed our obturator and placed our working bridge through out continous flow sheath.  GreenLight photovaporization was initiated beginning with the median lobe. After the median lobe was taken down, we then turned our attention to the patient's left lateral lobe. At the 5 O'clock position we made a groove from the vesical neck down to the level of the verumontanum, which was used as our distal landmark to protect the external sphincter. We vaporized adenomatous tissue down to the level of the capsule. During this part of the procedure the power level was increased to 180 ware. This was used as a guide of depth, and carried around in a counter-clockwise fashion to the 12 O'clock position. The process was then repeated on the contralateral side starting at the 7 O'clock position. We vaporized adenomatous tissue down to the level of the capsule, which again was used as a guide of depth, and carried around in a clockwise fashion to the 12 O'clock position. Finally we turned our attention to the apical tissue. Extensive photovaporization of the prostate was performed. We then incised the bladder neck with our laser. At this time the irrigation was turned off and the prostatic urethra appeared to be wide open and no longer obstructed. Hemostasis was achieved and persistent. Both ureteral orifices were noted to be uninvolved in the resection. There was no scatter of laser fiber into the bladder. We did not go distal to the verumontanum. We worked at 80 ware of power at the bladder neck and near the apex of the prostate. The scope was removed and a 22-[place] 3-way Feng catheter was inserted and irrigated to confirm position. Continuous bladder irrigation with normal saline was then initiated and 3 L of normal saline were allowed to infuse before the catheter was clamped. The patient was awoken and transferred to PACU. All instruments and equipment were accounted for at the end of the procedure. I was present and scrubbed for all critical portions procedure. DISPOSITION:  The patient was transferred to PACU in good condition. He will be discharged home from the hospital per the PACU team. Appropriate follow up will be arranged for catheter removal

## 2020-08-14 ENCOUNTER — CARE COORDINATION (OUTPATIENT)
Dept: CASE MANAGEMENT | Age: 70
End: 2020-08-14

## 2020-08-14 NOTE — CARE COORDINATION
[facility] Transitions Follow Up Call    2020    Patient: Diana Dowling  Patient : 1950   MRN: [id number]  Reason for Admission: UTI with hematuria, fever (102.4), A fib with RVR     Discharge Date: 8/10/20 RARS: Readmission Risk Score: 21         Spoke with:  Pt wife Kisha Coleman    Attempted to reach patient for subsequent transitional call.  left to return call to [phone]  Patient wife called back. States pt is doing \"pretty good, eating more, getting stronger\". States the catheter is out and he is voiding fine- no hesitancy, blood or odor  Denies fever/ chills or SOB  Denies needs  Encouraged to call writer/ CTC or providers if questions or concerns- v/u     Care Transitions Subsequent and Final Call    Subsequent and Final Calls  Do you have any ongoing symptoms?:  No  Have your medications changed?:  No  Do you have any questions related to your medications?:  No  Do you currently have any active services?:  No  Are you currently active with any services?:  Home Health  Do you have any needs or concerns that I can assist you with?:  No  Identified Barriers:  Lack of Education  Care Transitions Interventions  Other Interventions:             Follow Up  Future Appointments   Date Time Provider Alexandria Kaur   2020 12:00 PM MD JULIOCESAR Irene [facility]   2020  8:20 AM Saint Satchel, MD DURO [facility]   2020  9:00 AM SCHEDULE, Clinton Lynsohan [address] [facility] LAB [place]   2020  9:30 AM MD JULIOCESAR Irene [facility]   2020  6:38 AM Blessing Wong MD [facility]   2/3/2021  9:30 AM Gibson Barrett MD [facility]       Kortney Mtz, RN

## 2020-08-17 ENCOUNTER — OFFICE VISIT (OUTPATIENT)
Dept: INTERNAL MEDICINE | Age: 70
End: 2020-08-17
Payer: MEDICARE

## 2020-08-17 VITALS
TEMPERATURE: 97.6 F | DIASTOLIC BLOOD PRESSURE: 70 MMHG | BODY MASS INDEX: 29.46 KG/M2 | RESPIRATION RATE: 16 BRPM | SYSTOLIC BLOOD PRESSURE: 110 MMHG | HEIGHT: 65 IN | WEIGHT: 176.8 LBS | HEART RATE: 64 BPM

## 2020-08-17 PROCEDURE — 1111F DSCHRG MED/CURRENT MED MERGE: CPT | Performed by: INTERNAL MEDICINE

## 2020-08-17 PROCEDURE — 99214 OFFICE O/P EST MOD 30 MIN: CPT | Performed by: INTERNAL MEDICINE

## 2020-08-17 RX ORDER — ASPIRIN 81 MG/1
81 TABLET ORAL DAILY
COMMUNITY
End: 2021-02-03 | Stop reason: SDUPTHER

## 2020-08-17 RX ORDER — OXYBUTYNIN CHLORIDE 10 MG/1
10 TABLET, EXTENDED RELEASE ORAL DAILY
Qty: 30 TABLET | Refills: 3 | Status: SHIPPED | OUTPATIENT
Start: 2020-08-17 | End: 2020-08-31

## 2020-08-17 RX ORDER — DABIGATRAN ETEXILATE 150 MG/1
150 CAPSULE, COATED PELLETS ORAL 2 TIMES DAILY
COMMUNITY
End: 2021-02-03 | Stop reason: SDUPTHER

## 2020-08-18 ENCOUNTER — CARE COORDINATION (OUTPATIENT)
Dept: CASE MANAGEMENT | Age: 70
End: 2020-08-18

## 2020-08-18 NOTE — CARE COORDINATION
[facility] Transitions Follow Up Call    2020    Patient: Harleen Cevallos  Patient : 1950   MRN: [id number]  Reason for Admission: UTI with hematuria, fever (102.4), A fib with RVR      Discharge Date: 8/10/20 RARS: Readmission Risk Score: 21         Spoke with: Itzel Grandchild    Call to pt wife who states pt doing \"pretty good\"  States no problem with voiding  States appetite is better  States he is constipated so she got him a stool softener and a suppository if that does not work today. Writer advised foods with fiber- fresh fruits and veggies  Denies needs  Encouraged to call writer/ CTC or providers if questions or concerns- v/u     Care Transitions Subsequent and Final Call    Subsequent and Final Calls  Do you have any ongoing symptoms?:  No  Have your medications changed?:  No  Do you have any questions related to your medications?:  No  Do you currently have any active services?:  No  Are you currently active with any services?:  Home Health  Do you have any needs or concerns that I can assist you with?:  No  Identified Barriers:  Lack of Education  Care Transitions Interventions  Other Interventions:             Follow Up  Future Appointments   Date Time Provider Alexandria Kaur   2020  8:20 AM MD WILFRIDO Mon [facility]   2020  9:00 AM SCHEDULE, Clinton [address] LAB [facility] LAB [place]   2020  9:30 AM MD JULIOCESAR Segura [facility]   2020  7:02 AM [facility]   2/3/2021  9:30 AM [facility]       León Ulloa RN

## 2020-08-18 NOTE — PROGRESS NOTES
Post-Discharge Transitional Care Management Services or Hospital Follow Up      Cornelius Saldivar   YOB: 1950    Date of Office Visit:  8/17/2020  Date of Hospital Admission: 8/10/20  Date of Hospital Discharge: 8/10/20  Readmission Risk Score(high >=14%.  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    Combined forms of age-related cataract of both eyes    Acute cerebrovascular accident (CVA) ([address]    Tissue plasminogen activator (tPA) administered at other facility within 24 hours before current admission    Permanent atrial fibrillation    Acute ischemic stroke ([address]    Left homonymous hemianopsia    Hyperlipidemia    Stroke, recent, without late effect    Bilateral carotid artery stenosis    Chronic cerebral ischemia    Memory problem    Gait difficulty    Balance problem    At high risk for stroke    Risk for falls    Cerebrovascular accident (CVA) due to embolism of right posterior cerebral artery ([address]    Bilateral hemianopia    Dysphagia    Hematuria    PTSD (post-traumatic stress disorder)    Acute urinary retention    Horseshoe kidney    Kidney stone on left side    UTI (urinary tract infection)    Severe malnutrition ([address]       No Known Allergies    Medications listed as ordered at the time of discharge from [facility] [place] Medication Instructions [id number]    Printed on:08/18/20 1342   Medication Information                      aspirin 81 MG EC tablet  Take 81 mg by mouth daily atorvastatin (LIPITOR) 40 MG tablet  Take 1 tablet by mouth nightly             Cholecalciferol (VITAMIN D3) 25 MCG (1000 UT) CAPS  Take 2,000 Units by mouth daily             dabigatran (PRADAXA) 150 MG capsule  Take 150 mg by mouth 2 times daily             EPINEPHrine (EPIPEN 2-FARAZ) 0.3 MG/0.3ML SOAJ injection  Inject 0.3 mLs into the muscle once for 1 dose Use as directed for allergic reaction             famotidine (PEPCID) 20 MG tablet  Take 1 tablet by mouth 2 times daily             latanoprost (XALATAN) 0.005 % ophthalmic solution  Place 1 drop into both eyes nightly             Omega-3 Fatty Acids (FISH OIL) 1000 MG CAPS  Take 1,000 mg by mouth 2 times daily             oxybutynin (DITROPAN-XL) 10 MG extended release tablet  Take 1 tablet by mouth daily             tamsulosin (FLOMAX) 0.4 MG capsule  Take 1 capsule by mouth daily             trospium (SANCTURA) 20 MG tablet  Take 1 tablet by mouth 2 times daily (before meals)             VITAMIN B1-B12 IM  Inject into the muscle every 30 days                    Medications marked \"taking\" at this time  Outpatient Medications Marked as Taking for the 8/17/20 encounter (Office Visit) with Laya Avila MD   Medication Sig Dispense Refill    dabigatran (PRADAXA) 150 MG capsule Take 150 mg by mouth 2 times daily      aspirin 81 MG EC tablet Take 81 mg by mouth daily      oxybutynin (DITROPAN-XL) 10 MG extended release tablet Take 1 tablet by mouth daily 30 tablet 3    famotidine (PEPCID) 20 MG tablet Take 1 tablet by mouth 2 times daily 60 tablet 0    trospium (SANCTURA) 20 MG tablet Take 1 tablet by mouth 2 times daily (before meals) 60 tablet 0    tamsulosin (FLOMAX) 0.4 MG capsule Take 1 capsule by mouth daily 30 capsule 11    latanoprost (XALATAN) 0.005 % ophthalmic solution Place 1 drop into both eyes nightly      Cholecalciferol (VITAMIN D3) 25 MCG (1000 UT) CAPS Take 2,000 Units by mouth daily      Omega-3 Fatty Acids (FISH OIL) 1000 MG CAPS Take 1,000 mg by mouth 2 times daily      atorvastatin (LIPITOR) 40 MG tablet Take 1 tablet by mouth nightly 30 tablet 3    VITAMIN B1-B12 IM Inject into the muscle every 30 days           Medications patient taking as of now reconciled against medications ordered at time of hospital discharge: Yes    Chief Complaint   Patient presents with    Follow-Up from Hospital     UTI       HPI    Inpatient course: Discharge summary reviewed- see chart. Interval history/Current status: Was treated in the hospital for BPH but caused hematuria and required him to have a Feng catheter. Underwent a greenlight laser procedure. Says that he feels a lot better now, says that he is able to urinate better, says that there is no blood at this time. Denies fever, chills, dysuria. Has follow-up with urologist    Review of Systems    Vitals:    08/17/20 1158   BP: 110/70   Site: Left Upper Arm   Position: Sitting   Cuff Size: Medium Adult   Pulse: 64   Resp: 16   Temp: 97.6 °F (36.4 °C)   TempSrc: Infrared   Weight: 176 lb 12.8 oz (80.2 kg)   Height: 5' 5\" (1.651 m)     Body mass index is 29.42 kg/m². Wt Readings from Last 3 Encounters:   08/17/20 176 lb 12.8 oz (80.2 kg)   08/10/20 180 lb (81.6 kg)   08/10/20 180 lb (81.6 kg)     BP Readings from Last 3 Encounters:   08/17/20 110/70   08/10/20 (!) 147/82   08/10/20 (!) 153/89       Physical Exam        Assessment/Plan:  1. Hematuria, unspecified type      2. Benign prostatic hyperplasia without lower urinary tract symptoms  Follows with urology. Symptomatically improving after greenlight laser procedure. Operative note reviewed. Will reassess next visit.   - DE DISCHARGE MEDS RECONCILED W/ CURRENT OUTPATIENT MED LIST        Medical Decision Making: moderate complexity

## 2020-08-19 ENCOUNTER — TELEPHONE (OUTPATIENT)
Dept: UROLOGY | Age: 70
End: 2020-08-19

## 2020-08-19 ENCOUNTER — TELEPHONE (OUTPATIENT)
Dept: INTERNAL MEDICINE | Age: 70
End: 2020-08-19

## 2020-08-19 RX ORDER — TROSPIUM CHLORIDE 20 MG/1
TABLET, FILM COATED ORAL
Qty: 60 TABLET | Refills: 3 | Status: SHIPPED | OUTPATIENT
Start: 2020-08-19 | End: 2020-08-31

## 2020-08-19 NOTE — TELEPHONE ENCOUNTER
One option would be to eat more fruits and vegetables and drink lots of water. Otherwise, he can use Metamucil ema least twice a day.  I can prescribe Miralax as a last resort, but I would prefer to start with this

## 2020-08-19 NOTE — TELEPHONE ENCOUNTER
Spoke with patients wife and they will try the fruits and vegetables and extra water. And let us know.

## 2020-08-19 NOTE — TELEPHONE ENCOUNTER
Patients wife called and stated that the patient is constipated. A lot of straining. She stated that you recommended taking his Flomax at night to see if that helps. Patient is up all night urinating. She did state the patient has pressure but, had bright red blood in the toilet this morning. I explained due to having some constipation and with it being bright red it probably is hemorrhoids. She would like to know if you can recommend anything else? Please advise.

## 2020-08-19 NOTE — TELEPHONE ENCOUNTER
Patient's wife, Kristina Damon, reports they were told by another doctor this week to take his Flomax 0.4 mg at night instead of taking in the morning. Patient was up all night urinating. She would like to discuss with a nurse the best time for him to take the Flomax. Please call Kristina Damon back at [phone].

## 2020-08-27 ENCOUNTER — CARE COORDINATION (OUTPATIENT)
Dept: CASE MANAGEMENT | Age: 70
End: 2020-08-27

## 2020-08-27 NOTE — CARE COORDINATION
[facility] Transitions Follow Up Call    2020    Patient: Elvira Lopez  Patient : 1950   MRN: [id number]  Reason for Admission: UTI with hematuria, fever (102.4), A fib with RVR       Discharge Date: 8/10/20 RARS: Readmission Risk Score: 21         Spoke with: Jaguar Elder and his wife Jc Pizarro    Call to pt wife who states he is doing \"pretty good  Hands phone to pt who states he is doing \"not too bad\"  States he does not have much appetite- writer advised adding small portions of protein into his diet at least and to try to eat small meals throughout the day intentionally. Denies nausea or vomiting   Denies SOB, fever/ chills  States bowels are moving better now since he is eating foods with fiber and drinking more water   Denies needs  Encouraged to call writer/ CTC or providers if questions or concerns- v/u       Care Transitions Subsequent and Final Call    Subsequent and Final Calls  Do you have any ongoing symptoms?:  No  Have your medications changed?:  No  Do you have any questions related to your medications?:  No  Do you currently have any active services?:  No  Are you currently active with any services?:  Home Health  Do you have any needs or concerns that I can assist you with?:  No  Identified Barriers:  Lack of Education  Care Transitions Interventions  Other Interventions:             Follow Up  Future Appointments   Date Time Provider Alexandria Kaur   2020  8:20 AM MD RUBI DuffO DPP   2020  9:00 AM SCHEDULE, Clinton Lucero [address] [facility] [place]   2020  9:30 AM MD JULIOCESAR Harvey DPP   2020  3:40 [facility]   [facility]/3/2021  9:30 [facility]       Aniya Turner RN

## 2020-08-31 ENCOUNTER — OFFICE VISIT (OUTPATIENT)
Dept: UROLOGY | Age: 70
End: 2020-08-31
Payer: MEDICARE

## 2020-08-31 VITALS
DIASTOLIC BLOOD PRESSURE: 64 MMHG | WEIGHT: 176 LBS | SYSTOLIC BLOOD PRESSURE: 118 MMHG | BODY MASS INDEX: 29.32 KG/M2 | HEIGHT: 65 IN | HEART RATE: 89 BPM

## 2020-08-31 PROCEDURE — 99215 OFFICE O/P EST HI 40 MIN: CPT

## 2020-08-31 PROCEDURE — 99024 POSTOP FOLLOW-UP VISIT: CPT | Performed by: UROLOGY

## 2020-08-31 PROCEDURE — 51798 US URINE CAPACITY MEASURE: CPT | Performed by: UROLOGY

## 2020-08-31 NOTE — PROGRESS NOTES
S/p greenlight  Voiding well with PVR of 34 cc  Dizzy and off balance  Discontinue flomax and ditropan    Has horseshoe kidney with 3-4 stones > 1 cm.  Total aggregate > 3.5 cm        Needs L PCNL [facility]

## 2020-09-02 ENCOUNTER — TELEPHONE (OUTPATIENT)
Dept: UROLOGY | Age: 70
End: 2020-09-02

## 2020-09-02 ENCOUNTER — CARE COORDINATION (OUTPATIENT)
Dept: CASE MANAGEMENT | Age: 70
End: 2020-09-02

## 2020-09-02 PROBLEM — N39.0 UTI (URINARY TRACT INFECTION): Status: RESOLVED | Noted: 2020-08-03 | Resolved: 2020-09-02

## 2020-09-02 NOTE — TELEPHONE ENCOUNTER
Patient was seen on 8/31/2020 and a procedure was mentioned to take care of patient's kidney stones. Patient's wife would like to set up an appointment to discuss this further and the details of the procedure. Please call her back at [phone].

## 2020-09-02 NOTE — CARE COORDINATION
[facility] Transitions Follow Up Call- final call     2020    Patient: Reva Garcia  Patient : 1950   MRN: [id number]  Reason for Admission: UTI with hematuria, fever (102.4), A fib with RVR      Discharge Date: 8/10/20 RARS: Readmission Risk Score: 21         Spoke with: pt wife Shorty Perez     Call to pt wife who states pt is \"okay\". States he gets tired- was out yesterday and is napping today. Writer reminds he is still healing from infection (UTI and procedures) and encourage rest when needed  States they saw Dr Elvin Khalil on Mon and he scheduled a procedure at [facility] for this  and will be in overnight (kidney stone). States they wanted to meet with him before going through the procedure- so they scheduled appt with Dr Elvin Khalil  to discuss it further before scheduling   Denies needs  Writer informs this is final (CTC) phone call- v/u. Encouraged call writer/ CTC or providers if questions or concerns- v/u. Episode closed         Care Transitions Subsequent and Final Call    Subsequent and Final Calls  Do you have any ongoing symptoms?:  No  Have your medications changed?:  No  Do you have any questions related to your medications?:  No  Do you currently have any active services?:  No  Are you currently active with any services?:  Home Health  Do you have any needs or concerns that I can assist you with?:  No  Identified Barriers:  Lack of Education  Care Transitions Interventions  Other Interventions:             Follow Up  Future Appointments   Date Time Provider Alexandria Kaur   2020 12:00 PM MD WILFRIDO Mcfarland DPP   2020  9:00 AM SCHEDULE, Clinton Gordon [address] LAB [facility] LAB [place]   2020  9:30 AM Gillermo Epley, MD DINKELSEY [facility]   2020  1:08 [facility]   2/3/2021  9:30 AM MD HAYES Green DPP       Oliva Wallace RN

## 2020-09-21 ENCOUNTER — OFFICE VISIT (OUTPATIENT)
Dept: UROLOGY | Age: 70
End: 2020-09-21
Payer: MEDICARE

## 2020-09-21 VITALS
BODY MASS INDEX: 29.49 KG/M2 | OXYGEN SATURATION: 98 % | HEART RATE: 99 BPM | SYSTOLIC BLOOD PRESSURE: 112 MMHG | HEIGHT: 65 IN | WEIGHT: 177 LBS | DIASTOLIC BLOOD PRESSURE: 62 MMHG

## 2020-09-21 PROCEDURE — 99024 POSTOP FOLLOW-UP VISIT: CPT | Performed by: UROLOGY

## 2020-09-21 PROCEDURE — 99215 OFFICE O/P EST HI 40 MIN: CPT

## 2020-09-21 NOTE — PROGRESS NOTES
S/p greenlight  Voiding well but OAB. Restart ditropan  Has issues with Dizziness and being off balance    Here to discuss PCNL. Does not want to travel to [facility] and be off blood thinners  Has horseshoe kidney with 3-4 stones > 1 cm.  Total aggregate > 3.5 cm  Cystoscopy left ureteroscopy with holmium laser lithotripsy (staged) julia

## 2020-10-13 ENCOUNTER — PRE-PROCEDURE TELEPHONE (OUTPATIENT)
Dept: PREADMISSION TESTING | Age: 70
End: 2020-10-13

## 2020-10-14 ENCOUNTER — TELEPHONE (OUTPATIENT)
Dept: UROLOGY | Age: 70
End: 2020-10-14

## 2020-10-14 ENCOUNTER — HOSPITAL ENCOUNTER (OUTPATIENT)
Dept: PREADMISSION TESTING | Age: 70
Setting detail: SPECIMEN
Discharge: HOME | End: 2020-10-18
Payer: MEDICARE

## 2020-10-14 PROCEDURE — U0003 INFECTIOUS AGENT DETECTION BY NUCLEIC ACID (DNA OR RNA); SEVERE ACUTE RESPIRATORY SYNDROME CORONAVIRUS 2 (SARS-COV-2) (CORONAVIRUS DISEASE [COVID-19]), AMPLIFIED PROBE TECHNIQUE, MAKING USE OF HIGH THROUGHPUT TECHNOLOGIES AS DESCRIBED BY CMS-2020-01-R: HCPCS

## 2020-10-14 NOTE — TELEPHONE ENCOUNTER
Patient is scheduled for staged kidney stone procedure- he is getting a cysto, ureteroscopy, holmium laser lithotripsy, stent placement on 10/19/2020- patient recently had greenlight PVP. Cardio is recommending holding ASA for 7 days and Pradaxa for 3with Lovenox bridging- can Dr. Jameel Catherine bridging. Thank you!

## 2020-10-15 LAB — SARS-COV-2, NAA: NOT DETECTED

## 2020-10-15 NOTE — TELEPHONE ENCOUNTER
I prescribed Lovenox twice a day for 3 days. He should start the Lovenox the morning after he stops his Pradaxa. Last dose of the Lovenox will be the morning before the day of surgery (i.e. no evening dose the night before the surgery). Pradaxa can be resumed at his usual dose the morning of the day after the day of surgery, assuming the surgeon thinks there is no bleeding risk (i.e. there was no complications).

## 2020-10-16 ENCOUNTER — TELEPHONE (OUTPATIENT)
Dept: PRIMARY CARE CLINIC | Age: 70
End: 2020-10-16

## 2020-10-19 ENCOUNTER — ANESTHESIA (OUTPATIENT)
Dept: OPERATING ROOM | Age: 70
End: 2020-10-19
Payer: MEDICARE

## 2020-10-19 ENCOUNTER — APPOINTMENT (OUTPATIENT)
Dept: GENERAL RADIOLOGY | Age: 70
End: 2020-10-19
Attending: UROLOGY
Payer: MEDICARE

## 2020-10-19 ENCOUNTER — ANESTHESIA EVENT (OUTPATIENT)
Dept: OPERATING ROOM | Age: 70
End: 2020-10-19
Payer: MEDICARE

## 2020-10-19 ENCOUNTER — HOSPITAL ENCOUNTER (OUTPATIENT)
Age: 70
Setting detail: OUTPATIENT SURGERY
Discharge: HOME | End: 2020-10-19
Attending: UROLOGY | Admitting: UROLOGY
Payer: MEDICARE

## 2020-10-19 VITALS
HEART RATE: 97 BPM | OXYGEN SATURATION: 99 % | BODY MASS INDEX: 26.56 KG/M2 | DIASTOLIC BLOOD PRESSURE: 67 MMHG | RESPIRATION RATE: 18 BRPM | SYSTOLIC BLOOD PRESSURE: 135 MMHG | HEIGHT: 67 IN | WEIGHT: 169.2 LBS | TEMPERATURE: 97.9 F

## 2020-10-19 VITALS
DIASTOLIC BLOOD PRESSURE: 74 MMHG | OXYGEN SATURATION: 100 % | SYSTOLIC BLOOD PRESSURE: 121 MMHG | RESPIRATION RATE: 10 BRPM

## 2020-10-19 PROCEDURE — 2709999900 HC NON-CHARGEABLE SUPPLY: Performed by: UROLOGY

## 2020-10-19 PROCEDURE — 6360000002 HC RX W HCPCS: Performed by: UROLOGY

## 2020-10-19 PROCEDURE — 6360000002 HC RX W HCPCS: Performed by: NURSE ANESTHETIST, CERTIFIED REGISTERED

## 2020-10-19 PROCEDURE — 74018 RADEX ABDOMEN 1 VIEW: CPT

## 2020-10-19 PROCEDURE — C1769 GUIDE WIRE: HCPCS | Performed by: UROLOGY

## 2020-10-19 PROCEDURE — 7100000001 HC PACU RECOVERY - ADDTL 15 MIN: Performed by: UROLOGY

## 2020-10-19 PROCEDURE — 2500000003 HC RX 250 WO HCPCS: Performed by: NURSE ANESTHETIST, CERTIFIED REGISTERED

## 2020-10-19 PROCEDURE — 7100000000 HC PACU RECOVERY - FIRST 15 MIN: Performed by: UROLOGY

## 2020-10-19 PROCEDURE — 7100000011 HC PHASE II RECOVERY - ADDTL 15 MIN: Performed by: UROLOGY

## 2020-10-19 PROCEDURE — C1894 INTRO/SHEATH, NON-LASER: HCPCS | Performed by: UROLOGY

## 2020-10-19 PROCEDURE — 3700000001 HC ADD 15 MINUTES (ANESTHESIA): Performed by: UROLOGY

## 2020-10-19 PROCEDURE — 3700000000 HC ANESTHESIA ATTENDED CARE: Performed by: UROLOGY

## 2020-10-19 PROCEDURE — 3209999900 FLUORO FOR SURGICAL PROCEDURES

## 2020-10-19 PROCEDURE — C2617 STENT, NON-COR, TEM W/O DEL: HCPCS | Performed by: UROLOGY

## 2020-10-19 PROCEDURE — 3600000003 HC SURGERY LEVEL 3 BASE: Performed by: UROLOGY

## 2020-10-19 PROCEDURE — 7100000010 HC PHASE II RECOVERY - FIRST 15 MIN: Performed by: UROLOGY

## 2020-10-19 PROCEDURE — 3600000013 HC SURGERY LEVEL 3 ADDTL 15MIN: Performed by: UROLOGY

## 2020-10-19 PROCEDURE — 2580000003 HC RX 258: Performed by: UROLOGY

## 2020-10-19 DEVICE — STENT URET 6FR L26CM PERCFLX HYDR+ TAPR TIP GRAD: Type: IMPLANTABLE DEVICE | Site: URETER | Status: NON-FUNCTIONAL

## 2020-10-19 RX ORDER — SODIUM CHLORIDE 0.9 % (FLUSH) 0.9 %
10 SYRINGE (ML) INJECTION EVERY 12 HOURS SCHEDULED
Status: DISCONTINUED | OUTPATIENT
Start: 2020-10-19 | End: 2020-10-19 | Stop reason: HOSPADM

## 2020-10-19 RX ORDER — LIDOCAINE HYDROCHLORIDE 20 MG/ML
INJECTION, SOLUTION EPIDURAL; INFILTRATION; INTRACAUDAL; PERINEURAL PRN
Status: DISCONTINUED | OUTPATIENT
Start: 2020-10-19 | End: 2020-10-19 | Stop reason: SDUPTHER

## 2020-10-19 RX ORDER — TAMSULOSIN HYDROCHLORIDE 0.4 MG/1
0.4 CAPSULE ORAL DAILY
Qty: 30 CAPSULE | Refills: 11 | Status: SHIPPED | OUTPATIENT
Start: 2020-10-19 | End: 2021-07-26 | Stop reason: ALTCHOICE

## 2020-10-19 RX ORDER — SODIUM CHLORIDE 0.9 % (FLUSH) 0.9 %
10 SYRINGE (ML) INJECTION PRN
Status: DISCONTINUED | OUTPATIENT
Start: 2020-10-19 | End: 2020-10-19 | Stop reason: HOSPADM

## 2020-10-19 RX ORDER — GLYCOPYRROLATE 1 MG/5 ML
SYRINGE (ML) INTRAVENOUS PRN
Status: DISCONTINUED | OUTPATIENT
Start: 2020-10-19 | End: 2020-10-19 | Stop reason: SDUPTHER

## 2020-10-19 RX ORDER — SODIUM CHLORIDE, SODIUM LACTATE, POTASSIUM CHLORIDE, CALCIUM CHLORIDE 600; 310; 30; 20 MG/100ML; MG/100ML; MG/100ML; MG/100ML
INJECTION, SOLUTION INTRAVENOUS CONTINUOUS
Status: DISCONTINUED | OUTPATIENT
Start: 2020-10-19 | End: 2020-10-19 | Stop reason: HOSPADM

## 2020-10-19 RX ORDER — CEPHALEXIN 500 MG/1
500 CAPSULE ORAL 3 TIMES DAILY
Qty: 15 CAPSULE | Refills: 0 | Status: SHIPPED | OUTPATIENT
Start: 2020-10-19 | End: 2020-10-24

## 2020-10-19 RX ORDER — PROPOFOL 10 MG/ML
INJECTION, EMULSION INTRAVENOUS PRN
Status: DISCONTINUED | OUTPATIENT
Start: 2020-10-19 | End: 2020-10-19 | Stop reason: SDUPTHER

## 2020-10-19 RX ORDER — HYDROCODONE BITARTRATE AND ACETAMINOPHEN 5; 325 MG/1; MG/1
1 TABLET ORAL EVERY 6 HOURS PRN
Qty: 12 TABLET | Refills: 0 | Status: SHIPPED | OUTPATIENT
Start: 2020-10-19 | End: 2020-10-22

## 2020-10-19 RX ORDER — FENTANYL CITRATE 50 UG/ML
INJECTION, SOLUTION INTRAMUSCULAR; INTRAVENOUS PRN
Status: DISCONTINUED | OUTPATIENT
Start: 2020-10-19 | End: 2020-10-19 | Stop reason: SDUPTHER

## 2020-10-19 RX ADMIN — Medication 2 G: at 14:43

## 2020-10-19 RX ADMIN — FENTANYL CITRATE 50 MCG: 50 INJECTION, SOLUTION INTRAMUSCULAR; INTRAVENOUS at 15:01

## 2020-10-19 RX ADMIN — PROPOFOL 150 MG: 10 INJECTION, EMULSION INTRAVENOUS at 14:45

## 2020-10-19 RX ADMIN — SODIUM CHLORIDE, SODIUM LACTATE, POTASSIUM CHLORIDE, AND CALCIUM CHLORIDE: .6; .31; .03; .02 INJECTION, SOLUTION INTRAVENOUS at 14:07

## 2020-10-19 RX ADMIN — Medication 0.4 MG: at 14:55

## 2020-10-19 RX ADMIN — FENTANYL CITRATE 50 MCG: 50 INJECTION, SOLUTION INTRAMUSCULAR; INTRAVENOUS at 14:45

## 2020-10-19 RX ADMIN — PHENYLEPHRINE HYDROCHLORIDE 100 MCG: 10 INJECTION INTRAVENOUS at 14:54

## 2020-10-19 RX ADMIN — LIDOCAINE HYDROCHLORIDE 100 MG: 20 INJECTION, SOLUTION EPIDURAL; INFILTRATION; INTRACAUDAL; PERINEURAL at 14:45

## 2020-10-19 ASSESSMENT — PULMONARY FUNCTION TESTS
PIF_VALUE: 6
PIF_VALUE: 12
PIF_VALUE: 6
PIF_VALUE: 14
PIF_VALUE: 14
PIF_VALUE: 15
PIF_VALUE: 13
PIF_VALUE: 15
PIF_VALUE: 12
PIF_VALUE: 13
PIF_VALUE: 12
PIF_VALUE: 16
PIF_VALUE: 13
PIF_VALUE: 14
PIF_VALUE: 13
PIF_VALUE: 14
PIF_VALUE: 13
PIF_VALUE: 14
PIF_VALUE: 13
PIF_VALUE: 15
PIF_VALUE: 14
PIF_VALUE: 6
PIF_VALUE: 15
PIF_VALUE: 14
PIF_VALUE: 13
PIF_VALUE: 13
PIF_VALUE: 14
PIF_VALUE: 13
PIF_VALUE: 15
PIF_VALUE: 13
PIF_VALUE: 14
PIF_VALUE: 15
PIF_VALUE: 6
PIF_VALUE: 13
PIF_VALUE: 13
PIF_VALUE: 14
PIF_VALUE: 10
PIF_VALUE: 14
PIF_VALUE: 15
PIF_VALUE: 14
PIF_VALUE: 13
PIF_VALUE: 15
PIF_VALUE: 14
PIF_VALUE: 27
PIF_VALUE: 14
PIF_VALUE: 13
PIF_VALUE: 13
PIF_VALUE: 14

## 2020-10-19 ASSESSMENT — PAIN SCALES - GENERAL
PAINLEVEL_OUTOF10: 0

## 2020-10-19 ASSESSMENT — PAIN - FUNCTIONAL ASSESSMENT: PAIN_FUNCTIONAL_ASSESSMENT: 0-10

## 2020-10-19 NOTE — ANESTHESIA POSTPROCEDURE EVALUATION
Department of Anesthesiology  Postprocedure Note    Patient: Marilyn Wagner  MRN: [id number]  YOB: 1950  Date of evaluation: 10/19/2020  Time:  3:43 PM     Procedure Summary     Date:  10/19/20 Room / Location:  [address] [place] [address]    Anesthesia Start:  0046 Anesthesia Stop:  9175    Procedure:  CYSTO Left URETEROSCOPY w/ Holmium Laser & stent placement (Left ) Diagnosis:  (left kidney stone)    Surgeon:  Yvette Bahena MD Responsible Provider:  Vanita Heimlich, APRN - CRNA    Anesthesia Type:  general ASA Status:  3          Anesthesia Type: general    Merlyn Phase I: Merlyn Score: 9    Merlyn Phase II:      Last vitals: Reviewed and per EMR flowsheets.        Anesthesia Post Evaluation    Patient location during evaluation: PACU  Patient participation: complete - patient participated  Level of consciousness: awake, awake and alert and responsive to light touch  Pain score: 0  Airway patency: patent  Nausea & Vomiting: no nausea and no vomiting  Complications: no  Cardiovascular status: blood pressure returned to baseline and hemodynamically stable  Respiratory status: acceptable  Hydration status: euvolemic

## 2020-10-19 NOTE — ANESTHESIA PRE PROCEDURE
Department of Anesthesiology  Preprocedure Note       Name:  David Dallas   Age:  69 y.o.  :  1950                                          MRN:  [id number]         Date:  10/19/2020      Surgeon: Annia Angelo):  Brianna Root MD    Procedure: Procedure(s):  CYSTO Left URETEROSCOPY w/ Holmium Laser & stent placement    Medications prior to admission:   Prior to Admission medications    Medication Sig Start Date End Date Taking?  Authorizing Provider   enoxaparin (LOVENOX) 80 MG/0.8ML injection Inject into the skin 2 times daily Indications: afib/ PreOp x6 doses 10/16/20  Yes Historical Provider, MD   famotidine (PEPCID) 20 MG tablet Take 1 tablet by mouth 2 times daily 20  Yes Thee Hernandez   latanoprost (XALATAN) 0.005 % ophthalmic solution Place 1 drop into both eyes nightly 4/15/20  Yes Historical Provider, MD   Cholecalciferol (VITAMIN D3) 25 MCG (1000 UT) CAPS Take 2,000 Units by mouth daily   Yes Historical Provider, MD   Omega-3 Fatty Acids (FISH OIL) 1000 MG CAPS Take 1,000 mg by mouth 2 times daily   Yes Historical Provider, MD   atorvastatin (LIPITOR) 40 MG tablet Take 1 tablet by mouth nightly 20  Yes Kori Orellana MD   VITAMIN B1-B12 IM Inject into the muscle every 30 days    Yes Historical Provider, MD   dabigatran (PRADAXA) 150 MG capsule Take 150 mg by mouth 2 times daily    Historical Provider, MD   aspirin 81 MG EC tablet Take 81 mg by mouth daily    Historical Provider, MD   EPINEPHrine (EPIPEN 2-FARAZ) 0.3 MG/0.3ML SOAJ injection Inject 0.3 mLs into the muscle once for 1 dose Use as directed for allergic reaction  Patient not taking: Reported on 2020 3/3/20 7/29/20  Ijeoma Salinas MD       Current medications:    Current Facility-Administered Medications   Medication Dose Route Frequency Provider Last Rate Last Dose    sodium chloride flush 0.9 % injection 10 mL  10 mL Intravenous 2 times per day Brianna Root MD        sodium chloride flush 0.9 % injection 10 mL  10 mL Intravenous PRN Brianna Root MD        lactated ringers infusion   Intravenous Continuous Brianna Root  mL/hr at 10/19/20 1407      ceFAZolin (ANCEF) 2 g in sterile water 20 mL IV syringe  2 g Intravenous On Call to [address] [place], MD           Allergies:  No Known Allergies    Problem List:    Patient Active Problem List   Diagnosis Code    Corneal foreign body, right, initial encounter T15. 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 H43.813    Combined forms of age-related cataract of both eyes H25.813    Acute cerebrovascular accident (CVA) ([facility] I63.9    Tissue plasminogen activator (tPA) administered at other facility within 24 hours before current admission Z92.82    Permanent atrial fibrillation (HCC) I48.21    Acute ischemic stroke ([facility] I63.9    Left homonymous hemianopsia H53.462    Hyperlipidemia E78.5    Stroke, recent, without late effect Z86.73    Bilateral carotid artery stenosis I65.23    Chronic cerebral ischemia I67.82    Memory problem R41.3    Gait difficulty R26.9    Balance problem R26.89    At high risk for stroke Z91.89    Risk for falls Z91.81    Cerebrovascular accident (CVA) due to embolism of right posterior cerebral artery (HCC) I63.431    Bilateral hemianopia H53.47    Dysphagia R13.10    Hematuria R31.9    PTSD (post-traumatic stress disorder) F43.10    Acute urinary retention R33.8    Horseshoe kidney Q63.1    Kidney stone on left side N20.0    Severe malnutrition (HCC) E43       Past Medical History:        Diagnosis Date    Atrial fibrillation ([facility] 01/2020    Chronic kidney disease     CKD (chronic kidney disease)     History of ischemic stroke in prior three months     Hyperlipidemia     Ischemic stroke ([facility] 01/10/2020    Nihss score 10     PTSD (post-traumatic stress disorder)     from war, Agent Orange     Skin tag 12/21/2018    Stroke ([facility] 01/16/2020    MRI Brain WO: New right PCA distribution infarct, multifocal left MCA distribution infarcts    Stroke ([facility] 01/11/2020    large vessel occlusion/left M2 occlusion. He subsequently underwent emergent thrombectomy.  /  S/P TPA DONE       Past Surgical History:        Procedure Laterality Date    APPENDECTOMY      TRANSESOPHAGEAL ECHOCARDIOGRAM  01/17/2020    TURP N/A 8/10/2020    CYSTO w/ Greenlight Laser performed by Saint Bran, MD at [place] [address] History:    Social History     Tobacco Use    Smoking status: Never Smoker    Smokeless tobacco: Never Used   Substance Use Topics    Alcohol use: Yes     Alcohol/week: 0.0 standard drinks     Comment: occas                                Counseling given: Not Answered      Vital Signs (Current):   Vitals:    10/19/20 1353   BP: 107/76   Pulse: 86   Resp: 16   Temp: 36.1 °C (97 °F)   TempSrc: Tympanic   SpO2: 97%   Weight: 169 lb 3.2 oz (76.7 kg)   Height: 5' 7\" (1.702 m)                                              BP Readings from Last 3 Encounters:   10/19/20 107/76   09/21/20 112/62   08/31/20 118/64       NPO Status: Time of last liquid consumption: 0800                        Time of last solid consumption: 1800                        Date of last liquid consumption: 10/19/20                        Date of last solid food consumption: 10/18/20    BMI:   Wt Readings from Last 3 Encounters:   10/19/20 169 lb 3.2 oz (76.7 kg)   09/21/20 177 lb (80.3 kg)   08/31/20 176 lb (79.8 kg)     Body mass index is 26.5 kg/m².     CBC:   Lab Results   Component Value Date    WBC 6.6 08/05/2020    RBC 4.11 08/05/2020    HGB 11.9 08/05/2020    HCT 34.7 08/05/2020    MCV 84.4 08/05/2020    RDW 12.8 08/05/2020     08/05/2020       CMP:   Lab Results   Component Value Date     08/05/2020    K 4.5 08/05/2020     08/05/2020    CO2 19 08/05/2020    BUN 8 08/05/2020    CREATININE 0.65 08/05/2020    GFRAA >60 08/05/2020    LABGLOM >60 08/05/2020    GLUCOSE 106 08/05/2020    PROT 7.1 08/03/2020 CALCIUM 8.5 08/05/2020    BILITOT 1.52 08/03/2020    ALKPHOS 113 08/03/2020    AST 16 08/03/2020    ALT 11 08/03/2020       POC Tests: No results for input(s): POCGLU, POCNA, POCK, POCCL, POCBUN, POCHEMO, POCHCT in the last 72 hours. Coags:   Lab Results   Component Value Date    PROTIME 17.3 08/03/2020    INR 1.5 08/03/2020    APTT 51.9 08/03/2020       HCG (If Applicable): No results found for: PREGTESTUR, PREGSERUM, HCG, HCGQUANT     ABGs: No results found for: PHART, PO2ART, FSK2NPI, TGL0AGT, BEART, Y4BVUWNM     Type & Screen (If Applicable):  No results found for: LABABO, LABRH    Drug/Infectious Status (If Applicable):  No results found for: HIV, HEPCAB    COVID-19 Screening (If Applicable):   Lab Results   Component Value Date    COVID19 Not Detected 10/14/2020         Anesthesia Evaluation  Patient summary reviewed and Nursing notes reviewed no history of anesthetic complications:   Airway: Mallampati: II  TM distance: >3 FB   Neck ROM: full  Mouth opening: > = 3 FB Dental: normal exam         Pulmonary:Negative Pulmonary ROS and normal exam  breath sounds clear to auscultation                             Cardiovascular:  Exercise tolerance: good (>4 METS),   (+) hyperlipidemia        Rhythm: regular  Rate: normal           Beta Blocker:  Not on Beta Blocker         Neuro/Psych:   (+) CVA:, neuromuscular disease:, psychiatric history:            GI/Hepatic/Renal:   (+) renal disease: kidney stones,           Endo/Other:    (+) : arthritis:., .          Pt had no PAT visit       Abdominal:       Abdomen: soft. Vascular:   + PVD, aortic or cerebral, . Anesthesia Plan      general     ASA 3       Induction: intravenous. MIPS: Postoperative opioids intended and Prophylactic antiemetics administered. Anesthetic plan and risks discussed with patient. Plan discussed with attending, surgical team and CRNA.                   JIMBO Sanchez - CRNA 10/19/2020

## 2020-10-19 NOTE — OP NOTE
Patient:  Collette Goodnight  MRN: [id number]  YOB: 1950    FACILITY: [place], [place]    DATE: 10/19/2020    SURGEON: Ekta Corona MD     ASSISTANT: none    PREOPERATIVE DIAGNOSIS: left kidney stone    POSTOPERATIVE DIAGNOSIS: left kidney stone    PROCEDURE PERFORMED: CYSTO Left URETEROSCOPY w/ Holmium Laser & LEFT stent placement      ANESTHESIA: General    ESTIMATED BLOOD LOSS: 5 ml    COMPLICATIONS: None immediate    DRAINS: 6 x 26 double j stent    SPECIMENS: none    INDICATIONS FOR PROCEDURE:  The patient is a 69 y.o. male who presents today with left kidney stone here for CYSTO Left URETEROSCOPY w/ Holmium Laser & stent placement. After risks, benefits and alternatives of the procedure were discussed with the patient, the patient elected to proceed. DETAILS OF THE PROCEDURE:  The patient was brought back from the preoperative holding area to the operating suite, and was transferred to the operating table where the patient lay in supine position. EPC's were in place, connected to the machine and the machine was turned on before induction. General endotracheal anesthesia was induced, and patient was prepped and draped in standard surgical fashion after being placed in dorsolithotomy position. A proper timeout was performed, preoperative antibiotics were given. We began by inserting a cystoscope with a 22 Polish sheath and 30 degree lens into the patient's urethral meatus and advancing into the bladder without complication. A pan cystoscopy was performed and the bladder appeared unremarkable. the prostate was open from previous greenlight surgery. There was still some apical adenoma that remained and some edema from previous surgery and dystrophic calcifications. We then focused our attention on the Left ureteral orifice, which we cannulated with our glidewire, advanced up to renal pelvis. We then used a dual lumen catheter to place a second wire.   Once in good position, we advanced our flexible ureteroscope over the working wire to the renal pelvis under direct visualization. We identified the renal calculus and using a 200 micron holmium laser fiber we fragmented the calculus. there were significant stones noted throughout the kidney. There was very large. We broke them up into pieces. Specifically one stone was stuck in a lower pole calycx that was difficult to access easily. We did our best to break stones up. At this time a complete pyeloscopy was performed and severe substantial fragments were identified. We elected to stage the procedure. The stone was very dense. Visualization was poor as the patient was on blood thinners. We did not use a stone basket to basket out any larger fragments. We withdrew the ureteroscope and visualized the entire ureter. No stone fragments were identified. No damage to the ureter was identified. At this time, over the remaining glidewire, we placed a 6 x 26 stent in the usual fashion, and we noted appropriate placement in the upper collecting system using fluoroscopy. There was a good curl noted in the bladder. We decided to not leave a string at the end of the stent. The patient's bladder was drained and removed the scope and the procedure was subsequently terminated. I was present for all critical portions of the procedure.     Plan:  Discharge home in good condition  The patient will need a second look ureteroscopy with access sheath/poss disposable ureteroscope vs PCNL as the stone burden was great and the stone is very dense

## 2020-10-19 NOTE — H&P
Haylee Conley MD  History and Physical    Patient:  Marilyn Wagner  MRN: [id number]  YOB: 1950    HISTORY OF PRESENT ILLNESS:     The patient is a 69 y.o. male who presents with left kidney stones. Here for procedure. Patient's old records, notes and chart reviewed and summarized above. Haylee Conley MD independently reviewed the images and verified the radiology reports from:    No results found. Past Medical History:    Past Medical History:   Diagnosis Date    Atrial fibrillation ([facility] 01/2020    Chronic kidney disease     CKD (chronic kidney disease)     History of ischemic stroke in prior three months     Hyperlipidemia     Ischemic stroke ([facility] [address] 01/10/2020    Nihss score 10     PTSD (post-traumatic stress disorder)     from war, Agent Orange     Skin tag 12/21/2018    Stroke ([facility] [address] 01/16/2020    MRI Brain WO: New right PCA distribution infarct, multifocal left MCA distribution infarcts    Stroke ([facility] [address] 01/11/2020    large vessel occlusion/left M2 occlusion. He subsequently underwent emergent thrombectomy.  /  S/P TPA DONE       Past Surgical History:    Past Surgical History:   Procedure Laterality Date    APPENDECTOMY      TRANSESOPHAGEAL ECHOCARDIOGRAM  01/17/2020    TURP N/A 8/10/2020    CYSTO w/ Greenlight Laser performed by Yvette Bahena MD at [address] [place] [address]       Medications Prior to Admission:    Prior to Admission medications    Medication Sig Start Date End Date Taking?  Authorizing Provider   dabigatran (PRADAXA) 150 MG capsule Take 150 mg by mouth 2 times daily    Historical Provider, MD   aspirin 81 MG EC tablet Take 81 mg by mouth daily    Historical Provider, MD   famotidine (PEPCID) 20 MG tablet Take 1 tablet by mouth 2 times daily 8/5/20   John Mathew   latanoprost (XALATAN) 0.005 % ophthalmic solution Place 1 drop into both eyes nightly 4/15/20   Historical Provider, MD   EPINEPHrine (EPIPEN 2-FARAZ) 0.3 MG/0.3ML SOAJ injection Inject 0.3 mLs into the muscle once for 1 dose Use as directed for allergic reaction  Patient not taking: Reported on 8/17/2020 3/3/20 7/29/20  Michelle Edwards MD   Cholecalciferol (VITAMIN D3) 25 MCG (1000 UT) CAPS Take 2,000 Units by mouth daily    Historical Provider, MD   Omega-3 Fatty Acids (FISH OIL) 1000 MG CAPS Take 1,000 mg by mouth 2 times daily    Historical Provider, MD   atorvastatin (LIPITOR) 40 MG tablet Take 1 tablet by mouth nightly 1/13/20   Ama Membreno MD   VITAMIN B1-B12 IM Inject into the muscle every 30 days     Historical Provider, MD       Allergies:  Patient has no known allergies. Social History:    Social History     Socioeconomic History    Marital status:      Spouse name: Not on file    Number of children: Not on file    Years of education: Not on file    Highest education level: Not on file   Occupational History    Not on file   Social Needs    Financial resource strain: Not on file    Food insecurity     Worry: Not on file     Inability: Not on file    Transportation needs     Medical: Not on file     Non-medical: Not on file   Tobacco Use    Smoking status: Never Smoker    Smokeless tobacco: Never Used   Substance and Sexual Activity    Alcohol use:  Yes     Alcohol/week: 0.0 standard drinks     Comment: occas    Drug use: No    Sexual activity: Yes   Lifestyle    Physical activity     Days per week: Not on file     Minutes per session: Not on file    Stress: Not on file   Relationships    Social connections     Talks on phone: Not on file     Gets together: Not on file     Attends [employer] service: Not on file     Active member of club or organization: Not on file     Attends meetings of clubs or organizations: Not on file     Relationship status: Not on file    Intimate partner violence     Fear of current or ex partner: Not on file     Emotionally abused: Not on file     Physically abused: Not on file     Forced sexual activity: Not on file   Other Topics Concern    Not on file   Social History Narrative    Not on file       Family History:  No family history on file. REVIEW OF SYSTEMS:  Constitutional: negative  Eyes: negative  Respiratory: negative  Cardiovascular: negative  Gastrointestinal: negative  Genitourinary: no acute issues  Musculoskeletal: negative  Skin: negative   Neurological: negative  Hematological/Lymphatic: negative  Psychological: negative    Physical Exam:      No data found. Constitutional: Patient in no acute distress; Neuro: alert and oriented to person place and time. Psych: Mood and affect normal.  Skin: Normal  Lungs: Respiratory effort normal, CTA  Cardiovascular:  Normal peripheral pulses; no murmur. Normal rhythm  Abdomen: Soft, non-tender, non-distended with no CVA, flank pain, hepatosplenomegaly or hernia. Kidneys normal.  Bladder non-tender and not distended. LABS:   No results for input(s): WBC, HGB, HCT, MCV, PLT in the last 72 hours. No results for input(s): NA, K, CL, CO2, PHOS, BUN, CREATININE in the last 72 hours. Invalid input(s): CA  Lab Results   Component Value Date    PSA 35.88 (H) 07/20/2020    PSA 3.93 05/20/2020         Urinalysis: No results for input(s): COLORU, PHUR, LABCAST, WBCUA, RBCUA, MUCUS, TRICHOMONAS, YEAST, BACTERIA, CLARITYU, SPECGRAV, LEUKOCYTESUR, UROBILINOGEN, BILIRUBINUR, BLOODU in the last 72 hours.     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

## 2020-10-23 ENCOUNTER — TELEPHONE (OUTPATIENT)
Dept: UROLOGY | Age: 70
End: 2020-10-23

## 2020-10-23 NOTE — TELEPHONE ENCOUNTER
Spoke wiith patient wife. She state spatient is having some blood in his urine- I re-assured her that this is normal while he has the stent in. States he is having some pain but it is controlled with tylenol. Will contact patent with next step/regarding stent removal when I talk with Dr. Mathew Sánchez.

## 2020-10-23 NOTE — TELEPHONE ENCOUNTER
Wife called stating  would like to talk to Dr. Cady Garcia nurse, he has a few questions he would like to ask.

## 2020-11-02 ENCOUNTER — OFFICE VISIT (OUTPATIENT)
Dept: UROLOGY | Age: 70
End: 2020-11-02
Payer: MEDICARE

## 2020-11-02 ENCOUNTER — TELEPHONE (OUTPATIENT)
Dept: UROLOGY | Age: 70
End: 2020-11-02

## 2020-11-02 ENCOUNTER — HOSPITAL ENCOUNTER (OUTPATIENT)
Dept: GENERAL RADIOLOGY | Age: 70
Discharge: HOME | End: 2020-11-04
Payer: MEDICARE

## 2020-11-02 VITALS
OXYGEN SATURATION: 99 % | SYSTOLIC BLOOD PRESSURE: 122 MMHG | BODY MASS INDEX: 26.53 KG/M2 | WEIGHT: 169 LBS | HEART RATE: 70 BPM | DIASTOLIC BLOOD PRESSURE: 72 MMHG | HEIGHT: 67 IN

## 2020-11-02 PROCEDURE — 99024 POSTOP FOLLOW-UP VISIT: CPT | Performed by: UROLOGY

## 2020-11-02 PROCEDURE — 74018 RADEX ABDOMEN 1 VIEW: CPT

## 2020-11-02 PROCEDURE — 99214 OFFICE O/P EST MOD 30 MIN: CPT | Performed by: UROLOGY

## 2020-11-02 NOTE — TELEPHONE ENCOUNTER
[facility] called to give report of the KUB: Multiple left nephroliths. Patient has complex renal abnormality with horseshoe type kidney. Smaller calcifications may be within the proximal left ureteral stent. Left ureteral stent in appropriate position.

## 2020-11-02 NOTE — PROGRESS NOTES
S/p greenlight and URS  Voiding well but OAB. Hematuria and irritation from stent  Here to discuss PCNL. Does not want to travel to [facility] and be off blood thinners. However, stone very dense and lower pole stone difficult to access with URS  Has horseshoe kidney with 3-4 stones > 1 cm.  Total aggregate > 3.5 cm  KUB today  Will surgically plan based off this

## 2020-11-09 ENCOUNTER — TELEPHONE (OUTPATIENT)
Dept: UROLOGY | Age: 70
End: 2020-11-09

## 2020-11-09 NOTE — TELEPHONE ENCOUNTER
Patient's wife called the office requesting to speak with Dr Prabhjot Rene nurse. Wife is wanting to know the test results from last and what the next step is.   Please call back @ #[phone]

## 2020-12-01 ENCOUNTER — HOSPITAL ENCOUNTER (OUTPATIENT)
Dept: LAB | Age: 70
Discharge: HOME | End: 2020-12-01
Payer: MEDICARE

## 2020-12-01 LAB
ABSOLUTE EOS #: 0.21 K/UL (ref 0–0.44)
ABSOLUTE IMMATURE GRANULOCYTE: 0.03 K/UL (ref 0–0.3)
ABSOLUTE LYMPH #: 2.03 K/UL (ref 1.1–3.7)
ABSOLUTE MONO #: 0.53 K/UL (ref 0.1–1.2)
ALBUMIN SERPL-MCNC: 4.1 G/DL (ref 3.5–5.2)
ALBUMIN/GLOBULIN RATIO: 1.3 (ref 1–2.5)
ALP BLD-CCNC: 109 U/L (ref 40–129)
ALT SERPL-CCNC: 14 U/L (ref 5–41)
ANION GAP SERPL CALCULATED.3IONS-SCNC: 8 MMOL/L (ref 9–17)
AST SERPL-CCNC: 18 U/L
BASOPHILS # BLD: 0 % (ref 0–2)
BASOPHILS ABSOLUTE: <0.03 K/UL (ref 0–0.2)
BILIRUB SERPL-MCNC: 0.6 MG/DL (ref 0.3–1.2)
BUN BLDV-MCNC: 18 MG/DL (ref 8–23)
BUN/CREAT BLD: 21 (ref 9–20)
CALCIUM SERPL-MCNC: 9.4 MG/DL (ref 8.6–10.4)
CHLORIDE BLD-SCNC: 106 MMOL/L (ref 98–107)
CHOLESTEROL/HDL RATIO: 2.1
CHOLESTEROL: 112 MG/DL
CO2: 28 MMOL/L (ref 20–31)
CREAT SERPL-MCNC: 0.86 MG/DL (ref 0.7–1.2)
DIFFERENTIAL TYPE: NORMAL
EOSINOPHILS RELATIVE PERCENT: 3 % (ref 1–4)
GFR AFRICAN AMERICAN: >60 ML/MIN
GFR NON-AFRICAN AMERICAN: >60 ML/MIN
GFR SERPL CREATININE-BSD FRML MDRD: ABNORMAL ML/MIN/{1.73_M2}
GFR SERPL CREATININE-BSD FRML MDRD: ABNORMAL ML/MIN/{1.73_M2}
GLUCOSE BLD-MCNC: 101 MG/DL (ref 70–99)
HCT VFR BLD CALC: 43 % (ref 40.7–50.3)
HDLC SERPL-MCNC: 53 MG/DL
HEMOGLOBIN: 13.9 G/DL (ref 13–17)
IMMATURE GRANULOCYTES: 0 %
LDL CHOLESTEROL: 42 MG/DL (ref 0–130)
LYMPHOCYTES # BLD: 29 % (ref 24–43)
MCH RBC QN AUTO: 28.8 PG (ref 25.2–33.5)
MCHC RBC AUTO-ENTMCNC: 32.3 G/DL (ref 25.2–33.5)
MCV RBC AUTO: 89.2 FL (ref 82.6–102.9)
MONOCYTES # BLD: 8 % (ref 3–12)
NRBC AUTOMATED: 0 PER 100 WBC
PDW BLD-RTO: 13.9 % (ref 11.8–14.4)
PLATELET # BLD: 176 K/UL (ref 138–453)
PLATELET ESTIMATE: NORMAL
PMV BLD AUTO: 9.5 FL (ref 8.1–13.5)
POTASSIUM SERPL-SCNC: 4.2 MMOL/L (ref 3.7–5.3)
RBC # BLD: 4.82 M/UL (ref 4.21–5.77)
RBC # BLD: NORMAL 10*6/UL
SEG NEUTROPHILS: 60 % (ref 36–65)
SEGMENTED NEUTROPHILS ABSOLUTE COUNT: 4.17 K/UL (ref 1.5–8.1)
SODIUM BLD-SCNC: 142 MMOL/L (ref 135–144)
TOTAL PROTEIN: 7.3 G/DL (ref 6.4–8.3)
TRIGL SERPL-MCNC: 83 MG/DL
VLDLC SERPL CALC-MCNC: NORMAL MG/DL (ref 1–30)
WBC # BLD: 7 K/UL (ref 3.5–11.3)
WBC # BLD: NORMAL 10*3/UL

## 2020-12-01 PROCEDURE — 36415 COLL VENOUS BLD VENIPUNCTURE: CPT

## 2020-12-01 PROCEDURE — 80061 LIPID PANEL: CPT

## 2020-12-01 PROCEDURE — 85025 COMPLETE CBC W/AUTO DIFF WBC: CPT

## 2020-12-01 PROCEDURE — 80053 COMPREHEN METABOLIC PANEL: CPT

## 2020-12-08 ENCOUNTER — TELEPHONE (OUTPATIENT)
Dept: CARDIOLOGY | Age: 70
End: 2020-12-08

## 2020-12-08 ENCOUNTER — TELEPHONE (OUTPATIENT)
Dept: INTERNAL MEDICINE | Age: 70
End: 2020-12-08

## 2020-12-08 NOTE — TELEPHONE ENCOUNTER
Just to clarify - patient is on pradaxa, not plavix? Would not likely be indicated to bridge for plavix.

## 2020-12-08 NOTE — TELEPHONE ENCOUNTER
Patient had similar urological procedure 10/2020, and per phone encounter 10/14/20, cardio recommended holding ASA x 7 days and holding pradaxa x 3 days with lovenox bridging. Will plan on lovenox 1 mg/kg BID - can we please verify patient weight in order to calculate lovenox dose (last weight was over 1 month prior). Can we also verify cardiology  Recommends bridging as noted before?

## 2020-12-08 NOTE — TELEPHONE ENCOUNTER
Pt having urological procedure on 12/18. Last time he had procedure Dr David Sarkar wanted him bridged with Lovenox, does pt need Lovenox for upcoming procedure? [facility] office states they will handle bridging if needed.

## 2020-12-09 NOTE — TELEPHONE ENCOUNTER
Call Documentation     Chon Dickerson MD at 12/9/2020 11:42 AM     Status: Signed       Due to history of CVA and afib, bridging with lovenox is recommended prior to the procedure                      Kike Juárez LPN at 35/6/5940  9:19 PM     Status: Signed       Pt having urological procedure on 12/18. Last time he had procedure Dr Alejandro Velasquez wanted him bridged with Lovenox, does pt need Lovenox for upcoming procedure? [facility] office states they will handle bridging if needed.

## 2020-12-11 ENCOUNTER — TELEPHONE (OUTPATIENT)
Dept: UROLOGY | Age: 70
End: 2020-12-11

## 2020-12-11 NOTE — TELEPHONE ENCOUNTER
LM letting patient know he does not have to stop these medications prior- just dont take them the day of surgery.

## 2020-12-11 NOTE — TELEPHONE ENCOUNTER
Patient's wife called wondering if the patient needs to hold his tamsulosin and oxybutyn for his procedure on 12/18/20.      Last Appt:  11/2/2020  Next Appt:   Visit date not found  Med verified in [facility] [address]

## 2020-12-14 ENCOUNTER — HOSPITAL ENCOUNTER (OUTPATIENT)
Dept: PREADMISSION TESTING | Age: 70
Setting detail: SPECIMEN
Discharge: HOME | End: 2020-12-18
Payer: MEDICARE

## 2020-12-14 PROCEDURE — U0003 INFECTIOUS AGENT DETECTION BY NUCLEIC ACID (DNA OR RNA); SEVERE ACUTE RESPIRATORY SYNDROME CORONAVIRUS 2 (SARS-COV-2) (CORONAVIRUS DISEASE [COVID-19]), AMPLIFIED PROBE TECHNIQUE, MAKING USE OF HIGH THROUGHPUT TECHNOLOGIES AS DESCRIBED BY CMS-2020-01-R: HCPCS

## 2020-12-15 LAB — SARS-COV-2, NAA: NOT DETECTED

## 2020-12-16 ENCOUNTER — TELEPHONE (OUTPATIENT)
Dept: FAMILY MEDICINE CLINIC | Age: 70
End: 2020-12-16

## 2020-12-16 RX ORDER — OXYBUTYNIN CHLORIDE 10 MG/1
10 TABLET, EXTENDED RELEASE ORAL DAILY
COMMUNITY
End: 2021-01-05 | Stop reason: SDUPTHER

## 2020-12-16 NOTE — TELEPHONE ENCOUNTER
Called and informed patient of negative Covid test collected on 12/14/2020. Patient verbalized understanding.

## 2020-12-18 ENCOUNTER — HOSPITAL ENCOUNTER (OUTPATIENT)
Dept: INTERVENTIONAL RADIOLOGY/VASCULAR | Age: 70
Setting detail: OUTPATIENT SURGERY
Discharge: HOME | End: 2020-12-20
Attending: UROLOGY
Payer: MEDICARE

## 2020-12-18 ENCOUNTER — ANESTHESIA EVENT (OUTPATIENT)
Dept: INTERVENTIONAL RADIOLOGY/VASCULAR | Age: 70
End: 2020-12-18
Payer: MEDICARE

## 2020-12-18 ENCOUNTER — ANESTHESIA EVENT (OUTPATIENT)
Dept: OPERATING ROOM | Age: 70
End: 2020-12-18
Payer: MEDICARE

## 2020-12-18 ENCOUNTER — ANESTHESIA (OUTPATIENT)
Dept: OPERATING ROOM | Age: 70
End: 2020-12-18
Payer: MEDICARE

## 2020-12-18 ENCOUNTER — APPOINTMENT (OUTPATIENT)
Dept: GENERAL RADIOLOGY | Age: 70
End: 2020-12-18
Attending: UROLOGY
Payer: MEDICARE

## 2020-12-18 ENCOUNTER — HOSPITAL ENCOUNTER (OUTPATIENT)
Age: 70
Setting detail: OBSERVATION
Discharge: HOME | End: 2020-12-19
Attending: UROLOGY | Admitting: UROLOGY
Payer: MEDICARE

## 2020-12-18 ENCOUNTER — ANESTHESIA (OUTPATIENT)
Dept: INTERVENTIONAL RADIOLOGY/VASCULAR | Age: 70
End: 2020-12-18
Payer: MEDICARE

## 2020-12-18 VITALS
SYSTOLIC BLOOD PRESSURE: 131 MMHG | TEMPERATURE: 96.1 F | OXYGEN SATURATION: 100 % | RESPIRATION RATE: 12 BRPM | HEART RATE: 69 BPM | DIASTOLIC BLOOD PRESSURE: 78 MMHG

## 2020-12-18 VITALS — TEMPERATURE: 98.6 F | OXYGEN SATURATION: 99 % | SYSTOLIC BLOOD PRESSURE: 110 MMHG | DIASTOLIC BLOOD PRESSURE: 72 MMHG

## 2020-12-18 VITALS — DIASTOLIC BLOOD PRESSURE: 61 MMHG | OXYGEN SATURATION: 100 % | SYSTOLIC BLOOD PRESSURE: 106 MMHG

## 2020-12-18 VITALS — SYSTOLIC BLOOD PRESSURE: 140 MMHG | TEMPERATURE: 92.3 F | DIASTOLIC BLOOD PRESSURE: 85 MMHG | OXYGEN SATURATION: 100 %

## 2020-12-18 LAB
ANION GAP SERPL CALCULATED.3IONS-SCNC: 7 MMOL/L (ref 9–17)
BUN BLDV-MCNC: 18 MG/DL (ref 8–23)
BUN/CREAT BLD: ABNORMAL (ref 9–20)
CALCIUM SERPL-MCNC: 8.6 MG/DL (ref 8.6–10.4)
CHLORIDE BLD-SCNC: 107 MMOL/L (ref 98–107)
CO2: 25 MMOL/L (ref 20–31)
CREAT SERPL-MCNC: 0.87 MG/DL (ref 0.7–1.2)
GFR AFRICAN AMERICAN: >60 ML/MIN
GFR NON-AFRICAN AMERICAN: >60 ML/MIN
GFR NON-AFRICAN AMERICAN: >60 ML/MIN
GFR SERPL CREATININE-BSD FRML MDRD: >60 ML/MIN
GFR SERPL CREATININE-BSD FRML MDRD: ABNORMAL ML/MIN/{1.73_M2}
GFR SERPL CREATININE-BSD FRML MDRD: ABNORMAL ML/MIN/{1.73_M2}
GFR SERPL CREATININE-BSD FRML MDRD: NORMAL ML/MIN/{1.73_M2}
GLUCOSE BLD-MCNC: 108 MG/DL (ref 70–99)
GLUCOSE BLD-MCNC: 99 MG/DL (ref 74–100)
HCT VFR BLD CALC: 40.7 % (ref 40.7–50.3)
HCT VFR BLD CALC: 41.9 % (ref 40.7–50.3)
HEMOGLOBIN: 13.2 G/DL (ref 13–17)
HEMOGLOBIN: 13.2 G/DL (ref 13–17)
INR BLD: 1
MAGNESIUM: 2.2 MG/DL (ref 1.6–2.6)
MCH RBC QN AUTO: 27.4 PG (ref 25.2–33.5)
MCH RBC QN AUTO: 27.7 PG (ref 25.2–33.5)
MCHC RBC AUTO-ENTMCNC: 31.5 G/DL (ref 28.4–34.8)
MCHC RBC AUTO-ENTMCNC: 32.4 G/DL (ref 28.4–34.8)
MCV RBC AUTO: 85.3 FL (ref 82.6–102.9)
MCV RBC AUTO: 86.9 FL (ref 82.6–102.9)
NRBC AUTOMATED: 0 PER 100 WBC
NRBC AUTOMATED: 0 PER 100 WBC
PARTIAL THROMBOPLASTIN TIME: 26.5 SEC (ref 20.5–30.5)
PDW BLD-RTO: 12.9 % (ref 11.8–14.4)
PDW BLD-RTO: 13 % (ref 11.8–14.4)
PHOSPHORUS: 3.9 MG/DL (ref 2.5–4.5)
PLATELET # BLD: 167 K/UL (ref 138–453)
PLATELET # BLD: 178 K/UL (ref 138–453)
PMV BLD AUTO: 10 FL (ref 8.1–13.5)
PMV BLD AUTO: 9.9 FL (ref 8.1–13.5)
POC CREATININE: 0.91 MG/DL (ref 0.51–1.19)
POTASSIUM SERPL-SCNC: 4.7 MMOL/L (ref 3.7–5.3)
PROTHROMBIN TIME: 10.4 SEC (ref 9–12)
RBC # BLD: 4.77 M/UL (ref 4.21–5.77)
RBC # BLD: 4.82 M/UL (ref 4.21–5.77)
SODIUM BLD-SCNC: 139 MMOL/L (ref 135–144)
WBC # BLD: 4.5 K/UL (ref 3.5–11.3)
WBC # BLD: 6.1 K/UL (ref 3.5–11.3)

## 2020-12-18 PROCEDURE — 36415 COLL VENOUS BLD VENIPUNCTURE: CPT

## 2020-12-18 PROCEDURE — 85610 PROTHROMBIN TIME: CPT

## 2020-12-18 PROCEDURE — 80048 BASIC METABOLIC PNL TOTAL CA: CPT

## 2020-12-18 PROCEDURE — 2580000003 HC RX 258: Performed by: NURSE ANESTHETIST, CERTIFIED REGISTERED

## 2020-12-18 PROCEDURE — C1758 CATHETER, URETERAL: HCPCS | Performed by: UROLOGY

## 2020-12-18 PROCEDURE — 85730 THROMBOPLASTIN TIME PARTIAL: CPT

## 2020-12-18 PROCEDURE — 3700000000 HC ANESTHESIA ATTENDED CARE: Performed by: UROLOGY

## 2020-12-18 PROCEDURE — 3700000001 HC ADD 15 MINUTES (ANESTHESIA): Performed by: UROLOGY

## 2020-12-18 PROCEDURE — 7100000041 HC SPAR PHASE II RECOVERY - ADDTL 15 MIN

## 2020-12-18 PROCEDURE — G0378 HOSPITAL OBSERVATION PER HR: HCPCS

## 2020-12-18 PROCEDURE — 6360000002 HC RX W HCPCS: Performed by: ANESTHESIOLOGY

## 2020-12-18 PROCEDURE — 6360000002 HC RX W HCPCS: Performed by: NURSE ANESTHETIST, CERTIFIED REGISTERED

## 2020-12-18 PROCEDURE — 82947 ASSAY GLUCOSE BLOOD QUANT: CPT

## 2020-12-18 PROCEDURE — 3600000004 HC SURGERY LEVEL 4 BASE: Performed by: UROLOGY

## 2020-12-18 PROCEDURE — 84520 ASSAY OF UREA NITROGEN: CPT

## 2020-12-18 PROCEDURE — 3700000001 HC ADD 15 MINUTES (ANESTHESIA)

## 2020-12-18 PROCEDURE — 2500000003 HC RX 250 WO HCPCS: Performed by: NURSE ANESTHETIST, CERTIFIED REGISTERED

## 2020-12-18 PROCEDURE — 2580000003 HC RX 258: Performed by: UROLOGY

## 2020-12-18 PROCEDURE — 84100 ASSAY OF PHOSPHORUS: CPT

## 2020-12-18 PROCEDURE — 6360000002 HC RX W HCPCS: Performed by: PHYSICIAN ASSISTANT

## 2020-12-18 PROCEDURE — C2617 STENT, NON-COR, TEM W/O DEL: HCPCS | Performed by: UROLOGY

## 2020-12-18 PROCEDURE — 2709999900 HC NON-CHARGEABLE SUPPLY

## 2020-12-18 PROCEDURE — 2580000003 HC RX 258: Performed by: STUDENT IN AN ORGANIZED HEALTH CARE EDUCATION/TRAINING PROGRAM

## 2020-12-18 PROCEDURE — 6370000000 HC RX 637 (ALT 250 FOR IP): Performed by: STUDENT IN AN ORGANIZED HEALTH CARE EDUCATION/TRAINING PROGRAM

## 2020-12-18 PROCEDURE — C1769 GUIDE WIRE: HCPCS | Performed by: UROLOGY

## 2020-12-18 PROCEDURE — 3600000002 HC SURGERY LEVEL 2 BASE: Performed by: UROLOGY

## 2020-12-18 PROCEDURE — 6360000004 HC RX CONTRAST MEDICATION: Performed by: UROLOGY

## 2020-12-18 PROCEDURE — 7100000000 HC PACU RECOVERY - FIRST 15 MIN: Performed by: UROLOGY

## 2020-12-18 PROCEDURE — 3600000012 HC SURGERY LEVEL 2 ADDTL 15MIN: Performed by: UROLOGY

## 2020-12-18 PROCEDURE — 3600000014 HC SURGERY LEVEL 4 ADDTL 15MIN: Performed by: UROLOGY

## 2020-12-18 PROCEDURE — 82565 ASSAY OF CREATININE: CPT

## 2020-12-18 PROCEDURE — 3209999900 FLUORO FOR SURGICAL PROCEDURES

## 2020-12-18 PROCEDURE — 50430 NJX PX NFROSGRM &/URTRGRM: CPT

## 2020-12-18 PROCEDURE — C1894 INTRO/SHEATH, NON-LASER: HCPCS

## 2020-12-18 PROCEDURE — C1769 GUIDE WIRE: HCPCS

## 2020-12-18 PROCEDURE — C2628 CATHETER, OCCLUSION: HCPCS | Performed by: UROLOGY

## 2020-12-18 PROCEDURE — 7100000040 HC SPAR PHASE II RECOVERY - FIRST 15 MIN

## 2020-12-18 PROCEDURE — 85027 COMPLETE CBC AUTOMATED: CPT

## 2020-12-18 PROCEDURE — 3700000000 HC ANESTHESIA ATTENDED CARE

## 2020-12-18 PROCEDURE — 7100000001 HC PACU RECOVERY - ADDTL 15 MIN: Performed by: UROLOGY

## 2020-12-18 PROCEDURE — 6360000002 HC RX W HCPCS: Performed by: STUDENT IN AN ORGANIZED HEALTH CARE EDUCATION/TRAINING PROGRAM

## 2020-12-18 PROCEDURE — 83735 ASSAY OF MAGNESIUM: CPT

## 2020-12-18 PROCEDURE — 2709999900 HC NON-CHARGEABLE SUPPLY: Performed by: UROLOGY

## 2020-12-18 PROCEDURE — C1726 CATH, BAL DIL, NON-VASCULAR: HCPCS | Performed by: UROLOGY

## 2020-12-18 PROCEDURE — 87086 URINE CULTURE/COLONY COUNT: CPT

## 2020-12-18 DEVICE — URETERAL STENT
Type: IMPLANTABLE DEVICE | Status: NON-FUNCTIONAL
Brand: POLARIS™ ULTRA

## 2020-12-18 RX ORDER — SODIUM CHLORIDE, SODIUM LACTATE, POTASSIUM CHLORIDE, CALCIUM CHLORIDE 600; 310; 30; 20 MG/100ML; MG/100ML; MG/100ML; MG/100ML
INJECTION, SOLUTION INTRAVENOUS CONTINUOUS PRN
Status: DISCONTINUED | OUTPATIENT
Start: 2020-12-18 | End: 2020-12-18 | Stop reason: SDUPTHER

## 2020-12-18 RX ORDER — FENTANYL CITRATE 50 UG/ML
25 INJECTION, SOLUTION INTRAMUSCULAR; INTRAVENOUS ONCE
Status: DISCONTINUED | OUTPATIENT
Start: 2020-12-18 | End: 2020-12-18 | Stop reason: SDUPTHER

## 2020-12-18 RX ORDER — SODIUM CHLORIDE 0.9 % (FLUSH) 0.9 %
10 SYRINGE (ML) INJECTION PRN
Status: DISCONTINUED | OUTPATIENT
Start: 2020-12-18 | End: 2020-12-18 | Stop reason: SDUPTHER

## 2020-12-18 RX ORDER — FENTANYL CITRATE 50 UG/ML
25 INJECTION, SOLUTION INTRAMUSCULAR; INTRAVENOUS ONCE
Status: DISCONTINUED | OUTPATIENT
Start: 2020-12-18 | End: 2020-12-18 | Stop reason: HOSPADM

## 2020-12-18 RX ORDER — OXYBUTYNIN CHLORIDE 10 MG/1
10 TABLET, EXTENDED RELEASE ORAL DAILY
Status: DISCONTINUED | OUTPATIENT
Start: 2020-12-18 | End: 2020-12-19 | Stop reason: HOSPADM

## 2020-12-18 RX ORDER — MAGNESIUM HYDROXIDE 1200 MG/15ML
LIQUID ORAL PRN
Status: DISCONTINUED | OUTPATIENT
Start: 2020-12-18 | End: 2020-12-18 | Stop reason: ALTCHOICE

## 2020-12-18 RX ORDER — SODIUM CHLORIDE 0.9 % (FLUSH) 0.9 %
10 SYRINGE (ML) INJECTION EVERY 12 HOURS SCHEDULED
Status: DISCONTINUED | OUTPATIENT
Start: 2020-12-18 | End: 2020-12-19 | Stop reason: HOSPADM

## 2020-12-18 RX ORDER — SODIUM CHLORIDE 9 MG/ML
INJECTION, SOLUTION INTRAVENOUS CONTINUOUS
Status: DISCONTINUED | OUTPATIENT
Start: 2020-12-18 | End: 2020-12-19 | Stop reason: HOSPADM

## 2020-12-18 RX ORDER — SODIUM CHLORIDE 0.9 % (FLUSH) 0.9 %
10 SYRINGE (ML) INJECTION EVERY 12 HOURS SCHEDULED
Status: DISCONTINUED | OUTPATIENT
Start: 2020-12-18 | End: 2020-12-18 | Stop reason: SDUPTHER

## 2020-12-18 RX ORDER — FENTANYL CITRATE 50 UG/ML
25 INJECTION, SOLUTION INTRAMUSCULAR; INTRAVENOUS EVERY 5 MIN PRN
Status: DISCONTINUED | OUTPATIENT
Start: 2020-12-18 | End: 2020-12-21 | Stop reason: HOSPADM

## 2020-12-18 RX ORDER — PROPOFOL 10 MG/ML
INJECTION, EMULSION INTRAVENOUS CONTINUOUS PRN
Status: DISCONTINUED | OUTPATIENT
Start: 2020-12-18 | End: 2020-12-18 | Stop reason: SDUPTHER

## 2020-12-18 RX ORDER — OXYCODONE HYDROCHLORIDE 5 MG/1
5 TABLET ORAL EVERY 4 HOURS PRN
Status: DISCONTINUED | OUTPATIENT
Start: 2020-12-18 | End: 2020-12-19 | Stop reason: HOSPADM

## 2020-12-18 RX ORDER — POLYETHYLENE GLYCOL 3350 17 G/17G
17 POWDER, FOR SOLUTION ORAL DAILY
Status: DISCONTINUED | OUTPATIENT
Start: 2020-12-18 | End: 2020-12-19 | Stop reason: HOSPADM

## 2020-12-18 RX ORDER — SUCCINYLCHOLINE/SOD CL,ISO/PF 100 MG/5ML
SYRINGE (ML) INTRAVENOUS PRN
Status: DISCONTINUED | OUTPATIENT
Start: 2020-12-18 | End: 2020-12-18 | Stop reason: SDUPTHER

## 2020-12-18 RX ORDER — ONDANSETRON 2 MG/ML
4 INJECTION INTRAMUSCULAR; INTRAVENOUS EVERY 6 HOURS PRN
Status: DISCONTINUED | OUTPATIENT
Start: 2020-12-18 | End: 2020-12-19 | Stop reason: HOSPADM

## 2020-12-18 RX ORDER — PHENYLEPHRINE HCL IN 0.9% NACL 1 MG/10 ML
SYRINGE (ML) INTRAVENOUS PRN
Status: DISCONTINUED | OUTPATIENT
Start: 2020-12-18 | End: 2020-12-18 | Stop reason: SDUPTHER

## 2020-12-18 RX ORDER — MORPHINE SULFATE 4 MG/ML
4 INJECTION, SOLUTION INTRAMUSCULAR; INTRAVENOUS
Status: DISCONTINUED | OUTPATIENT
Start: 2020-12-18 | End: 2020-12-19 | Stop reason: HOSPADM

## 2020-12-18 RX ORDER — ONDANSETRON 2 MG/ML
4 INJECTION INTRAMUSCULAR; INTRAVENOUS
Status: COMPLETED | OUTPATIENT
Start: 2020-12-18 | End: 2020-12-18

## 2020-12-18 RX ORDER — LIDOCAINE HYDROCHLORIDE 10 MG/ML
INJECTION, SOLUTION EPIDURAL; INFILTRATION; INTRACAUDAL; PERINEURAL PRN
Status: DISCONTINUED | OUTPATIENT
Start: 2020-12-18 | End: 2020-12-18 | Stop reason: SDUPTHER

## 2020-12-18 RX ORDER — FENTANYL CITRATE 50 UG/ML
50 INJECTION, SOLUTION INTRAMUSCULAR; INTRAVENOUS EVERY 5 MIN PRN
Status: DISCONTINUED | OUTPATIENT
Start: 2020-12-18 | End: 2020-12-21 | Stop reason: HOSPADM

## 2020-12-18 RX ORDER — CIPROFLOXACIN 2 MG/ML
400 INJECTION, SOLUTION INTRAVENOUS
Status: DISCONTINUED | OUTPATIENT
Start: 2020-12-18 | End: 2020-12-18 | Stop reason: HOSPADM

## 2020-12-18 RX ORDER — TAMSULOSIN HYDROCHLORIDE 0.4 MG/1
0.4 CAPSULE ORAL DAILY
Status: DISCONTINUED | OUTPATIENT
Start: 2020-12-18 | End: 2020-12-19 | Stop reason: HOSPADM

## 2020-12-18 RX ORDER — ATORVASTATIN CALCIUM 40 MG/1
40 TABLET, FILM COATED ORAL NIGHTLY
Status: DISCONTINUED | OUTPATIENT
Start: 2020-12-18 | End: 2020-12-19 | Stop reason: HOSPADM

## 2020-12-18 RX ORDER — SODIUM CHLORIDE, SODIUM LACTATE, POTASSIUM CHLORIDE, CALCIUM CHLORIDE 600; 310; 30; 20 MG/100ML; MG/100ML; MG/100ML; MG/100ML
INJECTION, SOLUTION INTRAVENOUS CONTINUOUS
Status: DISCONTINUED | OUTPATIENT
Start: 2020-12-18 | End: 2020-12-18

## 2020-12-18 RX ORDER — SODIUM CHLORIDE 0.9 % (FLUSH) 0.9 %
10 SYRINGE (ML) INJECTION EVERY 12 HOURS SCHEDULED
Status: DISCONTINUED | OUTPATIENT
Start: 2020-12-18 | End: 2020-12-18 | Stop reason: HOSPADM

## 2020-12-18 RX ORDER — SODIUM CHLORIDE 0.9 % (FLUSH) 0.9 %
10 SYRINGE (ML) INJECTION PRN
Status: DISCONTINUED | OUTPATIENT
Start: 2020-12-18 | End: 2020-12-19 | Stop reason: HOSPADM

## 2020-12-18 RX ORDER — ONDANSETRON 2 MG/ML
4 INJECTION INTRAMUSCULAR; INTRAVENOUS ONCE
Status: DISCONTINUED | OUTPATIENT
Start: 2020-12-18 | End: 2020-12-18 | Stop reason: SDUPTHER

## 2020-12-18 RX ORDER — PROPOFOL 10 MG/ML
INJECTION, EMULSION INTRAVENOUS PRN
Status: DISCONTINUED | OUTPATIENT
Start: 2020-12-18 | End: 2020-12-18 | Stop reason: SDUPTHER

## 2020-12-18 RX ORDER — FENTANYL CITRATE 50 UG/ML
50 INJECTION, SOLUTION INTRAMUSCULAR; INTRAVENOUS EVERY 5 MIN PRN
Status: DISCONTINUED | OUTPATIENT
Start: 2020-12-18 | End: 2020-12-18 | Stop reason: HOSPADM

## 2020-12-18 RX ORDER — GLYCOPYRROLATE 1 MG/5 ML
SYRINGE (ML) INTRAVENOUS PRN
Status: DISCONTINUED | OUTPATIENT
Start: 2020-12-18 | End: 2020-12-18 | Stop reason: SDUPTHER

## 2020-12-18 RX ORDER — ACETAMINOPHEN 325 MG/1
650 TABLET ORAL EVERY 6 HOURS
Status: DISCONTINUED | OUTPATIENT
Start: 2020-12-18 | End: 2020-12-19 | Stop reason: HOSPADM

## 2020-12-18 RX ORDER — SODIUM CHLORIDE 9 MG/ML
INJECTION, SOLUTION INTRAVENOUS CONTINUOUS
Status: DISCONTINUED | OUTPATIENT
Start: 2020-12-18 | End: 2020-12-18

## 2020-12-18 RX ORDER — MIDAZOLAM HYDROCHLORIDE 2 MG/2ML
2 INJECTION, SOLUTION INTRAMUSCULAR; INTRAVENOUS
Status: DISCONTINUED | OUTPATIENT
Start: 2020-12-18 | End: 2020-12-18 | Stop reason: SDUPTHER

## 2020-12-18 RX ORDER — FENTANYL CITRATE 50 UG/ML
INJECTION, SOLUTION INTRAMUSCULAR; INTRAVENOUS PRN
Status: DISCONTINUED | OUTPATIENT
Start: 2020-12-18 | End: 2020-12-18 | Stop reason: SDUPTHER

## 2020-12-18 RX ORDER — SODIUM CHLORIDE 0.9 % (FLUSH) 0.9 %
10 SYRINGE (ML) INJECTION PRN
Status: DISCONTINUED | OUTPATIENT
Start: 2020-12-18 | End: 2020-12-18 | Stop reason: HOSPADM

## 2020-12-18 RX ORDER — MIDAZOLAM HYDROCHLORIDE 2 MG/2ML
2 INJECTION, SOLUTION INTRAMUSCULAR; INTRAVENOUS
Status: DISCONTINUED | OUTPATIENT
Start: 2020-12-18 | End: 2020-12-18 | Stop reason: HOSPADM

## 2020-12-18 RX ORDER — ONDANSETRON 2 MG/ML
4 INJECTION INTRAMUSCULAR; INTRAVENOUS
Status: DISCONTINUED | OUTPATIENT
Start: 2020-12-18 | End: 2020-12-18 | Stop reason: HOSPADM

## 2020-12-18 RX ORDER — MORPHINE SULFATE 2 MG/ML
2 INJECTION, SOLUTION INTRAMUSCULAR; INTRAVENOUS
Status: DISCONTINUED | OUTPATIENT
Start: 2020-12-18 | End: 2020-12-19 | Stop reason: HOSPADM

## 2020-12-18 RX ORDER — FENTANYL CITRATE 50 UG/ML
50 INJECTION, SOLUTION INTRAMUSCULAR; INTRAVENOUS EVERY 5 MIN PRN
Status: DISCONTINUED | OUTPATIENT
Start: 2020-12-18 | End: 2020-12-18 | Stop reason: SDUPTHER

## 2020-12-18 RX ORDER — MEPERIDINE HYDROCHLORIDE 50 MG/ML
12.5 INJECTION INTRAMUSCULAR; INTRAVENOUS; SUBCUTANEOUS EVERY 5 MIN PRN
Status: DISCONTINUED | OUTPATIENT
Start: 2020-12-18 | End: 2020-12-21 | Stop reason: HOSPADM

## 2020-12-18 RX ORDER — OXYCODONE HYDROCHLORIDE 5 MG/1
10 TABLET ORAL EVERY 4 HOURS PRN
Status: DISCONTINUED | OUTPATIENT
Start: 2020-12-18 | End: 2020-12-19 | Stop reason: HOSPADM

## 2020-12-18 RX ORDER — SODIUM CHLORIDE, SODIUM LACTATE, POTASSIUM CHLORIDE, CALCIUM CHLORIDE 600; 310; 30; 20 MG/100ML; MG/100ML; MG/100ML; MG/100ML
INJECTION, SOLUTION INTRAVENOUS CONTINUOUS
Status: DISCONTINUED | OUTPATIENT
Start: 2020-12-18 | End: 2020-12-18 | Stop reason: SDUPTHER

## 2020-12-18 RX ORDER — FAMOTIDINE 20 MG/1
20 TABLET, FILM COATED ORAL 2 TIMES DAILY
Status: DISCONTINUED | OUTPATIENT
Start: 2020-12-18 | End: 2020-12-19 | Stop reason: HOSPADM

## 2020-12-18 RX ORDER — LATANOPROST 50 UG/ML
1 SOLUTION/ DROPS OPHTHALMIC NIGHTLY
Status: DISCONTINUED | OUTPATIENT
Start: 2020-12-18 | End: 2020-12-19 | Stop reason: HOSPADM

## 2020-12-18 RX ORDER — ONDANSETRON 2 MG/ML
4 INJECTION INTRAMUSCULAR; INTRAVENOUS ONCE
Status: DISCONTINUED | OUTPATIENT
Start: 2020-12-18 | End: 2020-12-18 | Stop reason: HOSPADM

## 2020-12-18 RX ADMIN — CEFAZOLIN 2 G: 10 INJECTION, POWDER, FOR SOLUTION INTRAVENOUS at 12:21

## 2020-12-18 RX ADMIN — SODIUM CHLORIDE, POTASSIUM CHLORIDE, SODIUM LACTATE AND CALCIUM CHLORIDE: 600; 310; 30; 20 INJECTION, SOLUTION INTRAVENOUS at 12:56

## 2020-12-18 RX ADMIN — ONDANSETRON 4 MG: 2 INJECTION INTRAMUSCULAR; INTRAVENOUS at 16:00

## 2020-12-18 RX ADMIN — SODIUM CHLORIDE, POTASSIUM CHLORIDE, SODIUM LACTATE AND CALCIUM CHLORIDE: 600; 310; 30; 20 INJECTION, SOLUTION INTRAVENOUS at 15:41

## 2020-12-18 RX ADMIN — SODIUM CHLORIDE, POTASSIUM CHLORIDE, SODIUM LACTATE AND CALCIUM CHLORIDE: 600; 310; 30; 20 INJECTION, SOLUTION INTRAVENOUS at 12:08

## 2020-12-18 RX ADMIN — FAMOTIDINE 20 MG: 20 TABLET, FILM COATED ORAL at 22:57

## 2020-12-18 RX ADMIN — PROPOFOL 150 MG: 10 INJECTION, EMULSION INTRAVENOUS at 15:41

## 2020-12-18 RX ADMIN — Medication 0.2 MG: at 13:12

## 2020-12-18 RX ADMIN — Medication 10 ML: at 23:05

## 2020-12-18 RX ADMIN — CEFAZOLIN 1 G: 1 INJECTION, POWDER, FOR SOLUTION INTRAMUSCULAR; INTRAVENOUS at 22:56

## 2020-12-18 RX ADMIN — DESMOPRESSIN ACETATE 40 MG: 0.2 TABLET ORAL at 22:57

## 2020-12-18 RX ADMIN — PROPOFOL 150 MCG/KG/MIN: 10 INJECTION, EMULSION INTRAVENOUS at 12:11

## 2020-12-18 RX ADMIN — LATANOPROST 1 DROP: 50 SOLUTION OPHTHALMIC at 22:57

## 2020-12-18 RX ADMIN — IOVERSOL 50 ML: 741 INJECTION INTRA-ARTERIAL; INTRAVENOUS at 14:56

## 2020-12-18 RX ADMIN — Medication 100 MCG: at 15:51

## 2020-12-18 RX ADMIN — SODIUM CHLORIDE: 9 INJECTION, SOLUTION INTRAVENOUS at 22:56

## 2020-12-18 RX ADMIN — FENTANYL CITRATE 50 MCG: 50 INJECTION, SOLUTION INTRAMUSCULAR; INTRAVENOUS at 15:41

## 2020-12-18 RX ADMIN — OXYBUTYNIN CHLORIDE 10 MG: 10 TABLET, EXTENDED RELEASE ORAL at 22:57

## 2020-12-18 RX ADMIN — Medication 100 MG: at 15:42

## 2020-12-18 RX ADMIN — PROPOFOL 40 MCG/KG/MIN: 10 INJECTION, EMULSION INTRAVENOUS at 13:00

## 2020-12-18 RX ADMIN — LIDOCAINE HYDROCHLORIDE 50 MG: 10 INJECTION, SOLUTION EPIDURAL; INFILTRATION; INTRACAUDAL; PERINEURAL at 15:42

## 2020-12-18 RX ADMIN — LIDOCAINE HYDROCHLORIDE 50 MG: 10 INJECTION, SOLUTION EPIDURAL; INFILTRATION; INTRACAUDAL; PERINEURAL at 12:12

## 2020-12-18 RX ADMIN — PROPOFOL 50 MG: 10 INJECTION, EMULSION INTRAVENOUS at 12:11

## 2020-12-18 RX ADMIN — SODIUM CHLORIDE, POTASSIUM CHLORIDE, SODIUM LACTATE AND CALCIUM CHLORIDE: 600; 310; 30; 20 INJECTION, SOLUTION INTRAVENOUS at 13:28

## 2020-12-18 RX ADMIN — ACETAMINOPHEN 650 MG: 325 TABLET ORAL at 22:56

## 2020-12-18 RX ADMIN — TAMSULOSIN HYDROCHLORIDE 0.4 MG: 0.4 CAPSULE ORAL at 22:57

## 2020-12-18 ASSESSMENT — PULMONARY FUNCTION TESTS
PIF_VALUE: 0
PIF_VALUE: 0
PIF_VALUE: 15
PIF_VALUE: 0
PIF_VALUE: 2
PIF_VALUE: 15
PIF_VALUE: 0
PIF_VALUE: 1
PIF_VALUE: 0
PIF_VALUE: 1
PIF_VALUE: 0
PIF_VALUE: 1
PIF_VALUE: 4
PIF_VALUE: 0
PIF_VALUE: 0
PIF_VALUE: 1
PIF_VALUE: 0
PIF_VALUE: 0
PIF_VALUE: 1
PIF_VALUE: 11
PIF_VALUE: 15
PIF_VALUE: 0
PIF_VALUE: 1
PIF_VALUE: 0
PIF_VALUE: 1
PIF_VALUE: 0
PIF_VALUE: 0
PIF_VALUE: 1
PIF_VALUE: 0
PIF_VALUE: 1
PIF_VALUE: 1
PIF_VALUE: 0
PIF_VALUE: 1
PIF_VALUE: 4
PIF_VALUE: 0
PIF_VALUE: 15
PIF_VALUE: 0
PIF_VALUE: 13
PIF_VALUE: 0
PIF_VALUE: 15
PIF_VALUE: 15
PIF_VALUE: 0
PIF_VALUE: 1
PIF_VALUE: 0
PIF_VALUE: 1
PIF_VALUE: 0
PIF_VALUE: 1
PIF_VALUE: 2
PIF_VALUE: 0
PIF_VALUE: 0
PIF_VALUE: 1
PIF_VALUE: 2
PIF_VALUE: 0
PIF_VALUE: 0
PIF_VALUE: 15
PIF_VALUE: 0
PIF_VALUE: 14
PIF_VALUE: 0
PIF_VALUE: 1
PIF_VALUE: 20
PIF_VALUE: 0
PIF_VALUE: 2
PIF_VALUE: 0
PIF_VALUE: 1
PIF_VALUE: 0
PIF_VALUE: 14
PIF_VALUE: 4
PIF_VALUE: 1
PIF_VALUE: 1
PIF_VALUE: 14
PIF_VALUE: 0
PIF_VALUE: 0
PIF_VALUE: 1
PIF_VALUE: 0
PIF_VALUE: 16
PIF_VALUE: 0
PIF_VALUE: 13
PIF_VALUE: 0
PIF_VALUE: 0
PIF_VALUE: 15
PIF_VALUE: 1
PIF_VALUE: 1
PIF_VALUE: 0
PIF_VALUE: 15
PIF_VALUE: 0
PIF_VALUE: 13
PIF_VALUE: 0
PIF_VALUE: 0
PIF_VALUE: 1
PIF_VALUE: 0
PIF_VALUE: 15
PIF_VALUE: 0
PIF_VALUE: 1
PIF_VALUE: 15
PIF_VALUE: 0
PIF_VALUE: 14
PIF_VALUE: 1
PIF_VALUE: 0
PIF_VALUE: 1
PIF_VALUE: 0
PIF_VALUE: 14
PIF_VALUE: 3
PIF_VALUE: 1
PIF_VALUE: 0
PIF_VALUE: 1
PIF_VALUE: 0
PIF_VALUE: 1
PIF_VALUE: 0
PIF_VALUE: 15
PIF_VALUE: 1
PIF_VALUE: 15
PIF_VALUE: 14
PIF_VALUE: 0
PIF_VALUE: 1
PIF_VALUE: 1
PIF_VALUE: 0
PIF_VALUE: 14
PIF_VALUE: 0
PIF_VALUE: 14
PIF_VALUE: 0
PIF_VALUE: 1
PIF_VALUE: 0
PIF_VALUE: 4
PIF_VALUE: 0
PIF_VALUE: 1
PIF_VALUE: 0
PIF_VALUE: 13
PIF_VALUE: 0
PIF_VALUE: 0
PIF_VALUE: 3
PIF_VALUE: 0
PIF_VALUE: 0
PIF_VALUE: 1
PIF_VALUE: 0
PIF_VALUE: 0
PIF_VALUE: 2
PIF_VALUE: 0
PIF_VALUE: 14
PIF_VALUE: 0
PIF_VALUE: 3
PIF_VALUE: 0
PIF_VALUE: 1
PIF_VALUE: 14
PIF_VALUE: 0
PIF_VALUE: 1
PIF_VALUE: 0
PIF_VALUE: 1
PIF_VALUE: 1
PIF_VALUE: 0
PIF_VALUE: 15
PIF_VALUE: 0

## 2020-12-18 ASSESSMENT — PAIN - FUNCTIONAL ASSESSMENT
PAIN_FUNCTIONAL_ASSESSMENT: ACTIVITIES ARE NOT PREVENTED
PAIN_FUNCTIONAL_ASSESSMENT: 0-10

## 2020-12-18 ASSESSMENT — PAIN SCALES - GENERAL
PAINLEVEL_OUTOF10: 8
PAINLEVEL_OUTOF10: 2
PAINLEVEL_OUTOF10: 2
PAINLEVEL_OUTOF10: 5
PAINLEVEL_OUTOF10: 0
PAINLEVEL_OUTOF10: 2
PAINLEVEL_OUTOF10: 0

## 2020-12-18 ASSESSMENT — PAIN DESCRIPTION - ORIENTATION
ORIENTATION: LEFT
ORIENTATION: LEFT

## 2020-12-18 ASSESSMENT — PAIN DESCRIPTION - PROGRESSION: CLINICAL_PROGRESSION: NOT CHANGED

## 2020-12-18 ASSESSMENT — PAIN DESCRIPTION - PAIN TYPE
TYPE: SURGICAL PAIN
TYPE: SURGICAL PAIN

## 2020-12-18 ASSESSMENT — PAIN DESCRIPTION - FREQUENCY: FREQUENCY: CONTINUOUS

## 2020-12-18 ASSESSMENT — PAIN DESCRIPTION - LOCATION
LOCATION: FLANK
LOCATION: FLANK

## 2020-12-18 ASSESSMENT — PAIN DESCRIPTION - ONSET: ONSET: ON-GOING

## 2020-12-18 ASSESSMENT — PAIN DESCRIPTION - DESCRIPTORS: DESCRIPTORS: ACHING

## 2020-12-18 NOTE — ANESTHESIA PRE PROCEDURE
Department of Anesthesiology  Preprocedure Note       Name:  Maycol Ng   Age:  69 y.o.  :  1950                                          MRN:  [id number]         Date:  2020      Surgeon: * Surgery not found *    Procedure:     Medications prior to admission:   Prior to Admission medications    Medication Sig Start Date End Date Taking? Authorizing Provider   oxybutynin (DITROPAN-XL) 10 MG extended release tablet Take 10 mg by mouth daily    Historical Provider, MD   enoxaparin (LOVENOX) 80 MG/0.8ML injection Inject 0.8 mLs into the skin 2 times daily for 10 days Use as directed for Lovenox bridging.  20  Juan Wu DO   enoxaparin (LOVENOX) 80 MG/0.8ML injection Inject into the skin 2 times daily Indications: afib/ PreOp x6 doses 10/16/20   Historical Provider, MD   tamsulosin (FLOMAX) 0.4 MG capsule Take 1 capsule by mouth daily 10/19/20   Aundrea Borden MD   dabigatran (PRADAXA) 150 MG capsule Take 150 mg by mouth 2 times daily    Historical Provider, MD   aspirin 81 MG EC tablet Take 81 mg by mouth daily    Historical Provider, MD   famotidine (PEPCID) 20 MG tablet Take 1 tablet by mouth 2 times daily 20   Kevin Rousseau   latanoprost (XALATAN) 0.005 % ophthalmic solution Place 1 drop into both eyes nightly 4/15/20   Historical Provider, MD   EPINEPHrine (EPIPEN 2-FARAZ) 0.3 MG/0.3ML SOAJ injection Inject 0.3 mLs into the muscle once for 1 dose Use as directed for allergic reaction  Patient not taking: Reported on 2020 3/3/20 7/29/20  Raul Cazares MD   Cholecalciferol (VITAMIN D3) 25 MCG (1000 UT) CAPS Take 2,000 Units by mouth daily    Historical Provider, MD   Omega-3 Fatty Acids (FISH OIL) 1000 MG CAPS Take 1,000 mg by mouth 2 times daily    Historical Provider, MD   atorvastatin (LIPITOR) 40 MG tablet Take 1 tablet by mouth nightly 20   Umair Perez MD   VITAMIN B1-B12 IM Inject into the muscle every 30 days     Historical Provider, MD       Current medications:    No current facility-administered medications for this visit. No current outpatient medications on file. Facility-Administered Medications Ordered in Other Visits   Medication Dose Route Frequency Provider Last Rate Last Admin    0.9 % sodium chloride infusion   Intravenous Continuous Pollo Gonzalez MD        ciprofloxacin (CIPRO) IVPB 400 mg  400 mg Intravenous 60 Min Pre-Op Pollo Gonzalez MD        ceFAZolin (ANCEF) 2 g in dextrose 5 % 50 mL IVPB  2 g Intravenous On Call to [facility]        fentaNYL (SUBLIMAZE) injection 25 mcg  25 mcg Intravenous Once Be Russell MD        HYDROmorphone (DILAUDID) injection 0.5 mg  0.5 mg Intravenous Once Be Russell MD        lactated ringers infusion   Intravenous Continuous Be Russell MD        sodium chloride flush 0.9 % injection 10 mL  10 mL Intravenous 2 times per day Be Russell MD        sodium chloride flush 0.9 % injection 10 mL  10 mL Intravenous PRN Be Russell MD        ondansetron [facility]) injection 4 mg  4 mg Intravenous Once Be Russell MD        fentaNYL (SUBLIMAZE) injection 50 mcg  50 mcg Intravenous Q5 Min PRN Be Russell MD        midazolam PF (VERSED) injection 2 mg  2 mg Intravenous Once PRN Be Russell MD        ondansetron [facility]) injection 4 mg  4 mg Intravenous Once PRN Be Russell MD        fentaNYL (SUBLIMAZE) injection 50 mcg  50 mcg Intravenous Q5 Min PRN Be Russell MD           Allergies:  No Known Allergies    Problem List:    Patient Active Problem List   Diagnosis Code    Corneal foreign body, right, initial encounter T15. 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 H43.813    Combined forms of age-related cataract of both eyes H25.813    Acute cerebrovascular accident (CVA) ([facility] [address] I63.9    Tissue plasminogen activator (tPA) administered at other facility within 24 hours before current admission Z92.82  Permanent atrial fibrillation (HCC) I48.21    Acute ischemic stroke (HCC) I63.9    Left homonymous hemianopsia H53.462    Hyperlipidemia E78.5    Stroke, recent, without late effect Z86.73    Bilateral carotid artery stenosis I65.23    Chronic cerebral ischemia I67.82    Memory problem R41.3    Gait difficulty R26.9    Balance problem R26.89    At high risk for stroke Z91.89    Risk for falls Z91.81    Cerebrovascular accident (CVA) due to embolism of right posterior cerebral artery (HCC) I63.431    Bilateral hemianopia H53.47    Dysphagia R13.10    Hematuria R31.9    PTSD (post-traumatic stress disorder) F43.10    Acute urinary retention R33.8    Horseshoe kidney Q63.1    Kidney stone on left side N20.0    Severe malnutrition (HCC) E43       Past Medical History:        Diagnosis Date    Atrial fibrillation ([address] 01/2020    Blood in urine     Chronic kidney disease     CKD (chronic kidney disease)     History of ischemic stroke in prior three months     Hyperlipidemia     Ischemic stroke ([address] 01/10/2020    Kidney stones     Nihss score 10     PTSD (post-traumatic stress disorder)     from war, Agent Orange     Skin tag 12/21/2018    Stroke ([address] 01/16/2020    MRI Brain WO: New right PCA distribution infarct, multifocal left MCA distribution infarcts    Stroke ([address] 01/11/2020    large vessel occlusion/left M2 occlusion.   He subsequently underwent emergent thrombectomy.  /  S/P TPA DONE       Past Surgical History:        Procedure Laterality Date    APPENDECTOMY      CYSTOSCOPY Left 10/19/2020    CYSTOSCOPY, LEFT URETEROSCOPY w/ HOMIUM LASER LITHOTRIPSY, LEFT URETERAL STENT PLACEMENT performed by Stewart Ritchie MD at [address] [place] [address] TRANSESOPHAGEAL ECHOCARDIOGRAM  01/17/2020    TURP N/A 8/10/2020    CYSTO w/ Greenlight Laser performed by Stewart Ritchie MD at [place] [address] History:    Social History     Tobacco Use    Smoking status: Never Smoker    Smokeless tobacco: Never Used   Substance Use Topics    Alcohol use: Not Currently     Alcohol/week: 0.0 standard drinks                                Counseling given: Not Answered      Vital Signs (Current): There were no vitals filed for this visit.                                            BP Readings from Last 3 Encounters:   12/18/20 137/83   11/02/20 122/72   10/19/20 135/67       NPO Status:                                                                                 BMI:   Wt Readings from Last 3 Encounters:   12/18/20 165 lb (74.8 kg)   11/02/20 169 lb (76.7 kg)   10/19/20 169 lb 3.2 oz (76.7 kg)     There is no height or weight on file to calculate BMI.    CBC:   Lab Results   Component Value Date    WBC 7.0 12/01/2020    RBC 4.82 12/01/2020    HGB 13.9 12/01/2020    HCT 43.0 12/01/2020    MCV 89.2 12/01/2020    RDW 13.9 12/01/2020     12/01/2020       CMP:   Lab Results   Component Value Date     12/01/2020    K 4.2 12/01/2020     12/01/2020    CO2 28 12/01/2020    BUN 18 12/01/2020    CREATININE 0.91 12/18/2020    CREATININE 0.86 12/01/2020    GFRAA >60 12/01/2020    LABGLOM >60 12/18/2020    GLUCOSE 101 12/01/2020    PROT 7.3 12/01/2020    CALCIUM 9.4 12/01/2020    BILITOT 0.60 12/01/2020    ALKPHOS 109 12/01/2020    AST 18 12/01/2020    ALT 14 12/01/2020       POC Tests:   Recent Labs     12/18/20  1122   POCGLU 99       Coags:   Lab Results   Component Value Date    PROTIME 17.3 08/03/2020    INR 1.5 08/03/2020    APTT 51.9 08/03/2020       HCG (If Applicable): No results found for: PREGTESTUR, PREGSERUM, HCG, HCGQUANT     ABGs: No results found for: PHART, PO2ART, HYJ2YCH, YLA1WOP, BEART, N3EIUWTX     Type & Screen (If Applicable):  No results found for: LABABO, LABRH    Drug/Infectious Status (If Applicable):  No results found for: HIV, HEPCAB    COVID-19 Screening (If Applicable):   Lab Results   Component Value Date    COVID19 Not Detected 12/14/2020         Anesthesia Evaluation  Patient summary reviewed and Nursing notes reviewed no history of anesthetic complications:   Airway: Mallampati: II  TM distance: >3 FB   Neck ROM: full  Mouth opening: > = 3 FB Dental: normal exam         Pulmonary:Negative Pulmonary ROS and normal exam  breath sounds clear to auscultation                             Cardiovascular:  Exercise tolerance: good (>4 METS),   (+) hyperlipidemia        Rhythm: regular  Rate: normal           Beta Blocker:  Not on Beta Blocker         Neuro/Psych:   (+) CVA:, neuromuscular disease:, psychiatric history:            GI/Hepatic/Renal:   (+) renal disease: kidney stones,           Endo/Other:    (+) : arthritis:., .          Pt had no PAT visit       Abdominal:       Abdomen: soft. Vascular:   + PVD, aortic or cerebral, . Anesthesia Plan      general and MAC     ASA 3       Induction: intravenous. MIPS: Postoperative opioids intended and Prophylactic antiemetics administered. Anesthetic plan and risks discussed with patient. Plan discussed with CRNA.                   Jayde Roca MD   12/18/2020

## 2020-12-18 NOTE — SEDATION DOCUMENTATION
Properly positioned prone on table. Monitors and strap on. See CRNA for vitals and medications given during procedure. Left back is prepped and draped.

## 2020-12-18 NOTE — H&P
Pre-op History and Physical  [address] Fairfieldjacoby Lozano PA-C    Patient:  Jaime Rayo  MRN: [id number]  YOB: 1950    HISTORY OF PRESENT ILLNESS:     The patient is a 69 y.o. male who presents with left nephrolithiasis. Has horseshoe kidney. Here for occlusive balloon and PCNL LEFT. Patient's old records, notes and chart reviewed and summarized above. [address] Deanne Lozano PA-C independently reviewed the images and verified the radiology reports from:    No results found. Past Medical History:    Past Medical History:   Diagnosis Date    Atrial fibrillation ([address] 01/2020    Blood in urine     Chronic kidney disease     CKD (chronic kidney disease)     History of ischemic stroke in prior three months     Hyperlipidemia     Ischemic stroke ([facility] 01/10/2020    Kidney stones     Nihss score 10     PTSD (post-traumatic stress disorder)     from war, Agent Orange     Skin tag 12/21/2018    Stroke ([facility] 01/16/2020    MRI Brain WO: New right PCA distribution infarct, multifocal left MCA distribution infarcts    Stroke ([facility] 01/11/2020    large vessel occlusion/left M2 occlusion. He subsequently underwent emergent thrombectomy.  /  S/P TPA DONE       Past Surgical History:    Past Surgical History:   Procedure Laterality Date    APPENDECTOMY      CYSTOSCOPY Left 10/19/2020    CYSTOSCOPY, LEFT URETEROSCOPY w/ HOMIUM LASER LITHOTRIPSY, LEFT URETERAL STENT PLACEMENT performed by Perla Simmons MD at [address] [place] [address] TRANSESOPHAGEAL ECHOCARDIOGRAM  01/17/2020    TURP N/A 8/10/2020    CYSTO w/ Greenlight Laser performed by Perla Simmons MD at [address] [place] [address]       Medications Prior to Admission:    Prior to Admission medications    Medication Sig Start Date End Date Taking?  Authorizing Provider   oxybutynin (DITROPAN-XL) 10 MG extended release tablet Take 10 mg by mouth daily   Yes Historical Provider, MD   tamsulosin (FLOMAX) 0.4 MG capsule Take 1 capsule by mouth daily 10/19/20  Yes Perla Simmons MD   aspirin 81 MG EC tablet Take 81 mg by mouth daily   Yes Historical Provider, MD   famotidine (PEPCID) 20 MG tablet Take 1 tablet by mouth 2 times daily 8/5/20  Yes Thee Hernandez   latanoprost (XALATAN) 0.005 % ophthalmic solution Place 1 drop into both eyes nightly 4/15/20  Yes Historical Provider, MD   Cholecalciferol (VITAMIN D3) 25 MCG (1000 UT) CAPS Take 2,000 Units by mouth daily   Yes Historical Provider, MD   Omega-3 Fatty Acids (FISH OIL) 1000 MG CAPS Take 1,000 mg by mouth 2 times daily   Yes Historical Provider, MD   atorvastatin (LIPITOR) 40 MG tablet Take 1 tablet by mouth nightly 1/13/20  Yes Sheila Richter MD   VITAMIN B1-B12 IM Inject into the muscle every 30 days    Yes Historical Provider, MD   enoxaparin (LOVENOX) 80 MG/0.8ML injection Inject 0.8 mLs into the skin 2 times daily for 10 days Use as directed for Lovenox bridging. 12/9/20 12/19/20  Juan Wu DO   enoxaparin (LOVENOX) 80 MG/0.8ML injection Inject into the skin 2 times daily Indications: afib/ PreOp x6 doses 10/16/20   Historical Provider, MD   dabigatran (PRADAXA) 150 MG capsule Take 150 mg by mouth 2 times daily    Historical Provider, MD   EPINEPHrine (EPIPEN 2-FARAZ) 0.3 MG/0.3ML SOAJ injection Inject 0.3 mLs into the muscle once for 1 dose Use as directed for allergic reaction  Patient not taking: Reported on 8/17/2020 3/3/20 7/29/20  Sugey Mccann MD       Allergies:  Patient has no known allergies.     Social History:    Social History     Socioeconomic History    Marital status:      Spouse name: Not on file    Number of children: Not on file    Years of education: Not on file    Highest education level: Not on file   Occupational History    Not on file   Social Needs    Financial resource strain: Not on file    Food insecurity     Worry: Not on file     Inability: Not on file    Transportation needs     Medical: Not on file     Non-medical: Not on file   Tobacco Use    Smoking status: Never Smoker  Smokeless tobacco: Never Used   Substance and Sexual Activity    Alcohol use: Not Currently     Alcohol/week: 0.0 standard drinks    Drug use: No    Sexual activity: Yes   Lifestyle    Physical activity     Days per week: Not on file     Minutes per session: Not on file    Stress: Not on file   Relationships    Social connections     Talks on phone: Not on file     Gets together: Not on file     Attends Congregation service: Not on file     Active member of club or organization: Not on file     Attends meetings of clubs or organizations: Not on file     Relationship status: Not on file    Intimate partner violence     Fear of current or ex partner: Not on file     Emotionally abused: Not on file     Physically abused: Not on file     Forced sexual activity: Not on file   Other Topics Concern    Not on file   Social History Narrative    Not on file       Family History:    Family History   Problem Relation Age of Onset    Heart Disease Mother     Heart Disease Father     Stroke Father     Stroke Brother        REVIEW OF SYSTEMS:  Constitutional: negative  Eyes: negative  Respiratory: negative  Cardiovascular: negative  Gastrointestinal: negative  Genitourinary: no acute issues  Musculoskeletal: negative  Skin: negative   Neurological: negative  Hematological/Lymphatic: negative  Psychological: negative    PHYSICAL EXAM:    No data found. Constitutional: Patient in NAD  Neuro: Alert and oriented to person, place, and time  Psych: Mood and affect normal  Skin: Clean, dry, intact   Lungs: Respiratory effort normal, CTA  Cardiovascular:  Normal peripheral pulses; no murmur. Normal rhythm  Abdomen: Soft, non-tender, non-distended, no hepatosplenomegaly or hernia, CVA tenderness none  Bladder: Non-tender and non-disdended   : Non-tender, skin intact, no lesions       LABS:   No results for input(s): WBC, HGB, HCT, MCV, PLT in the last 72 hours.   No results for input(s): NA, K, CL, CO2, PHOS, BUN, CREATININE in the last 72 hours. Invalid input(s): CA  Lab Results   Component Value Date    PSA 35.88 (H) 07/20/2020    PSA 3.93 05/20/2020         Urinalysis: No results for input(s): COLORU, PHUR, LABCAST, WBCUA, RBCUA, MUCUS, TRICHOMONAS, YEAST, BACTERIA, CLARITYU, SPECGRAV, LEUKOCYTESUR, UROBILINOGEN, BILIRUBINUR, BLOODU in the last 72 hours. Invalid input(s): NITRATE, GLUCOSEUKETONESUAMORPHOUS     -----------------------------------------------------------------    ASSESSMENT AND PLAN:    Impression:    Left renal stone  Horseshoe kidney     Plan: Left occlusive balloon and PCNL in OR today. Consent obtained    The patient was counseled at length about the risks of lynette Covid-19 during their perioperative period and any recovery window from their procedure. The patient was made aware that lynette Covid-19  may worsen their prognosis for recovering from their procedure  and lend to a higher morbidity and/or mortality risk. All material risks, benefits, and reasonable alternatives including postponing the procedure were discussed. The patient does wish to proceed with the procedure at this time.     Gerald Amin PA-C  11:05 AM 12/18/2020

## 2020-12-18 NOTE — ANESTHESIA PRE PROCEDURE
Department of Anesthesiology  Preprocedure Note       Name:  Jovany Pedraza   Age:  69 y.o.  :  1950                                          MRN:  [id number]         Date:  2020      Surgeon: Sarah Sinha):  Aroldo Harper MD    Procedure: Procedure(s):  HOLMIUM - NEPHROLITHOTOMY PERCUTANEOUS, LITHOCLAST, C-ARM   PT COMING FROM INTERV RADIOLOGY  *SHORT STAY    Medications prior to admission:   Prior to Admission medications    Medication Sig Start Date End Date Taking? Authorizing Provider   oxybutynin (DITROPAN-XL) 10 MG extended release tablet Take 10 mg by mouth daily    Historical Provider, MD   enoxaparin (LOVENOX) 80 MG/0.8ML injection Inject 0.8 mLs into the skin 2 times daily for 10 days Use as directed for Lovenox bridging.  20  Juan Wu DO   enoxaparin (LOVENOX) 80 MG/0.8ML injection Inject into the skin 2 times daily Indications: afib/ PreOp x6 doses 10/16/20   Historical Provider, MD   tamsulosin (FLOMAX) 0.4 MG capsule Take 1 capsule by mouth daily 10/19/20   Aroldo Harper MD   dabigatran (PRADAXA) 150 MG capsule Take 150 mg by mouth 2 times daily    Historical Provider, MD   aspirin 81 MG EC tablet Take 81 mg by mouth daily    Historical Provider, MD   famotidine (PEPCID) 20 MG tablet Take 1 tablet by mouth 2 times daily 20   Metrogelio Abbott   latanoprost (XALATAN) 0.005 % ophthalmic solution Place 1 drop into both eyes nightly 4/15/20   Historical Provider, MD   EPINEPHrine (EPIPEN 2-FARAZ) 0.3 MG/0.3ML SOAJ injection Inject 0.3 mLs into the muscle once for 1 dose Use as directed for allergic reaction  Patient not taking: Reported on 2020 3/3/20 7/29/20  Nicolle Egan MD   Cholecalciferol (VITAMIN D3) 25 MCG (1000 UT) CAPS Take 2,000 Units by mouth daily    Historical Provider, MD   Omega-3 Fatty Acids (FISH OIL) 1000 MG CAPS Take 1,000 mg by mouth 2 times daily    Historical Provider, MD   atorvastatin (LIPITOR) 40 MG tablet Take 1 tablet by mouth nightly 20 Nunu Ferrer MD   VITAMIN B1-B12 IM Inject into the muscle every 30 days     Historical Provider, MD       Current medications:    No current facility-administered medications for this visit. No current outpatient medications on file. Facility-Administered Medications Ordered in Other Visits   Medication Dose Route Frequency Provider Last Rate Last Admin    0.9 % sodium chloride infusion   Intravenous Continuous Doug Armas MD        ciprofloxacin (CIPRO) IVPB 400 mg  400 mg Intravenous 60 Min Pre-Op Doug Armas MD        fentaNYL (SUBLIMAZE) injection 25 mcg  25 mcg Intravenous Once Yasmin Avalos MD        HYDROmorphone (DILAUDID) injection 0.5 mg  0.5 mg Intravenous Once Yasmin Avalos MD        lactated ringers infusion   Intravenous Continuous Yasmin Avalos MD        sodium chloride flush 0.9 % injection 10 mL  10 mL Intravenous 2 times per day Yasmin Avalos MD        sodium chloride flush 0.9 % injection 10 mL  10 mL Intravenous PRN Yasmin Avalos MD        ondansetron [facility]) injection 4 mg  4 mg Intravenous Once Yasmin Avalos MD        fentaNYL (SUBLIMAZE) injection 50 mcg  50 mcg Intravenous Q5 Min PRN Yasmin Avalos MD        midazolam PF (VERSED) injection 2 mg  2 mg Intravenous Once PRN Yasmin Avalos MD        ondansetron [facility]) injection 4 mg  4 mg Intravenous Once PRN Yasmin Avalos MD        propofol injection    Continuous PRN Elie Rubins, APRN - CRNA 18 mL/hr at 12/18/20 1300 40 mcg/kg/min at 12/18/20 1300    lactated ringers infusion    Continuous PRN Elie Rubins, APRN - CRNA   New Bag at 12/18/20 1256    glycopyrrolate (ROBINUL) injection    PRN Elie Rubins, APRN - CRNA   0.2 mg at 12/18/20 1312       Allergies:  No Known Allergies    Problem List:    Patient Active Problem List   Diagnosis Code    Corneal foreign body, right, initial encounter T15. Gelacio Parkinson Glaucoma suspect of both eyes H40.003    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 J55.215    Combined forms of age-related cataract of both eyes H25.813    Acute cerebrovascular accident (CVA) ([facility] I63.9    Tissue plasminogen activator (tPA) administered at other facility within 24 hours before current admission Z92.82    Permanent atrial fibrillation (HCC) I48.21    Acute ischemic stroke ([facility] I63.9    Left homonymous hemianopsia H53.462    Hyperlipidemia E78.5    Stroke, recent, without late effect Z86.73    Bilateral carotid artery stenosis I65.23    Chronic cerebral ischemia I67.82    Memory problem R41.3    Gait difficulty R26.9    Balance problem R26.89    At high risk for stroke Z91.89    Risk for falls Z91.81    Cerebrovascular accident (CVA) due to embolism of right posterior cerebral artery (HCC) I63.431    Bilateral hemianopia H53.47    Dysphagia R13.10    Hematuria R31.9    PTSD (post-traumatic stress disorder) F43.10    Acute urinary retention R33.8    Horseshoe kidney Q63.1    Kidney stone on left side N20.0    Severe malnutrition ([facility]) E43       Past Medical History:        Diagnosis Date    Atrial fibrillation ([facility] 01/2020    Blood in urine     Chronic kidney disease     CKD (chronic kidney disease)     History of ischemic stroke in prior three months     Hyperlipidemia     Ischemic stroke ([address] 01/10/2020    Kidney stones     Nihss score 10     PTSD (post-traumatic stress disorder)     from war, Agent Orange     Skin tag 12/21/2018    Stroke ([facility] 01/16/2020    MRI Brain WO: New right PCA distribution infarct, multifocal left MCA distribution infarcts    Stroke ([address] 01/11/2020    large vessel occlusion/left M2 occlusion.   He subsequently underwent emergent thrombectomy.  /  S/P TPA DONE       Past Surgical History:        Procedure Laterality Date    APPENDECTOMY      CYSTOSCOPY Left 10/19/2020    CYSTOSCOPY, LEFT URETEROSCOPY w/ HOMIUM LASER LITHOTRIPSY, LEFT URETERAL STENT PLACEMENT performed by Bravo Alvares MD at [place] [address]  TRANSESOPHAGEAL ECHOCARDIOGRAM  01/17/2020    TURP N/A 8/10/2020    CYSTO w/ Greenlight Laser [facility] History:    Social History     Tobacco Use    Smoking status: Never Smoker    Smokeless tobacco: Never Used   Substance Use Topics    Alcohol use: Not Currently     Alcohol/week: 0.0 standard drinks                                Counseling given: Not Answered      Vital Signs (Current): There were no vitals filed for this visit.                                            BP Readings from Last 3 Encounters:   12/18/20 137/83   12/18/20 110/72   12/18/20 (!) 94/59       NPO Status:                                                                                 BMI:   Wt Readings from Last 3 Encounters:   12/18/20 165 lb (74.8 kg)   11/02/20 169 lb (76.7 kg)   10/19/20 169 lb 3.2 oz (76.7 kg)     There is no height or weight on file to calculate BMI.    CBC:   Lab Results   Component Value Date    WBC 6.1 12/18/2020    RBC 4.77 12/18/2020    HGB 13.2 12/18/2020    HCT 40.7 12/18/2020    MCV 85.3 12/18/2020    RDW 12.9 12/18/2020     12/18/2020       CMP:   Lab Results   Component Value Date     12/01/2020    K 4.2 12/01/2020     12/01/2020    CO2 28 12/01/2020    BUN 18 12/01/2020    CREATININE 0.91 12/18/2020    CREATININE 0.86 12/01/2020    GFRAA >60 12/01/2020    LABGLOM >60 12/18/2020    GLUCOSE 101 12/01/2020    PROT 7.3 12/01/2020    CALCIUM 9.4 12/01/2020    BILITOT 0.60 12/01/2020    ALKPHOS 109 12/01/2020    AST 18 12/01/2020    ALT 14 12/01/2020       POC Tests:   Recent Labs     12/18/20  1122   POCGLU 99       Coags:   Lab Results   Component Value Date    PROTIME 10.4 12/18/2020    INR 1.0 12/18/2020    APTT 26.5 12/18/2020       HCG (If Applicable): No results found for: PREGTESTUR, PREGSERUM, HCG, HCGQUANT     ABGs: No results found for: PHART, PO2ART, DWC2GWS, KYN0QDQ, BEART, A5EATUEU     Type & Screen (If Applicable):  No results found for: Brittanymurphy Xie    Drug/Infectious Status (If Applicable):  No results found for: HIV, HEPCAB    COVID-19 Screening (If Applicable):   Lab Results   Component Value Date    COVID19 Not Detected 12/14/2020         Anesthesia Evaluation  Patient summary reviewed and Nursing notes reviewed no history of anesthetic complications:   Airway: Mallampati: II  TM distance: >3 FB   Neck ROM: full  Mouth opening: > = 3 FB Dental: normal exam         Pulmonary:Negative Pulmonary ROS and normal exam  breath sounds clear to auscultation                             Cardiovascular:  Exercise tolerance: good (>4 METS),   (+) hyperlipidemia        Rhythm: regular  Rate: normal           Beta Blocker:  Not on Beta Blocker         Neuro/Psych:   (+) CVA:, neuromuscular disease:, psychiatric history:            GI/Hepatic/Renal:   (+) renal disease: kidney stones,           Endo/Other:    (+) : arthritis:., .          Pt had no PAT visit       Abdominal:       Abdomen: soft. Vascular:   + PVD, aortic or cerebral, . Anesthesia Plan      general and MAC     ASA 3       Induction: intravenous. MIPS: Postoperative opioids intended and Prophylactic antiemetics administered. Anesthetic plan and risks discussed with patient. Plan discussed with CRNA.                   Shahrzad Myers MD   12/18/2020

## 2020-12-18 NOTE — ANESTHESIA POSTPROCEDURE EVALUATION
Department of Anesthesiology  Postprocedure Note    Patient: Maycol Ng  MRN: [id number]  [place]: 1950  Date of evaluation: 12/18/2020  Time:  12:57 PM     Procedure Summary     Date: 12/18/20 Room / Location: [address]    Anesthesia Start: 0903 Anesthesia Stop: 3816    Procedure: CYSTOSCOPY, LEFT STENT REMOVAL, LEFT RETROGRADE PYELOGRAM, [address] - PT TO [address] [place] [address] FOLLOWING (Left ) Diagnosis: (LEFT KIDNEY STONE)    Surgeons: Aundrea Borden MD Responsible Provider: Be Russell MD    Anesthesia Type: general, MAC ASA Status: 3          Anesthesia Type: general, MAC    Merlyn Phase I:      Merlyn Phase II:      Last vitals: Reviewed and per EMR flowsheets.        Anesthesia Post Evaluation    Patient location during evaluation: PACU  Patient participation: complete - patient participated  Level of consciousness: awake  Pain score: 3  Airway patency: patent  Nausea & Vomiting: no vomiting and no nausea  Complications: no  Cardiovascular status: blood pressure returned to baseline  Respiratory status: acceptable  Hydration status: euvolemic

## 2020-12-18 NOTE — OP NOTE
Dr. Gabi Acvees MD      [address] [place]  12/18/20    Patient:  Tomás Doyle  MRN: [id number]  YOB: 1950    Surgeon: Dr. Gabi Aceves MD  Assistant: Ruben Adams MD    Pre-op Diagnosis: Left Kidney Stone, 2cm  Post-op Diagnosis: Same    Procedure:   1. Cystoscopy with Left Occlusive Catheter Placement and Left Retrograde Pyelogram and Left Stent Removal    Intraoperative Findings:  - The stone appeared in the midpole     Anesthesia: Monitor Anesthesia Care  Complications: None  OR Blood Loss: Minimal  Fluids: Cystalloids  Specimens: Urine for culture    Indication:  69year old male with history of horseshoe kidney and left nephrolithiasis and has had a URS and stent. He presents for further treatment of his stone. Narrative of the Procedure:    After informed consent was obtained in the preoperative area, the patient was taken back to the operating room and transferred to the operating table in supine position. EPC cuffs were placed. The machine was turned on. Anesthesia was induced and antibiotics were given. The patient was placed in modified dorsal lithotomy position and sterilely prepped and draped in a standard fashion. A timeout occurred. Two patient identifiers were used. We entered the urethra with a 22F scope with a 30 degree lens. Once within the bladder a full 360 degree cystoscopy was preformed. There were no papillary lesions, trabeculations, diverticula, bladder stones, or other abnormalities noted. We removed the stent with a stent grasper. A 0.035 wire was advanced into the left ureteral orifice under fluoroscopic guidance. The wire remained in place and the scope was removed. The occlusive catheter was advanced over the wire to the level of the UPJ. The wire was removed and a retrograde pyelogram was performed. The retrograde demonstrated stone in the left midpole.      A kumari was inserted into the bladder and the balloon was filled with 10mL of sterile water. The occlusive catheter was fixed to the kumari to prevent migration. The occlusive balloon was left deflated. Both catheters were attached to gravity drainage. The patient was then awakened and discharged back to the PACU in good and stable condition. Follow-Up: The patient will be transported to interventional radiology for percutaneous access for subsequent procedure.     Babita Ledesma  Electronically signed on 12/18/2020 at 12:44 PM

## 2020-12-18 NOTE — OP NOTE
Operative Note      Patient: Ronak Hastings  YOB: 1950  MRN: [id number]    Date of Procedure: 12/18/2020    Pre-Op Diagnosis: INABILITY TO OBTAIN LEFT NEPHROURETERAL ACCESS, SUSPECTED URETERAL INJURY, HORSESHOE KIDNEY     Post-Op Diagnosis: INABILITY TO OBTAIN LEFT NEPHROURETERAL ACCESS, SUSPECTED URETERAL INJURY, HORSESHOE KIDNEY        Procedure(s):  Cystourethroscopy, left retrograde pyelogram, left ureteral stent placement     Surgeon(s):  Cortney Roberson MD    Assistant:   Rohini Davis MD    Anesthesia: General    Estimated Blood Loss (mL): Minimal    Complications: None    Specimens:   * No specimens in log *    Implants:  Implant Name Type Inv. Item Serial No.  Lot No. LRB No. Used Action   STENT URET 6FR L26CM PERCFLX HYDR+ DBL PGTL THRD 2  STENT URET 6FR L26CM PERCFLX HYDR+ DBL PGTL THRD 2  [employer]- [id number] Left 1 Implanted         Drains:   Urethral Catheter Double-lumen 16 fr (Active)       [REMOVED] Urethral Catheter Double-lumen; Latex 16 fr (Removed)   Catheter Indications Urology/Urologist seeing this patient or inserted indwelling catheter;Perioperative use in selected surgeries including but not limited to urologic, pelvic or need for intraoperative monitoring of urinary output due to prolonged surgery, large volume infusion or need for diuretic therapy in surgery 12/18/20 1504   Site Assessment No urethral drainage 12/18/20 1504   Urine Color Cherry 12/18/20 1504   Urine Appearance Cloudy 12/18/20 1504   Output (mL) 350 mL 12/18/20 1504       Findings: Evidence of extravasation from proximal ureter, continuity with the kidney established, left ureteral stent placed in appropriate position    Indications:  Patient is a 62-year-old  male with a history of left-sided nephrolithiasis status post previous ureteroscopy and laser lithotripsy.   Patient still had significant stone burden remaining and was deemed a good candidate for PCNL for definitive stone

## 2020-12-18 NOTE — ANESTHESIA PRE PROCEDURE
B1-B12 IM Inject into the muscle every 30 days     Historical Provider, MD       Current medications:    No current facility-administered medications for this visit. No current outpatient medications on file. Facility-Administered Medications Ordered in Other Visits   Medication Dose Route Frequency Provider Last Rate Last Admin    0.9 % sodium chloride infusion   Intravenous Continuous Starla Harrison MD        ciprofloxacin (CIPRO) IVPB 400 mg  400 mg Intravenous 60 Min Pre-Op Starla Harrison MD        ceFAZolin (ANCEF) 2 g in dextrose 5 % 50 mL IVPB  2 g Intravenous On Call to [address] [place] [address], PA-        fentaNYL (SUBLIMAZE) injection 25 mcg  25 mcg Intravenous Once Rober Sanchez MD        HYDROmorphone (DILAUDID) injection 0.5 mg  0.5 mg Intravenous Once Rober Sanchez MD        lactated ringers infusion   Intravenous Continuous Rober Sanchez MD        sodium chloride flush 0.9 % injection 10 mL  10 mL Intravenous 2 times per day Rober Sanchez MD        sodium chloride flush 0.9 % injection 10 mL  10 mL Intravenous PRN MD Jennifer Abdinsetron [facility]) injection 4 mg  4 mg Intravenous Once Rober Sanchez MD        fentaNYL (SUBLIMAZE) injection 50 mcg  50 mcg Intravenous Q5 Min PRN Rober Sanchez MD        midazolam PF (VERSED) injection 2 mg  2 mg Intravenous Once PRN Rober Sanchez MD        ondansetron [facility]) injection 4 mg  4 mg Intravenous Once PRN Rober Sanchez MD        fentaNYL (SUBLIMAZE) injection 50 mcg  50 mcg Intravenous Q5 Min PRN Rober Sanchez MD           Allergies:  No Known Allergies    Problem List:    Patient Active Problem List   Diagnosis Code    Corneal foreign body, right, initial encounter T15. 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 H43.813    Combined forms of age-related cataract of both eyes H25.813    Acute cerebrovascular accident (CVA) ([facility] [address] I63.9    Tissue plasminogen activator (tPA) administered at other facility within 24 hours before current admission Z92.82    Permanent atrial fibrillation (HCC) I48.21    Acute ischemic stroke (HCC) I63.9    Left homonymous hemianopsia H53.462    Hyperlipidemia E78.5    Stroke, recent, without late effect Z86.73    Bilateral carotid artery stenosis I65.23    Chronic cerebral ischemia I67.82    Memory problem R41.3    Gait difficulty R26.9    Balance problem R26.89    At high risk for stroke Z91.89    Risk for falls Z91.81    Cerebrovascular accident (CVA) due to embolism of right posterior cerebral artery (HCC) I63.431    Bilateral hemianopia H53.47    Dysphagia R13.10    Hematuria R31.9    PTSD (post-traumatic stress disorder) F43.10    Acute urinary retention R33.8    Horseshoe kidney Q63.1    Kidney stone on left side N20.0    Severe malnutrition (HCC) E43       Past Medical History:        Diagnosis Date    Atrial fibrillation ([address] 01/2020    Blood in urine     Chronic kidney disease     CKD (chronic kidney disease)     History of ischemic stroke in prior three months     Hyperlipidemia     Ischemic stroke ([address] 01/10/2020    Kidney stones     Nihss score 10     PTSD (post-traumatic stress disorder)     from war, Agent [place]     Skin tag 12/21/2018    Stroke ([address] 01/16/2020    MRI Brain WO: New right PCA distribution infarct, multifocal left MCA distribution infarcts    Stroke ([address] 01/11/2020    large vessel occlusion/left M2 occlusion.   He subsequently underwent emergent thrombectomy.  /  S/P TPA DONE       Past Surgical History:        Procedure Laterality Date    APPENDECTOMY      CYSTOSCOPY Left 10/19/2020    CYSTOSCOPY, LEFT URETEROSCOPY w/ HOMIUM LASER LITHOTRIPSY, LEFT URETERAL STENT PLACEMENT performed by Yvette Bahena MD at [facility] TRANSESOPHAGEAL ECHOCARDIOGRAM  01/17/2020    TURP N/A 8/10/2020    CYSTO w/ Greenlight Laser performed by Yvette Bahena MD at [address] [place] [address] Social History:    Social History     Tobacco Use    Smoking status: Never Smoker    Smokeless tobacco: Never Used   Substance Use Topics    Alcohol use: Not Currently     Alcohol/week: 0.0 standard drinks                                Counseling given: Not Answered      Vital Signs (Current): There were no vitals filed for this visit.                                            BP Readings from Last 3 Encounters:   12/18/20 137/83   11/02/20 122/72   10/19/20 135/67       NPO Status:                                                                                 BMI:   Wt Readings from Last 3 Encounters:   12/18/20 165 lb (74.8 kg)   11/02/20 169 lb (76.7 kg)   10/19/20 169 lb 3.2 oz (76.7 kg)     There is no height or weight on file to calculate BMI.    CBC:   Lab Results   Component Value Date    WBC 7.0 12/01/2020    RBC 4.82 12/01/2020    HGB 13.9 12/01/2020    HCT 43.0 12/01/2020    MCV 89.2 12/01/2020    RDW 13.9 12/01/2020     12/01/2020       CMP:   Lab Results   Component Value Date     12/01/2020    K 4.2 12/01/2020     12/01/2020    CO2 28 12/01/2020    BUN 18 12/01/2020    CREATININE 0.91 12/18/2020    CREATININE 0.86 12/01/2020    GFRAA >60 12/01/2020    LABGLOM >60 12/18/2020    GLUCOSE 101 12/01/2020    PROT 7.3 12/01/2020    CALCIUM 9.4 12/01/2020    BILITOT 0.60 12/01/2020    ALKPHOS 109 12/01/2020    AST 18 12/01/2020    ALT 14 12/01/2020       POC Tests:   Recent Labs     12/18/20  1122   POCGLU 99       Coags:   Lab Results   Component Value Date    PROTIME 17.3 08/03/2020    INR 1.5 08/03/2020    APTT 51.9 08/03/2020       HCG (If Applicable): No results found for: PREGTESTUR, PREGSERUM, HCG, HCGQUANT     ABGs: No results found for: PHART, PO2ART, VNO8STD, YNM0EEG, BEART, X4CSBIJE     Type & Screen (If Applicable):  No results found for: LABABO, LABRH    Drug/Infectious Status (If Applicable):  No results found for: HIV, HEPCAB    COVID-19 Screening (If Applicable): Lab Results   Component Value Date    COVID19 Not Detected 12/14/2020         Anesthesia Evaluation  Patient summary reviewed and Nursing notes reviewed no history of anesthetic complications:   Airway: Mallampati: II  TM distance: >3 FB   Neck ROM: full  Mouth opening: > = 3 FB Dental: normal exam         Pulmonary:Negative Pulmonary ROS and normal exam  breath sounds clear to auscultation                             Cardiovascular:  Exercise tolerance: good (>4 METS),   (+) hyperlipidemia        Rhythm: regular  Rate: normal           Beta Blocker:  Not on Beta Blocker         Neuro/Psych:   (+) CVA:, neuromuscular disease:, psychiatric history:            GI/Hepatic/Renal:   (+) renal disease: kidney stones,           Endo/Other:    (+) : arthritis:., .          Pt had no PAT visit       Abdominal:       Abdomen: soft. Vascular:   + PVD, aortic or cerebral, . Anesthesia Plan      general and MAC     ASA 3       Induction: intravenous. MIPS: Postoperative opioids intended and Prophylactic antiemetics administered. Anesthetic plan and risks discussed with patient. Plan discussed with attending, surgical team and CRNA.                   Bryson Dejesus MD   12/18/2020

## 2020-12-18 NOTE — ANESTHESIA POSTPROCEDURE EVALUATION
Department of Anesthesiology  Postprocedure Note    Patient: Collette Goodnight  MRN: [id number]  [place]: 1950  Date of evaluation: 12/18/2020  Time:  5:44 PM     Procedure Summary     Date: 12/18/20 Room / Location: [address]    Anesthesia Start: 3410 Anesthesia Stop: 9667    Procedure: cystoscopy, left stent placement, left retrograde pyelogram (Left ) Diagnosis: (LEFT KIDNEY STONE)    Surgeons: Rober Salas MD Responsible Provider: Ning Trujillo MD    Anesthesia Type: general, MAC ASA Status: 3          Anesthesia Type: general, MAC    Merlyn Phase I: Merlyn Score: 8    Merlyn Phase II:      Last vitals: Reviewed and per EMR flowsheets.        Anesthesia Post Evaluation    Patient location during evaluation: bedside  Patient participation: complete - patient participated  Level of consciousness: awake  Pain score: 2  Nausea & Vomiting: no nausea  Cardiovascular status: hemodynamically stable  Respiratory status: nasal cannula  Hydration status: euvolemic

## 2020-12-18 NOTE — BRIEF OP NOTE
Brief Postoperative Note    Prashanth Loyola  YOB: 1950  [id number]    Pre-operative Diagnosis: L nephrolithiasis    Post-operative Diagnosis: Same    Procedure: attempted nephroureteral access    Anesthesia: MAC    Surgeons/Assistants: NETO CORBIN    Estimated Blood Loss: less than 5mL     Complications: Unplanned perforation of the ureter by inflation of the ureteral occlusion balloon    Specimens: Was Not Obtained    Findings: retroperitoneal contrast due to ureteral perforation, unsuccessful nephroureteral access    Electronically signed by Jake Connors MD on 12/18/2020 at 3:06 PM

## 2020-12-18 NOTE — ADDENDUM NOTE
Addendum  created 12/18/20 1316 by JIMBO Mcdonald - CRNA    Flowsheet accepted, Intraprocedure Flowsheets edited

## 2020-12-18 NOTE — ANESTHESIA POSTPROCEDURE EVALUATION
Department of Anesthesiology  Postprocedure Note    Patient: Caridad Guzman  MRN: [id number]  [place]: 1950  Date of evaluation: 12/18/2020  Time:  5:47 PM     Procedure Summary     Date: 12/18/20 Room / Location: [place] [address] Special Procedures    Anesthesia Start: 3751 Anesthesia Stop: 9963    Procedure: IR NEPHROSTOMY PERCUTANEOUS LEFT Diagnosis:       Kidney stone on left side      Kidney stones    Scheduled Providers: Fuentes Interventional Radiologist; JIMBO Winters - CRNA Responsible Provider: Eduarda Jauregui MD    Anesthesia Type: general, MAC ASA Status: 3          Anesthesia Type: general, MAC    Merlyn Phase I:      Merlyn Phase II: Merlyn Score: 10    Last vitals: Reviewed and per EMR flowsheets.        Anesthesia Post Evaluation    Patient location during evaluation: bedside  Patient participation: complete - patient participated  Level of consciousness: awake  Pain score: 5  Nausea & Vomiting: no nausea  Cardiovascular status: hemodynamically stable  Respiratory status: room air

## 2020-12-18 NOTE — PROGRESS NOTES
Carmen Palacios, Tanja Caba, Gabino Erwin, & Lamar  Urology Progress Note    Subjective: Lesley Brunner is a 69 y.o. male. No acute events overnight. No chest pain, shortness of breath, nausea, vomiting, fevers, chills  Minimal pain  We discussed what happened yesterday; patient understands      Patient Vitals for the past 24 hrs:   BP Temp Temp src Pulse Resp SpO2 Height Weight   12/18/20 1730 (!) 142/80 96.8 °F (36 °C) -- 78 13 100 % -- --   12/18/20 1715 137/83 -- -- 71 19 100 % -- --   12/18/20 1700 123/81 -- -- 76 14 98 % -- --   12/18/20 1645 -- -- -- 81 14 95 % -- --   12/18/20 1630 129/78 -- -- 71 14 99 % -- --   12/18/20 1619 133/83 98.1 °F (36.7 °C) Temporal 73 14 99 % -- --   12/18/20 1114 137/83 97.7 °F (36.5 °C) Temporal 87 18 100 % 5' 7\" (1.702 m) 165 lb (74.8 kg)       Intake/Output Summary (Last 24 hours) at 12/18/2020 1843  Last data filed at 12/18/2020 1710  Gross per 24 hour   Intake 1500 ml   Output 500 ml   Net 1000 ml       Recent Labs     12/18/20  1154 12/18/20  1705   WBC 6.1 4.5   HGB 13.2 13.2   HCT 40.7 41.9   MCV 85.3 86.9    167     Recent Labs     12/18/20  1122 12/18/20  1705   NA  --  139   K  --  4.7   CL  --  107   CO2  --  25   PHOS  --  3.9   BUN  --  18   CREATININE 0.91 0.87       No results for input(s): COLORU, PHUR, LABCAST, WBCUA, RBCUA, MUCUS, TRICHOMONAS, YEAST, BACTERIA, CLARITYU, SPECGRAV, LEUKOCYTESUR, UROBILINOGEN, Charly Cliche in the last 72 hours. Invalid input(s): NITRATE, GLUCOSEUKETONESUAMORPHOUS    Physical Exam:     NAD, AOx3  RRR.  Peripheral pulses palpable  Respirations nonlabored, symmetric chest rise bilaterally  Abdomen: soft, appropriately tender, nondistended  Lower extremities: No edema of calf tenderness bilaterally  Feng draining yellow red tinged urine    Interval Imaging Findings:    Imaging was independently reviewed and checked with radiologist report    Impression:      Lesley Brunner is a 69 y.o. male admitted with      Problem List  - POD 1 s/p Left ureteral stent placement    Plan:     - Remove Feng  - Cr and UOP  - UCx pending  - Ciprofloxacin  - Will discuss timing of his pradaxa and aspirin restart with Dr. Paola Portillo  - Encourage ambulation and IS use      Cris Torres  Urology Resident, PGY5  6:43 PM 12/18/2020    Ambulating. Ready for discharge. Dr. Paola Portillo to arrange for treatment of stone.

## 2020-12-19 VITALS
HEART RATE: 95 BPM | DIASTOLIC BLOOD PRESSURE: 73 MMHG | WEIGHT: 173 LBS | BODY MASS INDEX: 27.15 KG/M2 | HEIGHT: 67 IN | TEMPERATURE: 98.6 F | RESPIRATION RATE: 18 BRPM | OXYGEN SATURATION: 97 % | SYSTOLIC BLOOD PRESSURE: 131 MMHG

## 2020-12-19 LAB
ANION GAP SERPL CALCULATED.3IONS-SCNC: 11 MMOL/L (ref 9–17)
BUN BLDV-MCNC: 15 MG/DL (ref 8–23)
BUN/CREAT BLD: ABNORMAL (ref 9–20)
CALCIUM SERPL-MCNC: 8.3 MG/DL (ref 8.6–10.4)
CHLORIDE BLD-SCNC: 105 MMOL/L (ref 98–107)
CO2: 22 MMOL/L (ref 20–31)
CREAT SERPL-MCNC: 0.89 MG/DL (ref 0.7–1.2)
CULTURE: NO GROWTH
EKG ATRIAL RATE: 81 BPM
EKG Q-T INTERVAL: 374 MS
EKG QRS DURATION: 84 MS
EKG QTC CALCULATION (BAZETT): 447 MS
EKG R AXIS: -4 DEGREES
EKG T AXIS: 8 DEGREES
EKG VENTRICULAR RATE: 86 BPM
GFR AFRICAN AMERICAN: >60 ML/MIN
GFR NON-AFRICAN AMERICAN: >60 ML/MIN
GFR SERPL CREATININE-BSD FRML MDRD: ABNORMAL ML/MIN/{1.73_M2}
GFR SERPL CREATININE-BSD FRML MDRD: ABNORMAL ML/MIN/{1.73_M2}
GLUCOSE BLD-MCNC: 131 MG/DL (ref 70–99)
HCT VFR BLD CALC: 37.6 % (ref 40.7–50.3)
HEMOGLOBIN: 12 G/DL (ref 13–17)
Lab: NORMAL
MAGNESIUM: 2.1 MG/DL (ref 1.6–2.6)
MCH RBC QN AUTO: 27.7 PG (ref 25.2–33.5)
MCHC RBC AUTO-ENTMCNC: 31.9 G/DL (ref 28.4–34.8)
MCV RBC AUTO: 86.8 FL (ref 82.6–102.9)
NRBC AUTOMATED: 0 PER 100 WBC
PDW BLD-RTO: 12.9 % (ref 11.8–14.4)
PLATELET # BLD: 156 K/UL (ref 138–453)
PMV BLD AUTO: 10.1 FL (ref 8.1–13.5)
POTASSIUM SERPL-SCNC: 3.8 MMOL/L (ref 3.7–5.3)
RBC # BLD: 4.33 M/UL (ref 4.21–5.77)
SODIUM BLD-SCNC: 138 MMOL/L (ref 135–144)
SPECIMEN DESCRIPTION: NORMAL
TROPONIN INTERP: NORMAL
TROPONIN T: NORMAL NG/ML
TROPONIN, HIGH SENSITIVITY: 13 NG/L (ref 0–22)
WBC # BLD: 7.3 K/UL (ref 3.5–11.3)

## 2020-12-19 PROCEDURE — 51798 US URINE CAPACITY MEASURE: CPT

## 2020-12-19 PROCEDURE — 85027 COMPLETE CBC AUTOMATED: CPT

## 2020-12-19 PROCEDURE — 6360000002 HC RX W HCPCS: Performed by: STUDENT IN AN ORGANIZED HEALTH CARE EDUCATION/TRAINING PROGRAM

## 2020-12-19 PROCEDURE — 6370000000 HC RX 637 (ALT 250 FOR IP): Performed by: STUDENT IN AN ORGANIZED HEALTH CARE EDUCATION/TRAINING PROGRAM

## 2020-12-19 PROCEDURE — 2580000003 HC RX 258: Performed by: STUDENT IN AN ORGANIZED HEALTH CARE EDUCATION/TRAINING PROGRAM

## 2020-12-19 PROCEDURE — 84484 ASSAY OF TROPONIN QUANT: CPT

## 2020-12-19 PROCEDURE — 93010 ELECTROCARDIOGRAM REPORT: CPT | Performed by: INTERNAL MEDICINE

## 2020-12-19 PROCEDURE — 83735 ASSAY OF MAGNESIUM: CPT

## 2020-12-19 PROCEDURE — 80048 BASIC METABOLIC PNL TOTAL CA: CPT

## 2020-12-19 PROCEDURE — 93005 ELECTROCARDIOGRAM TRACING: CPT | Performed by: STUDENT IN AN ORGANIZED HEALTH CARE EDUCATION/TRAINING PROGRAM

## 2020-12-19 PROCEDURE — G0378 HOSPITAL OBSERVATION PER HR: HCPCS

## 2020-12-19 PROCEDURE — 36415 COLL VENOUS BLD VENIPUNCTURE: CPT

## 2020-12-19 RX ORDER — HYDROCODONE BITARTRATE AND ACETAMINOPHEN 5; 325 MG/1; MG/1
1 TABLET ORAL EVERY 6 HOURS PRN
Qty: 20 TABLET | Refills: 0 | Status: SHIPPED | OUTPATIENT
Start: 2020-12-19 | End: 2020-12-24

## 2020-12-19 RX ORDER — DOCUSATE SODIUM 100 MG/1
100 CAPSULE, LIQUID FILLED ORAL 2 TIMES DAILY
Qty: 30 CAPSULE | Refills: 1 | Status: SHIPPED | OUTPATIENT
Start: 2020-12-19 | End: 2022-03-04

## 2020-12-19 RX ORDER — TAMSULOSIN HYDROCHLORIDE 0.4 MG/1
0.4 CAPSULE ORAL DAILY
Qty: 30 CAPSULE | Refills: 3 | Status: SHIPPED | OUTPATIENT
Start: 2020-12-19 | End: 2021-02-03 | Stop reason: SDUPTHER

## 2020-12-19 RX ADMIN — CEFAZOLIN 1 G: 1 INJECTION, POWDER, FOR SOLUTION INTRAMUSCULAR; INTRAVENOUS at 06:31

## 2020-12-19 RX ADMIN — FAMOTIDINE 20 MG: 20 TABLET, FILM COATED ORAL at 08:21

## 2020-12-19 RX ADMIN — TAMSULOSIN HYDROCHLORIDE 0.4 MG: 0.4 CAPSULE ORAL at 08:21

## 2020-12-19 RX ADMIN — Medication 10 ML: at 08:21

## 2020-12-19 RX ADMIN — OXYBUTYNIN CHLORIDE 10 MG: 10 TABLET, EXTENDED RELEASE ORAL at 08:21

## 2020-12-19 RX ADMIN — ACETAMINOPHEN 650 MG: 325 TABLET ORAL at 06:24

## 2020-12-19 RX ADMIN — SODIUM CHLORIDE: 9 INJECTION, SOLUTION INTRAVENOUS at 06:30

## 2020-12-19 RX ADMIN — POLYETHYLENE GLYCOL 3350 17 G: 17 POWDER, FOR SOLUTION ORAL at 08:24

## 2020-12-19 ASSESSMENT — PAIN SCALES - GENERAL
PAINLEVEL_OUTOF10: 0
PAINLEVEL_OUTOF10: 2

## 2020-12-19 NOTE — CARE COORDINATION
Case Management Initial Discharge Plan  Dean Shipman,             Met with:patient to discuss discharge plans. Information verified: address, contacts, phone number, , insurance Yes    Emergency Contact/Next of Kin name & number: Marilu Gaston     PCP: Mikki Asencio MD  Date of last visit: [phone]    Insurance Provider: Aziza Bazzi     Discharge Planning    Living Arrangements:  Spouse/Significant Other   Support Systems:  Spouse/Significant Other, Family Members, Friends/Neighbors    Home has 1 stories  1 stairs to climb to get into front door    Patient able to perform ADL's:Independent    Current Services (outpatient & in home) none   DME equipment: na  DME provider: na    Receiving oral anticoagulation therapy? ASA    If indicated:   Physician managing anticoagulation treatment: na  Where does patient obtain lab work for ATC treatment? na      Potential Assistance Needed:  N/A    Patient agreeable to home care: No  [place] of choice provided:  n/a    Prior SNF/Rehab Placement and Facility: na  Agreeable to SNF/Rehab: No  [place] of choice provided: n/a     Evaluation: no    Expected Discharge date:       Patient expects to be discharged to:  home  Follow Up Appointment: Best Day/ Time:      Transportation provider: Wife   Transportation arrangements needed for discharge: No    Readmission Risk              Risk of Unplanned Readmission:        0             Does patient have a readmission risk score greater than 14?: MARCUS   If yes, follow-up appointment must be made within 7 days of discharge.      Goals of Care: Stay away       Discharge Plan: Home Independently           Electronically signed by Santi Deshpande RN on 20 at 11:44 AM EST

## 2020-12-19 NOTE — DISCHARGE SUMMARY
DISCHARGE SUMMARY NOTE:      Patient Identification  PATIENT: Kush Fontana is a 69 y.o. male. 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    Combined forms of age-related cataract of both eyes    Acute cerebrovascular accident (CVA) ([address]    Tissue plasminogen activator (tPA) administered at other facility within 24 hours before current admission    Permanent atrial fibrillation ([address]    Acute ischemic stroke ([address]    Left homonymous hemianopsia    Hyperlipidemia    Stroke, recent, without late effect    Bilateral carotid artery stenosis    Chronic cerebral ischemia    Memory problem    Gait difficulty    Balance problem    At high risk for stroke    Risk for falls    Cerebrovascular accident (CVA) due to embolism of right posterior cerebral artery ([address]    Bilateral hemianopia    Dysphagia    Hematuria    PTSD (post-traumatic stress disorder)    Acute urinary retention    Horseshoe kidney    Kidney stone on left side    Severe malnutrition ([address]       Consults: None    Surgery:     - Cystoscopy, left stent placement    Patient Instructions: Activity: \"As tolerated  Diet: As tolerated  Patient told to follow up with Chaka Chicas MD in 2 week(s).    Discharge Medications:    Serge James   Home Medication Instructions CRF:[id number]    Printed on:20 1057   Medication Information                      aspirin 81 MG EC tablet  Take 81 mg by mouth daily             atorvastatin (LIPITOR) 40 MG tablet  Take 1 tablet by mouth nightly             Cholecalciferol (VITAMIN D3) 25 MCG (1000 UT) CAPS  Take 2,000 Units by mouth daily             dabigatran (PRADAXA) 150 MG capsule  Take 150 mg by mouth 2 times daily             docusate sodium (COLACE) 100 MG capsule  Take 1 capsule by mouth 2 times daily             enoxaparin (LOVENOX) 80 MG/0.8ML injection  Inject into the skin 2 times daily Indications: afib/ PreOp x6 doses             enoxaparin (LOVENOX) 80 MG/0.8ML injection  Inject 0.8 mLs into the skin 2 times daily for 10 days Use as directed for Lovenox bridging. EPINEPHrine (EPIPEN 2-FARAZ) 0.3 MG/0.3ML SOAJ injection  Inject 0.3 mLs into the muscle once for 1 dose Use as directed for allergic reaction             famotidine (PEPCID) 20 MG tablet  Take 1 tablet by mouth 2 times daily             HYDROcodone-acetaminophen (NORCO) 5-325 MG per tablet  Take 1 tablet by mouth every 6 hours as needed for Pain for up to 5 days. latanoprost (XALATAN) 0.005 % ophthalmic solution  Place 1 drop into both eyes nightly             Omega-3 Fatty Acids (FISH OIL) 1000 MG CAPS  Take 1,000 mg by mouth 2 times daily             oxybutynin (DITROPAN-XL) 10 MG extended release tablet  Take 10 mg by mouth daily             tamsulosin (FLOMAX) 0.4 MG capsule  Take 1 capsule by mouth daily             tamsulosin (FLOMAX) 0.4 MG capsule  Take 1 capsule by mouth daily             VITAMIN B1-B12 IM  Inject into the muscle every 30 days                  Hospital course: The patient was admitted after procedure as listed above. He had a stent placed due to possible ureteral injury and extravasation that was noted on retrograde pyelogram. His PCNL was not completed. He restarted all his blood thinners. Feng was removed on POD 1. He was doing well, ambulating the halls. He was discharged in stable condition.     Follow Up Plan  - Follow up with Dr. Jan Vazquez in 2 weeks to discuss management of his stones on the left     Mellisa Galaviz  10:57 AM 12/19/2020

## 2020-12-19 NOTE — PROGRESS NOTES
CLINICAL PHARMACY NOTE: MEDS TO [address] Select Patient?: Yes  Total # of Prescriptions Filled: 2   The following medications were delivered to the patient:  · norco  · dok  Total # of Interventions Completed: 0  Time Spent (min): 0    Additional Documentation:

## 2021-01-01 NOTE — ED PROVIDER NOTES
Discharge instructions given for infant to both parents. Verbal understanding given. All questions answered. Denies need for further teaching. [facility]  Emergency Department  Faculty Attestation     I performed a history and physical examination of the patient and discussed management with the resident. I reviewed the residents note and agree with the documented findings and plan of care. Any areas of disagreement are noted on the chart. I was personally present for the key portions of any procedures. I have documented in the chart those procedures where I was not present during the key portions. I have reviewed the emergency nurses triage note. I agree with the chief complaint, past medical history, past surgical history, allergies, medications, social and family history as documented unless otherwise noted below. For Physician Assistant/ Nurse Practitioner cases/documentation I have personally evaluated this patient and have completed at least one if not all key elements of the E/M (history, physical exam, and MDM). Additional findings are as noted. Primary Care Physician:  No primary care provider on file. Screenings:  [unfilled]    CHIEF COMPLAINT       Chief Complaint   Patient presents with    Cerebrovascular Accident       RECENT VITALS:   Temp: 97.9 °F (36.6 °C),  Pulse: 81, Resp: 17,      LABS:  Labs Reviewed - No data to display    Radiology  No orders to display         EKG:   EKG Interpretation    Interpreted by me    Rhythm: Atrial fibrillation  Rate: normal  Axis: Left  Ectopy: none  Conduction: normal  ST Segments: no acute change  T Waves: no acute change  Q Waves: none  U wave present    Clinical Impression: Rate controlled atrial fibrillation, no acute changes     Attending Physician Additional  Notes    Patient is transferred from outside hospital for left tendon ominous hemianopsia. He had stroke 3 days ago with dysarthria and had TPA and left MCA thrombectomy he is on Eliquis. Today his left-sided vision was lost.  Yet to read words from right to left.   He states this is

## 2021-01-04 ENCOUNTER — OFFICE VISIT (OUTPATIENT)
Dept: UROLOGY | Age: 71
End: 2021-01-04
Payer: MEDICARE

## 2021-01-04 VITALS
WEIGHT: 173 LBS | HEIGHT: 67 IN | DIASTOLIC BLOOD PRESSURE: 72 MMHG | OXYGEN SATURATION: 98 % | BODY MASS INDEX: 27.15 KG/M2 | HEART RATE: 91 BPM | SYSTOLIC BLOOD PRESSURE: 122 MMHG

## 2021-01-04 DIAGNOSIS — Q63.1 HORSESHOE KIDNEY: ICD-10-CM

## 2021-01-04 DIAGNOSIS — N13.8 BENIGN PROSTATIC HYPERPLASIA WITH URINARY OBSTRUCTION: ICD-10-CM

## 2021-01-04 DIAGNOSIS — N40.1 BENIGN PROSTATIC HYPERPLASIA WITH URINARY OBSTRUCTION: ICD-10-CM

## 2021-01-04 DIAGNOSIS — N20.0 KIDNEY STONE ON LEFT SIDE: Primary | ICD-10-CM

## 2021-01-04 DIAGNOSIS — R31.0 GROSS HEMATURIA: ICD-10-CM

## 2021-01-04 DIAGNOSIS — R97.20 ELEVATED PSA: ICD-10-CM

## 2021-01-04 PROCEDURE — 99214 OFFICE O/P EST MOD 30 MIN: CPT | Performed by: UROLOGY

## 2021-01-04 PROCEDURE — 99215 OFFICE O/P EST HI 40 MIN: CPT

## 2021-01-05 ENCOUNTER — OFFICE VISIT (OUTPATIENT)
Dept: INTERNAL MEDICINE | Age: 71
End: 2021-01-05
Payer: MEDICARE

## 2021-01-05 VITALS
HEIGHT: 67 IN | TEMPERATURE: 97.5 F | WEIGHT: 170.4 LBS | SYSTOLIC BLOOD PRESSURE: 118 MMHG | DIASTOLIC BLOOD PRESSURE: 72 MMHG | HEART RATE: 70 BPM | RESPIRATION RATE: 18 BRPM | BODY MASS INDEX: 26.74 KG/M2

## 2021-01-05 DIAGNOSIS — Z00.00 ROUTINE GENERAL MEDICAL EXAMINATION AT A HEALTH CARE FACILITY: Primary | ICD-10-CM

## 2021-01-05 DIAGNOSIS — N20.0 KIDNEY STONE ON LEFT SIDE: ICD-10-CM

## 2021-01-05 DIAGNOSIS — E78.5 HYPERLIPIDEMIA, UNSPECIFIED HYPERLIPIDEMIA TYPE: ICD-10-CM

## 2021-01-05 DIAGNOSIS — N40.0 BENIGN PROSTATIC HYPERPLASIA WITHOUT LOWER URINARY TRACT SYMPTOMS: ICD-10-CM

## 2021-01-05 DIAGNOSIS — I48.91 ATRIAL FIBRILLATION, UNSPECIFIED TYPE (HCC): ICD-10-CM

## 2021-01-05 PROCEDURE — 99214 OFFICE O/P EST MOD 30 MIN: CPT

## 2021-01-05 PROCEDURE — G0446 INTENS BEHAVE THER CARDIO DX: HCPCS | Performed by: INTERNAL MEDICINE

## 2021-01-05 PROCEDURE — G0439 PPPS, SUBSEQ VISIT: HCPCS | Performed by: INTERNAL MEDICINE

## 2021-01-05 PROCEDURE — 99214 OFFICE O/P EST MOD 30 MIN: CPT | Performed by: INTERNAL MEDICINE

## 2021-01-05 RX ORDER — OXYBUTYNIN CHLORIDE 10 MG/1
10 TABLET, EXTENDED RELEASE ORAL DAILY
Qty: 90 TABLET | Refills: 3 | Status: SHIPPED | OUTPATIENT
Start: 2021-01-05 | End: 2021-07-26 | Stop reason: ALTCHOICE

## 2021-01-05 ASSESSMENT — PATIENT HEALTH QUESTIONNAIRE - PHQ9
2. FEELING DOWN, DEPRESSED OR HOPELESS: 1
SUM OF ALL RESPONSES TO PHQ QUESTIONS 1-9: 1

## 2021-01-05 ASSESSMENT — LIFESTYLE VARIABLES: HOW OFTEN DO YOU HAVE A DRINK CONTAINING ALCOHOL: 0

## 2021-01-05 NOTE — PROGRESS NOTES
[place] [id number]  Dept: [phone]  Dept Fax: [phone]  Loc: [phone]     Visit Date:  1/5/2021  Patient:  Carrie Lancaster  YOB: 1950  Provider: Donald Harman MD      Assessment & Plan      BPH: On tamsulosin and Ditropan for overactive bladder. However, I recommended that he changes the dosing to the evening due to dizziness as tamsulosin can cause orthostatic hypotension. Will reassess next visit. Follows with neurology. A. fib: Regular rate and rhythm at this time. Continue same management. Continue anticoagulation. Hyperlipidemia: We will continue to monitor. Kidney stone: Follows with urology. Might possibly undergo surgery to remove/exchange of the stent. Reassess next visit. No hematuria or dysuria or abdominal pain at this time    Cardiovascular disease counseling: Aspirin use for primary prevention of cardiovascular disease was discussed. Screening for elevated blood pressure was done today and the results were discussed. I counseled regarding the imporotance of a healthy diet as well as exercise to promote cardiovascular health. 10 minutes were spent during this visit for cardiovacular disease counseling. Diagnosis Orders   1. Routine general medical examination at a health care facility     2. Benign prostatic hyperplasia without lower urinary tract symptoms     3. Atrial fibrillation, unspecified type ([address]     4. Hyperlipidemia, unspecified hyperlipidemia type     5. Kidney stone on left side         On this date 01/05/21 I have spent 30 minutes reviewing previous notes, test results and face to face with the patient discussing the diagnosis and importance of compliance with the treatment plan. Discussed use, benefit, and side effects of prescribed medications. All questions answered. Patient voiced understanding.   Reviewed health maintenance. HPI:     He was seen in the internal medicine office today for   Chief Complaint   Patient presents with    Medicare AWV     yearly    Hyperlipidemia    Hematuria    Other     CVA        Presents to follow-up. Had a ureteric stent put in by urology due to kidney stone. Says that they were not able to take the stone out. However, he says that he now feels okay, does not have any hematuria, dysuria, abdominal pain. And has follow-up with urology. Has a history of hyperlipidemia which has been stable. Has a history of A. fib which has been stable. Has a history of BPH: Uses tamsulosin as well as Ditropan for overactive bladder. Complains of lightheadedness and dizziness, which I advised could be side effect from the medications. So I advised that he takes them at night. Will reassess next visit. He agrees with plan.       Subjective:      REVIEW OF SYSTEMS    Constitutional: Denies fever, chills  Eyes: Denies recent vision changes  Cardiovascular: Denies chest pain, LL edema  Respiratory: Denies cough, wheezes  Gastrointestinal: Denies abdominal pain, nausea, vomiting  Skin: Denies rash  Endocrine: Denies polyuria  Hematologic: Denies bruising  Genitourinary: Denies dysuria, hematuria  Neurological: Denies headache, numbness      Medications    Current Outpatient Medications:     oxybutynin (DITROPAN-XL) 10 MG extended release tablet, Take 1 tablet by mouth daily, Disp: 90 tablet, Rfl: 3    docusate sodium (COLACE) 100 MG capsule, Take 1 capsule by mouth 2 times daily, Disp: 30 capsule, Rfl: 1    tamsulosin (FLOMAX) 0.4 MG capsule, Take 1 capsule by mouth daily, Disp: 30 capsule, Rfl: 3    tamsulosin (FLOMAX) 0.4 MG capsule, Take 1 capsule by mouth daily, Disp: 30 capsule, Rfl: 11    dabigatran (PRADAXA) 150 MG capsule, Take 150 mg by mouth 2 times daily, Disp: , Rfl:     aspirin 81 MG EC tablet, Take 81 mg by mouth daily, Disp: , Rfl:     famotidine (PEPCID) 20 MG tablet, Take 1 tablet by mouth 2 times daily, Disp: 60 tablet, Rfl: 0    latanoprost (XALATAN) 0.005 % ophthalmic solution, Place 1 drop into both eyes nightly, Disp: , Rfl:     Cholecalciferol (VITAMIN D3) 25 MCG (1000 UT) CAPS, Take 2,000 Units by mouth daily, Disp: , Rfl:     Omega-3 Fatty Acids (FISH OIL) 1000 MG CAPS, Take 1,000 mg by mouth 2 times daily, Disp: , Rfl:     atorvastatin (LIPITOR) 40 MG tablet, Take 1 tablet by mouth nightly, Disp: 30 tablet, Rfl: 3    VITAMIN B1-B12 IM, Inject into the muscle every 30 days , Disp: , Rfl:     EPINEPHrine (EPIPEN 2-FARAZ) 0.3 MG/0.3ML SOAJ injection, Inject 0.3 mLs into the muscle once for 1 dose Use as directed for allergic reaction (Patient not taking: Reported on 8/17/2020), Disp: 0.3 mL, Rfl: 0    The patient has No Known Allergies. Past Medical History  Eduin Romero  has a past medical history of Atrial fibrillation ([facility] [address] Blood in urine, Chronic kidney disease, CKD (chronic kidney disease), History of ischemic stroke in prior three months, Hyperlipidemia, Ischemic stroke ([address] Kidney stones, Nihss score 10, PTSD (post-traumatic stress disorder), Skin tag, Stroke ([address] and Stroke ([address] Past Surgical History  The patient  has a past surgical history that includes Appendectomy; transesophageal echocardiogram (01/17/2020); TURP (N/A, 08/10/2020); Cystoscopy (Left, 10/19/2020); Lithotripsy; Cystoscopy (Left, 12/18/2020); IR GUIDED NEPHROSTOMY CATH PLACEMENT LEFT (12/18/2020); NEPHROLITHOTOMY (Left, 12/18/2020); and Cystoscopy (Left, 12/18/2020). Family History  This patient's family history includes Heart Disease in his father and mother; Stroke in his brother and father. Social History  Eduin Romero  reports that he has never smoked. He has never used smokeless tobacco. He reports previous alcohol use. He reports that he does not use drugs.     Health Maintenance:    Health Maintenance   Topic Date Due    Hepatitis C screen  1950    DTaP/Tdap/Td vaccine (1 - Tdap) 07/06/1968    Shingles Vaccine (1 of 2) 07/06/1999    Colon cancer screen colonoscopy  07/06/1999    Pneumococcal 65+ years Vaccine (1 of 1 - PPSV23) 07/06/2014    Annual Wellness Visit (AWV)  06/19/2019    Flu vaccine (1) 09/01/2020    PSA counseling  07/20/2021    Lipid screen  12/01/2021    Hepatitis A vaccine  Aged Out    Hepatitis B vaccine  Aged Out    Hib vaccine  Aged Out    Meningococcal (ACWY) vaccine  Aged Out           Objective:     PHYSICAL EXAM  /72 (Site: Left Upper Arm, Position: Sitting, Cuff Size: Medium Adult)   Pulse 70   Temp 97.5 °F (36.4 °C)   Resp 18   Ht 5' 7\" (1.702 m)   Wt 170 lb 6.4 oz (77.3 kg)   BMI 26.69 kg/m²   Constitutional: No acute distress. Sits in chair comfortably  Eyes: Sclerae nonicteric. No lid lag or proptosis  HENT: External ears normal. No external lesions on the nose  Neck: No gross masses. Trachea visibly midline  Respiratory: Good air entry bilaterally. No wheezing or crackles  Cardiovascular: Normal S1-S2. No murmurs. No lower extremity edema  Gastrointestinal: No visible masses. No visible hernias  Skin: No abnormal rashes. No abnormal masses  Neurologic: Cranial nerves grossly intact  Psychiatric: Normal affect. Alert and oriented        Labs Reviewed 1/5/2021:    Lab Results   Component Value Date    WBC 7.3 12/19/2020    HGB 12.0 (L) 12/19/2020    HCT 37.6 (L) 12/19/2020     12/19/2020    CHOL 112 12/01/2020    TRIG 83 12/01/2020    HDL 53 12/01/2020    ALT 14 12/01/2020    AST 18 12/01/2020     12/19/2020    K 3.8 12/19/2020     12/19/2020    CREATININE 0.89 12/19/2020    BUN 15 12/19/2020    CO2 22 12/19/2020    TSH 1.60 08/03/2020    PSA 35.88 (H) 07/20/2020    INR 1.0 12/18/2020    LABA1C 4.9 05/20/2020            Electronically signed Ese LOVE MD on 1/5/2021 at 2:05 PM      This note has been created using the Epic electronic health record, and dictated in part by [facility] dictation system. Despite the documenting physician's best efforts, there may be errors in spelling, grammar or syntax.

## 2021-01-05 NOTE — PROGRESS NOTES
Medicare Annual Wellness Visit  Name: Fareed Orellana Date: 2021   MRN: [id number] Sex: Male   Age: 69 y.o. Ethnicity: Non-/Non    : 1950 Race: Jazlyn Jacobson is here for Medicare AWV (yearly), Hyperlipidemia, Hematuria, and Other (CVA)    Screenings for behavioral, psychosocial and functional/safety risks, and cognitive dysfunction are all negative except as indicated below. These results, as well as other patient data from the [address] [place] form, are documented in Flowsheets linked to this Encounter. No Known Allergies    Prior to Visit Medications    Medication Sig Taking?  Authorizing Provider   docusate sodium (COLACE) 100 MG capsule Take 1 capsule by mouth 2 times daily  Jasiel Blunt MD   tamsulosin (FLOMAX) 0.4 MG capsule Take 1 capsule by mouth daily  Jasiel Blunt MD   oxybutynin (DITROPAN-XL) 10 MG extended release tablet Take 10 mg by mouth daily  Historical Provider, MD   enoxaparin (LOVENOX) 80 MG/0.8ML injection Inject into the skin 2 times daily Indications: afib/ PreOp x6 doses  Historical Provider, MD   tamsulosin (FLOMAX) 0.4 MG capsule Take 1 capsule by mouth daily  Omega Tyson MD   dabigatran (PRADAXA) 150 MG capsule Take 150 mg by mouth 2 times daily  Historical Provider, MD   aspirin 81 MG EC tablet Take 81 mg by mouth daily  Historical Provider, MD   famotidine (PEPCID) 20 MG tablet Take 1 tablet by mouth 2 times daily  Thee Hernandez   latanoprost (XALATAN) 0.005 % ophthalmic solution Place 1 drop into both eyes nightly  Historical Provider, MD   EPINEPHrine (EPIPEN 2-FARAZ) 0.3 MG/0.3ML SOAJ injection Inject 0.3 mLs into the muscle once for 1 dose Use as directed for allergic reaction  Patient not taking: Reported on 2020  Ayah Hancock MD   Cholecalciferol (VITAMIN D3) 25 MCG (1000 UT) CAPS Take 2,000 Units by mouth daily  Historical Provider, MD   Omega-3 Fatty Acids (FISH OIL) 1000 MG CAPS Take 1,000 mg by mouth 2 times daily  Historical Provider, MD   atorvastatin (LIPITOR) 40 MG tablet Take 1 tablet by mouth nightly  Cassandra Noel MD   VITAMIN B1-B12 IM Inject into the muscle every 30 days   Historical Provider, MD       Past Medical History:   Diagnosis Date    Atrial fibrillation ([facility] 01/2020    Blood in urine     Chronic kidney disease     CKD (chronic kidney disease)     History of ischemic stroke in prior three months     Hyperlipidemia     Ischemic stroke ([facility] [address] 01/10/2020    Kidney stones     Nihss score 10     PTSD (post-traumatic stress disorder)     from war, Agent Orange     Skin tag 12/21/2018    Stroke ([facility] [address] 01/16/2020    MRI Brain WO: New right PCA distribution infarct, multifocal left MCA distribution infarcts    Stroke ([facility] [address] 01/11/2020    large vessel occlusion/left M2 occlusion.   He subsequently underwent emergent thrombectomy.  /  S/P TPA DONE       Past Surgical History:   Procedure Laterality Date    APPENDECTOMY      CYSTOSCOPY Left 10/19/2020    CYSTOSCOPY, LEFT URETEROSCOPY w/ HOMIUM LASER LITHOTRIPSY, LEFT URETERAL STENT PLACEMENT performed by Lisa Mcmillan MD at [facility] Left 12/18/2020    cystoscopy, left stent placement, left retrograde pyelogram    CYSTOSCOPY Left 12/18/2020    CYSTOSCOPY, LEFT STENT REMOVAL, LEFT RETROGRADE PYELOGRAM, INSERTION OCCLUSIVE BALLOON - PT TO INTERV RADIOLOGY FOLLOWING performed by Lisa Mcmillan MD at [facility] IR NEPHROSTOMY PERCUTANEOUS LEFT  12/18/2020    IR NEPHROSTOMY PERCUTANEOUS LEFT 12/18/2020 Killian Burgess MD [facility] SPECIAL PROCEDURES    LITHOTRIPSY      NEPHROLITHOTOMY Left 12/18/2020    cystoscopy, left stent placement, left retrograde pyelogram performed by Lisa Mcmillan MD at [facility] TRANSESOPHAGEAL ECHOCARDIOGRAM  01/17/2020    TURP N/A 08/10/2020    CYSTO w/ Greenlight Laser performed by Lisa Mcmillan MD at [address] [place] [address]       Family History   Problem Relation Age of Onset    Heart Disease Mother     Heart Disease Father     Stroke Father    Collette Satcheangelic Interventions:  · Inadequate physical activity:  patient is not ready to increase his/her physical activity level at this time  · Dental exam overdue:  patient encouraged to make appointment with his/her dentist       Personalized Preventive Plan   Current Health Maintenance Status    There is no immunization history on file for this patient. Health Maintenance   Topic Date Due    Hepatitis C screen  1950    DTaP/Tdap/Td vaccine (1 - Tdap) 07/06/1968    Shingles Vaccine (1 of 2) 07/06/1999    Colon cancer screen colonoscopy  07/06/1999    Pneumococcal 65+ years Vaccine (1 of 1 - PPSV23) 07/06/2014    Annual Wellness Visit (AWV)  06/19/2019    Flu vaccine (1) 09/01/2020    PSA counseling  07/20/2021    Lipid screen  12/01/2021    Hepatitis A vaccine  Aged Out    Hepatitis B vaccine  Aged Out    Hib vaccine  Aged Out    Meningococcal (ACWY) vaccine  Aged Out     Recommendations for LIQVID Due: see orders and patient instructions/AVS.  . Recommended screening schedule for the next 5-10 years is provided to the patient in written form: see Patient Instructions/AVS.    Rhonda MEREDITH LPN, 5/8/2700, performed the documented evaluation under the direct supervision of the attending physician.

## 2021-01-15 ENCOUNTER — OFFICE VISIT (OUTPATIENT)
Dept: NEUROLOGY | Age: 71
End: 2021-01-15
Payer: MEDICARE

## 2021-01-15 VITALS
OXYGEN SATURATION: 99 % | SYSTOLIC BLOOD PRESSURE: 118 MMHG | BODY MASS INDEX: 27.65 KG/M2 | DIASTOLIC BLOOD PRESSURE: 72 MMHG | HEART RATE: 102 BPM | WEIGHT: 176.2 LBS | HEIGHT: 67 IN | TEMPERATURE: 97.3 F

## 2021-01-15 DIAGNOSIS — I65.23 BILATERAL CAROTID ARTERY STENOSIS: ICD-10-CM

## 2021-01-15 DIAGNOSIS — E78.2 MIXED HYPERLIPIDEMIA: ICD-10-CM

## 2021-01-15 DIAGNOSIS — I67.82 CHRONIC CEREBRAL ISCHEMIA: ICD-10-CM

## 2021-01-15 DIAGNOSIS — Z91.81 RISK FOR FALLS: ICD-10-CM

## 2021-01-15 DIAGNOSIS — H53.47 BILATERAL HEMIANOPIA: ICD-10-CM

## 2021-01-15 DIAGNOSIS — R26.89 BALANCE PROBLEM: ICD-10-CM

## 2021-01-15 DIAGNOSIS — F43.10 PTSD (POST-TRAUMATIC STRESS DISORDER): ICD-10-CM

## 2021-01-15 DIAGNOSIS — R41.3 MEMORY PROBLEM: ICD-10-CM

## 2021-01-15 DIAGNOSIS — I63.9 ISCHEMIC STROKE (HCC): ICD-10-CM

## 2021-01-15 DIAGNOSIS — N20.0 KIDNEY STONE ON LEFT SIDE: ICD-10-CM

## 2021-01-15 DIAGNOSIS — I63.431 CEREBROVASCULAR ACCIDENT (CVA) DUE TO EMBOLISM OF RIGHT POSTERIOR CEREBRAL ARTERY (HCC): ICD-10-CM

## 2021-01-15 DIAGNOSIS — R26.9 GAIT DIFFICULTY: ICD-10-CM

## 2021-01-15 DIAGNOSIS — Z91.89 AT HIGH RISK FOR STROKE: ICD-10-CM

## 2021-01-15 DIAGNOSIS — Z86.73 H/O ISCHEMIC LEFT MCA STROKE: ICD-10-CM

## 2021-01-15 DIAGNOSIS — Z86.73 HISTORY OF ISCHEMIC POSTERIOR CEREBRAL ARTERY STROKE: ICD-10-CM

## 2021-01-15 DIAGNOSIS — I63.412 CEREBROVASCULAR ACCIDENT (CVA) DUE TO EMBOLISM OF LEFT MIDDLE CEREBRAL ARTERY (HCC): Primary | ICD-10-CM

## 2021-01-15 DIAGNOSIS — Z86.73 STROKE, RECENT, WITHOUT LATE EFFECT: ICD-10-CM

## 2021-01-15 DIAGNOSIS — I48.21 PERMANENT ATRIAL FIBRILLATION (HCC): ICD-10-CM

## 2021-01-15 DIAGNOSIS — R13.14 PHARYNGOESOPHAGEAL DYSPHAGIA: ICD-10-CM

## 2021-01-15 DIAGNOSIS — H53.462 LEFT HOMONYMOUS HEMIANOPSIA: ICD-10-CM

## 2021-01-15 DIAGNOSIS — I48.91 ATRIAL FIBRILLATION, UNSPECIFIED TYPE (HCC): ICD-10-CM

## 2021-01-15 PROCEDURE — 99215 OFFICE O/P EST HI 40 MIN: CPT | Performed by: PSYCHIATRY & NEUROLOGY

## 2021-01-15 PROCEDURE — 99214 OFFICE O/P EST MOD 30 MIN: CPT

## 2021-01-15 ASSESSMENT — ENCOUNTER SYMPTOMS
ABDOMINAL PAIN: 0
VOMITING: 0
EYE PAIN: 0
COUGH: 0
EYE REDNESS: 0
CHANGE IN BOWEL HABIT: 0
ABDOMINAL DISTENTION: 0
EYE ITCHING: 0
BLOOD IN STOOL: 0
SORE THROAT: 0
BOWEL INCONTINENCE: 0
BACK PAIN: 0
FACIAL SWELLING: 0
SINUS PRESSURE: 0
SHORTNESS OF BREATH: 0
SWOLLEN GLANDS: 0
VOICE CHANGE: 0
PHOTOPHOBIA: 0
COLOR CHANGE: 0
WHEEZING: 0
VISUAL CHANGE: 0
APNEA: 0
EYE DISCHARGE: 0
TROUBLE SWALLOWING: 0
CHOKING: 0
CHEST TIGHTNESS: 0
DIARRHEA: 0
CONSTIPATION: 0
NAUSEA: 0

## 2021-01-15 NOTE — PROGRESS NOTES
[facility]  Neurology  [address]:[phone]   Fax: [phone]    SUBJECTIVE:     PATIENT ID:  Syl Escobar is a  RIGHT  HANDED 69 y.o. male. Cerebrovascular Accident  This is a new problem. Episode onset: RECURRENT    LEFT  MCA   AND   RIGHT  PCA    EMBOLIC  STROKES  IN  JAN. 2020. The problem has been gradually improving. Pertinent negatives include no abdominal pain, anorexia, arthralgias, change in bowel habit, chest pain, chills, congestion, coughing, diaphoresis, fatigue, fever, headaches, joint swelling, myalgias, nausea, neck pain, numbness, rash, sore throat, swollen glands, urinary symptoms, vertigo, visual change, vomiting or weakness. Nothing aggravates the symptoms. Treatments tried: ASA,  PRADAXA   The treatment provided significant relief. Neurologic Problem  The patient's primary symptoms include clumsiness, a loss of balance and memory loss. The patient's pertinent negatives include no altered mental status, focal sensory loss, focal weakness, near-syncope, slurred speech, syncope, visual change or weakness. This is a recurrent problem. The current episode started more than 1 month ago. The neurological problem developed suddenly. The problem is unchanged. There was no focality noted. Associated symptoms include dizziness and light-headedness. Pertinent negatives include no abdominal pain, auditory change, aura, back pain, bladder incontinence, bowel incontinence, chest pain, confusion, diaphoresis, fatigue, fever, headaches, nausea, neck pain, palpitations, shortness of breath, vertigo or vomiting. Past treatments include bed rest, sleep and aspirin. The treatment provided no relief. His past medical history is significant for a CVA and dementia. There is no history of a bleeding disorder, a clotting disorder, head trauma, liver disease, mood changes or seizures.              History obtained from  The patient    AND      HIS  WIFE and other  available   medical  records   were  Also  reviewed. The  Duration,  Quality,  Severity,  Location,  Timing,  Context,  Modifying  Factors   Of   The   Chief   Complaint   And  Present  Illness       Was   Reviewed   In   Chronological   [facility].                                             PATIENT'S  MAIN  CONCERNS INCLUDE :                     1)     H/O    RECURRENCE     EMBOLIC   STROKES  IN     JAN. 2020                                   AND  HOSPITALIZATION     IN     [place]                                         -  PATIENT  ALSO  RECEIVED  tPA                                                                2)          MRI  BRAIN    ON    1/11/2020      SHOWED                           A)    MULTIPLE     STROKES  IN  LEFT  FRONTAL    AND  PARIETAL  LOBES                          B)        CHRONIC  CEREBRAL  ISCHEMIA                          3)    MRI    BRAIN   ON     1/16/ 2020        SHOWED                                ADDITIONAL      NEW     RIGHT  PCA  STROKE                                     AND  MANAGEMENT   OF   THE  SAME                                               3)      CTA  BRAIN    JAN. 2020     SHOWED                              A)       H/O    LEFT   MCA   OCCULUSION                              B)       RIGHT  PCA  OCCULUSION                               C)   BILATERAL  CAROTID  STENOSIS      50  %                                     -   NEEDS  MONITORING                        4)        CHRONIC   ATRIAL   FIBRILLATION                                   -    PATIENT  TO  FOLLOW  WITH  CARDIOLOGY                      5)        PATIENT       ON     ASA    81  MG       DAILY                                    AND  PRADAXA                                        -     TOLERATING  THE  SAME                        6)     HIGH  RISK  FOR  TIA /   STROKE                      7)      PATIENT  RECOVERED   WELL   FROM    HIS   RECENT                           RECURRENT STROKES                       8)     POST  STROKE          SYMPTOMS                          A)    GAIT  DIFFICULTY                         B)  BALANCE  PROBLEMS                                            C)    RESIDUAL   LEFT  VISUAL  FIELD  DEFECTS                        D)    MEMORY  PROBLEMS                                        -     IMPROVED   SIGNIFICANTLY                         9)      HIGH  RISK  FOR       FALLS                10)     MULTIPLE  CO  MORBID  MEDICAL  CONDITIONS                           -   TO  FOLLOW  WITH  HIS PCP                     11)          FOLLOW  UP     CT   HEAD     ON   2/27/2020    SHOWED  :                      \"  Generalized atrophy                   encephalomalacial changes within the right Temporal occipital region,                   and posterior left frontal lobe                   No acute intraparenchymal hemorrhage  \"                  12)       PATIENT    AND  HIS  WIFE   DENIES  ANY  NEW  NEUROLOGICAL    CONCERNS. 13)     VARIOUS  RISK   FACTORS   WERE  REVIEWED   AND   DISCUSSED. PATIENT   HAS  MULTIPLE   MEDICAL, NEUROLOGICAL                      PROBLEMS . PATIENT'S   MANAGEMENT  IS  CHALLENGING.                                     PRECIPITATING  FACTORS: including  fever/infection, exertion/relaxation, position change, stress, weather change,                        medications/alcohol, time of day/darkness/light  Are  absent                                                              MODIFYING  FACTORS:  fever/infection, exertion/relaxation, position change, stress, weather change,                        medications/alcohol, time of day/darkness/light  Are  absent             Patient   Indicates   The  Presence   And  The  Absence  Of  The  Following    Associated  And   Additional  Neurological    Symptoms:                                Balance  And coordination   problems  present           Gait problems present            Headaches      absent              Migraines           absent           Memory problemspresent              Confusion        absent            Paresthesia   numbness          absent           Seizures  And  Starring  Episodes           absent           Syncope,  Near  syncopal episodes         absent           Speech   problems           absent             Swallowing   Problems      absent            Dizziness,  Light headedness           present              Vertigo        absent             Generalized   Weakness    absent              focal  Weakness     absent             Tremors         absent              Sleep  Problems     absent             History  Of   Recent  Head  Injury     absent             History  Of   Recent  TIA     absent             History  Of   Recent    Stroke     present             Neck  Pain   and   Neck muscle  Spasms  absent               Radiating  down   And   Weakness           absent            Lower back   Pain  And     Spasms  absent              Radiating    Down   And   Weakness          absent                H/OFALLS        absent               History  Of   Visual  Symptoms    absent                  Associated   Diplopia       absent                                               Also   Additional   Symptoms   Present    As  Documented    In   The   detailed                  Review  Of  Systems   And    Please   Refer   To    Them for   Additional    Information. Any components  That are either  Unobtainable  Or  Limited  In   HPI, ROS  And/or PFSH   Are                   Due   ToPatient's  Medical  Problems,  Clinical  Condition   and/or lack of                                other    Alternate   resources.           RECORDS   REVIEWED:    historical medical records       INFORMATION   REVIEWED:     MEDICAL   HISTORY,SURGICAL   HISTORY,     MEDICATIONS   LIST,   ALLERGIES AND  DRUG  INTOLERANCES,       FAMILY   HISTORY,  SOCIAL HISTORY,      PROBLEM  LIST   FOR  PATIENT  CARE   COORDINATION      Past Medical History:   Diagnosis Date    Atrial fibrillation ([facility] 01/2020    Blood in urine     Chronic kidney disease     CKD (chronic kidney disease)     History of ischemic stroke in prior three months     Hyperlipidemia     Ischemic stroke ([facility] 01/10/2020    Kidney stones     Nihss score 10     PTSD (post-traumatic stress disorder)     from war, Agent Orange     Skin tag 12/21/2018    Stroke ([facility] 01/16/2020    MRI Brain WO: New right PCA distribution infarct, multifocal left MCA distribution infarcts    Stroke ([facility] 01/11/2020    large vessel occlusion/left M2 occlusion.   He subsequently underwent emergent thrombectomy.  /  S/P TPA DONE         Past Surgical History:   Procedure Laterality Date    APPENDECTOMY      CYSTOSCOPY Left 10/19/2020    CYSTOSCOPY, LEFT URETEROSCOPY w/ HOMIUM LASER LITHOTRIPSY, LEFT URETERAL STENT PLACEMENT performed by Klaus Meadows MD at [facility] Left 12/18/2020    cystoscopy, left stent placement, left retrograde pyelogram    CYSTOSCOPY Left 12/18/2020    CYSTOSCOPY, LEFT STENT REMOVAL, LEFT RETROGRADE PYELOGRAM, INSERTION OCCLUSIVE BALLOON - PT TO INTERV RADIOLOGY FOLLOWING performed by Klaus Meadows MD at [address] [place] [address] IR NEPHROSTOMY PERCUTANEOUS LEFT  12/18/2020    IR NEPHROSTOMY PERCUTANEOUS LEFT 12/18/2020 Aaron Eid MD [facility] SPECIAL PROCEDURES    LITHOTRIPSY      NEPHROLITHOTOMY Left 12/18/2020    cystoscopy, left stent placement, left retrograde pyelogram performed by Klaus Meadows MD at [address] [place] [address] TRANSESOPHAGEAL ECHOCARDIOGRAM  01/17/2020    TURP N/A 08/10/2020    CYSTO w/ Greenlight Laser performed by Klaus Meadows MD at [address] [place] [address]         Current Outpatient Medications   Medication Sig Dispense Refill    oxybutynin (DITROPAN-XL) 10 MG extended release tablet Take 1 tablet by mouth daily 90 tablet 3    tamsulosin (FLOMAX) 0.4 MG capsule Take 1 capsule by mouth daily 30 capsule 3    tamsulosin (FLOMAX) 0.4 MG capsule Take 1 capsule by mouth daily 30 capsule 11    dabigatran (PRADAXA) 150 MG capsule Take 150 mg by mouth 2 times daily      aspirin 81 MG EC tablet Take 81 mg by mouth daily      latanoprost (XALATAN) 0.005 % ophthalmic solution Place 1 drop into both eyes nightly      Cholecalciferol (VITAMIN D3) 25 MCG (1000 UT) CAPS Take 2,000 Units by mouth daily      Omega-3 Fatty Acids (FISH OIL) 1000 MG CAPS Take 1,000 mg by mouth 2 times daily      atorvastatin (LIPITOR) 40 MG tablet Take 1 tablet by mouth nightly 30 tablet 3    VITAMIN B1-B12 IM Inject into the muscle every 30 days       docusate sodium (COLACE) 100 MG capsule Take 1 capsule by mouth 2 times daily (Patient not taking: Reported on 1/15/2021) 30 capsule 1    famotidine (PEPCID) 20 MG tablet Take 1 tablet by mouth 2 times daily (Patient not taking: Reported on 1/15/2021) 60 tablet 0    EPINEPHrine (EPIPEN 2-FARAZ) 0.3 MG/0.3ML SOAJ injection Inject 0.3 mLs into the muscle once for 1 dose Use as directed for allergic reaction (Patient not taking: Reported on 8/17/2020) 0.3 mL 0     No current facility-administered medications for this visit.           No Known Allergies      Family History   Problem Relation Age of Onset    Heart Disease Mother     Heart Disease Father     Stroke Father     Stroke Brother          Social History     Socioeconomic History    Marital status:      Spouse name: Not on file    Number of children: Not on file    Years of education: Not on file    Highest education level: Not on file   Occupational History    Not on file   Social Needs    Financial resource strain: Not on file    Food insecurity     Worry: Not on file     Inability: Not on file   [employer] needs     Medical: Not on file     Non-medical: Not on file   Tobacco Use    Smoking status: Never Smoker    Smokeless tobacco: Never Used   Substance and Sexual Activity    Alcohol use: Not Currently LABALBU 4.1 12/01/2020     Lab Results   Component Value Date    BUN 15 12/19/2020    CREATININE 0.89 12/19/2020     Lab Results   Component Value Date    CALCIUM 8.3 12/19/2020    MG 2.1 12/19/2020     Lab Results   Component Value Date    AST 18 12/01/2020    ALT 14 12/01/2020       Lab Results   Component Value Date    URICACID 7.7 05/22/2016         Review of Systems   Constitutional: Negative for appetite change, chills, diaphoresis, fatigue, fever and unexpected weight change. HENT: Negative for congestion, dental problem, drooling, ear discharge, ear pain, facial swelling, hearing loss, mouth sores, nosebleeds, postnasal drip, sinus pressure, sore throat, tinnitus, trouble swallowing and voice change. Eyes: Negative for photophobia, pain, discharge, redness, itching and visual disturbance. Respiratory: Negative for apnea, cough, choking, chest tightness, shortness of breath and wheezing. Cardiovascular: Negative for chest pain, palpitations, leg swelling and near-syncope. Gastrointestinal: Negative for abdominal distention, abdominal pain, anorexia, blood in stool, bowel incontinence, change in bowel habit, constipation, diarrhea, nausea and vomiting. Endocrine: Negative for cold intolerance, heat intolerance, polydipsia, polyphagia and polyuria. Genitourinary: Negative for bladder incontinence. Musculoskeletal: Positive for gait problem. Negative for arthralgias, back pain, joint swelling, myalgias, neck pain and neck stiffness. Skin: Negative for color change, pallor, rash and wound. Allergic/Immunologic: Negative for environmental allergies, food allergies and immunocompromised state. Neurological: Positive for dizziness, light-headedness and loss of balance. Negative for vertigo, tremors, focal weakness, seizures, syncope, facial asymmetry, speech difficulty, weakness, numbness and headaches. Hematological: Negative for adenopathy. Does not bruise/bleed easily. Psychiatric/Behavioral: Positive for decreased concentration and memory loss. Negative for agitation, behavioral problems, confusion, dysphoric mood, hallucinations, self-injury, sleep disturbance and suicidal ideas. The patient is not nervous/anxious and is not hyperactive. OBJECTIVE:    Physical Exam  Constitutional:       Appearance: He is well-developed. HENT:      Head: Normocephalic and atraumatic. No raccoon eyes or Monte's sign. Right Ear: External ear normal.      Left Ear: External ear normal.      Nose: Nose normal.   Eyes:      Conjunctiva/sclera: Conjunctivae normal.      Pupils: Pupils are equal, round, and reactive to light. Neck:      Musculoskeletal: Normal range of motion and neck supple. Normal range of motion. No neck rigidity or muscular tenderness. Thyroid: No thyroid mass or thyromegaly. Vascular: No carotid bruit. Trachea: No tracheal deviation. Meningeal: Brudzinski's sign and Kernig's sign absent. Cardiovascular:      Rate and Rhythm: Normal rate and regular rhythm. Pulmonary:      Effort: Pulmonary effort is normal.   Musculoskeletal:         General: No tenderness. Skin:     General: Skin is warm. Coloration: Skin is not pale. Findings: No erythema or rash. Nails: There is no clubbing. Psychiatric:         Attention and Perception: He is attentive. Mood and Affect: Mood is not anxious or depressed. Affect is not labile, blunt or inappropriate. Speech: He is communicative. Speech is not rapid and pressured, delayed, slurred or tangential.         Behavior: Behavior is not agitated, slowed, aggressive, withdrawn, hyperactive or combative. Behavior is cooperative. Thought Content: Thought content is not paranoid or delusional. Thought content does not include homicidal or suicidal ideation. Thought content does not include homicidal or suicidal plan. Cognition and Memory: Cognition is impaired. Memory is impaired. He exhibits impaired recent memory. He does not exhibit impaired remote memory. Judgment: Judgment is not impulsive or inappropriate. NEUROLOGICALEXAMINATION :     A) MENTAL STATUS:                   Alert and  oriented  To place  And  Person. No Aphasia. No  Dysarthria. Able   To  Follow       SIMPLE   commands   without   Any  Difficulty. No right  To left confusion. Normal  Speech  And language function. Insight and  Judgment ,Fund  Of  Knowledge   within normal limits                Recent  And  Remote memory    DECREASED                 Attention &  Concentration are    DECREASED                      B) CRANIAL NERVES :      CN : Visual  Acuity  And  Visual fields      LEFT   VISUAL  FIELD  DEFECT               Fundi  Could  Not  Be  Could  Not  Be  Evaluated. 3,4,6 CN : Both  Pupils are   Reactive and  Equal.  Movements  Are  Intact. No  Nystagmus. No  HARSH. No  Afferent  Pupillary  Defect noted. 5 CN :  Normal  Facial sensations and Corneal  Reflexes           7 CN:  Normal  Facial  Symmetry  And  Strength. No facial  Weakness. 8 CN :  Hearing  Appears   DECREASED           9, 10 CN: Normal   spontaneous, reflex   palate   movements         11 CN:   Normal  Shoulder  shrug and  strength         12 CN :   Normal  Tongue movements and  Tongue  In midline                        No tongue   Fasciculations or atrophy       C) MOTOR  EXAM:                 Strength  In upper  And  Lower   extremities   within   normal limits                           Rapid   alternating  And  repetitions  Movements  DECREASED                Muscle  Tone  In upper  And  Lower  Extremities  Normal                  No rigidity. No  Spasticity. Bradykinesia   absent               No  Asterixis.                 Sustention  Tremor , Resting   Tremor   absent                    No   other  Abnormal  Movements noted           D) SENSORY :               Light   touch, pinprick,   position  And  Vibration DECREASED       E) REFLEXES:                   Deep  Tendon  Reflexes   DECREASED                   No  pathological  Reflexes  Bilaterally.                                   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    Combined forms of age-related cataract of both eyes    Acute cerebrovascular accident ([address]A) ([address]    Tissue plasminogen activator (tPA) administered at other facility within 24 hours before current admission    Permanent atrial fibrillation ([address]    Acute ischemic stroke ([address]    Left homonymous hemianopsia    Hyperlipidemia    Stroke, recent, without late effect    Bilateral carotid artery stenosis    Chronic cerebral ischemia    Memory problem    Gait difficulty    Balance problem    At high risk for stroke    Risk for falls    Cerebrovascular accident (CVA) due to embolism of right posterior cerebral artery ([address]    Bilateral hemianopia    Dysphagia    Hematuria    PTSD (post-traumatic stress disorder)    Acute urinary retention    Horseshoe kidney    Kidney stone on left side    Severe malnutrition ([address]    History of ischemic posterior cerebral artery stroke    Cerebrovascular accident (CVA) due to embolism of left middle cerebral artery ([address]    H/O ischemic left MCA stroke    Ischemic stroke ([address]    Atrial fibrillation ([address]           CT OF THE HEAD WITHOUT CONTRAST  2/27/2020 12:49 am       TECHNIQUE:   CT of the head was performed without the administration of intravenous   contrast. Dose modulation, iterative reconstruction, and/or weight based   adjustment of the mA/kV was utilized to reduce the radiation dose to as low   as reasonably achievable.       COMPARISON:   MR brain dated January 16 2020       HISTORY:   ORDERING SYSTEM PROVIDED HISTORY: dysphagia, recent stroke   TECHNOLOGIST PROVIDED HISTORY:       dysphagia, recent stroke   Reason for Exam: Trouble swallowing after eating dinner, hx of stroke   Acuity: Acute   Type of Exam: Initial       FINDINGS:   BRAIN/VENTRICLES: There is no acute intracranial hemorrhage, mass effect or   midline shift.  No abnormal extra-axial fluid collection.  The gray-white   differentiation is maintained without evidence of an acute infarct.  There is   no evidence of hydrocephalus. Encephalomalacial changes are seen involving   the posterior left frontal lobe, and right temporal occipital lobe, related   to the recent infarcts.  Lacunar infarcts are seen within the right   periventricular white matter region, related to the recent infarct.  No new   areas of acute infarct are demonstrated.  Generalized atrophy and senescent   white matter changes are present.       ORBITS: The visualized portion of the orbits demonstrate no acute abnormality.       SINUSES: The visualized paranasal sinuses and mastoid air cells demonstrate   no acute abnormality.       SOFT TISSUES/SKULL:  No acute abnormality of the visualized skull or soft   tissues.           Impression   1.  Generalized atrophy, and encephalomalacial changes within the right   temporal occipital region, and posterior left frontal lobe, related to the   recent infarcts.  No acute intraparenchymal hemorrhage, intra-axial mass, or   acute territorial infarct is present.                 [facility]    Vascular Carotid Procedure        Patient Name RANDY       Date of Study           02/27/2020                Harshad WINTER      Date of      1950  Gender                  Male    Birth        Age          69 year(s)  Race                            Room Number  4462        Corporate ID [id number]    #        Patient Acct [de-identified]    #        MR #         [id number]     Sonographer             Fidel Barrientos RVT        Accession #  [id number]   Interpreting Physician Salvador Barry    Referring                Referring Physician     Michell Trotter CNP    Nurse    Practitioner       Procedure   Type of Study:        Cerebral: Carotid, Carotid Scan Bilateral.       Indications for Study:Dysphagia and CVA.       Blood Pressure:Right arm 141/91 mmHg. Patient Status: In Patient. Technical Quality:Limited visualization. Limitation reason:Difficult visualization, left arm BP could not be taken   due to I.V. placement.  .       Comments:Basic Classification of ICA Stenosis:   PSV - Peak Systolic Velocity   Normal: No plaque or calcification identified, no elevation of PSV   Mild: <50% spectral broadening without increased PSV   Moderate: 50 - 69% PSV >125 - <230 cm/sec   Severe: 70 - 99% PSV >230 cm/sec   Critical: 80 - 99% PSV >230cm/sec and/or End Diastolic Velocities   >003MS/WFM.        Conclusions        Summary        Simultaneous real time imaging utilizing B-Mode, color flow doppler and    spectral waveform analysis was performed on the bilateral extracerebral    vascular system.    The study demonstrates:        Right:    Internal carotid artery has a mild, <50% stenosis based on velocities.    The vertebral artery is patent with antegrade flow.        Left:    Internal carotid artery has a mild, <50% stenosis based on velocities.    The vertebral artery is patent with antegrade flow.        Signature        ----------------------------------------------------------------    Electronically signed by Fidel Barrientos RVT(Sonographer)    on 02/27/2020 12:00 PM  ----------------------------------------------------------------        ----------------------------------------------------------------    Electronically signed by Riccardo Gonsalez(Interpreting   [address] [place] [address]) on 02/27/2020 04:10 PM    ----------------------------------------------------------------       Findings:        Right Impression:                     Left Impression:    The common carotid artery is normal.  The common carotid artery is normal.        The carotid bulb has a smooth         The carotid bulb is normal.    fibrocalcific plaque causing a <50%    stenosis.                              The internal carotid artery has a                                          smooth calcific plaque causing a    The internal carotid artery has a     <50% stenosis based on velocities.    smooth fibrocalcific plaque causing a    <50% stenosis based on velocities.    The external carotid artery is                                          normal.    The external carotid artery is    normal.                               The vertebral artery is patent with                                          antegrade flow.    The vertebral artery is patent with    antegrade flow.             MRI OF THE BRAIN WITHOUT CONTRAST  1/16/2020 7:13 pm       TECHNIQUE:   Multiplanar multisequence MRI of the brain was performed without the   administration of intravenous contrast.       COMPARISON:   CT head neck 01/16/2020, MRI brain performed 01/11/2020       HISTORY:   ORDERING SYSTEM PROVIDED HISTORY: Dysarthria, CTA with R P2 occlusion   TECHNOLOGIST PROVIDED HISTORY:   Dysarthria, CTA with R P2 occlusion       FINDINGS:   INTRACRANIAL STRUCTURES/VENTRICLES: Redemonstration of the evolving acute   strokes identified involving left MCA distribution involving the left   posterior insular region.  Multifocal areas of left MCA infarct identified   left frontal lobe cortex left parietal cortex and scattered foci involving   subcortical white matter left parietal lobe which have mildly progressed   since the previous examination with a few new foci of infarct.  Focus of   restricted diffusion identified involving right posterior frontal centrum   semiovale minimally progressed when compared to previous examination.  New   predominant cortical based restricted diffusion identified involving the   right PCA distribution.  Many of these infarcts demonstrate mild T2   prolongation.       No hemorrhagic conversion identified.  Mild generalized involutional changes   with prominence of ventricles and sulci.  Mild periventricular subcortical T2   prolongation suggestive chronic small vessel ischemic disease.       No shift of midline structures.  Basal cisterns are patent.       ORBITS: The visualized portion of the orbits demonstrate no acute abnormality.       SINUSES: Mild ethmoid sinus mucosal thickening.       BONES/SOFT TISSUES: The bone marrow signal intensity appears normal. The soft   tissues demonstrate no acute abnormality.           Impression   New right PCA distribution infarct.  No hemorrhagic conversion.       Multifocal areas of evolving left MCA distribution infarct, there are few   scattered new foci of restricted diffusion identified suggestion of mild   progression of the left MCA infarct.       Mild chronic small ischemic disease and age related involutional change.             CT OF THE HEAD WITHOUT CONTRAST  1/16/2020 11:55 am       TECHNIQUE:   CT of the head was performed without the administration of intravenous   contrast. Dose modulation, iterative reconstruction, and/or weight based   adjustment of the mA/kV was utilized to reduce the radiation dose to as low   as reasonably achievable.       COMPARISON:   Head CT 01/12/2020, MRI brain 01/11/2020, head CT 01/10/2020       HISTORY:   ORDERING SYSTEM PROVIDED HISTORY: vision loss   TECHNOLOGIST PROVIDED HISTORY:   vision loss       Reason for Exam: 1/10/2020 patient came to ER with stroke like symptoms, he   was sent to [facility] for thrombectomy today Patient has vision blurring and   dizziness       FINDINGS:   BRAIN/VENTRICLES: Continued evolution of subacute infarction in the left   frontal lobe and left insular cortex with loss of gray-white matter   differentiation.  There is small area of low attenuation within left parietal   lobe subcortical white matter.  No associated hemorrhage.       Diffuse parenchymal volume loss with enlargement of the ventricles and   cerebral sulci.  No abnormal extra-axial fluid collection.  No hydrocephalus. Mild periventricular white matter low attenuation compatible with chronic   microvascular ischemic changes.       ORBITS: The visualized portion of the orbits demonstrate no acute abnormality.       SINUSES: The visualized paranasal sinuses and mastoid air cells demonstrate   no acute abnormality.       SOFT TISSUES/SKULL: No acute abnormality of the visualized skull or soft   tissues.           Impression   1. Continued evolution of subacute infarctions in the left MCA territory   involving the left lateral frontal lobe and insular cortex as well as left   parietal lobe subcortical white matter.  No hemorrhagic transformation. 2. Diffuse parenchymal volume loss.  Mild chronic microvascular ischemic   changes. Findings were discussed with Cortes Brice at 12:16 pm on 1/16/2020.               CTA OF THE HEAD WITH CONTRAST WITH PERFUSION 1/11/2020 2:14 am       TECHNIQUE:   CTA of the head/brain was performed with the administration of intravenous   contrast. Multiplanar reformatted images are provided for review.  MIP images   are provided for review.  Dose modulation, iterative reconstruction, and/or   weight based adjustment of the mA/kV was utilized to reduce the radiation   dose to as low as reasonably achievable.       COMPARISON:   CT and CTA head and neck 1/11/2020       HISTORY:   ORDERING SYSTEM PROVIDED HISTORY: stroke   TECHNOLOGIST PROVIDED HISTORY:   stroke   Reason for Exam: post TPA   Acuity: Unknown   Type of Exam: Unknown       FINDINGS:   Mildly elevated mean transit time involving the left frontal, parietal, and   temporal lobes, in the left MCA vascular territory distribution.  Relative   cerebral blood flow and relative cerebral blood volume are maintained.  No   perfusion mismatch.           Impression   No perfusion mismatch.       Findings were discussed with Dr. Ovi Barajas At 2:50 am on 1/11/2020.               CTA OF THE HEAD AND NECK WITH CONTRAST 1/11/2020 12:36 am:       TECHNIQUE:   CTA of the head and neck was performed with the administration of intravenous   contrast. Multiplanar reformatted images are provided for review.  MIP images   are provided for review. Stenosis of the internal carotid arteries measured   using NASCET criteria. Dose modulation, iterative reconstruction, and/or   weight based adjustment of the mA/kV was utilized to reduce the radiation   dose to as low as reasonably achievable.       COMPARISON:   Noncontrast CT head 1/10/2020       HISTORY:   ORDERING SYSTEM PROVIDED HISTORY: stroke   TECHNOLOGIST PROVIDED HISTORY:   stroke   Reason for Exam: Stroke, TPA in process   Acuity: Acute   Type of Exam: Subsequent/Follow-up       FINDINGS:       CTA NECK:       AORTIC ARCH/ARCH VESSELS: Normal, three-vessel aortic arch anatomy.    Calcified and noncalcified atherosclerotic plaque of the aortic arch and   proximal arch vessels.  No dissection or arterial injury.  No significant   stenosis of the brachiocephalic or subclavian arteries.       CAROTID ARTERIES: Scattered, noncalcified atherosclerotic plaque of the mid   to distal common carotid arteries without focal stenosis.  Calcified and   noncalcified atherosclerotic plaque of the right carotid bifurcation and   proximal right internal carotid artery.  Mild, less than 50%, stenosis of the   right internal carotid artery by NASCET criteria.  Calcified and noncalcified   atherosclerotic plaque of the left carotid bifurcation and proximal left   internal carotid artery.  Mild, less than 50%, stenosis of the left internal   carotid artery by NASCET criteria.  No arterial injury or dissection.       VERTEBRAL ARTERIES: Vertebral arteries arise from their respective subclavian   arteries.  Left vertebral artery is dominant with a developmentally   hypoplastic right vertebral artery.  Mild narrowing of the left vertebral   artery origin.       SOFT TISSUES: Lung apices are clear.  No cervical or superior mediastinal   lymphadenopathy.  Larynx and pharynx are unremarkable.  No acute abnormality   of the salivary and thyroid glands.       BONES: No acute osseous abnormality.           CTA HEAD:       ANTERIOR CIRCULATION: Atherosclerotic calcification of the cavernous internal   carotid arteries without focal stenosis.  Occlusion of a left middle cerebral   artery proximal to mid M2 branch, at the level of the sylvian fissure.  No   significant stenosis of the intracranial internal carotid, anterior cerebral,   or right middle cerebral arteries. No aneurysm.       POSTERIOR CIRCULATION: Intracranial dominance of the left vertebral artery. No focal stenosis of the intracranial vertebral arteries or basilar artery. No flow-limiting stenosis of the posterior cerebral arteries.  Left P1   segment is hypoplastic with fetal type origin of the left posterior cerebral   artery.  No aneurysm.       OTHER: No dural venous sinus thrombosis on this non-dedicated study.       BRAIN: No mass effect or midline shift. No extra-axial fluid collection.    Gray-white differentiation is maintained.           Impression   Mild, less than 50%, stenosis of the internal carotid arteries by NASCET   criteria.       Occlusion of a left middle cerebral artery proximal to mid M2 branch, at the   level of the sylvian fissure.              CTA OF THE HEAD AND NECK WITH CONTRAST 1/16/2020 1:42 pm:     TECHNIQUE:   CTA of the head and neck was performed with the administration of intravenous   contrast. Multiplanar reformatted images are provided for review.  MIP images   are provided for review. Stenosis of the internal carotid arteries measured   using NASCET criteria. Dose modulation, iterative reconstruction, and/or   weight based adjustment of the mA/kV was utilized to reduce the radiation   dose to as low as reasonably achievable.       COMPARISON:   Head CT 01/16/2020, 01/12/2020.  CTA head 01/11/2020.       HISTORY:   ORDERING SYSTEM PROVIDED HISTORY: stroke   TECHNOLOGIST PROVIDED HISTORY:   stroke   Reason for Exam: vision disturbance today, recent 1/10/2020 had stoke like   symptoms was transported to [facility] to do a thrombectomy       FINDINGS:       CTA NECK:       AORTIC ARCH/ARCH VESSELS: No dissection or arterial injury.  No significant   stenosis of the brachiocephalic or subclavian arteries.       CAROTID ARTERIES: Mild noncalcified atherosclerotic plaque in the left common   carotid artery without significant stenosis.  The right common carotid artery   is without significant stenosis.  Calcified atherosclerotic plaque in the   carotid bulbs eccentrically resulting in less than 50% stenosis by NASCET   criteria.  No dissection.       VERTEBRAL ARTERIES: No dissection, arterial injury, or significant stenosis.    Mild stenosis at the origin of the left vertebral artery.  The left vertebral   artery is dominant.  Hypoplastic right vertebral artery.       SOFT TISSUES: The lung apices are clear.  No cervical or superior mediastinal   lymphadenopathy.  The larynx and pharynx are unremarkable.  No acute   abnormality of the salivary and thyroid glands.       BONES: No acute osseous abnormality.           CTA HEAD:       ANTERIOR CIRCULATION: No significant stenosis of the intracranial internal   carotid, anterior cerebral, or middle cerebral arteries.  Previously   demonstrated left M2 branch occlusion is patent status post previous   thrombectomy.  No aneurysm.       POSTERIOR CIRCULATION: No significant stenosis of the vertebral, basilar, or   left posterior cerebral arteries.  New occlusion of distal right P2 branch   (series 2, images [id number]).  No aneurysm.  Left posterior communicating   artery is present.       OTHER: No dural venous sinus thrombosis on this non-dedicated study.       BRAIN: No mass effect or midline shift. No extra-axial fluid collection. The   gray-white differentiation is maintained.           Impression   1. New occlusion of distal P2 branch of the right posterior cerebral artery. 2. Previously demonstrated left M2 branch occlusion is now patent status post   recent thrombectomy. 3. No hemodynamically significant stenosis or occlusion in the cervical   arterial circulation.  Mild less than 50% stenosis of the internal carotid   arteries by NASCET criteria bilaterally. Critical results were called by Dr. Obinna Wilkinson MD to Curlee Oyster on 1/16/2020 at 14:35.             MRI OF THE BRAIN WITHOUT CONTRAST  1/11/2020 4:47 pm       TECHNIQUE:   Multiplanar multisequence MRI of the brain was performed without the   administration of intravenous contrast.       COMPARISON:   CT angiogram head and neck done earlier same day.  No prior brain MRI   available for comparison.       HISTORY:   ORDERING SYSTEM PROVIDED HISTORY: stroke   TECHNOLOGIST PROVIDED HISTORY:   stroke       FINDINGS:   INTRACRANIAL STRUCTURES/VENTRICLES: Multiple small areas of acute stroke in   the posterior left middle cerebral artery territory involving the left   frontal and parietal lobes, most pronounced in the frontal insular region. There is an additional punctate focus of acute stroke in the right centrum   semiovale.  Small focus of gradient blooming in the left insula could   represent trace petechial hemorrhage.  Generalized cerebral and cerebellar   volume loss.  The axial FLAIR images demonstrate bilateral periventricular   and deep white matter signal hyperintensities, commonly seen in the setting   of chronic small vessel ischemic disease.  Punctate focus of gradient   blooming in the left parietal deep white matter demonstrates no correlating   diffusion restriction and may represent a small cavernoma or amyloid   angiopathy.       No mass effect or midline shift. No evidence of a large acute intracranial   hemorrhage.  The ventricles and sulci are normal in size and configuration. The sellar/suprasellar regions appear unremarkable.  The normal signal voids   within the major intracranial vessels appear maintained.       ORBITS: The visualized portion of the orbits demonstrate no acute abnormality.       SINUSES: The paranasal sinuses are clear.  Small amount of fluid in the   bilateral mastoid air cells.       BONES/SOFT TISSUES: The bone marrow signal intensity appears normal. The soft   tissues demonstrate no acute abnormality.           Impression   1.  Multiple small areas of acute stroke in the posterior left middle   cerebral artery territory involving the left frontal and parietal lobes, most   pronounced in the frontal insular region.  Additional punctate focus of acute   stroke in the right centrum semiovale.       2.  Small focus of gradient blooming in the left insula could represent trace   petechial hemorrhage.  No large acute intracranial hemorrhage.  No mass   effect or midline shift.       3.  Mild chronic small vessel ischemic disease.                 VISITING DIAGNOSIS:      ICD-10-CM    1. Cerebrovascular accident (CVA) due to embolism of left middle cerebral artery (HCC)  I63.412    2. Chronic cerebral ischemia  I67.82    3. At high risk for stroke  Z91.89    4. Left homonymous hemianopsia  H53.462    5. Cerebrovascular accident (CVA) due to embolism of right posterior cerebral artery ([facility] [address]  I63.431    6. PTSD (post-traumatic stress disorder)  F43.10    7.  Kidney stone on left side  N20.0    8. Bilateral hemianopia  H53.47    9. Bilateral carotid artery stenosis  I65.23    10. Balance problem  R26.89    11. Permanent atrial fibrillation (HCC)  I48.21    12. Risk for falls  Z91.81    13. Gait difficulty  R26.9    14. Stroke, recent, without late effect  Z86.73    15. Pharyngoesophageal dysphagia  R13.14    16. Memory problem  R41.3    17. Mixed hyperlipidemia  E78.2    18. History of ischemic posterior cerebral artery stroke  Z86.73    19. H/O ischemic left MCA stroke  Z86.73    20. Ischemic stroke ([address]  I63.9    21. Atrial fibrillation, unspecified type (HCC)  I48.91                   CONCERNS   &   INCREASED   RISK   FOR         * TIA,  CEREBRO  VASCULAR  ISCHEMIA,STROKE     *   DIZZINESS,   VERTEBROBASILAR  INSUFFICIENCY ,         *   COGNITIVE  &   MEMORY PROBLEMS  AND  DEMENTIAS        *  GAIT  DIFFICULTIES  &   BALANCE PROBLMES   AND  FALL                VARIOUS  RISK   FACTORS   WERE  REVIEWED   AND   DISCUSSED. *  PATIENT   HAS  MULTIPLE   MEDICAL, NEUROLOGICAL                 PROBLEMS . PATIENT'S   MANAGEMENT  IS  CHALLENGING. PLAN:                         Levora Fairly  Of  The  Diagnoses,  The  Management & Treatment  Options            AND    Care  plan  Were          Reviewed and   Discussed   With  patient. * Goals  And  Expectations  Of  The  Therapy  Discussed   And  Reviewed. *   Benefits   And   Side  Effect  Profile  Of  Medication/s   Were   Discussed                * Need   For  Further   Follow up For  The  Various  Problems Were  discussed. * Results  Of  The  Previous  Diagnostic tests were reviewed and  discussed                 Medical  Decision  Making  Was  Made  Based on the   Complexity  Of  Above  Mentioned  Diagnoses,    Data reviewed             And    Risk  Of  Significant   Co morbidities and   complicating   Factors.                  Medical  Decision  Was   High     Complexity   Due   To The  Patient's  Multiple  Symptoms,  Advancing   Disease,      Complex  Treatment  Regimen,  Multiple medications           and   Risk  Of   Side  Effects,  Difficulty  In  Medication  Management  And  Diagnostic  Challenges       In  View  Of  The  Associated   Co  Morbid  Conditions   And  Problems. * FALL   PRECAUTIONS. THESE  REVIEWED   AND  DISCUSSED    *  USE   WALKING  ASSISTANCE  DEVICES      /   WALKER          *    SUPERVISED   CARE    *   ABSOLUTELY   NO  DRIVING      *   BE  CAREFUL  WITH  ACTIVITIES           *   ADEQUATE   FLUIDINTAKE   AND  ELECTROLYTE  BALANCE             * KEEP  DAIRY  OF   THE  NEUROLOGICAL  SYMPTOMS        RECORDING THE    DURATION  AND  FREQUENCY. *  AVOID    CONDITIONS  AND  FACTORS   THAT  MAKE                  NEUROLOGICAL  SYMPTOMS  WORSE.              *TO  MAINTAIN  REGULAR  SLEEP  WAKE  CYCLES. *   TO  HAVE  ADEQUATE  REST  AND   SLEEP    HOURS.              *    AVOID  ANY USAGE OF    TOBACCO,              EXCESSIVE  ALCOHOL  AND   ILLEGAL   SUBSTANCES        *  CONTINUE   MEDICATIONS    PRESCRIBED      AS    RECOMMENDED       *   Compliance   With  Medications   And  Instructions            * CURRENTLY    TOLERATING  THE  PRESCRIBED   MEDICATIONS. WITHOUT  ANY  SIGNIFICANT  SIDE  EFFECTS   &  GETTING BENEFIT.             *    Antiplatelet  therapy    As   Recommended  Was   Discussed      *    Prophylactic  Use   Of     Vitamin   B   Complex,  Folic  Acid,    Vitamin  B12    Multivitamin,       Calcium  With  magnesium  And  Vit D    Supplementations   Over  The  Counter  Discussed                   *  PATIENT  IS  ALSO   COUNSELED   TO  KEEP    ACTIVITIES:         A)   SIMPLE      B)  ORGANIZED      C)  WRITEDOWN                     *  EVALUATIONS  AND  FOLLOW UP:                * PHYSICAL  THERAPY                                 *CARDIOLOGY              *   OPTHALMOLOGY                                    *    POST  STROKE SYMPTOMS                          A)    GAIT  DIFFICULTY                         B)  BALANCE  PROBLEMS                                            C)    RESIDUAL   LEFT  VISUAL  FIELD  DEFECTS                        D)    MEMORY  PROBLEMS                                        -     IMPROVED   SIGNIFICANTLY                        *         FOLLOW  UP     CT   HEAD     ON   2/27/2020    SHOWED  :                 \"  Generalized atrophy                   encephalomalacial changes within the right Temporal occipital region,                   and posterior left frontal lobe                   No acute intraparenchymal hemorrhage  \"                                   *            PATIENT       ON     ASA    81  MG       DAILY                                                  AND  PRADAXA                                        -     TOLERATING  THE  SAME                         *   PATIENT    AND  HIS  WIFE   DENIES  ANY  NEW  NEUROLOGICAL    CONCERNS. *PATIENT   TO  FOLLOW  UP  WITH   PRIMARY  CARE         OTHER  CONSULTANTS  AS  BEFORE. *  Maintain   Healthy  Life Style    With   Periodic  Monitoring  Of      Any  Medical  Conditions  Including   Elevated  Blood  Pressure,  Lipid  Profile,     Blood  Sugar levels  AndHeart  Disease. *   Period   Screening  For  Cancers  Involving  Breast,  Colon,    lungs  And  Other  Organs  As  Applicable,  In consultation   With  Your  Primary Care Providers. *Second  Neurological  Opinion  And  Evaluations  In  [facility]  Setting  If  Patient  Is  Interested. * Please   Contact   Neurology  Clinic   Early   If   Are  Any  New  Neurological   And  Any neurological  Concerns.                   *  If  The  Patient remains  Neurologically  Stable   Return   To  [facility] Neurology Department   IN     3 - 6         MONTHS  TIME   FOR  FURTHER FOLLOW UP.                       *   The  Neurological   Findings,  Possible  Diagnosis,  Differential diagnoses                    And  Options  For    Further   Investigations                   And  management   Are  Discussed  Comprehensively. Medications   And  Prescription   Risks  And  Side effects  Are   Also  Discussed. *  If   There is  Any  Significant  Worsening   Of  Current  Symptoms  And  Or                  If patient  Develops   Any additional  New  NeurologicalSymptoms                  Or  Significant  Concerns   Should  Call  911 or  Go  To  Emergency  Department  For  Further  Immediate  Evaluation. The   Above  Were  Reviewed  With  patient   And   HIS  WIFE                          questions  Answered  In  Detail. More   Than  50% of face  To face Time   Was  Spent  On  Counseling                    And   Coordination  Of  Care   Of   Patient's  multiple   Neurological  Problems                         And   Comorbid  Medical   Conditions. Electronically signed by Brando Espinoza MD., [facility]      Board Certified in  Neurology &  In  [employer] [address] [employer] ([employer])      DISCLAIMER:   Although every effort was made to ensure the accuracy of this  electronictranscription, some errors in transcription may have occurred. GENERAL PATIENT INSTRUCTIONS:      A Healthy Lifestyle: Care Instructions   Your Care Instructions   A healthy lifestyle can help you feel good, stay at ahealthy weight, and have plenty of energy for both work and play. A healthy lifestyle is something you can share with your whole family.  A healthy lifestyle also can lower your risk for serious health problems, such ashigh blood pressure, heart disease, and diabetes.  You can follow a few steps listed below to improve your health and the health of your family.    Follow-up careis a key part of your treatment and safety. Be sure to make and go to all appointments, and call your doctor if you are having problems. Its also a good idea to know your test results and keep a list of the medicines you take.  How can you care for yourself at home?  Do not eat too much sugar, fat, or fast foods. You can still have dessert and treats nowand then. The goal is moderation.  Start small to improve your eating habits. Pay attention to portion sizes, drink less juice and soda pop, and eat more fruits and vegetables.  Eat a healthy amount of food. A 3-ounce serving of meat, for example, is about the size of a deck of cards. Fill the rest of your plate with vegetables and whole grains.  Limit theamount of soda and sports drinks you have every day. Drink more water when you are thirsty.  Eat at least 5 servings of fruits and vegetables every day. It may seem like a lot, but it is not hard to reach this goal. Aserving or helping is 1 piece of fruit, 1 cup of vegetables, or 2 cups of leafy, raw vegetables. Have an apple or some carrot sticks as an afternoon snack instead of a candy bar. Try to have fruits and/or vegetables at everymeal.   Make exercise part of your daily routine. You may want to start with simple activities, such as walking, bicycling, or slow swimming. Try marietta active 30 to 60 minutes every day. You do not need to do all 30 to 60 minutes all at once. For example, you can exercise 3 times a day for 10 or 20 minutes. Moderate exercise is safe for most people, but it is always agood idea to talk to your doctor before starting an exercise program.   Keep moving. Cj Dirk the lawn, work in the garden, or [employer]. Take the stairs instead of the elevator at work.  If you smoke, quit. Peoplewho smoke have an increased risk for heart attack, stroke, cancer, and other lung illnesses. Quitting is hard, but there are ways to boost your chance of quitting tobacco for good.    Use nicotine gum, patches, or lozenges.  Ask your doctor about stop-smoking programs and medicines.  Keep trying.  In addition to reducing your risk of diseases in the future, you will notice some benefits soon after you stop using tobacco. If you have shortness of breath or asthma symptoms, they will likely getbetter within a few weeks after you quit.  Limit how much alcohol you drink. Moderate amounts of alcohol (up to 2 drinks a day for men, 1drink a day for women) are okay. But drinking too much can lead to liver problems, high blood pressure, and other health problems.  health   If you have a family, there are many things you can do together to improve your health.  Eat meals together as a family as often as possible.  Eat healthy foods. This includes fruits, vegetables, lean meats and dairy, and whole grains.  Include your family in your fitness plan. Most peoplethink of activities such as jogging or tennis as the way to fitness, but there are many ways you and your family can be more active. Anything that makes you breathe hard and gets your heart pumping is exercise. Here are sometips:   Walk to do errands or to take your child to school or the bus.  Go for a family bike ride after dinner instead of watching TV.  Where can you learn more?  Go toNo Paper Just [email or web address] and sign in to your Healthbox account. Enter M311 in the Search HealthInformation box to learn more about \"A Healthy Lifestyle: Care Instructions. \"     If you do not have anaccount, please click on the \"Sign Up Now\" link.  Current as of: July 26, 2016   Content Version: 11.2   © 7181-7806 [employer]. Care instructions adapted under license by [facility] ([facility]). If you have questions about a medical condition or this instruction, always ask your healthcare professional. [employer] disclaims any warranty or liability for your use of this information.

## 2021-01-17 NOTE — PROGRESS NOTES
JUSTUS Bal MD        [address] [address]  Dept: [phone]  Dept Fax: [phone]  Loc: [phone]      [facility] Urology Office Note - Follow up Visit    Patient:  Gean Osler  YOB: 1950    The patient is a 69 y.o. male who presents today for evaluation of the following problems: BPH,kidney stones  Chief Complaint   Patient presents with    Nephrolithiasis     discuss stone tx        HISTORY OF PRESENT ILLNESS:     Kidney stones  Onset was  Months ago  Overall, the problem(s) are unchanged. Severity is described as moderate. Associated Symptoms: No dysuria, some gross hematuria. Current Pain Severity: 3    stone very dense and lower pole stone difficult to access with URS  Has horseshoe kidney with 3-4 stones > 1 cm. Total aggregate > 3.5 cm  Unsuccessful nephrostomy tube access at [address] [place] [address] was made to avoid percutaneous nephrolithotomy. Retrograde pyelogram and stent were placed  Patient is here to discuss treatment options moving forward    Secondary Diagnosis:    BPH-stable. Voiding well without a catheter. Has had greenlight surgery in the past    Elevated PSA-was normal in May of last year. Elevated after retention episode. Still needs PSA recheck        Requested/reviewed records from [facility] office and/or outside physician/EMR    (Patient's old records have been requested, reviewed and pertinent findings summarized in today's note.)    Procedures Today: N/A    Last several PSA's:  Lab Results   Component Value Date    PSA 35.88 (H) 07/20/2020    PSA 3.93 05/20/2020       Last total testosterone:  No results found for: TESTOSTERONE    Urinalysis today:  No results found for this visit on 01/04/21.     Last BUN and creatinine:  Lab Results   Component Value Date    BUN 15 12/19/2020     Lab Results   Component Value Date    CREATININE 0.89 12/19/2020       Additional Lab/Culture results: none    Imaging Reviewed during this Office Visit:   Santos Funk MD independently reviewed the images and verified the radiology reports from:    Ir Guided Nephrostomy Cath Placement Left    Result Date: 12/18/2020  PROCEDURE: PERCUTANEOUS ANTEGRADE PYELOGRAM ULTRASOUND GUIDANCE 12/18/2020 HISTORY: ORDERING SYSTEM PROVIDED HISTORY: Left nephrolithiasis SEDATION: Monitored anesthesia care provided by the anesthesia service. CONTRAST: 50 mL Optiray injected into the urinary system FLUOROSCOPY DOSE AND TYPE OR TIME AND EXPOSURES: Fluoro time: 30.1 minutes DAP: 2801.01 cGycm2 TECHNIQUE Informed consent was obtained by the referring service. Universal protocol was followed. A time-out was performed confirming the correct patient and procedure. The patient's left flank was prepped and draped in sterile fashion and local anesthesia was achieved with lidocaine. Retrograde pyelogram was performed through the ureteral occlusion balloon previously placed by the Urology service. A 21 gauge needle was advanced into a mid pole calyx using ultrasound and fluoroscopic guidance and a percutaneous antegrade pyelogram was performed. A 0.018 guidewire was advanced through the needle but could not be advanced into the renal pelvis. The ureteral occlusion balloon was retracted into the ureter and insufflated. Retrograde pyelogram was performed and retroperitoneal extravasation of contrast was identified and the balloon was deflated. The remaining mid and upper pole calices were each accessed under fluoroscopic guidance. Antegrade pyelograms were performed at each site demonstrating opacification of the collecting system. Wire access was attempted at each location but the renal pelvis was not successfully accessed. Patient tolerated the procedure well.   Estimated blood loss: Less than 5 mL FINDINGS: Multiple antegrade pyelograms performed through mid and upper pole access demonstrate opacification of the collecting system. However, wire access could not be obtained into the renal pelvis. Retroperitoneal contrast extravasation identified after inflation of the ureteral occlusion balloon. The balloon was immediately deflated and left deflated through the remainder of the case. Unsuccessful left nephroureteral access. Ureteral injury secondary to insufflation of the ureteral occlusion balloon. Fluoro For Surgical Procedures    Result Date: 12/18/2020  Radiology exam is complete. No Radiologist dictation. Please follow up with ordering provider. Fluoro For Surgical Procedures    Result Date: 12/18/2020  Radiology exam is complete. No Radiologist dictation. Please follow up with ordering provider. PAST MEDICAL, FAMILY AND SOCIAL HISTORY:  Past Medical History:   Diagnosis Date    Atrial fibrillation ([address] 01/2020    Blood in urine     Chronic kidney disease     CKD (chronic kidney disease)     History of ischemic stroke in prior three months     Hyperlipidemia     Ischemic stroke ([address] 01/10/2020    Kidney stones     Nihss score 10     PTSD (post-traumatic stress disorder)     from war, Agent Orange     Skin tag 12/21/2018    Stroke ([address] 01/16/2020    MRI Brain WO: New right PCA distribution infarct, multifocal left MCA distribution infarcts    Stroke ([address] 01/11/2020    large vessel occlusion/left M2 occlusion.   He subsequently underwent emergent thrombectomy.  /  S/P TPA DONE     Past Surgical History:   Procedure Laterality Date    APPENDECTOMY      CYSTOSCOPY Left 10/19/2020    CYSTOSCOPY, LEFT URETEROSCOPY w/ HOMIUM LASER LITHOTRIPSY, LEFT URETERAL STENT PLACEMENT performed by Choco Macedo MD at [address] [place] [address] Drive Left 12/18/2020    cystoscopy, left stent placement, left retrograde pyelogram    CYSTOSCOPY Left 12/18/2020    CYSTOSCOPY, LEFT STENT REMOVAL, LEFT RETROGRADE PYELOGRAM, INSERTION OCCLUSIVE BALLOON - PT TO INTERV RADIOLOGY FOLLOWING performed by Omega Tyson MD at [facility] [address] IR NEPHROSTOMY PERCUTANEOUS LEFT  12/18/2020    IR NEPHROSTOMY PERCUTANEOUS LEFT 12/18/2020 [place] [facility]    LITHOTRIPSY      NEPHROLITHOTOMY Left 12/18/2020    cystoscopy, left stent placement, left retrograde pyelogram performed by Omega Tyson MD at [facility] TRANSESOPHAGEAL ECHOCARDIOGRAM  01/17/2020    TURP N/A 08/10/2020    CYSTO w/ Greenlight Laser performed by Omega Tyson MD at [address] [place] [address] History   Problem Relation Age of Onset    Heart Disease Mother     Heart Disease Father     Stroke Father     Stroke Brother      Outpatient Medications Marked as Taking for the 1/4/21 encounter (Office Visit) with Omega Tyson MD   Medication Sig Dispense Refill    docusate sodium (COLACE) 100 MG capsule Take 1 capsule by mouth 2 times daily (Patient not taking: Reported on 1/15/2021) 30 capsule 1    tamsulosin (FLOMAX) 0.4 MG capsule Take 1 capsule by mouth daily 30 capsule 3    [DISCONTINUED] oxybutynin (DITROPAN-XL) 10 MG extended release tablet Take 10 mg by mouth daily      tamsulosin (FLOMAX) 0.4 MG capsule Take 1 capsule by mouth daily 30 capsule 11    [DISCONTINUED] enoxaparin (LOVENOX) 80 MG/0.8ML injection Inject into the skin 2 times daily Indications: afib/ PreOp x6 doses      dabigatran (PRADAXA) 150 MG capsule Take 150 mg by mouth 2 times daily      aspirin 81 MG EC tablet Take 81 mg by mouth daily      famotidine (PEPCID) 20 MG tablet Take 1 tablet by mouth 2 times daily (Patient not taking: Reported on 1/15/2021) 60 tablet 0    latanoprost (XALATAN) 0.005 % ophthalmic solution Place 1 drop into both eyes nightly      Cholecalciferol (VITAMIN D3) 25 MCG (1000 UT) CAPS Take 2,000 Units by mouth daily      Omega-3 Fatty Acids (FISH OIL) 1000 MG CAPS Take 1,000 mg by mouth 2 times daily      atorvastatin (LIPITOR) 40 MG tablet Take 1 tablet by mouth nightly 30 tablet 3    VITAMIN B1-B12 IM Inject into the muscle every 30 days          Patient has no known allergies. Social History     Tobacco Use   Smoking Status Never Smoker   Smokeless Tobacco Never Used      (If patient a smoker, smoking cessation counseling offered)   Social History     Substance and Sexual Activity   Alcohol Use Not Currently    Alcohol/week: 0.0 standard drinks       REVIEW OF SYSTEMS:  Constitutional: negative  Eyes: negative  Respiratory: negative  Cardiovascular: negative  Gastrointestinal: negative  Genitourinary: see HPI  Musculoskeletal: negative  Skin: negative   Neurological: negative  Hematological/Lymphatic: negative  Psychological: negative        Physical Exam:    This a 69 y.o. male  Vitals:    01/04/21 1321   BP: 122/72   Pulse: 91   SpO2: 98%     Body mass index is 27.1 kg/m². Constitutional: Patient in no acute distress;       Assessment and Plan        1. Kidney stone on left side    2. Horseshoe kidney    3. Benign prostatic hyperplasia with urinary obstruction    4. Gross hematuria    5. Elevated PSA               Plan: Will monitor kidney stones and horseshoe kidney  Patient is voiding well  Recheck PSA in 3 months  Will need stent removal in 4 to 6 weeks      Prescriptions Ordered:  No orders of the defined types were placed in this encounter. Orders Placed:  No orders of the defined types were placed in this encounter.            Cecelia Nowak MD

## 2021-01-19 ENCOUNTER — TELEPHONE (OUTPATIENT)
Dept: PREADMISSION TESTING | Age: 71
End: 2021-01-19

## 2021-01-27 ENCOUNTER — HOSPITAL ENCOUNTER (OUTPATIENT)
Dept: PREADMISSION TESTING | Age: 71
Setting detail: SPECIMEN
Discharge: HOME | End: 2021-01-31
Payer: MEDICARE

## 2021-01-27 DIAGNOSIS — Z11.59 ENCOUNTER FOR SCREENING FOR OTHER VIRAL DISEASES: Primary | ICD-10-CM

## 2021-01-27 PROCEDURE — U0003 INFECTIOUS AGENT DETECTION BY NUCLEIC ACID (DNA OR RNA); SEVERE ACUTE RESPIRATORY SYNDROME CORONAVIRUS 2 (SARS-COV-2) (CORONAVIRUS DISEASE [COVID-19]), AMPLIFIED PROBE TECHNIQUE, MAKING USE OF HIGH THROUGHPUT TECHNOLOGIES AS DESCRIBED BY CMS-2020-01-R: HCPCS

## 2021-01-29 LAB — SARS-COV-2, NAA: NOT DETECTED

## 2021-01-30 ENCOUNTER — TELEPHONE (OUTPATIENT)
Dept: PRIMARY CARE CLINIC | Age: 71
End: 2021-01-30

## 2021-02-01 ENCOUNTER — HOSPITAL ENCOUNTER (OUTPATIENT)
Age: 71
Setting detail: OUTPATIENT SURGERY
Discharge: HOME | End: 2021-02-01
Attending: UROLOGY | Admitting: UROLOGY
Payer: MEDICARE

## 2021-02-01 ENCOUNTER — ANESTHESIA (OUTPATIENT)
Dept: OPERATING ROOM | Age: 71
End: 2021-02-01
Payer: MEDICARE

## 2021-02-01 ENCOUNTER — APPOINTMENT (OUTPATIENT)
Dept: GENERAL RADIOLOGY | Age: 71
End: 2021-02-01
Attending: UROLOGY
Payer: MEDICARE

## 2021-02-01 ENCOUNTER — ANESTHESIA EVENT (OUTPATIENT)
Dept: OPERATING ROOM | Age: 71
End: 2021-02-01
Payer: MEDICARE

## 2021-02-01 VITALS
BODY MASS INDEX: 28.03 KG/M2 | DIASTOLIC BLOOD PRESSURE: 58 MMHG | OXYGEN SATURATION: 97 % | TEMPERATURE: 97.6 F | HEIGHT: 67 IN | HEART RATE: 65 BPM | WEIGHT: 178.6 LBS | SYSTOLIC BLOOD PRESSURE: 123 MMHG | RESPIRATION RATE: 16 BRPM

## 2021-02-01 VITALS — DIASTOLIC BLOOD PRESSURE: 56 MMHG | SYSTOLIC BLOOD PRESSURE: 97 MMHG | OXYGEN SATURATION: 84 %

## 2021-02-01 PROCEDURE — 6360000004 HC RX CONTRAST MEDICATION: Performed by: UROLOGY

## 2021-02-01 PROCEDURE — 3700000000 HC ANESTHESIA ATTENDED CARE: Performed by: UROLOGY

## 2021-02-01 PROCEDURE — C1769 GUIDE WIRE: HCPCS | Performed by: UROLOGY

## 2021-02-01 PROCEDURE — 7100000010 HC PHASE II RECOVERY - FIRST 15 MIN: Performed by: UROLOGY

## 2021-02-01 PROCEDURE — 3600000012 HC SURGERY LEVEL 2 ADDTL 15MIN: Performed by: UROLOGY

## 2021-02-01 PROCEDURE — 6360000002 HC RX W HCPCS: Performed by: NURSE ANESTHETIST, CERTIFIED REGISTERED

## 2021-02-01 PROCEDURE — 2500000003 HC RX 250 WO HCPCS: Performed by: NURSE ANESTHETIST, CERTIFIED REGISTERED

## 2021-02-01 PROCEDURE — 3700000001 HC ADD 15 MINUTES (ANESTHESIA): Performed by: UROLOGY

## 2021-02-01 PROCEDURE — 3209999900 FLUORO FOR SURGICAL PROCEDURES

## 2021-02-01 PROCEDURE — 2709999900 HC NON-CHARGEABLE SUPPLY: Performed by: UROLOGY

## 2021-02-01 PROCEDURE — 6360000002 HC RX W HCPCS: Performed by: UROLOGY

## 2021-02-01 PROCEDURE — 2580000003 HC RX 258: Performed by: NURSE ANESTHETIST, CERTIFIED REGISTERED

## 2021-02-01 PROCEDURE — 7100000011 HC PHASE II RECOVERY - ADDTL 15 MIN: Performed by: UROLOGY

## 2021-02-01 PROCEDURE — 3600000002 HC SURGERY LEVEL 2 BASE: Performed by: UROLOGY

## 2021-02-01 PROCEDURE — C1758 CATHETER, URETERAL: HCPCS | Performed by: UROLOGY

## 2021-02-01 PROCEDURE — 2580000003 HC RX 258: Performed by: UROLOGY

## 2021-02-01 RX ORDER — SODIUM CHLORIDE 0.9 % (FLUSH) 0.9 %
10 SYRINGE (ML) INJECTION EVERY 12 HOURS SCHEDULED
Status: DISCONTINUED | OUTPATIENT
Start: 2021-02-01 | End: 2021-02-01 | Stop reason: HOSPADM

## 2021-02-01 RX ORDER — SODIUM CHLORIDE, SODIUM LACTATE, POTASSIUM CHLORIDE, CALCIUM CHLORIDE 600; 310; 30; 20 MG/100ML; MG/100ML; MG/100ML; MG/100ML
INJECTION, SOLUTION INTRAVENOUS CONTINUOUS
Status: DISCONTINUED | OUTPATIENT
Start: 2021-02-01 | End: 2021-02-01 | Stop reason: HOSPADM

## 2021-02-01 RX ORDER — SODIUM CHLORIDE, SODIUM LACTATE, POTASSIUM CHLORIDE, CALCIUM CHLORIDE 600; 310; 30; 20 MG/100ML; MG/100ML; MG/100ML; MG/100ML
INJECTION, SOLUTION INTRAVENOUS CONTINUOUS PRN
Status: DISCONTINUED | OUTPATIENT
Start: 2021-02-01 | End: 2021-02-01 | Stop reason: SDUPTHER

## 2021-02-01 RX ORDER — SODIUM CHLORIDE 0.9 % (FLUSH) 0.9 %
10 SYRINGE (ML) INJECTION PRN
Status: DISCONTINUED | OUTPATIENT
Start: 2021-02-01 | End: 2021-02-01 | Stop reason: HOSPADM

## 2021-02-01 RX ORDER — FENTANYL CITRATE 50 UG/ML
INJECTION, SOLUTION INTRAMUSCULAR; INTRAVENOUS PRN
Status: DISCONTINUED | OUTPATIENT
Start: 2021-02-01 | End: 2021-02-01 | Stop reason: SDUPTHER

## 2021-02-01 RX ORDER — PROPOFOL 10 MG/ML
INJECTION, EMULSION INTRAVENOUS CONTINUOUS PRN
Status: DISCONTINUED | OUTPATIENT
Start: 2021-02-01 | End: 2021-02-01 | Stop reason: SDUPTHER

## 2021-02-01 RX ORDER — MIDAZOLAM HYDROCHLORIDE 1 MG/ML
INJECTION INTRAMUSCULAR; INTRAVENOUS PRN
Status: DISCONTINUED | OUTPATIENT
Start: 2021-02-01 | End: 2021-02-01 | Stop reason: SDUPTHER

## 2021-02-01 RX ORDER — LIDOCAINE HYDROCHLORIDE 20 MG/ML
INJECTION, SOLUTION EPIDURAL; INFILTRATION; INTRACAUDAL; PERINEURAL PRN
Status: DISCONTINUED | OUTPATIENT
Start: 2021-02-01 | End: 2021-02-01 | Stop reason: SDUPTHER

## 2021-02-01 RX ORDER — CIPROFLOXACIN 2 MG/ML
400 INJECTION, SOLUTION INTRAVENOUS
Status: COMPLETED | OUTPATIENT
Start: 2021-02-01 | End: 2021-02-01

## 2021-02-01 RX ORDER — PROPOFOL 10 MG/ML
INJECTION, EMULSION INTRAVENOUS PRN
Status: DISCONTINUED | OUTPATIENT
Start: 2021-02-01 | End: 2021-02-01 | Stop reason: SDUPTHER

## 2021-02-01 RX ADMIN — SODIUM CHLORIDE, POTASSIUM CHLORIDE, SODIUM LACTATE AND CALCIUM CHLORIDE: 600; 310; 30; 20 INJECTION, SOLUTION INTRAVENOUS at 12:32

## 2021-02-01 RX ADMIN — PROPOFOL 40 MG: 10 INJECTION, EMULSION INTRAVENOUS at 12:36

## 2021-02-01 RX ADMIN — LIDOCAINE HYDROCHLORIDE 100 MG: 20 INJECTION, SOLUTION EPIDURAL; INFILTRATION; INTRACAUDAL; PERINEURAL at 12:36

## 2021-02-01 RX ADMIN — FENTANYL CITRATE 50 MCG: 50 INJECTION, SOLUTION INTRAMUSCULAR; INTRAVENOUS at 12:36

## 2021-02-01 RX ADMIN — CIPROFLOXACIN 400 MG: 2 INJECTION, SOLUTION INTRAVENOUS at 12:38

## 2021-02-01 RX ADMIN — FENTANYL CITRATE 50 MCG: 50 INJECTION, SOLUTION INTRAMUSCULAR; INTRAVENOUS at 12:31

## 2021-02-01 RX ADMIN — PROPOFOL 125 MCG/KG/MIN: 10 INJECTION, EMULSION INTRAVENOUS at 12:36

## 2021-02-01 RX ADMIN — SODIUM CHLORIDE, SODIUM LACTATE, POTASSIUM CHLORIDE, AND CALCIUM CHLORIDE: .6; .31; .03; .02 INJECTION, SOLUTION INTRAVENOUS at 12:14

## 2021-02-01 RX ADMIN — MIDAZOLAM HYDROCHLORIDE 1 MG: 1 INJECTION, SOLUTION INTRAMUSCULAR; INTRAVENOUS at 12:36

## 2021-02-01 RX ADMIN — MIDAZOLAM HYDROCHLORIDE 1 MG: 1 INJECTION, SOLUTION INTRAMUSCULAR; INTRAVENOUS at 12:31

## 2021-02-01 ASSESSMENT — PULMONARY FUNCTION TESTS
PIF_VALUE: 0

## 2021-02-01 ASSESSMENT — PAIN SCALES - GENERAL
PAINLEVEL_OUTOF10: 0

## 2021-02-01 NOTE — OP NOTE
Patient:  Princess Esteban  MRN: [id number]  YOB: 1950    FACILITY: [place], [place]    DATE:2/1/2021    SURGEON: Sourav Barrera MD     ASSISTANT:none    PREOPERATIVE DIAGNOSIS: left kidney stone    POSTOPERATIVE DIAGNOSIS: left kidney stone    PROCEDURE PERFORMED:   1. Cystourethroscopy  2. Left retrograde pyelogram  3. Left stent removal    ANESTHESIA: Monitor Anesthesia Care    ESTIMATED BLOOD LOSS: 0 ml    COMPLICATIONS: None immediate    DRAINS: none    SPECIMENS: none    INDICATIONS FOR PROCEDURE:  The patient is a 69 y.o. male who presents today with left kidney stone here for CYSTO Left URETEROSCOPY w/ stent removal ET RETROGRADE. After risks, benefits and alternatives of the procedure were discussed with the patient, the patient elected to proceed. DETAILS OF THE PROCEDURE:  The patient was correctly identified in the preoperative holding area. The patient was brought back to the operating room and placed in the dorsal lithotomy position. Anesthesia was administered; antibiotics administered by Anesthesia. EPC cuffs were on and functional. The patient was then prepped and draped in the usual sterile fashion. Once an appropriate time out had been performed, with all parties consenting, a 25 Malaysian cystoscope with a 30-degree lens was placed through the urethra into the bladder. The Left ureteral orifice was cannulated with a 5 Malaysian ureteral catheter after removal of the indwelling stent. Contrast was injected and the Left ureter and renal pelvis were identified, with  no abnormalities or filling defects. No collecting system injury. Stones noted in dependent calyces. A thorough and complete cystoscopy was performed with no abnormalities involving the bladder or mucosa. The bladder was drained and the cystoscope was removed. The patient tolerated the procedure well and was sent to the PACU for postoperative monitoring.       Plan:  The patient was discharged home in

## 2021-02-01 NOTE — ANESTHESIA PRE PROCEDURE
Department of Anesthesiology  Preprocedure Note       Name:  Nick Metcalf   Age:  69 y.o.  :  1950                                          MRN:  [id number]         Date:  2021      Surgeon: Qing Coles):  Shannan Guerra MD    Procedure: Procedure(s):  Cysto, Left Ureteroscopy and Stent removal    Medications prior to admission:   Prior to Admission medications    Medication Sig Start Date End Date Taking?  Authorizing Provider   oxybutynin (DITROPAN-XL) 10 MG extended release tablet Take 1 tablet by mouth daily 21   Sterling Tyson MD   docusate sodium (COLACE) 100 MG capsule Take 1 capsule by mouth 2 times daily  Patient not taking: Reported on 1/15/2021 12/19/20   Pantera Silver MD   tamsulosin (FLOMAX) 0.4 MG capsule Take 1 capsule by mouth daily 20  Pantera Silver MD   tamsulosin (FLOMAX) 0.4 MG capsule Take 1 capsule by mouth daily 10/19/20   Shannan Guerra MD   dabigatran (PRADAXA) 150 MG capsule Take 150 mg by mouth 2 times daily    Historical Provider, MD   aspirin 81 MG EC tablet Take 81 mg by mouth daily    Historical Provider, MD   famotidine (PEPCID) 20 MG tablet Take 1 tablet by mouth 2 times daily  Patient not taking: Reported on 1/15/2021 8/5/20   Jaylen Peguero   latanoprost (XALATAN) 0.005 % ophthalmic solution Place 1 drop into both eyes nightly 4/15/20   Historical Provider, MD   EPINEPHrine (EPIPEN 2-FARAZ) 0.3 MG/0.3ML SOAJ injection Inject 0.3 mLs into the muscle once for 1 dose Use as directed for allergic reaction  Patient not taking: Reported on 2020 3/3/20 7/29/20  Sterling Tyson MD   Cholecalciferol (VITAMIN D3) 25 MCG (1000 UT) CAPS Take 2,000 Units by mouth daily    Historical Provider, MD   Omega-3 Fatty Acids (FISH OIL) 1000 MG CAPS Take 1,000 mg by mouth 2 times daily    Historical Provider, MD   atorvastatin (LIPITOR) 40 MG tablet Take 1 tablet by mouth nightly 20   Rosana Botello MD   VITAMIN B1-B12 IM Inject into the muscle every 30 days     Historical Provider, MD Current medications:    No current facility-administered medications for this visit. No current outpatient medications on file. Facility-Administered Medications Ordered in Other Visits   Medication Dose Route Frequency Provider Last Rate Last Admin    sodium chloride flush 0.9 % injection 10 mL  10 mL Intravenous 2 times per day Jeromy Shipman MD        sodium chloride flush 0.9 % injection 10 mL  10 mL Intravenous PRN Jeromy Shipman MD        ceFAZolin (ANCEF) 2000 mg in sterile water 20 mL IV syringe  2,000 mg Intravenous On Call to [facility], MD        ciprofloxacin (CIPRO) IVPB 400 mg  400 mg Intravenous On Call to [facility], MD        lactated ringers infusion   Intravenous Continuous Jeromy Shipman MD           Allergies:  No Known Allergies    Problem List:    Patient Active Problem List   Diagnosis Code    Corneal foreign body, right, initial encounter T15. 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    Combined forms of age-related cataract of both eyes H25.813    Acute cerebrovascular accident (CVA) ([facility] I63.9    Tissue plasminogen activator (tPA) administered at other facility within 24 hours before current admission Z92.82    Permanent atrial fibrillation (HCC) I48.21    Acute ischemic stroke (HCC) I63.9    Left homonymous hemianopsia H53.462    Hyperlipidemia E78.5    Stroke, recent, without late effect Z86.73    Bilateral carotid artery stenosis I65.23    Chronic cerebral ischemia I67.82    Memory problem R41.3    Gait difficulty R26.9    Balance problem R26.89    At high risk for stroke Z91.89    Risk for falls Z91.81    Cerebrovascular accident (CVA) due to embolism of right posterior cerebral artery (HCC) I63.431    Bilateral hemianopia H53.47    Dysphagia R13.10    Hematuria R31.9    PTSD (post-traumatic stress disorder) F43.10    Acute urinary retention R33.8    Horseshoe kidney Q63.1    Kidney stone on left side N20.0    Severe malnutrition (HCC) E43    History of ischemic posterior cerebral artery stroke Z86.73    Cerebrovascular accident (CVA) due to embolism of left middle cerebral artery (HCC) I63.412    H/O ischemic left MCA stroke Z86.73    Ischemic stroke ([facility] I63.9    Atrial fibrillation ([facility] I48.91       Past Medical History:        Diagnosis Date    Atrial fibrillation ([facility] 01/2020    Blood in urine     Chronic kidney disease     CKD (chronic kidney disease)     History of ischemic stroke in prior three months     Hyperlipidemia     Ischemic stroke ([facility] 01/10/2020    Kidney stones     Nihss score 10     PTSD (post-traumatic stress disorder)     from war, Agent Orange     Skin tag 12/21/2018    Stroke ([facility] 01/16/2020    MRI Brain WO: New right PCA distribution infarct, multifocal left MCA distribution infarcts    Stroke ([facility] 01/11/2020    large vessel occlusion/left M2 occlusion.   He subsequently underwent emergent thrombectomy.  /  S/P TPA DONE       Past Surgical History:        Procedure Laterality Date    APPENDECTOMY      CYSTOSCOPY Left 10/19/2020    CYSTOSCOPY, LEFT URETEROSCOPY w/ HOMIUM LASER LITHOTRIPSY, LEFT URETERAL STENT PLACEMENT performed by Monae Rose MD at [address] [place] [address] Left 12/18/2020    cystoscopy, left stent placement, left retrograde pyelogram    CYSTOSCOPY Left 12/18/2020    CYSTOSCOPY, LEFT STENT REMOVAL, LEFT RETROGRADE PYELOGRAM, INSERTION OCCLUSIVE BALLOON - PT TO INTERV RADIOLOGY FOLLOWING performed by Monae Rose MD at [facility] IR NEPHROSTOMY PERCUTANEOUS LEFT  12/18/2020    IR NEPHROSTOMY PERCUTANEOUS LEFT 12/18/2020 Brandie John MD [facility] SPECIAL PROCEDURES    LITHOTRIPSY      NEPHROLITHOTOMY Left 12/18/2020    cystoscopy, left stent placement, left retrograde pyelogram performed by Monae Rose MD at [facility] TRANSESOPHAGEAL ECHOCARDIOGRAM  01/17/2020    TURP N/A 08/10/2020    CYSTO w/ Greenlight Laser performed by Bruno Balderas MD at [place] [address] History:    Social History     Tobacco Use    Smoking status: Never Smoker    Smokeless tobacco: Never Used   Substance Use Topics    Alcohol use: Not Currently     Alcohol/week: 0.0 standard drinks                                Counseling given: Not Answered      Vital Signs (Current): There were no vitals filed for this visit. BP Readings from Last 3 Encounters:   01/15/21 118/72   01/05/21 118/72   01/04/21 122/72       NPO Status:                                                                                 BMI:   Wt Readings from Last 3 Encounters:   01/15/21 176 lb 3.2 oz (79.9 kg)   01/05/21 170 lb 6.4 oz (77.3 kg)   01/04/21 173 lb (78.5 kg)     There is no height or weight on file to calculate BMI.    CBC:   Lab Results   Component Value Date    WBC 7.3 12/19/2020    RBC 4.33 12/19/2020    HGB 12.0 12/19/2020    HCT 37.6 12/19/2020    MCV 86.8 12/19/2020    RDW 12.9 12/19/2020     12/19/2020       CMP:   Lab Results   Component Value Date     12/19/2020    K 3.8 12/19/2020     12/19/2020    CO2 22 12/19/2020    BUN 15 12/19/2020    CREATININE 0.89 12/19/2020    GFRAA >60 12/19/2020    LABGLOM >60 12/19/2020    GLUCOSE 131 12/19/2020    PROT 7.3 12/01/2020    CALCIUM 8.3 12/19/2020    BILITOT 0.60 12/01/2020    ALKPHOS 109 12/01/2020    AST 18 12/01/2020    ALT 14 12/01/2020       POC Tests:   No results for input(s): POCGLU, POCNA, POCK, POCCL, POCBUN, POCHEMO, POCHCT in the last 72 hours.     Coags:   Lab Results   Component Value Date    PROTIME 10.4 12/18/2020    INR 1.0 12/18/2020    APTT 26.5 12/18/2020       HCG (If Applicable): No results found for: PREGTESTUR, PREGSERUM, HCG, HCGQUANT     ABGs: No results found for: PHART, PO2ART, TAO3DIF, QAI7OWE, BEART, G2BSUTID     Type & Screen (If Applicable):  No results found for: LABABO, LABRH    Drug/Infectious Status (If Applicable):  No results found for: HIV, HEPCAB    COVID-19 Screening (If Applicable):   Lab Results   Component Value Date    COVID19 Not Detected 01/27/2021         Anesthesia Evaluation  Patient summary reviewed and Nursing notes reviewed no history of anesthetic complications:   Airway: Mallampati: II  TM distance: >3 FB   Neck ROM: full  Mouth opening: > = 3 FB Dental: normal exam         Pulmonary:Negative Pulmonary ROS and normal exam                               Cardiovascular:  Exercise tolerance: good (>4 METS),   (+) hyperlipidemia        Rhythm: regular  Rate: normal           Beta Blocker:  Not on Beta Blocker         Neuro/Psych:   (+) CVA:, neuromuscular disease:, psychiatric history:            GI/Hepatic/Renal:   (+) renal disease: kidney stones,           Endo/Other:    (+) : arthritis:., .          Pt had no PAT visit       Abdominal:           Vascular:   + PVD, aortic or cerebral, . Anesthesia Plan      general and TIVA     ASA 3       Induction: intravenous. MIPS: Postoperative opioids intended and Prophylactic antiemetics administered. Anesthetic plan and risks discussed with patient. Use of blood products discussed with whom consented to blood products. Plan discussed with CRNA.                   JIMBO Vasquez - ADRIAN   2/1/2021

## 2021-02-01 NOTE — H&P
MD at [address] [place] [address] TRANSESOPHAGEAL ECHOCARDIOGRAM  01/17/2020    TURP N/A 08/10/2020    CYSTO w/ Green[employer] Laser performed by Eugene Schwartz MD at [address] [place] [address]       Medications Prior to Admission:    Prior to Admission medications    Medication Sig Start Date End Date Taking? Authorizing Provider   oxybutynin (DITROPAN-XL) 10 MG extended release tablet Take 1 tablet by mouth daily 1/5/21   Radha Velázquez MD   docusate sodium (COLACE) 100 MG capsule Take 1 capsule by mouth 2 times daily  Patient not taking: Reported on 1/15/2021 12/19/20   Watson Berry MD   tamsulosin (FLOMAX) 0.4 MG capsule Take 1 capsule by mouth daily 12/19/20 4/18/21  Watson Berry MD   tamsulosin (FLOMAX) 0.4 MG capsule Take 1 capsule by mouth daily 10/19/20   Eugene Schwartz MD   dabigatran (PRADAXA) 150 MG capsule Take 150 mg by mouth 2 times daily    Historical Provider, MD   aspirin 81 MG EC tablet Take 81 mg by mouth daily    Historical Provider, MD   famotidine (PEPCID) 20 MG tablet Take 1 tablet by mouth 2 times daily  Patient not taking: Reported on 1/15/2021 8/5/20   Salud Espinoza   latanoprost (XALATAN) 0.005 % ophthalmic solution Place 1 drop into both eyes nightly 4/15/20   Historical Provider, MD   EPINEPHrine (EPIPEN 2-FARAZ) 0.3 MG/0.3ML SOAJ injection Inject 0.3 mLs into the muscle once for 1 dose Use as directed for allergic reaction  Patient not taking: Reported on 8/17/2020 3/3/20 7/29/20  Radha Velázquez MD   Cholecalciferol (VITAMIN D3) 25 MCG (1000 UT) CAPS Take 2,000 Units by mouth daily    Historical Provider, MD   Omega-3 Fatty Acids (FISH OIL) 1000 MG CAPS Take 1,000 mg by mouth 2 times daily    Historical Provider, MD   atorvastatin (LIPITOR) 40 MG tablet Take 1 tablet by mouth nightly 1/13/20   Royetta Blizzard, MD   VITAMIN B1-B12 IM Inject into the muscle every 30 days     Historical Provider, MD       Allergies:  Patient has no known allergies.     Social History:    Social History     Socioeconomic History    Marital status:  Spouse name: Not on file    Number of children: Not on file    Years of education: Not on file    Highest education level: Not on file   Occupational History    Not on file   Social Needs    Financial resource strain: Not on file    Food insecurity     Worry: Not on file     Inability: Not on file    Transportation needs     Medical: Not on file     Non-medical: Not on file   Tobacco Use    Smoking status: Never Smoker    Smokeless tobacco: Never Used   Substance and Sexual Activity    Alcohol use: Not Currently     Alcohol/week: 0.0 standard drinks    Drug use: No    Sexual activity: Yes   Lifestyle    Physical activity     Days per week: Not on file     Minutes per session: Not on file    Stress: Not on file   Relationships    Social connections     Talks on phone: Not on file     Gets together: Not on file     Attends Mandaen service: Not on file     Active member of club or organization: Not on file     Attends meetings of clubs or organizations: Not on file     Relationship status: Not on file    Intimate partner violence     Fear of current or ex partner: Not on file     Emotionally abused: Not on file     Physically abused: Not on file     Forced sexual activity: Not on file   Other Topics Concern    Not on file   Social History Narrative    Not on file       Family History:    Family History   Problem Relation Age of Onset    Heart Disease Mother     Heart Disease Father     Stroke Father     Stroke Brother        REVIEW OF SYSTEMS:  Constitutional: negative  Eyes: negative  Respiratory: negative  Cardiovascular: negative  Gastrointestinal: negative  Genitourinary: no acute issues  Musculoskeletal: negative  Skin: negative   Neurological: negative  Hematological/Lymphatic: negative  Psychological: negative    Physical Exam:      No data found. Constitutional: Patient in no acute distress; Neuro: alert and oriented to person place and time.     Psych: Mood and affect normal.  Skin: Normal  Lungs: Respiratory effort normal, CTA  Cardiovascular:  Normal peripheral pulses; no murmur. Normal rhythm  Abdomen: Soft, non-tender, non-distended with no CVA, flank pain, hepatosplenomegaly or hernia. Kidneys normal.  Bladder non-tender and not distended. LABS:   No results for input(s): WBC, HGB, HCT, MCV, PLT in the last 72 hours. No results for input(s): NA, K, CL, CO2, PHOS, BUN, CREATININE in the last 72 hours. Invalid input(s): CA  Lab Results   Component Value Date    PSA 35.88 (H) 07/20/2020    PSA 3.93 05/20/2020         Urinalysis: No results for input(s): COLORU, PHUR, LABCAST, WBCUA, RBCUA, MUCUS, TRICHOMONAS, YEAST, BACTERIA, CLARITYU, SPECGRAV, LEUKOCYTESUR, UROBILINOGEN, BILIRUBINUR, BLOODU in the last 72 hours.     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    Combined forms of age-related cataract of both eyes    Acute cerebrovascular accident (CVA) ([address]    Tissue plasminogen activator (tPA) administered at other facility within 24 hours before current admission    Permanent atrial fibrillation ([address]    Acute ischemic stroke ([address]    Left homonymous hemianopsia    Hyperlipidemia    Stroke, recent, without late effect    Bilateral carotid artery stenosis    Chronic cerebral ischemia    Memory problem    Gait difficulty    Balance problem    At high risk for stroke    Risk for falls    Cerebrovascular accident (CVA) due to embolism of right posterior cerebral artery ([address]    Bilateral hemianopia    Dysphagia    Hematuria    PTSD (post-traumatic stress disorder)    Acute urinary retention    Horseshoe kidney    Kidney stone on left side    Severe malnutrition ([address]    History of ischemic posterior cerebral artery stroke    Cerebrovascular accident (CVA) due to embolism of left middle cerebral artery ([address]    H/O ischemic left MCA stroke    Ischemic stroke ([address]    Atrial fibrillation ([address]       Plan:     Consent obtained; cysto retrograde pyelogram poss ureteroscopy and stent removal/exchange in OR today    Lukas Will MD  8:54 AM 2/1/2021

## 2021-02-01 NOTE — ANESTHESIA POSTPROCEDURE EVALUATION
Department of Anesthesiology  Postprocedure Note    Patient: Abdelrahman Kauffman  MRN: [id number]  YOB: 1950  Date of evaluation: 2/1/2021  Time:  1:00 PM     Procedure Summary     Date: 02/01/21 Room / Location: [address] [place] [address]    Anesthesia Start: 1232 Anesthesia Stop:     Procedure: CYSTO Left URETEROSCOPY w/ stent removal ET RETROGRADE (Left ) Diagnosis: (left kidney stone)    Surgeons: Choco Macedo MD Responsible Provider: JIMBO Michelle CRNA    Anesthesia Type: general, TIVA ASA Status: 3          Anesthesia Type: general, TIVA    Merlyn Phase I: Merlyn Score: 10    Merlyn Phase II: Merlyn Score: 8    Last vitals: Reviewed and per EMR flowsheets.        Anesthesia Post Evaluation    Patient location during evaluation: PACU  Patient participation: complete - patient participated  Level of consciousness: awake and alert  Pain score: 0  Airway patency: patent  Nausea & Vomiting: no nausea and no vomiting  Complications: no  Cardiovascular status: blood pressure returned to baseline and hemodynamically stable  Respiratory status: acceptable  Hydration status: euvolemic

## 2021-02-03 ENCOUNTER — OFFICE VISIT (OUTPATIENT)
Dept: CARDIOLOGY | Age: 71
End: 2021-02-03
Payer: MEDICARE

## 2021-02-03 VITALS
WEIGHT: 182 LBS | HEIGHT: 67 IN | DIASTOLIC BLOOD PRESSURE: 67 MMHG | SYSTOLIC BLOOD PRESSURE: 125 MMHG | BODY MASS INDEX: 28.56 KG/M2 | HEART RATE: 85 BPM

## 2021-02-03 DIAGNOSIS — I48.21 PERMANENT ATRIAL FIBRILLATION (HCC): Primary | ICD-10-CM

## 2021-02-03 DIAGNOSIS — E78.5 HYPERLIPIDEMIA, UNSPECIFIED HYPERLIPIDEMIA TYPE: ICD-10-CM

## 2021-02-03 PROCEDURE — 99214 OFFICE O/P EST MOD 30 MIN: CPT

## 2021-02-03 PROCEDURE — 93010 ELECTROCARDIOGRAM REPORT: CPT | Performed by: INTERNAL MEDICINE

## 2021-02-03 PROCEDURE — 93005 ELECTROCARDIOGRAM TRACING: CPT | Performed by: INTERNAL MEDICINE

## 2021-02-03 PROCEDURE — 99214 OFFICE O/P EST MOD 30 MIN: CPT | Performed by: INTERNAL MEDICINE

## 2021-02-03 RX ORDER — ASPIRIN 81 MG/1
81 TABLET ORAL DAILY
Qty: 90 TABLET | Refills: 3 | Status: SHIPPED | OUTPATIENT
Start: 2021-02-03

## 2021-02-03 RX ORDER — ATORVASTATIN CALCIUM 40 MG/1
40 TABLET, FILM COATED ORAL NIGHTLY
Qty: 90 TABLET | Refills: 3 | Status: SHIPPED | OUTPATIENT
Start: 2021-02-03

## 2021-02-03 RX ORDER — DABIGATRAN ETEXILATE 150 MG/1
150 CAPSULE, COATED PELLETS ORAL 2 TIMES DAILY
Qty: 180 CAPSULE | Refills: 3 | Status: SHIPPED | OUTPATIENT
Start: 2021-02-03

## 2021-02-03 RX ORDER — CHLORAL HYDRATE 500 MG
1000 CAPSULE ORAL 2 TIMES DAILY
Qty: 180 CAPSULE | Refills: 3 | Status: SHIPPED | OUTPATIENT
Start: 2021-02-03

## 2021-02-03 NOTE — PROGRESS NOTES
Today's Date: 2/3/2021  Patient's Name: Alyssa Young  Patient's age: 69 y.o., 1950    Subjective:  Alyssa Young  is here for follow up. Wife is present. No chest pain, no dyspnea, no PND, no syncope or pre-syncope, no orthopnea. He denies any falls or bleeding. Prostrate surgery went well last year. BP at home is stable. Past Medical History:   has a past medical history of Atrial fibrillation ([address] Blood in urine, Chronic kidney disease, CKD (chronic kidney disease), History of ischemic stroke in prior three months, Hyperlipidemia, Ischemic stroke ([address] Kidney stones, Nihss score 10, PTSD (post-traumatic stress disorder), Skin tag, Stroke ([address] and Stroke ([address] Past Surgical History:   has a past surgical history that includes Appendectomy; transesophageal echocardiogram (01/17/2020); TURP (N/A, 08/10/2020); Cystoscopy (Left, 10/19/2020); Lithotripsy; Cystoscopy (Left, 12/18/2020); IR GUIDED NEPHROSTOMY CATH PLACEMENT LEFT (12/18/2020); NEPHROLITHOTOMY (Left, 12/18/2020); Cystoscopy (Left, 12/18/2020); and CYSTOSCOPY INSERTION / REMOVAL STENT / STONE (Left, 2/1/2021). Home Medications:  Prior to Admission medications    Medication Sig Start Date End Date Taking?  Authorizing Provider   oxybutynin (DITROPAN-XL) 10 MG extended release tablet Take 1 tablet by mouth daily 1/5/21   Leeroy Kussmaul, MD   docusate sodium (COLACE) 100 MG capsule Take 1 capsule by mouth 2 times daily  Patient not taking: Reported on 1/15/2021 12/19/20   Linda Hinds MD   tamsulosin (FLOMAX) 0.4 MG capsule Take 1 capsule by mouth daily 12/19/20 4/18/21  Linda Hinds MD   tamsulosin (FLOMAX) 0.4 MG capsule Take 1 capsule by mouth daily 10/19/20   Josie Viera MD   dabigatran (PRADAXA) 150 MG capsule Take 150 mg by mouth 2 times daily    Historical Provider, MD   aspirin 81 MG EC tablet Take 81 mg by mouth daily    Historical Provider, MD   famotidine (PEPCID) 20 MG tablet Take 1 tablet by mouth 2 times daily  Patient not taking: Reported on 1/15/2021 8/5/20   Prudence Emmer   latanoprost (XALATAN) 0.005 % ophthalmic solution Place 1 drop into both eyes nightly 4/15/20   Historical Provider, MD   EPINEPHrine (EPIPEN 2-FARAZ) 0.3 MG/0.3ML SOAJ injection Inject 0.3 mLs into the muscle once for 1 dose Use as directed for allergic reaction  Patient not taking: Reported on 8/17/2020 3/3/20 7/29/20  Heena Ramos MD   Cholecalciferol (VITAMIN D3) 25 MCG (1000 UT) CAPS Take 2,000 Units by mouth daily    Historical Provider, MD   Omega-3 Fatty Acids (FISH OIL) 1000 MG CAPS Take 1,000 mg by mouth 2 times daily    Historical Provider, MD   atorvastatin (LIPITOR) 40 MG tablet Take 1 tablet by mouth nightly 1/13/20   Bryan Mills MD   VITAMIN B1-B12 IM Inject into the muscle every 30 days     Historical Provider, MD       Allergies:  Patient has no known allergies. Social History:   reports that he has never smoked. He has never used smokeless tobacco. He reports previous alcohol use. He reports that he does not use drugs. Family History: family history includes Heart Disease in his father and mother; Stroke in his brother and father. No h/o sudden cardiac death. No for premature CAD    REVIEW OF SYSTEMS:    · Constitutional: there has been no unanticipated weight loss. There's been No change in energy level, No change in activity level. · Eyes: No visual changes or diplopia. No scleral icterus. · ENT: No Headaches, hearing loss or vertigo. No mouth sores or sore throat. · Cardiovascular: see above  · Respiratory: see above  · Gastrointestinal: No abdominal pain, appetite loss, blood in stools. · Genitourinary: No dysuria, trouble voiding, or hematuria. · Musculoskeletal:  No gait disturbance, No weakness or joint complaints. · Integumentary: No rash or pruritis. · Neurological: No headache or diplopia. No tingling  · Psychiatric: No anxiety, or depression. · Endocrine: No temperature intolerance.    · Hematologic/Lymphatic: No abnormal bruising or bleeding, blood clots or swollen lymph nodes. · Allergic/Immunologic: No nasal congestion or hives. PHYSICAL EXAM:      Vitals:    02/03/21 0933   BP: 125/67   Pulse: 85       Constitutional and General Appearance: alert, cooperative, no distress and appears stated age  HEENT: PERRL, no cervical lymphadenopathy. No masses palpable. Normal oral mucosa  Respiratory:  · Normal excursion and expansion without use of accessory muscles  · Resp Auscultation: Good respiratory effort. No for increased work of breathing. On auscultation: clear to auscultation bilaterally  Cardiovascular:  · irregular S1 and S2.  · Jugular venous pulsation Normal  · The carotid upstroke is normal in amplitude and contour without delay or bruit   Abdomen:   · soft  · Bowel sounds present  Extremities:  ·  No edema  Neurological:  · Alert and oriented. Cardiac Data:  EKG: Afib, NS ST-T changes    Labs:     CBC: No results for input(s): WBC, HGB, HCT, PLT in the last 72 hours. BMP: No results for input(s): NA, K, CO2, BUN, CREATININE, LABGLOM, GLUCOSE in the last 72 hours. PT/INR: No results for input(s): PROTIME, INR in the last 72 hours. FASTING LIPID PANEL:  Lab Results   Component Value Date    HDL 53 12/01/2020    TRIG 83 12/01/2020     LIVER PROFILE:No results for input(s): AST, ALT, LABALBU in the last 72 hours. Assessment / Acute Cardiac Problems:      1-Permenant A.fib and previously refused A/C had CVA requiring tPA and mechanical thrombectomy 1/11/2020 was discharged on eliquies and presented back 1/16/2020 with possible TIA. Eliquis changed to pradaxa. 2-H/o dizziness consistent with orthostatic intolerance.   3-PTSD [address] with exposure to agent orange.   4-Suspected sleep apnea. 5-stress test 7/11/2017 showed no ischemia or infarction LV ejection fraction 61%. 6-Transthoracic echocardiogram 1/11/2020 showed LV ejection fraction greater than 55%. Dilated RV with preserved function. Moderately dilated left atrium and right atrium. Mild aortic valve insufficiency. 7-Transesophageal echocardiogram 1/16/2020 showed LV ejection fraction 55%. Small PFO without any hemodynamically significant to moderate mitral regurgitation and aortic insufficiency. Mild tricuspid regurgitation. Aortic arch showed mild plaque formation. 8-Lower ext venous doppler negative for DVT 1/20/20.     Plan of Treatment:      1-Continue low dose ASA and pradaxa. 2-Continue Atorvastatin. LDL 42 on 12/01/2020  3-Follow up in 6 months.       Clinton Barrios [facility]           [phone]

## 2021-02-08 ENCOUNTER — TELEPHONE (OUTPATIENT)
Dept: INTERNAL MEDICINE | Age: 71
End: 2021-02-08

## 2021-02-08 NOTE — TELEPHONE ENCOUNTER
Patient is asking if you advise him to get COVID vaccine? He does have Eppi pen but has never had to use it. He is also on blood thinners. What do you advise?   [phone]

## 2021-02-08 NOTE — TELEPHONE ENCOUNTER
I would do it if I were him.   There is no guarantee that he will not have any side effects, but I do recommend it, and it is definitely much better than getting the actual disease

## 2021-02-25 ENCOUNTER — OFFICE VISIT (OUTPATIENT)
Dept: INTERNAL MEDICINE | Age: 71
End: 2021-02-25
Payer: MEDICARE

## 2021-02-25 VITALS
HEART RATE: 64 BPM | BODY MASS INDEX: 29.41 KG/M2 | HEIGHT: 67 IN | RESPIRATION RATE: 20 BRPM | DIASTOLIC BLOOD PRESSURE: 74 MMHG | WEIGHT: 187.4 LBS | TEMPERATURE: 97.5 F | SYSTOLIC BLOOD PRESSURE: 124 MMHG

## 2021-02-25 DIAGNOSIS — I48.91 ATRIAL FIBRILLATION, UNSPECIFIED TYPE (HCC): ICD-10-CM

## 2021-02-25 DIAGNOSIS — D50.9 IRON DEFICIENCY ANEMIA, UNSPECIFIED IRON DEFICIENCY ANEMIA TYPE: Primary | ICD-10-CM

## 2021-02-25 DIAGNOSIS — E78.5 HYPERLIPIDEMIA, UNSPECIFIED HYPERLIPIDEMIA TYPE: ICD-10-CM

## 2021-02-25 PROCEDURE — 99213 OFFICE O/P EST LOW 20 MIN: CPT

## 2021-02-25 PROCEDURE — 99214 OFFICE O/P EST MOD 30 MIN: CPT | Performed by: INTERNAL MEDICINE

## 2021-02-27 NOTE — PROGRESS NOTES
[place] [address]  Dept: [phone]  Dept Fax: [phone]  Loc: [phone]     Visit Date:  2/25/2021  Patient:  Anderson Morales  YOB: 1950  Provider: Keenan Calderon MD    Assessment & Plan      Anemia: Labs from [place] reviewed. He says that he will be prescribed iron supplement from the [place]. Labs show anemia with low MCV. Moreover, he uses Pradaxa for A. fib. Will refer to general surgery for colonoscopy/EGD. He never had a colonoscopy before. We have ferritin level from August which was elevated, but it could be acute phase reactant. Atrial fibrillation: Regular rate and rhythm at this time. Continue same management. Hyperlipidemia: Labs from the [place] reviewed. Continue atorvastatin. Diagnosis Orders   1. Iron deficiency anemia, unspecified iron deficiency anemia type  Maris Esquivel Brothers, DO, General Surgery, [place]   2. Atrial fibrillation, unspecified type ([address]     3. Hyperlipidemia, unspecified hyperlipidemia type         Discussed use, benefit, and side effects of prescribed medications. All questions answered. Patient voiced understanding. Reviewed health maintenance. HPI:     He was seen in the internal medicine office today for   Chief Complaint   Patient presents with    Other     to discuss labs from [facility]    Hyperlipidemia    Other     CVA due to embolism of right cerebral artery        Presents to follow-up. Says that he was seen at the [place] and had labs drawn which showed anemia. Says that the [place] doctor recommended that he gets an EGD/colonoscopy. He says that he would prefer to get it in town. He has [facility], which show low hemoglobin as well as low MCV, consistent with iron deficiency anemia. We have ferritin levels back in August 2020, and it was elevated, however, it can be an acute phase reactant at the time. Says that he never had a colonoscopy before. Has a history of atrial fibrillation which has been unchanged. Denies chest pain, palpitations, shortness of breath.     Has a history of hyperlipidemia which has been unchanged    Subjective:      REVIEW OF SYSTEMS    Constitutional: Denies fever, chills  Eyes: Denies recent vision changes  Cardiovascular: Denies chest pain, LL edema  Respiratory: Denies cough, wheezes  Gastrointestinal: Denies abdominal pain, nausea, vomiting  Skin: Denies rash  Endocrine: Denies polyuria  Hematologic: Denies bruising  Genitourinary: Denies dysuria, hematuria  Neurological: Denies headache, numbness      Medications    Current Outpatient Medications:     aspirin 81 MG EC tablet, Take 1 tablet by mouth daily, Disp: 90 tablet, Rfl: 3    dabigatran (PRADAXA) 150 MG capsule, Take 1 capsule by mouth 2 times daily, Disp: 180 capsule, Rfl: 3    Omega-3 Fatty Acids (FISH OIL) 1000 MG CAPS, Take 1 capsule by mouth 2 times daily, Disp: 180 capsule, Rfl: 3    atorvastatin (LIPITOR) 40 MG tablet, Take 1 tablet by mouth nightly, Disp: 90 tablet, Rfl: 3    oxybutynin (DITROPAN-XL) 10 MG extended release tablet, Take 1 tablet by mouth daily, Disp: 90 tablet, Rfl: 3    docusate sodium (COLACE) 100 MG capsule, Take 1 capsule by mouth 2 times daily (Patient taking differently: Take 100 mg by mouth 2 times daily as needed ), Disp: 30 capsule, Rfl: 1    tamsulosin (FLOMAX) 0.4 MG capsule, Take 1 capsule by mouth daily, Disp: 30 capsule, Rfl: 11    famotidine (PEPCID) 20 MG tablet, Take 1 tablet by mouth 2 times daily, Disp: 60 tablet, Rfl: 0    latanoprost (XALATAN) 0.005 % ophthalmic solution, Place 1 drop into both eyes nightly, Disp: , Rfl:     Cholecalciferol (VITAMIN D3) 25 MCG (1000 UT) CAPS, Take 2,000 Units by mouth daily, Disp: , Rfl:     VITAMIN B1-B12 IM, Inject into the muscle every 30 days , Disp: , Rfl:     EPINEPHrine (EPIPEN 2-FARAZ) 0.3 MG/0.3ML SOAJ injection, Inject 0.3 mLs into the muscle once for 1 dose Use as directed for allergic reaction (Patient not taking: Reported on 8/17/2020), Disp: 0.3 mL, Rfl: 0    The patient has No Known Allergies. Past Medical History  Luis Armando Harman  has a past medical history of Atrial fibrillation ([facility] Blood in urine, Chronic kidney disease, CKD (chronic kidney disease), History of ischemic stroke in prior three months, Hyperlipidemia, Ischemic stroke ([facility] Kidney stones, Nihss score 10, PTSD (post-traumatic stress disorder), Skin tag, Stroke ([facility] and Stroke ([facility] Past Surgical History  The patient  has a past surgical history that includes Appendectomy; transesophageal echocardiogram (01/17/2020); TURP (N/A, 08/10/2020); Cystoscopy (Left, 10/19/2020); Lithotripsy; Cystoscopy (Left, 12/18/2020); IR GUIDED NEPHROSTOMY CATH PLACEMENT LEFT (12/18/2020); NEPHROLITHOTOMY (Left, 12/18/2020); Cystoscopy (Left, 12/18/2020); and CYSTOSCOPY INSERTION / REMOVAL STENT / STONE (Left, 2/1/2021). Family History  This patient's family history includes Heart Disease in his father and mother; Stroke in his brother and father. Social History  Luis Armando Harman  reports that he has never smoked. He has never used smokeless tobacco. He reports previous alcohol use. He reports that he does not use drugs.     Health Maintenance:    Health Maintenance   Topic Date Due    Hepatitis C screen  1950    COVID-19 Vaccine (1 of 2) 07/06/1965    DTaP/Tdap/Td vaccine (1 - Tdap) 07/06/1968    Shingles Vaccine (1 of 2) 07/06/1999    Colon cancer screen colonoscopy  07/06/1999    Pneumococcal 65+ years Vaccine (1 of 1 - PPSV23) 07/06/2014    Flu vaccine (1) 09/01/2020    PSA counseling  07/20/2021    Lipid screen  12/01/2021    Annual Wellness Visit (AWV)  01/06/2022    Hepatitis A vaccine  Aged Out    Hepatitis B vaccine  Aged Out    Hib vaccine  Aged Out    Meningococcal (ACWY) vaccine  Aged Out           Objective:     PHYSICAL EXAM  /74   Pulse 64   Temp 97.5 °F (36.4 °C) (Tympanic)   Resp 20   Ht 5' 7\" (1.702 m)   Wt 187 lb 6.4 oz (85 kg)   BMI 29.35 kg/m²   Constitutional: No acute distress. Sits in chair comfortably  Eyes: Sclerae nonicteric. No lid lag or proptosis  HENT: External ears normal. No external lesions on the nose  Neck: No gross masses. Trachea visibly midline  Respiratory: Good air entry bilaterally. No wheezing or crackles  Cardiovascular: Normal S1-S2. No murmurs. No lower extremity edema  Gastrointestinal: No visible masses. No visible hernias  Skin: No abnormal rashes. No abnormal masses  Neurologic: Cranial nerves grossly intact  Psychiatric: Normal affect. Alert and oriented        Labs Reviewed 2/25/2021:    Lab Results   Component Value Date    WBC 7.3 12/19/2020    HGB 12.0 (L) 12/19/2020    HCT 37.6 (L) 12/19/2020     12/19/2020    CHOL 112 12/01/2020    TRIG 83 12/01/2020    HDL 53 12/01/2020    ALT 14 12/01/2020    AST 18 12/01/2020     12/19/2020    K 3.8 12/19/2020     12/19/2020    CREATININE 0.89 12/19/2020    BUN 15 12/19/2020    CO2 22 12/19/2020    TSH 1.60 08/03/2020    PSA 35.88 (H) 07/20/2020    INR 1.0 12/18/2020    LABA1C 4.9 05/20/2020            Electronically signed Aby LOVE MD on 2/25/2021 at 4:03 PM      This note has been created using the Epic electronic health record, and dictated in part by [employer] dictation system. Despite the documenting physician's best efforts, there may be errors in spelling, grammar or syntax.

## 2021-03-07 NOTE — TELEPHONE ENCOUNTER
Counseling regarding weight loss, diet and exercise    Patient is having cystoscopy on 12/18/2020 he is on Plavix and needs bridging with Lovenox. Has to stop Plavix 7 days prior.   [facility]

## 2021-03-25 ENCOUNTER — TELEPHONE (OUTPATIENT)
Dept: SURGERY | Age: 71
End: 2021-03-25

## 2021-03-25 ENCOUNTER — INITIAL CONSULT (OUTPATIENT)
Dept: SURGERY | Age: 71
End: 2021-03-25
Payer: MEDICARE

## 2021-03-25 VITALS
TEMPERATURE: 96.3 F | BODY MASS INDEX: 28.56 KG/M2 | WEIGHT: 182 LBS | HEART RATE: 70 BPM | RESPIRATION RATE: 16 BRPM | SYSTOLIC BLOOD PRESSURE: 122 MMHG | HEIGHT: 67 IN | DIASTOLIC BLOOD PRESSURE: 70 MMHG

## 2021-03-25 DIAGNOSIS — D50.9 IRON DEFICIENCY ANEMIA, UNSPECIFIED IRON DEFICIENCY ANEMIA TYPE: Primary | ICD-10-CM

## 2021-03-25 PROCEDURE — 99205 OFFICE O/P NEW HI 60 MIN: CPT | Performed by: SURGERY

## 2021-03-25 PROCEDURE — 99214 OFFICE O/P EST MOD 30 MIN: CPT | Performed by: SURGERY

## 2021-03-25 NOTE — PATIENT INSTRUCTIONS
EGD/Colonoscopy scheduled with Dr. Siena Sutton 4/27/2021    Instructions went over with you in office and a copy sent home with you. Please call Dr. Rosalind Weathers office with any questions or concerns.  [phone]

## 2021-03-25 NOTE — PROGRESS NOTES
General Surgery History & Physical  Mickey Simmons MD  Pt Name: Syl Escobar  MRN: [id number]  [place]: 1950  Date of evaluation: 3/25/2021  Primary Care Physician: Flora Chisholm MD    Patient evaluated at the request of Dr. Camilla Mills  Reason for evaluation: anemia       SUBJECTIVE:  Chief Complaint:   Chief Complaint   Patient presents with    Surgical Consult     EGD/Colonoscopy     History of Present Illness:  EGD         EGD  Nausea: no  Vomiting: no  Heartburn:no  Dysphagia:yes, after stroke (6/2020)  Hematemesis:no  Epigastric pain:no  Anemia: yes (also, on pradaxa for previous CVA)  Previous work up date:No previous EGD  Current Treatment:Pepcid 20 mg BID     Colonoscopy  Abd pain: no  Anemia: yes Hb=12.8  Bloating:no  Diarrhea: no  Constipation: no  Melena: no  Hematochezia:no  Rectal Bleeding:no  Rectal/Anal Pain:no  Pruritus: no  Family history colon Cancer: no  Previous colon cancer: no  Previous Colon Polyp: no  Change in bowels: no  Decrease caliber of stool: no  Change in color of stool: no  Previous work up date: No previous Colonoscopy     Past Medical History   has a past medical history of Atrial fibrillation ([address] Blood in urine, Chronic kidney disease, CKD (chronic kidney disease), History of ischemic stroke in prior three months, Hyperlipidemia, Ischemic stroke ([address] Kidney stones, Nihss score 10, PTSD (post-traumatic stress disorder), Skin tag, Stroke ([address] and Stroke ([address] Past Surgical History   has a past surgical history that includes Appendectomy; transesophageal echocardiogram (01/17/2020); TURP (N/A, 08/10/2020); Cystoscopy (Left, 10/19/2020); Lithotripsy; Cystoscopy (Left, 12/18/2020); IR GUIDED NEPHROSTOMY CATH PLACEMENT LEFT (12/18/2020); NEPHROLITHOTOMY (Left, 12/18/2020); Cystoscopy (Left, 12/18/2020); and CYSTOSCOPY INSERTION / REMOVAL STENT / STONE (Left, 2/1/2021).     Family History  family history includes Heart Disease in his father and mother; Stroke in his brother and father. Social History  Tobacco use:  reports that he has never smoked. He has never used smokeless tobacco.  Alcohol use:  reports previous alcohol use. Drug use:  reports no history of drug use. Medications  Current Medications:   Current Outpatient Medications   Medication Sig Dispense Refill    aspirin 81 MG EC tablet Take 1 tablet by mouth daily 90 tablet 3    dabigatran (PRADAXA) 150 MG capsule Take 1 capsule by mouth 2 times daily 180 capsule 3    Omega-3 Fatty Acids (FISH OIL) 1000 MG CAPS Take 1 capsule by mouth 2 times daily 180 capsule 3    atorvastatin (LIPITOR) 40 MG tablet Take 1 tablet by mouth nightly 90 tablet 3    oxybutynin (DITROPAN-XL) 10 MG extended release tablet Take 1 tablet by mouth daily 90 tablet 3    docusate sodium (COLACE) 100 MG capsule Take 1 capsule by mouth 2 times daily (Patient taking differently: Take 100 mg by mouth 2 times daily as needed ) 30 capsule 1    tamsulosin (FLOMAX) 0.4 MG capsule Take 1 capsule by mouth daily 30 capsule 11    famotidine (PEPCID) 20 MG tablet Take 1 tablet by mouth 2 times daily 60 tablet 0    latanoprost (XALATAN) 0.005 % ophthalmic solution Place 1 drop into both eyes nightly      Cholecalciferol (VITAMIN D3) 25 MCG (1000 UT) CAPS Take 2,000 Units by mouth daily      VITAMIN B1-B12 IM Inject into the muscle every 30 days       EPINEPHrine (EPIPEN 2-FARAZ) 0.3 MG/0.3ML SOAJ injection Inject 0.3 mLs into the muscle once for 1 dose Use as directed for allergic reaction (Patient not taking: Reported on 8/17/2020) 0.3 mL 0     No current facility-administered medications for this visit. Home Medications:   Prior to Admission medications    Medication Sig Start Date End Date Taking?  Authorizing Provider   aspirin 81 MG EC tablet Take 1 tablet by mouth daily 2/3/21  Yes Marrianne Bosworth, MD   dabigatran (PRADAXA) 150 MG capsule Take 1 capsule by mouth 2 times daily 2/3/21  Yes Marrianne Bosworth, MD   Omega-3 Fatty Acids (FISH OIL) 1000 MG CAPS Take 1 capsule by mouth 2 times daily 2/3/21  Yes Shanon Bagley MD   atorvastatin (LIPITOR) 40 MG tablet Take 1 tablet by mouth nightly 2/3/21  Yes Shanon Bagley MD   oxybutynin (DITROPAN-XL) 10 MG extended release tablet Take 1 tablet by mouth daily 1/5/21  Yes Mihai Garcia MD   docusate sodium (COLACE) 100 MG capsule Take 1 capsule by mouth 2 times daily  Patient taking differently: Take 100 mg by mouth 2 times daily as needed  12/19/20  Yes Lev Raman MD   tamsulosin (FLOMAX) 0.4 MG capsule Take 1 capsule by mouth daily 10/19/20  Yes Klaus Meadows MD   famotidine (PEPCID) 20 MG tablet Take 1 tablet by mouth 2 times daily 8/5/20  Yes Eduarda Hernandez   latanoprost (XALATAN) 0.005 % ophthalmic solution Place 1 drop into both eyes nightly 4/15/20  Yes Historical Provider, MD   Cholecalciferol (VITAMIN D3) 25 MCG (1000 UT) CAPS Take 2,000 Units by mouth daily   Yes Historical Provider, MD   VITAMIN B1-B12 IM Inject into the muscle every 30 days    Yes Historical Provider, MD   EPINEPHrine (EPIPEN 2-FARAZ) 0.3 MG/0.3ML SOAJ injection Inject 0.3 mLs into the muscle once for 1 dose Use as directed for allergic reaction  Patient not taking: Reported on 8/17/2020 3/3/20 7/29/20  Mihai Garcia MD       Allergies  Patient has no known allergies. Review of Systems:  General: Denies any fever, chills. HEENT: Denies any diplopia, tinnitus or vertigo. Respiratory: Denies any shortness of breath or cough. Cardiac: Denies any chest pain, palpitations, claudication or edema. Gastrointestinal: Denies any melena, hematochezia, hematemesis or pyrosis. Genitourinary: Denies any frequency, urgency, hesitancy or incontinence. Hematologic: Denies bruising or bleeding easily. Endocrine: Denies any history of diabetes or thyroid disease.     PHYSICAL EXAMINATION  Vitals:   Vitals:    03/25/21 1355   BP: 122/70   Pulse: 70   Resp: 16   Temp: 96.3 °F (35.7 °C)     General Appearance:  awake, alert, oriented, in no acute distress, well developed, well nourished and in no acute distress  Skin:  Skin color, texture, turgor normal. No rashes or lesions. Head/face:  NCAT  Eyes:  No gross abnormalities. , PERRL, Pupils- PERRL. Ears:  canals and TMs NI and External- normal  Nose/Sinuses:  Nares normal. Septum midline. Mucosa normal. No drainage or sinus tenderness. Mouth/Throat:  Mucosa moist.  No lesions. Pharynx without erythema, edema or exudate. Lungs:  Normal expansion. Clear to auscultation. No rales, rhonchi, or wheezing., Breathing Pattern: regular, no distress, Breath sounds: normal  Heart:  Heart sounds are normal.  Regular rate and rhythm without murmur, gallop or rub. Auscultation: Normal S1 and S2.  Regular rhythm. No murmurs, gallops, or rubs. Abdomen:  Soft, non-tender, normal bowel sounds. No bruits, organomegaly or masses.   Musculoskeletal:  negative, negative findings: no erythema, induration, or nodules, ROM of all joints is normal, strength normal  Neurologic:  negative findings: proximal muscle strength normal, speech normal, mental status intact, cranial nerves 2-12 intact, muscle tone normal, muscle strength normal    LABS:  CBC   Lab Results   Component Value Date    WBC 7.3 12/19/2020    HGB 12.0 12/19/2020    HCT 37.6 12/19/2020     12/19/2020     BMP   Lab Results   Component Value Date     12/19/2020    K 3.8 12/19/2020     12/19/2020    CO2 22 12/19/2020    BUN 15 12/19/2020    CREATININE 0.89 12/19/2020    PHOS 3.9 12/18/2020    MG 2.1 12/19/2020     LFT's:   Lab Results   Component Value Date    AST 18 12/01/2020    ALT 14 12/01/2020    ALKPHOS 109 12/01/2020    BILITOT 0.60 12/01/2020    BILIDIR 0.40 08/03/2020    LIPASE 10 08/03/2020     COAGS:   Lab Results   Component Value Date    INR 1.0 12/18/2020     Lipids:   Lab Results   Component Value Date    CHOL 112 12/01/2020    HDL 53 12/01/2020    LDLCHOLESTEROL 42 12/01/2020    CHOLHDLRATIO 2.1 12/01/2020    TRIG 83 12/01/2020 VLDL NOT REPORTED 12/01/2020       RADIOLOGY:  All images reviewed and within normal limits unless otherwise specified: Yes    IMPRESSIONS:    1. Anemia Hb 12.8  2. No previous EGD or CS (age 69)  1. No fam hx colon cancer    Surgical Risk: moderate risk    PLAN:  1. EGD and CS    Medical Decision Making: moderate complexity    Thank you for the interesting evaluation. Further recommendations to follow.     Corinne Burnham MD  Electronically signed 3/25/2021 at 2:07 PM

## 2021-03-25 NOTE — TELEPHONE ENCOUNTER
[address]           QEI:2/1/[birth year]           Surgical/Procedure Planned: EGD/Colonoscopy     Date & Location: 4/27/2021       Outpatient   Planned Length of OR: 1 hour    Sedation: intravenous sedation    This is notification of the scheduled procedure only:     Estimated Cardiac Risk for Non-Cardiac Surgery/Procedure     Low-Moderate    Medication Instructions - Clarification needed by this date:     -Other medications:    Typically, discontinuing aspirin and Pradaxa is not recommended prior to an EGD/colonoscopy, however, if a large (>1 cm) polyp is found and polypectomy of that large polyp is required, then the recommendation is to set up another colonoscopy and stop anticoagulation then.     Provider:Dr. Yoni Alvarado      Signature of Provider Giving Orders for Medication holds:    Molly Sharp MD

## 2021-04-02 ENCOUNTER — TELEPHONE (OUTPATIENT)
Dept: SURGERY | Age: 71
End: 2021-04-02

## 2021-04-02 NOTE — TELEPHONE ENCOUNTER
Please call back Willow Hamilton at Dr. Jacquelin Dhaliwal office at [phone], extension [phone]. Patient is scheduled for a colonoscopy and possible EGD on 4/27/2021 and they are working on getting this authorized by the [employer] and need more information about the plan of care.

## 2021-04-05 NOTE — TELEPHONE ENCOUNTER
LM on voicemail stating I was returning Amee's call about needing more information for the plan of care to get the scheduled CS and EGD authorized by the [place]. Clinic number provided on voicemail and reviewed office hours.

## 2021-04-06 NOTE — TELEPHONE ENCOUNTER
Received call from [facility] from Dr. Kristina Lake office stating they will need to referral and all progress notes faxed to their office at [phone] to work on getting the prior auth for the EGD and Colonoscopy approved.      Faxed all progress notes and referral.

## 2021-04-07 ENCOUNTER — TELEPHONE (OUTPATIENT)
Dept: SURGERY | Age: 71
End: 2021-04-07

## 2021-04-07 NOTE — TELEPHONE ENCOUNTER
Alexis Flower would like a nurse to call and give him clarification on when to stop his blood thinners and if to give shots in the stomach   And wants to know if you have received the order from the [place] to do the procedure-      She would like a call back from the nurse

## 2021-04-07 NOTE — TELEPHONE ENCOUNTER
Contacted wife and reviewed the telephone encounter with Dr. Rani Zayas for his blood thinners. Explained I sent the message to Dr. Lashawn Grey to see if he is okay with continuing the blood thinners for the scopes. I also explained I spoke to someone yesterday with the [facility] and I faxed all the information needed to get the prior auth approved, but have not received anything yet today. I explained I will call them back with Dr. Artemio Valdez response and when the procedure gets approved.

## 2021-04-09 NOTE — TELEPHONE ENCOUNTER
Kerri George at [phone] ext [phone] and asked for the status of the prior auth for the patient's upcoming EGD and Colonoscopy on 4/27. She states it has been approved for Dr. Brenda Medina to perform. I explained that Dr. Bulmaro Espinoza is the one who had a consult with the patient even though the referral stated Dr. Brenda Medina. She told me to contact Esperanza Blackman at ext [phone] to make sure that the prior authorization would still be approved with the procedure being performed by Dr. Bulmaro Espinoza. Ken Alvarez and he states he will make the changes to the approval and will fax all the paperwork needed to our office. Fax number provided for Gregory Ace.

## 2021-04-13 ENCOUNTER — PRE-PROCEDURE TELEPHONE (OUTPATIENT)
Dept: PREADMISSION TESTING | Age: 71
End: 2021-04-13

## 2021-04-13 NOTE — TELEPHONE ENCOUNTER
Voicemail message left regarding a covid swab appt for April 22nd at [address] [place] [address]. Instructed to call [phone] and confirm this appt time.

## 2021-04-16 NOTE — TELEPHONE ENCOUNTER
Contacted patient and explained his scopes have been approved by the [place] and I checked with Dr. Fiona Harper who states it is okay for patient to continue his blood thinners for his scopes like Dr. Aruna Ambrocio recommended. Patient verbalized understanding.

## 2021-04-22 ENCOUNTER — HOSPITAL ENCOUNTER (OUTPATIENT)
Dept: PREADMISSION TESTING | Age: 71
Setting detail: SPECIMEN
Discharge: HOME | End: 2021-04-26
Payer: MEDICARE

## 2021-04-22 DIAGNOSIS — Z11.59 ENCOUNTER FOR SCREENING FOR OTHER VIRAL DISEASES: Primary | ICD-10-CM

## 2021-04-22 PROCEDURE — U0005 INFEC AGEN DETEC AMPLI PROBE: HCPCS

## 2021-04-22 PROCEDURE — U0003 INFECTIOUS AGENT DETECTION BY NUCLEIC ACID (DNA OR RNA); SEVERE ACUTE RESPIRATORY SYNDROME CORONAVIRUS 2 (SARS-COV-2) (CORONAVIRUS DISEASE [COVID-19]), AMPLIFIED PROBE TECHNIQUE, MAKING USE OF HIGH THROUGHPUT TECHNOLOGIES AS DESCRIBED BY CMS-2020-01-R: HCPCS

## 2021-04-23 LAB
SARS-COV-2: NORMAL
SARS-COV-2: NOT DETECTED
SOURCE: NORMAL

## 2021-04-24 ENCOUNTER — TELEPHONE (OUTPATIENT)
Dept: PRIMARY CARE CLINIC | Age: 71
End: 2021-04-24

## 2021-04-26 ENCOUNTER — OFFICE VISIT (OUTPATIENT)
Dept: INTERNAL MEDICINE | Age: 71
End: 2021-04-26
Payer: MEDICARE

## 2021-04-26 VITALS
SYSTOLIC BLOOD PRESSURE: 122 MMHG | HEIGHT: 67 IN | WEIGHT: 188 LBS | BODY MASS INDEX: 29.51 KG/M2 | TEMPERATURE: 97.7 F | RESPIRATION RATE: 16 BRPM | DIASTOLIC BLOOD PRESSURE: 70 MMHG | HEART RATE: 74 BPM

## 2021-04-26 DIAGNOSIS — E78.5 HYPERLIPIDEMIA, UNSPECIFIED HYPERLIPIDEMIA TYPE: ICD-10-CM

## 2021-04-26 DIAGNOSIS — D50.9 IRON DEFICIENCY ANEMIA, UNSPECIFIED IRON DEFICIENCY ANEMIA TYPE: Primary | ICD-10-CM

## 2021-04-26 DIAGNOSIS — I48.91 ATRIAL FIBRILLATION, UNSPECIFIED TYPE (HCC): ICD-10-CM

## 2021-04-26 PROCEDURE — 99214 OFFICE O/P EST MOD 30 MIN: CPT | Performed by: INTERNAL MEDICINE

## 2021-04-26 NOTE — PROGRESS NOTES
[facility] INTERNAL MEDICINE    Visit Date:  4/26/2021  Patient:  Sara Varela  YOB: 1950    Assessment & Plan     Iron deficiency anemia: Set to undergo EGD/colonoscopy tomorrow. Will reassess next visit. Hyperlipidemia: Continue atorvastatin. We will continue to monitor. A. fib: Continue Pradaxa. We will continue to monitor. Diagnosis Orders   1. Iron deficiency anemia, unspecified iron deficiency anemia type  Ferritin   2. Hyperlipidemia, unspecified hyperlipidemia type  Lipid Panel   3. Atrial fibrillation, unspecified type ([address]  CBC Auto Differential    Comprehensive Metabolic Panel       Subjective    Chief Complaint   Patient presents with    Hyperlipidemia    Dizziness     Presents to follow-up. Is set to undergo EGD/colonoscopy tomorrow for anemia. He is asymptomatic at this time. Has a history of hyperlipidemia and A. fib which are unchanged    Objective  /70 (Site: Left Upper Arm, Position: Sitting, Cuff Size: Medium Adult)   Pulse 74   Temp 97.7 °F (36.5 °C) (Tympanic)   Resp 16   Ht 5' 7\" (1.702 m)   Wt 188 lb (85.3 kg)   BMI 29.44 kg/m²   Constitutional: No acute distress. Sits in chair comfortably  Eyes: Sclerae nonicteric. No lid lag or proptosis  HENT: External ears normal. No external lesions on the nose  Neck: No gross masses. Trachea visibly midline  Respiratory: Good air entry bilaterally. No wheezing or crackles  Cardiovascular: Normal S1-S2. No murmurs. No lower extremity edema  Gastrointestinal: No visible masses. No visible hernias  Skin: No abnormal rashes. No abnormal masses  Neurologic: Cranial nerves grossly intact  Psychiatric: Normal affect.  Alert and oriented    Medications  Current Outpatient Medications:     aspirin 81 MG EC tablet, Take 1 tablet by mouth daily, Disp: 90 tablet, Rfl: 3    dabigatran (PRADAXA) 150 MG capsule, Take 1 capsule by mouth 2 times daily, Disp: 180 capsule, Rfl: 3    Omega-3 Fatty Acids (FISH OIL) 1000 MG CAPS, Take 1 capsule by mouth 2 times daily, Disp: 180 capsule, Rfl: 3    atorvastatin (LIPITOR) 40 MG tablet, Take 1 tablet by mouth nightly, Disp: 90 tablet, Rfl: 3    oxybutynin (DITROPAN-XL) 10 MG extended release tablet, Take 1 tablet by mouth daily, Disp: 90 tablet, Rfl: 3    docusate sodium (COLACE) 100 MG capsule, Take 1 capsule by mouth 2 times daily (Patient taking differently: Take 100 mg by mouth 2 times daily as needed ), Disp: 30 capsule, Rfl: 1    tamsulosin (FLOMAX) 0.4 MG capsule, Take 1 capsule by mouth daily, Disp: 30 capsule, Rfl: 11    famotidine (PEPCID) 20 MG tablet, Take 1 tablet by mouth 2 times daily, Disp: 60 tablet, Rfl: 0    latanoprost (XALATAN) 0.005 % ophthalmic solution, Place 1 drop into both eyes nightly, Disp: , Rfl:     Cholecalciferol (VITAMIN D3) 25 MCG (1000 UT) CAPS, Take 2,000 Units by mouth daily, Disp: , Rfl:     VITAMIN B1-B12 IM, Inject into the muscle every 30 days , Disp: , Rfl:     EPINEPHrine (EPIPEN 2-FARAZ) 0.3 MG/0.3ML SOAJ injection, Inject 0.3 mLs into the muscle once for 1 dose Use as directed for allergic reaction (Patient not taking: Reported on 8/17/2020), Disp: 0.3 mL, Rfl: 0  The patient has No Known Allergies. Past Medical History  Simón Frias  has a past medical history of Atrial fibrillation ([address] Blood in urine, Chronic kidney disease, CKD (chronic kidney disease), History of ischemic stroke in prior three months, Hyperlipidemia, Ischemic stroke ([address] Kidney stones, Nihss score 10, PTSD (post-traumatic stress disorder), Skin tag, Stroke ([address] and Stroke ([address] Past Surgical History  The patient  has a past surgical history that includes Appendectomy; transesophageal echocardiogram (01/17/2020); TURP (N/A, 08/10/2020); Cystoscopy (Left, 10/19/2020); Lithotripsy; Cystoscopy (Left, 12/18/2020); IR GUIDED NEPHROSTOMY CATH PLACEMENT LEFT (12/18/2020); NEPHROLITHOTOMY (Left, 12/18/2020);  Cystoscopy (Left, 12/18/2020); and CYSTOSCOPY INSERTION / REMOVAL STENT / STONE (Left, 2/1/2021). Family History  This patient's family history includes Heart Disease in his father and mother; Stroke in his brother and father. Social History  Era Blazing  reports that he has never smoked. He has never used smokeless tobacco. He reports previous alcohol use. He reports that he does not use drugs. Discussed use, benefit, and side effects of prescribed medications. All questions answered. Patient voiced understanding. Reviewed health maintenance. Electronically signed Derian Ordonez MD on 4/26/2021 at 12:55 PM    This note has been created using the Epic electronic health record, and dictated in part by [id number] dictation system. Despite the documenting physician's best efforts, there may be errors in spelling, grammar or syntax.

## 2021-04-27 ENCOUNTER — ANESTHESIA (OUTPATIENT)
Dept: OPERATING ROOM | Age: 71
End: 2021-04-27
Payer: OTHER GOVERNMENT

## 2021-04-27 ENCOUNTER — ANESTHESIA EVENT (OUTPATIENT)
Dept: OPERATING ROOM | Age: 71
End: 2021-04-27
Payer: OTHER GOVERNMENT

## 2021-04-27 ENCOUNTER — HOSPITAL ENCOUNTER (OUTPATIENT)
Age: 71
Setting detail: OUTPATIENT SURGERY
Discharge: HOME | End: 2021-04-27
Attending: SURGERY | Admitting: SURGERY
Payer: OTHER GOVERNMENT

## 2021-04-27 VITALS — SYSTOLIC BLOOD PRESSURE: 112 MMHG | DIASTOLIC BLOOD PRESSURE: 68 MMHG | OXYGEN SATURATION: 99 %

## 2021-04-27 VITALS
HEIGHT: 67 IN | OXYGEN SATURATION: 100 % | BODY MASS INDEX: 28.53 KG/M2 | RESPIRATION RATE: 16 BRPM | DIASTOLIC BLOOD PRESSURE: 67 MMHG | SYSTOLIC BLOOD PRESSURE: 131 MMHG | TEMPERATURE: 97 F | HEART RATE: 64 BPM | WEIGHT: 181.8 LBS

## 2021-04-27 PROCEDURE — 3609027000 HC COLONOSCOPY: Performed by: SURGERY

## 2021-04-27 PROCEDURE — 43239 EGD BIOPSY SINGLE/MULTIPLE: CPT | Performed by: SURGERY

## 2021-04-27 PROCEDURE — 2500000003 HC RX 250 WO HCPCS: Performed by: NURSE ANESTHETIST, CERTIFIED REGISTERED

## 2021-04-27 PROCEDURE — 2580000003 HC RX 258: Performed by: SURGERY

## 2021-04-27 PROCEDURE — 88305 TISSUE EXAM BY PATHOLOGIST: CPT

## 2021-04-27 PROCEDURE — 2709999900 HC NON-CHARGEABLE SUPPLY: Performed by: SURGERY

## 2021-04-27 PROCEDURE — 88342 IMHCHEM/IMCYTCHM 1ST ANTB: CPT

## 2021-04-27 PROCEDURE — 6360000002 HC RX W HCPCS: Performed by: NURSE ANESTHETIST, CERTIFIED REGISTERED

## 2021-04-27 PROCEDURE — 7100000011 HC PHASE II RECOVERY - ADDTL 15 MIN: Performed by: SURGERY

## 2021-04-27 PROCEDURE — 7100000010 HC PHASE II RECOVERY - FIRST 15 MIN: Performed by: SURGERY

## 2021-04-27 PROCEDURE — 45378 DIAGNOSTIC COLONOSCOPY: CPT | Performed by: SURGERY

## 2021-04-27 PROCEDURE — 3700000001 HC ADD 15 MINUTES (ANESTHESIA): Performed by: SURGERY

## 2021-04-27 PROCEDURE — 3700000000 HC ANESTHESIA ATTENDED CARE: Performed by: SURGERY

## 2021-04-27 PROCEDURE — 3609012400 HC EGD TRANSORAL BIOPSY SINGLE/MULTIPLE: Performed by: SURGERY

## 2021-04-27 RX ORDER — SODIUM CHLORIDE, SODIUM LACTATE, POTASSIUM CHLORIDE, CALCIUM CHLORIDE 600; 310; 30; 20 MG/100ML; MG/100ML; MG/100ML; MG/100ML
INJECTION, SOLUTION INTRAVENOUS CONTINUOUS
Status: DISCONTINUED | OUTPATIENT
Start: 2021-04-27 | End: 2021-04-27 | Stop reason: HOSPADM

## 2021-04-27 RX ORDER — FERROUS SULFATE 325(65) MG
1 TABLET ORAL DAILY
COMMUNITY
Start: 2021-02-23 | End: 2022-03-04 | Stop reason: ALTCHOICE

## 2021-04-27 RX ORDER — PROPOFOL 10 MG/ML
INJECTION, EMULSION INTRAVENOUS PRN
Status: DISCONTINUED | OUTPATIENT
Start: 2021-04-27 | End: 2021-04-27 | Stop reason: SDUPTHER

## 2021-04-27 RX ORDER — LIDOCAINE HYDROCHLORIDE 40 MG/ML
INJECTION, SOLUTION RETROBULBAR; TOPICAL PRN
Status: DISCONTINUED | OUTPATIENT
Start: 2021-04-27 | End: 2021-04-27 | Stop reason: SDUPTHER

## 2021-04-27 RX ADMIN — LIDOCAINE HYDROCHLORIDE 80 MG: 40 INJECTION, SOLUTION RETROBULBAR; TOPICAL at 10:04

## 2021-04-27 RX ADMIN — PROPOFOL 220 MG: 10 INJECTION, EMULSION INTRAVENOUS at 10:04

## 2021-04-27 RX ADMIN — SODIUM CHLORIDE, POTASSIUM CHLORIDE, SODIUM LACTATE AND CALCIUM CHLORIDE: 600; 310; 30; 20 INJECTION, SOLUTION INTRAVENOUS at 09:03

## 2021-04-27 RX ADMIN — SODIUM CHLORIDE, POTASSIUM CHLORIDE, SODIUM LACTATE AND CALCIUM CHLORIDE: 600; 310; 30; 20 INJECTION, SOLUTION INTRAVENOUS at 10:02

## 2021-04-27 ASSESSMENT — PAIN SCALES - GENERAL: PAINLEVEL_OUTOF10: 0

## 2021-04-27 NOTE — OP NOTE
ESOPHAGUS, BIOPSY (SQUAMOUS AND GLANDULAR MUCOSA): -MINIMAL CHRONIC INFLAMMATION. Plan:    1. Continue pepcid 20 mg bid  2. Have PCP follow up on Hb, if it drops again, then will have him see us again.   3.  Repeat CS in  10 years for screening          Electronically signed by Tae Lopez MD  on 4/27/2021 at 10:00 AM

## 2021-04-27 NOTE — ANESTHESIA PRE PROCEDURE
Department of Anesthesiology  Preprocedure Note       Name:  Sara Varela   Age:  69 y.o.  :  1950                                          MRN:  [id number]         Date:  2021      Surgeon: Memo Barry):  Peña Garcias MD    Procedure: Procedure(s):  EGDMedications prior to admission:   Prior to Admission medications    Medication Sig Start Date End Date Taking?  Authorizing Provider   ferrous sulfate (IRON 325) 325 (65 Fe) MG tablet Take 1 tablet by mouth daily 21   Historical Provider, MD   zinc 50 MG CAPS Take 1 tablet by mouth daily 21   Historical Provider, MD   aspirin 81 MG EC tablet Take 1 tablet by mouth daily 2/3/21   Shasta Krause MD   dabigatran (PRADAXA) 150 MG capsule Take 1 capsule by mouth 2 times daily 2/3/21   Shasta Krause MD   Omega-3 Fatty Acids (FISH OIL) 1000 MG CAPS Take 1 capsule by mouth 2 times daily 2/3/21   Shasta Krause MD   atorvastatin (LIPITOR) 40 MG tablet Take 1 tablet by mouth nightly 2/3/21   Shasta Krause MD   oxybutynin (DITROPAN-XL) 10 MG extended release tablet Take 1 tablet by mouth daily 21   Kishan Pelletier MD   docusate sodium (COLACE) 100 MG capsule Take 1 capsule by mouth 2 times daily  Patient taking differently: Take 100 mg by mouth 2 times daily as needed  20   Pretty Metz MD   tamsulosin (FLOMAX) 0.4 MG capsule Take 1 capsule by mouth daily 10/19/20   Bob Pastor MD   famotidine (PEPCID) 20 MG tablet Take 1 tablet by mouth 2 times daily 20   Arnulfo Perrin   latanoprost (XALATAN) 0.005 % ophthalmic solution Place 1 drop into both eyes nightly 4/15/20   Historical Provider, MD   EPINEPHrine (EPIPEN 2-FARAZ) 0.3 MG/0.3ML SOAJ injection Inject 0.3 mLs into the muscle once for 1 dose Use as directed for allergic reaction  Patient not taking: Reported on 2020 3/3/20 7/29/20  Kishan Pelletier MD   Cholecalciferol (VITAMIN D3) 25 MCG (1000 UT) CAPS Take 2,000 Units by mouth daily    Historical Provider, MD   VITAMIN B1-B12 IM Inject into the muscle every 30 days     Historical Provider, MD       Current medications:    No current facility-administered medications for this visit. No current outpatient medications on file. Facility-Administered Medications Ordered in Other Visits   Medication Dose Route Frequency Provider Last Rate Last Admin    lactated ringers infusion   Intravenous Continuous Star MD Griselda 75 mL/hr at 04/27/21 0903 New Bag at 04/27/21 0903       Allergies:  No Known Allergies    Problem List:    Patient Active Problem List   Diagnosis Code    Corneal foreign body, right, initial encounter T15. 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 H43.813    Combined forms of age-related cataract of both eyes H25.813    Acute cerebrovascular accident (CVA) ([facility] [address] I63.9    Tissue plasminogen activator (tPA) administered at other facility within 24 hours before current admission Z92.82    Permanent atrial fibrillation (HCC) I48.21    Acute ischemic stroke ([facility]) I63.9    Left homonymous hemianopsia H53.462    Hyperlipidemia E78.5    Stroke, recent, without late effect Z86.73    Bilateral carotid artery stenosis I65.23    Chronic cerebral ischemia I67.82    Memory problem R41.3    Gait difficulty R26.9    Balance problem R26.89    At high risk for stroke Z91.89    Risk for falls Z91.81    Cerebrovascular accident (CVA) due to embolism of right posterior cerebral artery (HCC) I63.431    Bilateral hemianopia H53.47    Dysphagia R13.10    Hematuria R31.9    PTSD (post-traumatic stress disorder) F43.10    Acute urinary retention R33.8    Horseshoe kidney Q63.1    Kidney stone on left side N20.0    Severe malnutrition (HCC) E43    History of ischemic posterior cerebral artery stroke Z86.73    Cerebrovascular accident (CVA) due to embolism of left middle cerebral artery ([address] U32.002    H/O ischemic left MCA stroke Z86.73    Ischemic stroke ([address] I63.9  Atrial fibrillation (HCC) I48.91       Past Medical History:        Diagnosis Date    Atrial fibrillation ([facility] [address] 01/2020    Blood in urine     Chronic kidney disease     CKD (chronic kidney disease)     History of ischemic stroke in prior three months     Hyperlipidemia     Ischemic stroke ([facility] [address] 01/10/2020    Kidney stones     Nihss score 10     PTSD (post-traumatic stress disorder)     from war, Agent [place]     Skin tag 12/21/2018    Stroke ([facility] [address] 01/16/2020    MRI Brain WO: New right PCA distribution infarct, multifocal left MCA distribution infarcts    Stroke ([facility] [address] 01/11/2020    large vessel occlusion/left M2 occlusion.   He subsequently underwent emergent thrombectomy.  /  S/P TPA DONE       Past Surgical History:        Procedure Laterality Date    APPENDECTOMY      CYSTOSCOPY Left 10/19/2020    CYSTOSCOPY, LEFT URETEROSCOPY w/ HOMIUM LASER LITHOTRIPSY, LEFT URETERAL STENT PLACEMENT performed by Bob Pastor MD at [facility] Left 12/18/2020    cystoscopy, left stent placement, left retrograde pyelogram    CYSTOSCOPY Left 12/18/2020    CYSTOSCOPY, LEFT STENT REMOVAL, LEFT RETROGRADE PYELOGRAM, INSERTION OCCLUSIVE BALLOON - PT TO INTERV RADIOLOGY FOLLOWING performed by Bob Pastor MD at [address] / Bebeto Duran / Pauline Hess Left 2/1/2021    CYSTO Left URETEROSCOPY w/ stent removal ET RETROGRADE performed by Bob Pastor MD at [address] [facility] IR NEPHROSTOMY PERCUTANEOUS LEFT  12/18/2020    IR NEPHROSTOMY PERCUTANEOUS LEFT 12/18/2020 Xiomara Marcano MD [facility] SPECIAL PROCEDURES    LITHOTRIPSY      NEPHROLITHOTOMY Left 12/18/2020    cystoscopy, left stent placement, left retrograde pyelogram performed by Bob Pastor MD at [facility] TRANSESOPHAGEAL ECHOCARDIOGRAM  01/17/2020    TURP N/A 08/10/2020    CYSTO w/ Greenlight Laser performed by Bob Pastor MD at [place] [address] History:    Social History     Tobacco Use    Smoking status: Never Smoker    Smokeless tobacco: Never Used   Substance Use Topics    Alcohol use: Not Currently     Alcohol/week: 0.0 standard drinks                                Counseling given: Not Answered      Vital Signs (Current): There were no vitals filed for this visit. BP Readings from Last 3 Encounters:   04/27/21 (!) 140/80   04/26/21 122/70   03/25/21 122/70       NPO Status:                                                                                 BMI:   Wt Readings from Last 3 Encounters:   04/27/21 181 lb 12.8 oz (82.5 kg)   04/26/21 188 lb (85.3 kg)   03/25/21 182 lb (82.6 kg)     There is no height or weight on file to calculate BMI.    CBC:   Lab Results   Component Value Date    WBC 7.3 12/19/2020    RBC 4.33 12/19/2020    HGB 12.0 12/19/2020    HCT 37.6 12/19/2020    MCV 86.8 12/19/2020    RDW 12.9 12/19/2020     12/19/2020       CMP:   Lab Results   Component Value Date     12/19/2020    K 3.8 12/19/2020     12/19/2020    CO2 22 12/19/2020    BUN 15 12/19/2020    CREATININE 0.89 12/19/2020    GFRAA >60 12/19/2020    LABGLOM >60 12/19/2020    GLUCOSE 131 12/19/2020    PROT 7.3 12/01/2020    CALCIUM 8.3 12/19/2020    BILITOT 0.60 12/01/2020    ALKPHOS 109 12/01/2020    AST 18 12/01/2020    ALT 14 12/01/2020       POC Tests:   No results for input(s): POCGLU, POCNA, POCK, POCCL, POCBUN, POCHEMO, POCHCT in the last 72 hours.     Coags:   Lab Results   Component Value Date    PROTIME 10.4 12/18/2020    INR 1.0 12/18/2020    APTT 26.5 12/18/2020       HCG (If Applicable): No results found for: PREGTESTUR, PREGSERUM, HCG, HCGQUANT     ABGs: No results found for: PHART, PO2ART, WLN1RDX, BCC6VUN, BEART, L8TYRRJM     Type & Screen (If Applicable):  No results found for: LABABO, LABRH    Drug/Infectious Status (If Applicable):  No results found for: HIV, HEPCAB    COVID-19 Screening (If Applicable):   Lab Results   Component Value Date    COVID19 Not Detected 04/22/2021    COVID19 Not Detected 01/27/2021         Anesthesia Evaluation  Patient summary reviewed and Nursing notes reviewed no history of anesthetic complications:   Airway: Mallampati: II  TM distance: >3 FB   Neck ROM: full  Mouth opening: > = 3 FB Dental: normal exam         Pulmonary:Negative Pulmonary ROS and normal exam                               Cardiovascular:  Exercise tolerance: good (>4 METS),   (+) hyperlipidemia        Rhythm: regular  Rate: normal           Beta Blocker:  Not on Beta Blocker         Neuro/Psych:   (+) CVA:, neuromuscular disease:, psychiatric history:            GI/Hepatic/Renal:   (+) renal disease: kidney stones,           Endo/Other:    (+) : arthritis:., .          Pt had no PAT visit       Abdominal:           Vascular:   + PVD, aortic or cerebral, . Anesthesia Plan      general and TIVA     ASA 3       Induction: intravenous. MIPS: Postoperative opioids intended and Prophylactic antiemetics administered. Anesthetic plan and risks discussed with patient. Use of blood products discussed with whom consented to blood products. Plan discussed with CRNA.                   JIMBO Bethea - ADRIAN   4/27/2021

## 2021-04-27 NOTE — H&P
General Surgery History & Physical  Evangelist Palm MD  Pt Name: Eliana Florentino  MRN: [id number]  [place]: 1950  Date of evaluation: 3/25/2021  Primary Care Physician: Dario Elena MD     Patient evaluated at the request of Dr. Florencio Quan  Reason for evaluation: anemia                   SUBJECTIVE:  Chief Complaint:        Chief Complaint   Patient presents with    Surgical Consult       EGD/Colonoscopy      History of Present Illness:  EGD          EGD  Nausea: no  Vomiting: no  Heartburn:no  Dysphagia:yes, after stroke (6/2020)  Hematemesis:no  Epigastric pain:no  Anemia: yes (also, on pradaxa for previous CVA)  Previous work up date:No previous EGD  Current Treatment:Pepcid 20 mg BID      Colonoscopy  Abd pain: no  Anemia: yes Hb=12.8  Bloating:no  Diarrhea: no  Constipation: no  Melena: no  Hematochezia:no  Rectal Bleeding:no  Rectal/Anal Pain:no  Pruritus: no  Family history colon Cancer: no  Previous colon cancer: no  Previous Colon Polyp: no  Change in bowels: no  Decrease caliber of stool: no  Change in color of stool: no  Previous work up date: No previous Colonoscopy      Past Medical History   has a past medical history of Atrial fibrillation ([address] Blood in urine, Chronic kidney disease, CKD (chronic kidney disease), History of ischemic stroke in prior three months, Hyperlipidemia, Ischemic stroke ([address] Kidney stones, Nihss score 10, PTSD (post-traumatic stress disorder), Skin tag, Stroke ([address] and Stroke ([address] Past Surgical History   has a past surgical history that includes Appendectomy; transesophageal echocardiogram (01/17/2020); TURP (N/A, 08/10/2020); Cystoscopy (Left, 10/19/2020); Lithotripsy; Cystoscopy (Left, 12/18/2020); IR GUIDED NEPHROSTOMY CATH PLACEMENT LEFT (12/18/2020); NEPHROLITHOTOMY (Left, 12/18/2020); Cystoscopy (Left, 12/18/2020); and CYSTOSCOPY INSERTION / REMOVAL STENT / STONE (Left, 2/1/2021).      Family History  family history includes Heart Disease in his father and mother; Stroke in his brother and father. Social History  Tobacco use:  reports that he has never smoked. He has never used smokeless tobacco.  Alcohol use:  reports previous alcohol use. Drug use:  reports no history of drug use. Medications  Current Medications:   Current Facility-Administered Medications          Current Outpatient Medications   Medication Sig Dispense Refill    aspirin 81 MG EC tablet Take 1 tablet by mouth daily 90 tablet 3    dabigatran (PRADAXA) 150 MG capsule Take 1 capsule by mouth 2 times daily 180 capsule 3    Omega-3 Fatty Acids (FISH OIL) 1000 MG CAPS Take 1 capsule by mouth 2 times daily 180 capsule 3    atorvastatin (LIPITOR) 40 MG tablet Take 1 tablet by mouth nightly 90 tablet 3    oxybutynin (DITROPAN-XL) 10 MG extended release tablet Take 1 tablet by mouth daily 90 tablet 3    docusate sodium (COLACE) 100 MG capsule Take 1 capsule by mouth 2 times daily (Patient taking differently: Take 100 mg by mouth 2 times daily as needed ) 30 capsule 1    tamsulosin (FLOMAX) 0.4 MG capsule Take 1 capsule by mouth daily 30 capsule 11    famotidine (PEPCID) 20 MG tablet Take 1 tablet by mouth 2 times daily 60 tablet 0    latanoprost (XALATAN) 0.005 % ophthalmic solution Place 1 drop into both eyes nightly        Cholecalciferol (VITAMIN D3) 25 MCG (1000 UT) CAPS Take 2,000 Units by mouth daily        VITAMIN B1-B12 IM Inject into the muscle every 30 days         EPINEPHrine (EPIPEN 2-FARAZ) 0.3 MG/0.3ML SOAJ injection Inject 0.3 mLs into the muscle once for 1 dose Use as directed for allergic reaction (Patient not taking: Reported on 8/17/2020) 0.3 mL 0      No current facility-administered medications for this visit. Home Medications:   Home Medications           Prior to Admission medications    Medication Sig Start Date End Date Taking?  Authorizing Provider   aspirin 81 MG EC tablet Take 1 tablet by mouth daily 2/3/21   Yes Velma Dow MD   [facility]) 150 MG capsule Take 1 capsule by mouth 2 times daily 2/3/21   Yes Jadyn Rust MD   Omega-3 Fatty Acids (FISH OIL) 1000 MG CAPS Take 1 capsule by mouth 2 times daily 2/3/21   Yes Jadyn Rust MD   atorvastatin (LIPITOR) 40 MG tablet Take 1 tablet by mouth nightly 2/3/21   Yes Jadyn Rust MD   oxybutynin (DITROPAN-XL) 10 MG extended release tablet Take 1 tablet by mouth daily 1/5/21   Yes Margarette Lovell MD   docusate sodium (COLACE) 100 MG capsule Take 1 capsule by mouth 2 times daily  Patient taking differently: Take 100 mg by mouth 2 times daily as needed  12/19/20   Yes Antony Alvarez MD   tamsulosin (FLOMAX) 0.4 MG capsule Take 1 capsule by mouth daily 10/19/20   Yes Eden Calloway MD   famotidine (PEPCID) 20 MG tablet Take 1 tablet by mouth 2 times daily 8/5/20   Yes Vivienne Hernandez   latanoprost (XALATAN) 0.005 % ophthalmic solution Place 1 drop into both eyes nightly 4/15/20   Yes Historical Provider, MD   Cholecalciferol (VITAMIN D3) 25 MCG (1000 UT) CAPS Take 2,000 Units by mouth daily     Yes Historical Provider, MD   VITAMIN B1-B12 IM Inject into the muscle every 30 days      Yes Historical Provider, MD   EPINEPHrine (EPIPEN 2-FARAZ) 0.3 MG/0.3ML SOAJ injection Inject 0.3 mLs into the muscle once for 1 dose Use as directed for allergic reaction  Patient not taking: Reported on 8/17/2020 3/3/20 7/29/20   Margarette Lovell MD            Allergies  Patient has no known allergies. Review of Systems:  General: Denies any fever, chills. HEENT: Denies any diplopia, tinnitus or vertigo. Respiratory: Denies any shortness of breath or cough. Cardiac: Denies any chest pain, palpitations, claudication or edema. Gastrointestinal: Denies any melena, hematochezia, hematemesis or pyrosis. Genitourinary: Denies any frequency, urgency, hesitancy or incontinence. Hematologic: Denies bruising or bleeding easily. Endocrine: Denies any history of diabetes or thyroid disease.      PHYSICAL EXAMINATION  Vitals:       Vitals: 03/25/21 1355   BP: 122/70   Pulse: 70   Resp: 16   Temp: 96.3 °F (35.7 °C)      General Appearance:  awake, alert, oriented, in no acute distress, well developed, well nourished and in no acute distress  Skin:  Skin color, texture, turgor normal. No rashes or lesions. Head/face:  NCAT  Eyes:  No gross abnormalities. , PERRL, Pupils- PERRL. Ears:  canals and TMs NI and External- normal  Nose/Sinuses:  Nares normal. Septum midline. Mucosa normal. No drainage or sinus tenderness. Mouth/Throat:  Mucosa moist.  No lesions. Pharynx without erythema, edema or exudate. Lungs:  Normal expansion. Clear to auscultation. No rales, rhonchi, or wheezing., Breathing Pattern: regular, no distress, Breath sounds: normal  Heart:  Heart sounds are normal.  Regular rate and rhythm without murmur, gallop or rub. Auscultation: Normal S1 and S2.  Regular rhythm. No murmurs, gallops, or rubs. Abdomen:  Soft, non-tender, normal bowel sounds. No bruits, organomegaly or masses.   Musculoskeletal:  negative, negative findings: no erythema, induration, or nodules, ROM of all joints is normal, strength normal  Neurologic:  negative findings: proximal muscle strength normal, speech normal, mental status intact, cranial nerves 2-12 intact, muscle tone normal, muscle strength normal     LABS:  CBC         Lab Results   Component Value Date     WBC 7.3 12/19/2020     HGB 12.0 12/19/2020     HCT 37.6 12/19/2020      12/19/2020      BMP         Lab Results   Component Value Date      12/19/2020     K 3.8 12/19/2020      12/19/2020     CO2 22 12/19/2020     BUN 15 12/19/2020     CREATININE 0.89 12/19/2020     PHOS 3.9 12/18/2020     MG 2.1 12/19/2020      LFT's:         Lab Results   Component Value Date     AST 18 12/01/2020     ALT 14 12/01/2020     ALKPHOS 109 12/01/2020     BILITOT 0.60 12/01/2020     BILIDIR 0.40 08/03/2020     LIPASE 10 08/03/2020      COAGS:         Lab Results   Component Value Date     INR 1.0

## 2021-04-29 LAB — SURGICAL PATHOLOGY REPORT: NORMAL

## 2021-04-30 ENCOUNTER — TELEPHONE (OUTPATIENT)
Dept: SURGERY | Age: 71
End: 2021-04-30

## 2021-04-30 NOTE — LETTER
[address]  Phone: [phone]  Fax: [phone]    Georgia Eubanks MD      4/30/2021    Dear Esperanza Jules,      Dr. Juan Guerrero reviewed the results of your recent EGD and colonoscopy. Your colon was normal. The biopsies on the upper scope showed mild inflammation in the esophagus and mild to moderate inflammation in the stomach. Continue Pepcid. Have your primary physician monitor you blood count. Come back and see us if your blood count drops    His recommendations are to be scoped again in 10 years, due to colon cancer screening. If you have any questions or concerns regarding your test results or our recommendations, please call the office at [phone].       Sincerely,           [place], [place]

## 2021-06-17 ENCOUNTER — TELEPHONE (OUTPATIENT)
Dept: INTERNAL MEDICINE | Age: 71
End: 2021-06-17

## 2021-06-17 NOTE — TELEPHONE ENCOUNTER
I would recommend that they wait a couple of days, because most likely will get better on its own, and usually do not take Imodium to stop and acute diarrhea.   If diarrhea persists beyond 5 days, then he can start taking Imodium

## 2021-07-15 ENCOUNTER — OFFICE VISIT (OUTPATIENT)
Dept: NEUROLOGY | Age: 71
End: 2021-07-15
Payer: MEDICARE

## 2021-07-15 VITALS
HEIGHT: 67 IN | BODY MASS INDEX: 28.9 KG/M2 | SYSTOLIC BLOOD PRESSURE: 108 MMHG | WEIGHT: 184.13 LBS | HEART RATE: 88 BPM | DIASTOLIC BLOOD PRESSURE: 66 MMHG

## 2021-07-15 DIAGNOSIS — E78.2 MIXED HYPERLIPIDEMIA: ICD-10-CM

## 2021-07-15 DIAGNOSIS — I63.412 CEREBROVASCULAR ACCIDENT (CVA) DUE TO EMBOLISM OF LEFT MIDDLE CEREBRAL ARTERY (HCC): Primary | ICD-10-CM

## 2021-07-15 DIAGNOSIS — Z86.73 STROKE, RECENT, WITHOUT LATE EFFECT: ICD-10-CM

## 2021-07-15 DIAGNOSIS — I65.23 BILATERAL CAROTID ARTERY STENOSIS: ICD-10-CM

## 2021-07-15 DIAGNOSIS — F43.10 PTSD (POST-TRAUMATIC STRESS DISORDER): ICD-10-CM

## 2021-07-15 DIAGNOSIS — H53.462 LEFT HOMONYMOUS HEMIANOPSIA: ICD-10-CM

## 2021-07-15 DIAGNOSIS — R41.3 MEMORY PROBLEM: ICD-10-CM

## 2021-07-15 DIAGNOSIS — Z91.89 AT HIGH RISK FOR STROKE: ICD-10-CM

## 2021-07-15 DIAGNOSIS — I63.9 ISCHEMIC STROKE (HCC): ICD-10-CM

## 2021-07-15 DIAGNOSIS — R26.9 GAIT DIFFICULTY: ICD-10-CM

## 2021-07-15 DIAGNOSIS — Z91.81 RISK FOR FALLS: ICD-10-CM

## 2021-07-15 DIAGNOSIS — Z86.73 HISTORY OF ISCHEMIC POSTERIOR CEREBRAL ARTERY STROKE: ICD-10-CM

## 2021-07-15 DIAGNOSIS — R13.12 OROPHARYNGEAL DYSPHAGIA: ICD-10-CM

## 2021-07-15 DIAGNOSIS — I48.91 ATRIAL FIBRILLATION, UNSPECIFIED TYPE (HCC): ICD-10-CM

## 2021-07-15 DIAGNOSIS — R26.89 BALANCE PROBLEM: ICD-10-CM

## 2021-07-15 DIAGNOSIS — Z86.73 H/O ISCHEMIC LEFT MCA STROKE: ICD-10-CM

## 2021-07-15 DIAGNOSIS — I67.82 CHRONIC CEREBRAL ISCHEMIA: ICD-10-CM

## 2021-07-15 PROCEDURE — 99214 OFFICE O/P EST MOD 30 MIN: CPT | Performed by: PSYCHIATRY & NEUROLOGY

## 2021-07-15 PROCEDURE — 99213 OFFICE O/P EST LOW 20 MIN: CPT | Performed by: PSYCHIATRY & NEUROLOGY

## 2021-07-15 ASSESSMENT — ENCOUNTER SYMPTOMS
EYE DISCHARGE: 0
CHEST TIGHTNESS: 0
NAUSEA: 0
DIARRHEA: 0
CHOKING: 0
CHANGE IN BOWEL HABIT: 0
COUGH: 0
SORE THROAT: 0
ABDOMINAL DISTENTION: 0
WHEEZING: 0
SHORTNESS OF BREATH: 0
SWOLLEN GLANDS: 0
VOICE CHANGE: 0
BOWEL INCONTINENCE: 0
COLOR CHANGE: 0
CONSTIPATION: 0
EYE PAIN: 0
ABDOMINAL PAIN: 0
SINUS PRESSURE: 0
BACK PAIN: 0
FACIAL SWELLING: 0
PHOTOPHOBIA: 0
VOMITING: 0
EYE REDNESS: 0
BLOOD IN STOOL: 0
TROUBLE SWALLOWING: 0
EYE ITCHING: 0
VISUAL CHANGE: 0
APNEA: 0

## 2021-07-15 NOTE — PROGRESS NOTES
[facility]  Neurology  [address]FSX:[phone]   Fax: [phone]    SUBJECTIVE:     PATIENT ID:  Lesley Brunner is a  RIGHT  HANDED 66 y.o. male. Cerebrovascular Accident  This is a new problem. Episode onset: RECURRENT    LEFT  MCA   AND   RIGHT  PCA    EMBOLIC  STROKES  IN  JAN. 2020. The problem has been gradually improving. Pertinent negatives include no abdominal pain, anorexia, arthralgias, change in bowel habit, chest pain, chills, congestion, coughing, diaphoresis, fatigue, fever, headaches, joint swelling, myalgias, nausea, neck pain, numbness, rash, sore throat, swollen glands, urinary symptoms, vertigo, visual change, vomiting or weakness. Nothing aggravates the symptoms. Treatments tried: ASA,  PRADAXA   The treatment provided significant relief. Neurologic Problem  The patient's primary symptoms include clumsiness, a loss of balance and memory loss. The patient's pertinent negatives include no altered mental status, focal sensory loss, focal weakness, near-syncope, slurred speech, syncope, visual change or weakness. This is a recurrent problem. The current episode started more than 1 month ago. The neurological problem developed suddenly. The problem is unchanged. There was no focality noted. Associated symptoms include dizziness and light-headedness. Pertinent negatives include no abdominal pain, auditory change, aura, back pain, bladder incontinence, bowel incontinence, chest pain, confusion, diaphoresis, fatigue, fever, headaches, nausea, neck pain, palpitations, shortness of breath, vertigo or vomiting. Past treatments include bed rest, sleep and aspirin. The treatment provided no relief. His past medical history is significant for a CVA and dementia. There is no history of a bleeding disorder, a clotting disorder, head trauma, liver disease, mood changes or seizures.              History obtained from  The patient    AND      HIS WIFE       and other  available   medical  records   were  Also  reviewed. The  Duration,  Quality,  Severity,  Location,  Timing,  Context,  Modifying  Factors   Of   The   Chief   Complaint   And  Present  Illness       Was   Reviewed   In   Chronological   [facility].                                             PATIENT'S  MAIN  CONCERNS INCLUDE :                     1)     H/O    RECURRENCE     EMBOLIC   STROKES  IN     JAN. 2020                                   AND  HOSPITALIZATION     IN     [place]                                         -  PATIENT  ALSO  RECEIVED  tPA                                                                2)          MRI  BRAIN    ON    1/11/2020      SHOWED                           A)    MULTIPLE     STROKES  IN  LEFT  FRONTAL    AND  PARIETAL  LOBES                          B)        CHRONIC  CEREBRAL  ISCHEMIA                          3)    MRI    BRAIN   ON     1/16/ 2020        SHOWED                                ADDITIONAL      NEW     RIGHT  PCA  STROKE                                     AND  MANAGEMENT   OF   THE  SAME                                               3)      CTA  BRAIN    JAN. 2020     SHOWED                              A)       H/O    LEFT   MCA   OCCULUSION                              B)       RIGHT  PCA  OCCULUSION                               C)   BILATERAL  CAROTID  STENOSIS      50  %                                     -   NEEDS  MONITORING                        4)        CHRONIC   ATRIAL   FIBRILLATION                                   -    PATIENT  TO  FOLLOW  WITH  CARDIOLOGY                      5)        PATIENT       ON     ASA    81  MG       DAILY                                    AND  PRADAXA                                        -     TOLERATING  THE  SAME                        6)     HIGH  RISK  FOR  TIA /   STROKE                      7)      PATIENT  RECOVERED   WELL   FROM    HIS   RECENT RECURRENT  STROKES                       8)     POST  STROKE          SYMPTOMS                          A)    GAIT  DIFFICULTY                         B)  BALANCE  PROBLEMS                                            C)    RESIDUAL   LEFT  VISUAL  FIELD  DEFECTS                        D)    MEMORY  PROBLEMS                                        -     IMPROVED   SIGNIFICANTLY                         9)      HIGH  RISK  FOR       FALLS                10)     MULTIPLE  CO  MORBID  MEDICAL  CONDITIONS                           -   TO  FOLLOW  WITH  HIS PCP                     11)          FOLLOW  UP     CT   HEAD     ON   2/27/2020    SHOWED  :                      \"  Generalized atrophy                   encephalomalacial changes within the right Temporal occipital region,                   and posterior left frontal lobe                   No acute intraparenchymal hemorrhage  \"                  12)       PATIENT    AND  HIS  WIFE   DENIES  ANY  NEW  NEUROLOGICAL    CONCERNS. 13)     VARIOUS  RISK   FACTORS   WERE  REVIEWED   AND   DISCUSSED. PATIENT   HAS  MULTIPLE   MEDICAL, NEUROLOGICAL                      PROBLEMS . PATIENT'S   MANAGEMENT  IS  CHALLENGING.                                     PRECIPITATING  FACTORS: including  fever/infection, exertion/relaxation, position change, stress, weather change,                        medications/alcohol, time of day/darkness/light  Are  absent                                                              MODIFYING  FACTORS:  fever/infection, exertion/relaxation, position change, stress, weather change,                        medications/alcohol, time of day/darkness/light  Are  absent             Patient   Indicates   The  Presence   And  The  Absence  Of  The  Following    Associated  And   Additional  Neurological    Symptoms:                                Balance  And coordination   problems  present Gait problems     present            Headaches      absent              Migraines           absent           Memory problemspresent              Confusion        absent            Paresthesia   numbness          absent           Seizures  And  Starring  Episodes           absent           Syncope,  Near  syncopal episodes         absent           Speech   problems           absent             Swallowing   Problems      absent            Dizziness,  Light headedness           present              Vertigo        absent             Generalized   Weakness    absent              focal  Weakness     absent             Tremors         absent              Sleep  Problems     absent             History  Of   Recent  Head  Injury     absent             History  Of   Recent  TIA     absent             History  Of   Recent    Stroke     present             Neck  Pain   and   Neck muscle  Spasms  absent               Radiating  down   And   Weakness           absent            Lower back   Pain  And     Spasms  absent              Radiating    Down   And   Weakness          absent                H/OFALLS        absent               History  Of   Visual  Symptoms    absent                  Associated   Diplopia       absent                                               Also   Additional   Symptoms   Present    As  Documented    In   The   detailed                  Review  Of  Systems   And    Please   Refer   To    Them for   Additional    Information. Any components  That are either  Unobtainable  Or  Limited  In   HPI, ROS  And/or PFSH   Are                   Due   ToPatient's  Medical  Problems,  Clinical  Condition   and/or lack of                                other    Alternate   resources.           RECORDS   REVIEWED:    historical medical records       INFORMATION   REVIEWED:     MEDICAL   HISTORY,SURGICAL   HISTORY,     MEDICATIONS   LIST,   ALLERGIES AND  DRUG  INTOLERANCES,       FAMILY   HISTORY, SOCIAL  HISTORY,      PROBLEM  LIST   FOR  PATIENT  CARE   COORDINATION      Past Medical History:   Diagnosis Date    [facility]rial [facility] 01/2020    Blood in urine     Chronic kidney disease     CKD (chronic kidney disease)     History of ischemic stroke in prior three months     Hyperlipidemia     Ischemic stroke ([facility] [address] [facility] 01/10/2020    Kidney stones     Nihss score 10     PTSD (post-traumatic stress disorder)     from war, Agent Orange     Skin tag 12/21/2018    Stroke ([facility] [address] [facility] 01/16/2020    MRI Brain WO: New right PCA distribution infarct, multifocal left MCA distribution infarcts    Stroke ([facility] [address] [facility] 01/11/2020    large vessel occlusion/left M2 occlusion. He subsequently underwent emergent thrombectomy.  /  S/P TPA DONE         Past Surgical History:   Procedure Laterality Date    APPENDECTOMY      COLONOSCOPY N/A 4/27/2021    Normal colon.  Dr. Caleb Lemos at [facility] Left 10/19/2020    CYSTOSCOPY, LEFT URETEROSCOPY w/ [place] LITHOTRIPSY, LEFT URETERAL STENT PLACEMENT performed by Stewart Ritchie MD at [facility] Left 12/18/2020    cystoscopy, left stent placement, left retrograde pyelogram    CYSTOSCOPY Left 12/18/2020    CYSTOSCOPY, LEFT STENT REMOVAL, LEFT RETROGRADE PYELOGRAM, INSERTION OCCLUSIVE BALLOON - PT TO INTERV RADIOLOGY FOLLOWING performed by Stewart Ritchie MD at [address] [place] / [place] [facility] / [place] Left 2/1/2021    CYSTO Left URETEROSCOPY w/ stent removal ET RETROGRADE performed by Stewart Ritchie MD at [facility] IR NEPHROSTOMY PERCUTANEOUS LEFT  12/18/2020    IR NEPHROSTOMY PERCUTANEOUS LEFT 12/18/2020 Rosa Self MD [facility] SPECIAL PROCEDURES    LITHOTRIPSY      NEPHROLITHOTOMY Left 12/18/2020    cystoscopy, left stent placement, left retrograde pyelogram performed by Stewart Ritchie MD at [address] TRANSESOPHAGEAL ECHOCARDIOGRAM  01/17/2020    TURP N/A 08/10/2020    CYSTO w/ [place] performed by Stewart Ritchie MD at [address] GASTROINTESTINAL ENDOSCOPY N/A 4/27/2021    mild esophagitis. mild-mod. esophagitis. Dr. Baron Ponce at [facility]         Current Outpatient Medications   Medication Sig Dispense Refill    ferrous sulfate (IRON 325) 325 (65 Fe) MG tablet Take 1 tablet by mouth daily      zinc 50 MG CAPS Take 1 tablet by mouth daily      aspirin 81 MG EC tablet Take 1 tablet by mouth daily 90 tablet 3    dabigatran (PRADAXA) 150 MG capsule Take 1 capsule by mouth 2 times daily 180 capsule 3    Omega-3 Fatty Acids (FISH OIL) 1000 MG CAPS Take 1 capsule by mouth 2 times daily 180 capsule 3    atorvastatin (LIPITOR) 40 MG tablet Take 1 tablet by mouth nightly 90 tablet 3    docusate sodium (COLACE) 100 MG capsule Take 1 capsule by mouth 2 times daily (Patient taking differently: Take 100 mg by mouth 2 times daily as needed ) 30 capsule 1    famotidine (PEPCID) 20 MG tablet Take 1 tablet by mouth 2 times daily 60 tablet 0    latanoprost (XALATAN) 0.005 % ophthalmic solution Place 1 drop into both eyes nightly      Cholecalciferol (VITAMIN D3) 25 MCG (1000 UT) CAPS Take 2,000 Units by mouth daily      VITAMIN B1-B12 IM Inject into the muscle every 30 days       oxybutynin (DITROPAN-XL) 10 MG extended release tablet Take 1 tablet by mouth daily (Patient not taking: Reported on 7/15/2021) 90 tablet 3    tamsulosin (FLOMAX) 0.4 MG capsule Take 1 capsule by mouth daily (Patient not taking: Reported on 7/15/2021) 30 capsule 11    EPINEPHrine (EPIPEN 2-FARAZ) 0.3 MG/0.3ML SOAJ injection Inject 0.3 mLs into the muscle once for 1 dose Use as directed for allergic reaction (Patient not taking: Reported on 8/17/2020) 0.3 mL 0     No current facility-administered medications for this visit.          No Known Allergies      Family History   Problem Relation Age of Onset    Heart Disease Mother     Heart Disease Father     Stroke Father     Stroke Brother          Social History     Socioeconomic History    Marital status:      Spouse name: Not on file    Number of children: Not on file    Years of education: Not on file    Highest education level: Not on file   Occupational History    Not on file   Tobacco Use    Smoking status: Never Smoker    Smokeless tobacco: Never Used   Vaping Use    Vaping Use: Never used   Substance and Sexual Activity    Alcohol use: Not Currently     Alcohol/week: 0.0 standard drinks    Drug use: No    Sexual activity: Yes   Other Topics Concern    Not on file   Social History Narrative    Not on file     Social Determinants of Health     Financial Resource Strain:     Difficulty of Paying Living Expenses:    Food Insecurity:     Worried About Running Out of Food in the Last Year:     [address] in the Last Year:    Transportation Needs:     Lack of Transportation (Medical):      Lack of Transportation (Non-Medical):    Physical Activity:     Days of Exercise per Week:     Minutes of Exercise per Session:    Stress:     Feeling of Stress :    Social Connections:     Frequency of Communication with Friends and Family:     Frequency of Social Gatherings with Friends and Family:     Attends Scientologist Services:     Active Member of Clubs or Organizations:     Attends Club or Organization Meetings:     Marital Status:    Intimate Partner Violence:     Fear of Current or Ex-Partner:     Emotionally Abused:     Physically Abused:     Sexually Abused:        Vitals:    07/15/21 0931   BP: 108/66   Pulse: 88         Wt Readings from Last 3 Encounters:   07/15/21 184 lb 2 oz (83.5 kg)   04/27/21 181 lb 12.8 oz (82.5 kg)   04/26/21 188 lb (85.3 kg)         BP Readings from Last 3 Encounters:   07/15/21 108/66   04/27/21 131/67   04/27/21 112/68       Hematology and Coagulation  Lab Results   Component Value Date    WBC 7.3 12/19/2020    RBC 4.33 12/19/2020    HGB 12.0 12/19/2020    HCT 37.6 12/19/2020    MCV 86.8 12/19/2020    MCH 27.7 12/19/2020    MCHC 31.9 12/19/2020    RDW 12.9 12/19/2020    PLT 156 12/19/2020    MPV 10.1 12/19/2020         Chemistries  Lab Results   Component Value Date     12/19/2020    K 3.8 12/19/2020     12/19/2020    CO2 22 12/19/2020    BUN 15 12/19/2020    CREATININE 0.89 12/19/2020    CALCIUM 8.3 12/19/2020    PROT 7.3 12/01/2020    LABALBU 4.1 12/01/2020    BILITOT 0.60 12/01/2020    ALKPHOS 109 12/01/2020    AST 18 12/01/2020    ALT 14 12/01/2020     Lab Results   Component Value Date    ALKPHOS 109 12/01/2020    ALT 14 12/01/2020    AST 18 12/01/2020    PROT 7.3 12/01/2020    BILITOT 0.60 12/01/2020    BILIDIR 0.40 08/03/2020    LABALBU 4.1 12/01/2020     Lab Results   Component Value Date    BUN 15 12/19/2020    CREATININE 0.89 12/19/2020     Lab Results   Component Value Date    CALCIUM 8.3 12/19/2020    MG 2.1 12/19/2020     Lab Results   Component Value Date    AST 18 12/01/2020    ALT 14 12/01/2020       Lab Results   Component Value Date    URICACID 7.7 05/22/2016         Review of Systems   Constitutional: Negative for appetite change, chills, diaphoresis, fatigue, fever and unexpected weight change. HENT: Negative for congestion, dental problem, drooling, ear discharge, ear pain, facial swelling, hearing loss, mouth sores, nosebleeds, postnasal drip, sinus pressure, sore throat, tinnitus, trouble swallowing and voice change. Eyes: Negative for photophobia, pain, discharge, redness, itching and visual disturbance. Respiratory: Negative for apnea, cough, choking, chest tightness, shortness of breath and wheezing. Cardiovascular: Negative for chest pain, palpitations, leg swelling and near-syncope. Gastrointestinal: Negative for abdominal distention, abdominal pain, anorexia, blood in stool, bowel incontinence, change in bowel habit, constipation, diarrhea, nausea and vomiting. Endocrine: Negative for cold intolerance, heat intolerance, polydipsia, polyphagia and polyuria. Genitourinary: Negative for bladder incontinence.    Musculoskeletal: Positive for gait problem. Negative for arthralgias, back pain, joint swelling, myalgias, neck pain and neck stiffness. Skin: Negative for color change, pallor, rash and wound. Allergic/Immunologic: Negative for environmental allergies, food allergies and immunocompromised state. Neurological: Positive for dizziness, light-headedness and loss of balance. Negative for vertigo, tremors, focal weakness, seizures, syncope, facial asymmetry, speech difficulty, weakness, numbness and headaches. Hematological: Negative for adenopathy. Does not bruise/bleed easily. Psychiatric/Behavioral: Positive for decreased concentration and memory loss. Negative for agitation, behavioral problems, confusion, dysphoric mood, hallucinations, self-injury, sleep disturbance and suicidal ideas. The patient is not nervous/anxious and is not hyperactive. OBJECTIVE:    Physical Exam  Constitutional:       Appearance: He is well-developed. HENT:      Head: Normocephalic and atraumatic. No raccoon eyes or Monte's sign. Right Ear: External ear normal.      Left Ear: External ear normal.      Nose: Nose normal.   Eyes:      Conjunctiva/sclera: Conjunctivae normal.      Pupils: Pupils are equal, round, and reactive to light. Neck:      Thyroid: No thyroid mass or thyromegaly. Vascular: No carotid bruit. Trachea: No tracheal deviation. Meningeal: Brudzinski's sign and Kernig's sign absent. Cardiovascular:      Rate and Rhythm: Normal rate and regular rhythm. Pulmonary:      Effort: Pulmonary effort is normal.   Musculoskeletal:         General: No tenderness. Cervical back: Normal range of motion and neck supple. No rigidity. No muscular tenderness. Normal range of motion. Skin:     General: Skin is warm. Coloration: Skin is not pale. Findings: No erythema or rash. Nails: There is no clubbing. Psychiatric:         Attention and Perception: He is attentive.          Mood and Affect: Mood is not anxious or depressed. Affect is not labile, blunt or inappropriate. Speech: He is communicative. Speech is not rapid and pressured, delayed, slurred or tangential.         Behavior: Behavior is not agitated, slowed, aggressive, withdrawn, hyperactive or combative. Behavior is cooperative. Thought Content: Thought content is not paranoid or delusional. Thought content does not include homicidal or suicidal ideation. Thought content does not include homicidal or suicidal plan. Cognition and Memory: Cognition is impaired. Memory is impaired. He exhibits impaired recent memory. He does not exhibit impaired remote memory. Judgment: Judgment is not impulsive or inappropriate. NEUROLOGICALEXAMINATION :     A) MENTAL STATUS:                   Alert and  oriented  To place  And  Person. No Aphasia. No  Dysarthria. Able   To  Follow       SIMPLE   commands                      No right  To left confusion. Normal  Speech  And language function. Insight and  Judgment ,Fund  Of  Knowledge   within normal limits                Recent  And  Remote memory    DECREASED                 Attention &  Concentration are    DECREASED                          B) CRANIAL NERVES :      CN : Visual  Acuity  And  Visual fields      LEFT   VISUAL  FIELD  DEFECT               Fundi  Could  Not  Be  Could  Not  Be  Evaluated. 3,4,6 CN : Both  Pupils are   Reactive and  Equal.  Movements  Are  Intact. No  Nystagmus. No  HARSH. No  Afferent  Pupillary  Defect noted. 5 CN :  Normal  Facial sensations and Corneal  Reflexes           7 CN:  Normal  Facial  Symmetry  And  Strength. No facial  Weakness.            8 CN :  Hearing  Appears   DECREASED           9, 10 CN: Normal   spontaneous, reflex   palate   movements         11 CN:   Normal  Shoulder  shrug and strength         12 CN :   Normal  Tongue movements and  Tongue  In midline                        No tongue   Fasciculations or atrophy       C) MOTOR  EXAM:                 Strength  In upper  And  Lower   extremities   within   normal limits                           Rapid   alternating  And  repetitions  Movements  DECREASED                Muscle  Tone  In upper  And  Lower  Extremities  Normal                  No rigidity. No  Spasticity. Bradykinesia   absent               No  Asterixis. Sustention  Tremor , Resting   Tremor   absent                    No   other  Abnormal  Movements noted           D) SENSORY :               Light   touch, pinprick,   position  And  Vibration DECREASED       E) REFLEXES:                   Deep  Tendon  Reflexes   DECREASED                   No  pathological  Reflexes  Bilaterally.                                   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    Combined forms of age-related cataract of both eyes    [facility]    Tissue plasminogen activator (tPA) administered at other facility within 24 hours before current admission    Permanent atrial fibrillation ([address]    Acute ischemic stroke ([facility] [facility]    Left homonymous hemianopsia    Hyperlipidemia    Stroke, recent, without late effect    Bilateral carotid artery stenosis    Chronic cerebral ischemia    Memory problem    Gait difficulty    Balance problem    At high risk for stroke    Risk for falls    Cerebrovascular accident (CVA) due to embolism of right posterior cerebral artery ([address]    Bilateral hemianopia    Dysphagia    Hematuria    PTSD (post-traumatic stress disorder)    Acute urinary retention    Horseshoe kidney    Kidney stone on left side    Severe malnutrition (HCC)    History of ischemic posterior cerebral artery stroke    Cerebrovascular accident (CVA) due to embolism of left middle cerebral artery ([address]    H/O ischemic left MCA stroke    Ischemic stroke ([address]    Atrial fibrillation ([address]           CT OF THE HEAD WITHOUT CONTRAST  2/27/2020 12:49 am       TECHNIQUE:   CT of the head was performed without the administration of intravenous   contrast. Dose modulation, iterative reconstruction, and/or weight based   adjustment of the mA/kV was utilized to reduce the radiation dose to as low   as reasonably achievable.       COMPARISON:   MR brain dated January 16 2020       HISTORY:   ORDERING SYSTEM PROVIDED HISTORY: dysphagia, recent stroke   TECHNOLOGIST PROVIDED HISTORY:       dysphagia, recent stroke   Reason for Exam: Trouble swallowing after eating dinner, hx of stroke   Acuity: Acute   Type of Exam: Initial       FINDINGS:   BRAIN/VENTRICLES: There is no acute intracranial hemorrhage, mass effect or   midline shift.  No abnormal extra-axial fluid collection.  The gray-white   differentiation is maintained without evidence of an acute infarct.  There is   no evidence of hydrocephalus.  Encephalomalacial changes are seen involving   the posterior left frontal lobe, and right temporal occipital lobe, related   to the recent infarcts.  Lacunar infarcts are seen within the right   periventricular white matter region, related to the recent infarct.  No new   areas of acute infarct are demonstrated.  Generalized atrophy and senescent   white matter changes are present.       ORBITS: The visualized portion of the orbits demonstrate no acute abnormality.       SINUSES: The visualized paranasal sinuses and mastoid air cells demonstrate   no acute abnormality.       SOFT TISSUES/SKULL:  No acute abnormality of the visualized skull or soft   tissues.           Impression   1. Generalized atrophy, and encephalomalacial changes within the right   temporal occipital region, and posterior left frontal lobe, related to the   recent infarcts.  No acute intraparenchymal hemorrhage, intra-axial mass, or   acute territorial infarct is present.                 [facility]    Vascular Carotid Procedure        Patient Name Carolyn [facility] [facility]       Date of Study           02/27/2020                Guanaco Solders        Date of      1950  Gender                  Male    Birth        Age          69 year(s)  Race                            Room Number  9424        Corporate ID [id number]    #        Patient Acct [de-identified]    #        MR #         [id number]     Sonographer             Jelly Stone, T        Accession #  [id number]   Interpreting Physician Mirna Perkins    Referring                Referring Physician     JEFFREY Nieto, CNP    Nurse    Practitioner       Procedure   Type of Study:        Cerebral: Carotid, Carotid Scan Bilateral.       Indications for Study:Dysphagia and CVA.       Blood Pressure:Right arm 141/91 mmHg. Patient Status: In Patient. Technical Quality:Limited visualization. Limitation reason:Difficult visualization, left arm BP could not be taken   due to I.V. placement. .       Comments:Basic Classification of ICA Stenosis:   PSV - Peak Systolic Velocity   Normal: No plaque or calcification identified, no elevation of PSV   Mild: <50% spectral broadening without increased PSV   Moderate: 50 - 69% PSV >125 - <230 cm/sec   Severe: 70 - 99% PSV >230 cm/sec   Critical: 80 - 99% PSV >230cm/sec and/or End Diastolic Velocities   >343QU/HRC.        Conclusions        Summary        Simultaneous real time imaging utilizing B-Mode, color flow doppler and    spectral waveform analysis was performed on the bilateral extracerebral    vascular system.  The study demonstrates:        Right:    Internal carotid artery has a mild, <50% stenosis based on velocities.    The vertebral artery is patent with antegrade flow.        Left:    Internal carotid artery has a mild, <50% stenosis based on velocities.    The vertebral artery is patent with antegrade flow.        Signature        ----------------------------------------------------------------    Electronically signed by Mario Zuniga RVT(Sonographer)    on 02/27/2020 12:00 PM    ----------------------------------------------------------------        ----------------------------------------------------------------    Electronically signed by Riccardo Morgan(Interpreting    physician) on 02/27/2020 04:10 PM    ----------------------------------------------------------------       Findings:        Right Impression:                     Left Impression:    The common carotid artery is normal.  The common carotid artery is normal.        The carotid bulb has a smooth         The carotid bulb is normal.    fibrocalcific plaque causing a <50%    stenosis.                              The internal carotid artery has a                                          smooth calcific plaque causing a    The internal carotid artery has a     <50% stenosis based on velocities.    smooth fibrocalcific plaque causing a    <50% stenosis based on velocities.    The external carotid artery is                                          normal.    The external carotid artery is    normal.                               The vertebral artery is patent with                                          antegrade flow.    The vertebral artery is patent with    antegrade flow.             MRI OF THE BRAIN WITHOUT CONTRAST  1/16/2020 7:13 pm       TECHNIQUE:   Multiplanar multisequence MRI of the brain was performed without the   administration of intravenous contrast.       COMPARISON:   CT head neck 01/16/2020, MRI brain performed 01/11/2020     HISTORY:   ORDERING SYSTEM PROVIDED HISTORY: Dysarthria, CTA with R P2 occlusion   TECHNOLOGIST PROVIDED HISTORY:   Dysarthria, CTA with R P2 occlusion       FINDINGS:   INTRACRANIAL STRUCTURES/VENTRICLES: Redemonstration of the evolving acute   strokes identified involving left MCA distribution involving the left   posterior insular region.  Multifocal areas of left MCA infarct identified   left frontal lobe cortex left parietal cortex and scattered foci involving   subcortical white matter left parietal lobe which have mildly progressed   since the previous examination with a few new foci of infarct.  Focus of   restricted diffusion identified involving right posterior frontal centrum   semiovale minimally progressed when compared to previous examination.  New   predominant cortical based restricted diffusion identified involving the   right PCA distribution.  Many of these infarcts demonstrate mild T2   prolongation.       No hemorrhagic conversion identified.  Mild generalized involutional changes   with prominence of ventricles and sulci.  Mild periventricular subcortical T2   prolongation suggestive chronic small vessel ischemic disease.       No shift of midline structures.  Basal cisterns are patent.       ORBITS: The visualized portion of the orbits demonstrate no acute abnormality.       SINUSES: Mild ethmoid sinus mucosal thickening.       BONES/SOFT TISSUES: The bone marrow signal intensity appears normal. The soft   tissues demonstrate no acute abnormality.           Impression   New right PCA distribution infarct.  No hemorrhagic conversion.       Multifocal areas of evolving left MCA distribution infarct, there are few   scattered new foci of restricted diffusion identified suggestion of mild   progression of the left MCA infarct.       Mild chronic small ischemic disease and age related involutional change.             CT OF THE HEAD WITHOUT CONTRAST  1/16/2020 11:55 am       TECHNIQUE:   CT of the head was performed without the administration of intravenous   contrast. Dose modulation, iterative reconstruction, and/or weight based   adjustment of the mA/kV was utilized to reduce the radiation dose to as low   as reasonably achievable.       COMPARISON:   Head CT 01/12/2020, MRI brain 01/11/2020, head CT 01/10/2020       HISTORY:   ORDERING SYSTEM PROVIDED HISTORY: vision loss   TECHNOLOGIST PROVIDED HISTORY:   vision loss       Reason for Exam: 1/10/2020 patient came to ER with stroke like symptoms, he   was sent to [facility] for thrombectomy today Patient has vision blurring and   dizziness       FINDINGS:   BRAIN/VENTRICLES: Continued evolution of subacute infarction in the left   frontal lobe and left insular cortex with loss of gray-white matter   differentiation.  There is small area of low attenuation within left parietal   lobe subcortical white matter.  No associated hemorrhage.       Diffuse parenchymal volume loss with enlargement of the ventricles and   cerebral sulci.  No abnormal extra-axial fluid collection.  No hydrocephalus. Mild periventricular white matter low attenuation compatible with chronic   microvascular ischemic changes.       ORBITS: The visualized portion of the orbits demonstrate no acute abnormality.       SINUSES: The visualized paranasal sinuses and mastoid air cells demonstrate   no acute abnormality.       SOFT TISSUES/SKULL: No acute abnormality of the visualized skull or soft   tissues.           Impression   1. Continued evolution of subacute infarctions in the left MCA territory   involving the left lateral frontal lobe and insular cortex as well as left   parietal lobe subcortical white matter.  No hemorrhagic transformation. 2. Diffuse parenchymal volume loss.  Mild chronic microvascular ischemic   changes.    Findings were discussed with Vicky Ritchie at 12:16 pm on 1/16/2020.               CTA OF THE HEAD WITH CONTRAST WITH PERFUSION 1/11/2020 2:14 am       TECHNIQUE: CTA of the head/brain was performed with the administration of intravenous   contrast. Multiplanar reformatted images are provided for review.  MIP images   are provided for review. Dose modulation, iterative reconstruction, and/or   weight based adjustment of the mA/kV was utilized to reduce the radiation   dose to as low as reasonably achievable.       COMPARISON:   CT and CTA head and neck 1/11/2020       HISTORY:   ORDERING SYSTEM PROVIDED HISTORY: stroke   TECHNOLOGIST PROVIDED HISTORY:   stroke   Reason for Exam: post TPA   Acuity: Unknown   Type of Exam: Unknown       FINDINGS:   Mildly elevated mean transit time involving the left frontal, parietal, and   temporal lobes, in the left MCA vascular territory distribution.  Relative   cerebral blood flow and relative cerebral blood volume are maintained.  No   perfusion mismatch.           Impression   No perfusion mismatch.       Findings were discussed with Dr. Dorothy Kaur At 2:50 am on 1/11/2020.               CTA OF THE HEAD AND NECK WITH CONTRAST 1/11/2020 12:36 am:       TECHNIQUE:   CTA of the head and neck was performed with the administration of intravenous   contrast. Multiplanar reformatted images are provided for review.  MIP images   are provided for review. Stenosis of the internal carotid arteries measured   using NASCET criteria. Dose modulation, iterative reconstruction, and/or   weight based adjustment of the mA/kV was utilized to reduce the radiation   dose to as low as reasonably achievable.       COMPARISON:   Noncontrast CT head 1/10/2020       HISTORY:   ORDERING SYSTEM PROVIDED HISTORY: stroke   TECHNOLOGIST PROVIDED HISTORY:   stroke   Reason for Exam: Stroke, TPA in process   Acuity: Acute   Type of Exam: Subsequent/Follow-up       FINDINGS:       CTA NECK:       AORTIC ARCH/ARCH VESSELS: Normal, three-vessel aortic arch anatomy.    Calcified and noncalcified atherosclerotic plaque of the aortic arch and   proximal arch vessels.  No dissection or arterial injury.  No significant   stenosis of the brachiocephalic or subclavian arteries.       CAROTID ARTERIES: Scattered, noncalcified atherosclerotic plaque of the mid   to distal common carotid arteries without focal stenosis.  Calcified and   noncalcified atherosclerotic plaque of the right carotid bifurcation and   proximal right internal carotid artery.  Mild, less than 50%, stenosis of the   right internal carotid artery by NASCET criteria.  Calcified and noncalcified   atherosclerotic plaque of the left carotid bifurcation and proximal left   internal carotid artery.  Mild, less than 50%, stenosis of the left internal   carotid artery by NASCET criteria.  No arterial injury or dissection.       VERTEBRAL ARTERIES: Vertebral arteries arise from their respective subclavian   arteries.  Left vertebral artery is dominant with a developmentally   hypoplastic right vertebral artery.  Mild narrowing of the left vertebral   artery origin.       SOFT TISSUES: Lung apices are clear.  No cervical or superior mediastinal   lymphadenopathy.  Larynx and pharynx are unremarkable.  No acute abnormality   of the salivary and thyroid glands.       BONES: No acute osseous abnormality.           CTA HEAD:       ANTERIOR CIRCULATION: Atherosclerotic calcification of the cavernous internal   carotid arteries without focal stenosis.  Occlusion of a left middle cerebral   artery proximal to mid M2 branch, at the level of the sylvian fissure.  No   significant stenosis of the intracranial internal carotid, anterior cerebral,   or right middle cerebral arteries. No aneurysm.       POSTERIOR CIRCULATION: Intracranial dominance of the left vertebral artery. No focal stenosis of the intracranial vertebral arteries or basilar artery.    No flow-limiting stenosis of the posterior cerebral arteries.  Left P1   segment is hypoplastic with fetal type origin of the left posterior cerebral   artery.  No aneurysm.       OTHER:  Hypoplastic right vertebral artery.       SOFT TISSUES: The lung apices are clear.  No cervical or superior mediastinal   lymphadenopathy.  The larynx and pharynx are unremarkable.  No acute   abnormality of the salivary and thyroid glands.       BONES: No acute osseous abnormality.           CTA HEAD:       ANTERIOR CIRCULATION: No significant stenosis of the intracranial internal   carotid, anterior cerebral, or middle cerebral arteries.  Previously   demonstrated left M2 branch occlusion is patent status post previous   thrombectomy.  No aneurysm.       POSTERIOR CIRCULATION: No significant stenosis of the vertebral, basilar, or   left posterior cerebral arteries.  New occlusion of distal right P2 branch   (series 2, images [id number]).  No aneurysm.  Left posterior communicating   artery is present.       OTHER: No dural venous sinus thrombosis on this non-dedicated study.       BRAIN: No mass effect or midline shift. No extra-axial fluid collection. The   gray-white differentiation is maintained.           Impression   1. New occlusion of distal P2 branch of the right posterior cerebral artery. 2. Previously demonstrated left M2 branch occlusion is now patent status post   recent thrombectomy. 3. No hemodynamically significant stenosis or occlusion in the cervical   arterial circulation.  Mild less than 50% stenosis of the internal carotid   arteries by NASCET criteria bilaterally.    Critical results were called by Dr. Sumeet Johnson MD to Ceci Saxena on 1/16/2020 at 14:35.             MRI OF THE BRAIN WITHOUT CONTRAST  1/11/2020 4:47 pm       TECHNIQUE:   Multiplanar multisequence MRI of the brain was performed without the   administration of intravenous contrast.       COMPARISON:   CT angiogram head and neck done earlier same day.  No prior brain MRI   available for comparison.       HISTORY:   ORDERING SYSTEM PROVIDED HISTORY: stroke   TECHNOLOGIST PROVIDED HISTORY:   stroke       FINDINGS: INTRACRANIAL STRUCTURES/VENTRICLES: Multiple small areas of acute stroke in   the posterior left middle cerebral artery territory involving the left   frontal and parietal lobes, most pronounced in the frontal insular region. There is an additional punctate focus of acute stroke in the right centrum   semiovale.  Small focus of gradient blooming in the left insula could   represent trace petechial hemorrhage. Generalized cerebral and cerebellar   volume loss.  The axial FLAIR images demonstrate bilateral periventricular   and deep white matter signal hyperintensities, commonly seen in the setting   of chronic small vessel ischemic disease.  Punctate focus of gradient   blooming in the left parietal deep white matter demonstrates no correlating   diffusion restriction and may represent a small cavernoma or amyloid   angiopathy.       No mass effect or midline shift. No evidence of a large acute intracranial   hemorrhage.  The ventricles and sulci are normal in size and configuration. The sellar/suprasellar regions appear unremarkable.  The normal signal voids   within the major intracranial vessels appear maintained.       ORBITS: The visualized portion of the orbits demonstrate no acute abnormality.       SINUSES: The paranasal sinuses are clear.  Small amount of fluid in the   bilateral mastoid air cells.       BONES/SOFT TISSUES: The bone marrow signal intensity appears normal. The soft   tissues demonstrate no acute abnormality.           Impression   1.  Multiple small areas of acute stroke in the posterior left middle   cerebral artery territory involving the left frontal and parietal lobes, most   pronounced in the frontal insular region.  Additional punctate focus of acute   stroke in the right centrum semiovale.       2.  Small focus of gradient blooming in the left insula could represent trace   petechial hemorrhage.  No large acute intracranial hemorrhage.  No mass   effect or midline shift.       3.  Mild chronic small vessel ischemic disease.                 VISITING DIAGNOSIS:      ICD-10-CM    1. Cerebrovascular accident (CVA) due to embolism of left middle cerebral artery (HCC)  I63.412    2. H/O ischemic left MCA stroke  Z86.73    3. [facility]  I63.9    4. Chronic cerebral ischemia  I67.82    5. PTSD (post-traumatic stress disorder)  F43.10    6. Atrial fibrillation, unspecified type ([facility]  I48.91    7. Risk for falls  Z91.81    8. Memory problem  R41.3    9. Gait difficulty  R26.9    10. Left homonymous hemianopsia  H53.462    11. Balance problem  R26.89    12. Stroke, recent, without late effect  Z86.73    13. At high risk for stroke  Z91.89    14. Bilateral carotid artery stenosis  I65.23    15. Oropharyngeal dysphagia  R13.12    16. History of ischemic posterior cerebral artery stroke  Z86.73    17. Mixed hyperlipidemia  E78.2                   CONCERNS   &   INCREASED   RISK   FOR         * TIA,  CEREBRO  VASCULAR  ISCHEMIA,STROKE     *   DIZZINESS,   VERTEBROBASILAR  INSUFFICIENCY ,         *   COGNITIVE  &   MEMORY PROBLEMS  AND  DEMENTIAS        *  GAIT  DIFFICULTIES  &   BALANCE PROBLMES   AND  FALL                VARIOUS  RISK   FACTORS   WERE  REVIEWED   AND   DISCUSSED. *  PATIENT   HAS  MULTIPLE   MEDICAL, NEUROLOGICAL                 PROBLEMS . PATIENT'S   MANAGEMENT  IS  CHALLENGING. PLAN:                         Ольга Moraes  Of  The  Diagnoses,  The  Management & Treatment  Options            AND    Care  plan  Were          Reviewed and   Discussed   With  patient. * Goals  And  Expectations  Of  The  Therapy  Discussed   And  Reviewed. *   Benefits   And   Side  Effect  Profile  Of  Medication/s   Were   Discussed                * Need   For  Further   Follow up For  The  Various  Problems Were  discussed.           * Results  Of  The  Previous  Diagnostic tests were reviewed and  discussed                 Medical  Decision  Making Was  Made  Based on the   Complexity  Of  Above  Mentioned  Diagnoses,    Data reviewed             And    Risk  Of  Significant   Co morbidities and   complicating   Factors. Medical  Decision  Was   High     Complexity   Due   To  The  Patient's  Multiple  Symptoms,  Advancing   Disease,      Complex  Treatment  Regimen,  Multiple medications           and   Risk  Of   Side  Effects,  Difficulty  In  Medication  Management  And  Diagnostic  Challenges       In  View  Of  The  Associated   Co  Morbid  Conditions   And  Problems. * FALL   PRECAUTIONS. THESE  REVIEWED   AND  DISCUSSED    *  USE   WALKING  ASSISTANCE  DEVICES      /   WALKER          *    SUPERVISED   CARE    *   ABSOLUTELY   NO  DRIVING      *   BE  CAREFUL  WITH  ACTIVITIES           *   ADEQUATE   FLUIDINTAKE   AND  ELECTROLYTE  BALANCE             * KEEP  DAIRY  OF   THE  NEUROLOGICAL  SYMPTOMS        RECORDING THE    DURATION  AND  FREQUENCY. *  AVOID    CONDITIONS  AND  FACTORS   THAT  MAKE                  NEUROLOGICAL  SYMPTOMS  WORSE.              *TO  MAINTAIN  REGULAR  SLEEP  WAKE  CYCLES. *   TO  HAVE  ADEQUATE  REST  AND   SLEEP    HOURS.              *    AVOID  ANY USAGE OF    TOBACCO,              EXCESSIVE  ALCOHOL  AND   ILLEGAL   SUBSTANCES        *  CONTINUE   MEDICATIONS    PRESCRIBED      AS    RECOMMENDED       *   Compliance   With  Medications   And  Instructions            * CURRENTLY    TOLERATING  THE  PRESCRIBED   MEDICATIONS. WITHOUT  ANY  SIGNIFICANT  SIDE  EFFECTS   &  GETTING BENEFIT.             *    Antiplatelet  therapy    As   Recommended  Was   Discussed      *    Prophylactic  Use   Of     Vitamin   B   Complex,  Folic  Acid,    Vitamin  B12    Multivitamin,       Calcium  With  magnesium  And  Vit D    Supplementations   Over  The  Counter  Discussed                   *  PATIENT  IS  ALSO   COUNSELED   TO  KEEP    ACTIVITIES:         A)   SIMPLE B)  ORGANIZED      C)  WRITEDOWN                     *  EVALUATIONS  AND  FOLLOW UP:                * PHYSICAL  THERAPY                                 *CARDIOLOGY              *   OPTHALMOLOGY                                    *    POST  STROKE          SYMPTOMS                          A)    GAIT  DIFFICULTY                         B)  BALANCE  PROBLEMS                                            C)    RESIDUAL   LEFT  VISUAL  FIELD  DEFECTS                        D)    MEMORY  PROBLEMS                                        -     IMPROVED   SIGNIFICANTLY                        *         FOLLOW  UP     CT   HEAD     ON   2/27/2020    SHOWED  :                 \"  Generalized atrophy                   encephalomalacial changes within the right Temporal occipital region,                   and posterior left frontal lobe                   No acute intraparenchymal hemorrhage  \"                                   *            PATIENT       ON     ASA    81  MG       DAILY                                                  AND  PRADAXA                                        -     TOLERATING  THE  SAME                         *   PATIENT    AND  HIS  WIFE   DENIES  ANY  NEW  NEUROLOGICAL    CONCERNS. *PATIENT   TO  FOLLOW  UP  WITH   PRIMARY  CARE         OTHER  CONSULTANTS  AS  BEFORE. *  Maintain   Healthy  Life Style    With   Periodic  Monitoring  Of      Any  Medical  Conditions  Including   Elevated  Blood  Pressure,  Lipid  Profile,     Blood  Sugar levels  AndHeart  Disease. *   Period   Screening  For  Cancers  Involving  Breast,  Colon,    lungs  And  Other  Organs  As  Applicable,  In consultation   With  Your  Primary Care Providers. *Second  Neurological  Opinion  And  Evaluations  In  [facility]  Setting  If  Patient  Is  Interested.             * Please   Contact   Neurology  Clinic   Early   If   Are  Any  New Neurological   And  Any neurological  Concerns. *  If  The  Patient remains  Neurologically  Stable   Return   To  [facility] Neurology Department   IN     3 - 6         MONTHS  TIME   FOR  FURTHER              FOLLOW UP.                       *   The  Neurological   Findings,  Possible  Diagnosis,  Differential diagnoses                    And  Options  For    Further   Investigations                   And  management   Are  Discussed  Comprehensively. Medications   And  Prescription   Risks  And  Side effects  Are   Also  Discussed. *  If   There is  Any  Significant  Worsening   Of  Current  Symptoms  And  Or                  If patient  Develops   Any additional  New  NeurologicalSymptoms                  Or  Significant  Concerns   Should  Call  911 or  Go  To  Emergency  Department  For  Further  Immediate  Evaluation. The   Above  Were  Reviewed  With  patient   And   HIS  WIFE                          questions  Answered  In  Detail. More   Than  50% of face  To face Time   Was  Spent  On  Counseling                    And   Coordination  Of  Care   Of   Patient's  multiple   Neurological  Problems                         And   Comorbid  Medical   Conditions. Electronically signed by Felecia Cordero MD., Monie Lui      Board Certified in  Neurology &  In  [employer] and [employer] ([employer])      DISCLAIMER:   Although every effort was made to ensure the accuracy of this  electronictranscription, some errors in transcription may have occurred. GENERAL PATIENT INSTRUCTIONS:      A Healthy Lifestyle: Care Instructions   Your Care Instructions   A healthy lifestyle can help you feel good, stay at ahealthy weight, and have plenty of energy for both work and play.  A healthy lifestyle is something you can share with your whole family.  A healthy lifestyle also can lower your risk for serious health problems, such ashigh blood pressure, heart disease, and diabetes.  You can follow a few steps listed below to improve your health and the health of your family.  Follow-up careis a key part of your treatment and safety. Be sure to make and go to all appointments, and call your doctor if you are having problems. Its also a good idea to know your test results and keep a list of the medicines you take.  How can you care for yourself at home?  Do not eat too much sugar, fat, or fast foods. You can still have dessert and treats nowand then. The goal is moderation.  Start small to improve your eating habits. Pay attention to portion sizes, drink less juice and soda pop, and eat more fruits and vegetables.  Eat a healthy amount of food. A 3-ounce serving of meat, for example, is about the size of a deck of cards. Fill the rest of your plate with vegetables and whole grains.  Limit theamount of soda and sports drinks you have every day. Drink more water when you are thirsty.  Eat at least 5 servings of fruits and vegetables every day. It may seem like a lot, but it is not hard to reach this goal. Aserving or helping is 1 piece of fruit, 1 cup of vegetables, or 2 cups of leafy, raw vegetables. Have an apple or some carrot sticks as an afternoon snack instead of a candy bar. Try to have fruits and/or vegetables at everymeal.   Make exercise part of your daily routine. You may want to start with simple activities, such as walking, bicycling, or slow swimming. Try marietta active 30 to 60 minutes every day. You do not need to do all 30 to 60 minutes all at once. For example, you can exercise 3 times a day for 10 or 20 minutes. Moderate exercise is safe for most people, but it is always agood idea to talk to your doctor before starting an exercise program.   Keep moving. Danette Naas the lawn, work in the garden, or [employer].  Take the stairs instead of the elevator at work.  If you smoke, quit. Peoplewho smoke have an increased risk for heart attack, stroke, cancer, and other lung illnesses. Quitting is hard, but there are ways to boost your chance of quitting tobacco for good.  Use nicotine gum, patches, or lozenges.  Ask your doctor about stop-smoking programs and medicines.  Keep trying.  In addition to reducing your risk of diseases in the future, you will notice some benefits soon after you stop using tobacco. If you have shortness of breath or asthma symptoms, they will likely getbetter within a few weeks after you quit.  Limit how much alcohol you drink. Moderate amounts of alcohol (up to 2 drinks a day for men, 1drink a day for women) are okay. But drinking too much can lead to liver problems, high blood pressure, and other health problems.  health   If you have a family, there are many things you can do together to improve your health.  Eat meals together as a family as often as possible.  Eat healthy foods. This includes fruits, vegetables, lean meats and dairy, and whole grains.  Include your family in your fitness plan. Most peoplethink of activities such as jogging or tennis as the way to fitness, but there are many ways you and your family can be more active. Anything that makes you breathe hard and gets your heart pumping is exercise. Here are sometips:   Walk to do errands or to take your child to school or the bus.  Go for a family bike ride after dinner instead of watching TV.  Where can you learn more?  Go totps://[email or web address]Gro. org and sign in to your "[employer] account. Enter T364 in the Search HealthInformation box to learn more about \"A Healthy Lifestyle: Care Instructions. \"     If you do not have anaccount, please click on the \"Sign Up Now\" link.  Current as of: July 26, 2016   Content Version: 11.2   © 4499-3784 Healthwise, Incorporated.  Care instructions adapted under license by [facility] [place] ([facility]). If you have questions about a medical condition or this instruction, always ask your healthcare professional. Healthwise,Incorporated disclaims any warranty or liability for your use of this information.

## 2021-07-26 ENCOUNTER — OFFICE VISIT (OUTPATIENT)
Dept: INTERNAL MEDICINE | Age: 71
End: 2021-07-26
Payer: MEDICARE

## 2021-07-26 VITALS
BODY MASS INDEX: 28.66 KG/M2 | HEIGHT: 67 IN | SYSTOLIC BLOOD PRESSURE: 124 MMHG | DIASTOLIC BLOOD PRESSURE: 80 MMHG | WEIGHT: 182.6 LBS | TEMPERATURE: 97.5 F | RESPIRATION RATE: 18 BRPM | HEART RATE: 60 BPM

## 2021-07-26 DIAGNOSIS — E78.5 HYPERLIPIDEMIA, UNSPECIFIED HYPERLIPIDEMIA TYPE: ICD-10-CM

## 2021-07-26 DIAGNOSIS — I48.91 ATRIAL FIBRILLATION, UNSPECIFIED TYPE (HCC): ICD-10-CM

## 2021-07-26 DIAGNOSIS — D50.9 IRON DEFICIENCY ANEMIA, UNSPECIFIED IRON DEFICIENCY ANEMIA TYPE: Primary | ICD-10-CM

## 2021-07-26 PROBLEM — E43 SEVERE MALNUTRITION (HCC): Status: RESOLVED | Noted: 2020-08-05 | Resolved: 2021-07-26

## 2021-07-26 PROCEDURE — 99213 OFFICE O/P EST LOW 20 MIN: CPT

## 2021-07-26 PROCEDURE — 99214 OFFICE O/P EST MOD 30 MIN: CPT | Performed by: INTERNAL MEDICINE

## 2021-07-26 NOTE — PROGRESS NOTES
[facility]    Visit Date:  7/26/2021  Patient:  Megha Dietz  YOB: 1950    Assessment & Plan     Iron deficiency anemia: Continue iron supplement. He will obtain blood work at the [place]. Will reassess. Hyperlipidemia: Continue atorvastatin. We will continue to monitor. A. fib: Regular rate and rhythm at this time. Continue aspirin, Pradaxa. Diagnosis Orders   1. Iron deficiency anemia, unspecified iron deficiency anemia type     2. Hyperlipidemia, unspecified hyperlipidemia type     3. Atrial fibrillation, unspecified type [facility])         Chief Complaint  Anemia (iron defiency    3mo f/u wit labs)    History of Present Illness   Presents to follow-up. He was found to be anemic last visit, underwent EGD and colonoscopy which did not show source of bleeding. He denies having any bleeding at this time, says that he feels a bit tired, therefore I recommended that he gets the blood work that I ordered for him. However, he says that he will get blood work at the [place] anyway in a month. Has a history of hyperlipidemia and atrial fibrillation that are unchanged    Objective  /80   Pulse 60   Temp 97.5 °F (36.4 °C) (Tympanic)   Resp 18   Ht 5' 7\" (1.702 m)   Wt 182 lb 9.6 oz (82.8 kg)   BMI 28.60 kg/m²   Constitutional: No acute distress. Sits in chair comfortably  Eyes: Sclerae nonicteric. No lid lag or proptosis  HENT: External ears normal. No external lesions on the nose  Neck: No gross masses. Trachea visibly midline  Respiratory: Good air entry bilaterally. No wheezing or crackles  Cardiovascular: Normal S1-S2. No murmurs. No lower extremity edema  Gastrointestinal: No visible masses. No visible hernias  Skin: No abnormal rashes. No abnormal masses  Neurologic: Cranial nerves grossly intact  Psychiatric: Normal affect.  Alert and oriented    Medications  Current Outpatient Medications:     ferrous sulfate (IRON 325) 325 (65 Fe) MG tablet, Take 1 underwent emergent thrombectomy.  /  S/P TPA DONE     Past Surgical History:   Procedure Laterality Date    APPENDECTOMY      COLONOSCOPY N/A 4/27/2021    Normal colon. Dr. Abhay [facility]av[facility] at [facility] Left 10/19/2020    CYSTOSCOPY, LEFT URETEROSCOPY w/ [place] LITHOTRIPSY, LEFT URETERAL STENT PLACEMENT performed by Wm Harper MD at [facility] Left 12/18/2020    cystoscopy, left stent placement, left retrograde pyelogram    CYSTOSCOPY Left 12/18/2020    CYSTOSCOPY, LEFT STENT REMOVAL, LEFT RETROGRADE PYELOGRAM, INSERTION OCCLUSIVE BALLOON - PT TO INTERV RADIOLOGY FOLLOWING performed by Wm [facility] [address] Left 2/1/2021    CYSTO Left URETEROSCOPY w/ stent removal ET RETROGRADE performed by Wm Harper MD at [facility] IR NEPHROSTOMY PERCUTANEOUS LEFT  12/18/2020    IR NEPHROSTOMY PERCUTANEOUS LEFT 12/18/2020 Karli Leonard MD [facility] SPECIAL PROCEDURES    LITHOTRIPSY      NEPHROLITHOTOMY Left 12/18/2020    cystoscopy, left stent placement, left retrograde pyelogram performed by Wm Harper MD at [address] TRANSESOPHAGEAL ECHOCARDIOGRAM  01/17/2020    TURP N/A 08/10/2020    CYSTO w/ Greenlight Laser performed by Wm Harper MD at [facility] N/A 4/27/2021    mild esophagitis. mild-mod. esophagitis. Dr. Abhay [facility]ria at [facility]     Family History  This patient's family history includes Heart Disease in his father and mother; Stroke in his brother and father. Social History  Linda August  reports that he has never smoked. He has never used smokeless tobacco. He reports previous alcohol use. He reports that he does not use drugs. Discussed use, benefit, and side effects of prescribed medications. All questions answered. Patient voiced understanding. Reviewed health maintenance.       Electronically signed Maddi Velázquez MD on 7/26/2021 at 5:03 PM    This note has been created using the Epic electronic health record, and dictated in part by [employer] dictation system. Despite the documenting physician's best efforts, there may be errors in spelling, grammar or syntax.

## 2021-08-02 ENCOUNTER — OFFICE VISIT (OUTPATIENT)
Dept: UROLOGY | Age: 71
End: 2021-08-02
Payer: MEDICARE

## 2021-08-02 VITALS
HEART RATE: 101 BPM | HEIGHT: 67 IN | RESPIRATION RATE: 16 BRPM | DIASTOLIC BLOOD PRESSURE: 72 MMHG | BODY MASS INDEX: 28.41 KG/M2 | WEIGHT: 181 LBS | SYSTOLIC BLOOD PRESSURE: 108 MMHG | OXYGEN SATURATION: 97 %

## 2021-08-02 DIAGNOSIS — N40.1 BENIGN PROSTATIC HYPERPLASIA WITH URINARY OBSTRUCTION: ICD-10-CM

## 2021-08-02 DIAGNOSIS — R97.20 ELEVATED PSA: ICD-10-CM

## 2021-08-02 DIAGNOSIS — Q63.1 HORSESHOE KIDNEY: ICD-10-CM

## 2021-08-02 DIAGNOSIS — N13.8 BENIGN PROSTATIC HYPERPLASIA WITH URINARY OBSTRUCTION: ICD-10-CM

## 2021-08-02 DIAGNOSIS — N20.0 KIDNEY STONE ON LEFT SIDE: Primary | ICD-10-CM

## 2021-08-02 DIAGNOSIS — R31.0 GROSS HEMATURIA: ICD-10-CM

## 2021-08-02 PROCEDURE — 99212 OFFICE O/P EST SF 10 MIN: CPT | Performed by: UROLOGY

## 2021-08-02 PROCEDURE — 99214 OFFICE O/P EST MOD 30 MIN: CPT | Performed by: UROLOGY

## 2021-08-02 NOTE — PROGRESS NOTES
JUSTUS Mercado MD        [address] [address]  Dept: [phone]  Dept Fax: [phone]  Loc: [phone]      [facility] Urology Office Note - Follow up Visit    Patient:  Jovany Pedraza  YOB: 1950    The patient is a 66 y.o. male who presents today for evaluation of the following problems: BPH,kidney stones  Chief Complaint   Patient presents with    Nephrolithiasis     6 mo f/u        HISTORY OF PRESENT ILLNESS:     Kidney stones  stone very dense and lower pole stone difficult to access with URS  Has horseshoe kidney with 3-4 stones > 1 cm. Total aggregate > 3.5 cm  Unsuccessful nephrostomy tube access at [address] [place] [address] was made to avoid percutaneous nephrolithotomy. Has been doing well since. No imaging today    BPH-stable. Voiding well without a catheter. Has had greenlight surgery in the past    Elevated PSA-was normal in May of last year. Elevated after retention episode. Still needs PSA recheck        Requested/reviewed records from Jhonatan LOVE MD office and/or outside physician/EMR    (Patient's old records have been requested, reviewed and pertinent findings summarized in today's note.)    Procedures Today: N/A    Last several PSA's:  Lab Results   Component Value Date    PSA 35.88 (H) 07/20/2020    PSA 3.93 05/20/2020       Last total testosterone:  No results found for: TESTOSTERONE    Urinalysis today:  No results found for this visit on 08/02/21.     Last BUN and creatinine:  Lab Results   Component Value Date    BUN 15 12/19/2020     Lab Results   Component Value Date    CREATININE 0.89 12/19/2020       Additional Lab/Culture results: none    Imaging Reviewed during this Office Visit:   Aram Santos MD independently reviewed the images and verified the radiology reports from:    Ir Guided Nephrostomy Cath Placement Left    Result Date: left deflated through the remainder of the case. Unsuccessful left nephroureteral access. Ureteral injury secondary to insufflation of the ureteral occlusion balloon. Fluoro For Surgical Procedures    Result Date: 12/18/2020  Radiology exam is complete. No Radiologist dictation. Please follow up with ordering provider. Fluoro For Surgical Procedures    Result Date: 12/18/2020  Radiology exam is complete. No Radiologist dictation. Please follow up with ordering provider. PAST MEDICAL, FAMILY AND SOCIAL HISTORY:  Past Medical History:   Diagnosis Date    Atrial fibrillation ([address] 01/2020    Blood in urine     Chronic kidney disease     CKD (chronic kidney disease)     History of ischemic stroke in prior three months     Hyperlipidemia     Ischemic stroke ([facility] 01/10/2020    Kidney stones     Nihss score 10     PTSD (post-traumatic stress disorder)     from war, Agent Orange     Skin tag 12/21/2018    Stroke ([facility] 01/16/2020    MRI Brain WO: New right PCA distribution infarct, multifocal left MCA distribution infarcts    Stroke ([facility] 01/11/2020    large vessel occlusion/left M2 occlusion. He subsequently underwent emergent thrombectomy.  /  S/P TPA DONE     Past Surgical History:   Procedure Laterality Date    APPENDECTOMY      COLONOSCOPY N/A 4/27/2021    Normal colon.  Dr. Genet Arora at [place] [address] Left 10/19/2020    CYSTOSCOPY, LEFT URETEROSCOPY w/ [facility], LEFT URETERAL STENT PLACEMENT performed by Demetria Hawkins MD at [address] [place] [address] Left 12/18/2020    cystoscopy, left stent placement, left retrograde pyelogram    CYSTOSCOPY Left 12/18/2020    CYSTOSCOPY, LEFT STENT REMOVAL, LEFT RETROGRADE PYELOGRAM, INSERTION OCCLUSIVE BALLOON - PT TO INTERV RADIOLOGY FOLLOWING performed by Demetria Hawkins MD at [address] / Ramila Arnold / Dmitry [place] Left 2/1/2021    CYSTO Left URETEROSCOPY w/ stent removal ET RETROGRADE performed by Demetria Hawkins MD at [address] [place] [address] IR NEPHROSTOMY PERCUTANEOUS LEFT  12/18/2020    IR NEPHROSTOMY PERCUTANEOUS LEFT 12/18/2020 Tobias Brown MD [facility] SPECIAL PROCEDURES    LITHOTRIPSY      NEPHROLITHOTOMY Left 12/18/2020    cystoscopy, left stent placement, left retrograde pyelogram performed by Shaun Hammond MD at [address] [facility] [place] TRANSESOPHAGEAL ECHOCARDIOGRAM  01/17/2020    TURP N/A 08/10/2020    CYSTO w/ Greenlight Laser performed by Shaun Hammond MD at [address] [place] [address] N/A 4/27/2021    mild esophagitis. mild-mod. esophagitis. Dr. Lesley Murillo at [facility]     Family History   Problem Relation Age of Onset    Heart Disease Mother     Heart Disease Father     Stroke Father     Stroke Brother      Outpatient Medications Marked as Taking for the 8/2/21 encounter (Office Visit) with Shaun Hammond MD   Medication Sig Dispense Refill    ferrous sulfate (IRON 325) 325 (65 Fe) MG tablet Take 1 tablet by mouth daily      zinc 50 MG CAPS Take 1 tablet by mouth daily      aspirin 81 MG EC tablet Take 1 tablet by mouth daily 90 tablet 3    dabigatran (PRADAXA) 150 MG capsule Take 1 capsule by mouth 2 times daily 180 capsule 3    Omega-3 Fatty Acids (FISH OIL) 1000 MG CAPS Take 1 capsule by mouth 2 times daily 180 capsule 3    atorvastatin (LIPITOR) 40 MG tablet Take 1 tablet by mouth nightly 90 tablet 3    docusate sodium (COLACE) 100 MG capsule Take 1 capsule by mouth 2 times daily (Patient taking differently: Take 100 mg by mouth 2 times daily as needed ) 30 capsule 1    famotidine (PEPCID) 20 MG tablet Take 1 tablet by mouth 2 times daily 60 tablet 0    latanoprost (XALATAN) 0.005 % ophthalmic solution Place 1 drop into both eyes nightly      Cholecalciferol (VITAMIN D3) 25 MCG (1000 UT) CAPS Take 2,000 Units by mouth daily      VITAMIN B1-B12 IM Inject into the muscle every 30 days          Patient has no known allergies.   Social History     Tobacco Use   Smoking Status Never Smoker   Smokeless Tobacco Never Used      (If patient a smoker, smoking cessation counseling offered)   Social History     Substance and Sexual Activity   Alcohol Use Yes    Comment: rarely       REVIEW OF SYSTEMS:  Constitutional: negative  Eyes: negative  Respiratory: negative  Cardiovascular: negative  Gastrointestinal: negative  Genitourinary: see HPI  Musculoskeletal: negative  Skin: negative   Neurological: negative  Hematological/Lymphatic: negative  Psychological: negative        Physical Exam:    This a 66 y.o. male  Vitals:    08/02/21 1106   BP: 108/72   Pulse: 101   Resp: 16   SpO2: 97%     Body mass index is 28.35 kg/m². Constitutional: Patient in no acute distress;       Assessment and Plan        1. Kidney stone on left side    2. Horseshoe kidney    3. Benign prostatic hyperplasia with urinary obstruction    4. Gross hematuria    5. Elevated PSA               Plan:       Gross hematuria resolved  Will monitor kidney stones and horseshoe kidney  Patient is voiding well off flomax  Recheck PSA and KUB [facility]    Follow up in three months with both psa and kub. Afterwards annually      Prescriptions Ordered:  No orders of the defined types were placed in this encounter. Orders Placed:  No orders of the defined types were placed in this encounter.            Haylee Conley MD

## 2021-08-11 ENCOUNTER — OFFICE VISIT (OUTPATIENT)
Dept: CARDIOLOGY | Age: 71
End: 2021-08-11
Payer: MEDICARE

## 2021-08-11 VITALS
HEIGHT: 67 IN | SYSTOLIC BLOOD PRESSURE: 130 MMHG | HEART RATE: 89 BPM | BODY MASS INDEX: 29.03 KG/M2 | WEIGHT: 185 LBS | DIASTOLIC BLOOD PRESSURE: 71 MMHG

## 2021-08-11 DIAGNOSIS — I48.21 PERMANENT ATRIAL FIBRILLATION (HCC): Primary | ICD-10-CM

## 2021-08-11 DIAGNOSIS — E78.5 HYPERLIPIDEMIA, UNSPECIFIED HYPERLIPIDEMIA TYPE: ICD-10-CM

## 2021-08-11 PROCEDURE — 99212 OFFICE O/P EST SF 10 MIN: CPT | Performed by: INTERNAL MEDICINE

## 2021-08-11 PROCEDURE — 99214 OFFICE O/P EST MOD 30 MIN: CPT | Performed by: INTERNAL MEDICINE

## 2021-08-11 PROCEDURE — 93010 ELECTROCARDIOGRAM REPORT: CPT | Performed by: INTERNAL MEDICINE

## 2021-08-11 PROCEDURE — 93005 ELECTROCARDIOGRAM TRACING: CPT | Performed by: INTERNAL MEDICINE

## 2021-08-11 NOTE — PROGRESS NOTES
[address] [facility] [address]  Dept: [phone]  Loc: [phone]    Subjective: The patient is a 66y.o. year old, , male is in the office for a follow up history of persistent A. fib, TIA CVA. Patient is doing quit well from cardiac stand point. He john any chest pain or discomfort. No orthopnea or PND. John any palpitation, dizziness or syncope. He is completely asymptomatic from cardiac stand point. Patient denies any bleeding problem on Pradaxa, denies any weakness numbness. Past Medical History:   has a past medical history of Atrial fibrillation ([facility] Blood in urine, Chronic kidney disease, CKD (chronic kidney disease), History of ischemic stroke in prior three months, Hyperlipidemia, Ischemic stroke ([facility] Kidney stones, Nihss score 10, PTSD (post-traumatic stress disorder), Skin tag, Stroke ([facility] and Stroke ([facility] Past Surgical History:   has a past surgical history that includes Appendectomy; transesophageal echocardiogram (01/17/2020); TURP (N/A, 08/10/2020); Cystoscopy (Left, 10/19/2020); Lithotripsy; Cystoscopy (Left, 12/18/2020); IR GUIDED NEPHROSTOMY CATH PLACEMENT LEFT (12/18/2020); NEPHROLITHOTOMY (Left, 12/18/2020); Cystoscopy (Left, 12/18/2020); CYSTOSCOPY INSERTION / REMOVAL STENT / STONE (Left, 2/1/2021); Colonoscopy (N/A, 4/27/2021); and Upper gastrointestinal endoscopy (N/A, 4/27/2021). Home Medications:  Prior to Admission medications    Medication Sig Start Date End Date Taking?  Authorizing Provider   ferrous sulfate (IRON 325) 325 (65 Fe) MG tablet Take 1 tablet by mouth daily 2/23/21  Yes Historical Provider, MD   zinc 50 MG CAPS Take 1 tablet by mouth daily 2/23/21  Yes Historical Provider, MD   aspirin 81 MG EC tablet Take 1 tablet by mouth daily 2/3/21  Yes Paulie Hauser MD   dabigatran (PRADAXA) 150 MG capsule Take 1 capsule by mouth 2 times daily 2/3/21  Yes Sterling Raymond MD   Omega-3 Fatty Acids (FISH OIL) 1000 MG CAPS Take 1 capsule by mouth 2 times daily 2/3/21  Yes Sterling Raymond MD   atorvastatin (LIPITOR) 40 MG tablet Take 1 tablet by mouth nightly 2/3/21  Yes Sterling Raymond MD   docusate sodium (COLACE) 100 MG capsule Take 1 capsule by mouth 2 times daily  Patient taking differently: Take 100 mg by mouth 2 times daily as needed  12/19/20  Yes Jame Pruett MD   famotidine (PEPCID) 20 MG tablet Take 1 tablet by mouth 2 times daily 8/5/20  Yes Huong Hernandez   latanoprost (XALATAN) 0.005 % ophthalmic solution Place 1 drop into both eyes nightly 4/15/20  Yes Historical Provider, MD   Cholecalciferol (VITAMIN D3) 25 MCG (1000 UT) CAPS Take 2,000 Units by mouth daily   Yes Historical Provider, MD   VITAMIN B1-B12 IM Inject into the muscle every 30 days    Yes Historical Provider, MD   EPINEPHrine (EPIPEN 2-FARAZ) 0.3 MG/0.3ML SOAJ injection Inject 0.3 mLs into the muscle once for 1 dose Use as directed for allergic reaction  Patient not taking: Reported on 8/17/2020 3/3/20 7/29/20  Americo Trinidad MD       Allergies:  Patient has no known allergies. Social History:   reports that he has never smoked. He has never used smokeless tobacco. He reports current alcohol use. He reports that he does not use drugs. Review of Systems:  · Constitutional: there has been no unanticipated weight loss. There's been No change in energy level, No change in activity level. · Eyes: No visual changes or diplopia. No scleral icterus. · ENT: No Headaches, hearing loss or vertigo. No mouth sores or sore throat. · Cardiovascular: As above. · Respiratory: No SOB, cough or hemoptysis. · Gastrointestinal: No abdominal pain, appetite loss, blood in stools. No change in bowel or bladder habits. · Genitourinary: No dysuria, trouble voiding, or hematuria. · Musculoskeletal:  No gait disturbance, No weakness or joint complaints.   · Integumentary: No rash or CHOL 112 12/01/2020    HDL 53 12/01/2020    LDLCHOLESTEROL 42 12/01/2020    TRIG 83 12/01/2020    CHOLHDLRATIO 2.1 12/01/2020     LIVER PROFILE:No results for input(s): AST, ALT, LABALBU in the last 72 hours. 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    Combined forms of age-related cataract of both eyes    Acute cerebrovascular accident (CVA) ([address]    Tissue plasminogen activator (tPA) administered at other facility within 24 hours before current admission    Permanent atrial fibrillation ([address]    Acute ischemic stroke ([address]    Left homonymous hemianopsia    Hyperlipidemia    Stroke, recent, without late effect    Bilateral carotid artery stenosis    Chronic cerebral ischemia    Memory problem    Gait difficulty    Balance problem    At high risk for stroke    Risk for falls    Cerebrovascular accident (CVA) due to embolism of right posterior cerebral artery ([address]    Bilateral hemianopia    Dysphagia    Hematuria    PTSD (post-traumatic stress disorder)    Acute urinary retention    Horseshoe kidney    Kidney stone on left side    History of ischemic posterior cerebral artery stroke    Cerebrovascular accident (CVA) due to embolism of left middle cerebral artery ([address]    H/O ischemic left MCA stroke    Ischemic stroke ([address]    Atrial fibrillation (HCC)       RECOMMENDATIONS:  Persistent A. fib very well rate controlled on Pradaxa we'll continue  Patient is advised to watch for any cuts bruises head injury.     Stress test on 7/11/2017 no ischemia EF 61%     HYPERTENSION- WELL CONTROLLED, WILL CONTINUE CURRENT MEDICATIONS     HYPERLIPIDEMIA- ON STATIN, NEEDS TO WATCH LIPID PROFILE AND LFT'S    History of TIA stroke requiring TPA and mechanical thrombectomy on 1/11/2020    DISCUSSED IN DETAILS ABOUT RISK MODIFICATION    RETURN VISIT IN 6 MONTHS, IF ANY SYMPTOM CHANGE PATIENT ADVISED TO GO TO THE EMERGENCY ROOM.           Annia Ramos MD, MD  [facility] Cardiology Consult           [phone]

## 2021-09-15 ENCOUNTER — TELEPHONE (OUTPATIENT)
Dept: INTERNAL MEDICINE | Age: 71
End: 2021-09-15

## 2021-09-15 RX ORDER — AMOXICILLIN AND CLAVULANATE POTASSIUM 875; 125 MG/1; MG/1
1 TABLET, FILM COATED ORAL 2 TIMES DAILY
Qty: 14 TABLET | Refills: 0 | Status: SHIPPED | OUTPATIENT
Start: 2021-09-15 | End: 2021-09-22

## 2021-09-15 NOTE — TELEPHONE ENCOUNTER
----- Message from [facility] sent at 9/15/2021 11:04 AM EDT -----  Subject: Appointment Request    Reason for Call: Semi-Routine Cough, Cold Symptoms    QUESTIONS  Type of Appointment? Established Patient  Reason for appointment request? Available appointments did not meet   patient need  Additional Information for Provider? Aurea Sandifer would like to have Tyler   seen either today or tomorrow due to developing a head cold. The pharmacy   said it was okay for him to take Robitussin along with his blood thinners,   so he has been taking that. I informed her of the virtual visits available   and she wanted me to see if we could get him seen in person. They have a   wedding to attend this weekend and are heading out on vacation Monday so   she would like to get him seen this week if possible.  ---------------------------------------------------------------------------  --------------  CALL BACK INFO  What is the best way for the office to contact you? OK to leave message on   voicemail  Preferred Call Back Phone Number? [phone]  ---------------------------------------------------------------------------  --------------  SCRIPT ANSWERS  Relationship to Patient? Other  Representative Name? Bronson Stone - wife  Additional information verified (besides Name and Date of Birth)? Address  Are you currently unable to finish sentences due to any difficulty   breathing? No  Are you unable to swallow liquids? No  Are you having fevers (100.4 or greater), chills, or sweats? No  Do you have COPD, asthma or a chronic lung condition? No  Have your symptoms been present for more than 5 days? No  Have you recently (14 days) been seen by a provider for this issue? No  Have you been diagnosed with, awaiting test results for, or told that you   are suspected of having COVID-19 (Coronavirus)? (If patient has tested   negative or was tested as a requirement for work, school, or travel and   not based on symptoms, answer no)?  No  Within the past two weeks have you developed any of the following symptoms   (answer no if symptoms have been present longer than 2 weeks or began   more than 2 weeks ago)? Fever or Chills, Cough, Shortness of breath or   difficulty breathing, Loss of taste or smell, Sore throat, Nasal   congestion, Sneezing or runny nose, Fatigue or generalized body aches   (answer no if pain is specific to a body part e.g. back pain), Diarrhea,   Headache?  Yes

## 2021-09-15 NOTE — TELEPHONE ENCOUNTER
Ordered Augmentin to [facility], I would recommend that he starts it if the symptoms have been going on for a week or more

## 2021-09-15 NOTE — TELEPHONE ENCOUNTER
I called and spoke with pt in more detail regarding symptoms. Pt feels he has a sinus infection. Has a lot of sinus pressure in his nose/face area, slight cough, throat is irritated but not sore. No sneezing and no drainage. No vomiting or diarrhea.  Would like to know if something can be sent in to [facility].

## 2021-09-29 ENCOUNTER — OFFICE VISIT (OUTPATIENT)
Dept: PRIMARY CARE CLINIC | Age: 71
End: 2021-09-29
Payer: MEDICARE

## 2021-09-29 ENCOUNTER — HOSPITAL ENCOUNTER (OUTPATIENT)
Dept: GENERAL RADIOLOGY | Age: 71
Discharge: HOME | End: 2021-10-01
Payer: MEDICARE

## 2021-09-29 VITALS
HEIGHT: 67 IN | HEART RATE: 96 BPM | OXYGEN SATURATION: 98 % | WEIGHT: 144 LBS | DIASTOLIC BLOOD PRESSURE: 76 MMHG | TEMPERATURE: 97.3 F | SYSTOLIC BLOOD PRESSURE: 130 MMHG | BODY MASS INDEX: 22.6 KG/M2 | RESPIRATION RATE: 12 BRPM

## 2021-09-29 DIAGNOSIS — S93.491A SPRAIN OF ANTERIOR TALOFIBULAR LIGAMENT OF RIGHT ANKLE, INITIAL ENCOUNTER: ICD-10-CM

## 2021-09-29 DIAGNOSIS — M25.571 ACUTE RIGHT ANKLE PAIN: Primary | ICD-10-CM

## 2021-09-29 DIAGNOSIS — M25.571 ACUTE RIGHT ANKLE PAIN: ICD-10-CM

## 2021-09-29 PROCEDURE — 99213 OFFICE O/P EST LOW 20 MIN: CPT | Performed by: FAMILY MEDICINE

## 2021-09-29 PROCEDURE — 73610 X-RAY EXAM OF ANKLE: CPT

## 2021-09-29 PROCEDURE — 99212 OFFICE O/P EST SF 10 MIN: CPT | Performed by: FAMILY MEDICINE

## 2021-09-29 SDOH — ECONOMIC STABILITY: FOOD INSECURITY: WITHIN THE PAST 12 MONTHS, YOU WORRIED THAT YOUR FOOD WOULD RUN OUT BEFORE YOU GOT MONEY TO BUY MORE.: NEVER TRUE

## 2021-09-29 SDOH — ECONOMIC STABILITY: FOOD INSECURITY: WITHIN THE PAST 12 MONTHS, THE FOOD YOU BOUGHT JUST DIDN'T LAST AND YOU DIDN'T HAVE MONEY TO GET MORE.: NEVER TRUE

## 2021-09-29 ASSESSMENT — ENCOUNTER SYMPTOMS
RESPIRATORY NEGATIVE: 1
EYES NEGATIVE: 1
GASTROINTESTINAL NEGATIVE: 1
ALLERGIC/IMMUNOLOGIC NEGATIVE: 1

## 2021-09-29 ASSESSMENT — SOCIAL DETERMINANTS OF HEALTH (SDOH): HOW HARD IS IT FOR YOU TO PAY FOR THE VERY BASICS LIKE FOOD, HOUSING, MEDICAL CARE, AND HEATING?: NOT HARD AT ALL

## 2021-09-29 NOTE — PROGRESS NOTES
Subjective:      Patient ID: Jaime Rayo is a 66 y.o. male. HPI Acute urgent care visit for right ankle pain. He tripped over his mower by report. Some right ankle pain, able to get upon his own. Noticing more swelling today prompting visit. Past Medical History:   Diagnosis Date    Atrial fibrillation ([facility] [address] 01/2020    Blood in urine     Chronic kidney disease     CKD (chronic kidney disease)     History of ischemic stroke in prior three months     Hyperlipidemia     Ischemic stroke ([facility] [address] 01/10/2020    Kidney stones     Nihss score 10     PTSD (post-traumatic stress disorder)     from war, Agent Orange     Skin tag 12/21/2018    Stroke ([facility] [address] 01/16/2020    MRI Brain WO: New right PCA distribution infarct, multifocal left MCA distribution infarcts    Stroke ([facility] [address] 01/11/2020    large vessel occlusion/left M2 occlusion. He subsequently underwent emergent thrombectomy.  /  S/P TPA DONE     Past Surgical History:   Procedure Laterality Date    APPENDECTOMY      COLONOSCOPY N/A 4/27/2021    Normal colon.  Dr. Zeenat Alvarez at [facility] Left 10/19/2020    CYSTOSCOPY, LEFT URETEROSCOPY w/ [place] LITHOTRIPSY, LEFT URETERAL STENT PLACEMENT performed by Perla Simmons MD at [facility] Left 12/18/2020    cystoscopy, left stent placement, left retrograde pyelogram    CYSTOSCOPY Left 12/18/2020    CYSTOSCOPY, LEFT STENT REMOVAL, LEFT RETROGRADE PYELOGRAM, INSERTION OCCLUSIVE BALLOON - PT TO INTERV RADIOLOGY FOLLOWING performed by Perla Simmons MD at [address] / Roman [place] / Brittany Cesar Left 2/1/2021    CYSTO Left URETEROSCOPY w/ stent removal ET RETROGRADE performed by Perla Simmons MD at [address] [facility] IR NEPHROSTOMY PERCUTANEOUS LEFT  12/18/2020    IR NEPHROSTOMY PERCUTANEOUS LEFT 12/18/2020 Suzanne Gutierrez MD [facility] SPECIAL PROCEDURES    LITHOTRIPSY      NEPHROLITHOTOMY Left 12/18/2020    cystoscopy, left stent placement, left retrograde pyelogram performed by Perla Simmons MD at [facility] TRANSESOPHAGEAL ECHOCARDIOGRAM  01/17/2020    TURP N/A 08/10/2020    CYSTO w/ Greenlight Laser performed by Otto Mazariegos MD at [facility] N/A 4/27/2021    mild esophagitis. mild-mod. esophagitis. Dr. Kristan Braun at [facility]     Current Outpatient Medications   Medication Sig Dispense Refill    ferrous sulfate (IRON 325) 325 (65 Fe) MG tablet Take 1 tablet by mouth daily      zinc 50 MG CAPS Take 1 tablet by mouth daily      aspirin 81 MG EC tablet Take 1 tablet by mouth daily 90 tablet 3    dabigatran (PRADAXA) 150 MG capsule Take 1 capsule by mouth 2 times daily 180 capsule 3    Omega-3 Fatty Acids (FISH OIL) 1000 MG CAPS Take 1 capsule by mouth 2 times daily 180 capsule 3    atorvastatin (LIPITOR) 40 MG tablet Take 1 tablet by mouth nightly 90 tablet 3    docusate sodium (COLACE) 100 MG capsule Take 1 capsule by mouth 2 times daily (Patient taking differently: Take 100 mg by mouth 2 times daily as needed ) 30 capsule 1    famotidine (PEPCID) 20 MG tablet Take 1 tablet by mouth 2 times daily 60 tablet 0    latanoprost (XALATAN) 0.005 % ophthalmic solution Place 1 drop into both eyes nightly      Cholecalciferol (VITAMIN D3) 25 MCG (1000 UT) CAPS Take 2,000 Units by mouth daily      VITAMIN B1-B12 IM Inject into the muscle every 30 days       EPINEPHrine (EPIPEN 2-FARAZ) 0.3 MG/0.3ML SOAJ injection Inject 0.3 mLs into the muscle once for 1 dose Use as directed for allergic reaction (Patient not taking: Reported on 8/17/2020) 0.3 mL 0     No current facility-administered medications for this visit. Review of Systems   Constitutional: Negative. HENT: Negative. Eyes: Negative. Respiratory: Negative. Cardiovascular: Negative. Gastrointestinal: Negative. Endocrine: Negative. Genitourinary: Negative. Musculoskeletal: Positive for arthralgias (right ankle). Skin: Negative. Allergic/Immunologic: Negative. Neurological: Negative. Hematological: Negative. Psychiatric/Behavioral: Negative. Objective:   Physical Exam  Constitutional:       General: He is not in acute distress. Appearance: Normal appearance. He is not toxic-appearing. HENT:      Head: Normocephalic and atraumatic. Nose: Nose normal.   Eyes:      Pupils: Pupils are equal, round, and reactive to light. Cardiovascular:      Rate and Rhythm: Normal rate. Pulses: Normal pulses. Pulmonary:      Effort: Pulmonary effort is normal. No respiratory distress. Abdominal:      General: There is no distension. Musculoskeletal:         General: Normal range of motion. Right ankle: Swelling and ecchymosis present. Tenderness present over the ATF ligament. Normal range of motion. Right Achilles Tendon: Normal.   Neurological:      General: No focal deficit present. Mental Status: He is alert. Psychiatric:         Mood and Affect: Mood normal.         Thought Content: Thought content normal.         Judgment: Judgment normal.       /76 (Site: Left Upper Arm, Position: Sitting, Cuff Size: Medium Adult)   Pulse 96   Temp 97.3 °F (36.3 °C) (Temporal)   Resp 12   Ht 5' 7\" (1.702 m)   Wt 144 lb (65.3 kg)   SpO2 98%   BMI 22.55 kg/m²     Assessment:      Encounter Diagnoses   Name Primary?  Acute right ankle pain Yes    Sprain of anterior talofibular ligament of right ankle, initial encounter            Plan:      Ice, ibuprofen, rest.   Avoid uneven surfaces and re injury  Defers on bracing. Seems to be walking ok.               Shayy Rodríguez MD

## 2021-10-25 ENCOUNTER — OFFICE VISIT (OUTPATIENT)
Dept: INTERNAL MEDICINE | Age: 71
End: 2021-10-25
Payer: MEDICARE

## 2021-10-25 ENCOUNTER — NURSE ONLY (OUTPATIENT)
Dept: LAB | Age: 71
End: 2021-10-25
Payer: MEDICARE

## 2021-10-25 VITALS
RESPIRATION RATE: 18 BRPM | HEIGHT: 67 IN | DIASTOLIC BLOOD PRESSURE: 68 MMHG | SYSTOLIC BLOOD PRESSURE: 115 MMHG | BODY MASS INDEX: 29.41 KG/M2 | TEMPERATURE: 95.1 F | WEIGHT: 187.4 LBS | HEART RATE: 72 BPM

## 2021-10-25 DIAGNOSIS — N40.0 BENIGN PROSTATIC HYPERPLASIA WITHOUT LOWER URINARY TRACT SYMPTOMS: ICD-10-CM

## 2021-10-25 DIAGNOSIS — D50.9 IRON DEFICIENCY ANEMIA, UNSPECIFIED IRON DEFICIENCY ANEMIA TYPE: Primary | ICD-10-CM

## 2021-10-25 DIAGNOSIS — Z23 NEED FOR TDAP VACCINATION: Primary | ICD-10-CM

## 2021-10-25 DIAGNOSIS — E78.5 HYPERLIPIDEMIA, UNSPECIFIED HYPERLIPIDEMIA TYPE: ICD-10-CM

## 2021-10-25 DIAGNOSIS — I48.91 ATRIAL FIBRILLATION, UNSPECIFIED TYPE (HCC): ICD-10-CM

## 2021-10-25 PROCEDURE — 99214 OFFICE O/P EST MOD 30 MIN: CPT | Performed by: INTERNAL MEDICINE

## 2021-10-25 PROCEDURE — PBSHW PBB SHADOW CHARGE: Performed by: INTERNAL MEDICINE

## 2021-10-25 PROCEDURE — 90471 IMMUNIZATION ADMIN: CPT | Performed by: INTERNAL MEDICINE

## 2021-10-25 PROCEDURE — 99213 OFFICE O/P EST LOW 20 MIN: CPT

## 2021-10-25 NOTE — PROGRESS NOTES
Tdap given as ordered, IM  left deltoid. Patient tolerated it well, no questions re: VIS information. NDC# 94598-119-17, Lot number [id number], expiration date 06/05/23, [facility]. [facility]. Patient supplied medication. See attached scan.

## 2021-11-01 NOTE — PROGRESS NOTES
[facility]    Visit Date:  10/25/2021  Patient:  Carla Rodriguez  YOB: 1950    Assessment & Plan     Iron deficiency: He stopped taking his ferrous sulfate pills. Will order ferritin level. Reassess next visit. Hyperlipidemia: Continue atorvastatin. We will continue to monitor. Atrial fibrillation: Asymptomatic. Regular rate and rhythm at this time. Continue Pradaxa    BPH: Follows with urology. Asymptomatic at this time. Diagnosis Orders   1. Iron deficiency anemia, unspecified iron deficiency anemia type  Ferritin   2. Hyperlipidemia, unspecified hyperlipidemia type     3. Atrial fibrillation, unspecified type [facility])         Chief Complaint  Anemia (iron defiency)    History of Present Illness   Presents to follow-up. He is asymptomatic at this time. Appears to be doing okay. Has a history of iron deficiency anemia, and has been using ferrous sulfate pills, however he stopped them on his own because they were causing constipation. Denies nausea, vomiting, abdominal pain at this time. Denies fever or chills. I advised that we can check an iron level. He otherwise have checked blood work at the [place]. Objective  /68   Pulse 72   Temp 95.1 °F (35.1 °C) (Tympanic)   Resp 18   Ht 5' 7\" (1.702 m)   Wt 187 lb 6.4 oz (85 kg)   BMI 29.35 kg/m²   Constitutional: No acute distress. Sits in chair comfortably  Eyes: Sclerae nonicteric. No lid lag or proptosis  HENT: External ears normal. No external lesions on the nose  Neck: No gross masses. Trachea visibly midline  Respiratory: Good air entry bilaterally. No wheezing or crackles  Cardiovascular: Normal S1-S2. No murmurs. No lower extremity edema  Gastrointestinal: No visible masses. No visible hernias  Skin: No abnormal rashes. No abnormal masses  Neurologic: Cranial nerves grossly intact  Psychiatric: Normal affect.  Alert and oriented    Medications  Current Outpatient Medications:     zinc infarcts    Stroke ([facility] [address] 01/11/2020    large vessel occlusion/left M2 occlusion. He subsequently underwent emergent thrombectomy.  /  S/P TPA DONE     Past Surgical History:   Procedure Laterality Date    APPENDECTOMY      COLONOSCOPY N/A 4/27/2021    Normal colon. Dr. Gayathri Sandoval at [facility] Left 10/19/2020    CYSTOSCOPY, LEFT URETEROSCOPY w/ [place] LITHOTRIPSY, LEFT URETERAL STENT PLACEMENT performed by Cesar Rainey MD at [facility] Left 12/18/2020    cystoscopy, left stent placement, left retrograde pyelogram    CYSTOSCOPY Left 12/18/2020    CYSTOSCOPY, LEFT STENT REMOVAL, LEFT RETROGRADE PYELOGRAM, INSERTION OCCLUSIVE BALLOON - PT TO INTERV RADIOLOGY FOLLOWING performed by Cesar Rainey MD at [address] / Nick Flack / [place] Left 2/1/2021    CYSTO Left URETEROSCOPY w/ stent removal ET RETROGRADE performed by Cesar Rainey MD at [address] [facility] IR NEPHROSTOMY PERCUTANEOUS LEFT  12/18/2020    IR NEPHROSTOMY PERCUTANEOUS LEFT 12/18/2020 Randy Courtney MD [facility] SPECIAL PROCEDURES    LITHOTRIPSY      NEPHROLITHOTOMY Left 12/18/2020    cystoscopy, left stent placement, left retrograde pyelogram performed by Cesar Rainey MD at [address] TRANSESOPHAGEAL ECHOCARDIOGRAM  01/17/2020    TURP N/A 08/10/2020    CYSTO w/ Greenlight Laser performed by Cesar Rainey MD at [address] [facility] N/A 4/27/2021    mild esophagitis. mild-mod. esophagitis. Dr. Gayathri Sandoval at [facility]     Family History  This patient's family history includes Heart Disease in his father and mother; Stroke in his brother and father. Social History  Kalpesh Gomez  reports that he has never smoked. He has never used smokeless tobacco. He reports current alcohol use. He reports that he does not use drugs. Discussed use, benefit, and side effects of prescribed medications. All questions answered. Patient voiced understanding. Reviewed health maintenance.       Electronically signed Cirilo Mcdaniels MD on 10/25/2021 at 8:50 AM    This note has been created using the Epic electronic health record, and dictated in part by [employer] dictation system. Despite the documenting physician's best efforts, there may be errors in spelling, grammar or syntax.

## 2021-11-02 ENCOUNTER — PATIENT MESSAGE (OUTPATIENT)
Dept: INTERNAL MEDICINE | Age: 71
End: 2021-11-02

## 2021-11-04 ENCOUNTER — HOSPITAL ENCOUNTER (OUTPATIENT)
Dept: LAB | Age: 71
Discharge: HOME | End: 2021-11-04
Payer: MEDICARE

## 2021-11-04 DIAGNOSIS — E78.5 HYPERLIPIDEMIA, UNSPECIFIED HYPERLIPIDEMIA TYPE: ICD-10-CM

## 2021-11-04 DIAGNOSIS — R97.20 ELEVATED PSA: ICD-10-CM

## 2021-11-04 DIAGNOSIS — D50.9 IRON DEFICIENCY ANEMIA, UNSPECIFIED IRON DEFICIENCY ANEMIA TYPE: ICD-10-CM

## 2021-11-04 DIAGNOSIS — I48.91 ATRIAL FIBRILLATION, UNSPECIFIED TYPE (HCC): ICD-10-CM

## 2021-11-04 LAB
ABSOLUTE EOS #: 0.2 K/UL (ref 0–0.44)
ABSOLUTE IMMATURE GRANULOCYTE: 0.03 K/UL (ref 0–0.3)
ABSOLUTE LYMPH #: 1.96 K/UL (ref 1.1–3.7)
ABSOLUTE MONO #: 0.54 K/UL (ref 0.1–1.2)
ALBUMIN SERPL-MCNC: 4.3 G/DL (ref 3.5–5.2)
ALBUMIN/GLOBULIN RATIO: 1.3 (ref 1–2.5)
ALP BLD-CCNC: 100 U/L (ref 40–129)
ALT SERPL-CCNC: 19 U/L (ref 5–41)
ANION GAP SERPL CALCULATED.3IONS-SCNC: 10 MMOL/L (ref 9–17)
AST SERPL-CCNC: 23 U/L
BASOPHILS # BLD: 0 % (ref 0–2)
BASOPHILS ABSOLUTE: 0.03 K/UL (ref 0–0.2)
BILIRUB SERPL-MCNC: 0.71 MG/DL (ref 0.3–1.2)
BUN BLDV-MCNC: 16 MG/DL (ref 8–23)
BUN/CREAT BLD: 19 (ref 9–20)
CALCIUM SERPL-MCNC: 9.6 MG/DL (ref 8.6–10.4)
CHLORIDE BLD-SCNC: 103 MMOL/L (ref 98–107)
CHOLESTEROL/HDL RATIO: 3.2
CHOLESTEROL: 133 MG/DL
CO2: 27 MMOL/L (ref 20–31)
CREAT SERPL-MCNC: 0.86 MG/DL (ref 0.7–1.2)
DIFFERENTIAL TYPE: ABNORMAL
EOSINOPHILS RELATIVE PERCENT: 3 % (ref 1–4)
FERRITIN: 83 UG/L (ref 30–400)
GFR AFRICAN AMERICAN: >60 ML/MIN
GFR NON-AFRICAN AMERICAN: >60 ML/MIN
GFR SERPL CREATININE-BSD FRML MDRD: ABNORMAL ML/MIN/{1.73_M2}
GFR SERPL CREATININE-BSD FRML MDRD: ABNORMAL ML/MIN/{1.73_M2}
GLUCOSE BLD-MCNC: 105 MG/DL (ref 70–99)
HCT VFR BLD CALC: 48 % (ref 40.7–50.3)
HDLC SERPL-MCNC: 41 MG/DL
HEMOGLOBIN: 16.2 G/DL (ref 13–17)
IMMATURE GRANULOCYTES: 0 %
LDL CHOLESTEROL: 51 MG/DL (ref 0–130)
LYMPHOCYTES # BLD: 28 % (ref 24–43)
MCH RBC QN AUTO: 30.1 PG (ref 25.2–33.5)
MCHC RBC AUTO-ENTMCNC: 33.8 G/DL (ref 25.2–33.5)
MCV RBC AUTO: 89.2 FL (ref 82.6–102.9)
MONOCYTES # BLD: 8 % (ref 3–12)
NRBC AUTOMATED: 0 PER 100 WBC
PDW BLD-RTO: 12.8 % (ref 11.8–14.4)
PLATELET # BLD: 166 K/UL (ref 138–453)
PLATELET ESTIMATE: ABNORMAL
PMV BLD AUTO: 9.1 FL (ref 8.1–13.5)
POTASSIUM SERPL-SCNC: 4.4 MMOL/L (ref 3.7–5.3)
PROSTATE SPECIFIC ANTIGEN: 3.61 UG/L
RBC # BLD: 5.38 M/UL (ref 4.21–5.77)
RBC # BLD: ABNORMAL 10*6/UL
SEG NEUTROPHILS: 61 % (ref 36–65)
SEGMENTED NEUTROPHILS ABSOLUTE COUNT: 4.36 K/UL (ref 1.5–8.1)
SODIUM BLD-SCNC: 140 MMOL/L (ref 135–144)
TOTAL PROTEIN: 7.7 G/DL (ref 6.4–8.3)
TRIGL SERPL-MCNC: 205 MG/DL
VLDLC SERPL CALC-MCNC: ABNORMAL MG/DL (ref 1–30)
WBC # BLD: 7.1 K/UL (ref 3.5–11.3)
WBC # BLD: ABNORMAL 10*3/UL

## 2021-11-04 PROCEDURE — 85025 COMPLETE CBC W/AUTO DIFF WBC: CPT

## 2021-11-04 PROCEDURE — 82728 ASSAY OF FERRITIN: CPT

## 2021-11-04 PROCEDURE — 80053 COMPREHEN METABOLIC PANEL: CPT

## 2021-11-04 PROCEDURE — 84153 ASSAY OF PSA TOTAL: CPT

## 2021-11-04 PROCEDURE — 80061 LIPID PANEL: CPT

## 2021-11-04 PROCEDURE — 36415 COLL VENOUS BLD VENIPUNCTURE: CPT

## 2021-11-08 ENCOUNTER — OFFICE VISIT (OUTPATIENT)
Dept: UROLOGY | Age: 71
End: 2021-11-08
Payer: MEDICARE

## 2021-11-08 VITALS
SYSTOLIC BLOOD PRESSURE: 120 MMHG | HEART RATE: 70 BPM | HEIGHT: 67 IN | BODY MASS INDEX: 29.35 KG/M2 | WEIGHT: 187 LBS | DIASTOLIC BLOOD PRESSURE: 60 MMHG

## 2021-11-08 DIAGNOSIS — N40.1 BENIGN PROSTATIC HYPERPLASIA WITH URINARY OBSTRUCTION: ICD-10-CM

## 2021-11-08 DIAGNOSIS — N20.0 KIDNEY STONE ON LEFT SIDE: Primary | ICD-10-CM

## 2021-11-08 DIAGNOSIS — N13.8 BENIGN PROSTATIC HYPERPLASIA WITH URINARY OBSTRUCTION: ICD-10-CM

## 2021-11-08 DIAGNOSIS — R97.20 ELEVATED PSA: ICD-10-CM

## 2021-11-08 DIAGNOSIS — Q63.1 HORSESHOE KIDNEY: ICD-10-CM

## 2021-11-08 DIAGNOSIS — R31.0 GROSS HEMATURIA: ICD-10-CM

## 2021-11-08 PROCEDURE — 99213 OFFICE O/P EST LOW 20 MIN: CPT | Performed by: UROLOGY

## 2021-11-08 PROCEDURE — 99214 OFFICE O/P EST MOD 30 MIN: CPT | Performed by: UROLOGY

## 2021-11-08 NOTE — PROGRESS NOTES
JUSTUS Peterson MD        [address] [address]  Dept: [phone]  Dept Fax: [phone]  Loc: [phone]      [facility] Urology Office Note - Follow up Visit    Patient:  Mandy Tadeo  YOB: 1950    The patient is a 66 y.o. male who presents today for evaluation of the following problems: BPH,kidney stones  Chief Complaint   Patient presents with    Nephrolithiasis     3 month        HISTORY OF PRESENT ILLNESS:     Kidney stones  No new issues. horeshoe kidney    BPH-stable. Voiding well without a catheter. Has had greenlight surgery in the past    Elevated PSA-was normal in May of last year. Elevated after retention episode. Back to normal    Summary of Previous Records:  stone very dense and lower pole stone difficult to access with URS  Has horseshoe kidney with 3-4 stones > 1 cm. Total aggregate > 3.5 cm  Unsuccessful nephrostomy tube access at [address] [place] [address] was made to avoid percutaneous nephrolithotomy. Has been doing well since. No imaging today    Requested/reviewed records from [facility] MD IVAN office and/or outside physician/EMR    (Patient's old records have been requested, reviewed and pertinent findings summarized in today's note.)    Procedures Today: N/A    Last several PSA's:  Lab Results   Component Value Date    PSA 3.61 11/04/2021    PSA 35.88 (H) 07/20/2020    PSA 3.93 05/20/2020       Last total testosterone:  No results found for: TESTOSTERONE    Urinalysis today:  No results found for this visit on 11/08/21.     Last BUN and creatinine:  Lab Results   Component Value Date    BUN 16 11/04/2021     Lab Results   Component Value Date    CREATININE 0.86 11/04/2021       Additional Lab/Culture results: none    Imaging Reviewed during this Office Visit:   imaging independently reviewed by Donna Hewitt MD and radiology report verified Mavis Espino MD at [address] N/A 4/27/2021    mild esophagitis. mild-mod. esophagitis. Dr. Blas Alva at [facility]     Family History   Problem Relation Age of Onset    Heart Disease Mother     Heart Disease Father     Stroke Father     Stroke Brother      Outpatient Medications Marked as Taking for the 11/8/21 encounter (Office Visit) with Gm Wilkinson MD   Medication Sig Dispense Refill    zinc 50 MG CAPS Take 1 tablet by mouth daily      aspirin 81 MG EC tablet Take 1 tablet by mouth daily 90 tablet 3    dabigatran (PRADAXA) 150 MG capsule Take 1 capsule by mouth 2 times daily 180 capsule 3    Omega-3 Fatty Acids (FISH OIL) 1000 MG CAPS Take 1 capsule by mouth 2 times daily 180 capsule 3    atorvastatin (LIPITOR) 40 MG tablet Take 1 tablet by mouth nightly 90 tablet 3    docusate sodium (COLACE) 100 MG capsule Take 1 capsule by mouth 2 times daily (Patient taking differently: Take 100 mg by mouth 2 times daily as needed ) 30 capsule 1    famotidine (PEPCID) 20 MG tablet Take 1 tablet by mouth 2 times daily 60 tablet 0    latanoprost (XALATAN) 0.005 % ophthalmic solution Place 1 drop into both eyes nightly      Cholecalciferol (VITAMIN D3) 25 MCG (1000 UT) CAPS Take 2,000 Units by mouth daily      VITAMIN B1-B12 IM Inject into the muscle every 30 days          Patient has no known allergies.   Social History     Tobacco Use   Smoking Status Never Smoker   Smokeless Tobacco Never Used      (If patient a smoker, smoking cessation counseling offered)   Social History     Substance and Sexual Activity   Alcohol Use Yes    Comment: rarely       REVIEW OF SYSTEMS:  Constitutional: negative  Eyes: negative  Respiratory: negative  Cardiovascular: negative  Gastrointestinal: negative  Genitourinary: see HPI  Musculoskeletal: negative  Skin: negative   Neurological: negative  Hematological/Lymphatic: negative  Psychological: negative        Physical Exam:    This a 66 y.o. male  There were no vitals filed for this visit. There is no height or weight on file to calculate BMI. Constitutional: Patient in no acute distress;       Assessment and Plan        1. Kidney stone on left side    2. Horseshoe kidney    3. Benign prostatic hyperplasia with urinary obstruction    4. Gross hematuria    5. Elevated PSA               Plan:       Gross hematuria resolved  Will monitor kidney stones and horseshoe kidney. Attempts at PCNL were made in past as well as ureteroscopy  Patient is voiding well off flomax. Hx of greenlight  psa back to baseline    Follow up in 12 months with both psa and kub. Prescriptions Ordered:  No orders of the defined types were placed in this encounter. Orders Placed:  No orders of the defined types were placed in this encounter.            Robb Olmstead MD

## 2022-01-20 ENCOUNTER — OFFICE VISIT (OUTPATIENT)
Dept: NEUROLOGY | Age: 72
End: 2022-01-20
Payer: MEDICARE

## 2022-01-20 VITALS
OXYGEN SATURATION: 98 % | RESPIRATION RATE: 16 BRPM | BODY MASS INDEX: 30.45 KG/M2 | HEIGHT: 67 IN | SYSTOLIC BLOOD PRESSURE: 126 MMHG | HEART RATE: 98 BPM | WEIGHT: 194 LBS | DIASTOLIC BLOOD PRESSURE: 74 MMHG

## 2022-01-20 DIAGNOSIS — Z91.89 AT HIGH RISK FOR STROKE: ICD-10-CM

## 2022-01-20 DIAGNOSIS — H53.47 BILATERAL HEMIANOPIA: ICD-10-CM

## 2022-01-20 DIAGNOSIS — F43.10 PTSD (POST-TRAUMATIC STRESS DISORDER): ICD-10-CM

## 2022-01-20 DIAGNOSIS — R26.89 BALANCE PROBLEM: ICD-10-CM

## 2022-01-20 DIAGNOSIS — I67.82 CHRONIC CEREBRAL ISCHEMIA: ICD-10-CM

## 2022-01-20 DIAGNOSIS — R26.9 GAIT DIFFICULTY: ICD-10-CM

## 2022-01-20 DIAGNOSIS — Q63.1 HORSESHOE KIDNEY: ICD-10-CM

## 2022-01-20 DIAGNOSIS — Z86.73 H/O ISCHEMIC LEFT MCA STROKE: ICD-10-CM

## 2022-01-20 DIAGNOSIS — H53.462 LEFT HOMONYMOUS HEMIANOPSIA: ICD-10-CM

## 2022-01-20 DIAGNOSIS — I48.91 ATRIAL FIBRILLATION, UNSPECIFIED TYPE (HCC): ICD-10-CM

## 2022-01-20 DIAGNOSIS — R41.3 MEMORY PROBLEM: ICD-10-CM

## 2022-01-20 DIAGNOSIS — I63.9 ISCHEMIC STROKE (HCC): ICD-10-CM

## 2022-01-20 DIAGNOSIS — Z91.81 RISK FOR FALLS: ICD-10-CM

## 2022-01-20 DIAGNOSIS — Z86.73 HISTORY OF ISCHEMIC POSTERIOR CEREBRAL ARTERY STROKE: ICD-10-CM

## 2022-01-20 DIAGNOSIS — I65.23 BILATERAL CAROTID ARTERY STENOSIS: ICD-10-CM

## 2022-01-20 DIAGNOSIS — I63.412 CEREBROVASCULAR ACCIDENT (CVA) DUE TO EMBOLISM OF LEFT MIDDLE CEREBRAL ARTERY (HCC): Primary | ICD-10-CM

## 2022-01-20 DIAGNOSIS — R13.10 DYSPHAGIA, UNSPECIFIED TYPE: ICD-10-CM

## 2022-01-20 PROCEDURE — 99212 OFFICE O/P EST SF 10 MIN: CPT | Performed by: PSYCHIATRY & NEUROLOGY

## 2022-01-20 PROCEDURE — 99214 OFFICE O/P EST MOD 30 MIN: CPT | Performed by: PSYCHIATRY & NEUROLOGY

## 2022-01-20 ASSESSMENT — ENCOUNTER SYMPTOMS
PHOTOPHOBIA: 0
EYE ITCHING: 0
COUGH: 0
BLOOD IN STOOL: 0
CHANGE IN BOWEL HABIT: 0
APNEA: 0
WHEEZING: 0
CONSTIPATION: 0
BOWEL INCONTINENCE: 0
EYE PAIN: 0
ABDOMINAL PAIN: 0
CHEST TIGHTNESS: 0
SINUS PRESSURE: 0
BACK PAIN: 0
DIARRHEA: 0
VOICE CHANGE: 0
TROUBLE SWALLOWING: 0
COLOR CHANGE: 0
SWOLLEN GLANDS: 0
ABDOMINAL DISTENTION: 0
EYE REDNESS: 0
SORE THROAT: 0
SHORTNESS OF BREATH: 0
CHOKING: 0
EYE DISCHARGE: 0
NAUSEA: 0
VOMITING: 0
VISUAL CHANGE: 0
FACIAL SWELLING: 0

## 2022-01-20 NOTE — PROGRESS NOTES
[facility]  Neurology  [address] [place] [address]  ORXP:[phone]   Fax: [phone]        SUBJECTIVE:     PATIENT ID:  Maury Cardenas is a  RIGHT  HANDED 66 y.o. male. Cerebrovascular Accident  This is a new problem. Episode onset: RECURRENT    LEFT  MCA   AND   RIGHT  PCA    EMBOLIC  STROKES  IN  JAN. 2020. The problem has been gradually improving. Pertinent negatives include no abdominal pain, anorexia, arthralgias, change in bowel habit, chest pain, chills, congestion, coughing, diaphoresis, fatigue, fever, headaches, joint swelling, myalgias, nausea, neck pain, numbness, rash, sore throat, swollen glands, urinary symptoms, vertigo, visual change, vomiting or weakness. Nothing aggravates the symptoms. Treatments tried: ASA,  PRADAXA   The treatment provided significant relief. Neurologic Problem  The patient's primary symptoms include clumsiness, a loss of balance and memory loss. The patient's pertinent negatives include no altered mental status, focal sensory loss, focal weakness, near-syncope, slurred speech, syncope, visual change or weakness. This is a recurrent problem. The current episode started more than 1 month ago. The neurological problem developed suddenly. The problem is unchanged. There was no focality noted. Associated symptoms include dizziness and light-headedness. Pertinent negatives include no abdominal pain, auditory change, aura, back pain, bladder incontinence, bowel incontinence, chest pain, confusion, diaphoresis, fatigue, fever, headaches, nausea, neck pain, palpitations, shortness of breath, vertigo or vomiting. Past treatments include bed rest, sleep and aspirin. The treatment provided no relief. His past medical history is significant for a CVA and dementia. There is no history of a bleeding disorder, a clotting disorder, head trauma, liver disease, mood changes or seizures.              History obtained from  The patient    AND HIS  WIFE       and other  available   medical  records   were  Also  reviewed. The  Duration,  Quality,  Severity,  Location,  Timing,  Context,  Modifying  Factors   Of   The   Chief   Complaint   And  Present  Illness       Was   Reviewed   In   Chronological   [facility].                                             PATIENT'S  MAIN  CONCERNS INCLUDE :                     1)             H/O    RECURRENCE     EMBOLIC   STROKES  IN     JAN. 2020                                        AND  HOSPITALIZATION     IN     [place]                                         -  PATIENT  ALSO  RECEIVED  tPA                                                                2)          MRI  BRAIN    ON    1/11/2020      SHOWED                             A)    MULTIPLE     STROKES  IN  LEFT  FRONTAL    AND  PARIETAL  LOBES                          B)        CHRONIC  CEREBRAL  ISCHEMIA                          3)    MRI    BRAIN   ON     1/16/ 2020        SHOWED                                ADDITIONAL      NEW     RIGHT  PCA  STROKE                                     AND  MANAGEMENT   OF   THE  SAME                                               3)      CTA  BRAIN    JAN. 2020     SHOWED                              A)       H/O    LEFT   MCA   OCCULUSION                              B)       RIGHT  PCA  OCCULUSION                               C)   BILATERAL  CAROTID  STENOSIS      50  %                                     -   NEEDS  MONITORING                        4)        CHRONIC   ATRIAL   FIBRILLATION                                   -    PATIENT  TO  FOLLOW  WITH  CARDIOLOGY                      5)        PATIENT       ON     ASA    81  MG       DAILY                                    AND  PRADAXA                                        -     TOLERATING  THE  SAME                        6)     HIGH  RISK  FOR  TIA /   STROKE                      7)      PATIENT  RECOVERED   WELL   FROM    HIS   RECENT RECURRENT  STROKES                       8)     POST  STROKE          SYMPTOMS                          A)    GAIT  DIFFICULTY                         B)  BALANCE  PROBLEMS                                            C)    RESIDUAL   LEFT  VISUAL  FIELD  DEFECTS                        D)    MEMORY  PROBLEMS                                        -     IMPROVED   SIGNIFICANTLY                         9)      HIGH  RISK  FOR       FALLS                           RECOMMEND     USE   QUAD   CANE     AS   NEEDED                    10)     MULTIPLE  CO  MORBID  MEDICAL  CONDITIONS                           -   TO  FOLLOW  WITH  HIS PCP                           11)          FOLLOW  UP     CT   HEAD     ON   2/27/2020    SHOWED  :                            \"  Generalized atrophy                              encephalomalacial changes within the right Temporal occipital region,                              and posterior left frontal lobe                               No acute intraparenchymal hemorrhage  \"                        12)       PATIENT    AND  HIS  WIFE   DENIES  ANY  NEW  NEUROLOGICAL    CONCERNS                              PATIENT      REFUSED     FURTHER      FOLLOW  UP                                       NEURO  DIAGNOSTIC      EVALUATIONS    . 13)     VARIOUS  RISK   FACTORS   WERE  REVIEWED   AND   DISCUSSED. PATIENT   HAS  MULTIPLE   MEDICAL, NEUROLOGICAL     PROBLEMS . PATIENT'S   MANAGEMENT  IS  CHALLENGING.                                PRECIPITATING  FACTORS: including  fever/infection, exertion/relaxation, position change, stress, weather change,                        medications/alcohol, time of day/darkness/light  Are  absent                                                           MODIFYING  FACTORS:  fever/infection, exertion/relaxation, position change, stress, weather change,                        medications/alcohol, time of day/darkness/light  Are  absent             Patient   Indicates   The  Presence   And  The  Absence  Of  The  Following    Associated      And   Additional  Neurological    Symptoms:                                Balance  And coordination   problems  present           Gait problems     present            Headaches      absent              Migraines           absent           Memory problemspresent              Confusion        absent            Paresthesia   numbness          absent           Seizures  And  Starring  Episodes           absent           Syncope,  Near  syncopal episodes         absent           Speech   problems           absent             Swallowing   Problems      absent            Dizziness,  Light headedness           present              Vertigo        absent             Generalized   Weakness    absent              focal  Weakness     absent             Tremors         absent              Sleep  Problems     absent             History  Of   Recent  Head  Injury     absent             History  Of   Recent  TIA     absent             History  Of   Recent    Stroke     present             Neck  Pain   and   Neck muscle  Spasms  absent               Radiating  down   And   Weakness           absent            Lower back   Pain  And     Spasms  absent              Radiating    Down   And   Weakness          absent                H/OFALLS        absent               History  Of   Visual  Symptoms    absent                  Associated   Diplopia       absent                                               Also   Additional   Symptoms   Present    As  Documented    In   The   detailed                  Review  Of  Systems   And    Please   Refer   To    Them for   Additional    Information.                   Any components  That are either  Unobtainable  Or  Limited  In   HPI, ROS  And/or PFSH   Are                   Due   ToPatient's  Medical  Problems,  Clinical  Condition   and/or lack of other    Alternate   resources. RECORDS   REVIEWED:    historical medical records       INFORMATION   REVIEWED:     MEDICAL   HISTORY,SURGICAL   HISTORY,     MEDICATIONS   LIST,   ALLERGIES AND  DRUG  INTOLERANCES,       FAMILY   HISTORY,  SOCIAL  HISTORY,      PROBLEM  LIST   FOR  PATIENT  CARE   COORDINATION      Past Medical History:   Diagnosis Date    Atrial fibrillation ([facility] [address] 01/2020    Blood in urine     Chronic kidney disease     CKD (chronic kidney disease)     History of ischemic stroke in prior three months     Hyperlipidemia     Ischemic stroke ([facility] [address] [facility] 01/10/2020    Kidney stones     Nihss score 10     PTSD (post-traumatic stress disorder)     from war, Agent Orange     Skin tag 12/21/2018    Stroke ([facility] [address] [facility] 01/16/2020    MRI Brain WO: New right PCA distribution infarct, multifocal left MCA distribution infarcts    Stroke ([facility] [address] 01/11/2020    large vessel occlusion/left M2 occlusion. He subsequently underwent emergent thrombectomy.  /  S/P TPA DONE         Past Surgical History:   Procedure Laterality Date    APPENDECTOMY      COLONOSCOPY N/A 4/27/2021    Normal colon.  Dr. Mike Hernandez at [place] [address] Left 10/19/2020    CYSTOSCOPY, LEFT URETEROSCOPY w/ [place] LITHOTRIPSY, LEFT URETERAL STENT PLACEMENT performed by Anna Castle MD at [address] [facility] Left 12/18/2020    cystoscopy, left stent placement, left retrograde pyelogram    CYSTOSCOPY Left 12/18/2020    CYSTOSCOPY, LEFT STENT REMOVAL, LEFT RETROGRADE PYELOGRAM, INSERTION OCCLUSIVE BALLOON - PT TO INTERV RADIOLOGY FOLLOWING performed by Anna Castle MD at [address] [facility] Yolanda Shahid / Oscar Dia Left 2/1/2021    CYSTO Left URETEROSCOPY w/ stent removal ET RETROGRADE performed by Anna Castle MD at [address] [facility] IR NEPHROSTOMY PERCUTANEOUS LEFT  12/18/2020    IR NEPHROSTOMY PERCUTANEOUS LEFT 12/18/2020 Corena Lundborg, MD [facility] SPECIAL PROCEDURES    LITHOTRIPSY      NEPHROLITHOTOMY Left 12/18/2020    cystoscopy, left stent placement, left retrograde pyelogram performed by Dorian Aquino MD at [address] [facility] TRANSESOPHAGEAL ECHOCARDIOGRAM  01/17/2020    TURP N/A 08/10/2020    CYSTO w/ Greenlight Laser performed by Dorian Aquino MD at [facility] N/A 4/27/2021    mild esophagitis. mild-mod. esophagitis. Dr[facility] Bart Olive[facility]         Current Outpatient Medications   Medication Sig Dispense Refill    zinc 50 MG CAPS Take 1 tablet by mouth daily      aspirin 81 MG EC tablet Take 1 tablet by mouth daily 90 tablet 3    dabigatran (PRADAXA) 150 MG capsule Take 1 capsule by mouth 2 times daily 180 capsule 3    Omega-3 Fatty Acids (FISH OIL) 1000 MG CAPS Take 1 capsule by mouth 2 times daily 180 capsule 3    atorvastatin (LIPITOR) 40 MG tablet Take 1 tablet by mouth nightly 90 tablet 3    docusate sodium (COLACE) 100 MG capsule Take 1 capsule by mouth 2 times daily (Patient taking differently: Take 100 mg by mouth 2 times daily as needed ) 30 capsule 1    famotidine (PEPCID) 20 MG tablet Take 1 tablet by mouth 2 times daily 60 tablet 0    latanoprost (XALATAN) 0.005 % ophthalmic solution Place 1 drop into both eyes nightly      Cholecalciferol (VITAMIN D3) 25 MCG (1000 UT) CAPS Take 2,000 Units by mouth daily      VITAMIN B1-B12 IM Inject into the muscle every 30 days       ferrous sulfate (IRON 325) 325 (65 Fe) MG tablet Take 1 tablet by mouth daily (Patient not taking: Reported on 10/25/2021)      EPINEPHrine (EPIPEN 2-FARAZ) 0.3 MG/0.3ML SOAJ injection Inject 0.3 mLs into the muscle once for 1 dose Use as directed for allergic reaction (Patient not taking: Reported on 8/17/2020) 0.3 mL 0     No current facility-administered medications for this visit.          No Known Allergies      Family History   Problem Relation Age of Onset    Heart Disease Mother     Heart Disease Father     Stroke Father     Stroke Brother          Social History     Socioeconomic History    Marital status:      Spouse name: Not on file    Number of children: Not on file    Years of education: Not on file    Highest education level: Not on file   Occupational History    Not on file   Tobacco Use    Smoking status: Never Smoker    Smokeless tobacco: Never Used   Vaping Use    Vaping Use: Never used   Substance and Sexual Activity    Alcohol use: Yes     Comment: rarely    Drug use: No    Sexual activity: Yes   Other Topics Concern    Not on file   Social History Narrative    Not on file     Social Determinants of Health     Financial Resource Strain: Low Risk     Difficulty of Paying Living Expenses: Not hard at all   Food Insecurity: No Food Insecurity    Worried About [address] [employer] in the Last Year: Never true    [facility] [place] in the Last Year: Never true   Transportation Needs:     Lack of Transportation (Medical): Not on file    Lack of Transportation (Non-Medical):  Not on file   Physical Activity:     Days of Exercise per Week: Not on file    Minutes of Exercise per Session: Not on file   Stress:     Feeling of Stress : Not on file   Social Connections:     Frequency of Communication with Friends and Family: Not on file    Frequency of Social Gatherings with Friends and Family: Not on file    Attends [employer] Services: Not on file    Active Member of [address] [place] [address] or Organizations: Not on file    Attends Club or Organization Meetings: Not on file    Marital Status: Not on file   Intimate Partner Violence:     Fear of Current or Ex-Partner: Not on file    Emotionally Abused: Not on file    Physically Abused: Not on file    Sexually Abused: Not on file   Housing Stability:     Unable to Pay for Housing in the Last Year: Not on file    Number of [place] in the Last Year: Not on file    Unstable Housing in the Last Year: Not on file       Vitals:    01/20/22 0941   BP: 126/74   Pulse: 98   Resp: 16   SpO2: 98%         Wt Readings from Last 3 Encounters:   01/20/22 194 lb (88 kg)   11/08/21 187 lb (84.8 kg)   10/25/21 187 lb 6.4 oz (85 kg)         BP Readings from Last 3 Encounters:   01/20/22 126/74   11/08/21 120/60   10/25/21 115/68       Hematology and Coagulation  Lab Results   Component Value Date    WBC 7.1 11/04/2021    RBC 5.38 11/04/2021    HGB 16.2 11/04/2021    HCT 48.0 11/04/2021    MCV 89.2 11/04/2021    MCH 30.1 11/04/2021    MCHC 33.8 11/04/2021    RDW 12.8 11/04/2021     11/04/2021    MPV 9.1 11/04/2021         Chemistries  Lab Results   Component Value Date     11/04/2021    K 4.4 11/04/2021     11/04/2021    CO2 27 11/04/2021    BUN 16 11/04/2021    CREATININE 0.86 11/04/2021    CALCIUM 9.6 11/04/2021    PROT 7.7 11/04/2021    LABALBU 4.3 11/04/2021    BILITOT 0.71 11/04/2021    ALKPHOS 100 11/04/2021    AST 23 11/04/2021    ALT 19 11/04/2021     Lab Results   Component Value Date    ALKPHOS 100 11/04/2021    ALT 19 11/04/2021    AST 23 11/04/2021    PROT 7.7 11/04/2021    BILITOT 0.71 11/04/2021    BILIDIR 0.40 08/03/2020    LABALBU 4.3 11/04/2021     Lab Results   Component Value Date    BUN 16 11/04/2021    CREATININE 0.86 11/04/2021     Lab Results   Component Value Date    CALCIUM 9.6 11/04/2021    MG 2.1 12/19/2020     Lab Results   Component Value Date    AST 23 11/04/2021    ALT 19 11/04/2021       Lab Results   Component Value Date    URICACID 7.7 05/22/2016         Review of Systems   Constitutional: Negative for appetite change, chills, diaphoresis, fatigue, fever and unexpected weight change. HENT: Negative for congestion, dental problem, drooling, ear discharge, ear pain, facial swelling, hearing loss, mouth sores, nosebleeds, postnasal drip, sinus pressure, sore throat, tinnitus, trouble swallowing and voice change. Eyes: Negative for photophobia, pain, discharge, redness, itching and visual disturbance. Respiratory: Negative for apnea, cough, choking, chest tightness, shortness of breath and wheezing. Cardiovascular: Negative for chest pain, palpitations, leg swelling and near-syncope. Gastrointestinal: Negative for abdominal distention, abdominal pain, anorexia, blood in stool, bowel incontinence, change in bowel habit, constipation, diarrhea, nausea and vomiting. Endocrine: Negative for cold intolerance, heat intolerance, polydipsia, polyphagia and polyuria. Genitourinary: Negative for bladder incontinence. Musculoskeletal: Positive for gait problem. Negative for arthralgias, back pain, joint swelling, myalgias, neck pain and neck stiffness. Skin: Negative for color change, pallor, rash and wound. Allergic/Immunologic: Negative for environmental allergies, food allergies and immunocompromised state. Neurological: Positive for dizziness, light-headedness and loss of balance. Negative for vertigo, tremors, focal weakness, seizures, syncope, facial asymmetry, speech difficulty, weakness, numbness and headaches. Hematological: Negative for adenopathy. Does not bruise/bleed easily. Psychiatric/Behavioral: Positive for decreased concentration and memory loss. Negative for agitation, behavioral problems, confusion, dysphoric mood, hallucinations, self-injury, sleep disturbance and suicidal ideas. The patient is not nervous/anxious and is not hyperactive. OBJECTIVE:    Physical Exam  Constitutional:       Appearance: He is well-developed. HENT:      Head: Normocephalic and atraumatic. No raccoon eyes or Monte's sign. Right Ear: External ear normal.      Left Ear: External ear normal.      Nose: Nose normal.   Eyes:      Conjunctiva/sclera: Conjunctivae normal.      Pupils: Pupils are equal, round, and reactive to light. Neck:      Thyroid: No thyroid mass or thyromegaly. Vascular: No carotid bruit. Trachea: No tracheal deviation. Meningeal: Brudzinski's sign and Kernig's sign absent. Cardiovascular:      Rate and Rhythm: Normal rate and regular rhythm. Pulmonary:      Effort: Pulmonary effort is normal.   Musculoskeletal:         General: No tenderness. Cervical back: Normal range of motion and neck supple. No rigidity. No muscular tenderness. Normal range of motion. Skin:     General: Skin is warm. Coloration: Skin is not pale. Findings: No erythema or rash. Nails: There is no clubbing. Psychiatric:         Attention and Perception: He is attentive. Mood and Affect: Mood is not anxious or depressed. Affect is not labile, blunt or inappropriate. Speech: He is communicative. Speech is not rapid and pressured, delayed, slurred or tangential.         Behavior: Behavior is not agitated, slowed, aggressive, withdrawn, hyperactive or combative. Behavior is cooperative. Thought Content: Thought content is not paranoid or delusional. Thought content does not include homicidal or suicidal ideation. Thought content does not include homicidal or suicidal plan. Cognition and Memory: Cognition is impaired. Memory is impaired. He exhibits impaired recent memory. He does not exhibit impaired remote memory. Judgment: Judgment is not impulsive or inappropriate. NEUROLOGICALEXAMINATION :     A) MENTAL STATUS:                   Alert and  oriented  To place  And  Person. No Aphasia. No  Dysarthria. Able   To  Follow       SIMPLE   commands                      No right  To left confusion. Normal  Speech  And language function. Insight and  Judgment ,Fund  Of  Knowledge   within normal limits                Recent  And  Remote memory    DECREASED                 Attention &  Concentration are    DECREASED                          B) CRANIAL NERVES :      CN : Visual  Acuity  And  Visual fields      LEFT   VISUAL  FIELD  DEFECT               Fundi  Could  Not  Be  Could  Not  Be  Evaluated.            3,4,6 CN : Both  Pupils are   Reactive and  Equal.  Movements  Are  Intact. No  Nystagmus. No  HARSH. No  Afferent  Pupillary  Defect noted. 5 CN :  Normal  Facial sensations and Corneal  Reflexes           7 CN:  Normal  Facial  Symmetry  And  Strength. No facial  Weakness. 8 CN :  Hearing  Appears   DECREASED           9, 10 CN: Normal   spontaneous, reflex   palate   movements         11 CN:   Normal  Shoulder  shrug and  strength         12 CN :   Normal  Tongue movements and  Tongue  In midline                        No tongue   Fasciculations or atrophy       C) MOTOR  EXAM:                 Strength  In upper  And  Lower   extremities   within   normal limits                           Rapid   alternating  And  repetitions  Movements  DECREASED                Muscle  Tone  In upper  And  Lower  Extremities  Normal                  No rigidity. No  Spasticity. Bradykinesia   absent               No  Asterixis. Sustention  Tremor , Resting   Tremor   absent                    No   other  Abnormal  Movements noted           D) SENSORY :               Light   touch, pinprick,   position  And  Vibration DECREASED       E) REFLEXES:                   Deep  Tendon  Reflexes   DECREASED                   No  pathological  Reflexes  Bilaterally.                                   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    Combined forms of age-related cataract of both eyes    [facility] [address]    Tissue plasminogen activator (tPA) administered at other facility within 24 hours before current admission    Permanent atrial fibrillation (HCC)    Acute ischemic stroke ([address]    Left homonymous hemianopsia    Hyperlipidemia    Stroke, recent, without late effect    Bilateral carotid artery stenosis    Chronic cerebral ischemia    Memory problem    Gait difficulty    Balance problem    At high risk for stroke    Risk for falls    Cerebrovascular accident (CVA) due to embolism of right posterior cerebral artery (HCC)    Bilateral hemianopia    Dysphagia    Hematuria    PTSD (post-traumatic stress disorder)    Acute urinary retention    Horseshoe kidney    Kidney stone on left side    History of ischemic posterior cerebral artery stroke    Cerebrovascular accident (CVA) due to embolism of left middle cerebral artery ([address]    H/O ischemic left MCA stroke    Ischemic stroke ([address]    Atrial fibrillation ([address]           CT OF THE HEAD WITHOUT CONTRAST  2/27/2020 12:49 am       TECHNIQUE:   CT of the head was performed without the administration of intravenous   contrast. Dose modulation, iterative reconstruction, and/or weight based   adjustment of the mA/kV was utilized to reduce the radiation dose to as low   as reasonably achievable.       COMPARISON:   MR brain dated January 16 2020       HISTORY:   ORDERING SYSTEM PROVIDED HISTORY: dysphagia, recent stroke   TECHNOLOGIST PROVIDED HISTORY:       dysphagia, recent stroke   Reason for Exam: Trouble swallowing after eating dinner, hx of stroke   Acuity: Acute   Type of Exam: Initial       FINDINGS:   BRAIN/VENTRICLES: There is no acute intracranial hemorrhage, mass effect or   midline shift.  No abnormal extra-axial fluid collection.  The gray-white   differentiation is maintained without evidence of an acute infarct.  There is   no evidence of hydrocephalus.  Encephalomalacial changes are seen involving   the posterior left frontal lobe, and right temporal occipital lobe, related   to the recent infarcts.  Lacunar infarcts are seen within the right   periventricular white matter region, related to the recent infarct.  No new   areas of acute infarct are demonstrated.  Generalized atrophy and senescent   white matter changes are present.       ORBITS: The visualized portion of the orbits demonstrate no acute abnormality.       SINUSES: The visualized paranasal sinuses and mastoid air cells demonstrate   no acute abnormality.       SOFT TISSUES/SKULL:  No acute abnormality of the visualized skull or soft   tissues.           Impression   1. Generalized atrophy, and encephalomalacial changes within the right   temporal occipital region, and posterior left frontal lobe, related to the   recent infarcts.  No acute intraparenchymal hemorrhage, intra-axial mass, or   acute territorial infarct is present.                 [facility]    Vascular Carotid Procedure        Patient Name [address] [place] [address],[address] [facility]       Date of Study           02/27/2020                [facility]        Date of      1950  Gender                  Male    Birth        Age          69 year(s)  Race                            Room Number  4414        Corporate ID [id number]    #        Patient Acct [de-identified]    #        MR #         [id number]     Sonographer             Geoffrey Becerril [facility]        Accession #  [id number]   Interpreting Physician Loly Wong    Referring                Referring Physician     JEFFREY Randall, CNP    Nurse    Practitioner       Procedure   Type of Study:        Cerebral: Carotid, Carotid Scan Bilateral.       Indications for Study:Dysphagia and CVA.       Blood Pressure:Right arm 141/91 mmHg. Patient Status: In Patient. Technical Quality:Limited visualization. Limitation reason:Difficult visualization, left arm BP could not be taken   due to I.V. placement.  .       Comments:Basic Classification of ICA Stenosis:   PSV - Peak Systolic Velocity   Normal: No plaque or calcification identified, no elevation of PSV   Mild: <50% spectral broadening without increased PSV   Moderate: 50 - 69% PSV >125 - <230 cm/sec   Severe: 70 - 99% PSV >230 cm/sec   Critical: 80 - 99% PSV >230cm/sec and/or End Diastolic Velocities   >892GC/MANNY.        Conclusions        Summary        Simultaneous real time imaging utilizing B-Mode, color flow doppler and    spectral waveform analysis was performed on the bilateral extracerebral    vascular system.    The study demonstrates:        Right:    Internal carotid artery has a mild, <50% stenosis based on velocities.    The vertebral artery is patent with antegrade flow.        Left:    Internal carotid artery has a mild, <50% stenosis based on velocities.    The vertebral artery is patent with antegrade flow.        Signature        ----------------------------------------------------------------    Electronically signed by Arlin Figueroa RVT(Sonographer)    on 02/27/2020 12:00 PM    ----------------------------------------------------------------        ----------------------------------------------------------------    Electronically signed by Riccardo Carr(Interpreting    physician) on 02/27/2020 04:10 PM    ----------------------------------------------------------------       Findings:        Right Impression:                     Left Impression:    The common carotid artery is normal.  The common carotid artery is normal.        The carotid bulb has a smooth         The carotid bulb is normal.    fibrocalcific plaque causing a <50%    stenosis.                              The internal carotid artery has a                                          smooth calcific plaque causing a    The internal carotid artery has a     <50% stenosis based on velocities.    smooth fibrocalcific plaque causing a    <50% stenosis based on velocities.    The external carotid artery is                                          normal.    The external carotid artery is    normal.                               The vertebral artery is patent with                                          antegrade flow.    The vertebral artery is patent with    antegrade flow.             MRI OF THE BRAIN WITHOUT CONTRAST  1/16/2020 7:13 pm       TECHNIQUE:   Multiplanar multisequence MRI of the brain was performed without the   administration of intravenous contrast.       COMPARISON:   CT head neck 01/16/2020, MRI brain performed 01/11/2020       HISTORY:   ORDERING SYSTEM PROVIDED HISTORY: Dysarthria, CTA with R P2 occlusion   TECHNOLOGIST PROVIDED HISTORY:   Dysarthria, CTA with R P2 occlusion       FINDINGS:   INTRACRANIAL STRUCTURES/VENTRICLES: Redemonstration of the evolving acute   strokes identified involving left MCA distribution involving the left   posterior insular region.  Multifocal areas of left MCA infarct identified   left frontal lobe cortex left parietal cortex and scattered foci involving   subcortical white matter left parietal lobe which have mildly progressed   since the previous examination with a few new foci of infarct.  Focus of   restricted diffusion identified involving right posterior frontal centrum   semiovale minimally progressed when compared to previous examination.  New   predominant cortical based restricted diffusion identified involving the   right PCA distribution.  Many of these infarcts demonstrate mild T2   prolongation.       No hemorrhagic conversion identified.  Mild generalized involutional changes   with prominence of ventricles and sulci.  Mild periventricular subcortical T2   prolongation suggestive chronic small vessel ischemic disease.       No shift of midline structures.  Basal cisterns are patent.       ORBITS: The visualized portion of the orbits demonstrate no acute abnormality.       SINUSES: Mild ethmoid sinus mucosal thickening.       BONES/SOFT TISSUES: The bone marrow signal intensity appears normal. The soft   tissues demonstrate no acute abnormality.           Impression   New right PCA distribution infarct.  No hemorrhagic conversion.       Multifocal areas of evolving left MCA distribution infarct, there are few   scattered new foci of restricted diffusion identified suggestion of mild   progression of the left MCA infarct.       Mild chronic small ischemic disease and age related involutional change.             CT OF THE HEAD WITHOUT CONTRAST  1/16/2020 11:55 am       TECHNIQUE:   CT of the head was performed without the administration of intravenous   contrast. Dose modulation, iterative reconstruction, and/or weight based   adjustment of the mA/kV was utilized to reduce the radiation dose to as low   as reasonably achievable.       COMPARISON:   Head CT 01/12/2020, MRI brain 01/11/2020, head CT 01/10/2020       HISTORY:   ORDERING SYSTEM PROVIDED HISTORY: vision loss   TECHNOLOGIST PROVIDED HISTORY:   vision loss       Reason for Exam: 1/10/2020 patient came to ER with stroke like symptoms, he   was sent to [facility] for thrombectomy today Patient has vision blurring and   dizziness       FINDINGS:   BRAIN/VENTRICLES: Continued evolution of subacute infarction in the left   frontal lobe and left insular cortex with loss of gray-white matter   differentiation.  There is small area of low attenuation within left parietal   lobe subcortical white matter.  No associated hemorrhage.       Diffuse parenchymal volume loss with enlargement of the ventricles and   cerebral sulci.  No abnormal extra-axial fluid collection.  No hydrocephalus. Mild periventricular white matter low attenuation compatible with chronic   microvascular ischemic changes.       ORBITS: The visualized portion of the orbits demonstrate no acute abnormality.       SINUSES: The visualized paranasal sinuses and mastoid air cells demonstrate   no acute abnormality.       SOFT TISSUES/SKULL: No acute abnormality of the visualized skull or soft   tissues.           Impression   1.  Continued evolution of subacute infarctions in the left MCA territory   involving the left lateral frontal lobe and insular cortex as well as left   parietal lobe subcortical white matter.  No hemorrhagic transformation. 2. Diffuse parenchymal volume loss.  Mild chronic microvascular ischemic   changes. Findings were discussed with Brandt Mclain at 12:16 pm on 1/16/2020.               CTA OF THE HEAD WITH CONTRAST WITH PERFUSION 1/11/2020 2:14 am       TECHNIQUE:   CTA of the head/brain was performed with the administration of intravenous   contrast. Multiplanar reformatted images are provided for review.  MIP images   are provided for review. Dose modulation, iterative reconstruction, and/or   weight based adjustment of the mA/kV was utilized to reduce the radiation   dose to as low as reasonably achievable.       COMPARISON:   CT and CTA head and neck 1/11/2020       HISTORY:   ORDERING SYSTEM PROVIDED HISTORY: stroke   TECHNOLOGIST PROVIDED HISTORY:   stroke   Reason for Exam: post TPA   Acuity: Unknown   Type of Exam: Unknown       FINDINGS:   Mildly elevated mean transit time involving the left frontal, parietal, and   temporal lobes, in the left MCA vascular territory distribution.  Relative   cerebral blood flow and relative cerebral blood volume are maintained.  No   perfusion mismatch.           Impression   No perfusion mismatch.       Findings were discussed with Dr. Moses Mosquera At 2:50 am on 1/11/2020.               CTA OF THE HEAD AND NECK WITH CONTRAST 1/11/2020 12:36 am:       TECHNIQUE:   CTA of the head and neck was performed with the administration of intravenous   contrast. Multiplanar reformatted images are provided for review.  MIP images   are provided for review. Stenosis of the internal carotid arteries measured   using NASCET criteria.  Dose modulation, iterative reconstruction, and/or   weight based adjustment of the mA/kV was utilized to reduce the radiation   dose to as low as reasonably achievable.       COMPARISON:   Noncontrast CT head 1/10/2020       HISTORY:   ORDERING SYSTEM PROVIDED HISTORY: stroke   TECHNOLOGIST PROVIDED HISTORY:   stroke   Reason for Exam: Stroke, TPA in process   Acuity: Acute   Type of Exam: Subsequent/Follow-up       FINDINGS:       CTA NECK:       AORTIC ARCH/ARCH VESSELS: Normal, three-vessel aortic arch anatomy.    Calcified and noncalcified atherosclerotic plaque of the aortic arch and   proximal arch vessels.  No dissection or arterial injury.  No significant   stenosis of the brachiocephalic or subclavian arteries.       CAROTID ARTERIES: Scattered, noncalcified atherosclerotic plaque of the mid   to distal common carotid arteries without focal stenosis.  Calcified and   noncalcified atherosclerotic plaque of the right carotid bifurcation and   proximal right internal carotid artery.  Mild, less than 50%, stenosis of the   right internal carotid artery by NASCET criteria.  Calcified and noncalcified   atherosclerotic plaque of the left carotid bifurcation and proximal left   internal carotid artery.  Mild, less than 50%, stenosis of the left internal   carotid artery by NASCET criteria.  No arterial injury or dissection.       VERTEBRAL ARTERIES: Vertebral arteries arise from their respective subclavian   arteries.  Left vertebral artery is dominant with a developmentally   hypoplastic right vertebral artery.  Mild narrowing of the left vertebral   artery origin.       SOFT TISSUES: Lung apices are clear.  No cervical or superior mediastinal   lymphadenopathy.  Larynx and pharynx are unremarkable.  No acute abnormality   of the salivary and thyroid glands.       BONES: No acute osseous abnormality.           CTA HEAD:       ANTERIOR CIRCULATION: Atherosclerotic calcification of the cavernous internal   carotid arteries without focal stenosis.  Occlusion of a left middle cerebral   artery proximal to mid M2 branch, at the level of the sylvian fissure.  No   significant stenosis of the intracranial internal carotid, anterior cerebral,   or right middle cerebral arteries. No aneurysm.       POSTERIOR CIRCULATION: Intracranial dominance of the left vertebral artery. No focal stenosis of the intracranial vertebral arteries or basilar artery. No flow-limiting stenosis of the posterior cerebral arteries.  Left P1   segment is hypoplastic with fetal type origin of the left posterior cerebral   artery.  No aneurysm.       OTHER: No dural venous sinus thrombosis on this non-dedicated study.       BRAIN: No mass effect or midline shift. No extra-axial fluid collection. Gray-white differentiation is maintained.           Impression   Mild, less than 50%, stenosis of the internal carotid arteries by NASCET   criteria.       Occlusion of a left middle cerebral artery proximal to mid M2 branch, at the   level of the sylvian fissure.              CTA OF THE HEAD AND NECK WITH CONTRAST 1/16/2020 1:42 pm:       TECHNIQUE:   CTA of the head and neck was performed with the administration of intravenous   contrast. Multiplanar reformatted images are provided for review.  MIP images   are provided for review. Stenosis of the internal carotid arteries measured   using NASCET criteria.  Dose modulation, iterative reconstruction, and/or   weight based adjustment of the mA/kV was utilized to reduce the radiation   dose to as low as reasonably achievable.       COMPARISON:   Head CT 01/16/2020, 01/12/2020.  CTA head 01/11/2020.       HISTORY:   ORDERING SYSTEM PROVIDED HISTORY: stroke   TECHNOLOGIST PROVIDED HISTORY:   stroke   Reason for Exam: vision disturbance today, recent 1/10/2020 had stoke like   symptoms was transported to [facility] to do a thrombectomy       FINDINGS:       CTA NECK:       AORTIC ARCH/ARCH VESSELS: No dissection or arterial injury.  No significant   stenosis of the brachiocephalic or subclavian arteries.       CAROTID ARTERIES: Mild noncalcified atherosclerotic plaque in the left common   carotid artery without significant stenosis.  The right common carotid artery   is without significant stenosis.  Calcified atherosclerotic plaque in the   carotid bulbs eccentrically resulting in less than 50% stenosis by NASCET   criteria.  No dissection.       VERTEBRAL ARTERIES: No dissection, arterial injury, or significant stenosis. Mild stenosis at the origin of the left vertebral artery.  The left vertebral   artery is dominant.  Hypoplastic right vertebral artery.       SOFT TISSUES: The lung apices are clear.  No cervical or superior mediastinal   lymphadenopathy.  The larynx and pharynx are unremarkable.  No acute   abnormality of the salivary and thyroid glands.       BONES: No acute osseous abnormality.           CTA HEAD:       ANTERIOR CIRCULATION: No significant stenosis of the intracranial internal   carotid, anterior cerebral, or middle cerebral arteries.  Previously   demonstrated left M2 branch occlusion is patent status post previous   thrombectomy.  No aneurysm.       POSTERIOR CIRCULATION: No significant stenosis of the vertebral, basilar, or   left posterior cerebral arteries.  New occlusion of distal right P2 branch   (series 2, images 212-213).  No aneurysm.  Left posterior communicating   artery is present.       OTHER: No dural venous sinus thrombosis on this non-dedicated study.       BRAIN: No mass effect or midline shift. No extra-axial fluid collection. The   gray-white differentiation is maintained.           Impression   1. New occlusion of distal P2 branch of the right posterior cerebral artery. 2. Previously demonstrated left M2 branch occlusion is now patent status post   recent thrombectomy. 3. No hemodynamically significant stenosis or occlusion in the cervical   arterial circulation.  Mild less than 50% stenosis of the internal carotid   arteries by NASCET criteria bilaterally.    Critical results were called by Dr. Chance Sweet MD to Evan Stark on 1/16/2020 at 14:35.             MRI OF THE BRAIN WITHOUT CONTRAST  1/11/2020 4:47 pm       TECHNIQUE:   Multiplanar multisequence MRI of the brain was performed without the   administration of intravenous contrast.       COMPARISON:   CT angiogram head and neck done earlier same day.  No prior brain MRI   available for comparison.       HISTORY:   ORDERING SYSTEM PROVIDED HISTORY: stroke   TECHNOLOGIST PROVIDED HISTORY:   stroke       FINDINGS:   INTRACRANIAL STRUCTURES/VENTRICLES: Multiple small areas of acute stroke in   the posterior left middle cerebral artery territory involving the left   frontal and parietal lobes, most pronounced in the frontal insular region. There is an additional punctate focus of acute stroke in the right centrum   semiovale.  Small focus of gradient blooming in the left insula could   represent trace petechial hemorrhage. Generalized cerebral and cerebellar   volume loss.  The axial FLAIR images demonstrate bilateral periventricular   and deep white matter signal hyperintensities, commonly seen in the setting   of chronic small vessel ischemic disease.  Punctate focus of gradient   blooming in the left parietal deep white matter demonstrates no correlating   diffusion restriction and may represent a small cavernoma or amyloid   angiopathy.       No mass effect or midline shift. No evidence of a large acute intracranial   hemorrhage.  The ventricles and sulci are normal in size and configuration.    The sellar/suprasellar regions appear unremarkable.  The normal signal voids   within the major intracranial vessels appear maintained.       ORBITS: The visualized portion of the orbits demonstrate no acute abnormality.       SINUSES: The paranasal sinuses are clear.  Small amount of fluid in the   bilateral mastoid air cells.       BONES/SOFT TISSUES: The bone marrow signal intensity appears normal. The soft   tissues demonstrate no acute abnormality.           Impression   1.  Multiple small areas of acute stroke in the posterior left middle   cerebral artery territory involving the left frontal and parietal lobes, most   pronounced in the frontal insular region.  Additional punctate focus of acute   stroke in the right centrum semiovale.       2.  Small focus of gradient blooming in the left insula could represent trace   petechial hemorrhage.  No large acute intracranial hemorrhage.  No mass   effect or midline shift.       3.  Mild chronic small vessel ischemic disease.                 VISITING DIAGNOSIS:      ICD-10-CM    1. Cerebrovascular accident (CVA) due to embolism of left middle cerebral artery (HCC)  I63.412    2. H/O ischemic left MCA stroke  Z86.73    3. Ischemic stroke ([facility] [address]  I63.9    4. Atrial fibrillation, unspecified type ([facility] [address]  I48.91    5. PTSD (post-traumatic stress disorder)  F43.10    6. Gait difficulty  R26.9    7. Left homonymous hemianopsia  H53.462    8. At high risk for stroke  Z91.89    9. Balance problem  R26.89    10. Bilateral carotid artery stenosis  I65.23    11. Dysphagia, unspecified type  R13.10    12. Risk for falls  Z91.81    13. Memory problem  R41.3    14. Horseshoe kidney  Q63.1    15. Bilateral hemianopia  H53.47    16. History of ischemic posterior cerebral artery stroke  Z86.73    17. Chronic cerebral ischemia  I67.82                   CONCERNS   &   INCREASED   RISK   FOR         * TIA,  CEREBRO  VASCULAR  ISCHEMIA,STROKE     *   DIZZINESS,   VERTEBROBASILAR  INSUFFICIENCY ,         *   COGNITIVE  &   MEMORY PROBLEMS  AND  DEMENTIAS        *  GAIT  DIFFICULTIES  &   BALANCE PROBLMES   AND  FALL                VARIOUS  RISK   FACTORS   WERE  REVIEWED   AND   DISCUSSED. *  PATIENT   HAS  MULTIPLE   MEDICAL, NEUROLOGICAL                 PROBLEMS . PATIENT'S   MANAGEMENT  IS  CHALLENGING. PLAN:                         Radha Mckeon  Of  The  Diagnoses,  The  Management & Treatment  Options            AND    Care  plan  Were          Reviewed and   Discussed   With  patient. * Goals  And  Expectations  Of  The  Therapy  Discussed   And  Reviewed. *   Benefits   And   Side  Effect  Profile  Of  Medication/s   Were   Discussed                * Need   For  Further   Follow up For  The  Various  Problems Were  discussed. * Results  Of  The  Previous  Diagnostic tests were reviewed and  discussed                 Medical  Decision  Making  Was  Made  Based on the   Complexity  Of  Above  Mentioned  Diagnoses,    Data reviewed             And    Risk  Of  Significant   Co morbidities and   complicating   Factors. Medical  Decision  Was   High     Complexity   Due   To  The  Patient's  Multiple  Symptoms,  Advancing   Disease,      Complex  Treatment  Regimen,  Multiple medications           and   Risk  Of   Side  Effects,  Difficulty  In  Medication  Management  And  Diagnostic  Challenges       In  View  Of  The  Associated   Co  Morbid  Conditions   And  Problems. * FALL   PRECAUTIONS. THESE  REVIEWED   AND  DISCUSSED      *  USE   WALKING  ASSISTANCE  DEVICES           *    SUPERVISED   CARE      *   BE  CAREFUL  WITH  ACTIVITIES               *   ADEQUATE   FLUIDINTAKE   AND  ELECTROLYTE  BALANCE             * KEEP  DAIRY  OF   THE  NEUROLOGICAL  SYMPTOMS        RECORDING THE    DURATION  AND  FREQUENCY. *  AVOID    CONDITIONS  AND  FACTORS   THAT  MAKE                  NEUROLOGICAL  SYMPTOMS  WORSE.                *TO  MAINTAIN  REGULAR  SLEEP  WAKE  CYCLES. *   TO  HAVE  ADEQUATE  REST  AND   SLEEP    HOURS.              *    AVOID  ANY USAGE OF    TOBACCO,              EXCESSIVE  ALCOHOL  AND   ILLEGAL   SUBSTANCES          *  CONTINUE   MEDICATIONS    PRESCRIBED      AS    RECOMMENDED         *   Compliance   With  Medications   And  Instructions            * CURRENTLY    TOLERATING  THE  PRESCRIBED   MEDICATIONS. WITHOUT  ANY  SIGNIFICANT  SIDE  EFFECTS   &  GETTING BENEFIT.             * Antiplatelet  therapy    As   Recommended  Was   Discussed        *    Prophylactic  Use   Of     Vitamin   B   Complex,  Folic  Acid,    Vitamin  B12    Multivitamin,       Calcium  With  magnesium  And  Vit D    Supplementations   Over  The  Counter  Discussed                   *  PATIENT  IS  ALSO   COUNSELED   TO  KEEP    ACTIVITIES:         A)   SIMPLE      B)  ORGANIZED      C)  WRITEDOWN                     *  EVALUATIONS  AND  FOLLOW UP:                * PHYSICAL  THERAPY                                 *CARDIOLOGY              *   OPTHALMOLOGY                                    *    POST  STROKE          SYMPTOMS                          A)    GAIT  DIFFICULTY                         B)  BALANCE  PROBLEMS                                            C)    RESIDUAL   LEFT  VISUAL  FIELD  DEFECTS                        D)    MEMORY  PROBLEMS                                        -     IMPROVED   SIGNIFICANTLY                        *         FOLLOW  UP     CT   HEAD     ON   2/27/2020    SHOWED  :                 \"  Generalized atrophy                   encephalomalacial changes within the right Temporal occipital region,                   and posterior left frontal lobe                   No acute intraparenchymal hemorrhage  \"                                   *            PATIENT       ON     ASA    81  MG       DAILY                                                  AND  PRADAXA                                        -     TOLERATING  THE  SAME                         *   PATIENT    AND  HIS  WIFE   DENIES  ANY  NEW  NEUROLOGICAL    CONCERNS. *PATIENT   TO  FOLLOW  UP  WITH   PRIMARY  CARE         OTHER  CONSULTANTS  AS  BEFORE. *  Maintain   Healthy  Life Style    With   Periodic  Monitoring  Of      Any  Medical  Conditions  Including   Elevated  Blood  Pressure,  Lipid  Profile,     Blood  Sugar levels  AndHeart  Disease.                 *   Period Screening  For  Cancers  Involving  Breast,  Colon,    lungs  And  Other  Organs  As  Applicable,  In consultation   With  Your  Primary Care Providers. *Second  Neurological  Opinion  And  Evaluations  In  [facility]  Setting  If  Patient  Is  Interested. * Please   Contact   Neurology  Clinic   Early   If   Are  Any  New  Neurological   And  Any neurological  Concerns. *  If  The  Patient remains  Neurologically  Stable   Return   To  [facility] Neurology Department   IN     3 - 6         MONTHS  TIME   FOR  FURTHER              FOLLOW UP.                       *   The  Neurological   Findings,  Possible  Diagnosis,  Differential diagnoses                    And  Options  For    Further   Investigations                   And  management   Are  Discussed  Comprehensively. Medications   And  Prescription   Risks  And  Side effects  Are   Also  Discussed. *  If   There is  Any  Significant  Worsening   Of  Current  Symptoms  And  Or                  If patient  Develops   Any additional  New  NeurologicalSymptoms                  Or  Significant  Concerns   Should  Call  911 or  Go  To  Emergency  Department                    For  Further  Immediate  Evaluation. The   Above  Were  Reviewed  With  patient   And   HIS  WIFE                          questions  Answered  In  Detail. More   Than  50% of face  To face Time   Was  Spent  On  Counseling                    And   Coordination  Of  Care   Of   Patient's  multiple   Neurological  Problems                         And   Comorbid  Medical   Conditions.             Electronically signed by Valerie Hylton MD., [facility]      Board Certified in  Neurology &  In  [employer] [employer] and [employer] (ABPN)      DISCLAIMER:   Although every effort was made to ensure the accuracy of this  electronictranscription, some errors in transcription may have occurred. GENERAL PATIENT INSTRUCTIONS:      A Healthy Lifestyle: Care Instructions   Your Care Instructions   A healthy lifestyle can help you feel good, stay at ahealthy weight, and have plenty of energy for both work and play. A healthy lifestyle is something you can share with your whole family.  A healthy lifestyle also can lower your risk for serious health problems, such ashigh blood pressure, heart disease, and diabetes.  You can follow a few steps listed below to improve your health and the health of your family.  Follow-up careis a key part of your treatment and safety. Be sure to make and go to all appointments, and call your doctor if you are having problems. Its also a good idea to know your test results and keep a list of the medicines you take.  How can you care for yourself at home?  Do not eat too much sugar, fat, or fast foods. You can still have dessert and treats nowand then. The goal is moderation.  Start small to improve your eating habits. Pay attention to portion sizes, drink less juice and soda pop, and eat more fruits and vegetables.  Eat a healthy amount of food. A 3-ounce serving of meat, for example, is about the size of a deck of cards. Fill the rest of your plate with vegetables and whole grains.  Limit theamount of soda and sports drinks you have every day. Drink more water when you are thirsty.  Eat at least 5 servings of fruits and vegetables every day. It may seem like a lot, but it is not hard to reach this goal. Aserving or helping is 1 piece of fruit, 1 cup of vegetables, or 2 cups of leafy, raw vegetables. Have an apple or some carrot sticks as an afternoon snack instead of a candy bar. Try to have fruits and/or vegetables at everymeal.   Make exercise part of your daily routine. You may want to start with simple activities, such as walking, bicycling, or slow swimming.  Try marietta active 30 to 60 minutes every day. You do not need to do all 30 to 60 minutes all at once. For example, you can exercise 3 times a day for 10 or 20 minutes. Moderate exercise is safe for most people, but it is always agood idea to talk to your doctor before starting an exercise program.   Keep moving. Cortney Blocker the lawn, work in the garden, or [employer]. Take the stairs instead of the elevator at work.  If you smoke, quit. Peoplewho smoke have an increased risk for heart attack, stroke, cancer, and other lung illnesses. Quitting is hard, but there are ways to boost your chance of quitting tobacco for good.  Use nicotine gum, patches, or lozenges.  Ask your doctor about stop-smoking programs and medicines.  Keep trying.  In addition to reducing your risk of diseases in the future, you will notice some benefits soon after you stop using tobacco. If you have shortness of breath or asthma symptoms, they will likely getbetter within a few weeks after you quit.  Limit how much alcohol you drink. Moderate amounts of alcohol (up to 2 drinks a day for men, 1drink a day for women) are okay. But drinking too much can lead to liver problems, high blood pressure, and other health problems.  health   If you have a family, there are many things you can do together to improve your health.  Eat meals together as a family as often as possible.  Eat healthy foods. This includes fruits, vegetables, lean meats and dairy, and whole grains.  Include your family in your fitness plan. Most peoplethink of activities such as jogging or tennis as the way to fitness, but there are many ways you and your family can be more active. Anything that makes you breathe hard and gets your heart pumping is exercise. Here are sometips:   Walk to do errands or to take your child to school or the bus.  Go for a family bike ride after dinner instead of watching TV.  Where can you learn more?    Go

## 2022-01-20 NOTE — PATIENT INSTRUCTIONS
* FALL   PRECAUTIONS. *  USE   WALKING  ASSISTANCE  DEVICES     QUAD  CANE          *   ADEQUATE   FLUID  INTAKE   AND  ELECTROLYTE  BALANCE             * KEEP  DAIRY  OF   THE  NEUROLOGICAL  SYMPTOMS          *  TO  MAINTAIN  REGULAR  SLEEP  WAKE  CYCLES. *   TO  HAVE  ADEQUATE  REST  AND   SLEEP    HOURS.          *    AVOID  USAGE OF   TOBACCO,  EXCESSIVE  ALCOHOL                AND   ILLEGAL   SUBSTANCES,  IF  ANY          *  Maintain   Healthy  Life Style    With   Periodic  Monitoring  Of         Any  Medical  Conditions  Including   Elevated  Blood  Pressure,  Lipid  Profile,       Blood  Sugar levels  And   Heart  Disease. *   Period   Screening  For  Cancers  Involving  Breast,  Colon,         Lungs  And  Other  Organs  As  Applicable,           In consultation   With  Your  Primary Care Providers. *  If   There is  Any  Significant  Worsening   Of  Current  Symptoms  And             Or  If    Any additional  New  Neurological  Symptoms  and          Significant  Concerns   Should  Call  911 or  Go  To  Emergency  Department            For  Further  Immediate  Evaluation.

## 2022-03-04 ENCOUNTER — HOSPITAL ENCOUNTER (OUTPATIENT)
Dept: LAB | Age: 72
Discharge: HOME | End: 2022-03-04
Payer: MEDICARE

## 2022-03-04 ENCOUNTER — OFFICE VISIT (OUTPATIENT)
Dept: PRIMARY CARE CLINIC | Age: 72
End: 2022-03-04
Payer: MEDICARE

## 2022-03-04 VITALS
OXYGEN SATURATION: 100 % | BODY MASS INDEX: 30.19 KG/M2 | TEMPERATURE: 97.7 F | WEIGHT: 192.38 LBS | RESPIRATION RATE: 18 BRPM | HEIGHT: 67 IN | HEART RATE: 102 BPM | SYSTOLIC BLOOD PRESSURE: 102 MMHG | DIASTOLIC BLOOD PRESSURE: 62 MMHG

## 2022-03-04 DIAGNOSIS — Z86.73 HISTORY OF CVA (CEREBROVASCULAR ACCIDENT): ICD-10-CM

## 2022-03-04 DIAGNOSIS — Z92.29 HX OF LONG TERM USE OF BLOOD THINNERS: ICD-10-CM

## 2022-03-04 DIAGNOSIS — R09.81 CONGESTION OF NASAL SINUS: ICD-10-CM

## 2022-03-04 DIAGNOSIS — R53.83 FATIGUE, UNSPECIFIED TYPE: ICD-10-CM

## 2022-03-04 DIAGNOSIS — R53.83 FATIGUE, UNSPECIFIED TYPE: Primary | ICD-10-CM

## 2022-03-04 DIAGNOSIS — R50.9 FEVER, UNSPECIFIED FEVER CAUSE: ICD-10-CM

## 2022-03-04 PROBLEM — H40.009 GLAUCOMA SUSPECT: Status: ACTIVE | Noted: 2022-03-04

## 2022-03-04 PROBLEM — F41.0 PANIC DISORDER WITHOUT AGORAPHOBIA WITH MODERATE PANIC ATTACKS: Status: ACTIVE | Noted: 2022-03-04

## 2022-03-04 PROBLEM — E53.8 OTHER B-COMPLEX DEFICIENCIES: Status: ACTIVE | Noted: 2022-03-04

## 2022-03-04 PROBLEM — K21.9 GASTROESOPHAGEAL REFLUX DISEASE: Status: ACTIVE | Noted: 2022-03-04

## 2022-03-04 PROBLEM — G58.9 MONONEURITIS: Status: ACTIVE | Noted: 2022-03-04

## 2022-03-04 PROBLEM — G62.9 PERIPHERAL NERVE DISEASE: Status: ACTIVE | Noted: 2022-03-04

## 2022-03-04 PROBLEM — R42 DIZZINESS AND GIDDINESS: Status: ACTIVE | Noted: 2022-03-04

## 2022-03-04 PROBLEM — E55.9 VITAMIN D DEFICIENCY: Status: ACTIVE | Noted: 2022-03-04

## 2022-03-04 PROBLEM — F43.12 CHRONIC POST-TRAUMATIC STRESS DISORDER (PTSD) AFTER MILITARY COMBAT: Status: ACTIVE | Noted: 2022-03-04

## 2022-03-04 PROBLEM — L25.9 CONTACT DERMATITIS AND OTHER ECZEMA: Status: ACTIVE | Noted: 2022-03-04

## 2022-03-04 PROBLEM — R25.2 CRAMP OF EXTREMITY: Status: ACTIVE | Noted: 2022-03-04

## 2022-03-04 PROBLEM — H93.19 TINNITUS: Status: ACTIVE | Noted: 2022-03-04

## 2022-03-04 PROBLEM — E78.1 PURE HYPERTRIGLYCERIDEMIA: Status: ACTIVE | Noted: 2022-03-04

## 2022-03-04 LAB
ABSOLUTE EOS #: <0.03 K/UL (ref 0–0.44)
ABSOLUTE IMMATURE GRANULOCYTE: <0.03 K/UL (ref 0–0.3)
ABSOLUTE LYMPH #: 1.28 K/UL (ref 1.1–3.7)
ABSOLUTE MONO #: 0.38 K/UL (ref 0.1–1.2)
BASOPHILS # BLD: 0 % (ref 0–2)
BASOPHILS ABSOLUTE: <0.03 K/UL (ref 0–0.2)
EOSINOPHILS RELATIVE PERCENT: 0 % (ref 1–4)
HCT VFR BLD CALC: 48.6 % (ref 40.7–50.3)
HEMOGLOBIN: 16.8 G/DL (ref 13–17)
IMMATURE GRANULOCYTES: 0 %
INFLUENZA A ANTIBODY: NORMAL
INFLUENZA B ANTIBODY: NORMAL
LYMPHOCYTES # BLD: 33 % (ref 24–43)
MCH RBC QN AUTO: 29.9 PG (ref 25.2–33.5)
MCHC RBC AUTO-ENTMCNC: 34.6 G/DL (ref 25.2–33.5)
MCV RBC AUTO: 86.6 FL (ref 82.6–102.9)
MONOCYTES # BLD: 10 % (ref 3–12)
NRBC AUTOMATED: 0 PER 100 WBC
PDW BLD-RTO: 13.1 % (ref 11.8–14.4)
PLATELET # BLD: 140 K/UL (ref 138–453)
PMV BLD AUTO: 9.6 FL (ref 8.1–13.5)
RBC # BLD: 5.61 M/UL (ref 4.21–5.77)
REASON FOR REJECTION: NORMAL
SEG NEUTROPHILS: 57 % (ref 36–65)
SEGMENTED NEUTROPHILS ABSOLUTE COUNT: 2.25 K/UL (ref 1.5–8.1)
WBC # BLD: 3.9 K/UL (ref 3.5–11.3)
ZZ NTE CLEAN UP: ORDERED TEST: NORMAL
ZZ NTE WITH NAME CLEAN UP: SPECIMEN SOURCE: NORMAL

## 2022-03-04 PROCEDURE — 99212 OFFICE O/P EST SF 10 MIN: CPT | Performed by: NURSE PRACTITIONER

## 2022-03-04 PROCEDURE — PBSHW POCT INFLUENZA A/B: Performed by: NURSE PRACTITIONER

## 2022-03-04 PROCEDURE — 99213 OFFICE O/P EST LOW 20 MIN: CPT | Performed by: NURSE PRACTITIONER

## 2022-03-04 PROCEDURE — 36415 COLL VENOUS BLD VENIPUNCTURE: CPT

## 2022-03-04 PROCEDURE — 87804 INFLUENZA ASSAY W/OPTIC: CPT | Performed by: NURSE PRACTITIONER

## 2022-03-04 PROCEDURE — 85025 COMPLETE CBC W/AUTO DIFF WBC: CPT

## 2022-03-04 RX ORDER — LORATADINE 10 MG/1
10 TABLET ORAL DAILY
Qty: 30 TABLET | Refills: 0 | Status: SHIPPED | OUTPATIENT
Start: 2022-03-04 | End: 2022-04-03

## 2022-03-04 ASSESSMENT — ENCOUNTER SYMPTOMS
SINUS PRESSURE: 0
NAUSEA: 0
RHINORRHEA: 0
SORE THROAT: 0
ALLERGIC/IMMUNOLOGIC NEGATIVE: 1
RESPIRATORY NEGATIVE: 1
VOMITING: 0
COUGH: 0
EYES NEGATIVE: 1

## 2022-03-04 ASSESSMENT — PATIENT HEALTH QUESTIONNAIRE - PHQ9
1. LITTLE INTEREST OR PLEASURE IN DOING THINGS: 0
SUM OF ALL RESPONSES TO PHQ QUESTIONS 1-9: 0
2. FEELING DOWN, DEPRESSED OR HOPELESS: 0
SUM OF ALL RESPONSES TO PHQ9 QUESTIONS 1 & 2: 0

## 2022-03-04 NOTE — PROGRESS NOTES
[facility] A department of [facility]  Phone: [phone]  Fax: [phone]      Carlos Virk is a 66 y.o. male who presents to the [facility] today for his medical conditions/complaints as noted below. Carlos Virk is c/o of Fatigue (loss of appetite, x1 week, fever at home)          HPI:     Has history of CVA and Afib and is on blood thinners. Previously was taking iron for anemia and stopped end of last year due to counts being normal. Has been extremely fatigued for the past 1 week. No significant symptoms of cause for fatigue. No change in bowel habits. He does not recall any black or tarry stools or any rectal bleeding. Appetite and been poor also. Fatigue  This is a new problem. The current episode started in the past 7 days. The problem occurs constantly. The problem has been unchanged. Associated symptoms include anorexia, congestion, fatigue and a fever (low grade 99.). Pertinent negatives include no chills, coughing, headaches, myalgias, nausea, rash, sore throat, vomiting or weakness. Nothing aggravates the symptoms. He has tried nothing for the symptoms. The treatment provided no relief. Past Medical History:   Diagnosis Date    Atrial fibrillation ([address] 01/2020    Blood in urine     Chronic kidney disease     CKD (chronic kidney disease)     History of ischemic stroke in prior three months     Hyperlipidemia     Ischemic stroke ([address] 01/10/2020    Kidney stones     Nihss score 10     PTSD (post-traumatic stress disorder)     from war, Agent Orange     Skin tag 12/21/2018    Stroke ([address] 01/16/2020    MRI Brain WO: New right PCA distribution infarct, multifocal left MCA distribution infarcts    Stroke ([address] 01/11/2020    large vessel occlusion/left M2 occlusion.   He subsequently underwent emergent thrombectomy.  /  S/P TPA DONE        No Known Allergies    Wt Readings from Last 3 Encounters:   03/04/22 192 lb 6 oz (87.3 kg)   01/20/22 194 lb (88 kg)   11/08/21 187 lb (84.8 kg)     BP Readings from Last 3 Encounters:   03/04/22 102/62   01/20/22 126/74   11/08/21 120/60      Temp Readings from Last 3 Encounters:   03/04/22 97.7 °F (36.5 °C) (Tympanic)   10/25/21 95.1 °F (35.1 °C) (Tympanic)   09/29/21 97.3 °F (36.3 °C) (Temporal)     Pulse Readings from Last 3 Encounters:   03/04/22 102   01/20/22 98   11/08/21 70     SpO2 Readings from Last 3 Encounters:   03/04/22 100%   01/20/22 98%   09/29/21 98%       Subjective:      Review of Systems   Constitutional: Positive for appetite change, fatigue and fever (low grade 99.). Negative for chills. HENT: Positive for congestion. Negative for postnasal drip, rhinorrhea, sinus pressure and sore throat. Eyes: Negative. Respiratory: Negative. Negative for cough. Cardiovascular: Negative. Gastrointestinal: Positive for anorexia. Negative for nausea and vomiting. Endocrine: Negative. Genitourinary: Negative. Musculoskeletal: Negative. Negative for myalgias. Skin: Negative. Negative for rash. Allergic/Immunologic: Negative. Neurological: Negative. Negative for weakness and headaches. Hematological: Negative. Psychiatric/Behavioral: Negative. All other systems reviewed and are negative. Objective:     Vitals:    03/04/22 1356   BP: 102/62   Site: Right Upper Arm   Position: Sitting   Cuff Size: Medium Adult   Pulse: 102   Resp: 18   Temp: 97.7 °F (36.5 °C)   TempSrc: Tympanic   SpO2: 100%   Weight: 192 lb 6 oz (87.3 kg)   Height: 5' 7\" (1.702 m)     Body mass index is 30.13 kg/m². /62 (Site: Right Upper Arm, Position: Sitting, Cuff Size: Medium Adult)   Pulse 102   Temp 97.7 °F (36.5 °C) (Tympanic)   Resp 18   Ht 5' 7\" (1.702 m)   Wt 192 lb 6 oz (87.3 kg)   SpO2 100%   BMI 30.13 kg/m²   Physical Exam  Vitals and nursing note reviewed.    Constitutional:       General: He is not in acute distress. Appearance: He is well-developed. HENT:      Right Ear: Hearing, ear canal and external ear normal. Tympanic membrane is retracted. Tympanic membrane is not erythematous or bulging. Left Ear: Ear canal and external ear normal. Tympanic membrane is retracted. Tympanic membrane is not erythematous or bulging. Nose: Nose normal.      Mouth/Throat:      Mouth: Mucous membranes are moist.   Eyes:      Conjunctiva/sclera: Conjunctivae normal.      Pupils: Pupils are equal, round, and reactive to light. Neck:      Thyroid: No thyromegaly. Cardiovascular:      Rate and Rhythm: Normal rate and regular rhythm. Pulses: Normal pulses. Heart sounds: Normal heart sounds. No murmur heard. Pulmonary:      Effort: Pulmonary effort is normal. No respiratory distress. Breath sounds: Normal breath sounds. No decreased air movement. No decreased breath sounds, wheezing, rhonchi or rales. Musculoskeletal:         General: Normal range of motion. Cervical back: Normal range of motion and neck supple. Lymphadenopathy:      Cervical: No cervical adenopathy. Skin:     General: Skin is warm and dry. Capillary Refill: Capillary refill takes less than 2 seconds. Findings: No rash. Neurological:      General: No focal deficit present. Mental Status: He is alert and oriented to person, place, and time. Mental status is at baseline. Psychiatric:         Mood and Affect: Mood normal.         Behavior: Behavior normal.         Judgment: Judgment normal.         Assessment:      Diagnosis Orders   1. Fatigue, unspecified type  CBC with Auto Differential    Comprehensive Metabolic Panel   2. Fever, unspecified fever cause  POCT Influenza A/B   3. Congestion of nasal sinus     4. History of CVA (cerebrovascular accident)     5.  Hx of long term use of blood thinners         Plan:   Due to his long term use of blood thinners a CBC and CMP was ordered to help determine if he is experiencing anemia due to potential blood loss. He will have these done today and will be called with any abnormal results. He was encouraged to make and appointment to follow up with his PCP early next week. He has not paid attention to his bowel movements and states that he has not noticed any changes. He is very fatigued and has loss of appetite. Physical exam shows some sinus congestion, otherwise negative for obvious explanation of fatigue. Recommended claritin use daily for sinus congestion and drainage. Discussed exam, POCT findings, plan of care (including prescriptive and supportive as listed below) and follow-up at length with patient. Reviewed all prescribed and recommended medications, administration and side effects. Encouraged to return to the clinic for no improvement and or worsening of symptoms. Patient instructed to go to ER or call 911 if any difficulty breathing, shortness of breath, inability to swallow, hives or temp greater than 103 degrees. All questions were answered and they verbalized understanding and were agreeable with the plan. Return if symptoms worsen or fail to improve.         Electronically signed by JIMBO Silva CNP on 3/4/2022 at 3:15 PM

## 2022-03-07 ENCOUNTER — OFFICE VISIT (OUTPATIENT)
Dept: INTERNAL MEDICINE | Age: 72
End: 2022-03-07
Payer: MEDICARE

## 2022-03-07 ENCOUNTER — HOSPITAL ENCOUNTER (OUTPATIENT)
Dept: LAB | Age: 72
Discharge: HOME | End: 2022-03-07
Payer: MEDICARE

## 2022-03-07 VITALS
SYSTOLIC BLOOD PRESSURE: 115 MMHG | RESPIRATION RATE: 16 BRPM | HEIGHT: 67 IN | HEART RATE: 93 BPM | WEIGHT: 196.6 LBS | OXYGEN SATURATION: 98 % | BODY MASS INDEX: 30.86 KG/M2 | DIASTOLIC BLOOD PRESSURE: 75 MMHG | TEMPERATURE: 94.8 F

## 2022-03-07 DIAGNOSIS — K21.9 GASTROESOPHAGEAL REFLUX DISEASE WITHOUT ESOPHAGITIS: ICD-10-CM

## 2022-03-07 DIAGNOSIS — D64.9 ANEMIA, UNSPECIFIED TYPE: Primary | ICD-10-CM

## 2022-03-07 DIAGNOSIS — E78.5 HYPERLIPIDEMIA, UNSPECIFIED HYPERLIPIDEMIA TYPE: ICD-10-CM

## 2022-03-07 DIAGNOSIS — D64.9 ANEMIA, UNSPECIFIED TYPE: ICD-10-CM

## 2022-03-07 DIAGNOSIS — D69.6 THROMBOCYTOPENIA, UNSPECIFIED (HCC): ICD-10-CM

## 2022-03-07 LAB
ABSOLUTE EOS #: 0.06 K/UL (ref 0–0.44)
ABSOLUTE IMMATURE GRANULOCYTE: <0.03 K/UL (ref 0–0.3)
ABSOLUTE LYMPH #: 1.39 K/UL (ref 1.1–3.7)
ABSOLUTE MONO #: 0.48 K/UL (ref 0.1–1.2)
ALBUMIN SERPL-MCNC: 3.8 G/DL (ref 3.5–5.2)
ALBUMIN/GLOBULIN RATIO: 1.1 (ref 1–2.5)
ALP BLD-CCNC: 102 U/L (ref 40–129)
ALT SERPL-CCNC: 20 U/L (ref 5–41)
ANION GAP SERPL CALCULATED.3IONS-SCNC: 10 MMOL/L (ref 9–17)
AST SERPL-CCNC: 25 U/L
BASOPHILS # BLD: 0 % (ref 0–2)
BASOPHILS ABSOLUTE: <0.03 K/UL (ref 0–0.2)
BILIRUB SERPL-MCNC: 0.66 MG/DL (ref 0.3–1.2)
BUN BLDV-MCNC: 20 MG/DL (ref 8–23)
BUN/CREAT BLD: 22 (ref 9–20)
CALCIUM SERPL-MCNC: 9.1 MG/DL (ref 8.6–10.4)
CHLORIDE BLD-SCNC: 106 MMOL/L (ref 98–107)
CO2: 26 MMOL/L (ref 20–31)
CREAT SERPL-MCNC: 0.9 MG/DL (ref 0.7–1.2)
EOSINOPHILS RELATIVE PERCENT: 1 % (ref 1–4)
FERRITIN: 443 UG/L (ref 30–400)
GFR AFRICAN AMERICAN: >60 ML/MIN
GFR NON-AFRICAN AMERICAN: >60 ML/MIN
GFR SERPL CREATININE-BSD FRML MDRD: ABNORMAL ML/MIN/{1.73_M2}
GLUCOSE BLD-MCNC: 109 MG/DL (ref 70–99)
HCT VFR BLD CALC: 45.6 % (ref 40.7–50.3)
HEMOGLOBIN: 15.9 G/DL (ref 13–17)
IMMATURE GRANULOCYTES: 0 %
LYMPHOCYTES # BLD: 29 % (ref 24–43)
MCH RBC QN AUTO: 30 PG (ref 25.2–33.5)
MCHC RBC AUTO-ENTMCNC: 34.9 G/DL (ref 25.2–33.5)
MCV RBC AUTO: 86 FL (ref 82.6–102.9)
MONOCYTES # BLD: 10 % (ref 3–12)
NRBC AUTOMATED: 0 PER 100 WBC
PDW BLD-RTO: 12.8 % (ref 11.8–14.4)
PLATELET # BLD: 180 K/UL (ref 138–453)
PMV BLD AUTO: 9.7 FL (ref 8.1–13.5)
POTASSIUM SERPL-SCNC: 4.2 MMOL/L (ref 3.7–5.3)
RBC # BLD: 5.3 M/UL (ref 4.21–5.77)
SEG NEUTROPHILS: 60 % (ref 36–65)
SEGMENTED NEUTROPHILS ABSOLUTE COUNT: 2.87 K/UL (ref 1.5–8.1)
SODIUM BLD-SCNC: 142 MMOL/L (ref 135–144)
TOTAL PROTEIN: 7.4 G/DL (ref 6.4–8.3)
VITAMIN B-12: 1217 PG/ML (ref 232–1245)
WBC # BLD: 4.8 K/UL (ref 3.5–11.3)

## 2022-03-07 PROCEDURE — 82607 VITAMIN B-12: CPT

## 2022-03-07 PROCEDURE — 80053 COMPREHEN METABOLIC PANEL: CPT

## 2022-03-07 PROCEDURE — 82728 ASSAY OF FERRITIN: CPT

## 2022-03-07 PROCEDURE — 99214 OFFICE O/P EST MOD 30 MIN: CPT | Performed by: INTERNAL MEDICINE

## 2022-03-07 PROCEDURE — 99213 OFFICE O/P EST LOW 20 MIN: CPT

## 2022-03-07 PROCEDURE — 36415 COLL VENOUS BLD VENIPUNCTURE: CPT

## 2022-03-07 PROCEDURE — 85025 COMPLETE CBC W/AUTO DIFF WBC: CPT

## 2022-03-07 RX ORDER — OMEPRAZOLE 40 MG/1
40 CAPSULE, DELAYED RELEASE ORAL
Qty: 30 CAPSULE | Refills: 5 | Status: SHIPPED | OUTPATIENT
Start: 2022-03-07 | End: 2022-04-13

## 2022-03-07 RX ORDER — AMOXICILLIN AND CLAVULANATE POTASSIUM 875; 125 MG/1; MG/1
1 TABLET, FILM COATED ORAL 2 TIMES DAILY
Qty: 14 TABLET | Refills: 0 | Status: SHIPPED | OUTPATIENT
Start: 2022-03-07 | End: 2022-03-14

## 2022-03-13 NOTE — PROGRESS NOTES
edema  Gastrointestinal: No visible masses. No visible hernias  Skin: No abnormal rashes. No abnormal masses  Neurologic: Cranial nerves grossly intact  Psychiatric: Normal affect. Alert and oriented    Medications  Current Outpatient Medications:     omeprazole (PRILOSEC) 40 MG delayed release capsule, Take 1 capsule by mouth every morning (before breakfast), Disp: 30 capsule, Rfl: 5    amoxicillin-clavulanate (AUGMENTIN) 875-125 MG per tablet, Take 1 tablet by mouth 2 times daily for 7 days, Disp: 14 tablet, Rfl: 0    loratadine (CLARITIN) 10 MG tablet, Take 1 tablet by mouth daily, Disp: 30 tablet, Rfl: 0    zinc 50 MG CAPS, Take 1 tablet by mouth daily, Disp: , Rfl:     aspirin 81 MG EC tablet, Take 1 tablet by mouth daily, Disp: 90 tablet, Rfl: 3    dabigatran (PRADAXA) 150 MG capsule, Take 1 capsule by mouth 2 times daily, Disp: 180 capsule, Rfl: 3    Omega-3 Fatty Acids (FISH OIL) 1000 MG CAPS, Take 1 capsule by mouth 2 times daily, Disp: 180 capsule, Rfl: 3    atorvastatin (LIPITOR) 40 MG tablet, Take 1 tablet by mouth nightly, Disp: 90 tablet, Rfl: 3    famotidine (PEPCID) 20 MG tablet, Take 1 tablet by mouth 2 times daily, Disp: 60 tablet, Rfl: 0    latanoprost (XALATAN) 0.005 % ophthalmic solution, Place 1 drop into both eyes nightly, Disp: , Rfl:     Cholecalciferol (VITAMIN D3) 25 MCG (1000 UT) CAPS, Take 2,000 Units by mouth daily, Disp: , Rfl:     VITAMIN B1-B12 IM, Inject into the muscle every 30 days , Disp: , Rfl:     Allergies  Patient has no known allergies.     Past Medical History:   Diagnosis Date    [facility] 01/2020    Blood in urine     Chronic kidney disease     CKD (chronic kidney disease)     History of ischemic stroke in prior three months     Hyperlipidemia     Ischemic [facility] 01/10/2020    Kidney stones     Nihss score 10     PTSD (post-traumatic stress disorder)     from war, Agent Orange     Skin tag 12/21/2018    St[facility] 01/16/2020 MRI Brain WO: New right PCA distribution infarct, multifocal left MCA distribution infarcts    Stroke ([address] 01/11/2020    large vessel occlusion/left M2 occlusion. He subsequently underwent emergent thrombectomy.  /  S/P TPA DONE     Past Surgical History:   Procedure Laterality Date    APPENDECTOMY      COLONOSCOPY N/A 4/27/2021    Normal colon. Dr. Madison Birmingham at [facility] Left 10/19/2020    CYSTOSCOPY, LEFT URETEROSCOPY w/ [place] LITHOTRIPSY, LEFT URETERAL STENT PLACEMENT performed by Bandar Wilhelm MD at [facility] Left 12/18/2020    cystoscopy, left stent placement, left retrograde pyelogram    CYSTOSCOPY Left 12/18/2020    CYSTOSCOPY, LEFT STENT REMOVAL, LEFT RETROGRADE PYELOGRAM, INSERTION OCCLUSIVE BALLOON - PT TO INTERV RADIOLOGY FOLLOWING performed by Bandar Wilhelm MD at [address] [place] [address] / [address] / Gayleen Lighter Left 2/1/2021    CYSTO Left URETEROSCOPY w/ stent removal ET RETROGRADE performed by Bandar Wilhelm MD at [address] [place] [address] IR NEPHROSTOMY PERCUTANEOUS LEFT  12/18/2020    IR NEPHROSTOMY PERCUTANEOUS LEFT 12/18/2020 Katie Perkins MD [facility] SPECIAL PROCEDURES    LITHOTRIPSY      NEPHROLITHOTOMY Left 12/18/2020    cystoscopy, left stent placement, left retrograde pyelogram performed by Bandar Wilhelm MD at [address] TRANSESOPHAGEAL ECHOCARDIOGRAM  01/17/2020    TURP N/A 08/10/2020    CYSTO w/ [place] performed by Bandar Wilhelm MD at [facility] N/A 4/27/2021    mild esophagitis. mild-mod. esophagitis. Dr. Madison Birmingham at [address] [facility]     Family History  This patient's family history includes Heart Disease in his father and mother; Stroke in his brother and father. Social History  Helena Parker  reports that he has never smoked. He has never used smokeless tobacco. He reports current alcohol use. He reports that he does not use drugs. Discussed use, benefit, and side effects of prescribed medications. All questions answered.   Patient voiced understanding. Reviewed health maintenance. Electronically signed Sen Schneider MD on 3/7/2022 at 5:14 PM    This note has been created using the Epic electronic health record, and dictated in part by [employer] dictation system. Despite the documenting physician's best efforts, there may be errors in spelling, grammar or syntax.

## 2022-03-16 ENCOUNTER — OFFICE VISIT (OUTPATIENT)
Dept: CARDIOLOGY | Age: 72
End: 2022-03-16
Payer: MEDICARE

## 2022-03-16 ENCOUNTER — HOSPITAL ENCOUNTER (OUTPATIENT)
Dept: LAB | Age: 72
Discharge: HOME | End: 2022-03-16
Payer: MEDICARE

## 2022-03-16 VITALS
HEART RATE: 73 BPM | WEIGHT: 197 LBS | HEIGHT: 67 IN | DIASTOLIC BLOOD PRESSURE: 68 MMHG | SYSTOLIC BLOOD PRESSURE: 119 MMHG | BODY MASS INDEX: 30.92 KG/M2

## 2022-03-16 DIAGNOSIS — E78.5 HYPERLIPIDEMIA, UNSPECIFIED HYPERLIPIDEMIA TYPE: ICD-10-CM

## 2022-03-16 DIAGNOSIS — I48.21 PERMANENT ATRIAL FIBRILLATION (HCC): ICD-10-CM

## 2022-03-16 DIAGNOSIS — R94.31 ABNORMAL ECG: Primary | ICD-10-CM

## 2022-03-16 DIAGNOSIS — R53.83 OTHER FATIGUE: ICD-10-CM

## 2022-03-16 LAB
SEX HORMONE BINDING GLOBULIN: 31 NMOL/L (ref 11–80)
TESTOSTERONE FREE-NONMALE: 66.4 PG/ML (ref 47–244)
TESTOSTERONE TOTAL: 323 NG/DL (ref 220–1000)
TESTOSTERONE, BIOAVAILABLE: 155.6 NG/DL (ref 130–680)
TSH SERPL DL<=0.05 MIU/L-ACNC: 2.04 MIU/L (ref 0.3–5)

## 2022-03-16 PROCEDURE — 99214 OFFICE O/P EST MOD 30 MIN: CPT | Performed by: INTERNAL MEDICINE

## 2022-03-16 PROCEDURE — 84443 ASSAY THYROID STIM HORMONE: CPT

## 2022-03-16 PROCEDURE — 36415 COLL VENOUS BLD VENIPUNCTURE: CPT

## 2022-03-16 PROCEDURE — 93010 ELECTROCARDIOGRAM REPORT: CPT | Performed by: INTERNAL MEDICINE

## 2022-03-16 PROCEDURE — 93005 ELECTROCARDIOGRAM TRACING: CPT | Performed by: INTERNAL MEDICINE

## 2022-03-16 PROCEDURE — 84270 ASSAY OF SEX HORMONE GLOBUL: CPT

## 2022-03-16 PROCEDURE — 84403 ASSAY OF TOTAL TESTOSTERONE: CPT
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
## 2022-03-23 ENCOUNTER — HOSPITAL ENCOUNTER (OUTPATIENT)
Dept: NUCLEAR MEDICINE | Age: 72
Discharge: HOME | End: 2022-03-25
Payer: MEDICARE

## 2022-03-23 ENCOUNTER — HOSPITAL ENCOUNTER (OUTPATIENT)
Dept: NON INVASIVE DIAGNOSTICS | Age: 72
Discharge: HOME | End: 2022-03-23

## 2022-03-23 ENCOUNTER — HOSPITAL ENCOUNTER (OUTPATIENT)
Dept: NON INVASIVE DIAGNOSTICS | Age: 72
Discharge: HOME | End: 2022-03-23
Payer: MEDICARE

## 2022-03-23 DIAGNOSIS — R53.83 OTHER FATIGUE: ICD-10-CM

## 2022-03-23 DIAGNOSIS — R94.31 ABNORMAL ECG: ICD-10-CM

## 2022-03-23 PROCEDURE — 3430000000 HC RX DIAGNOSTIC RADIOPHARMACEUTICAL: Performed by: INTERNAL MEDICINE

## 2022-03-23 PROCEDURE — 6360000002 HC RX W HCPCS: Performed by: INTERNAL MEDICINE

## 2022-03-23 PROCEDURE — A9500 TC99M SESTAMIBI: HCPCS | Performed by: INTERNAL MEDICINE

## 2022-03-23 PROCEDURE — 93017 CV STRESS TEST TRACING ONLY: CPT

## 2022-03-23 PROCEDURE — 78452 HT MUSCLE IMAGE SPECT MULT: CPT

## 2022-03-23 RX ADMIN — REGADENOSON 0.4 MG: 0.08 INJECTION, SOLUTION INTRAVENOUS at 10:58

## 2022-03-23 RX ADMIN — TETRAKIS(2-METHOXYISOBUTYLISOCYANIDE)COPPER(I) TETRAFLUOROBORATE 10 MILLICURIE: 1 INJECTION, POWDER, LYOPHILIZED, FOR SOLUTION INTRAVENOUS at 11:39

## 2022-03-23 RX ADMIN — TETRAKIS(2-METHOXYISOBUTYLISOCYANIDE)COPPER(I) TETRAFLUOROBORATE 30 MILLICURIE: 1 INJECTION, POWDER, LYOPHILIZED, FOR SOLUTION INTRAVENOUS at 11:40

## 2022-03-23 NOTE — PROCEDURES
[address]                 [address] [place] [id number]                              CARDIAC STRESS TEST    PATIENT NAME: Meena Almazan                     :        1950  MED REC NO:   [id number]                             ROOM:  ACCOUNT NO:   [de-identified]                           ADMIT DATE: 2022  PROVIDER:     Chapo Bryant    DATE OF STUDY:  2022    STRESS TEST    Ordering Provider:  Becca Leo MD    Primary Care Provider:  Melba Medrano MD    Patient's Age: 66     Height: 5 feet 7 inches  Weight: 197 pounds    INDICATION:  Fatigue, abnormal EKG. Lexiscan 0.4 mg injected over 10 seconds. IV Cardiolite injected 20 seconds post Lexiscan injection. Heart Rate: 89 Resting blood pressure:  156/86              HR   BP  1 min          100  135/70  2 min  3 min          87   141/80  4 min  5 min          113  139/72  6 min  7 min          97   135/76  8 min  9 min          87   126/73  10 min    Symptoms:  Chest Pain: No  Nausea: No  Headache:  No  Shortness of  breath: No  Other: Yes, stomach felt bloated    Resting EKG:  Atrial fibrillation. Arrhythmias:  Same. EKG changes:  None. EKG returned to baseline minutes in recovery. INTERPRETATION:  1. Stress EKG inconclusive for ischemia. 2.  Nuclear scan to follow.     Nuclear Myocardial Perfusion Imaging (SPECT)    TESTING METHOD  STRESS:   Lexiscan  AGENT:    Cardiolite  REST:          Injection Date:  2022  Time:  0958  Amount:  10.2  mCi  STRESS:   Injection Date:  2022  Time:  1050  Amount:  30.7 mCi  IMAGE TIME:    Rest:  1032  Stress:  1130    EF:  80%  TID:  0.90  LHR:  0.69    FINDINGS:  Ischemia (Reversible Defect):           Yes, small area of mild ischemia  inferoseptal                                        area  Infarction (Irreversible Defect):       No  Normal ejection fraction:               Yes  Normal segmental wall motion: Yes    IMPRESSION:  1. Small area of mild ischemia, inferoseptal.  2.  No infarct.       Maple Block    D: 03/23/2022 14:39:41       T: 03/23/2022 14:40:39     RONAK/DESIREE_EDIT  Job#: [id number]     Doc#: Unknown    CC:  MD Nuno Pulliam MD

## 2022-03-23 NOTE — PROGRESS NOTES
Patient Name:  Phyllis Maharaj MRN:  [id number]   :  1950  Age:  66 y.o. Sex: male   Ordering Provider: Bridgett Membreno MD  Referring Provider: Toi Leong MD  Primary Care Provider:  Toi Leong MD     Indications: EKG abnormalities, SOB, and fatigue    Medications:     Current Outpatient Medications:     omeprazole (PRILOSEC) 40 MG delayed release capsule, Take 1 capsule by mouth every morning (before breakfast), Disp: 30 capsule, Rfl: 5    loratadine (CLARITIN) 10 MG tablet, Take 1 tablet by mouth daily, Disp: 30 tablet, Rfl: 0    zinc 50 MG CAPS, Take 1 tablet by mouth daily, Disp: , Rfl:     aspirin 81 MG EC tablet, Take 1 tablet by mouth daily, Disp: 90 tablet, Rfl: 3    dabigatran (PRADAXA) 150 MG capsule, Take 1 capsule by mouth 2 times daily, Disp: 180 capsule, Rfl: 3    Omega-3 Fatty Acids (FISH OIL) 1000 MG CAPS, Take 1 capsule by mouth 2 times daily, Disp: 180 capsule, Rfl: 3    atorvastatin (LIPITOR) 40 MG tablet, Take 1 tablet by mouth nightly, Disp: 90 tablet, Rfl: 3    famotidine (PEPCID) 20 MG tablet, Take 1 tablet by mouth 2 times daily, Disp: 60 tablet, Rfl: 0    latanoprost (XALATAN) 0.005 % ophthalmic solution, Place 1 drop into both eyes nightly, Disp: , Rfl:     Cholecalciferol (VITAMIN D3) 25 MCG (1000 UT) CAPS, Take 2,000 Units by mouth daily, Disp: , Rfl:     VITAMIN B1-B12 IM, Inject into the muscle every 30 days , Disp: , Rfl:   No current facility-administered medications for this encounter. Facility-Administered Medications Ordered in Other Encounters:     technetium sestamibi (CARDIOLITE) injection 30 millicurie, 30 millicurie, IntraVENous, ONCE PRN, Bridgett Membreno MD    technetium sestamibi (CARDIOLITE) injection 10 millicurie, 10 millicurie, IntraVENous, ONCE PRN, Bridgett Membreno MD      ----------------------------------------------------------------------------------------------------------                Claire Thompson 0.4 mg injected over 10 seconds.     IV Cardiolite injected 20 seconds post Lexiscan injection. Heart Rate:  89  Resting Blood Pressure:  156/86   ----------------------------------------------------------------------------------------------------------     HR BP   1 min 100 135/70   2 min     3 min 87 141/80   4 min     5 min 113 139/72   6 min     7 min 97 135/76   8 min     9 min 87 126/73   10 min       Symptoms:  Chest Pain:  No      Nausea:  No     Headache:  No    Shortness of Breath:  No     Other:  Yes, stomach felt bloated      Electronically signed by Bri Fernandez RCP on 3/23/22 at 10:43 AM EDT    ----------------------------------------------------------------------------------------------------------  Resting EKG:  AFIB    Arrhythmias:  SAME      EKG Changes:  NONE     Maximum Changes:       Leads with maximum changes:      EKG returned to baseline  minutes in recovery. Interpretation:        STRESS EKG INCONCLUSIVE FOR ISCHEMIA       NUCLEAR SCAN TO FOLLOW .        Supervising Physician:  Dr. Amina Obando

## 2022-03-24 ENCOUNTER — TELEPHONE (OUTPATIENT)
Dept: CARDIOLOGY | Age: 72
End: 2022-03-24

## 2022-03-24 NOTE — TELEPHONE ENCOUNTER
Recommend office visit with Dr Venkatesh Turner. Brice Torres to discuss cath vs medical management.

## 2022-03-24 NOTE — TELEPHONE ENCOUNTER
Spoke with pt and notified him of abn stress test, he is aware we will check with Dr Marly Alicea on Monday and call pt with recommendations. Pt states he does not have any symptoms other than fatigue and takes a nap every day. He is aware to use ER in meantime.

## 2022-04-12 ENCOUNTER — OFFICE VISIT (OUTPATIENT)
Dept: INTERNAL MEDICINE | Age: 72
End: 2022-04-12
Payer: MEDICARE

## 2022-04-12 VITALS
OXYGEN SATURATION: 98 % | HEART RATE: 76 BPM | TEMPERATURE: 97.1 F | DIASTOLIC BLOOD PRESSURE: 80 MMHG | WEIGHT: 197.2 LBS | HEIGHT: 67 IN | SYSTOLIC BLOOD PRESSURE: 120 MMHG | BODY MASS INDEX: 30.95 KG/M2 | RESPIRATION RATE: 16 BRPM

## 2022-04-12 DIAGNOSIS — I48.91 ATRIAL FIBRILLATION, UNSPECIFIED TYPE (HCC): ICD-10-CM

## 2022-04-12 DIAGNOSIS — D64.9 ANEMIA, UNSPECIFIED TYPE: ICD-10-CM

## 2022-04-12 DIAGNOSIS — E78.5 HYPERLIPIDEMIA, UNSPECIFIED HYPERLIPIDEMIA TYPE: ICD-10-CM

## 2022-04-12 DIAGNOSIS — K21.9 GASTROESOPHAGEAL REFLUX DISEASE WITHOUT ESOPHAGITIS: ICD-10-CM

## 2022-04-12 DIAGNOSIS — Z00.00 MEDICARE ANNUAL WELLNESS VISIT, SUBSEQUENT: Primary | ICD-10-CM

## 2022-04-12 PROCEDURE — 99397 PER PM REEVAL EST PAT 65+ YR: CPT | Performed by: INTERNAL MEDICINE

## 2022-04-12 PROCEDURE — G0439 PPPS, SUBSEQ VISIT: HCPCS | Performed by: INTERNAL MEDICINE

## 2022-04-12 PROCEDURE — 99214 OFFICE O/P EST MOD 30 MIN: CPT | Performed by: INTERNAL MEDICINE

## 2022-04-12 PROCEDURE — G0463 HOSPITAL OUTPT CLINIC VISIT: HCPCS | Performed by: INTERNAL MEDICINE

## 2022-04-12 ASSESSMENT — PATIENT HEALTH QUESTIONNAIRE - PHQ9: DEPRESSION UNABLE TO ASSESS: PT REFUSES

## 2022-04-12 ASSESSMENT — LIFESTYLE VARIABLES
HOW MANY STANDARD DRINKS CONTAINING ALCOHOL DO YOU HAVE ON A TYPICAL DAY: 1 OR 2
HOW OFTEN DO YOU HAVE A DRINK CONTAINING ALCOHOL: MONTHLY OR LESS

## 2022-04-12 NOTE — PROGRESS NOTES
Medicare Annual Wellness Visit    Yareli Julian is here for Medicare AWV, Anemia, Gastroesophageal Reflux, and Hyperlipidemia    Assessment & Plan   Medicare annual wellness visit, subsequent      Recommendations for Preventive Services Due: see orders and patient instructions/AVS.  Recommended screening schedule for the next 5-10 years is provided to the patient in written form: see Patient Instructions/AVS.     Return for Medicare Annual Wellness Visit in 1 year. Subjective       Patient's complete Health Risk Assessment and screening values have been reviewed and are found in Flowsheets. The following problems were reviewed today and where indicated follow up appointments were made and/or referrals ordered.     Positive Risk Factor Screenings with Interventions:    Fall Risk:  Do you feel unsteady or are you worried about falling? : (!) yes  2 or more falls in past year?: no  Fall with injury in past year?: no     Fall Risk Interventions:    · Home safety tips provided     Depression:  Depression Unable to Assess: Pt refuses    Severity:1-4 = minimal depression, 5-9 = mild depression, 10-14 = moderate depression, 15-19 = moderately severe depression, 20-27 = severe depression          General Health and ACP:  General  In general, how would you say your health is?: Fair  In the past 7 days, have you experienced any of the following: New or Increased Pain, New or Increased Fatigue, Loneliness, Social Isolation, Stress or Anger?: (!) Yes  Select all that apply: (!) New or Increased Fatigue  Do you get the social and emotional support that you need?: Yes  Do you have a Living Will?: Yes    Advance Directives     Power of  Living Will ACP-Advance Directive ACP-Power of     Not on File Not on File Not on File Not on File      General Health Risk Interventions:  · Fatigue: patient advised to follow-up in this office for further evaluation and treatment within 3 month(s)    Health Habits/Nutrition:     Physical Activity: Inactive    Days of Exercise per Week: 0 days    Minutes of Exercise per Session: 0 min     Have you lost any weight without trying in the past 3 months?: No  Body mass index: (!) 30.88  Have you seen the dentist within the past year?: (!) No    Health Habits/Nutrition Interventions:  · Dental exam overdue:  Has dentures             Objective   Vitals:    04/12/22 0937   BP: 120/80   Site: Left Upper Arm   Position: Sitting   Pulse: 76   Resp: 16   Temp: 97.1 °F (36.2 °C)   TempSrc: Tympanic   SpO2: 98%   Weight: 197 lb 3.2 oz (89.4 kg)   Height: 5' 7\" (1.702 m)      Body mass index is 30.89 kg/m². No Known Allergies  Prior to Visit Medications    Medication Sig Taking?  Authorizing Provider   zinc 50 MG CAPS Take 1 tablet by mouth daily Yes Historical Provider, MD   aspirin 81 MG EC tablet Take 1 tablet by mouth daily Yes Izabel Rojas MD   dabigatran (PRADAXA) 150 MG capsule Take 1 capsule by mouth 2 times daily Yes Izabel Rojas MD   Omega-3 Fatty Acids (FISH OIL) 1000 MG CAPS Take 1 capsule by mouth 2 times daily Yes Izabel Rojas MD   atorvastatin (LIPITOR) 40 MG tablet Take 1 tablet by mouth nightly Yes Izabel Rojas MD   famotidine (PEPCID) 20 MG tablet Take 1 tablet by mouth 2 times daily Yes Chente Hernandez   latanoprost (XALATAN) 0.005 % ophthalmic solution Place 1 drop into both eyes nightly Yes Historical Provider, MD   Cholecalciferol (VITAMIN D3) 25 MCG (1000 UT) CAPS Take 2,000 Units by mouth daily Yes Historical Provider, MD   VITAMIN B1-B12 IM Inject into the muscle every 30 days  Yes Historical Provider, MD   omeprazole (PRILOSEC) 40 MG delayed release capsule Take 1 capsule by mouth every morning (before breakfast)  Emil Lopez MD       [facility] (Including outside providers/suppliers regularly involved in providing care):   Patient Care Team:  Emil Lopez MD as PCP - General (Internal Medicine)  Emil Lopez MD as PCP - [address] [place] [address] Provider  Anju Valdez MD as Resident (Neurology)  Kassidy Villa MD as Consulting Physician (Vascular Neurology)  Moi Augustine MD (Internal Medicine)    Reviewed and updated this visit:  Tobacco  Allergies  Meds  Med Hx  Surg Hx  Soc Hx  Fam Hx

## 2022-04-12 NOTE — PATIENT INSTRUCTIONS
Personalized Preventive Plan for Caridad Guzman - 4/12/2022  Medicare offers a range of preventive health benefits. Some of the tests and screenings are paid in full while other may be subject to a deductible, co-insurance, and/or copay. Some of these benefits include a comprehensive review of your medical history including lifestyle, illnesses that may run in your family, and various assessments and screenings as appropriate. After reviewing your medical record and screening and assessments performed today your provider may have ordered immunizations, labs, imaging, and/or referrals for you. A list of these orders (if applicable) as well as your Preventive Care list are included within your After Visit Summary for your review. Other Preventive Recommendations:    · A preventive eye exam performed by an eye specialist is recommended every 1-2 years to screen for glaucoma; cataracts, macular degeneration, and other eye disorders. · A preventive dental visit is recommended every 6 months. · Try to get at least 150 minutes of exercise per week or 10,000 steps per day on a pedometer . · Order or download the FREE \"Exercise & Physical Activity: Your Everyday Guide\" from [employer]. Call 4-773.309.8535 or search [employer] online. · You need 3971-6375 mg of calcium and 7638-2331 IU of vitamin D per day. It is possible to meet your calcium requirement with diet alone, but a vitamin D supplement is usually necessary to meet this goal.  · When exposed to the sun, use a sunscreen that protects against both UVA and UVB radiation with an SPF of 30 or greater. Reapply every 2 to 3 hours or after sweating, drying off with a towel, or swimming. · Always wear a seat belt when traveling in a car. Always wear a helmet when riding a bicycle or motorcycle.
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
## 2022-04-13 ENCOUNTER — OFFICE VISIT (OUTPATIENT)
Dept: CARDIOLOGY | Age: 72
End: 2022-04-13
Payer: MEDICARE

## 2022-04-13 VITALS
DIASTOLIC BLOOD PRESSURE: 86 MMHG | WEIGHT: 197 LBS | SYSTOLIC BLOOD PRESSURE: 127 MMHG | HEIGHT: 67 IN | HEART RATE: 105 BPM | BODY MASS INDEX: 30.92 KG/M2

## 2022-04-13 DIAGNOSIS — I48.21 PERMANENT ATRIAL FIBRILLATION (HCC): ICD-10-CM

## 2022-04-13 DIAGNOSIS — R94.39 ABNORMAL STRESS TEST: Primary | ICD-10-CM

## 2022-04-13 PROCEDURE — 99212 OFFICE O/P EST SF 10 MIN: CPT | Performed by: INTERNAL MEDICINE

## 2022-04-13 PROCEDURE — 99213 OFFICE O/P EST LOW 20 MIN: CPT | Performed by: INTERNAL MEDICINE

## 2022-04-13 PROCEDURE — 99214 OFFICE O/P EST MOD 30 MIN: CPT | Performed by: INTERNAL MEDICINE
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
## 2022-07-12 ENCOUNTER — OFFICE VISIT (OUTPATIENT)
Dept: INTERNAL MEDICINE | Age: 72
End: 2022-07-12
Payer: MEDICARE

## 2022-07-12 VITALS
BODY MASS INDEX: 31.27 KG/M2 | SYSTOLIC BLOOD PRESSURE: 138 MMHG | HEIGHT: 67 IN | OXYGEN SATURATION: 100 % | WEIGHT: 199.2 LBS | RESPIRATION RATE: 16 BRPM | HEART RATE: 90 BPM | DIASTOLIC BLOOD PRESSURE: 78 MMHG

## 2022-07-12 DIAGNOSIS — R53.83 FATIGUE, UNSPECIFIED TYPE: ICD-10-CM

## 2022-07-12 DIAGNOSIS — D64.9 ANEMIA, UNSPECIFIED TYPE: Primary | ICD-10-CM

## 2022-07-12 DIAGNOSIS — I48.91 ATRIAL FIBRILLATION, UNSPECIFIED TYPE (HCC): ICD-10-CM

## 2022-07-12 DIAGNOSIS — E78.5 HYPERLIPIDEMIA, UNSPECIFIED HYPERLIPIDEMIA TYPE: ICD-10-CM

## 2022-07-12 PROCEDURE — 99214 OFFICE O/P EST MOD 30 MIN: CPT | Performed by: INTERNAL MEDICINE

## 2022-07-12 PROCEDURE — 99212 OFFICE O/P EST SF 10 MIN: CPT | Performed by: INTERNAL MEDICINE

## 2022-07-12 PROCEDURE — 1123F ACP DISCUSS/DSCN MKR DOCD: CPT | Performed by: INTERNAL MEDICINE
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
## 2022-07-28 ENCOUNTER — OFFICE VISIT (OUTPATIENT)
Dept: NEUROLOGY | Age: 72
End: 2022-07-28
Payer: MEDICARE

## 2022-07-28 VITALS
SYSTOLIC BLOOD PRESSURE: 132 MMHG | HEART RATE: 68 BPM | WEIGHT: 196 LBS | BODY MASS INDEX: 30.7 KG/M2 | RESPIRATION RATE: 16 BRPM | OXYGEN SATURATION: 98 % | DIASTOLIC BLOOD PRESSURE: 74 MMHG

## 2022-07-28 DIAGNOSIS — I67.82 CHRONIC CEREBRAL ISCHEMIA: ICD-10-CM

## 2022-07-28 DIAGNOSIS — F41.0 PANIC DISORDER WITHOUT AGORAPHOBIA WITH MODERATE PANIC ATTACKS: ICD-10-CM

## 2022-07-28 DIAGNOSIS — R26.89 BALANCE PROBLEM: ICD-10-CM

## 2022-07-28 DIAGNOSIS — I63.9 ISCHEMIC STROKE (HCC): ICD-10-CM

## 2022-07-28 DIAGNOSIS — Z91.89 AT HIGH RISK FOR STROKE: ICD-10-CM

## 2022-07-28 DIAGNOSIS — Z86.73 H/O ISCHEMIC LEFT MCA STROKE: ICD-10-CM

## 2022-07-28 DIAGNOSIS — I63.49 CEREBROVASCULAR ACCIDENT (CVA) DUE TO EMBOLISM OF OTHER CEREBRAL ARTERY (HCC): ICD-10-CM

## 2022-07-28 DIAGNOSIS — H53.47 BILATERAL HEMIANOPIA: ICD-10-CM

## 2022-07-28 DIAGNOSIS — I63.431 CEREBROVASCULAR ACCIDENT (CVA) DUE TO EMBOLISM OF RIGHT POSTERIOR CEREBRAL ARTERY (HCC): Primary | ICD-10-CM

## 2022-07-28 DIAGNOSIS — G62.9 PERIPHERAL NERVE DISEASE: ICD-10-CM

## 2022-07-28 DIAGNOSIS — I48.91 ATRIAL FIBRILLATION, UNSPECIFIED TYPE (HCC): ICD-10-CM

## 2022-07-28 PROCEDURE — 1123F ACP DISCUSS/DSCN MKR DOCD: CPT | Performed by: PSYCHIATRY & NEUROLOGY

## 2022-07-28 PROCEDURE — 99214 OFFICE O/P EST MOD 30 MIN: CPT | Performed by: PSYCHIATRY & NEUROLOGY

## 2022-07-28 PROCEDURE — 99212 OFFICE O/P EST SF 10 MIN: CPT | Performed by: PSYCHIATRY & NEUROLOGY

## 2022-07-28 ASSESSMENT — ENCOUNTER SYMPTOMS
VISUAL CHANGE: 0
CHEST TIGHTNESS: 0
BOWEL INCONTINENCE: 0
TROUBLE SWALLOWING: 0
COUGH: 0
CONSTIPATION: 0
COLOR CHANGE: 0
EYE REDNESS: 0
DIARRHEA: 0
VOMITING: 0
EYE PAIN: 0
CHOKING: 0
PHOTOPHOBIA: 0
BACK PAIN: 0
FACIAL SWELLING: 0
SHORTNESS OF BREATH: 0
BLOOD IN STOOL: 0
SWOLLEN GLANDS: 0
VOICE CHANGE: 0
SORE THROAT: 0
ABDOMINAL PAIN: 0
CHANGE IN BOWEL HABIT: 0
NAUSEA: 0
SINUS PRESSURE: 0
APNEA: 0
ABDOMINAL DISTENTION: 0
EYE DISCHARGE: 0
WHEEZING: 0
EYE ITCHING: 0

## 2022-07-28 ASSESSMENT — PATIENT HEALTH QUESTIONNAIRE - PHQ9
SUM OF ALL RESPONSES TO PHQ QUESTIONS 1-9: 0
2. FEELING DOWN, DEPRESSED OR HOPELESS: 0
SUM OF ALL RESPONSES TO PHQ9 QUESTIONS 1 & 2: 0
1. LITTLE INTEREST OR PLEASURE IN DOING THINGS: 0
SUM OF ALL RESPONSES TO PHQ QUESTIONS 1-9: 0

## 2022-07-28 NOTE — PROGRESS NOTES
[facility]  Neurology  [address] [place] [address]  MLVEY:[phone]   Fax: [phone]        SUBJECTIVE:     PATIENT ID:  Estrella Ventura is a  RIGHT  HANDED 67 y.o. male. Cerebrovascular Accident  This is a new problem. Episode onset: RECURRENT    LEFT  MCA   AND   RIGHT  PCA    EMBOLIC  STROKES  IN  JAN. 2020. The problem has been gradually improving. Pertinent negatives include no abdominal pain, anorexia, arthralgias, change in bowel habit, chest pain, chills, congestion, coughing, diaphoresis, fatigue, fever, headaches, joint swelling, myalgias, nausea, neck pain, numbness, rash, sore throat, swollen glands, urinary symptoms, vertigo, visual change, vomiting or weakness. Nothing aggravates the symptoms. Treatments tried: ASA,  PRADAXA   The treatment provided significant relief. Neurologic Problem  The patient's primary symptoms include clumsiness, a loss of balance and memory loss. The patient's pertinent negatives include no altered mental status, focal sensory loss, focal weakness, near-syncope, slurred speech, syncope, visual change or weakness. This is a recurrent problem. The current episode started more than 1 month ago. The neurological problem developed suddenly. The problem is unchanged. There was no focality noted. Associated symptoms include dizziness and light-headedness. Pertinent negatives include no abdominal pain, auditory change, aura, back pain, bladder incontinence, bowel incontinence, chest pain, confusion, diaphoresis, fatigue, fever, headaches, nausea, neck pain, palpitations, shortness of breath, vertigo or vomiting. Past treatments include bed rest, sleep and aspirin. The treatment provided no relief. His past medical history is significant for a CVA and dementia. There is no history of a bleeding disorder, a clotting disorder, head trauma, liver disease, mood changes or seizures.            History obtained from  The patient    AND HIS  WIFE       and other  available   medical  records   were  Also  reviewed. The  Duration,  Quality,  Severity,  Location,  Timing,  Context,  Modifying  Factors   Of   The   Chief   Complaint   And  Present  Illness       Was   Reviewed   In   Chronological   [place].                                             PATIENT'S  MAIN  CONCERNS INCLUDE :                     1)           H/O    RECURRENCE     EMBOLIC   STROKES  IN     JAN. 2020                                        AND  HOSPITALIZATION     IN     [place]                                         -  PATIENT  ALSO  RECEIVED  tPA                                                                2)          MRI  BRAIN    ON    1/11/2020      SHOWED                             A)    MULTIPLE     STROKES  IN  LEFT  FRONTAL    AND  PARIETAL  LOBES                          B)        CHRONIC  CEREBRAL  ISCHEMIA                          3)    MRI    BRAIN   ON     1/16/ 2020        SHOWED                                ADDITIONAL      NEW     RIGHT  PCA  STROKE                                     AND  MANAGEMENT   OF   THE  SAME                                               3)      CTA  BRAIN    JAN. 2020     SHOWED                              A)       H/O    LEFT   MCA   OCCULUSION                              B)       RIGHT  PCA  OCCULUSION                               C)   BILATERAL  CAROTID  STENOSIS      50  %                                     -   NEEDS  MONITORING                        4)        CHRONIC   ATRIAL   FIBRILLATION                                   -    PATIENT  TO  FOLLOW  WITH  CARDIOLOGY                      5)        PATIENT       ON     ASA    81  MG       DAILY                                    AND  PRADAXA                                        -     TOLERATING  THE  SAME                        6)     HIGH  RISK  FOR  TIA /   STROKE                      7)      PATIENT  RECOVERED   WELL   FROM    HIS RECURRENT  STROKES                       8)     POST  STROKE          SYMPTOMS                          A)    GAIT  DIFFICULTY                         B)  BALANCE  PROBLEMS                                            C)    RESIDUAL   LEFT  VISUAL  FIELD  DEFECTS                        D)    MEMORY  PROBLEMS                                        -     IMPROVED   SIGNIFICANTLY                         9)      HIGH  RISK  FOR       FALLS                           RECOMMEND     USE   QUAD   CANE     AS   NEEDED                    10)     MULTIPLE  CO  MORBID  MEDICAL  CONDITIONS                           -   TO  FOLLOW  WITH  HIS PCP                           11)          FOLLOW  UP     CT   HEAD     ON   2/27/2020    SHOWED  :                            \"  Generalized atrophy                              encephalomalacial changes within the right Temporal occipital region,                              and posterior left frontal lobe                               No acute intraparenchymal hemorrhage  \"                        12)       PATIENT    AND  HIS  WIFE   DENIES  ANY  NEW  NEUROLOGICAL    CONCERNS                              PATIENT      REFUSED     FURTHER      FOLLOW  UP                                       NEURO  DIAGNOSTIC      EVALUATIONS    . 13)     VARIOUS  RISK   FACTORS   WERE  REVIEWED   AND   DISCUSSED. PATIENT   HAS  MULTIPLE   MEDICAL, NEUROLOGICAL     PROBLEMS . PATIENT'S   MANAGEMENT  IS  CHALLENGING.                                PRECIPITATING  FACTORS: including  fever/infection, exertion/relaxation, position change, stress, weather change,                        medications/alcohol, time of day/darkness/light  Are  absent                                                           MODIFYING  FACTORS:  fever/infection, exertion/relaxation, position change, stress, weather change,                        medications/alcohol, time of day/darkness/light  Are  absent             Patient   Indicates   The  Presence   And  The  Absence  Of  The  Following    Associated      And   Additional  Neurological    Symptoms:                                Balance  And coordination   problems  present           Gait problems     present            Headaches      absent              Migraines           absent           Memory problemspresent              Confusion        absent            Paresthesia   numbness          absent           Seizures  And  Starring  Episodes           absent           Syncope,  Near  syncopal episodes         absent           Speech   problems           absent             Swallowing   Problems      absent            Dizziness,  Light headedness           present              Vertigo        absent             Generalized   Weakness    absent              focal  Weakness     absent             Tremors         absent              Sleep  Problems     absent             History  Of   Recent  Head  Injury     absent             History  Of   Recent  TIA     absent             History  Of   Recent    Stroke     present             Neck  Pain   and   Neck muscle  Spasms  absent               Radiating  down   And   Weakness           absent            Lower back   Pain  And     Spasms  absent              Radiating    Down   And   Weakness          absent                H/OFALLS        absent               History  Of   Visual  Symptoms    absent                  Associated   Diplopia       absent                                               Also   Additional   Symptoms   Present    As  Documented    In   The   detailed                  Review  Of  Systems   And    Please   Refer   To    Them for   Additional    Information.                   Any components  That are either  Unobtainable  Or  Limited  In   HPI, ROS  And/or PFSH   Are                   Due   ToPatient's  Medical  Problems,  Clinical  Condition   and/or lack of other    Alternate   resources. RECORDS   REVIEWED:    historical medical records       INFORMATION   REVIEWED:     MEDICAL   HISTORY,SURGICAL   HISTORY,     MEDICATIONS   LIST,   ALLERGIES AND  DRUG  INTOLERANCES,       FAMILY   HISTORY,  SOCIAL  HISTORY,      PROBLEM  LIST   FOR  PATIENT  CARE   COORDINATION      Past Medical History:   Diagnosis Date    Atrial fibrillation ([facility] 01/2020    Blood in urine     Chronic kidney disease     CKD (chronic kidney disease)     History of ischemic stroke in prior three months     Hyperlipidemia     Ischemic stroke ([facility] 01/10/2020    Kidney stones     Nihss score 10     PTSD (post-traumatic stress disorder)     from war, [place]     Skin tag 12/21/2018    Stroke ([facility] 01/16/2020    MRI Brain WO: New right PCA distribution infarct, multifocal left MCA distribution infarcts    Stroke ([facility] [facility] 01/11/2020    large vessel occlusion/left M2 occlusion. He subsequently underwent emergent thrombectomy.  /  S/P TPA DONE         Past Surgical History:   Procedure Laterality Date    APPENDECTOMY      COLONOSCOPY N/A 4/27/2021    Normal colon.  Dr. Stoney Donahue at . [address] Left 10/19/2020    CYSTOSCOPY, LEFT URETEROSCOPY w/ [place] LITHOTRIPSY, LEFT URETERAL STENT PLACEMENT performed by Alessandro Meeks MD at [facility]. [address] Left 12/18/2020    cystoscopy, left stent placement, left retrograde pyelogram    CYSTOSCOPY Left 12/18/2020    CYSTOSCOPY, LEFT STENT REMOVAL, LEFT RETROGRADE PYELOGRAM, INSERTION OCCLUSIVE BALLOON - PT TO INTERV RADIOLOGY FOLLOWING performed by Alessandro Meeks MD at [address] / Marylee Ginger / Jannette Francisco Left 2/1/2021    CYSTO Left URETEROSCOPY w/ stent removal ET RETROGRADE performed by Alessandro Meeks MD at [address] [facility] NEPHROSTOMY PERCUTANEOUS LEFT  12/18/2020    IR NEPHROSTOMY PERCUTANEOUS LEFT 12/18/2020 Raghu See MD [facility] SPECIAL PROCEDURES    LITHOTRIPSY      NEPHROLITHOTOMY Left 12/18/2020    cystoscopy, left stent placement, left retrograde pyelogram performed by Brianda Huertas MD at [facility]    TRANSESOPHAGEAL ECHOCARDIOGRAM  01/17/2020    TURP N/A 08/10/2020    CYSTO w/ [place] Laser performed by Brianda Huertas MD at [facility] [address] N/A 4/27/2021    mild esophagitis. mild-mod. esophagitis. Dr. Laureen Cota at [facility]         Current Outpatient Medications   Medication Sig Dispense Refill    zinc 50 MG CAPS Take 1 tablet by mouth daily      aspirin 81 MG EC tablet Take 1 tablet by mouth daily 90 tablet 3    dabigatran (PRADAXA) 150 MG capsule Take 1 capsule by mouth 2 times daily 180 capsule 3    Omega-3 Fatty Acids (FISH OIL) 1000 MG CAPS Take 1 capsule by mouth 2 times daily 180 capsule 3    atorvastatin (LIPITOR) 40 MG tablet Take 1 tablet by mouth nightly 90 tablet 3    famotidine (PEPCID) 20 MG tablet Take 1 tablet by mouth 2 times daily 60 tablet 0    latanoprost (XALATAN) 0.005 % ophthalmic solution Place 1 drop into both eyes nightly      Cholecalciferol (VITAMIN D3) 25 MCG (1000 UT) CAPS Take 2,000 Units by mouth daily      VITAMIN B1-B12 IM Inject into the muscle every 30 days        No current facility-administered medications for this visit.          No Known Allergies      Family History   Problem Relation Age of Onset    Heart Disease Mother     Heart Disease Father     Stroke Father     Stroke Brother          Social History     Socioeconomic History    Marital status:      Spouse name: Not on file    Number of children: Not on file    Years of education: Not on file    Highest education level: Not on file   Occupational History    Not on file   Tobacco Use    Smoking status: Never    Smokeless tobacco: Never   Vaping Use    Vaping Use: Never used   Substance and Sexual Activity    Alcohol use: Yes     Comment: rarely    Drug use: No    Sexual activity: Yes   Other Topics Concern    Not on file   Social History Narrative    Not on file     Social Determinants of Health     Financial Resource Strain: Low Risk     Difficulty of Paying Living Expenses: Not hard at all   Food Insecurity: No Food Insecurity    Worried About Running Out of Food in the Last Year: Never true    Ran Out of Food in the Last Year: Never true   Transportation Needs: Not on file   Physical Activity: Inactive    Days of Exercise per Week: 0 days    Minutes of Exercise per Session: 0 min   Stress: Not on file   Social Connections: Not on file   Intimate Partner Violence: Not on file   Housing Stability: Not on file       Vitals:    07/28/22 0948   BP: 132/74   Pulse: 68   Resp: 16   SpO2: 98%         Wt Readings from Last 3 Encounters:   07/28/22 196 lb (88.9 kg)   07/12/22 199 lb 3.2 oz (90.4 kg)   04/13/22 197 lb (89.4 kg)         BP Readings from Last 3 Encounters:   07/28/22 132/74   07/12/22 138/78   04/13/22 127/86       Hematology and Coagulation  Lab Results   Component Value Date/Time    WBC 4.8 03/07/2022 12:59 PM    RBC 5.30 03/07/2022 12:59 PM    HGB 15.9 03/07/2022 12:59 PM    HCT 45.6 03/07/2022 12:59 PM    MCV 86.0 03/07/2022 12:59 PM    MCH 30.0 03/07/2022 12:59 PM    MCHC 34.9 03/07/2022 12:59 PM    RDW 12.8 03/07/2022 12:59 PM     03/07/2022 12:59 PM    MPV 9.7 03/07/2022 12:59 PM         Chemistries  Lab Results   Component Value Date/Time     03/07/2022 12:59 PM    K 4.2 03/07/2022 12:59 PM     03/07/2022 12:59 PM    CO2 26 03/07/2022 12:59 PM    BUN 20 03/07/2022 12:59 PM    CREATININE 0.90 03/07/2022 12:59 PM    CALCIUM 9.1 03/07/2022 12:59 PM    PROT 7.4 03/07/2022 12:59 PM    LABALBU 3.8 03/07/2022 12:59 PM    BILITOT 0.66 03/07/2022 12:59 PM    ALKPHOS 102 03/07/2022 12:59 PM    AST 25 03/07/2022 12:59 PM    ALT 20 03/07/2022 12:59 PM     Lab Results   Component Value Date/Time    ALKPHOS 102 03/07/2022 12:59 PM    ALT 20 03/07/2022 12:59 PM    AST 25 03/07/2022 12:59 PM    PROT 7.4 03/07/2022 12:59 PM    BILITOT 0.66 03/07/2022 12:59 PM    BILIDIR 0.40 08/03/2020 01:28 PM LABALBU 3.8 03/07/2022 12:59 PM     Lab Results   Component Value Date/Time    BUN 20 03/07/2022 12:59 PM    CREATININE 0.90 03/07/2022 12:59 PM     Lab Results   Component Value Date/Time    CALCIUM 9.1 03/07/2022 12:59 PM    MG 2.1 12/19/2020 08:56 AM     Lab Results   Component Value Date/Time    AST 25 03/07/2022 12:59 PM    ALT 20 03/07/2022 12:59 PM       Lab Results   Component Value Date/Time    URICACID 7.7 05/22/2016 01:32 PM         Review of Systems   Constitutional:  Negative for appetite change, chills, diaphoresis, fatigue, fever and unexpected weight change. HENT:  Negative for congestion, dental problem, drooling, ear discharge, ear pain, facial swelling, hearing loss, mouth sores, nosebleeds, postnasal drip, sinus pressure, sore throat, tinnitus, trouble swallowing and voice change. Eyes:  Negative for photophobia, pain, discharge, redness, itching and visual disturbance. Respiratory:  Negative for apnea, cough, choking, chest tightness, shortness of breath and wheezing. Cardiovascular:  Negative for chest pain, palpitations, leg swelling and near-syncope. Gastrointestinal:  Negative for abdominal distention, abdominal pain, anorexia, blood in stool, bowel incontinence, change in bowel habit, constipation, diarrhea, nausea and vomiting. Endocrine: Negative for cold intolerance, heat intolerance, polydipsia, polyphagia and polyuria. Genitourinary:  Negative for bladder incontinence. Musculoskeletal:  Positive for gait problem. Negative for arthralgias, back pain, joint swelling, myalgias, neck pain and neck stiffness. Skin:  Negative for color change, pallor, rash and wound. Allergic/Immunologic: Negative for environmental allergies, food allergies and immunocompromised state. Neurological:  Positive for dizziness, light-headedness and loss of balance.  Negative for vertigo, tremors, focal weakness, seizures, syncope, facial asymmetry, speech difficulty, weakness, numbness and headaches. Hematological:  Negative for adenopathy. Does not bruise/bleed easily. Psychiatric/Behavioral:  Positive for decreased concentration and memory loss. Negative for agitation, behavioral problems, confusion, dysphoric mood, hallucinations, self-injury, sleep disturbance and suicidal ideas. The patient is not nervous/anxious and is not hyperactive. OBJECTIVE:    Physical Exam  Constitutional:       Appearance: He is well-developed. HENT:      Head: Normocephalic and atraumatic. No raccoon eyes or Monte's sign. Right Ear: External ear normal.      Left Ear: External ear normal.      Nose: Nose normal.   Eyes:      Conjunctiva/sclera: Conjunctivae normal.      Pupils: Pupils are equal, round, and reactive to light. Neck:      Thyroid: No thyroid mass or thyromegaly. Vascular: No carotid bruit. Trachea: No tracheal deviation. Meningeal: Brudzinski's sign and Kernig's sign absent. Cardiovascular:      Rate and Rhythm: Normal rate and regular rhythm. Pulmonary:      Effort: Pulmonary effort is normal.   Musculoskeletal:         General: No tenderness. Cervical back: Normal range of motion and neck supple. No rigidity. No muscular tenderness. Normal range of motion. Skin:     General: Skin is warm. Coloration: Skin is not pale. Findings: No erythema or rash. Nails: There is no clubbing. Psychiatric:         Attention and Perception: He is attentive. Mood and Affect: Mood is not anxious or depressed. Affect is not labile, blunt or inappropriate. Speech: He is communicative. Speech is not rapid and pressured, delayed, slurred or tangential.         Behavior: Behavior is not agitated, slowed, aggressive, withdrawn, hyperactive or combative. Behavior is cooperative. Thought Content: Thought content is not paranoid or delusional. Thought content does not include homicidal or suicidal ideation.  Thought content does not include No  Asterixis. Sustention  Tremor , Resting   Tremor   absent                    No   other  Abnormal  Movements noted           D) SENSORY :               Light   touch, pinprick,   position  And  Vibration DECREASED       E) REFLEXES:                   Deep  Tendon  Reflexes   DECREASED                   No  pathological  Reflexes  Bilaterally.                                   F) COORDINATION  AND  GAIT :                                Station and  Gait    SLOW    UNSTEADY                              Romberg 's test    POSITIVE                            Ataxia     PRESENT            ASSESSMENT:          Patient Active Problem List   Diagnosis    Corneal foreign body, right, initial encounter    Glaucoma suspect of both eyes    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    Combined forms of age-related cataract of both eyes    Acute cerebrovascular accident (CVA) ([address]    Tissue plasminogen activator (tPA) administered at other facility within 24 hours before current admission    Permanent atrial fibrillation ([address]    Acute ischemic stroke ([address]    Left homonymous hemianopsia    Hyperlipidemia    Stroke, recent, without late effect    Bilateral carotid artery stenosis    Chronic cerebral ischemia    Memory problem    Gait difficulty    Balance problem    At high risk for stroke    Risk for falls    Cerebrovascular accident (CVA) due to embolism of right posterior cerebral artery ([address]    Bilateral hemianopia    Dysphagia    Hematuria    PTSD (post-traumatic stress disorder)    Acute urinary retention    Horseshoe kidney    Kidney stone on left side    History of ischemic posterior cerebral artery stroke    Cerebrovascular accident (CVA) due to embolism of left middle cerebral artery ([address]    H/O ischemic left MCA stroke    Ischemic stroke ([address]    Atrial fibrillation (HCC)    Glaucoma suspect    Vitamin D deficiency    Tinnitus    Pure hypertriglyceridemia    Peripheral nerve disease Panic disorder without agoraphobia with moderate panic attacks    Other B-complex deficiencies    Gastroesophageal reflux disease    Dizziness and giddiness    Cramp of extremity    Contact dermatitis and other eczema    Mononeuritis    Chronic post-traumatic stress disorder (PTSD) after  combat    Thrombocytopenia, unspecified           CT OF THE HEAD WITHOUT CONTRAST  2/27/2020 12:49 am       TECHNIQUE:   CT of the head was performed without the administration of intravenous   contrast. Dose modulation, iterative reconstruction, and/or weight based   adjustment of the mA/kV was utilized to reduce the radiation dose to as low   as reasonably achievable. COMPARISON:   MR brain dated January 16 2020       HISTORY:   ORDERING SYSTEM PROVIDED HISTORY: dysphagia, recent stroke   TECHNOLOGIST PROVIDED HISTORY:       dysphagia, recent stroke   Reason for Exam: Trouble swallowing after eating dinner, hx of stroke   Acuity: Acute   Type of Exam: Initial       FINDINGS:   BRAIN/VENTRICLES: There is no acute intracranial hemorrhage, mass effect or   midline shift. No abnormal extra-axial fluid collection. The gray-white   differentiation is maintained without evidence of an acute infarct. There is   no evidence of hydrocephalus. Encephalomalacial changes are seen involving   the posterior left frontal lobe, and right temporal occipital lobe, related   to the recent infarcts. Lacunar infarcts are seen within the right   periventricular white matter region, related to the recent infarct. No new   areas of acute infarct are demonstrated. Generalized atrophy and senescent   white matter changes are present. ORBITS: The visualized portion of the orbits demonstrate no acute abnormality. SINUSES: The visualized paranasal sinuses and mastoid air cells demonstrate   no acute abnormality. SOFT TISSUES/SKULL:  No acute abnormality of the visualized skull or soft   tissues. Impression   1. Generalized atrophy, and encephalomalacial changes within the right   temporal occipital region, and posterior left frontal lobe, related to the   recent infarcts. No acute intraparenchymal hemorrhage, intra-axial mass, or   acute territorial infarct is present. [facility]    Vascular Carotid Procedure        Patient Name Carolyn [place] [facility]       Date of Study           02/27/2020                 Addy Spillers        Date of      1950  Gender                  Male    Birth        Age          78 year(s)  Race                            Room Number  6352        Corporate ID [id number]    #        Patient Lizzie [de-identified]    #        MR #         [id number]     Sonographer             Chana Alejandre, T        Accession #  [id number]   Interpreting Physician  Lawrence Wong        Referring                Referring Physician     Kim John, CNP    Nurse    Practitioner       Procedure   Type of Study:        Cerebral: Carotid, Carotid Scan Bilateral.       Indications for Study:Dysphagia and CVA. Blood Pressure:Right arm 141/91 mmHg. Patient Status: In Patient. Technical Quality:Limited visualization. Limitation reason:Difficult visualization, left arm BP could not be taken   due to I.V. placement. .       Comments:Basic Classification of ICA Stenosis:   PSV - Peak Systolic Velocity   Normal: No plaque or calcification identified, no elevation of PSV   Mild: <50% spectral broadening without increased PSV   Moderate: 50 - 69% PSV >125 - <230 cm/sec   Severe: 70 - 99% PSV >230 cm/sec   Critical: 80 - 99% PSV >230cm/sec and/or End Diastolic Velocities   >435DF/LCJ. Conclusions        Summary        Simultaneous real time imaging utilizing B-Mode, color flow doppler and    spectral waveform analysis was performed on the bilateral extracerebral    vascular system.     The study demonstrates:        Right:    Internal carotid artery has a mild, <50% stenosis based on velocities. The vertebral artery is patent with antegrade flow. Left:    Internal carotid artery has a mild, <50% stenosis based on velocities. The vertebral artery is patent with antegrade flow. Signature        ----------------------------------------------------------------    Electronically signed by Vida Michael RVT(Sonographer)    on 02/27/2020 12:00 PM    ----------------------------------------------------------------        ----------------------------------------------------------------    Electronically signed by Riccardo Cooney(Interpreting    physician) on 02/27/2020 04:10 PM    ----------------------------------------------------------------       Findings:        Right Impression:                     Left Impression:    The common carotid artery is normal.  The common carotid artery is normal.        The carotid bulb has a smooth         The carotid bulb is normal.    fibrocalcific plaque causing a <50%    stenosis. The internal carotid artery has a                                          smooth calcific plaque causing a    The internal carotid artery has a     <50% stenosis based on velocities. smooth fibrocalcific plaque causing a    <50% stenosis based on velocities. The external carotid artery is                                          normal.    The external carotid artery is    normal.                               The vertebral artery is patent with                                          antegrade flow. The vertebral artery is patent with    antegrade flow.              MRI OF THE BRAIN WITHOUT CONTRAST  1/16/2020 7:13 pm       TECHNIQUE:   Multiplanar multisequence MRI of the brain was performed without the   administration of intravenous contrast.       COMPARISON:   CT head neck 01/16/2020, MRI brain performed 01/11/2020       HISTORY:   ORDERING SYSTEM PROVIDED HISTORY: Dysarthria, CTA with R P2 occlusion   TECHNOLOGIST PROVIDED HISTORY:   Dysarthria, CTA with R P2 occlusion       FINDINGS:   INTRACRANIAL STRUCTURES/VENTRICLES: Redemonstration of the evolving acute   strokes identified involving left MCA distribution involving the left   posterior insular region. Multifocal areas of left MCA infarct identified   left frontal lobe cortex left parietal cortex and scattered foci involving   subcortical white matter left parietal lobe which have mildly progressed   since the previous examination with a few new foci of infarct. Focus of   restricted diffusion identified involving right posterior frontal centrum   semiovale minimally progressed when compared to previous examination. New   predominant cortical based restricted diffusion identified involving the   right PCA distribution. Many of these infarcts demonstrate mild T2   prolongation. No hemorrhagic conversion identified. Mild generalized involutional changes   with prominence of ventricles and sulci. Mild periventricular subcortical T2   prolongation suggestive chronic small vessel ischemic disease. No shift of midline structures. Basal cisterns are patent. ORBITS: The visualized portion of the orbits demonstrate no acute abnormality. SINUSES: Mild ethmoid sinus mucosal thickening. BONES/SOFT TISSUES: The bone marrow signal intensity appears normal. The soft   tissues demonstrate no acute abnormality. Impression   New right PCA distribution infarct. No hemorrhagic conversion. Multifocal areas of evolving left MCA distribution infarct, there are few   scattered new foci of restricted diffusion identified suggestion of mild   progression of the left MCA infarct. Mild chronic small ischemic disease and age related involutional change.              CT OF THE HEAD WITHOUT CONTRAST  1/16/2020 11:55 am       TECHNIQUE:   CT of the head was performed without the administration of intravenous   contrast. Dose modulation, iterative reconstruction, and/or weight based   adjustment of the mA/kV was utilized to reduce the radiation dose to as low   as reasonably achievable. COMPARISON:   Head CT 01/12/2020, MRI brain 01/11/2020, head CT 01/10/2020       HISTORY:   ORDERING SYSTEM PROVIDED HISTORY: vision loss   TECHNOLOGIST PROVIDED HISTORY:   vision loss       Reason for Exam: 1/10/2020 patient came to ER with stroke like symptoms, he   was sent to [facility] for thrombectomy today Patient has vision blurring and   dizziness       FINDINGS:   BRAIN/VENTRICLES: Continued evolution of subacute infarction in the left   frontal lobe and left insular cortex with loss of gray-white matter   differentiation. There is small area of low attenuation within left parietal   lobe subcortical white matter. No associated hemorrhage. Diffuse parenchymal volume loss with enlargement of the ventricles and   cerebral sulci. No abnormal extra-axial fluid collection. No hydrocephalus. Mild periventricular white matter low attenuation compatible with chronic   microvascular ischemic changes. ORBITS: The visualized portion of the orbits demonstrate no acute abnormality. SINUSES: The visualized paranasal sinuses and mastoid air cells demonstrate   no acute abnormality. SOFT TISSUES/SKULL: No acute abnormality of the visualized skull or soft   tissues. Impression   1. Continued evolution of subacute infarctions in the left MCA territory   involving the left lateral frontal lobe and insular cortex as well as left   parietal lobe subcortical white matter. No hemorrhagic transformation. 2. Diffuse parenchymal volume loss. Mild chronic microvascular ischemic   changes. Findings were discussed with Sherri Abarca at 12:16 pm on 1/16/2020.                CTA OF THE HEAD WITH CONTRAST WITH PERFUSION 1/11/2020 2:14 am       TECHNIQUE:   CTA of the head/brain was performed with the administration of intravenous   contrast. Multiplanar reformatted images are provided for review. MIP images   are provided for review. Dose modulation, iterative reconstruction, and/or   weight based adjustment of the mA/kV was utilized to reduce the radiation   dose to as low as reasonably achievable. COMPARISON:   CT and CTA head and neck 1/11/2020       HISTORY:   ORDERING SYSTEM PROVIDED HISTORY: stroke   TECHNOLOGIST PROVIDED HISTORY:   stroke   Reason for Exam: post TPA   Acuity: Unknown   Type of Exam: Unknown       FINDINGS:   Mildly elevated mean transit time involving the left frontal, parietal, and   temporal lobes, in the left MCA vascular territory distribution. Relative   cerebral blood flow and relative cerebral blood volume are maintained. No   perfusion mismatch. Impression   No perfusion mismatch. Findings were discussed with Dr. Phil Holloway At 2:50 am on 1/11/2020. CTA OF THE HEAD AND NECK WITH CONTRAST 1/11/2020 12:36 am:       TECHNIQUE:   CTA of the head and neck was performed with the administration of intravenous   contrast. Multiplanar reformatted images are provided for review. MIP images   are provided for review. Stenosis of the internal carotid arteries measured   using NASCET criteria. Dose modulation, iterative reconstruction, and/or   weight based adjustment of the mA/kV was utilized to reduce the radiation   dose to as low as reasonably achievable. COMPARISON:   Noncontrast CT head 1/10/2020       HISTORY:   ORDERING SYSTEM PROVIDED HISTORY: stroke   TECHNOLOGIST PROVIDED HISTORY:   stroke   Reason for Exam: Stroke, TPA in process   Acuity: Acute   Type of Exam: Subsequent/Follow-up       FINDINGS:       CTA NECK:       AORTIC ARCH/ARCH VESSELS: Normal, three-vessel aortic arch anatomy. Calcified and noncalcified atherosclerotic plaque of the aortic arch and   proximal arch vessels. No dissection or arterial injury.   No significant   stenosis of the brachiocephalic or subclavian arteries. CAROTID ARTERIES: Scattered, noncalcified atherosclerotic plaque of the mid   to distal common carotid arteries without focal stenosis. Calcified and   noncalcified atherosclerotic plaque of the right carotid bifurcation and   proximal right internal carotid artery. Mild, less than 50%, stenosis of the   right internal carotid artery by NASCET criteria. Calcified and noncalcified   atherosclerotic plaque of the left carotid bifurcation and proximal left   internal carotid artery. Mild, less than 50%, stenosis of the left internal   carotid artery by NASCET criteria. No arterial injury or dissection. VERTEBRAL ARTERIES: Vertebral arteries arise from their respective subclavian   arteries. Left vertebral artery is dominant with a developmentally   hypoplastic right vertebral artery. Mild narrowing of the left vertebral   artery origin. SOFT TISSUES: Lung apices are clear. No cervical or superior mediastinal   lymphadenopathy. Larynx and pharynx are unremarkable. No acute abnormality   of the salivary and thyroid glands. BONES: No acute osseous abnormality. CTA HEAD:       ANTERIOR CIRCULATION: Atherosclerotic calcification of the cavernous internal   carotid arteries without focal stenosis. Occlusion of a left middle cerebral   artery proximal to mid M2 branch, at the level of the sylvian fissure. No   significant stenosis of the intracranial internal carotid, anterior cerebral,   or right middle cerebral arteries. No aneurysm. POSTERIOR CIRCULATION: Intracranial dominance of the left vertebral artery. No focal stenosis of the intracranial vertebral arteries or basilar artery. No flow-limiting stenosis of the posterior cerebral arteries. Left P1   segment is hypoplastic with fetal type origin of the left posterior cerebral   artery. No aneurysm. OTHER: No dural venous sinus thrombosis on this non-dedicated study.        BRAIN: No mass effect or midline shift. No extra-axial fluid collection. Gray-white differentiation is maintained. Impression   Mild, less than 50%, stenosis of the internal carotid arteries by NASCET   criteria. Occlusion of a left middle cerebral artery proximal to mid M2 branch, at the   level of the sylvian fissure. CTA OF THE HEAD AND NECK WITH CONTRAST 1/16/2020 1:42 pm:       TECHNIQUE:   CTA of the head and neck was performed with the administration of intravenous   contrast. Multiplanar reformatted images are provided for review. MIP images   are provided for review. Stenosis of the internal carotid arteries measured   using NASCET criteria. Dose modulation, iterative reconstruction, and/or   weight based adjustment of the mA/kV was utilized to reduce the radiation   dose to as low as reasonably achievable. COMPARISON:   Head CT 01/16/2020, 01/12/2020. CTA head 01/11/2020. HISTORY:   ORDERING SYSTEM PROVIDED HISTORY: stroke   TECHNOLOGIST PROVIDED HISTORY:   stroke   Reason for Exam: vision disturbance today, recent 1/10/2020 had stoke like   symptoms was transported to [facility] to do a thrombectomy       FINDINGS:       CTA NECK:       AORTIC ARCH/ARCH VESSELS: No dissection or arterial injury. No significant   stenosis of the brachiocephalic or subclavian arteries. CAROTID ARTERIES: Mild noncalcified atherosclerotic plaque in the left common   carotid artery without significant stenosis. The right common carotid artery   is without significant stenosis. Calcified atherosclerotic plaque in the   carotid bulbs eccentrically resulting in less than 50% stenosis by NASCET   criteria. No dissection. VERTEBRAL ARTERIES: No dissection, arterial injury, or significant stenosis. Mild stenosis at the origin of the left vertebral artery. The left vertebral   artery is dominant. Hypoplastic right vertebral artery. SOFT TISSUES: The lung apices are clear.   No cervical or superior mediastinal   lymphadenopathy. The larynx and pharynx are unremarkable. No acute   abnormality of the salivary and thyroid glands. BONES: No acute osseous abnormality. CTA HEAD:       ANTERIOR CIRCULATION: No significant stenosis of the intracranial internal   carotid, anterior cerebral, or middle cerebral arteries. Previously   demonstrated left M2 branch occlusion is patent status post previous   thrombectomy. No aneurysm. POSTERIOR CIRCULATION: No significant stenosis of the vertebral, basilar, or   left posterior cerebral arteries. New occlusion of distal right P2 branch   (series 2, images [id number]). No aneurysm. Left posterior communicating   artery is present. OTHER: No dural venous sinus thrombosis on this non-dedicated study. BRAIN: No mass effect or midline shift. No extra-axial fluid collection. The   gray-white differentiation is maintained. Impression   1. New occlusion of distal P2 branch of the right posterior cerebral artery. 2. Previously demonstrated left M2 branch occlusion is now patent status post   recent thrombectomy. 3. No hemodynamically significant stenosis or occlusion in the cervical   arterial circulation. Mild less than 50% stenosis of the internal carotid   arteries by NASCET criteria bilaterally. Critical results were called by Dr. Erlinda Rudolph MD to Letha Bueno on 1/16/2020 at 14:35. MRI OF THE BRAIN WITHOUT CONTRAST  1/11/2020 4:47 pm       TECHNIQUE:   Multiplanar multisequence MRI of the brain was performed without the   administration of intravenous contrast.       COMPARISON:   CT angiogram head and neck done earlier same day. No prior brain MRI   available for comparison.        HISTORY:   ORDERING SYSTEM PROVIDED HISTORY: stroke   TECHNOLOGIST PROVIDED HISTORY:   stroke       FINDINGS:   INTRACRANIAL STRUCTURES/VENTRICLES: Multiple small areas of acute stroke in   the posterior left middle cerebral artery territory involving the left   frontal and parietal lobes, most pronounced in the frontal insular region. There is an additional punctate focus of acute stroke in the right centrum   semiovale. Small focus of gradient blooming in the left insula could   represent trace petechial hemorrhage. Generalized cerebral and cerebellar   volume loss. The axial FLAIR images demonstrate bilateral periventricular   and deep white matter signal hyperintensities, commonly seen in the setting   of chronic small vessel ischemic disease. Punctate focus of gradient   blooming in the left parietal deep white matter demonstrates no correlating   diffusion restriction and may represent a small cavernoma or amyloid   angiopathy. No mass effect or midline shift. No evidence of a large acute intracranial   hemorrhage. The ventricles and sulci are normal in size and configuration. The sellar/suprasellar regions appear unremarkable. The normal signal voids   within the major intracranial vessels appear maintained. ORBITS: The visualized portion of the orbits demonstrate no acute abnormality. SINUSES: The paranasal sinuses are clear. Small amount of fluid in the   bilateral mastoid air cells. BONES/SOFT TISSUES: The bone marrow signal intensity appears normal. The soft   tissues demonstrate no acute abnormality. Impression   1. Multiple small areas of acute stroke in the posterior left middle   cerebral artery territory involving the left frontal and parietal lobes, most   pronounced in the frontal insular region. Additional punctate focus of acute   stroke in the right centrum semiovale. 2.  Small focus of gradient blooming in the left insula could represent trace   petechial hemorrhage. No large acute intracranial hemorrhage. No mass   effect or midline shift. 3.  Mild chronic small vessel ischemic disease.                  VISITING DIAGNOSIS:      ICD-10-CM 1. Cerebrovascular accident (CVA) due to embolism of right posterior cerebral artery ([facility] [address]  I63.431       2. Ischemic stroke ([facility] [address]  I63.9       3. Peripheral nerve disease  G62.9       4. Panic disorder without agoraphobia with moderate panic attacks  F41.0       5. Atrial fibrillation, unspecified type ([facility] [address]  I48.91       6. Bilateral hemianopia  H53.47       7. Chronic cerebral ischemia  I67.82       8. Balance problem  R26.89       9. H/O ischemic left MCA stroke  Z86.73       10. At high risk for stroke  Z91.89       11. Cerebrovascular accident (CVA) due to embolism of other cerebral artery (HCC)  I63.49                      CONCERNS   &   INCREASED   RISK   FOR         * TIA,  CEREBRO  VASCULAR  ISCHEMIA,STROKE     *   DIZZINESS,   VERTEBROBASILAR  INSUFFICIENCY ,         *   COGNITIVE  &   MEMORY PROBLEMS  AND  DEMENTIAS        *  GAIT  DIFFICULTIES  &   BALANCE PROBLMES   AND  FALL                VARIOUS  RISK   FACTORS   WERE  REVIEWED   AND   DISCUSSED. *  PATIENT   HAS  MULTIPLE   MEDICAL, NEUROLOGICAL                 PROBLEMS . PATIENT'S   MANAGEMENT  IS  CHALLENGING. PLAN:                         Petrona Arbour  Of  The  Diagnoses,  The  Management & Treatment  Options            AND    Care  plan  Were          Reviewed and   Discussed   With  patient. * Goals  And  Expectations  Of  The  Therapy  Discussed   And  Reviewed. *   Benefits   And   Side  Effect  Profile  Of  Medication/s   Were   Discussed                * Need   For  Further   Follow up For  The  Various  Problems Were  discussed. * Results  Of  The  Previous  Diagnostic tests were reviewed and  discussed                 Medical  Decision  Making  Was  Made  Based on the   Complexity  Of  Above  Mentioned  Diagnoses,    Data reviewed             And    Risk  Of  Significant   Co morbidities and   complicating   Factors.                  Medical  Decision  Was   High     Complexity   Due To  The  Patient's  Multiple  Symptoms,  Advancing   Disease,      Complex  Treatment  Regimen,  Multiple medications           and   Risk  Of   Side  Effects,  Difficulty  In  Medication  Management  And  Diagnostic  Challenges       In  View  Of  The  Associated   Co  Morbid  Conditions   And  Problems. * FALL   PRECAUTIONS. THESE  REVIEWED   AND  DISCUSSED      *  USE   WALKING  ASSISTANCE  DEVICES           *    SUPERVISED   CARE      *   BE  CAREFUL  WITH  ACTIVITIES               *   ADEQUATE   FLUIDINTAKE   AND  ELECTROLYTE  BALANCE             * KEEP  DAIRY  OF   THE  NEUROLOGICAL  SYMPTOMS        RECORDING THE    DURATION  AND  FREQUENCY. *  AVOID    CONDITIONS  AND  FACTORS   THAT  MAKE                  NEUROLOGICAL  SYMPTOMS  WORSE.                *TO  MAINTAIN  REGULAR  SLEEP  WAKE  CYCLES. *   TO  HAVE  ADEQUATE  REST  AND   SLEEP    HOURS.              *    AVOID  ANY USAGE OF    TOBACCO,              EXCESSIVE  ALCOHOL  AND   ILLEGAL   SUBSTANCES          *  CONTINUE   MEDICATIONS    PRESCRIBED      AS    RECOMMENDED         *   Compliance   With  Medications   And  Instructions            * CURRENTLY    TOLERATING  THE  PRESCRIBED   MEDICATIONS. WITHOUT  ANY  SIGNIFICANT  SIDE  EFFECTS   &  GETTING BENEFIT.             *    Antiplatelet  therapy    As   Recommended  Was   Discussed        *    Prophylactic  Use   Of     Vitamin   B   Complex,  Folic  Acid,    Vitamin  B12    Multivitamin,       Calcium  With  magnesium  And  Vit D    Supplementations   Over  The  Counter  Discussed                   *  PATIENT  IS  ALSO   COUNSELED   TO  KEEP    ACTIVITIES:         A)   SIMPLE      B)  ORGANIZED      C)  WRITEDOWN                     *  EVALUATIONS  AND  FOLLOW UP:                * PHYSICAL  THERAPY                                 *CARDIOLOGY              *   OPTHALMOLOGY                                    *    POST  STROKE          SYMPTOMS A)    GAIT  DIFFICULTY                         B)  BALANCE  PROBLEMS                                            C)    RESIDUAL   LEFT  VISUAL  FIELD  DEFECTS                        D)    MEMORY  PROBLEMS                                        -     IMPROVED   SIGNIFICANTLY                                           *            PATIENT       ON     ASA    81  MG       DAILY                                                  AND  PRADAXA                                        -     TOLERATING  THE  SAME                         *   PATIENT    AND  HIS  WIFE   DENIES  ANY  NEW  NEUROLOGICAL    CONCERNS. *PATIENT   TO  FOLLOW  UP  WITH   PRIMARY  CARE         OTHER  CONSULTANTS  AS  BEFORE. *  Maintain   Healthy  Life Style    With   Periodic  Monitoring  Of      Any  Medical  Conditions  Including   Elevated  Blood  Pressure,  Lipid  Profile,     Blood  Sugar levels  AndHeart  Disease. *   Period   Screening  For  Cancers  Involving  Breast,  Colon,    lungs  And  Other  Organs  As  Applicable,  In consultation   With  Your  Primary Care Providers. *Second  Neurological  Opinion  And  Evaluations  In  [facility] AND [facility]  Setting  If  Patient  Is  Interested. * Please   Contact   Neurology  Clinic   Early   If   Are  Any  New  Neurological   And  Any neurological  Concerns. *  If  The  Patient remains  Neurologically  Stable   Return   To  [facility] Neurology Department   IN     3 - 6         MONTHS  TIME   FOR  FURTHER              FOLLOW UP.                       *   The  Neurological   Findings,  Possible  Diagnosis,  Differential diagnoses                    And  Options  For    Further   Investigations                   And  management   Are  Discussed  Comprehensively.                     Medications   And  Prescription   Risks  And  Side effects  Are   Also Discussed. *  If   There is  Any  Significant  Worsening   Of  Current  Symptoms  And  Or                  If patient  Develops   Any additional  New  NeurologicalSymptoms                  Or  Significant  Concerns   Should  Call  911 or  Go  To  Emergency  Department                    For  Further  Immediate  Evaluation. The   Above  Were  Reviewed  With  patient   And   HIS  WIFE                          questions  Answered  In  Detail. More   Than  50% of face  To face Time   Was  Spent  On  Counseling                    And   Coordination  Of  Care   Of   Patient's  multiple   Neurological  Problems                         And   Comorbid  Medical   Conditions. Electronically signed by Tenzin Garcia MD., Juan Mann      Board Certified in  Neurology &  In  [facility] ([facility])      DISCLAIMER:   Although every effort was made to ensure the accuracy of this  electronictranscription, some errors in transcription may have occurred. GENERAL PATIENT INSTRUCTIONS:     A Healthy Lifestyle: Care Instructions  Your Care Instructions  A healthy lifestyle can help you feel good, stay at ahealthy weight, and have plenty of energy for both work and play. A healthy lifestyle is something you can share with your whole family. A healthy lifestyle also can lower your risk for serious health problems, such ashigh blood pressure, heart disease, and diabetes. You can follow a few steps listed below to improve your health and the health of your family. Follow-up careis a key part of your treatment and safety. Be sure to make and go to all appointments, and call your doctor if you are having problems. Its also a good idea to know your test results and keep a list of the medicines you take. How can you care for yourself at home? Do not eat too much sugar, fat, or fast foods.  You can still have dessert and treats nowand then. The goal is moderation. Start small to improve your eating habits. Pay attention to portion sizes, drink less juice and soda pop, and eat more fruits and vegetables. Eat a healthy amount of food. A 3-ounce serving of meat, for example, is about the size of a deck of cards. Fill the rest of your plate with vegetables and whole grains. Limit theamount of soda and sports drinks you have every day. Drink more water when you are thirsty. Eat at least 5 servings of fruits and vegetables every day. It may seem like a lot, but it is not hard to reach this goal. Aserving or helping is 1 piece of fruit, 1 cup of vegetables, or 2 cups of leafy, raw vegetables. Have an apple or some carrot sticks as an afternoon snack instead of a candy bar. Try to have fruits and/or vegetables at everymeal.  Make exercise part of your daily routine. You may want to start with simple activities, such as walking, bicycling, or slow swimming. Try marietta active 30 to 60 minutes every day. You do not need to do all 30 to 60 minutes all at once. For example, you can exercise 3 times a day for 10 or 20 minutes. Moderate exercise is safe for most people, but it is always agood idea to talk to your doctor before starting an exercise program.  Keep moving. [place] the lawn, work in the garden, or ver[employer]CAR. Take the stairs instead of the elevator at work. If you smoke, quit. Peoplewho smoke have an increased risk for heart attack, stroke, cancer, and other lung illnesses. Quitting is hard, but there are ways to boost your chance of quitting tobacco for good. Use nicotine gum, patches, or lozenges. Ask your doctor about stop-smoking programs and medicines. Keep trying. In addition to reducing your risk of diseases in the future, you will notice some benefits soon after you stop using tobacco. If you have shortness of breath or asthma symptoms, they will likely getbetter within a few weeks after you quit.   Limit how much alcohol you drink. Moderate amounts of alcohol (up to 2 drinks a day for men, 1drink a day for women) are okay. But drinking too much can lead to liver problems, high blood pressure, and other health problems. health  If you have a family, there are many things you can do together to improve your health. Eat meals together as a family as often as possible. Eat healthy foods. This includes fruits, vegetables, lean meats and dairy, and whole grains. Include your family in your fitness plan. Most peoplethink of activities such as jogging or tennis as the way to fitness, but there are many ways you and your family can be more active. Anything that makes you breathe hard and gets your heart pumping is exercise. Here are sometips:  Walk to do errands or to take your child to school or the bus. Go for a family bike ride after dinner instead of watching TV. Where can you learn more? Go toStudyEdgetps://Zameen.compeModafirma.healthC8 Sciences. org and sign in to your [employer] account. Enter B660 in the Search HealthInformation box to learn more about \"A Healthy Lifestyle: Care Instructions. \"     If you do not have anaccount, please click on the \"Sign Up Now\" link. Current as of: July 26, 2016  Content Version: 11.2  © 4350-2468 [employer]. Care instructions adapted under license by [facility] ([facility]). If you have questions about a medical condition or this instruction, always ask your healthcare professional. [employer] disclaims any warranty or liability for your use of this information.

## 2022-10-05 ENCOUNTER — HOSPITAL ENCOUNTER (OUTPATIENT)
Dept: LAB | Age: 72
Discharge: HOME | End: 2022-10-05
Payer: MEDICARE

## 2022-10-05 DIAGNOSIS — E78.5 HYPERLIPIDEMIA, UNSPECIFIED HYPERLIPIDEMIA TYPE: ICD-10-CM

## 2022-10-05 DIAGNOSIS — D64.9 ANEMIA, UNSPECIFIED TYPE: ICD-10-CM

## 2022-10-05 LAB
ABSOLUTE EOS #: 0.17 K/UL (ref 0–0.44)
ABSOLUTE IMMATURE GRANULOCYTE: <0.03 K/UL (ref 0–0.3)
ABSOLUTE LYMPH #: 1.71 K/UL (ref 1.1–3.7)
ABSOLUTE MONO #: 0.55 K/UL (ref 0.1–1.2)
ALBUMIN SERPL-MCNC: 4.2 G/DL (ref 3.5–5.2)
ALBUMIN/GLOBULIN RATIO: 1.4 (ref 1–2.5)
ALP BLD-CCNC: 105 U/L (ref 40–129)
ALT SERPL-CCNC: 20 U/L (ref 5–41)
ANION GAP SERPL CALCULATED.3IONS-SCNC: 9 MMOL/L (ref 9–17)
AST SERPL-CCNC: 22 U/L
BASOPHILS # BLD: 0 % (ref 0–2)
BASOPHILS ABSOLUTE: <0.03 K/UL (ref 0–0.2)
BILIRUB SERPL-MCNC: 0.8 MG/DL (ref 0.3–1.2)
BUN BLDV-MCNC: 15 MG/DL (ref 8–23)
BUN/CREAT BLD: 16 (ref 9–20)
CALCIUM SERPL-MCNC: 9.3 MG/DL (ref 8.6–10.4)
CHLORIDE BLD-SCNC: 106 MMOL/L (ref 98–107)
CHOLESTEROL/HDL RATIO: 2.8
CHOLESTEROL: 110 MG/DL
CO2: 27 MMOL/L (ref 20–31)
CREAT SERPL-MCNC: 0.91 MG/DL (ref 0.7–1.2)
EOSINOPHILS RELATIVE PERCENT: 3 % (ref 1–4)
FERRITIN: 79 NG/ML (ref 30–400)
GFR SERPL CREATININE-BSD FRML MDRD: >60 ML/MIN/1.73M2
GLUCOSE BLD-MCNC: 100 MG/DL (ref 70–99)
HCT VFR BLD CALC: 44.3 % (ref 40.7–50.3)
HDLC SERPL-MCNC: 39 MG/DL
HEMOGLOBIN: 15.5 G/DL (ref 13–17)
IMMATURE GRANULOCYTES: 0 %
LDL CHOLESTEROL: 46 MG/DL (ref 0–130)
LYMPHOCYTES # BLD: 26 % (ref 24–43)
MCH RBC QN AUTO: 30.5 PG (ref 25.2–33.5)
MCHC RBC AUTO-ENTMCNC: 35 G/DL (ref 25.2–33.5)
MCV RBC AUTO: 87 FL (ref 82.6–102.9)
MONOCYTES # BLD: 8 % (ref 3–12)
NRBC AUTOMATED: 0 PER 100 WBC
PDW BLD-RTO: 13.2 % (ref 11.8–14.4)
PLATELET # BLD: 176 K/UL (ref 138–453)
PMV BLD AUTO: 9.7 FL (ref 8.1–13.5)
POTASSIUM SERPL-SCNC: 4.3 MMOL/L (ref 3.7–5.3)
RBC # BLD: 5.09 M/UL (ref 4.21–5.77)
SEG NEUTROPHILS: 63 % (ref 36–65)
SEGMENTED NEUTROPHILS ABSOLUTE COUNT: 4.14 K/UL (ref 1.5–8.1)
SODIUM BLD-SCNC: 142 MMOL/L (ref 135–144)
TOTAL PROTEIN: 7.3 G/DL (ref 6.4–8.3)
TRIGL SERPL-MCNC: 126 MG/DL
WBC # BLD: 6.6 K/UL (ref 3.5–11.3)

## 2022-10-05 PROCEDURE — 85025 COMPLETE CBC W/AUTO DIFF WBC: CPT

## 2022-10-05 PROCEDURE — 80053 COMPREHEN METABOLIC PANEL: CPT

## 2022-10-05 PROCEDURE — 80061 LIPID PANEL: CPT

## 2022-10-05 PROCEDURE — 36415 COLL VENOUS BLD VENIPUNCTURE: CPT

## 2022-10-05 PROCEDURE — 82728 ASSAY OF FERRITIN: CPT

## 2022-10-11 ENCOUNTER — OFFICE VISIT (OUTPATIENT)
Dept: INTERNAL MEDICINE | Age: 72
End: 2022-10-11
Payer: MEDICARE

## 2022-10-11 VITALS
OXYGEN SATURATION: 95 % | SYSTOLIC BLOOD PRESSURE: 128 MMHG | RESPIRATION RATE: 16 BRPM | DIASTOLIC BLOOD PRESSURE: 80 MMHG | HEART RATE: 68 BPM | HEIGHT: 67 IN | BODY MASS INDEX: 30.54 KG/M2 | WEIGHT: 194.6 LBS

## 2022-10-11 DIAGNOSIS — R73.01 IFG (IMPAIRED FASTING GLUCOSE): ICD-10-CM

## 2022-10-11 DIAGNOSIS — E78.5 HYPERLIPIDEMIA, UNSPECIFIED HYPERLIPIDEMIA TYPE: Primary | ICD-10-CM

## 2022-10-11 DIAGNOSIS — K21.9 GASTROESOPHAGEAL REFLUX DISEASE WITHOUT ESOPHAGITIS: ICD-10-CM

## 2022-10-11 DIAGNOSIS — I48.91 ATRIAL FIBRILLATION, UNSPECIFIED TYPE (HCC): ICD-10-CM

## 2022-10-11 PROCEDURE — 99212 OFFICE O/P EST SF 10 MIN: CPT | Performed by: INTERNAL MEDICINE

## 2022-10-11 PROCEDURE — 1123F ACP DISCUSS/DSCN MKR DOCD: CPT | Performed by: INTERNAL MEDICINE

## 2022-10-11 PROCEDURE — 99214 OFFICE O/P EST MOD 30 MIN: CPT | Performed by: INTERNAL MEDICINE

## 2022-10-11 SDOH — ECONOMIC STABILITY: FOOD INSECURITY: WITHIN THE PAST 12 MONTHS, THE FOOD YOU BOUGHT JUST DIDN'T LAST AND YOU DIDN'T HAVE MONEY TO GET MORE.: NEVER TRUE

## 2022-10-11 SDOH — ECONOMIC STABILITY: FOOD INSECURITY: WITHIN THE PAST 12 MONTHS, YOU WORRIED THAT YOUR FOOD WOULD RUN OUT BEFORE YOU GOT MONEY TO BUY MORE.: NEVER TRUE

## 2022-10-11 ASSESSMENT — SOCIAL DETERMINANTS OF HEALTH (SDOH): HOW HARD IS IT FOR YOU TO PAY FOR THE VERY BASICS LIKE FOOD, HOUSING, MEDICAL CARE, AND HEATING?: NOT VERY HARD

## 2022-10-12 ENCOUNTER — OFFICE VISIT (OUTPATIENT)
Dept: CARDIOLOGY | Age: 72
End: 2022-10-12
Payer: MEDICARE

## 2022-10-12 VITALS
SYSTOLIC BLOOD PRESSURE: 120 MMHG | HEART RATE: 80 BPM | DIASTOLIC BLOOD PRESSURE: 82 MMHG | WEIGHT: 198.2 LBS | HEIGHT: 67 IN | BODY MASS INDEX: 31.11 KG/M2

## 2022-10-12 DIAGNOSIS — I48.21 PERMANENT ATRIAL FIBRILLATION (HCC): Primary | ICD-10-CM

## 2022-10-12 DIAGNOSIS — E78.5 HYPERLIPIDEMIA, UNSPECIFIED HYPERLIPIDEMIA TYPE: ICD-10-CM

## 2022-10-12 PROCEDURE — 1123F ACP DISCUSS/DSCN MKR DOCD: CPT | Performed by: INTERNAL MEDICINE

## 2022-10-12 PROCEDURE — 99214 OFFICE O/P EST MOD 30 MIN: CPT | Performed by: INTERNAL MEDICINE

## 2022-10-12 PROCEDURE — 99213 OFFICE O/P EST LOW 20 MIN: CPT | Performed by: INTERNAL MEDICINE

## 2022-10-12 PROCEDURE — 93010 ELECTROCARDIOGRAM REPORT: CPT | Performed by: INTERNAL MEDICINE

## 2022-10-12 PROCEDURE — 93005 ELECTROCARDIOGRAM TRACING: CPT | Performed by: INTERNAL MEDICINE
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
## 2022-10-19 ENCOUNTER — OFFICE VISIT (OUTPATIENT)
Dept: PRIMARY CARE CLINIC | Age: 72
End: 2022-10-19
Payer: MEDICARE

## 2022-10-19 VITALS
HEART RATE: 88 BPM | SYSTOLIC BLOOD PRESSURE: 112 MMHG | TEMPERATURE: 96.9 F | HEIGHT: 67 IN | OXYGEN SATURATION: 98 % | DIASTOLIC BLOOD PRESSURE: 74 MMHG | WEIGHT: 196.13 LBS | RESPIRATION RATE: 20 BRPM | BODY MASS INDEX: 30.78 KG/M2

## 2022-10-19 DIAGNOSIS — U07.1 COVID-19: Primary | ICD-10-CM

## 2022-10-19 DIAGNOSIS — R05.9 COUGH, UNSPECIFIED TYPE: ICD-10-CM

## 2022-10-19 LAB
INFLUENZA A ANTIBODY: NEGATIVE
INFLUENZA B ANTIBODY: NEGATIVE
Lab: ABNORMAL
QC PASS/FAIL: ABNORMAL
SARS-COV-2 RDRP RESP QL NAA+PROBE: POSITIVE

## 2022-10-19 PROCEDURE — 99212 OFFICE O/P EST SF 10 MIN: CPT | Performed by: NURSE PRACTITIONER

## 2022-10-19 PROCEDURE — PBSHW POCT COVID-19 RAPID, NAAT: Performed by: NURSE PRACTITIONER

## 2022-10-19 PROCEDURE — 1123F ACP DISCUSS/DSCN MKR DOCD: CPT | Performed by: NURSE PRACTITIONER

## 2022-10-19 PROCEDURE — PBSHW POCT INFLUENZA A/B: Performed by: NURSE PRACTITIONER

## 2022-10-19 PROCEDURE — 87804 INFLUENZA ASSAY W/OPTIC: CPT | Performed by: NURSE PRACTITIONER

## 2022-10-19 PROCEDURE — 99213 OFFICE O/P EST LOW 20 MIN: CPT | Performed by: NURSE PRACTITIONER

## 2022-10-19 PROCEDURE — 87635 SARS-COV-2 COVID-19 AMP PRB: CPT | Performed by: NURSE PRACTITIONER

## 2022-10-19 RX ORDER — DEXTROMETHORPHAN HYDROBROMIDE AND PROMETHAZINE HYDROCHLORIDE 15; 6.25 MG/5ML; MG/5ML
5 SYRUP ORAL 4 TIMES DAILY PRN
Qty: 240 ML | Refills: 0 | Status: SHIPPED | OUTPATIENT
Start: 2022-10-19 | End: 2022-10-26

## 2022-10-19 ASSESSMENT — ENCOUNTER SYMPTOMS
COUGH: 1
SHORTNESS OF BREATH: 0
WHEEZING: 0
RHINORRHEA: 1
HEMOPTYSIS: 0

## 2022-10-19 NOTE — PROGRESS NOTES
Subjective:      Patient ID: Lori Crabtree is a 67 y.o. male coming in for   Chief Complaint   Patient presents with    Cough     Started a day or two ago. Worse when he lays down. Congestion. Cough  This is a new problem. Episode onset: x2 days. The cough is Non-productive. Associated symptoms include nasal congestion, postnasal drip and rhinorrhea. Pertinent negatives include no chest pain, fever, headaches, hemoptysis, shortness of breath or wheezing. The symptoms are aggravated by lying down. He has tried nothing for the symptoms. Review of Systems   Constitutional:  Negative for fever. HENT:  Positive for congestion, postnasal drip and rhinorrhea. Respiratory:  Positive for cough. Negative for hemoptysis, shortness of breath and wheezing. Cardiovascular:  Negative for chest pain. Neurological:  Negative for headaches. All other systems reviewed and are negative. Objective:/74 (Site: Right Upper Arm, Position: Sitting, Cuff Size: Medium Adult)   Pulse 88   Temp 96.9 °F (36.1 °C) (Tympanic)   Resp 20   Ht 5' 7\" (1.702 m)   Wt 196 lb 2 oz (89 kg)   SpO2 98%   BMI 30.72 kg/m²      Physical Exam  Vitals and nursing note reviewed. Constitutional:       General: He is not in acute distress. Appearance: Normal appearance. He is not ill-appearing. HENT:      Head: Normocephalic. Nose: Congestion present. Mouth/Throat:      Mouth: Mucous membranes are moist.      Pharynx: Oropharynx is clear. No oropharyngeal exudate or posterior oropharyngeal erythema. Cardiovascular:      Rate and Rhythm: Normal rate and regular rhythm. Heart sounds: Normal heart sounds. Pulmonary:      Effort: Pulmonary effort is normal.      Breath sounds: Normal breath sounds. Musculoskeletal:      Cervical back: Neck supple. Right lower leg: No edema. Left lower leg: No edema. Lymphadenopathy:      Cervical: No cervical adenopathy.    Skin:     General: Skin is warm and dry. Findings: No rash. Neurological:      General: No focal deficit present. Mental Status: He is alert and oriented to person, place, and time. Assessment:      1. Cough, unspecified type           Plan:    -rapid covid positive  -discussed home remedies/ otc cough cold meds  -cough medications sent in  -push fluids  Discussed quarantine guidelines with pt as well. Orders Placed This Encounter   Procedures    POCT COVID-19 Rapid, NAAT     Order Specific Question:   Is this test for diagnosis or screening? Answer:   Diagnosis of ill patient     Order Specific Question:   Symptomatic for COVID-19 as defined by CDC? Answer:   Yes     Order Specific Question:   Date of Symptom Onset     Answer:   10/17/2022     Order Specific Question:   Hospitalized for COVID-19? Answer:   No     Order Specific Question:   Admitted to ICU for COVID-19? Answer:   No     Order Specific Question:   Employed in healthcare setting? Answer:   No     Order Specific Question:   Resident in a congregate (group) care setting? Answer:   No     Order Specific Question:   Pregnant: Answer:   No     Order Specific Question:   Previously tested for COVID-19?      Answer:   Yes    POCT Influenza A/B      Outpatient Encounter Medications as of 10/19/2022   Medication Sig Dispense Refill    zinc 50 MG CAPS Take 1 tablet by mouth daily      aspirin 81 MG EC tablet Take 1 tablet by mouth daily 90 tablet 3    dabigatran (PRADAXA) 150 MG capsule Take 1 capsule by mouth 2 times daily 180 capsule 3    Omega-3 Fatty Acids (FISH OIL) 1000 MG CAPS Take 1 capsule by mouth 2 times daily 180 capsule 3    atorvastatin (LIPITOR) 40 MG tablet Take 1 tablet by mouth nightly 90 tablet 3    famotidine (PEPCID) 20 MG tablet Take 1 tablet by mouth 2 times daily 60 tablet 0    latanoprost (XALATAN) 0.005 % ophthalmic solution Place 1 drop into both eyes nightly      Cholecalciferol (VITAMIN D3) 25 MCG (1000 UT) CAPS Take 2,000 Units by mouth daily      VITAMIN B1-B12 IM Inject into the muscle every 30 days        No facility-administered encounter medications on file as of 10/19/2022.             Hortencia Crenshaw, APRN - CNP

## 2022-11-18 ENCOUNTER — HOSPITAL ENCOUNTER (OUTPATIENT)
Dept: LAB | Age: 72
Discharge: HOME | End: 2022-11-18
Payer: MEDICARE

## 2022-11-18 DIAGNOSIS — N20.0 KIDNEY STONE ON LEFT SIDE: ICD-10-CM

## 2022-11-18 LAB — PROSTATE SPECIFIC ANTIGEN: 3.73 NG/ML

## 2022-11-18 PROCEDURE — 84153 ASSAY OF PSA TOTAL: CPT

## 2022-11-18 PROCEDURE — 36415 COLL VENOUS BLD VENIPUNCTURE: CPT

## 2022-12-05 ENCOUNTER — HOSPITAL ENCOUNTER (OUTPATIENT)
Dept: GENERAL RADIOLOGY | Age: 72
Discharge: HOME | End: 2022-12-07
Payer: MEDICARE

## 2022-12-05 ENCOUNTER — OFFICE VISIT (OUTPATIENT)
Dept: UROLOGY | Age: 72
End: 2022-12-05
Payer: MEDICARE

## 2022-12-05 VITALS
HEIGHT: 67 IN | BODY MASS INDEX: 31.08 KG/M2 | WEIGHT: 198 LBS | DIASTOLIC BLOOD PRESSURE: 80 MMHG | SYSTOLIC BLOOD PRESSURE: 110 MMHG

## 2022-12-05 DIAGNOSIS — N13.8 BENIGN PROSTATIC HYPERPLASIA WITH URINARY OBSTRUCTION: Primary | ICD-10-CM

## 2022-12-05 DIAGNOSIS — R97.20 ELEVATED PSA: ICD-10-CM

## 2022-12-05 DIAGNOSIS — N40.1 BENIGN PROSTATIC HYPERPLASIA WITH URINARY OBSTRUCTION: Primary | ICD-10-CM

## 2022-12-05 DIAGNOSIS — Q63.1 HORSESHOE KIDNEY: ICD-10-CM

## 2022-12-05 DIAGNOSIS — N20.0 KIDNEY STONE ON LEFT SIDE: ICD-10-CM

## 2022-12-05 PROCEDURE — 99212 OFFICE O/P EST SF 10 MIN: CPT | Performed by: UROLOGY

## 2022-12-05 PROCEDURE — 99214 OFFICE O/P EST MOD 30 MIN: CPT | Performed by: UROLOGY

## 2022-12-05 PROCEDURE — 74018 RADEX ABDOMEN 1 VIEW: CPT

## 2022-12-05 PROCEDURE — 1123F ACP DISCUSS/DSCN MKR DOCD: CPT | Performed by: UROLOGY
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
## 2023-01-26 ENCOUNTER — OFFICE VISIT (OUTPATIENT)
Dept: NEUROLOGY | Age: 73
End: 2023-01-26
Payer: MEDICARE

## 2023-01-26 VITALS
RESPIRATION RATE: 16 BRPM | OXYGEN SATURATION: 97 % | HEART RATE: 69 BPM | BODY MASS INDEX: 30.45 KG/M2 | SYSTOLIC BLOOD PRESSURE: 134 MMHG | DIASTOLIC BLOOD PRESSURE: 74 MMHG | WEIGHT: 194.4 LBS

## 2023-01-26 DIAGNOSIS — G62.9 PERIPHERAL NERVE DISEASE: ICD-10-CM

## 2023-01-26 DIAGNOSIS — I48.91 ATRIAL FIBRILLATION, UNSPECIFIED TYPE (HCC): ICD-10-CM

## 2023-01-26 DIAGNOSIS — F41.0 PANIC DISORDER WITHOUT AGORAPHOBIA WITH MODERATE PANIC ATTACKS: ICD-10-CM

## 2023-01-26 DIAGNOSIS — R26.89 BALANCE PROBLEM: ICD-10-CM

## 2023-01-26 DIAGNOSIS — Z91.89 AT HIGH RISK FOR STROKE: ICD-10-CM

## 2023-01-26 DIAGNOSIS — H53.462 LEFT HOMONYMOUS HEMIANOPSIA: ICD-10-CM

## 2023-01-26 DIAGNOSIS — F43.10 PTSD (POST-TRAUMATIC STRESS DISORDER): ICD-10-CM

## 2023-01-26 DIAGNOSIS — R42 DIZZINESS AND GIDDINESS: ICD-10-CM

## 2023-01-26 DIAGNOSIS — Z91.81 RISK FOR FALLS: ICD-10-CM

## 2023-01-26 DIAGNOSIS — Z86.73 HISTORY OF ISCHEMIC POSTERIOR CEREBRAL ARTERY STROKE: ICD-10-CM

## 2023-01-26 DIAGNOSIS — I63.431 CEREBROVASCULAR ACCIDENT (CVA) DUE TO EMBOLISM OF RIGHT POSTERIOR CEREBRAL ARTERY (HCC): ICD-10-CM

## 2023-01-26 DIAGNOSIS — Z86.73 H/O ISCHEMIC LEFT MCA STROKE: ICD-10-CM

## 2023-01-26 DIAGNOSIS — Q63.1 HORSESHOE KIDNEY: ICD-10-CM

## 2023-01-26 DIAGNOSIS — I69.30 H/O: STROKE WITH RESIDUAL EFFECTS: Primary | ICD-10-CM

## 2023-01-26 PROCEDURE — 99214 OFFICE O/P EST MOD 30 MIN: CPT | Performed by: PSYCHIATRY & NEUROLOGY

## 2023-01-26 ASSESSMENT — PATIENT HEALTH QUESTIONNAIRE - PHQ9
SUM OF ALL RESPONSES TO PHQ QUESTIONS 1-9: 0
SUM OF ALL RESPONSES TO PHQ9 QUESTIONS 1 & 2: 0
SUM OF ALL RESPONSES TO PHQ QUESTIONS 1-9: 0
SUM OF ALL RESPONSES TO PHQ QUESTIONS 1-9: 0
2. FEELING DOWN, DEPRESSED OR HOPELESS: 0
1. LITTLE INTEREST OR PLEASURE IN DOING THINGS: 0
SUM OF ALL RESPONSES TO PHQ QUESTIONS 1-9: 0

## 2023-01-26 ASSESSMENT — ENCOUNTER SYMPTOMS
VISUAL CHANGE: 0
PHOTOPHOBIA: 0
CHEST TIGHTNESS: 0
COUGH: 0
EYE ITCHING: 0
SWOLLEN GLANDS: 0
SINUS PRESSURE: 0
EYE REDNESS: 0
SHORTNESS OF BREATH: 0
CONSTIPATION: 0
VOMITING: 0
BLOOD IN STOOL: 0
TROUBLE SWALLOWING: 0
ABDOMINAL DISTENTION: 0
ABDOMINAL PAIN: 0
EYE DISCHARGE: 0
COLOR CHANGE: 0
VOICE CHANGE: 0
BOWEL INCONTINENCE: 0
SORE THROAT: 0
WHEEZING: 0
APNEA: 0
NAUSEA: 0
FACIAL SWELLING: 0
DIARRHEA: 0
CHANGE IN BOWEL HABIT: 0
BACK PAIN: 0
CHOKING: 0
EYE PAIN: 0

## 2023-01-26 NOTE — PATIENT INSTRUCTIONS
* FALL   PRECAUTIONS. *  USE   WALKING  ASSISTANCE  DEVICES     QUAD  CANE  /   WALKER            *   ADEQUATE   FLUID  INTAKE   AND  ELECTROLYTE  BALANCE             * KEEP  DAIRY  OF   THE  NEUROLOGICAL  SYMPTOMS          *  TO  MAINTAIN  REGULAR  SLEEP  WAKE  CYCLES. *   TO  HAVE  ADEQUATE  REST  AND   SLEEP    HOURS.          *    AVOID  USAGE OF   TOBACCO,  EXCESSIVE  ALCOHOL                AND   ILLEGAL   SUBSTANCES,  IF  ANY          *  Maintain   Healthy  Life Style    With   Periodic  Monitoring  Of         Any  Medical  Conditions  Including   Elevated  Blood  Pressure,  Lipid  Profile,       Blood  Sugar levels  And   Heart  Disease. *   Period   Screening  For  Cancers  Involving  Breast,  Colon,         Lungs  And  Other  Organs  As  Applicable,           In consultation   With  Your  Primary Care Providers. *  If   There is  Any  Significant  Worsening   Of  Current  Symptoms  And             Or  If    Any additional  New  Neurological  Symptoms  and          Significant  Concerns   Should  Call  911 or  Go  To  Emergency  Department            For  Further  Immediate  Evaluation.

## 2023-01-26 NOTE — PROGRESS NOTES
[facility]  Neurology  [address] [address]  IQQEE:[phone]   Fax: [phone]        SUBJECTIVE:     PATIENT ID:  Fer Gardner is a  RIGHT  HANDED 67 y.o. male. Cerebrovascular Accident  This is a chronic problem. Episode onset: RECURRENT    LEFT  MCA   AND   RIGHT  PCA    EMBOLIC  STROKES  IN  JAN. 2020. The problem has been gradually improving. Pertinent negatives include no abdominal pain, anorexia, arthralgias, change in bowel habit, chest pain, chills, congestion, coughing, diaphoresis, fatigue, fever, headaches, joint swelling, myalgias, nausea, neck pain, numbness, rash, sore throat, swollen glands, urinary symptoms, vertigo, visual change, vomiting or weakness. Nothing aggravates the symptoms. Treatments tried: ASA,  PRADAXA   The treatment provided significant relief. Neurologic Problem  The patient's primary symptoms include clumsiness, a loss of balance and memory loss. The patient's pertinent negatives include no altered mental status, focal sensory loss, focal weakness, near-syncope, slurred speech, syncope, visual change or weakness. This is a recurrent problem. The current episode started more than 1 month ago. The neurological problem developed suddenly. The problem is unchanged. There was no focality noted. Associated symptoms include dizziness and light-headedness. Pertinent negatives include no abdominal pain, auditory change, aura, back pain, bladder incontinence, bowel incontinence, chest pain, confusion, diaphoresis, fatigue, fever, headaches, nausea, neck pain, palpitations, shortness of breath, vertigo or vomiting. Past treatments include bed rest, sleep and aspirin. The treatment provided no relief. His past medical history is significant for a CVA and dementia. There is no history of a bleeding disorder, a clotting disorder, head trauma, liver disease, mood changes or seizures.                History obtained from  The patient AND      HIS  WIFE       and other  available   medical  records   were  Also  reviewed. The  Duration,  Quality,  Severity,  Location,  Timing,  Context,  Modifying  Factors   Of   The   Chief   Complaint   And  Present  Illness       Was   Reviewed   In   Chronological   Manner.                                               PATIENT'S  MAIN  CONCERNS INCLUDE :                     1)     PREVIOUS        H/O    RECURRENCE     EMBOLIC   STROKES  IN     JAN. 2020                                     AND  HOSPITALIZATION     IN     [place]                                       2)        MRI  BRAIN    ON    1/11/2020      SHOWED                             A)    MULTIPLE     STROKES  IN  LEFT  FRONTAL                                        AND  PARIETAL  LOBES                          B)        CHRONIC  CEREBRAL  ISCHEMIA                          3)    MRI    BRAIN   ON     1/16/ 2020        SHOWED                                ADDITIONAL      NEW     RIGHT  PCA  STROKE                                            3)      CTA  BRAIN    JAN. 2020     SHOWED                              A)       H/O    LEFT   MCA   OCCULUSION                              B)       RIGHT  PCA  OCCULUSION                               C)   BILATERAL  CAROTID  STENOSIS      50  %                                     -   NEEDS  MONITORING                        4)        CHRONIC   ATRIAL   FIBRILLATION                                   -    PATIENT  TO  FOLLOW  WITH  CARDIOLOGY                      5)        PATIENT       ON     ASA    81  MG       DAILY                                    AND  PRADAXA                                        -     TOLERATING  THE  SAME                        6)     HIGH  RISK  FOR  TIA /   STROKE                      7)      PATIENT  RECOVERED   WELL   FROM    HIS                            RECURRENT  STROKES                       8)     POST  STROKE          SYMPTOMS                          A)    GAIT DIFFICULTY                         B)  BALANCE  PROBLEMS                                            C)    RESIDUAL   LEFT  VISUAL  FIELD  DEFECTS                        D)    MEMORY  PROBLEMS                                        -     IMPROVED   SIGNIFICANTLY                         9)      HIGH  RISK  FOR       FALLS                           RECOMMEND     USE   QUAD   CANE     AS   NEEDED                    10)     MULTIPLE  CO  MORBID  MEDICAL  CONDITIONS                           -   TO  FOLLOW  WITH  HIS PCP                           11)          FOLLOW  UP     CT   HEAD     ON   2/27/2020    SHOWED  :                            \"  Generalized atrophy                              encephalomalacial changes within the right Temporal occipital region,                              and posterior left frontal lobe                               No acute intraparenchymal hemorrhage  \"                        12)     PATIENT      REFUSED       FOLLOW  UP                             NEURO  DIAGNOSTIC      EVALUATIONS    IN   THE  PAST. 13)       PATIENT    AND  HIS  WIFE   DENIED  ANY  NEW  NEUROLOGICAL    CONCERNS                           NO   RECURRENCE  OF  TIA  /   STROKE. NO   FALLS. PATIENT    NOT  CONCERNED   ABOUT    MEMORY PROBLEMS.                                  PATIENT   BEING  FOLLOWED  BY   [facility]  PROVIDERS                      14)      RECOMMENDED  :                               A)    FALL  PRECAUTIONS                                 B)   USE  QUAD  AND  WALKER  AS  NEEDED                               C)      FOLLOW  UP    WITH     PCP                                  D)      BE  CAREFUL     WITH    ACTIVITIES   INCLUDING   DRIVING                               E)     FOLLOW  UP    WITH   EYE    DOCTORS    PERIODICALLY                              F)     TO CONTINUE   ASA    AND  ANTI  COAGULATION    AS  BEFORE                              G ) FOLLOW  UP    WITH     CARDIOLOGY  PERIODICALLY                              H)    FOLLOW    UP   NEURO  DIAGNOTIC      EVALUATIONS                                - ABOVE     REVIEWED   AND   DISCUSSED    WITH  PATIENT  AND  HIS  WIFE                                      15)     VARIOUS  RISK   FACTORS   WERE  REVIEWED   AND   DISCUSSED. PATIENT   HAS  MULTIPLE   MEDICAL, NEUROLOGICAL     PROBLEMS . PATIENT'S   MANAGEMENT  IS  CHALLENGING.                                PRECIPITATING  FACTORS: including  fever/infection, exertion/relaxation, position change, stress, weather change,                        medications/alcohol, time of day/darkness/light  Are  absent                                                           MODIFYING  FACTORS:  fever/infection, exertion/relaxation, position change, stress, weather change,                        medications/alcohol, time of day/darkness/light  Are  absent             Patient   Indicates   The  Presence   And  The  Absence  Of  The  Following    Associated      And   Additional  Neurological    Symptoms:                                Balance  And coordination   problems  present           Gait problems     present            Headaches      absent              Migraines           absent           Memory problemspresent              Confusion        absent            Paresthesia   numbness          absent           Seizures  And  Starring  Episodes           absent           Syncope,  Near  syncopal episodes         absent           Speech   problems           absent             Swallowing   Problems      absent            Dizziness,  Light headedness           present              Vertigo        absent             Generalized   Weakness    absent              focal  Weakness     absent             Tremors         absent              Sleep  Problems     absent             History  Of   Recent  Head  Injury     absent             History Of   Recent  TIA     absent             History  Of   Recent    Stroke     present             Neck  Pain   and   Neck muscle  Spasms  absent               Radiating  down   And   Weakness           absent            Lower back   Pain  And     Spasms  absent              Radiating    Down   And   Weakness          absent                H/OFALLS        absent               History  Of   Visual  Symptoms    absent                  Associated   Diplopia       absent                                               Also   Additional   Symptoms   Present    As  Documented    In   The   detailed                  Review  Of  Systems   And    Please   Refer   To    Them for   Additional    Information. Any components  That are either  Unobtainable  Or  Limited  In   HPI, ROS  And/or PFSH   Are                   Due   ToPatient's  Medical  Problems,  Clinical  Condition   and/or lack of                                other    Alternate   resources. RECORDS   REVIEWED:    historical medical records       INFORMATION   REVIEWED:     MEDICAL   HISTORY,SURGICAL   HISTORY,     MEDICATIONS   LIST,   ALLERGIES AND  DRUG  INTOLERANCES,       FAMILY   HISTORY,  SOCIAL  HISTORY,      PROBLEM  LIST   FOR  PATIENT  CARE   COORDINATION      Past Medical History:   Diagnosis Date    Atrial fibrillation ([address] 01/2020    Blood in urine     Chronic kidney disease     CKD (chronic kidney disease)     History of ischemic stroke in prior three months     Hyperlipidemia     Ischemic stroke ([facility] 01/10/2020    Kidney stones     Nihss score 10     PTSD (post-traumatic stress disorder)     from war, [place]     Skin tag 12/21/2018    Stroke ([facility] 01/16/2020    MRI Brain WO: New right PCA distribution infarct, multifocal left MCA distribution infarcts    Stroke ([facility] 01/11/2020    large vessel occlusion/left M2 occlusion.   He subsequently underwent emergent thrombectomy.  /  S/P TPA DONE         Past Surgical History: Procedure Laterality Date    APPENDECTOMY      COLONOSCOPY N/A 4/27/2021    Normal colon. Dr. Gayathri Sandoval at . [address] Left 10/19/2020    CYSTOSCOPY, LEFT URETEROSCOPY w/ [place] LITHOTRIPSY, LEFT URETERAL STENT PLACEMENT performed by Cesar Rainey MD at [facility]. [address] Left 12/18/2020    cystoscopy, left stent placement, left retrograde pyelogram    CYSTOSCOPY Left 12/18/2020    CYSTOSCOPY, LEFT STENT REMOVAL, LEFT RETROGRADE PYELOGRAM, INSERTION OCCLUSIVE BALLOON - PT TO INTERV RADIOLOGY FOLLOWING performed by Cesar Rainey MD at [facility] / [address] / [place] Left 2/1/2021    CYSTO Left URETEROSCOPY w/ stent removal ET RETROGRADE performed by Cesar Rainey MD at [address] NEPHROSTOMY PERCUTANEOUS LEFT  12/18/2020    IR NEPHROSTOMY PERCUTANEOUS LEFT 12/18/2020 Randy Courtney MD [facility] SPECIAL PROCEDURES    LITHOTRIPSY      NEPHROLITHOTOMY Left 12/18/2020    cystoscopy, left stent placement, left retrograde pyelogram performed by Cesar Rainey MD at [facility] [address]    TRANSESOPHAGEAL ECHOCARDIOGRAM  01/17/2020    TURP N/A 08/10/2020    CYSTO w/ Greenlight Laser performed by Cesar Rainey MD at [facility] [address] N/A 4/27/2021    mild esophagitis. mild-mod. esophagitis.  Dr. Gayathri Sandoval at [facility]         Current Outpatient Medications   Medication Sig Dispense Refill    zinc 50 MG CAPS Take 1 tablet by mouth daily      aspirin 81 MG EC tablet Take 1 tablet by mouth daily 90 tablet 3    dabigatran (PRADAXA) 150 MG capsule Take 1 capsule by mouth 2 times daily 180 capsule 3    Omega-3 Fatty Acids (FISH OIL) 1000 MG CAPS Take 1 capsule by mouth 2 times daily 180 capsule 3    atorvastatin (LIPITOR) 40 MG tablet Take 1 tablet by mouth nightly 90 tablet 3    famotidine (PEPCID) 20 MG tablet Take 1 tablet by mouth 2 times daily 60 tablet 0    latanoprost (XALATAN) 0.005 % ophthalmic solution Place 1 drop into both eyes nightly      Cholecalciferol (VITAMIN D3) 25 MCG (1000 UT) CAPS Take 2,000 Units by mouth daily      VITAMIN B1-B12 IM Inject into the muscle every 30 days        No current facility-administered medications for this visit.          No Known Allergies      Family History   Problem Relation Age of Onset    Heart Disease Mother     Heart Disease Father     Stroke Father     Stroke Brother          Social History     Socioeconomic History    Marital status:      Spouse name: Not on file    Number of children: Not on file    Years of education: Not on file    Highest education level: Not on file   Occupational History    Not on file   Tobacco Use    Smoking status: Never    Smokeless tobacco: Never   Vaping Use    Vaping Use: Never used   Substance and Sexual Activity    Alcohol use: Yes     Comment: rarely    Drug use: No    Sexual activity: Yes   Other Topics Concern    Not on file   Social History Narrative    Not on file     Social Determinants of Health     Financial Resource Strain: Low Risk     Difficulty of Paying Living Expenses: Not very hard   Food Insecurity: No Food Insecurity    Worried About Running Out of Food in the Last Year: Never true    Ran Out of Food in the Last Year: Never true   Transportation Needs: Not on file   Physical Activity: Inactive    Days of Exercise per Week: 0 days    Minutes of Exercise per Session: 0 min   Stress: Not on file   Social Connections: Not on file   Intimate Partner Violence: Not on file   Housing Stability: Not on file       Vitals:    01/26/23 0953   BP: 134/74   Pulse: 69   Resp: 16   SpO2: 97%         Wt Readings from Last 3 Encounters:   01/26/23 194 lb 6.4 oz (88.2 kg)   12/05/22 198 lb (89.8 kg)   10/19/22 196 lb 2 oz (89 kg)         BP Readings from Last 3 Encounters:   01/26/23 134/74   12/05/22 110/80   10/19/22 112/74       Hematology and Coagulation  Lab Results   Component Value Date/Time    WBC 6.6 10/05/2022 09:21 AM    RBC 5.09 10/05/2022 09:21 AM    HGB 15.5 10/05/2022 09:21 AM    HCT 44.3 10/05/2022 09:21 AM MCV 87.0 10/05/2022 09:21 AM    MCH 30.5 10/05/2022 09:21 AM    MCHC 35.0 10/05/2022 09:21 AM    RDW 13.2 10/05/2022 09:21 AM     10/05/2022 09:21 AM    MPV 9.7 10/05/2022 09:21 AM         Chemistries  Lab Results   Component Value Date/Time     10/05/2022 09:21 AM    K 4.3 10/05/2022 09:21 AM     10/05/2022 09:21 AM    CO2 27 10/05/2022 09:21 AM    BUN 15 10/05/2022 09:21 AM    CREATININE 0.91 10/05/2022 09:21 AM    CALCIUM 9.3 10/05/2022 09:21 AM    PROT 7.3 10/05/2022 09:21 AM    LABALBU 4.2 10/05/2022 09:21 AM    BILITOT 0.8 10/05/2022 09:21 AM    ALKPHOS 105 10/05/2022 09:21 AM    AST 22 10/05/2022 09:21 AM    ALT 20 10/05/2022 09:21 AM     Lab Results   Component Value Date/Time    ALKPHOS 105 10/05/2022 09:21 AM    ALT 20 10/05/2022 09:21 AM    AST 22 10/05/2022 09:21 AM    PROT 7.3 10/05/2022 09:21 AM    BILITOT 0.8 10/05/2022 09:21 AM    BILIDIR 0.40 08/03/2020 01:28 PM    LABALBU 4.2 10/05/2022 09:21 AM     Lab Results   Component Value Date/Time    BUN 15 10/05/2022 09:21 AM    CREATININE 0.91 10/05/2022 09:21 AM     Lab Results   Component Value Date/Time    CALCIUM 9.3 10/05/2022 09:21 AM    MG 2.1 12/19/2020 08:56 AM     Lab Results   Component Value Date/Time    AST 22 10/05/2022 09:21 AM    ALT 20 10/05/2022 09:21 AM       Lab Results   Component Value Date/Time    URICACID 7.7 05/22/2016 01:32 PM         Review of Systems   Constitutional:  Negative for appetite change, chills, diaphoresis, fatigue, fever and unexpected weight change. HENT:  Negative for congestion, dental problem, drooling, ear discharge, ear pain, facial swelling, hearing loss, mouth sores, nosebleeds, postnasal drip, sinus pressure, sore throat, tinnitus, trouble swallowing and voice change. Eyes:  Negative for photophobia, pain, discharge, redness, itching and visual disturbance. Respiratory:  Negative for apnea, cough, choking, chest tightness, shortness of breath and wheezing.     Cardiovascular: Negative for chest pain, palpitations, leg swelling and near-syncope. Gastrointestinal:  Negative for abdominal distention, abdominal pain, anorexia, blood in stool, bowel incontinence, change in bowel habit, constipation, diarrhea, nausea and vomiting. Endocrine: Negative for cold intolerance, heat intolerance, polydipsia, polyphagia and polyuria. Genitourinary:  Negative for bladder incontinence. Musculoskeletal:  Positive for gait problem. Negative for arthralgias, back pain, joint swelling, myalgias, neck pain and neck stiffness. Skin:  Negative for color change, pallor, rash and wound. Allergic/Immunologic: Negative for environmental allergies, food allergies and immunocompromised state. Neurological:  Positive for dizziness, light-headedness and loss of balance. Negative for vertigo, tremors, focal weakness, seizures, syncope, facial asymmetry, speech difficulty, weakness, numbness and headaches. Hematological:  Negative for adenopathy. Does not bruise/bleed easily. Psychiatric/Behavioral:  Positive for decreased concentration and memory loss. Negative for agitation, behavioral problems, confusion, dysphoric mood, hallucinations, self-injury, sleep disturbance and suicidal ideas. The patient is not nervous/anxious and is not hyperactive. OBJECTIVE:    Physical Exam  Constitutional:       Appearance: He is well-developed. HENT:      Head: Normocephalic and atraumatic. No raccoon eyes or Monte's sign. Right Ear: External ear normal.      Left Ear: External ear normal.      Nose: Nose normal.   Eyes:      Conjunctiva/sclera: Conjunctivae normal.      Pupils: Pupils are equal, round, and reactive to light. Neck:      Thyroid: No thyroid mass or thyromegaly. Vascular: No carotid bruit. Trachea: No tracheal deviation. Meningeal: Brudzinski's sign and Kernig's sign absent. Cardiovascular:      Rate and Rhythm: Normal rate and regular rhythm.    Pulmonary: Effort: Pulmonary effort is normal.   Musculoskeletal:         General: No tenderness. Cervical back: Normal range of motion and neck supple. No rigidity. No muscular tenderness. Normal range of motion. Skin:     General: Skin is warm. Coloration: Skin is not pale. Findings: No erythema or rash. Nails: There is no clubbing. Psychiatric:         Attention and Perception: He is attentive. Mood and Affect: Mood is not anxious or depressed. Affect is not labile, blunt or inappropriate. Speech: He is communicative. Speech is not rapid and pressured, delayed, slurred or tangential.         Behavior: Behavior is not agitated, slowed, aggressive, withdrawn, hyperactive or combative. Behavior is cooperative. Thought Content: Thought content is not paranoid or delusional. Thought content does not include homicidal or suicidal ideation. Thought content does not include homicidal or suicidal plan. Cognition and Memory: Cognition is impaired. Memory is impaired. He exhibits impaired recent memory. He does not exhibit impaired remote memory. Judgment: Judgment is not impulsive or inappropriate. NEUROLOGICALEXAMINATION :     A) MENTAL STATUS:                   Alert and  oriented  To place  And  Person. No Aphasia. No  Dysarthria. Able   To  Follow       SIMPLE   commands                      No right  To left confusion. Normal  Speech  And language function. Insight and  Judgment ,Fund  Of  Knowledge   within normal limits                Recent  And  Remote memory    DECREASED                 Attention &  Concentration are    DECREASED                          B) CRANIAL NERVES :      CN : Visual  Acuity  And  Visual fields      LEFT   VISUAL  FIELD  DEFECT               Fundi  Could  Not  Be  Could  Not  Be  Evaluated.            3,4,6 CN : Both  Pupils are   Reactive and  Equal. Movements  Are  Intact. No  Nystagmus. No  HARSH. No  Afferent  Pupillary  Defect noted. 5 CN :  Normal  Facial sensations and Corneal  Reflexes           7 CN:  Normal  Facial  Symmetry  And  Strength. No facial  Weakness. 8 CN :  Hearing  Appears   DECREASED           9, 10 CN: Normal   spontaneous, reflex   palate   movements         11 CN:   Normal  Shoulder  shrug and  strength         12 CN :   Normal  Tongue movements and  Tongue  In midline                        No tongue   Fasciculations or atrophy       C) MOTOR  EXAM:                 Strength  In upper  And  Lower   extremities   within   normal limits                           Rapid   alternating  And  repetitions  Movements  DECREASED                Muscle  Tone  In upper  And  Lower  Extremities  Normal                  No rigidity. No  Spasticity. Bradykinesia   absent               No  Asterixis. Sustention  Tremor , Resting   Tremor   absent                    No   other  Abnormal  Movements noted           D) SENSORY :               Light   touch, pinprick,   position  And  Vibration DECREASED       E) REFLEXES:                   Deep  Tendon  Reflexes   DECREASED                   No  pathological  Reflexes  Bilaterally.                                   F) COORDINATION  AND  GAIT :                                Station and  Gait    SLOW    UNSTEADY                              Romberg 's test    POSITIVE                            Ataxia     PRESENT            ASSESSMENT:          Patient Active Problem List   Diagnosis    Corneal foreign body, right, initial encounter    Glaucoma suspect of both eyes    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    Combined forms of age-related cataract of both eyes    Acute cerebrovascular accident (CVA) ([facility]    Tissue plasminogen activator (tPA) administered at other facility within 24 hours before current admission Permanent atrial fibrillation (HCC)    Acute ischemic stroke ([address]    Left homonymous hemianopsia    Hyperlipidemia    Stroke, recent, without late effect    Bilateral carotid artery stenosis    Chronic cerebral ischemia    Memory problem    Gait difficulty    Balance problem    At high risk for stroke    Risk for falls    Cerebrovascular accident (CVA) due to embolism of right posterior cerebral artery (HCC)    Bilateral hemianopia    Dysphagia    Hematuria    PTSD (post-traumatic stress disorder)    Acute urinary retention    Horseshoe kidney    Kidney stone on left side    History of ischemic posterior cerebral artery stroke    Cerebrovascular accident (CVA) due to embolism of left middle cerebral artery ([address]    H/O ischemic left MCA stroke    Ischemic stroke ([address]    Atrial fibrillation (HCC)    Glaucoma suspect    Vitamin D deficiency    Tinnitus    Pure hypertriglyceridemia    Peripheral nerve disease    Panic disorder without agoraphobia with moderate panic attacks    Other B-complex deficiencies    Gastroesophageal reflux disease    Dizziness and giddiness    Cramp of extremity    Contact dermatitis and other eczema    Mononeuritis    Chronic post-traumatic stress disorder (PTSD) after  combat    Thrombocytopenia, unspecified           CT OF THE HEAD WITHOUT CONTRAST  2/27/2020 12:49 am       TECHNIQUE:   CT of the head was performed without the administration of intravenous   contrast. Dose modulation, iterative reconstruction, and/or weight based   adjustment of the mA/kV was utilized to reduce the radiation dose to as low   as reasonably achievable.        COMPARISON:   MR brain dated January 16 2020       HISTORY:   ORDERING SYSTEM PROVIDED HISTORY: dysphagia, recent stroke   TECHNOLOGIST PROVIDED HISTORY:       dysphagia, recent stroke   Reason for Exam: Trouble swallowing after eating dinner, hx of stroke   Acuity: Acute   Type of Exam: Initial       FINDINGS:   BRAIN/VENTRICLES: There is no acute intracranial hemorrhage, mass effect or   midline shift. No abnormal extra-axial fluid collection. The gray-white   differentiation is maintained without evidence of an acute infarct. There is   no evidence of hydrocephalus. Encephalomalacial changes are seen involving   the posterior left frontal lobe, and right temporal occipital lobe, related   to the recent infarcts. Lacunar infarcts are seen within the right   periventricular white matter region, related to the recent infarct. No new   areas of acute infarct are demonstrated. Generalized atrophy and senescent   white matter changes are present. ORBITS: The visualized portion of the orbits demonstrate no acute abnormality. SINUSES: The visualized paranasal sinuses and mastoid air cells demonstrate   no acute abnormality. SOFT TISSUES/SKULL:  No acute abnormality of the visualized skull or soft   tissues. Impression   1. Generalized atrophy, and encephalomalacial changes within the right   temporal occipital region, and posterior left frontal lobe, related to the   recent infarcts. No acute intraparenchymal hemorrhage, intra-axial mass, or   acute territorial infarct is present.                  [facility]    Vascular Carotid Procedure        Patient Name [address] [place] [address],[address] [facility]       Date of Study           02/27/2020                 [facility]        Date of      1950  Gender                  Male    Birth        Age          78 year(s)  Race                            Room Number  0916        Corporate ID [id number]    #        Patient Lizzie [de-identified]    #        MR #         [id number]     Sonographer             Chana Euceda RVT        Accession #  [id number]   Interpreting Physician  Chapincito Gary        Referring                Referring Physician     Jamie Mullen CNP    Nurse    Practitioner       Procedure   Type of Study:        Cerebral: Carotid, Carotid Scan Bilateral.       Indications for Study:Dysphagia and CVA. Blood Pressure:Right arm 141/91 mmHg. Patient Status: In Patient. Technical Quality:Limited visualization. Limitation reason:Difficult visualization, left arm BP could not be taken   due to I.V. placement. .       Comments:Basic Classification of ICA Stenosis:   PSV - Peak Systolic Velocity   Normal: No plaque or calcification identified, no elevation of PSV   Mild: <50% spectral broadening without increased PSV   Moderate: 50 - 69% PSV >125 - <230 cm/sec   Severe: 70 - 99% PSV >230 cm/sec   Critical: 80 - 99% PSV >230cm/sec and/or End Diastolic Velocities   >460BR/VVB. Conclusions        Summary        Simultaneous real time imaging utilizing B-Mode, color flow doppler and    spectral waveform analysis was performed on the bilateral extracerebral    vascular system. The study demonstrates:        Right:    Internal carotid artery has a mild, <50% stenosis based on velocities. The vertebral artery is patent with antegrade flow. Left:    Internal carotid artery has a mild, <50% stenosis based on velocities. The vertebral artery is patent with antegrade flow. Signature        ----------------------------------------------------------------    Electronically signed by Catalina Hubbard RVT(Sonographer)    on 02/27/2020 12:00 PM    ----------------------------------------------------------------        ----------------------------------------------------------------    Electronically signed by Riccardo Hurst(Interpreting    physician) on 02/27/2020 04:10 PM    ----------------------------------------------------------------       Findings:        Right Impression:                     Left Impression:    The common carotid artery is normal.  The common carotid artery is normal.        The carotid bulb has a smooth         The carotid bulb is normal.    fibrocalcific plaque causing a <50%    stenosis.                              The internal carotid artery has a                                          smooth calcific plaque causing a    The internal carotid artery has a     <50% stenosis based on velocities.    smooth fibrocalcific plaque causing a    <50% stenosis based on velocities.    The external carotid artery is                                          normal.    The external carotid artery is    normal.                               The vertebral artery is patent with                                          antegrade flow.    The vertebral artery is patent with    antegrade flow.             MRI OF THE BRAIN WITHOUT CONTRAST  1/16/2020 7:13 pm       TECHNIQUE:   Multiplanar multisequence MRI of the brain was performed without the   administration of intravenous contrast.       COMPARISON:   CT head neck 01/16/2020, MRI brain performed 01/11/2020       HISTORY:   ORDERING SYSTEM PROVIDED HISTORY: Dysarthria, CTA with R P2 occlusion   TECHNOLOGIST PROVIDED HISTORY:   Dysarthria, CTA with R P2 occlusion       FINDINGS:   INTRACRANIAL STRUCTURES/VENTRICLES: Redemonstration of the evolving acute   strokes identified involving left MCA distribution involving the left   posterior insular region.  Multifocal areas of left MCA infarct identified   left frontal lobe cortex left parietal cortex and scattered foci involving   subcortical white matter left parietal lobe which have mildly progressed   since the previous examination with a few new foci of infarct.  Focus of   restricted diffusion identified involving right posterior frontal centrum   semiovale minimally progressed when compared to previous examination.  New   predominant cortical based restricted diffusion identified involving the   right PCA distribution.  Many of these infarcts demonstrate mild T2   prolongation.       No hemorrhagic conversion identified.  Mild generalized involutional changes   with prominence of ventricles and sulci.  Mild periventricular subcortical T2   prolongation  suggestive chronic small vessel ischemic disease.       No shift of midline structures.  Basal cisterns are patent.       ORBITS: The visualized portion of the orbits demonstrate no acute abnormality.       SINUSES: Mild ethmoid sinus mucosal thickening.       BONES/SOFT TISSUES: The bone marrow signal intensity appears normal. The soft   tissues demonstrate no acute abnormality.           Impression   New right PCA distribution infarct.  No hemorrhagic conversion.       Multifocal areas of evolving left MCA distribution infarct, there are few   scattered new foci of restricted diffusion identified suggestion of mild   progression of the left MCA infarct.       Mild chronic small ischemic disease and age related involutional change.             CT OF THE HEAD WITHOUT CONTRAST  1/16/2020 11:55 am       TECHNIQUE:   CT of the head was performed without the administration of intravenous   contrast. Dose modulation, iterative reconstruction, and/or weight based   adjustment of the mA/kV was utilized to reduce the radiation dose to as low   as reasonably achievable.       COMPARISON:   Head CT 01/12/2020, MRI brain 01/11/2020, head CT 01/10/2020       HISTORY:   ORDERING SYSTEM PROVIDED HISTORY: vision loss   TECHNOLOGIST PROVIDED HISTORY:   vision loss       Reason for Exam: 1/10/2020 patient came to ER with stroke like symptoms, he   was sent to [facility] for thrombectomy today Patient has vision blurring and   dizziness       FINDINGS:   BRAIN/VENTRICLES: Continued evolution of subacute infarction in the left   frontal lobe and left insular cortex with loss of gray-white matter   differentiation.  There is small area of low attenuation within left parietal   lobe subcortical white matter.  No associated hemorrhage.       Diffuse parenchymal volume loss with enlargement of the ventricles and   cerebral sulci.  No abnormal extra-axial fluid collection.  No hydrocephalus.   Mild periventricular white matter low attenuation  compatible with chronic   microvascular ischemic changes. ORBITS: The visualized portion of the orbits demonstrate no acute abnormality. SINUSES: The visualized paranasal sinuses and mastoid air cells demonstrate   no acute abnormality. SOFT TISSUES/SKULL: No acute abnormality of the visualized skull or soft   tissues. Impression   1. Continued evolution of subacute infarctions in the left MCA territory   involving the left lateral frontal lobe and insular cortex as well as left   parietal lobe subcortical white matter. No hemorrhagic transformation. 2. Diffuse parenchymal volume loss. Mild chronic microvascular ischemic   changes. Findings were discussed with Eduin Murillo at 12:16 pm on 1/16/2020. CTA OF THE HEAD WITH CONTRAST WITH PERFUSION 1/11/2020 2:14 am       TECHNIQUE:   CTA of the head/brain was performed with the administration of intravenous   contrast. Multiplanar reformatted images are provided for review. MIP images   are provided for review. Dose modulation, iterative reconstruction, and/or   weight based adjustment of the mA/kV was utilized to reduce the radiation   dose to as low as reasonably achievable. COMPARISON:   CT and CTA head and neck 1/11/2020       HISTORY:   ORDERING SYSTEM PROVIDED HISTORY: stroke   TECHNOLOGIST PROVIDED HISTORY:   stroke   Reason for Exam: post TPA   Acuity: Unknown   Type of Exam: Unknown       FINDINGS:   Mildly elevated mean transit time involving the left frontal, parietal, and   temporal lobes, in the left MCA vascular territory distribution. Relative   cerebral blood flow and relative cerebral blood volume are maintained. No   perfusion mismatch. Impression   No perfusion mismatch. Findings were discussed with Dr. Sharron Mcintyre At 2:50 am on 1/11/2020.                CTA OF THE HEAD AND NECK WITH CONTRAST 1/11/2020 12:36 am:       TECHNIQUE:   CTA of the head and neck was performed with the administration of intravenous   contrast. Multiplanar reformatted images are provided for review. MIP images   are provided for review. Stenosis of the internal carotid arteries measured   using NASCET criteria. Dose modulation, iterative reconstruction, and/or   weight based adjustment of the mA/kV was utilized to reduce the radiation   dose to as low as reasonably achievable. COMPARISON:   Noncontrast CT head 1/10/2020       HISTORY:   ORDERING SYSTEM PROVIDED HISTORY: stroke   TECHNOLOGIST PROVIDED HISTORY:   stroke   Reason for Exam: Stroke, TPA in process   Acuity: Acute   Type of Exam: Subsequent/Follow-up       FINDINGS:       CTA NECK:       AORTIC ARCH/ARCH VESSELS: Normal, three-vessel aortic arch anatomy. Calcified and noncalcified atherosclerotic plaque of the aortic arch and   proximal arch vessels. No dissection or arterial injury. No significant   stenosis of the brachiocephalic or subclavian arteries. CAROTID ARTERIES: Scattered, noncalcified atherosclerotic plaque of the mid   to distal common carotid arteries without focal stenosis. Calcified and   noncalcified atherosclerotic plaque of the right carotid bifurcation and   proximal right internal carotid artery. Mild, less than 50%, stenosis of the   right internal carotid artery by NASCET criteria. Calcified and noncalcified   atherosclerotic plaque of the left carotid bifurcation and proximal left   internal carotid artery. Mild, less than 50%, stenosis of the left internal   carotid artery by NASCET criteria. No arterial injury or dissection. VERTEBRAL ARTERIES: Vertebral arteries arise from their respective subclavian   arteries. Left vertebral artery is dominant with a developmentally   hypoplastic right vertebral artery. Mild narrowing of the left vertebral   artery origin. SOFT TISSUES: Lung apices are clear. No cervical or superior mediastinal   lymphadenopathy. Larynx and pharynx are unremarkable.   No acute abnormality   of the salivary and thyroid glands. BONES: No acute osseous abnormality. CTA HEAD:       ANTERIOR CIRCULATION: Atherosclerotic calcification of the cavernous internal   carotid arteries without focal stenosis. Occlusion of a left middle cerebral   artery proximal to mid M2 branch, at the level of the sylvian fissure. No   significant stenosis of the intracranial internal carotid, anterior cerebral,   or right middle cerebral arteries. No aneurysm. POSTERIOR CIRCULATION: Intracranial dominance of the left vertebral artery. No focal stenosis of the intracranial vertebral arteries or basilar artery. No flow-limiting stenosis of the posterior cerebral arteries. Left P1   segment is hypoplastic with fetal type origin of the left posterior cerebral   artery. No aneurysm. OTHER: No dural venous sinus thrombosis on this non-dedicated study. BRAIN: No mass effect or midline shift. No extra-axial fluid collection. Gray-white differentiation is maintained. Impression   Mild, less than 50%, stenosis of the internal carotid arteries by NASCET   criteria. Occlusion of a left middle cerebral artery proximal to mid M2 branch, at the   level of the sylvian fissure. CTA OF THE HEAD AND NECK WITH CONTRAST 1/16/2020 1:42 pm:       TECHNIQUE:   CTA of the head and neck was performed with the administration of intravenous   contrast. Multiplanar reformatted images are provided for review. MIP images   are provided for review. Stenosis of the internal carotid arteries measured   using NASCET criteria. Dose modulation, iterative reconstruction, and/or   weight based adjustment of the mA/kV was utilized to reduce the radiation   dose to as low as reasonably achievable. COMPARISON:   Head CT 01/16/2020, 01/12/2020. CTA head 01/11/2020.        HISTORY:   ORDERING SYSTEM PROVIDED HISTORY: stroke   TECHNOLOGIST PROVIDED HISTORY:   stroke   Reason for Exam: vision disturbance today, recent 1/10/2020 had stoke like   symptoms was transported to [facility] to do a thrombectomy       FINDINGS:       CTA NECK:       AORTIC ARCH/ARCH VESSELS: No dissection or arterial injury. No significant   stenosis of the brachiocephalic or subclavian arteries. CAROTID ARTERIES: Mild noncalcified atherosclerotic plaque in the left common   carotid artery without significant stenosis. The right common carotid artery   is without significant stenosis. Calcified atherosclerotic plaque in the   carotid bulbs eccentrically resulting in less than 50% stenosis by NASCET   criteria. No dissection. VERTEBRAL ARTERIES: No dissection, arterial injury, or significant stenosis. Mild stenosis at the origin of the left vertebral artery. The left vertebral   artery is dominant. Hypoplastic right vertebral artery. SOFT TISSUES: The lung apices are clear. No cervical or superior mediastinal   lymphadenopathy. The larynx and pharynx are unremarkable. No acute   abnormality of the salivary and thyroid glands. BONES: No acute osseous abnormality. CTA HEAD:       ANTERIOR CIRCULATION: No significant stenosis of the intracranial internal   carotid, anterior cerebral, or middle cerebral arteries. Previously   demonstrated left M2 branch occlusion is patent status post previous   thrombectomy. No aneurysm. POSTERIOR CIRCULATION: No significant stenosis of the vertebral, basilar, or   left posterior cerebral arteries. New occlusion of distal right P2 branch   (series 2, images [id number]). No aneurysm. Left posterior communicating   artery is present. OTHER: No dural venous sinus thrombosis on this non-dedicated study. BRAIN: No mass effect or midline shift. No extra-axial fluid collection. The   gray-white differentiation is maintained. Impression   1. New occlusion of distal P2 branch of the right posterior cerebral artery.    2. Previously demonstrated left M2 branch occlusion is now patent status post   recent thrombectomy. 3. No hemodynamically significant stenosis or occlusion in the cervical   arterial circulation. Mild less than 50% stenosis of the internal carotid   arteries by NASCET criteria bilaterally. Critical results were called by Dr. Aranza Joy MD to Nila Palacios on 1/16/2020 at 14:35. MRI OF THE BRAIN WITHOUT CONTRAST  1/11/2020 4:47 pm       TECHNIQUE:   Multiplanar multisequence MRI of the brain was performed without the   administration of intravenous contrast.       COMPARISON:   CT angiogram head and neck done earlier same day. No prior brain MRI   available for comparison. HISTORY:   ORDERING SYSTEM PROVIDED HISTORY: stroke   TECHNOLOGIST PROVIDED HISTORY:   stroke       FINDINGS:   INTRACRANIAL STRUCTURES/VENTRICLES: Multiple small areas of acute stroke in   the posterior left middle cerebral artery territory involving the left   frontal and parietal lobes, most pronounced in the frontal insular region. There is an additional punctate focus of acute stroke in the right centrum   semiovale. Small focus of gradient blooming in the left insula could   represent trace petechial hemorrhage. Generalized cerebral and cerebellar   volume loss. The axial FLAIR images demonstrate bilateral periventricular   and deep white matter signal hyperintensities, commonly seen in the setting   of chronic small vessel ischemic disease. Punctate focus of gradient   blooming in the left parietal deep white matter demonstrates no correlating   diffusion restriction and may represent a small cavernoma or amyloid   angiopathy. No mass effect or midline shift. No evidence of a large acute intracranial   hemorrhage. The ventricles and sulci are normal in size and configuration. The sellar/suprasellar regions appear unremarkable.   The normal signal voids   within the major intracranial vessels appear maintained. ORBITS: The visualized portion of the orbits demonstrate no acute abnormality. SINUSES: The paranasal sinuses are clear. Small amount of fluid in the   bilateral mastoid air cells. BONES/SOFT TISSUES: The bone marrow signal intensity appears normal. The soft   tissues demonstrate no acute abnormality. Impression   1. Multiple small areas of acute stroke in the posterior left middle   cerebral artery territory involving the left frontal and parietal lobes, most   pronounced in the frontal insular region. Additional punctate focus of acute   stroke in the right centrum semiovale. 2.  Small focus of gradient blooming in the left insula could represent trace   petechial hemorrhage. No large acute intracranial hemorrhage. No mass   effect or midline shift. 3.  Mild chronic small vessel ischemic disease. VISITING DIAGNOSIS:      ICD-10-CM    1. H/O: stroke with residual effects  I69.30 CT HEAD WO CONTRAST     VL DUP CAROTID BILATERAL     VL TRANSCRANIAL DOPPLER COMPLETE      2. Cerebrovascular accident (CVA) due to embolism of right posterior cerebral artery (HCC)  I63.431 CT HEAD WO CONTRAST     VL DUP CAROTID BILATERAL     VL TRANSCRANIAL DOPPLER COMPLETE      3. Dizziness and giddiness  R42 CT HEAD WO CONTRAST     VL DUP CAROTID BILATERAL     VL TRANSCRANIAL DOPPLER COMPLETE      4. Panic disorder without agoraphobia with moderate panic attacks  F41.0       5. Atrial fibrillation, unspecified type ([address]  I48.91       6. PTSD (post-traumatic stress disorder)  F43.10       7. H/O ischemic left MCA stroke  Z86.73 CT HEAD WO CONTRAST     VL DUP CAROTID BILATERAL     VL TRANSCRANIAL DOPPLER COMPLETE      8. Horseshoe kidney  Q63.1       9. Balance problem  R26.89       10. At high risk for stroke  Z91.89       11. Peripheral nerve disease  G62.9       12. History of ischemic posterior cerebral artery stroke  Z86.73       13.  Left homonymous hemianopsia  H53.462       14. Risk for falls  Z91.81                      CONCERNS   &   INCREASED   RISK   FOR         * TIA,  CEREBRO  VASCULAR  ISCHEMIA,STROKE     *   DIZZINESS,   VERTEBROBASILAR  INSUFFICIENCY ,         *   COGNITIVE  &   MEMORY PROBLEMS  AND  DEMENTIAS        *  GAIT  DIFFICULTIES  &   BALANCE PROBLMES   AND  FALL                VARIOUS  RISK   FACTORS   WERE  REVIEWED   AND   DISCUSSED. *  PATIENT   HAS  MULTIPLE   MEDICAL, NEUROLOGICAL                 PROBLEMS . PATIENT'S   MANAGEMENT  IS  CHALLENGING. PLAN:                         Liliane Scott  Of  The  Diagnoses,  The  Management & Treatment  Options            AND    Care  plan  Were          Reviewed and   Discussed   With  patient. * Goals  And  Expectations  Of  The  Therapy  Discussed   And  Reviewed. *   Benefits   And   Side  Effect  Profile  Of  Medication/s   Were   Discussed                * Need   For  Further   Follow up For  The  Various  Problems Were  discussed. * Results  Of  The  Previous  Diagnostic tests were reviewed and  discussed                 Medical  Decision  Making  Was  Made  Based on the   Complexity  Of  Above  Mentioned  Diagnoses,    Data reviewed             And    Risk  Of  Significant   Co morbidities and   complicating   Factors. Medical  Decision  Was   High     Complexity   Due   To  The  Patient's  Multiple  Symptoms,  Advancing   Disease,      Complex  Treatment  Regimen,  Multiple medications           and   Risk  Of   Side  Effects,  Difficulty  In  Medication  Management  And  Diagnostic  Challenges       In  View  Of  The  Associated   Co  Morbid  Conditions   And  Problems. * FALL   PRECAUTIONS.       THESE  REVIEWED   AND  DISCUSSED      *  USE   WALKING  ASSISTANCE  DEVICES           *    SUPERVISED   CARE      *   BE  CAREFUL  WITH  ACTIVITIES               *   ADEQUATE   FLUIDINTAKE   AND ELECTROLYTE  BALANCE             * KEEP  DAIRY  OF   THE  NEUROLOGICAL  SYMPTOMS        RECORDING THE    DURATION  AND  FREQUENCY. *  AVOID    CONDITIONS  AND  FACTORS   THAT  MAKE                  NEUROLOGICAL  SYMPTOMS  WORSE.                *TO  MAINTAIN  REGULAR  SLEEP  WAKE  CYCLES. *   TO  HAVE  ADEQUATE  REST  AND   SLEEP    HOURS.              *    AVOID  ANY USAGE OF    TOBACCO,              EXCESSIVE  ALCOHOL  AND   ILLEGAL   SUBSTANCES          *  CONTINUE   MEDICATIONS    PRESCRIBED      AS    RECOMMENDED         *   Compliance   With  Medications   And  Instructions            * CURRENTLY    TOLERATING  THE  PRESCRIBED   MEDICATIONS. WITHOUT  ANY  SIGNIFICANT  SIDE  EFFECTS   &  GETTING BENEFIT.             *    Antiplatelet  therapy    As   Recommended  Was   Discussed        *    Prophylactic  Use   Of     Vitamin   B   Complex,  Folic  Acid,    Vitamin  B12    Multivitamin,       Calcium  With  magnesium  And  Vit D    Supplementations   Over  The  Counter  Discussed                   *  PATIENT  IS  ALSO   COUNSELED   TO  KEEP    ACTIVITIES:         A)   SIMPLE      B)  ORGANIZED      C)  WRITEDOWN                     *  EVALUATIONS  AND  FOLLOW UP:                * PHYSICAL  THERAPY                                 *CARDIOLOGY              *   OPTHALMOLOGY                                    *    POST  STROKE          SYMPTOMS                          A)    GAIT  DIFFICULTY                         B)  BALANCE  PROBLEMS                                            C)    RESIDUAL   LEFT  VISUAL  FIELD  DEFECTS                        D)    MEMORY  PROBLEMS                                        -     IMPROVED   SIGNIFICANTLY                                           *            PATIENT       ON     ASA    81  MG       DAILY                                                  AND  PRADAXA                                        -     TOLERATING  THE  SAME                 *       RECOMMENDED  : A)    FALL  PRECAUTIONS                                 B)   USE  QUAD  AND  WALKER  AS  NEEDED                               C)      FOLLOW  UP    WITH     PCP                                  D)      BE  CAREFUL     WITH    ACTIVITIES   INCLUDING   DRIVING                               E)     FOLLOW  UP    WITH   EYE    DOCTORS    PERIODICALLY                              F)     TO CONTINUE   ASA    AND  ANTI  COAGULATION    AS  BEFORE                              G )  FOLLOW  UP    WITH     CARDIOLOGY  PERIODICALLY                              H)    FOLLOW    UP   NEURO  DIAGNOTIC      EVALUATIONS                                - ABOVE     REVIEWED   AND   DISCUSSED    WITH  PATIENT  AND  HIS  WIFE                                            Orders Placed This Encounter   Procedures    CT HEAD WO CONTRAST    VL DUP CAROTID BILATERAL    VL TRANSCRANIAL DOPPLER COMPLETE                            *PATIENT   TO  FOLLOW  UP  WITH   PRIMARY  CARE         OTHER  CONSULTANTS  AS  BEFORE. *  Maintain   Healthy  Life Style    With   Periodic  Monitoring  Of      Any  Medical  Conditions  Including   Elevated  Blood  Pressure,  Lipid  Profile,     Blood  Sugar levels  AndHeart  Disease. *   Period   Screening  For  Cancers  Involving  Breast,  Colon,    lungs  And  Other  Organs  As  Applicable,  In consultation   With  Your  Primary Care Providers. *Second  Neurological  Opinion  And  Evaluations  In  [facility]  Setting  If  Patient  Is  Interested. * Please   Contact   Neurology  Clinic   Early   If   Are  Any  New  Neurological   And  Any neurological  Concerns.                   *  If  The  Patient remains  Neurologically  Stable   Return   To  [facility] Neurology Department   IN     3 - 6         MONTHS  TIME   FOR  FURTHER              FOLLOW UP.                       *   The  Neurological   Findings, Possible  Diagnosis,  Differential diagnoses                    And  Options  For    Further   Investigations                   And  management   Are  Discussed  Comprehensively. Medications   And  Prescription   Risks  And  Side effects  Are   Also  Discussed. *  If   There is  Any  Significant  Worsening   Of  Current  Symptoms  And  Or                  If patient  Develops   Any additional  New  NeurologicalSymptoms                  Or  Significant  Concerns   Should  Call  911 or  Go  To  Emergency  Department                    For  Further  Immediate  Evaluation. The   Above  Were  Reviewed  With  patient   And   HIS  WIFE                          questions  Answered  In  Detail. More   Than  50% of face  To face Time   Was  Spent  On  Counseling                    And   Coordination  Of  Care   Of   Patient's  multiple   Neurological  Problems                         And   Comorbid  Medical   Conditions. TOTAL   TIME     SPENT :                On this date 1/26/2023 I have spent    40  minutes   reviewing previous notes, test results               and face to face time with the patient including   discussing the diagnosis and importance of compliance               with the treatment plan  as well as documenting on the day of the visit. Electronically signed by Pepe Little MD., [facility]      Board Certified in  Neurology &  In  [facility] of Psychiatry and Neurology (ABPN)      DISCLAIMER:   Although every effort was made to ensure the accuracy of this  electronictranscription, some errors in transcription may have occurred.       GENERAL PATIENT INSTRUCTIONS:     A Healthy Lifestyle: Care Instructions  Your Care Instructions  A healthy lifestyle can help you feel good, stay at ahealthy weight, and have plenty of energy for both work and play. A healthy lifestyle is something you can share with your whole family. A healthy lifestyle also can lower your risk for serious health problems, such ashigh blood pressure, heart disease, and diabetes. You can follow a few steps listed below to improve your health and the health of your family. Follow-up careis a key part of your treatment and safety. Be sure to make and go to all appointments, and call your doctor if you are having problems. Its also a good idea to know your test results and keep a list of the medicines you take. How can you care for yourself at home? Do not eat too much sugar, fat, or fast foods. You can still have dessert and treats nowand then. The goal is moderation. Start small to improve your eating habits. Pay attention to portion sizes, drink less juice and soda pop, and eat more fruits and vegetables. Eat a healthy amount of food. A 3-ounce serving of meat, for example, is about the size of a deck of cards. Fill the rest of your plate with vegetables and whole grains. Limit theamount of soda and sports drinks you have every day. Drink more water when you are thirsty. Eat at least 5 servings of fruits and vegetables every day. It may seem like a lot, but it is not hard to reach this goal. Aserving or helping is 1 piece of fruit, 1 cup of vegetables, or 2 cups of leafy, raw vegetables. Have an apple or some carrot sticks as an afternoon snack instead of a candy bar. Try to have fruits and/or vegetables at everymeal.  Make exercise part of your daily routine. You may want to start with simple activities, such as walking, bicycling, or slow swimming. Try marietta active 30 to 60 minutes every day. You do not need to do all 30 to 60 minutes all at once. For example, you can exercise 3 times a day for 10 or 20 minutes. Moderate exercise is safe for most people, but it is always agood idea to talk to your doctor before starting an exercise program.  Keep moving.  Tony shoutr the lawn, work in the garden, or clean your house. Take the stairs instead of the elevator at work. If you smoke, quit. Peoplewho smoke have an increased risk for heart attack, stroke, cancer, and other lung illnesses. Quitting is hard, but there are ways to boost your chance of quitting tobacco for good. Use nicotine gum, patches, or lozenges. Ask your doctor about stop-smoking programs and medicines. Keep trying. In addition to reducing your risk of diseases in the future, you will notice some benefits soon after you stop using tobacco. If you have shortness of breath or asthma symptoms, they will likely getbetter within a few weeks after you quit. Limit how much alcohol you drink. Moderate amounts of alcohol (up to 2 drinks a day for men, 1drink a day for women) are okay. But drinking too much can lead to liver problems, high blood pressure, and other health problems. health  If you have a family, there are many things you can do together to improve your health. Eat meals together as a family as often as possible. Eat healthy foods. This includes fruits, vegetables, lean meats and dairy, and whole grains. Include your family in your fitness plan. Most peoplethink of activities such as jogging or tennis as the way to fitness, but there are many ways you and your family can be more active. Anything that makes you breathe hard and gets your heart pumping is exercise. Here are sometips:  Walk to do errands or to take your child to school or the bus. Go for a family bike ride after dinner instead of watching TV. Where can you learn more? Go totps://ac.healthSafaricross. org and sign in to your [employer] account. Enter T248 in the Search HealthInformation box to learn more about \"A Healthy Lifestyle: Care Instructions. \"     If you do not have anaccount, please click on the \"Sign Up Now\" link. Current as of: July 26, 2016  Content Version: 11.2  © 8176-7775 [employer].  Care instructions adapted under license by [facility] ([facility]). If you have questions about a medical condition or this instruction, always ask your healthcare professional. Healthwise,Incorporated disclaims any warranty or liability for your use of this information.

## 2023-02-06 ENCOUNTER — HOSPITAL ENCOUNTER (OUTPATIENT)
Dept: NEUROLOGY | Age: 73
Discharge: HOME | End: 2023-02-06
Payer: MEDICARE

## 2023-02-06 DIAGNOSIS — Z86.73 H/O ISCHEMIC LEFT MCA STROKE: ICD-10-CM

## 2023-02-06 DIAGNOSIS — I63.431 CEREBROVASCULAR ACCIDENT (CVA) DUE TO EMBOLISM OF RIGHT POSTERIOR CEREBRAL ARTERY (HCC): ICD-10-CM

## 2023-02-06 DIAGNOSIS — I69.30 H/O: STROKE WITH RESIDUAL EFFECTS: ICD-10-CM

## 2023-02-06 DIAGNOSIS — R42 DIZZINESS AND GIDDINESS: ICD-10-CM

## 2023-02-06 PROCEDURE — 93886 INTRACRANIAL COMPLETE STUDY: CPT

## 2023-02-06 NOTE — PROGRESS NOTES
TCD Completed without Emboli Detection. PCP: Olivia Valera MD    Ordering: Ekaterina Salter Neurologist    Interpreting Physician: Ekaterina Salter Neurologist    Electronically signed by David Nava RN on 2/6/2023 at 10:57 AM

## 2023-02-09 NOTE — PROCEDURES
[address] [address]                             Transcranial Doppler (TCD)      PATIENT NAME: Griffin South                     :        1950  MED REC NO:   [id number]                             ROOM:  ACCOUNT NO:   [de-identified]                           ADMIT DATE: 2023    PROVIDER:     Kane Burgos MD    DATE OF STUDY:  2023      TECHNIQUE:  Transcranial Doppler study of intracranial arteries was  performed using [employer] equipment with digital Doppler technology, with  high resolution 250 Imogene M-mode display and Multigate Spectral Windows  and 2 MHz Doppler probes via temporal, suboccipital and orbital  approaches. CLINICAL DATA:      The patient is 67years old with a history of previous  stroke, memory problems, atrial fibrillation, gait difficulty, balance  problems, posterior cerebral artery stroke, left middle cerebral artery  stroke, dizziness. The purpose of the study is to evaluate for stroke, intracranial focal  stenosis, flow abnormalities, vertebrobasilar insufficiency evaluations. SUMMARY:      Anterior circulation could not be evaluated due to the absence  of the corresponding temporal windows bilaterally. Mean flow velocities in the right and left vertebral arteries, basilar  artery are low with elevated PI values. IMPRESSION:      1. 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 2.  Anterior circulation could not be evaluated due to the absence of        the corresponding temporal windows. Further clinical correlation and followup recommended. Bryson Squires MD, [address] [place] [address]     Board Certified in Neurology  & in  [id number] [address] [place] [address] of Psychiatry and Neurology (ABPN).             D: 2023 10:24:58       T: 2023 11:11:59     [id number]  Job#: [id number]     Doc#: [id number]    CC:  Ashley Mills MD

## 2023-03-03 ENCOUNTER — HOSPITAL ENCOUNTER (OUTPATIENT)
Dept: CT IMAGING | Age: 73
End: 2023-03-03
Payer: MEDICARE

## 2023-03-03 ENCOUNTER — HOSPITAL ENCOUNTER (OUTPATIENT)
Dept: INTERVENTIONAL RADIOLOGY/VASCULAR | Age: 73
End: 2023-03-03
Payer: MEDICARE

## 2023-03-03 DIAGNOSIS — I63.431 CEREBROVASCULAR ACCIDENT (CVA) DUE TO EMBOLISM OF RIGHT POSTERIOR CEREBRAL ARTERY (HCC): ICD-10-CM

## 2023-03-03 DIAGNOSIS — Z86.73 H/O ISCHEMIC LEFT MCA STROKE: ICD-10-CM

## 2023-03-03 DIAGNOSIS — I69.30 H/O: STROKE WITH RESIDUAL EFFECTS: ICD-10-CM

## 2023-03-03 DIAGNOSIS — R42 DIZZINESS AND GIDDINESS: ICD-10-CM

## 2023-03-03 PROCEDURE — 70450 CT HEAD/BRAIN W/O DYE: CPT

## 2023-03-03 PROCEDURE — 93880 EXTRACRANIAL BILAT STUDY: CPT

## 2023-04-11 PROBLEM — F43.12 CHRONIC POST-TRAUMATIC STRESS DISORDER: Status: ACTIVE | Noted: 2023-04-11

## 2023-04-11 PROBLEM — M54.50 CHRONIC LOW BACK PAIN: Status: ACTIVE | Noted: 2023-04-11

## 2023-04-11 PROBLEM — F33.2 SEVERE EPISODE OF RECURRENT MAJOR DEPRESSIVE DISORDER, WITHOUT PSYCHOTIC FEATURES (HCC): Status: ACTIVE | Noted: 2023-04-11

## 2023-04-11 PROBLEM — F41.1 GENERALIZED ANXIETY DISORDER: Status: ACTIVE | Noted: 2023-04-11

## 2023-04-11 PROBLEM — K21.9 GASTRO-ESOPHAGEAL REFLUX: Status: ACTIVE | Noted: 2023-04-11

## 2023-04-11 PROBLEM — G90.09 OTHER IDIOPATHIC PERIPHERAL AUTONOMIC NEUROPATHY: Status: ACTIVE | Noted: 2023-04-11

## 2023-04-11 PROBLEM — G89.29 CHRONIC LOW BACK PAIN: Status: ACTIVE | Noted: 2023-04-11

## 2023-04-11 PROBLEM — D64.9 ANEMIA: Status: ACTIVE | Noted: 2023-04-11

## 2023-04-11 PROBLEM — N40.1 BENIGN PROSTATIC HYPERPLASIA WITH LOWER URINARY TRACT SYMPTOMS: Status: ACTIVE | Noted: 2023-04-11

## 2023-04-11 PROBLEM — I48.91 UNSPECIFIED ATRIAL FIBRILLATION (HCC): Status: ACTIVE | Noted: 2023-04-11

## 2023-04-11 PROBLEM — I63.9 CEREBRAL INFARCTION (HCC): Status: ACTIVE | Noted: 2023-04-11

## 2023-04-11 PROBLEM — F43.12 POST-TRAUMATIC STRESS DISORDER, CHRONIC: Status: ACTIVE | Noted: 2023-04-11

## 2023-04-17 ENCOUNTER — TELEPHONE (OUTPATIENT)
Dept: INTERNAL MEDICINE | Age: 73
End: 2023-04-17

## 2023-04-17 RX ORDER — IPRATROPIUM BROMIDE 21 UG/1
2 SPRAY, METERED NASAL EVERY 12 HOURS
Qty: 30 ML | Refills: 3 | Status: SHIPPED | OUTPATIENT
Start: 2023-04-17

## 2023-04-17 NOTE — TELEPHONE ENCOUNTER
Patient was in last week for appt. Prescribed Fluticasone. Wife stopped it because he says it feels like his Nostrils are swollen which makes it hard to breathe so he coughs more frequently. Can you change to something else or what do you advise?   [phone]

## 2023-04-17 NOTE — TELEPHONE ENCOUNTER
Please call him and let him know that he can try ipratropium nasal spray, which I sent to the pharmacy, which she can stop when his symptoms are better. Ultimately fluticasone and ipratropium are both symptomatic treatments so he does not have to use them if he does not want to, but ipratropium might work better for him so he can try it if he wants.

## 2023-07-27 ENCOUNTER — OFFICE VISIT (OUTPATIENT)
Dept: NEUROLOGY | Age: 73
End: 2023-07-27
Payer: MEDICARE

## 2023-07-27 VITALS
SYSTOLIC BLOOD PRESSURE: 128 MMHG | DIASTOLIC BLOOD PRESSURE: 68 MMHG | BODY MASS INDEX: 29.91 KG/M2 | HEART RATE: 86 BPM | OXYGEN SATURATION: 97 % | RESPIRATION RATE: 16 BRPM | WEIGHT: 191 LBS

## 2023-07-27 DIAGNOSIS — Z86.73 H/O ISCHEMIC LEFT MCA STROKE: ICD-10-CM

## 2023-07-27 DIAGNOSIS — G45.0 VBI (VERTEBROBASILAR INSUFFICIENCY): ICD-10-CM

## 2023-07-27 DIAGNOSIS — R26.89 BALANCE PROBLEM: ICD-10-CM

## 2023-07-27 DIAGNOSIS — I63.9 ISCHEMIC STROKE (HCC): ICD-10-CM

## 2023-07-27 DIAGNOSIS — I67.82 CHRONIC CEREBRAL ISCHEMIA: ICD-10-CM

## 2023-07-27 DIAGNOSIS — Z91.81 RISK FOR FALLS: ICD-10-CM

## 2023-07-27 DIAGNOSIS — R26.9 GAIT DIFFICULTY: ICD-10-CM

## 2023-07-27 DIAGNOSIS — Q63.1 HORSESHOE KIDNEY: ICD-10-CM

## 2023-07-27 DIAGNOSIS — F41.0 PANIC DISORDER WITHOUT AGORAPHOBIA WITH MODERATE PANIC ATTACKS: ICD-10-CM

## 2023-07-27 DIAGNOSIS — H53.47 BILATERAL HEMIANOPIA: ICD-10-CM

## 2023-07-27 DIAGNOSIS — I48.91 ATRIAL FIBRILLATION, UNSPECIFIED TYPE (HCC): ICD-10-CM

## 2023-07-27 DIAGNOSIS — I69.30 H/O: STROKE WITH RESIDUAL EFFECTS: Primary | ICD-10-CM

## 2023-07-27 DIAGNOSIS — I65.23 BILATERAL CAROTID ARTERY STENOSIS: ICD-10-CM

## 2023-07-27 DIAGNOSIS — H53.462 LEFT HOMONYMOUS HEMIANOPSIA: ICD-10-CM

## 2023-07-27 DIAGNOSIS — R42 DIZZINESS AND GIDDINESS: ICD-10-CM

## 2023-07-27 DIAGNOSIS — I63.431 CEREBROVASCULAR ACCIDENT (CVA) DUE TO EMBOLISM OF RIGHT POSTERIOR CEREBRAL ARTERY (HCC): ICD-10-CM

## 2023-07-27 DIAGNOSIS — Z86.73 HISTORY OF ISCHEMIC POSTERIOR CEREBRAL ARTERY STROKE: ICD-10-CM

## 2023-07-27 DIAGNOSIS — G31.9 DIFFUSE CEREBRAL ATROPHY (HCC): ICD-10-CM

## 2023-07-27 DIAGNOSIS — Z91.89 AT HIGH RISK FOR STROKE: ICD-10-CM

## 2023-07-27 DIAGNOSIS — F43.10 PTSD (POST-TRAUMATIC STRESS DISORDER): ICD-10-CM

## 2023-07-27 PROCEDURE — 1123F ACP DISCUSS/DSCN MKR DOCD: CPT | Performed by: PSYCHIATRY & NEUROLOGY

## 2023-07-27 PROCEDURE — 99215 OFFICE O/P EST HI 40 MIN: CPT | Performed by: PSYCHIATRY & NEUROLOGY

## 2023-07-27 PROCEDURE — 99214 OFFICE O/P EST MOD 30 MIN: CPT | Performed by: PSYCHIATRY & NEUROLOGY

## 2023-07-27 ASSESSMENT — ENCOUNTER SYMPTOMS
APNEA: 0
SINUS PRESSURE: 0
VOMITING: 0
PHOTOPHOBIA: 0
WHEEZING: 0
EYE PAIN: 0
VISUAL CHANGE: 0
COLOR CHANGE: 0
EYE DISCHARGE: 0
EYE ITCHING: 0
TROUBLE SWALLOWING: 0
ABDOMINAL DISTENTION: 0
ABDOMINAL PAIN: 0
NAUSEA: 0
SORE THROAT: 0
EYE REDNESS: 0
SHORTNESS OF BREATH: 0
CONSTIPATION: 0
VOICE CHANGE: 0
CHOKING: 0
DIARRHEA: 0
BOWEL INCONTINENCE: 0
SWOLLEN GLANDS: 0
BLOOD IN STOOL: 0
COUGH: 0
FACIAL SWELLING: 0
CHANGE IN BOWEL HABIT: 0
BACK PAIN: 0
CHEST TIGHTNESS: 0

## 2023-07-27 ASSESSMENT — PATIENT HEALTH QUESTIONNAIRE - PHQ9
SUM OF ALL RESPONSES TO PHQ QUESTIONS 1-9: 0
1. LITTLE INTEREST OR PLEASURE IN DOING THINGS: 0
SUM OF ALL RESPONSES TO PHQ QUESTIONS 1-9: 0
2. FEELING DOWN, DEPRESSED OR HOPELESS: 0
SUM OF ALL RESPONSES TO PHQ QUESTIONS 1-9: 0
SUM OF ALL RESPONSES TO PHQ9 QUESTIONS 1 & 2: 0
SUM OF ALL RESPONSES TO PHQ QUESTIONS 1-9: 0

## 2023-10-05 ENCOUNTER — HOSPITAL ENCOUNTER (OUTPATIENT)
Age: 73
Discharge: HOME | End: 2023-10-05
Payer: MEDICARE

## 2023-10-05 DIAGNOSIS — R73.01 IFG (IMPAIRED FASTING GLUCOSE): ICD-10-CM

## 2023-10-05 DIAGNOSIS — E78.5 HYPERLIPIDEMIA, UNSPECIFIED HYPERLIPIDEMIA TYPE: ICD-10-CM

## 2023-10-05 LAB
ALBUMIN SERPL-MCNC: 4.3 G/DL (ref 3.5–5.2)
ALBUMIN/GLOB SERPL: 1.3 {RATIO} (ref 1–2.5)
ALP SERPL-CCNC: 95 U/L (ref 40–129)
ALT SERPL-CCNC: 24 U/L (ref 5–41)
ANION GAP SERPL CALCULATED.3IONS-SCNC: 10 MMOL/L (ref 9–17)
AST SERPL-CCNC: 25 U/L
BASOPHILS # BLD: 0.03 K/UL (ref 0–0.2)
BASOPHILS NFR BLD: 0 % (ref 0–2)
BILIRUB SERPL-MCNC: 1 MG/DL (ref 0.3–1.2)
BUN SERPL-MCNC: 16 MG/DL (ref 8–23)
BUN/CREAT SERPL: 18 (ref 9–20)
CALCIUM SERPL-MCNC: 9.3 MG/DL (ref 8.6–10.4)
CHLORIDE SERPL-SCNC: 106 MMOL/L (ref 98–107)
CHOLEST SERPL-MCNC: 114 MG/DL
CHOLESTEROL/HDL RATIO: 2.9
CO2 SERPL-SCNC: 26 MMOL/L (ref 20–31)
CREAT SERPL-MCNC: 0.9 MG/DL (ref 0.7–1.2)
EOSINOPHIL # BLD: 0.13 K/UL (ref 0–0.44)
EOSINOPHILS RELATIVE PERCENT: 2 % (ref 1–4)
ERYTHROCYTE [DISTWIDTH] IN BLOOD BY AUTOMATED COUNT: 13.2 % (ref 11.8–14.4)
EST. AVERAGE GLUCOSE BLD GHB EST-MCNC: 71 MG/DL
GFR SERPL CREATININE-BSD FRML MDRD: >60 ML/MIN/1.73M2
GLUCOSE SERPL-MCNC: 104 MG/DL (ref 70–99)
HBA1C MFR BLD: 4.1 % (ref 4–6)
HCT VFR BLD AUTO: 44.3 % (ref 40.7–50.3)
HDLC SERPL-MCNC: 39 MG/DL
HGB BLD-MCNC: 15.5 G/DL (ref 13–17)
IMM GRANULOCYTES # BLD AUTO: <0.03 K/UL (ref 0–0.3)
IMM GRANULOCYTES NFR BLD: 0 %
LDLC SERPL CALC-MCNC: 53 MG/DL (ref 0–130)
LYMPHOCYTES NFR BLD: 2.1 K/UL (ref 1.1–3.7)
LYMPHOCYTES RELATIVE PERCENT: 30 % (ref 24–43)
MCH RBC QN AUTO: 30.5 PG (ref 25.2–33.5)
MCHC RBC AUTO-ENTMCNC: 35 G/DL (ref 25.2–33.5)
MCV RBC AUTO: 87 FL (ref 82.6–102.9)
MONOCYTES NFR BLD: 0.57 K/UL (ref 0.1–1.2)
MONOCYTES NFR BLD: 8 % (ref 3–12)
NEUTROPHILS NFR BLD: 60 % (ref 36–65)
NEUTS SEG NFR BLD: 4.07 K/UL (ref 1.5–8.1)
NRBC BLD-RTO: 0 PER 100 WBC
PLATELET # BLD AUTO: 177 K/UL (ref 138–453)
PMV BLD AUTO: 9.3 FL (ref 8.1–13.5)
POTASSIUM SERPL-SCNC: 4.2 MMOL/L (ref 3.7–5.3)
PROT SERPL-MCNC: 7.5 G/DL (ref 6.4–8.3)
RBC # BLD AUTO: 5.09 M/UL (ref 4.21–5.77)
SODIUM SERPL-SCNC: 142 MMOL/L (ref 135–144)
TRIGL SERPL-MCNC: 111 MG/DL
WBC OTHER # BLD: 6.9 K/UL (ref 3.5–11.3)

## 2023-10-05 PROCEDURE — 80053 COMPREHEN METABOLIC PANEL: CPT

## 2023-10-05 PROCEDURE — 83036 HEMOGLOBIN GLYCOSYLATED A1C: CPT

## 2023-10-05 PROCEDURE — 36415 COLL VENOUS BLD VENIPUNCTURE: CPT

## 2023-10-05 PROCEDURE — 80061 LIPID PANEL: CPT

## 2023-10-05 PROCEDURE — 85025 COMPLETE CBC W/AUTO DIFF WBC: CPT

## 2023-10-12 ENCOUNTER — OFFICE VISIT (OUTPATIENT)
Dept: INTERNAL MEDICINE | Age: 73
End: 2023-10-12
Payer: MEDICARE

## 2023-10-12 VITALS
DIASTOLIC BLOOD PRESSURE: 82 MMHG | WEIGHT: 195 LBS | BODY MASS INDEX: 30.61 KG/M2 | RESPIRATION RATE: 16 BRPM | SYSTOLIC BLOOD PRESSURE: 130 MMHG | HEART RATE: 74 BPM | HEIGHT: 67 IN | OXYGEN SATURATION: 98 %

## 2023-10-12 DIAGNOSIS — K21.9 GASTROESOPHAGEAL REFLUX DISEASE WITHOUT ESOPHAGITIS: Primary | ICD-10-CM

## 2023-10-12 DIAGNOSIS — E78.5 HYPERLIPIDEMIA, UNSPECIFIED HYPERLIPIDEMIA TYPE: ICD-10-CM

## 2023-10-12 DIAGNOSIS — I48.91 ATRIAL FIBRILLATION, UNSPECIFIED TYPE (HCC): ICD-10-CM

## 2023-10-12 PROCEDURE — 99214 OFFICE O/P EST MOD 30 MIN: CPT | Performed by: INTERNAL MEDICINE

## 2023-10-12 PROCEDURE — 1123F ACP DISCUSS/DSCN MKR DOCD: CPT | Performed by: INTERNAL MEDICINE

## 2023-11-01 ENCOUNTER — OFFICE VISIT (OUTPATIENT)
Dept: CARDIOLOGY | Age: 73
End: 2023-11-01
Payer: MEDICARE

## 2023-11-01 VITALS
BODY MASS INDEX: 31.39 KG/M2 | HEIGHT: 67 IN | HEART RATE: 88 BPM | WEIGHT: 200 LBS | SYSTOLIC BLOOD PRESSURE: 134 MMHG | DIASTOLIC BLOOD PRESSURE: 60 MMHG

## 2023-11-01 DIAGNOSIS — E78.5 HYPERLIPIDEMIA, UNSPECIFIED HYPERLIPIDEMIA TYPE: ICD-10-CM

## 2023-11-01 DIAGNOSIS — I48.21 PERMANENT ATRIAL FIBRILLATION (HCC): Primary | ICD-10-CM

## 2023-11-01 PROCEDURE — 93010 ELECTROCARDIOGRAM REPORT: CPT | Performed by: INTERNAL MEDICINE

## 2023-11-01 PROCEDURE — 99213 OFFICE O/P EST LOW 20 MIN: CPT | Performed by: INTERNAL MEDICINE

## 2023-11-01 PROCEDURE — 1123F ACP DISCUSS/DSCN MKR DOCD: CPT | Performed by: INTERNAL MEDICINE

## 2023-11-01 PROCEDURE — 93005 ELECTROCARDIOGRAM TRACING: CPT | Performed by: INTERNAL MEDICINE

## 2023-11-01 PROCEDURE — 99214 OFFICE O/P EST MOD 30 MIN: CPT | Performed by: INTERNAL MEDICINE

## 2023-11-01 NOTE — PROGRESS NOTES
Today's Date: 11/1/2023  Patient's Name: Analia Barron  Patient's age: 75 y.o., 1950    Subjective:  Analia Barron is being seen in clinic today regarding Permanent atrial fibrillation, hyperlipidemia. he is here for follow up. He reports feeling tired. This is a chronic issues. No chest pain, no dyspnea, no PND, no syncope or pre-syncope, no orthopnea. He had stress test last year. Past Medical History:   has a past medical history of Atrial fibrillation ([address]), Blood in urine, Chronic kidney disease, CKD (chronic kidney disease), History of ischemic stroke in prior three months, Hyperlipidemia, Ischemic stroke ([address]), Kidney stones, Nihss score 10, PTSD (post-traumatic stress disorder), Skin tag, Stroke ([address]), and Stroke ([address]). Past Surgical History:   has a past surgical history that includes Appendectomy; transesophageal echocardiogram (01/17/2020); TURP (N/A, 08/10/2020); Cystoscopy (Left, 10/19/2020); Lithotripsy; Cystoscopy (Left, 12/18/2020); IR GUIDED NEPHROSTOMY CATH PLACEMENT LEFT (12/18/2020); NEPHROLITHOTOMY (Left, 12/18/2020); Cystoscopy (Left, 12/18/2020); CYSTOSCOPY INSERTION / REMOVAL STENT / STONE (Left, 2/1/2021); Colonoscopy (N/A, 4/27/2021); and Upper gastrointestinal endoscopy (N/A, 4/27/2021). Home Medications:  Prior to Admission medications    Medication Sig Start Date End Date Taking? Authorizing Provider   ipratropium (ATROVENT) 0.03 % nasal spray 2 sprays by Each Nostril route in the morning and 2 sprays in the evening.  4/17/23   Julissa Edwards MD   fluticasone [facility]) 50 MCG/ACT nasal spray 1 spray by Each Nostril route daily 4/11/23   Julissa Edwards MD   zinc 50 MG CAPS Take 1 tablet by mouth daily 2/23/21   ProviderCristian MD   aspirin 81 MG EC tablet Take 1 tablet by mouth daily 2/3/21   Dwayne Black MD   dabigatran [facility]) 150 MG capsule Take 1 capsule by mouth 2 times daily 2/3/21   Dwayne Black MD   Omega-3 Fatty Acids (FISH OIL) 1000 MG CAPS

## 2023-11-20 ENCOUNTER — OFFICE VISIT (OUTPATIENT)
Dept: PRIMARY CARE CLINIC | Age: 73
End: 2023-11-20
Payer: MEDICARE

## 2023-11-20 VITALS
TEMPERATURE: 98.2 F | OXYGEN SATURATION: 98 % | WEIGHT: 194 LBS | DIASTOLIC BLOOD PRESSURE: 74 MMHG | BODY MASS INDEX: 30.38 KG/M2 | SYSTOLIC BLOOD PRESSURE: 132 MMHG | HEART RATE: 58 BPM

## 2023-11-20 DIAGNOSIS — T16.2XXA FOREIGN BODY OF LEFT EAR, INITIAL ENCOUNTER: Primary | ICD-10-CM

## 2023-11-20 PROCEDURE — 99212 OFFICE O/P EST SF 10 MIN: CPT | Performed by: STUDENT IN AN ORGANIZED HEALTH CARE EDUCATION/TRAINING PROGRAM

## 2023-11-20 PROCEDURE — 1123F ACP DISCUSS/DSCN MKR DOCD: CPT | Performed by: STUDENT IN AN ORGANIZED HEALTH CARE EDUCATION/TRAINING PROGRAM

## 2023-11-20 PROCEDURE — 99213 OFFICE O/P EST LOW 20 MIN: CPT | Performed by: STUDENT IN AN ORGANIZED HEALTH CARE EDUCATION/TRAINING PROGRAM

## 2023-11-20 ASSESSMENT — PATIENT HEALTH QUESTIONNAIRE - PHQ9
SUM OF ALL RESPONSES TO PHQ9 QUESTIONS 1 & 2: 0
SUM OF ALL RESPONSES TO PHQ QUESTIONS 1-9: 0
SUM OF ALL RESPONSES TO PHQ QUESTIONS 1-9: 0
2. FEELING DOWN, DEPRESSED OR HOPELESS: 0
1. LITTLE INTEREST OR PLEASURE IN DOING THINGS: 0
SUM OF ALL RESPONSES TO PHQ QUESTIONS 1-9: 0
SUM OF ALL RESPONSES TO PHQ QUESTIONS 1-9: 0

## 2023-12-04 ENCOUNTER — HOSPITAL ENCOUNTER (OUTPATIENT)
Age: 73
Discharge: HOME | End: 2023-12-04
Payer: MEDICARE

## 2023-12-04 ENCOUNTER — HOSPITAL ENCOUNTER (OUTPATIENT)
Dept: GENERAL RADIOLOGY | Age: 73
Discharge: HOME | End: 2023-12-06
Payer: MEDICARE

## 2023-12-04 DIAGNOSIS — R97.20 ELEVATED PSA: ICD-10-CM

## 2023-12-04 DIAGNOSIS — N20.0 KIDNEY STONE ON LEFT SIDE: ICD-10-CM

## 2023-12-04 LAB — PSA SERPL-MCNC: 3.17 NG/ML

## 2023-12-04 PROCEDURE — 74018 RADEX ABDOMEN 1 VIEW: CPT

## 2023-12-04 PROCEDURE — 36415 COLL VENOUS BLD VENIPUNCTURE: CPT

## 2023-12-04 PROCEDURE — 84153 ASSAY OF PSA TOTAL: CPT

## 2023-12-15 ENCOUNTER — OFFICE VISIT (OUTPATIENT)
Dept: UROLOGY | Age: 73
End: 2023-12-15
Payer: MEDICARE

## 2023-12-15 VITALS
WEIGHT: 193 LBS | SYSTOLIC BLOOD PRESSURE: 122 MMHG | HEART RATE: 58 BPM | DIASTOLIC BLOOD PRESSURE: 76 MMHG | BODY MASS INDEX: 30.29 KG/M2 | RESPIRATION RATE: 16 BRPM | HEIGHT: 67 IN | OXYGEN SATURATION: 98 %

## 2023-12-15 DIAGNOSIS — Q63.1 HORSESHOE KIDNEY: ICD-10-CM

## 2023-12-15 DIAGNOSIS — R97.20 ELEVATED PSA: ICD-10-CM

## 2023-12-15 DIAGNOSIS — N13.8 BENIGN PROSTATIC HYPERPLASIA WITH URINARY OBSTRUCTION: Primary | ICD-10-CM

## 2023-12-15 DIAGNOSIS — N40.1 BENIGN PROSTATIC HYPERPLASIA WITH URINARY OBSTRUCTION: Primary | ICD-10-CM

## 2023-12-15 DIAGNOSIS — N20.0 KIDNEY STONES: ICD-10-CM

## 2023-12-15 PROCEDURE — 1123F ACP DISCUSS/DSCN MKR DOCD: CPT | Performed by: UROLOGY

## 2023-12-15 PROCEDURE — 99214 OFFICE O/P EST MOD 30 MIN: CPT | Performed by: UROLOGY

## 2023-12-15 PROCEDURE — 99212 OFFICE O/P EST SF 10 MIN: CPT | Performed by: UROLOGY

## 2023-12-15 NOTE — PROGRESS NOTES
JUSTUS Lainez MD        [address] [place] [address]  Dept: [phone]  Dept Fax: [phone]  Loc: [phone]      [facility] Urology Office Note - Follow up Visit    Patient:  Dionne Eastman  YOB: 1950    The patient is a 75 y.o. male who presents today for evaluation of the following problems: BPH,kidney stones  Chief Complaint   Patient presents with    Benign Prostatic Hypertrophy     1 yr f/u    Nephrolithiasis        HISTORY OF PRESENT ILLNESS:     Kidney stones  No new issues. horeshoe kidney    BPH- stable. Voiding well without a catheter. Has had greenlight surgery in the past    Elevated PSA- stable. Elevated after retention episode. Back to normal    Summary of Previous Records:  stone very dense and lower pole stone difficult to access with URS  Has horseshoe kidney with 3-4 stones > 1 cm. Total aggregate > 3.5 cm  Unsuccessful nephrostomy tube access at [address] was made to avoid percutaneous nephrolithotomy. Has been doing well since. No imaging today    Requested/reviewed records from Shaq Degroot MD office and/or outside physician/EMR    (Patient's old records have been requested, reviewed and pertinent findings summarized in today's note.)    Procedures Today: N/A    Last several PSA's:  Lab Results   Component Value Date    PSA 3.17 12/04/2023    PSA 3.73 11/18/2022    PSA 3.61 11/04/2021       Last total testosterone:  Lab Results   Component Value Date    TESTOSTERONE 323 03/16/2022       Urinalysis today:  No results found for this visit on 12/15/23.     Last BUN and creatinine:  Lab Results   Component Value Date    BUN 16 10/05/2023     Lab Results   Component Value Date    CREATININE 0.9 10/05/2023       Additional Lab/Culture results: none    Imaging Reviewed during this Office Visit:   imaging independently reviewed by Chandni Rainey MD and

## 2024-01-15 ENCOUNTER — OFFICE VISIT (OUTPATIENT)
Dept: INTERNAL MEDICINE | Age: 74
End: 2024-01-15
Payer: MEDICARE

## 2024-01-15 VITALS
RESPIRATION RATE: 16 BRPM | WEIGHT: 190 LBS | HEART RATE: 78 BPM | DIASTOLIC BLOOD PRESSURE: 82 MMHG | OXYGEN SATURATION: 98 % | SYSTOLIC BLOOD PRESSURE: 128 MMHG | BODY MASS INDEX: 29.82 KG/M2 | HEIGHT: 67 IN

## 2024-01-15 DIAGNOSIS — K21.9 GASTROESOPHAGEAL REFLUX DISEASE WITHOUT ESOPHAGITIS: ICD-10-CM

## 2024-01-15 DIAGNOSIS — E78.5 HYPERLIPIDEMIA, UNSPECIFIED HYPERLIPIDEMIA TYPE: Primary | ICD-10-CM

## 2024-01-15 DIAGNOSIS — I48.91 ATRIAL FIBRILLATION, UNSPECIFIED TYPE (HCC): ICD-10-CM

## 2024-01-15 DIAGNOSIS — R73.01 IFG (IMPAIRED FASTING GLUCOSE): ICD-10-CM

## 2024-01-15 PROCEDURE — 1123F ACP DISCUSS/DSCN MKR DOCD: CPT | Performed by: INTERNAL MEDICINE

## 2024-01-15 PROCEDURE — 99212 OFFICE O/P EST SF 10 MIN: CPT | Performed by: INTERNAL MEDICINE

## 2024-01-15 PROCEDURE — 99214 OFFICE O/P EST MOD 30 MIN: CPT | Performed by: INTERNAL MEDICINE

## 2024-01-15 ASSESSMENT — PATIENT HEALTH QUESTIONNAIRE - PHQ9
5. POOR APPETITE OR OVEREATING: 0
SUM OF ALL RESPONSES TO PHQ QUESTIONS 1-9: 6
9. THOUGHTS THAT YOU WOULD BE BETTER OFF DEAD, OR OF HURTING YOURSELF: 0
4. FEELING TIRED OR HAVING LITTLE ENERGY: 2
8. MOVING OR SPEAKING SO SLOWLY THAT OTHER PEOPLE COULD HAVE NOTICED. OR THE OPPOSITE, BEING SO FIGETY OR RESTLESS THAT YOU HAVE BEEN MOVING AROUND A LOT MORE THAN USUAL: 0
DEPRESSION UNABLE TO ASSESS: FUNCTIONAL CAPACITY MOTIVATION LIMITS ACCURACY
SUM OF ALL RESPONSES TO PHQ QUESTIONS 1-9: 6
SUM OF ALL RESPONSES TO PHQ9 QUESTIONS 1 & 2: 2
2. FEELING DOWN, DEPRESSED OR HOPELESS: 1
10. IF YOU CHECKED OFF ANY PROBLEMS, HOW DIFFICULT HAVE THESE PROBLEMS MADE IT FOR YOU TO DO YOUR WORK, TAKE CARE OF THINGS AT HOME, OR GET ALONG WITH OTHER PEOPLE: 0
6. FEELING BAD ABOUT YOURSELF - OR THAT YOU ARE A FAILURE OR HAVE LET YOURSELF OR YOUR FAMILY DOWN: 0
1. LITTLE INTEREST OR PLEASURE IN DOING THINGS: 1
SUM OF ALL RESPONSES TO PHQ QUESTIONS 1-9: 6
7. TROUBLE CONCENTRATING ON THINGS, SUCH AS READING THE NEWSPAPER OR WATCHING TELEVISION: 0
SUM OF ALL RESPONSES TO PHQ QUESTIONS 1-9: 6
3. TROUBLE FALLING OR STAYING ASLEEP: 2

## 2024-01-15 NOTE — PROGRESS NOTES
rashes. No abnormal masses  Neurologic: Cranial nerves grossly intact  Psychiatric: Normal affect. Alert and oriented    Medications  Current Outpatient Medications:     FOLIC ACID PO, Take 0.4 mg by mouth Daily, Disp: , Rfl:     zinc 50 MG CAPS, Take 1 tablet by mouth daily, Disp: , Rfl:     aspirin 81 MG EC tablet, Take 1 tablet by mouth daily, Disp: 90 tablet, Rfl: 3    dabigatran (PRADAXA) 150 MG capsule, Take 1 capsule by mouth 2 times daily, Disp: 180 capsule, Rfl: 3    Omega-3 Fatty Acids (FISH OIL) 1000 MG CAPS, Take 1 capsule by mouth 2 times daily, Disp: 180 capsule, Rfl: 3    atorvastatin (LIPITOR) 40 MG tablet, Take 1 tablet by mouth nightly, Disp: 90 tablet, Rfl: 3    famotidine (PEPCID) 20 MG tablet, Take 1 tablet by mouth 2 times daily, Disp: 60 tablet, Rfl: 0    latanoprost (XALATAN) 0.005 % ophthalmic solution, Place 1 drop into both eyes nightly, Disp: , Rfl:     Cholecalciferol (VITAMIN D3) 25 MCG (1000 UT) CAPS, Take 2,000 Units by mouth daily, Disp: , Rfl:     VITAMIN B1-B12 IM, Inject into the muscle every 30 days , Disp: , Rfl:     Allergies  Patient has no known allergies.    Past Medical History:   Diagnosis Date    Atrial fibrillation ([facility]) 01/2020    Blood in urine     Chronic kidney disease     CKD (chronic kidney disease)     History of ischemic stroke in prior three months     Hyperlipidemia     Ischemic stroke ([facility]) 01/10/2020    Kidney stones     Nihss score 10     PTSD (post-traumatic stress disorder)     from war, Agent Orange     Skin tag 12/21/2018    Stroke ([facility]) 01/16/2020    MRI Brain WO: New right PCA distribution infarct, multifocal left MCA distribution infarcts    Stroke ([facility]) 01/11/2020    large vessel occlusion/left M2 occlusion.  He subsequently underwent emergent thrombectomy.  /  S/P TPA DONE     Past Surgical History:   Procedure Laterality Date    APPENDECTOMY      COLONOSCOPY N/A 4/27/2021    Normal colon. Dr. Ferrer [facility]    CYSTOSCOPY Left 10/19/2020    CYSTOSCOPY,

## 2024-01-31 ENCOUNTER — OFFICE VISIT (OUTPATIENT)
Dept: NEUROLOGY | Age: 74
End: 2024-01-31
Payer: MEDICARE

## 2024-01-31 VITALS
HEIGHT: 67 IN | BODY MASS INDEX: 29.79 KG/M2 | HEART RATE: 101 BPM | DIASTOLIC BLOOD PRESSURE: 62 MMHG | SYSTOLIC BLOOD PRESSURE: 114 MMHG | OXYGEN SATURATION: 98 % | WEIGHT: 189.8 LBS

## 2024-01-31 DIAGNOSIS — G62.9 PERIPHERAL NERVE DISEASE: ICD-10-CM

## 2024-01-31 DIAGNOSIS — Q63.1 HORSESHOE KIDNEY: ICD-10-CM

## 2024-01-31 DIAGNOSIS — I48.91 ATRIAL FIBRILLATION, UNSPECIFIED TYPE (HCC): ICD-10-CM

## 2024-01-31 DIAGNOSIS — F43.10 PTSD (POST-TRAUMATIC STRESS DISORDER): ICD-10-CM

## 2024-01-31 DIAGNOSIS — G45.0 VBI (VERTEBROBASILAR INSUFFICIENCY): ICD-10-CM

## 2024-01-31 DIAGNOSIS — Z91.81 RISK FOR FALLS: ICD-10-CM

## 2024-01-31 DIAGNOSIS — I69.30 H/O: STROKE WITH RESIDUAL EFFECTS: Primary | ICD-10-CM

## 2024-01-31 DIAGNOSIS — I63.49 CEREBROVASCULAR ACCIDENT (CVA) DUE TO EMBOLISM OF OTHER CEREBRAL ARTERY (HCC): ICD-10-CM

## 2024-01-31 DIAGNOSIS — H53.462 LEFT HOMONYMOUS HEMIANOPSIA: ICD-10-CM

## 2024-01-31 DIAGNOSIS — Z86.73 H/O ISCHEMIC LEFT MCA STROKE: ICD-10-CM

## 2024-01-31 DIAGNOSIS — R42 DIZZINESS AND GIDDINESS: ICD-10-CM

## 2024-01-31 DIAGNOSIS — R26.9 GAIT DIFFICULTY: ICD-10-CM

## 2024-01-31 DIAGNOSIS — G31.9 DIFFUSE CEREBRAL ATROPHY (HCC): ICD-10-CM

## 2024-01-31 DIAGNOSIS — R26.89 BALANCE PROBLEM: ICD-10-CM

## 2024-01-31 DIAGNOSIS — Z91.89 AT HIGH RISK FOR STROKE: ICD-10-CM

## 2024-01-31 DIAGNOSIS — I67.82 CHRONIC CEREBRAL ISCHEMIA: ICD-10-CM

## 2024-01-31 PROCEDURE — 99214 OFFICE O/P EST MOD 30 MIN: CPT | Performed by: PSYCHIATRY & NEUROLOGY

## 2024-01-31 PROCEDURE — 99213 OFFICE O/P EST LOW 20 MIN: CPT | Performed by: PSYCHIATRY & NEUROLOGY

## 2024-01-31 PROCEDURE — 1123F ACP DISCUSS/DSCN MKR DOCD: CPT | Performed by: PSYCHIATRY & NEUROLOGY

## 2024-01-31 ASSESSMENT — ENCOUNTER SYMPTOMS
SHORTNESS OF BREATH: 0
EYE PAIN: 0
SINUS PRESSURE: 0
SWOLLEN GLANDS: 0
NAUSEA: 0
BACK PAIN: 0
FACIAL SWELLING: 0
CHANGE IN BOWEL HABIT: 0
EYE DISCHARGE: 0
BOWEL INCONTINENCE: 0
EYE ITCHING: 0
ABDOMINAL PAIN: 0
PHOTOPHOBIA: 0
COLOR CHANGE: 0
VOICE CHANGE: 0
ABDOMINAL DISTENTION: 0
VISUAL CHANGE: 0
CONSTIPATION: 0
WHEEZING: 0
CHOKING: 0
SORE THROAT: 0
CHEST TIGHTNESS: 0
APNEA: 0
VOMITING: 0
BLOOD IN STOOL: 0
TROUBLE SWALLOWING: 0
COUGH: 0
EYE REDNESS: 0
DIARRHEA: 0

## 2024-01-31 NOTE — PROGRESS NOTES
DIFFICULTY                         B)  BALANCE  PROBLEMS                                            C)    RESIDUAL   LEFT  VISUAL  FIELD  DEFECTS                        D)    MEMORY  PROBLEMS                                        -     IMPROVED   SIGNIFICANTLY                         9)      HIGH  RISK  FOR       FALLS                           RECOMMEND     USE   QUAD   CANE     AS   NEEDED                    10)     MULTIPLE  CO  MORBID  MEDICAL  CONDITIONS                           -   TO  FOLLOW  WITH  HIS PCP                           11)          CT   HEAD     ON   2/27/2020    SHOWED  :                            \"  Generalized atrophy                              encephalomalacial changes within the right Temporal occipital region,                              and posterior left frontal lobe                               No acute intraparenchymal hemorrhage  \"                        12)      FOLLOW  UP    CT   HEAD     IN   MARCH 2023   .  REPORTED    AS                           \"   A)  No acute intracranial abnormality.                         B)  Again seen is disproportionate enlargement of the supratentorial ventricles                             relative to the degree of parenchymal volume loss.  Findings may reflect more                           central white matter volume loss versus NPH in the appropriate clinical setting.                        C)    Again seen are remote cortical infarcts throughout the left parietal and                                right occipital lobes within the left MCA and right PCA territories, respectively.\"                          CAROTID  DOPPLER      IN   MARCH 2023       SHOWED                                NO  SIGNIFICANT  ABNORMALITIES                       13)       PATIENT    AND  HIS  WIFE   DENIED  ANY  NEW  NEUROLOGICAL    CONCERNS                           NO   RECURRENCE  OF  TIA  /   STROKE.                                PATIENT USING    RIGHT   FOOT

## 2024-02-01 ENCOUNTER — TELEPHONE (OUTPATIENT)
Dept: INTERNAL MEDICINE | Age: 74
End: 2024-02-01

## 2024-02-01 NOTE — TELEPHONE ENCOUNTER
If he is doing okay generally, then hopefully this is a self-limiting condition that will improve on its own.  If he has no blood in his stool, that he can use Imodium 3 times a day as needed for diarrhea.  If he is not better by the middle of next week, then he should let us know

## 2024-02-01 NOTE — TELEPHONE ENCOUNTER
Wife calling to say he has had loose stools  for two days. Denies nausea vomiting and otherwise feels ok. Concerned as he is on blood thinners and wants to know what he can safely take OTC to help slow down. Was not sure if could try immodium(Advised BRAT diet also) Call back @[phone]

## 2024-04-01 ENCOUNTER — OFFICE VISIT (OUTPATIENT)
Dept: INTERNAL MEDICINE | Age: 74
End: 2024-04-01
Payer: MEDICARE

## 2024-04-01 VITALS
OXYGEN SATURATION: 100 % | BODY MASS INDEX: 28.56 KG/M2 | WEIGHT: 182 LBS | DIASTOLIC BLOOD PRESSURE: 78 MMHG | HEART RATE: 96 BPM | HEIGHT: 67 IN | SYSTOLIC BLOOD PRESSURE: 138 MMHG | RESPIRATION RATE: 16 BRPM

## 2024-04-01 DIAGNOSIS — Z00.00 MEDICARE ANNUAL WELLNESS VISIT, SUBSEQUENT: Primary | ICD-10-CM

## 2024-04-01 DIAGNOSIS — E78.5 HYPERLIPIDEMIA, UNSPECIFIED HYPERLIPIDEMIA TYPE: ICD-10-CM

## 2024-04-01 PROCEDURE — 99212 OFFICE O/P EST SF 10 MIN: CPT | Performed by: INTERNAL MEDICINE

## 2024-04-01 PROCEDURE — 99214 OFFICE O/P EST MOD 30 MIN: CPT | Performed by: INTERNAL MEDICINE

## 2024-04-01 PROCEDURE — 1123F ACP DISCUSS/DSCN MKR DOCD: CPT | Performed by: INTERNAL MEDICINE

## 2024-04-01 PROCEDURE — G0439 PPPS, SUBSEQ VISIT: HCPCS | Performed by: INTERNAL MEDICINE

## 2024-04-01 ASSESSMENT — COLUMBIA-SUICIDE SEVERITY RATING SCALE - C-SSRS
2. HAVE YOU ACTUALLY HAD ANY THOUGHTS OF KILLING YOURSELF?: NO
1. WITHIN THE PAST MONTH, HAVE YOU WISHED YOU WERE DEAD OR WISHED YOU COULD GO TO SLEEP AND NOT WAKE UP?: NO
6. HAVE YOU EVER DONE ANYTHING, STARTED TO DO ANYTHING, OR PREPARED TO DO ANYTHING TO END YOUR LIFE?: NO

## 2024-04-01 ASSESSMENT — PATIENT HEALTH QUESTIONNAIRE - PHQ9
SUM OF ALL RESPONSES TO PHQ QUESTIONS 1-9: 12
3. TROUBLE FALLING OR STAYING ASLEEP: NEARLY EVERY DAY
7. TROUBLE CONCENTRATING ON THINGS, SUCH AS READING THE NEWSPAPER OR WATCHING TELEVISION: NOT AT ALL
4. FEELING TIRED OR HAVING LITTLE ENERGY: NEARLY EVERY DAY
SUM OF ALL RESPONSES TO PHQ QUESTIONS 1-9: 12
5. POOR APPETITE OR OVEREATING: NOT AT ALL
10. IF YOU CHECKED OFF ANY PROBLEMS, HOW DIFFICULT HAVE THESE PROBLEMS MADE IT FOR YOU TO DO YOUR WORK, TAKE CARE OF THINGS AT HOME, OR GET ALONG WITH OTHER PEOPLE: NOT DIFFICULT AT ALL
1. LITTLE INTEREST OR PLEASURE IN DOING THINGS: NEARLY EVERY DAY
SUM OF ALL RESPONSES TO PHQ9 QUESTIONS 1 & 2: 6
SUM OF ALL RESPONSES TO PHQ QUESTIONS 1-9: 12
6. FEELING BAD ABOUT YOURSELF - OR THAT YOU ARE A FAILURE OR HAVE LET YOURSELF OR YOUR FAMILY DOWN: NOT AT ALL
SUM OF ALL RESPONSES TO PHQ QUESTIONS 1-9: 12
2. FEELING DOWN, DEPRESSED OR HOPELESS: NEARLY EVERY DAY
8. MOVING OR SPEAKING SO SLOWLY THAT OTHER PEOPLE COULD HAVE NOTICED. OR THE OPPOSITE, BEING SO FIGETY OR RESTLESS THAT YOU HAVE BEEN MOVING AROUND A LOT MORE THAN USUAL: NOT AT ALL
9. THOUGHTS THAT YOU WOULD BE BETTER OFF DEAD, OR OF HURTING YOURSELF: NOT AT ALL

## 2024-04-01 ASSESSMENT — LIFESTYLE VARIABLES
HOW OFTEN DO YOU HAVE A DRINK CONTAINING ALCOHOL: MONTHLY OR LESS
HOW MANY STANDARD DRINKS CONTAINING ALCOHOL DO YOU HAVE ON A TYPICAL DAY: PATIENT DOES NOT DRINK

## 2024-04-01 NOTE — PATIENT INSTRUCTIONS
day. Make sure you take aspirin and not another kind of pain reliever, such as acetaminophen (Tylenol).   When should you call for help?   Call 911 if you have symptoms of a heart attack. These may include:    Chest pain or pressure, or a strange feeling in the chest.     Sweating.     Shortness of breath.     Pain, pressure, or a strange feeling in the back, neck, jaw, or upper belly or in one or both shoulders or arms.     Lightheadedness or sudden weakness.     A fast or irregular heartbeat.   After you call 911, the  may tell you to chew 1 adult-strength or 2 to 4 low-dose aspirin. Wait for an ambulance. Do not try to drive yourself.  Watch closely for changes in your health, and be sure to contact your doctor if you have any problems.  Where can you learn more?  Go to https://www.doubleTwist.net/patientEd and enter F075 to learn more about \"A Healthy Heart: Care Instructions.\"  Current as of: June 24, 2023               Content Version: 14.0  © 5424-2825 [employer].   Care instructions adapted under license by [employer]. If you have questions about a medical condition or this instruction, always ask your healthcare professional. [employer] disclaims any warranty or liability for your use of this information.      Personalized Preventive Plan for Tyler Worthy - 4/1/2024  Medicare offers a range of preventive health benefits. Some of the tests and screenings are paid in full while other may be subject to a deductible, co-insurance, and/or copay.    Some of these benefits include a comprehensive review of your medical history including lifestyle, illnesses that may run in your family, and various assessments and screenings as appropriate.    After reviewing your medical record and screening and assessments performed today your provider may have ordered immunizations, labs, imaging, and/or referrals for you.  A list of these orders (if applicable) as well as your Preventive

## 2024-04-01 NOTE — PROGRESS NOTES
Medicare Annual Wellness Visit    Tyler Worthy is here for Medicare AWV, Discuss Medications (Atrorvastatin  , mood changes , grump , angry , anxiety , having issue with sleeping ), and [place] ([place] [facility] but [facility] are no longer [place].  Patient is having Flash back from his time in there [employer] )    Assessment & Plan   Medicare annual wellness visit, subsequent  Recommendations for Preventive Services Due: see orders and patient instructions/AVS.  Recommended screening schedule for the next 5-10 years is provided to the patient in written form: see Patient Instructions/AVS.     No follow-ups on file.     Subjective       Patient's complete Health Risk Assessment and screening values have been reviewed and are found in Flowsheets. The following problems were reviewed today and where indicated follow up appointments were made and/or referrals ordered.    Positive Risk Factor Screenings with Interventions:        Depression:  PHQ-2 Score: 6  PHQ-9 Total Score: 12    Interpretation:  5-9 mild   10-14 moderate   15-19 moderately severe   20-27 severe     Interventions:  Patient was seen by psychologist today          General HRA Questions:  Select all that apply: (!) Stress, Anger    Stress Interventions:  Patient advised to follow up in the office for further evaluation and treatment    Anger Interventions:  Patient advised to follow up in the office for further evaluation and treatment                          Objective   Vitals:    04/01/24 1130   BP: 138/78   Site: Left Upper Arm   Position: Sitting   Cuff Size: Medium Adult   Pulse: 96   Resp: 16   SpO2: 100%   Weight: 82.6 kg (182 lb)   Height: 1.702 m (5' 7\")      Body mass index is 28.51 kg/m².               No Known Allergies  Prior to Visit Medications    Medication Sig Taking? Authorizing Provider   FOLIC ACID PO Take 0.4 mg by mouth Daily Yes Cristian Gay MD   zinc 50 MG CAPS Take 1 tablet by mouth daily Yes Cristian Gay MD 
proptosis  HENT: External ears normal. No external lesions on the nose  Neck: No gross masses. Trachea visibly midline  Respiratory: Good air entry bilaterally.  No wheezing or crackles  Cardiovascular: Normal S1-S2.  No murmurs.  No lower extremity edema  Gastrointestinal: No visible masses. No visible hernias  Skin: No abnormal rashes. No abnormal masses  Neurologic: Cranial nerves grossly intact  Psychiatric: Normal affect. Alert and oriented    Medications  Current Outpatient Medications:     FOLIC ACID PO, Take 0.4 mg by mouth Daily, Disp: , Rfl:     zinc 50 MG CAPS, Take 1 tablet by mouth daily, Disp: , Rfl:     aspirin 81 MG EC tablet, Take 1 tablet by mouth daily, Disp: 90 tablet, Rfl: 3    dabigatran (PRADAXA) 150 MG capsule, Take 1 capsule by mouth 2 times daily, Disp: 180 capsule, Rfl: 3    Omega-3 Fatty Acids (FISH OIL) 1000 MG CAPS, Take 1 capsule by mouth 2 times daily, Disp: 180 capsule, Rfl: 3    atorvastatin (LIPITOR) 40 MG tablet, Take 1 tablet by mouth nightly, Disp: 90 tablet, Rfl: 3    famotidine (PEPCID) 20 MG tablet, Take 1 tablet by mouth 2 times daily, Disp: 60 tablet, Rfl: 0    latanoprost (XALATAN) 0.005 % ophthalmic solution, Place 1 drop into both eyes nightly, Disp: , Rfl:     Cholecalciferol (VITAMIN D3) 25 MCG (1000 UT) CAPS, Take 2 capsules by mouth daily, Disp: , Rfl:     VITAMIN B1-B12 IM, Inject into the muscle every 30 days , Disp: , Rfl:     Allergies  Patient has no known allergies.    Past Medical History:   Diagnosis Date    Atrial fibrillation ([facility]) 01/2020    Blood in urine     Chronic kidney disease     CKD (chronic kidney disease)     History of ischemic stroke in prior three months     Hyperlipidemia     Ischemic stroke ([facility]) 01/10/2020    Kidney stones     Nihss score 10     PTSD (post-traumatic stress disorder)     from war, Agent Orange     Skin tag 12/21/2018    Stroke ([facility]) 01/16/2020    MRI Brain WO: New right PCA distribution infarct, multifocal left MCA

## 2024-05-02 ENCOUNTER — HOSPITAL ENCOUNTER (EMERGENCY)
Age: 74
Discharge: HOME | End: 2024-05-02
Attending: EMERGENCY MEDICINE
Payer: MEDICARE

## 2024-05-02 ENCOUNTER — APPOINTMENT (OUTPATIENT)
Dept: GENERAL RADIOLOGY | Age: 74
End: 2024-05-02
Payer: MEDICARE

## 2024-05-02 ENCOUNTER — APPOINTMENT (OUTPATIENT)
Dept: CT IMAGING | Age: 74
End: 2024-05-02
Payer: MEDICARE

## 2024-05-02 ENCOUNTER — TELEPHONE (OUTPATIENT)
Dept: INTERNAL MEDICINE | Age: 74
End: 2024-05-02

## 2024-05-02 ENCOUNTER — OFFICE VISIT (OUTPATIENT)
Dept: INTERNAL MEDICINE | Age: 74
End: 2024-05-02
Payer: MEDICARE

## 2024-05-02 VITALS
WEIGHT: 184 LBS | OXYGEN SATURATION: 96 % | HEART RATE: 68 BPM | DIASTOLIC BLOOD PRESSURE: 76 MMHG | TEMPERATURE: 97 F | RESPIRATION RATE: 18 BRPM | BODY MASS INDEX: 28.82 KG/M2 | SYSTOLIC BLOOD PRESSURE: 130 MMHG

## 2024-05-02 VITALS — DIASTOLIC BLOOD PRESSURE: 90 MMHG | SYSTOLIC BLOOD PRESSURE: 180 MMHG

## 2024-05-02 DIAGNOSIS — K21.9 GASTROESOPHAGEAL REFLUX DISEASE WITHOUT ESOPHAGITIS: ICD-10-CM

## 2024-05-02 DIAGNOSIS — R51.9 PRESSURE IN HEAD: Primary | ICD-10-CM

## 2024-05-02 DIAGNOSIS — I48.91 ATRIAL FIBRILLATION, UNSPECIFIED TYPE (HCC): ICD-10-CM

## 2024-05-02 DIAGNOSIS — I10 HYPERTENSION, UNSPECIFIED TYPE: ICD-10-CM

## 2024-05-02 DIAGNOSIS — E78.5 HYPERLIPIDEMIA, UNSPECIFIED HYPERLIPIDEMIA TYPE: Primary | ICD-10-CM

## 2024-05-02 PROBLEM — I48.20 CHRONIC ATRIAL FIBRILLATION, UNSPECIFIED (HCC): Status: ACTIVE | Noted: 2024-05-02

## 2024-05-02 LAB
ANION GAP SERPL CALCULATED.3IONS-SCNC: 10 MMOL/L (ref 9–17)
BASOPHILS # BLD: <0.03 K/UL (ref 0–0.2)
BASOPHILS NFR BLD: 0 % (ref 0–2)
BUN SERPL-MCNC: 11 MG/DL (ref 8–23)
BUN/CREAT SERPL: 12 (ref 9–20)
CALCIUM SERPL-MCNC: 9 MG/DL (ref 8.6–10.4)
CHLORIDE SERPL-SCNC: 107 MMOL/L (ref 98–107)
CO2 SERPL-SCNC: 22 MMOL/L (ref 20–31)
CREAT SERPL-MCNC: 0.9 MG/DL (ref 0.7–1.2)
EKG Q-T INTERVAL: 360 MS
EKG QRS DURATION: 78 MS
EKG QTC CALCULATION (BAZETT): 418 MS
EKG R AXIS: 16 DEGREES
EKG T AXIS: 33 DEGREES
EKG VENTRICULAR RATE: 81 BPM
EOSINOPHIL # BLD: 0.1 K/UL (ref 0–0.44)
EOSINOPHILS RELATIVE PERCENT: 2 % (ref 1–4)
ERYTHROCYTE [DISTWIDTH] IN BLOOD BY AUTOMATED COUNT: 13.5 % (ref 11.8–14.4)
GFR, ESTIMATED: 90 ML/MIN/1.73M2
GLUCOSE SERPL-MCNC: 103 MG/DL (ref 70–99)
HCT VFR BLD AUTO: 43.7 % (ref 40.7–50.3)
HGB BLD-MCNC: 15.6 G/DL (ref 13–17)
IMM GRANULOCYTES # BLD AUTO: <0.03 K/UL (ref 0–0.3)
IMM GRANULOCYTES NFR BLD: 0 %
LYMPHOCYTES NFR BLD: 1.27 K/UL (ref 1.1–3.7)
LYMPHOCYTES RELATIVE PERCENT: 20 % (ref 24–43)
MCH RBC QN AUTO: 30.3 PG (ref 25.2–33.5)
MCHC RBC AUTO-ENTMCNC: 35.7 G/DL (ref 25.2–33.5)
MCV RBC AUTO: 84.9 FL (ref 82.6–102.9)
MONOCYTES NFR BLD: 0.58 K/UL (ref 0.1–1.2)
MONOCYTES NFR BLD: 9 % (ref 3–12)
NEUTROPHILS NFR BLD: 69 % (ref 36–65)
NEUTS SEG NFR BLD: 4.24 K/UL (ref 1.5–8.1)
NRBC BLD-RTO: 0 PER 100 WBC
PLATELET # BLD AUTO: 159 K/UL (ref 138–453)
PMV BLD AUTO: 9.8 FL (ref 8.1–13.5)
POTASSIUM SERPL-SCNC: 3.8 MMOL/L (ref 3.7–5.3)
RBC # BLD AUTO: 5.15 M/UL (ref 4.21–5.77)
SODIUM SERPL-SCNC: 139 MMOL/L (ref 135–144)
TROPONIN I SERPL HS-MCNC: 13 NG/L (ref 0–22)
WBC OTHER # BLD: 6.2 K/UL (ref 3.5–11.3)

## 2024-05-02 PROCEDURE — 70450 CT HEAD/BRAIN W/O DYE: CPT

## 2024-05-02 PROCEDURE — 1123F ACP DISCUSS/DSCN MKR DOCD: CPT | Performed by: INTERNAL MEDICINE

## 2024-05-02 PROCEDURE — 93005 ELECTROCARDIOGRAM TRACING: CPT | Performed by: EMERGENCY MEDICINE

## 2024-05-02 PROCEDURE — 99215 OFFICE O/P EST HI 40 MIN: CPT | Performed by: INTERNAL MEDICINE

## 2024-05-02 PROCEDURE — 80048 BASIC METABOLIC PNL TOTAL CA: CPT

## 2024-05-02 PROCEDURE — 71045 X-RAY EXAM CHEST 1 VIEW: CPT

## 2024-05-02 PROCEDURE — 85025 COMPLETE CBC W/AUTO DIFF WBC: CPT

## 2024-05-02 PROCEDURE — 99285 EMERGENCY DEPT VISIT HI MDM: CPT

## 2024-05-02 PROCEDURE — 84484 ASSAY OF TROPONIN QUANT: CPT

## 2024-05-02 SDOH — ECONOMIC STABILITY: FOOD INSECURITY: WITHIN THE PAST 12 MONTHS, THE FOOD YOU BOUGHT JUST DIDN'T LAST AND YOU DIDN'T HAVE MONEY TO GET MORE.: NEVER TRUE

## 2024-05-02 SDOH — ECONOMIC STABILITY: FOOD INSECURITY: WITHIN THE PAST 12 MONTHS, YOU WORRIED THAT YOUR FOOD WOULD RUN OUT BEFORE YOU GOT MONEY TO BUY MORE.: NEVER TRUE

## 2024-05-02 SDOH — ECONOMIC STABILITY: HOUSING INSECURITY
IN THE LAST 12 MONTHS, WAS THERE A TIME WHEN YOU DID NOT HAVE A STEADY PLACE TO SLEEP OR SLEPT IN A SHELTER (INCLUDING NOW)?: NO

## 2024-05-02 SDOH — ECONOMIC STABILITY: INCOME INSECURITY: HOW HARD IS IT FOR YOU TO PAY FOR THE VERY BASICS LIKE FOOD, HOUSING, MEDICAL CARE, AND HEATING?: NOT HARD AT ALL

## 2024-05-02 ASSESSMENT — PAIN - FUNCTIONAL ASSESSMENT: PAIN_FUNCTIONAL_ASSESSMENT: NONE - DENIES PAIN

## 2024-05-02 NOTE — TELEPHONE ENCOUNTER
If everything was good in the ER, then I do not need to see him next week, but I would like to see him in a month to follow-up if he is okay with it

## 2024-05-02 NOTE — TELEPHONE ENCOUNTER
Kamille is calling to see if Tyler needs seen next week for ER f/u or just keep his appt 7/15/24? Please call wife back and let her know.

## 2024-05-02 NOTE — ED PROVIDER NOTES
Result Value Ref Range    Sodium 139 135 - 144 mmol/L    Potassium 3.8 3.7 - 5.3 mmol/L    Chloride 107 98 - 107 mmol/L    CO2 22 20 - 31 mmol/L    Anion Gap 10 9 - 17 mmol/L    Glucose 103 (H) 70 - 99 mg/dL    BUN 11 8 - 23 mg/dL    Creatinine 0.9 0.7 - 1.2 mg/dL    Est, Glom Filt Rate 90 >60 mL/min/1.73m2    Bun/Cre Ratio 12 9 - 20    Calcium 9.0 8.6 - 10.4 mg/dL   Troponin   Result Value Ref Range    Troponin, High Sensitivity 13 0 - 22 ng/L   EKG 12 Lead   Result Value Ref Range    Ventricular Rate 81 BPM    QRS Duration 78 ms    Q-T Interval 360 ms    QTc Calculation (Bazett) 418 ms    R Axis 16 degrees    T Axis 33 degrees       Not indicated unless otherwise documented above    RADIOLOGY:   I reviewed the radiologist interpretations:    CT HEAD WO CONTRAST    (Results Pending)   XR CHEST PORTABLE    (Results Pending)       Not indicated unless otherwise documented above    EMERGENCY DEPARTMENT COURSE:     The patient was given the following medications:  No orders of the defined types were placed in this encounter.       Vitals:   -------------------------  /67   Pulse 61   Temp 97 °F (36.1 °C) (Tympanic)   Resp 18   Wt 83.5 kg (184 lb)   SpO2 98%   BMI 28.82 kg/m²     2:10 PM patient reevaluated multiple times blood pressure improved 114/67.  Head pressure also improved.  Normal CBC normal electrolytes and kidney function normal troponin.  [facility] states there is a \"systemwide problem getting the dictations\" they will fax them to us.  Patient wants to leave.  Willing to wait a little longer.    2:15 PM CT of the head shows moderate central and mild cortical cerebral atrophy underlying normal pressure hydrocephalus cannot be excluded.  Mild chronic deep white matter ischemic changes.  Stable encephalomalacia no acute abnormalities.  Chest x-ray interpreted as no acute process.  Patient be discharged to follow-up and return if worsening symptoms or any other concerns.    CRITICAL

## 2024-05-02 NOTE — PATIENT INSTRUCTIONS
Please call Mateo Rao  [facility] Point of Contact  [facility]  Phone: [phone], ext. [phone] if you can't get through on this ext please call [phone]  Email: [email or web address]     [facility] office contact Tanya Brunner  Call [phone]

## 2024-06-07 ENCOUNTER — OFFICE VISIT (OUTPATIENT)
Dept: BEHAVIORAL/MENTAL HEALTH | Age: 74
End: 2024-06-07
Payer: MEDICARE

## 2024-06-07 DIAGNOSIS — F43.10 PTSD (POST-TRAUMATIC STRESS DISORDER): Primary | ICD-10-CM

## 2024-06-07 PROCEDURE — 90834 PSYTX W PT 45 MINUTES: CPT | Performed by: COUNSELOR

## 2024-06-07 NOTE — PROGRESS NOTES
with this plan and has requested follow-up on an as-needed basis at this time. Patient will make further scheduling arrangements when appropriate. Patient is aware that they are able to reach out as needed for additional information or support prior to the next scheduled contact.      P:    Discussed various factors related to the development and maintenance of  PTSD (including biological, cognitive, behavioral, and environmental factors), Trained in relaxation strategies, Discussed potential treatments for  PTSD, Discussed self-care (sleep, nutrition, rewarding activities, social support, exercise), Discussed benefits of referral for specialty care, Discussed and problem-solved barriers in adhering to behavioral change plan, Motivational Interviewing to increase patient confidence and compliance with adhering to behavioral change plan, Motivational Interviewing to determine importance and readiness for change, Discussed potential barriers to change, Established rapport, Conducted functional assessment, [place]-setting to identify pt's primary goals for [facility] visit / overall health, and Supportive techniques    Patient Plan:  Patient reminded of previously provided information on self-care. Patient also provided with additional information on relaxation breathing and progressive muscle relaxation to use to reduce effect of acute stress which may be exacerbating his current medical concerns.    Patient to return as needed for follow-up.    All questions about treatment plan answered. Patient instructed to call the crisis line and/or proceed to emergency room if suicidal or homicidal ideations occur outside of clinic hours and crisis management skills do not provide relief. Patient stated understanding and is agreeable to treatment and crisis plan.    (Please note that portions of this note were completed with a voice recognition program. Efforts were made to edit the dictations but occasionally words are

## 2024-06-07 NOTE — PSYCHOTHERAPY
Still gets dizzy -- not a headache, more of a pressure. \"Little pressure all the time, sometimes gets worse.\"     Went to [facility] yesterday, told them about the issue. Does have known history of aFib, think blood pressure fluctuations likely contributing.     Dad  at 67 from stroke, pt has had a couple, brother has had some.     Hard time telling doctors about things that are wrong with him -- feels like \"it is what it is\". Did encourage telling PCP about the high fatigue he has been having.     Psychoed on stress effect on cardio health. Sent w/ info on relaxation breathing / PMR.

## 2024-06-13 ENCOUNTER — APPOINTMENT (OUTPATIENT)
Dept: CT IMAGING | Age: 74
End: 2024-06-13
Payer: MEDICARE

## 2024-06-13 ENCOUNTER — OFFICE VISIT (OUTPATIENT)
Dept: PRIMARY CARE CLINIC | Age: 74
End: 2024-06-13

## 2024-06-13 ENCOUNTER — HOSPITAL ENCOUNTER (EMERGENCY)
Age: 74
Discharge: HOME | End: 2024-06-13
Attending: EMERGENCY MEDICINE
Payer: MEDICARE

## 2024-06-13 VITALS
DIASTOLIC BLOOD PRESSURE: 84 MMHG | BODY MASS INDEX: 29.03 KG/M2 | HEART RATE: 73 BPM | TEMPERATURE: 98.3 F | RESPIRATION RATE: 16 BRPM | OXYGEN SATURATION: 98 % | WEIGHT: 185 LBS | HEIGHT: 67 IN | SYSTOLIC BLOOD PRESSURE: 130 MMHG

## 2024-06-13 VITALS
SYSTOLIC BLOOD PRESSURE: 141 MMHG | TEMPERATURE: 98.9 F | WEIGHT: 184 LBS | RESPIRATION RATE: 16 BRPM | BODY MASS INDEX: 28.82 KG/M2 | DIASTOLIC BLOOD PRESSURE: 64 MMHG | HEART RATE: 82 BPM | OXYGEN SATURATION: 97 %

## 2024-06-13 DIAGNOSIS — R51.9 NONINTRACTABLE EPISODIC HEADACHE, UNSPECIFIED HEADACHE TYPE: Primary | ICD-10-CM

## 2024-06-13 DIAGNOSIS — R53.1 WEAKNESS: Primary | ICD-10-CM

## 2024-06-13 LAB
ALBUMIN SERPL-MCNC: 4.1 G/DL (ref 3.5–5.2)
ALBUMIN/GLOB SERPL: 1.5 {RATIO} (ref 1–2.5)
ALP SERPL-CCNC: 111 U/L (ref 40–129)
ALT SERPL-CCNC: 28 U/L (ref 5–41)
ANION GAP SERPL CALCULATED.3IONS-SCNC: 11 MMOL/L (ref 9–17)
AST SERPL-CCNC: 27 U/L
BACTERIA URNS QL MICRO: ABNORMAL
BASOPHILS # BLD: 0.03 K/UL (ref 0–0.2)
BASOPHILS NFR BLD: 1 % (ref 0–2)
BILIRUB SERPL-MCNC: 0.8 MG/DL (ref 0.3–1.2)
BILIRUB UR QL STRIP: NEGATIVE
BUN SERPL-MCNC: 20 MG/DL (ref 8–23)
BUN/CREAT SERPL: 22 (ref 9–20)
CALCIUM SERPL-MCNC: 8.8 MG/DL (ref 8.6–10.4)
CHARACTER UR: ABNORMAL
CHLORIDE SERPL-SCNC: 106 MMOL/L (ref 98–107)
CLARITY UR: CLEAR
CO2 SERPL-SCNC: 23 MMOL/L (ref 20–31)
COLOR UR: YELLOW
CREAT SERPL-MCNC: 0.9 MG/DL (ref 0.7–1.2)
EOSINOPHIL # BLD: 0.09 K/UL (ref 0–0.44)
EOSINOPHILS RELATIVE PERCENT: 1 % (ref 1–4)
EPI CELLS #/AREA URNS HPF: ABNORMAL /HPF (ref 0–5)
ERYTHROCYTE [DISTWIDTH] IN BLOOD BY AUTOMATED COUNT: 13.3 % (ref 11.8–14.4)
GFR, ESTIMATED: 90 ML/MIN/1.73M2
GLUCOSE SERPL-MCNC: 105 MG/DL (ref 70–99)
GLUCOSE UR STRIP-MCNC: NEGATIVE MG/DL
HCT VFR BLD AUTO: 42.3 % (ref 40.7–50.3)
HGB BLD-MCNC: 15.1 G/DL (ref 13–17)
HGB UR QL STRIP.AUTO: ABNORMAL
IMM GRANULOCYTES # BLD AUTO: <0.03 K/UL (ref 0–0.3)
IMM GRANULOCYTES NFR BLD: 0 %
KETONES UR STRIP-MCNC: NEGATIVE MG/DL
LACTATE BLDV-SCNC: 0.9 MMOL/L (ref 0.5–2.2)
LEUKOCYTE ESTERASE UR QL STRIP: ABNORMAL
LYMPHOCYTES NFR BLD: 1.49 K/UL (ref 1.1–3.7)
LYMPHOCYTES RELATIVE PERCENT: 24 % (ref 24–43)
MCH RBC QN AUTO: 30.6 PG (ref 25.2–33.5)
MCHC RBC AUTO-ENTMCNC: 35.7 G/DL (ref 25.2–33.5)
MCV RBC AUTO: 85.6 FL (ref 82.6–102.9)
MONOCYTES NFR BLD: 0.51 K/UL (ref 0.1–1.2)
MONOCYTES NFR BLD: 8 % (ref 3–12)
MUCOUS THREADS URNS QL MICRO: ABNORMAL
NEUTROPHILS NFR BLD: 66 % (ref 36–65)
NEUTS SEG NFR BLD: 4.22 K/UL (ref 1.5–8.1)
NITRITE UR QL STRIP: NEGATIVE
NRBC BLD-RTO: 0 PER 100 WBC
PH UR STRIP: 6 [PH] (ref 5–6)
PLATELET # BLD AUTO: 145 K/UL (ref 138–453)
PMV BLD AUTO: 9.6 FL (ref 8.1–13.5)
POTASSIUM SERPL-SCNC: 4.1 MMOL/L (ref 3.7–5.3)
PROT SERPL-MCNC: 6.8 G/DL (ref 6.4–8.3)
PROT UR STRIP-MCNC: ABNORMAL MG/DL
RBC # BLD AUTO: 4.94 M/UL (ref 4.21–5.77)
RBC #/AREA URNS HPF: ABNORMAL /HPF (ref 0–4)
SODIUM SERPL-SCNC: 140 MMOL/L (ref 135–144)
SP GR UR STRIP: 1.02 (ref 1.01–1.02)
TROPONIN I SERPL HS-MCNC: 12 NG/L (ref 0–22)
TSH SERPL DL<=0.05 MIU/L-ACNC: 2.03 UIU/ML (ref 0.3–5)
UROBILINOGEN UR STRIP-ACNC: NORMAL EU/DL (ref 0–1)
WBC #/AREA URNS HPF: ABNORMAL /HPF (ref 0–4)
WBC OTHER # BLD: 6.4 K/UL (ref 3.5–11.3)

## 2024-06-13 PROCEDURE — 80053 COMPREHEN METABOLIC PANEL: CPT

## 2024-06-13 PROCEDURE — 84443 ASSAY THYROID STIM HORMONE: CPT

## 2024-06-13 PROCEDURE — 84484 ASSAY OF TROPONIN QUANT: CPT

## 2024-06-13 PROCEDURE — 84439 ASSAY OF FREE THYROXINE: CPT

## 2024-06-13 PROCEDURE — 81001 URINALYSIS AUTO W/SCOPE: CPT

## 2024-06-13 PROCEDURE — 70450 CT HEAD/BRAIN W/O DYE: CPT

## 2024-06-13 PROCEDURE — 83605 ASSAY OF LACTIC ACID: CPT

## 2024-06-13 PROCEDURE — 2580000003 HC RX 258: Performed by: EMERGENCY MEDICINE

## 2024-06-13 PROCEDURE — 85025 COMPLETE CBC W/AUTO DIFF WBC: CPT

## 2024-06-13 PROCEDURE — 99284 EMERGENCY DEPT VISIT MOD MDM: CPT

## 2024-06-13 PROCEDURE — 36415 COLL VENOUS BLD VENIPUNCTURE: CPT

## 2024-06-13 RX ORDER — 0.9 % SODIUM CHLORIDE 0.9 %
1000 INTRAVENOUS SOLUTION INTRAVENOUS ONCE
Status: COMPLETED | OUTPATIENT
Start: 2024-06-13 | End: 2024-06-13

## 2024-06-13 RX ADMIN — SODIUM CHLORIDE 1000 ML: 9 INJECTION, SOLUTION INTRAVENOUS at 19:16

## 2024-06-13 ASSESSMENT — PAIN - FUNCTIONAL ASSESSMENT: PAIN_FUNCTIONAL_ASSESSMENT: 0-10

## 2024-06-13 ASSESSMENT — LIFESTYLE VARIABLES
HOW MANY STANDARD DRINKS CONTAINING ALCOHOL DO YOU HAVE ON A TYPICAL DAY: PATIENT DOES NOT DRINK
HOW OFTEN DO YOU HAVE A DRINK CONTAINING ALCOHOL: NEVER

## 2024-06-13 NOTE — PROGRESS NOTES
Pt was transported to the ER via wheelchair, without incident. Wife was escorted along as well   Detail Level: Zone Other (Free Text): Please refer to the attached body diagrams for complete details.

## 2024-06-14 LAB — T4 FREE SERPL-MCNC: 0.9 NG/DL (ref 0.92–1.68)

## 2024-06-14 NOTE — DISCHARGE INSTRUCTIONS
Return immediately if any worsening symptoms or any other concerns    Please understand that early in the process of an illness or injury, an emergency department workup can be falsely reassuring.      Tell us how we did visit: [email or web address]   and let us know about your experience

## 2024-06-14 NOTE — ED PROVIDER NOTES
ADDENDUM:        The patient was seen for Headache and Dizziness (X1 month, pressure in head)  .  The patient's initial evaluation and plan have been discussed with the prior provider who initially evaluated the patient.  Nursing Notes, Past Medical Hx, Past Surgical Hx, Social Hx, Allergies, and Family Hx were all reviewed.    PAST MEDICAL HISTORY    has a past medical history of Atrial fibrillation (HCC), Blood in urine, Chronic kidney disease, CKD (chronic kidney disease), History of ischemic stroke in prior three months, Hyperlipidemia, Ischemic stroke (HCC), Kidney stones, Nihss score 10, PTSD (post-traumatic stress disorder), Skin tag, Stroke (HCC), and Stroke (HCC).    SURGICAL HISTORY      has a past surgical history that includes Appendectomy; transesophageal echocardiogram (01/17/2020); TURP (N/A, 08/10/2020); Cystoscopy (Left, 10/19/2020); Lithotripsy; Cystoscopy (Left, 12/18/2020); IR GUIDED NEPHROSTOMY CATH PLACEMENT LEFT (12/18/2020); NEPHROLITHOTOMY (Left, 12/18/2020); Cystoscopy (Left, 12/18/2020); CYSTOSCOPY INSERTION / REMOVAL STENT / STONE (Left, 2/1/2021); Colonoscopy (N/A, 4/27/2021); and Upper gastrointestinal endoscopy (N/A, 4/27/2021).    CURRENT MEDICATIONS       Previous Medications    ASPIRIN 81 MG EC TABLET    Take 1 tablet by mouth daily    ATORVASTATIN (LIPITOR) 40 MG TABLET    Take 1 tablet by mouth nightly    CHOLECALCIFEROL (VITAMIN D3) 25 MCG (1000 UT) CAPS    Take 2 capsules by mouth daily    DABIGATRAN (PRADAXA) 150 MG CAPSULE    Take 1 capsule by mouth 2 times daily    FAMOTIDINE (PEPCID) 20 MG TABLET    Take 1 tablet by mouth 2 times daily    FOLIC ACID PO    Take 0.4 mg by mouth Daily    LATANOPROST (XALATAN) 0.005 % OPHTHALMIC SOLUTION    Place 1 drop into both eyes nightly    OMEGA-3 FATTY ACIDS (FISH OIL) 1000 MG CAPS    Take 1 capsule by mouth 2 times daily    VITAMIN B1-B12 IM    Inject into the muscle every 30 days     ZINC 50 MG CAPS    Take 1 tablet by mouth daily 
Physician Attending         Onofre Jaimes MD  06/13/24 1910

## 2024-06-17 ENCOUNTER — OFFICE VISIT (OUTPATIENT)
Dept: INTERNAL MEDICINE | Age: 74
End: 2024-06-17
Payer: MEDICARE

## 2024-06-17 ENCOUNTER — HOSPITAL ENCOUNTER (EMERGENCY)
Age: 74
Discharge: HOME | End: 2024-06-17
Attending: EMERGENCY MEDICINE
Payer: MEDICARE

## 2024-06-17 ENCOUNTER — APPOINTMENT (OUTPATIENT)
Dept: GENERAL RADIOLOGY | Age: 74
End: 2024-06-17
Payer: MEDICARE

## 2024-06-17 VITALS
WEIGHT: 180 LBS | DIASTOLIC BLOOD PRESSURE: 98 MMHG | HEART RATE: 61 BPM | OXYGEN SATURATION: 96 % | SYSTOLIC BLOOD PRESSURE: 120 MMHG | BODY MASS INDEX: 28.25 KG/M2 | TEMPERATURE: 97.9 F | HEIGHT: 67 IN | RESPIRATION RATE: 17 BRPM

## 2024-06-17 VITALS
DIASTOLIC BLOOD PRESSURE: 88 MMHG | RESPIRATION RATE: 16 BRPM | HEART RATE: 89 BPM | OXYGEN SATURATION: 99 % | BODY MASS INDEX: 28.72 KG/M2 | SYSTOLIC BLOOD PRESSURE: 138 MMHG | WEIGHT: 183 LBS | HEIGHT: 67 IN

## 2024-06-17 DIAGNOSIS — R07.89 ATYPICAL CHEST PAIN: Primary | ICD-10-CM

## 2024-06-17 DIAGNOSIS — R07.9 CHEST PAIN, UNSPECIFIED TYPE: Primary | ICD-10-CM

## 2024-06-17 DIAGNOSIS — E78.5 HYPERLIPIDEMIA, UNSPECIFIED HYPERLIPIDEMIA TYPE: ICD-10-CM

## 2024-06-17 DIAGNOSIS — R07.9 CHEST PAIN, UNSPECIFIED TYPE: ICD-10-CM

## 2024-06-17 DIAGNOSIS — K21.9 GASTROESOPHAGEAL REFLUX DISEASE WITHOUT ESOPHAGITIS: ICD-10-CM

## 2024-06-17 DIAGNOSIS — I48.20 CHRONIC ATRIAL FIBRILLATION (HCC): ICD-10-CM

## 2024-06-17 DIAGNOSIS — I48.91 ATRIAL FIBRILLATION, UNSPECIFIED TYPE (HCC): ICD-10-CM

## 2024-06-17 LAB
ALBUMIN SERPL-MCNC: 4.4 G/DL (ref 3.5–5.2)
ALBUMIN/GLOB SERPL: 1.5 {RATIO} (ref 1–2.5)
ALP SERPL-CCNC: 114 U/L (ref 40–129)
ALT SERPL-CCNC: 28 U/L (ref 5–41)
ANION GAP SERPL CALCULATED.3IONS-SCNC: 11 MMOL/L (ref 9–17)
AST SERPL-CCNC: 26 U/L
BASOPHILS NFR BLD: 0 % (ref 0–2)
BILIRUB SERPL-MCNC: 0.9 MG/DL (ref 0.3–1.2)
BNP SERPL-MCNC: 427 PG/ML
BUN SERPL-MCNC: 16 MG/DL (ref 8–23)
BUN/CREAT SERPL: 16 (ref 9–20)
CALCIUM SERPL-MCNC: 9.2 MG/DL (ref 8.6–10.4)
CO2 SERPL-SCNC: 23 MMOL/L (ref 20–31)
CREAT SERPL-MCNC: 1 MG/DL (ref 0.7–1.2)
EOSINOPHIL # BLD: 0.12 K/UL (ref 0–0.44)
EOSINOPHILS RELATIVE PERCENT: 2 % (ref 1–4)
ERYTHROCYTE [DISTWIDTH] IN BLOOD BY AUTOMATED COUNT: 13.2 % (ref 11.8–14.4)
GFR, ESTIMATED: 79 ML/MIN/1.73M2
HCT VFR BLD AUTO: 45 % (ref 40.7–50.3)
IMM GRANULOCYTES # BLD AUTO: <0.03 K/UL (ref 0–0.3)
IMM GRANULOCYTES NFR BLD: 0 %
LYMPHOCYTES NFR BLD: 1.36 K/UL (ref 1.1–3.7)
LYMPHOCYTES RELATIVE PERCENT: 23 % (ref 24–43)
MCH RBC QN AUTO: 30.3 PG (ref 25.2–33.5)
MCHC RBC AUTO-ENTMCNC: 35.3 G/DL (ref 25.2–33.5)
MCV RBC AUTO: 85.9 FL (ref 82.6–102.9)
MONOCYTES NFR BLD: 0.47 K/UL (ref 0.1–1.2)
MONOCYTES NFR BLD: 8 % (ref 3–12)
NEUTROPHILS NFR BLD: 67 % (ref 36–65)
NEUTS SEG NFR BLD: 3.96 K/UL (ref 1.5–8.1)
NRBC BLD-RTO: 0 PER 100 WBC
PLATELET # BLD AUTO: 150 K/UL (ref 138–453)
PMV BLD AUTO: 9.6 FL (ref 8.1–13.5)
POTASSIUM SERPL-SCNC: 4.3 MMOL/L (ref 3.7–5.3)
PROT SERPL-MCNC: 7.4 G/DL (ref 6.4–8.3)
RBC # BLD AUTO: 5.24 M/UL (ref 4.21–5.77)
SODIUM SERPL-SCNC: 141 MMOL/L (ref 135–144)
TROPONIN I SERPL HS-MCNC: 14 NG/L (ref 0–22)
WBC OTHER # BLD: 6 K/UL (ref 3.5–11.3)

## 2024-06-17 PROCEDURE — 99212 OFFICE O/P EST SF 10 MIN: CPT | Performed by: INTERNAL MEDICINE

## 2024-06-17 PROCEDURE — 85610 PROTHROMBIN TIME: CPT

## 2024-06-17 PROCEDURE — 1123F ACP DISCUSS/DSCN MKR DOCD: CPT | Performed by: INTERNAL MEDICINE

## 2024-06-17 PROCEDURE — 36415 COLL VENOUS BLD VENIPUNCTURE: CPT

## 2024-06-17 PROCEDURE — 93005 ELECTROCARDIOGRAM TRACING: CPT | Performed by: EMERGENCY MEDICINE

## 2024-06-17 PROCEDURE — 99215 OFFICE O/P EST HI 40 MIN: CPT | Performed by: INTERNAL MEDICINE

## 2024-06-17 PROCEDURE — 83880 ASSAY OF NATRIURETIC PEPTIDE: CPT

## 2024-06-17 PROCEDURE — 99285 EMERGENCY DEPT VISIT HI MDM: CPT

## 2024-06-17 PROCEDURE — 80053 COMPREHEN METABOLIC PANEL: CPT

## 2024-06-17 PROCEDURE — 84484 ASSAY OF TROPONIN QUANT: CPT

## 2024-06-17 PROCEDURE — 71045 X-RAY EXAM CHEST 1 VIEW: CPT

## 2024-06-17 PROCEDURE — 93005 ELECTROCARDIOGRAM TRACING: CPT | Performed by: INTERNAL MEDICINE

## 2024-06-17 PROCEDURE — 85025 COMPLETE CBC W/AUTO DIFF WBC: CPT

## 2024-06-17 ASSESSMENT — ENCOUNTER SYMPTOMS
SORE THROAT: 0
SHORTNESS OF BREATH: 0
ABDOMINAL PAIN: 0
EYE REDNESS: 1
TROUBLE SWALLOWING: 0
BACK PAIN: 0
VOMITING: 0
DIARRHEA: 0
WHEEZING: 0
CONSTIPATION: 0
NAUSEA: 0
BLOOD IN STOOL: 0

## 2024-06-17 ASSESSMENT — LIFESTYLE VARIABLES
HOW OFTEN DO YOU HAVE A DRINK CONTAINING ALCOHOL: NEVER
HOW MANY STANDARD DRINKS CONTAINING ALCOHOL DO YOU HAVE ON A TYPICAL DAY: PATIENT DOES NOT DRINK

## 2024-06-17 ASSESSMENT — PAIN - FUNCTIONAL ASSESSMENT: PAIN_FUNCTIONAL_ASSESSMENT: NONE - DENIES PAIN

## 2024-06-17 NOTE — ED PROVIDER NOTES
Previous charts    Tests considered but not ordered: Not applicable    Independent interpretation of tests (eg.  X-ray, CAT scan, Doppler studies, EKG): A-fib    Discussion of x-ray results with radiology:   Consults: Dr. Mcdermott of cardiology also discussed with his primary physician Dr. Ortiz    Consideration for admission/observation (even if discharged): Patient is asymptomatic palpitations were a little night he has a known history of A-fib troponin is normal    Prescription considerations:     Sepsis considered: Not applicable    Critical Care note written: None    DIAGNOSTIC RESULTS     EKG: All EKG's are interpreted by the Emergency Department Physician who either signs or Co-signs this chart in the absence of a cardiologist.    EKG shows atrial fibs with a rate of 75 bpm QRS duration 74 ms QT corrected 419 ms axis is 0 there is no acute ST or T wave changes seen    RADIOLOGY:   XR CHEST PORTABLE   Final Result   No acute process.            I directly visualized the following  images and reviewed the radiologist interpretations:          ED BEDSIDE ULTRASOUND:       LABS:  Labs Reviewed   BRAIN NATRIURETIC PEPTIDE - Abnormal; Notable for the following components:       Result Value    Pro- (*)     All other components within normal limits   CBC WITH AUTO DIFFERENTIAL - Abnormal; Notable for the following components:    MCHC 35.3 (*)     Neutrophils % 67 (*)     Lymphocytes % 23 (*)     All other components within normal limits   COMPREHENSIVE METABOLIC PANEL W/ REFLEX TO MG FOR LOW K - Abnormal; Notable for the following components:    Glucose 113 (*)     All other components within normal limits   PROTIME-INR   TROPONIN           EMERGENCY DEPARTMENT COURSE:   Vitals:    Vitals:    06/17/24 1200 06/17/24 1230 06/17/24 1300 06/17/24 1315   BP: 125/64 (!) 126/110 (!) 130/93 (!) 120/98   Pulse: 66 56 64 61   Resp: 12 12 10 17   Temp:       TempSrc:       SpO2: 95% 98% 96% 96%   Weight:       Height:

## 2024-06-17 NOTE — PROGRESS NOTES
Writer notified ER registration that patient was being sent over for eval & treat. Patient was seen in office with c/o chest pain & a-fib.

## 2024-06-19 ENCOUNTER — OFFICE VISIT (OUTPATIENT)
Dept: CARDIOLOGY | Age: 74
End: 2024-06-19
Payer: MEDICARE

## 2024-06-19 VITALS
WEIGHT: 182 LBS | DIASTOLIC BLOOD PRESSURE: 94 MMHG | BODY MASS INDEX: 28.56 KG/M2 | HEART RATE: 54 BPM | SYSTOLIC BLOOD PRESSURE: 128 MMHG | HEIGHT: 67 IN

## 2024-06-19 DIAGNOSIS — I10 PRIMARY HYPERTENSION: ICD-10-CM

## 2024-06-19 DIAGNOSIS — I48.21 PERMANENT ATRIAL FIBRILLATION (HCC): Primary | ICD-10-CM

## 2024-06-19 LAB
EKG Q-T INTERVAL: 376 MS
EKG QRS DURATION: 74 MS
EKG QTC CALCULATION (BAZETT): 419 MS
EKG R AXIS: 0 DEGREES
EKG T AXIS: 33 DEGREES
EKG VENTRICULAR RATE: 75 BPM

## 2024-06-19 PROCEDURE — 3080F DIAST BP >= 90 MM HG: CPT | Performed by: INTERNAL MEDICINE

## 2024-06-19 PROCEDURE — 3074F SYST BP LT 130 MM HG: CPT | Performed by: INTERNAL MEDICINE

## 2024-06-19 PROCEDURE — 99212 OFFICE O/P EST SF 10 MIN: CPT | Performed by: INTERNAL MEDICINE

## 2024-06-19 PROCEDURE — 1123F ACP DISCUSS/DSCN MKR DOCD: CPT | Performed by: INTERNAL MEDICINE

## 2024-06-19 PROCEDURE — 93005 ELECTROCARDIOGRAM TRACING: CPT | Performed by: INTERNAL MEDICINE

## 2024-06-19 PROCEDURE — 99214 OFFICE O/P EST MOD 30 MIN: CPT | Performed by: INTERNAL MEDICINE

## 2024-06-19 PROCEDURE — 93010 ELECTROCARDIOGRAM REPORT: CPT | Performed by: INTERNAL MEDICINE

## 2024-06-19 RX ORDER — AMLODIPINE BESYLATE 5 MG/1
5 TABLET ORAL DAILY
Qty: 30 TABLET | Refills: 5 | Status: SHIPPED | OUTPATIENT
Start: 2024-06-19

## 2024-06-19 NOTE — PROGRESS NOTES
Today's Date: 6/19/2024  Patient's Name: Tyler Worthy  Patient's age: 73 y.o., 1950    Subjective:  Tyler Worthy is being seen in clinic today regarding permanent atrial fibrillation, hyperlipidemia, headaches    he is here for follow-up after emergency room visit on 6/17/2024 for palpitations and ballooning sensation in his head. His BP in ER was 120/98. Wife reports high BP intermittently with one time reading 200/118. He denies any chest pain. He denies any dyspnea. No syncope or pre-syncope, no orthopnea. He reports feeling tired. He reports he snores at night time. TSH and electrolytes were normal. was normal in ER        Past Medical History:   has a past medical history of Atrial fibrillation (HCC), Blood in urine, Chronic kidney disease, CKD (chronic kidney disease), History of ischemic stroke in prior three months, Hyperlipidemia, Ischemic stroke (HCC), Kidney stones, Nihss score 10, PTSD (post-traumatic stress disorder), Skin tag, Stroke (HCC), and Stroke (HCC).    Past Surgical History:   has a past surgical history that includes Appendectomy; transesophageal echocardiogram (01/17/2020); TURP (N/A, 08/10/2020); Cystoscopy (Left, 10/19/2020); Lithotripsy; Cystoscopy (Left, 12/18/2020); IR GUIDED NEPHROSTOMY CATH PLACEMENT LEFT (12/18/2020); NEPHROLITHOTOMY (Left, 12/18/2020); Cystoscopy (Left, 12/18/2020); CYSTOSCOPY INSERTION / REMOVAL STENT / STONE (Left, 2/1/2021); Colonoscopy (N/A, 4/27/2021); and Upper gastrointestinal endoscopy (N/A, 4/27/2021).    Home Medications:  Prior to Admission medications    Medication Sig Start Date End Date Taking? Authorizing Provider   FOLIC ACID PO Take 0.4 mg by mouth Daily    Cristian Gay MD   zinc 50 MG CAPS Take 1 tablet by mouth daily 2/23/21   Cristian Gay MD   aspirin 81 MG EC tablet Take 1 tablet by mouth daily 2/3/21   Del Ortiz MD   dabigatran (PRADAXA) 150 MG capsule Take 1 capsule by mouth 2 times daily 2/3/21   Del Ortiz

## 2024-06-21 ENCOUNTER — TELEPHONE (OUTPATIENT)
Dept: CARDIOLOGY | Age: 74
End: 2024-06-21

## 2024-06-21 NOTE — TELEPHONE ENCOUNTER
See Gilma's note below:  started Amlodipine 2 days ago.    I spoke to pt's wife.  Diarrhea has slowed down after 3 times.  Doing the BRAT diet and better at the moment.  Might try imodium if any more diarrhea.    /75, pulse 104 today.  Seeing PCP Dr. DEVANG Ortiz on Monday 6/24.

## 2024-06-21 NOTE — TELEPHONE ENCOUNTER
Pt wife called to ask if the new med, amlodipine, would have side effect of diarrhea. Pt has had several bouts today and also has pressure in his head and is very tired.     Please advise, [phone]    Last Appt:  6/19/2024  Next Appt:   11/6/2024  Med verified in Epic   If you are a smoker, it is important for your health to stop smoking. Please be aware that second hand smoke is also harmful.

## 2024-06-24 ENCOUNTER — OFFICE VISIT (OUTPATIENT)
Dept: INTERNAL MEDICINE | Age: 74
End: 2024-06-24
Payer: MEDICARE

## 2024-06-24 VITALS
WEIGHT: 181 LBS | HEIGHT: 67 IN | RESPIRATION RATE: 16 BRPM | SYSTOLIC BLOOD PRESSURE: 138 MMHG | HEART RATE: 66 BPM | BODY MASS INDEX: 28.41 KG/M2 | OXYGEN SATURATION: 97 % | DIASTOLIC BLOOD PRESSURE: 88 MMHG

## 2024-06-24 DIAGNOSIS — R06.83 SNORING: ICD-10-CM

## 2024-06-24 DIAGNOSIS — E78.5 HYPERLIPIDEMIA, UNSPECIFIED HYPERLIPIDEMIA TYPE: ICD-10-CM

## 2024-06-24 DIAGNOSIS — G47.33 OBSTRUCTIVE SLEEP APNEA (ADULT) (PEDIATRIC): ICD-10-CM

## 2024-06-24 DIAGNOSIS — R53.83 FATIGUE, UNSPECIFIED TYPE: Primary | ICD-10-CM

## 2024-06-24 DIAGNOSIS — R73.01 IFG (IMPAIRED FASTING GLUCOSE): ICD-10-CM

## 2024-06-24 DIAGNOSIS — I48.91 ATRIAL FIBRILLATION, UNSPECIFIED TYPE (HCC): ICD-10-CM

## 2024-06-24 PROCEDURE — G2211 COMPLEX E/M VISIT ADD ON: HCPCS | Performed by: INTERNAL MEDICINE

## 2024-06-24 PROCEDURE — 1123F ACP DISCUSS/DSCN MKR DOCD: CPT | Performed by: INTERNAL MEDICINE

## 2024-06-24 PROCEDURE — 99214 OFFICE O/P EST MOD 30 MIN: CPT | Performed by: INTERNAL MEDICINE

## 2024-06-24 PROCEDURE — 99212 OFFICE O/P EST SF 10 MIN: CPT | Performed by: INTERNAL MEDICINE

## 2024-06-24 NOTE — PROGRESS NOTES
CAPS, Take 2 capsules by mouth daily, Disp: , Rfl:     VITAMIN B1-B12 IM, Inject into the muscle every 30 days , Disp: , Rfl:     Allergies  Patient has no known allergies.    Past Medical History:   Diagnosis Date    Atrial fibrillation (HCC) 01/2020    Blood in urine     Chronic kidney disease     CKD (chronic kidney disease)     History of ischemic stroke in prior three months     Hyperlipidemia     Ischemic stroke (HCC) 01/10/2020    Kidney stones     Nihss score 10     PTSD (post-traumatic stress disorder)     from war, [place]     Skin tag 12/21/2018    Stroke ([facility]) 01/16/2020    MRI Brain WO: New right PCA distribution infarct, multifocal left MCA distribution infarcts    Stroke ([facility]) 01/11/2020    large vessel occlusion/left M2 occlusion.  He subsequently underwent emergent thrombectomy.  /  S/P TPA DONE     Past Surgical History:   Procedure Laterality Date    APPENDECTOMY      COLONOSCOPY N/A 4/27/2021    Normal colon. Dr. Ferrer at [facility]    CYSTOSCOPY Left 10/19/2020    CYSTOSCOPY, LEFT URETEROSCOPY w/ HOMIUM LASER LITHOTRIPSY, LEFT URETERAL STENT PLACEMENT performed by Dave Shine MD at [facility] OR    CYSTOSCOPY Left 12/18/2020    cystoscopy, left stent placement, left retrograde pyelogram    CYSTOSCOPY Left 12/18/2020    CYSTOSCOPY, LEFT STENT REMOVAL, LEFT RETROGRADE PYELOGRAM, INSERTION OCCLUSIVE BALLOON - PT TO INTERV RADIOLOGY FOLLOWING performed by Dave Shine MD at [facility] OR    CYSTOSCOPY INSERTION / REMOVAL STENT / STONE Left 2/1/2021    CYSTO Left URETEROSCOPY w/ stent removal ET RETROGRADE performed by Dave Shine MD at [facility] OR    IR NEPHROSTOMY PERCUTANEOUS LEFT  12/18/2020    IR NEPHROSTOMY PERCUTANEOUS LEFT 12/18/2020 Eran Wheat MD [facility] SPECIAL PROCEDURES    LITHOTRIPSY      NEPHROLITHOTOMY Left 12/18/2020    cystoscopy, left stent placement, left retrograde pyelogram performed by Dave Shine MD at [facility] OR    TRANSESOPHAGEAL ECHOCARDIOGRAM  01/17/2020    TURP N/A 08/10/2020    CYSTO w/

## 2024-06-25 ENCOUNTER — HOSPITAL ENCOUNTER (OUTPATIENT)
Age: 74
Discharge: HOME | End: 2024-06-25
Payer: MEDICARE

## 2024-06-25 DIAGNOSIS — R73.01 IFG (IMPAIRED FASTING GLUCOSE): ICD-10-CM

## 2024-06-25 DIAGNOSIS — E78.5 HYPERLIPIDEMIA, UNSPECIFIED HYPERLIPIDEMIA TYPE: ICD-10-CM

## 2024-06-25 DIAGNOSIS — R53.83 FATIGUE, UNSPECIFIED TYPE: ICD-10-CM

## 2024-06-25 LAB
ALBUMIN SERPL-MCNC: 4.2 G/DL (ref 3.5–5.2)
ALBUMIN/GLOB SERPL: 1.4 {RATIO} (ref 1–2.5)
ALP SERPL-CCNC: 118 U/L (ref 40–129)
ALT SERPL-CCNC: 18 U/L (ref 5–41)
ANION GAP SERPL CALCULATED.3IONS-SCNC: 8 MMOL/L (ref 9–17)
AST SERPL-CCNC: 23 U/L
BASOPHILS # BLD: 0.03 K/UL (ref 0–0.2)
BASOPHILS NFR BLD: 1 % (ref 0–2)
BILIRUB SERPL-MCNC: 0.9 MG/DL (ref 0.3–1.2)
BUN SERPL-MCNC: 16 MG/DL (ref 8–23)
BUN/CREAT SERPL: 16 (ref 9–20)
CALCIUM SERPL-MCNC: 9.4 MG/DL (ref 8.6–10.4)
CHLORIDE SERPL-SCNC: 105 MMOL/L (ref 98–107)
CHOLEST SERPL-MCNC: 89 MG/DL (ref 0–199)
CHOLESTEROL/HDL RATIO: 2
CO2 SERPL-SCNC: 28 MMOL/L (ref 20–31)
CREAT SERPL-MCNC: 1 MG/DL (ref 0.7–1.2)
EOSINOPHIL # BLD: 0.16 K/UL (ref 0–0.44)
EOSINOPHILS RELATIVE PERCENT: 3 % (ref 1–4)
ERYTHROCYTE [DISTWIDTH] IN BLOOD BY AUTOMATED COUNT: 13.1 % (ref 11.8–14.4)
EST. AVERAGE GLUCOSE BLD GHB EST-MCNC: 88 MG/DL
GFR, ESTIMATED: 79 ML/MIN/1.73M2
GLUCOSE SERPL-MCNC: 97 MG/DL (ref 70–99)
HBA1C MFR BLD: 4.7 % (ref 4–6)
HCT VFR BLD AUTO: 45.7 % (ref 40.7–50.3)
HDLC SERPL-MCNC: 40 MG/DL
HGB BLD-MCNC: 16.1 G/DL (ref 13–17)
IMM GRANULOCYTES # BLD AUTO: <0.03 K/UL (ref 0–0.3)
IMM GRANULOCYTES NFR BLD: 0 %
LDLC SERPL CALC-MCNC: 29 MG/DL (ref 0–100)
LYMPHOCYTES NFR BLD: 1.71 K/UL (ref 1.1–3.7)
LYMPHOCYTES RELATIVE PERCENT: 27 % (ref 24–43)
MCH RBC QN AUTO: 30.3 PG (ref 25.2–33.5)
MCHC RBC AUTO-ENTMCNC: 35.2 G/DL (ref 25.2–33.5)
MCV RBC AUTO: 86.1 FL (ref 82.6–102.9)
MONOCYTES NFR BLD: 0.48 K/UL (ref 0.1–1.2)
MONOCYTES NFR BLD: 8 % (ref 3–12)
NEUTROPHILS NFR BLD: 61 % (ref 36–65)
NEUTS SEG NFR BLD: 3.95 K/UL (ref 1.5–8.1)
NRBC BLD-RTO: 0 PER 100 WBC
PLATELET # BLD AUTO: 170 K/UL (ref 138–453)
PMV BLD AUTO: 10.2 FL (ref 8.1–13.5)
POTASSIUM SERPL-SCNC: 5 MMOL/L (ref 3.7–5.3)
PROT SERPL-MCNC: 7.3 G/DL (ref 6.4–8.3)
RBC # BLD AUTO: 5.31 M/UL (ref 4.21–5.77)
SODIUM SERPL-SCNC: 141 MMOL/L (ref 135–144)
TRIGL SERPL-MCNC: 103 MG/DL
VLDLC SERPL CALC-MCNC: 21 MG/DL
WBC OTHER # BLD: 6.3 K/UL (ref 3.5–11.3)

## 2024-06-25 PROCEDURE — 83036 HEMOGLOBIN GLYCOSYLATED A1C: CPT

## 2024-06-25 PROCEDURE — 84403 ASSAY OF TOTAL TESTOSTERONE: CPT

## 2024-06-25 PROCEDURE — 80053 COMPREHEN METABOLIC PANEL: CPT

## 2024-06-25 PROCEDURE — 36415 COLL VENOUS BLD VENIPUNCTURE: CPT

## 2024-06-25 PROCEDURE — 84270 ASSAY OF SEX HORMONE GLOBUL: CPT

## 2024-06-25 PROCEDURE — 85025 COMPLETE CBC W/AUTO DIFF WBC: CPT

## 2024-06-25 PROCEDURE — 80061 LIPID PANEL: CPT

## 2024-06-26 LAB
SHBG SERPL-SCNC: 39 NMOL/L (ref 19–76)
TESTOST FREE MFR SERPL: 62.7 PG/ML (ref 47–244)
TESTOST SERPL-MCNC: 348 NG/DL (ref 193–740)
TESTOSTERONE, BIOAVAILABLE: 147 NG/DL (ref 130–680)

## 2024-07-31 ENCOUNTER — OFFICE VISIT (OUTPATIENT)
Dept: NEUROLOGY | Age: 74
End: 2024-07-31
Payer: MEDICARE

## 2024-07-31 VITALS
BODY MASS INDEX: 27.57 KG/M2 | WEIGHT: 176 LBS | DIASTOLIC BLOOD PRESSURE: 82 MMHG | SYSTOLIC BLOOD PRESSURE: 118 MMHG | OXYGEN SATURATION: 96 % | HEART RATE: 99 BPM

## 2024-07-31 DIAGNOSIS — Z86.73 H/O: STROKE: ICD-10-CM

## 2024-07-31 DIAGNOSIS — Q63.1 HORSESHOE KIDNEY: ICD-10-CM

## 2024-07-31 DIAGNOSIS — Z91.81 RISK FOR FALLS: ICD-10-CM

## 2024-07-31 DIAGNOSIS — F41.0 PANIC DISORDER WITHOUT AGORAPHOBIA WITH MODERATE PANIC ATTACKS: ICD-10-CM

## 2024-07-31 DIAGNOSIS — R53.83 FATIGUE DUE TO DEPRESSION: ICD-10-CM

## 2024-07-31 DIAGNOSIS — R26.9 GAIT DIFFICULTY: ICD-10-CM

## 2024-07-31 DIAGNOSIS — G91.2 NPH (NORMAL PRESSURE HYDROCEPHALUS) (HCC): ICD-10-CM

## 2024-07-31 DIAGNOSIS — R51.9 PRESSURE IN HEAD: ICD-10-CM

## 2024-07-31 DIAGNOSIS — Z91.89 AT HIGH RISK FOR STROKE: ICD-10-CM

## 2024-07-31 DIAGNOSIS — I48.91 ATRIAL FIBRILLATION, UNSPECIFIED TYPE (HCC): ICD-10-CM

## 2024-07-31 DIAGNOSIS — F02.80 CIRCUMSCRIBED CEREBRAL ATROPHY (HCC): ICD-10-CM

## 2024-07-31 DIAGNOSIS — R42 DIZZINESS AND GIDDINESS: ICD-10-CM

## 2024-07-31 DIAGNOSIS — Z86.73 H/O ISCHEMIC LEFT MCA STROKE: ICD-10-CM

## 2024-07-31 DIAGNOSIS — H53.47 BILATERAL HEMIANOPIA: ICD-10-CM

## 2024-07-31 DIAGNOSIS — F43.10 PTSD (POST-TRAUMATIC STRESS DISORDER): ICD-10-CM

## 2024-07-31 DIAGNOSIS — F32.A FATIGUE DUE TO DEPRESSION: ICD-10-CM

## 2024-07-31 DIAGNOSIS — G31.01 CIRCUMSCRIBED CEREBRAL ATROPHY (HCC): ICD-10-CM

## 2024-07-31 DIAGNOSIS — G31.9 DIFFUSE CEREBRAL ATROPHY (HCC): ICD-10-CM

## 2024-07-31 DIAGNOSIS — G93.89 ENCEPHALOMALACIA ON IMAGING STUDY: ICD-10-CM

## 2024-07-31 DIAGNOSIS — R26.89 BALANCE PROBLEM: ICD-10-CM

## 2024-07-31 DIAGNOSIS — I69.30 H/O: STROKE WITH RESIDUAL EFFECTS: ICD-10-CM

## 2024-07-31 DIAGNOSIS — H53.462 LEFT HOMONYMOUS HEMIANOPSIA: ICD-10-CM

## 2024-07-31 DIAGNOSIS — I67.82 CHRONIC CEREBRAL ISCHEMIA: ICD-10-CM

## 2024-07-31 DIAGNOSIS — G93.89 BRAIN DYSFUNCTION: ICD-10-CM

## 2024-07-31 DIAGNOSIS — R42 DIZZINESS: Primary | ICD-10-CM

## 2024-07-31 DIAGNOSIS — G45.0 VBI (VERTEBROBASILAR INSUFFICIENCY): ICD-10-CM

## 2024-07-31 PROCEDURE — 99214 OFFICE O/P EST MOD 30 MIN: CPT | Performed by: PSYCHIATRY & NEUROLOGY

## 2024-07-31 PROCEDURE — 1123F ACP DISCUSS/DSCN MKR DOCD: CPT | Performed by: PSYCHIATRY & NEUROLOGY

## 2024-07-31 PROCEDURE — 99215 OFFICE O/P EST HI 40 MIN: CPT | Performed by: PSYCHIATRY & NEUROLOGY

## 2024-07-31 ASSESSMENT — ENCOUNTER SYMPTOMS
COLOR CHANGE: 0
APNEA: 0
WHEEZING: 0
BOWEL INCONTINENCE: 0
SORE THROAT: 0
PHOTOPHOBIA: 0
EYE DISCHARGE: 0
BLOOD IN STOOL: 0
NAUSEA: 0
TROUBLE SWALLOWING: 0
SWOLLEN GLANDS: 0
ABDOMINAL DISTENTION: 0
CHOKING: 0
ABDOMINAL PAIN: 0
VOICE CHANGE: 0
CHEST TIGHTNESS: 0
VOMITING: 0
FACIAL SWELLING: 0
EYE PAIN: 0
EYE ITCHING: 0
BACK PAIN: 0
SHORTNESS OF BREATH: 0
VISUAL CHANGE: 0
EYE REDNESS: 0
DIARRHEA: 0
SINUS PRESSURE: 0
CHANGE IN BOWEL HABIT: 0
CONSTIPATION: 0
COUGH: 0

## 2024-07-31 NOTE — PROGRESS NOTES
[facility]  Neurology  [address] [id number]  Phone:[phone]   Fax: [phone]        SUBJECTIVE:     PATIENT ID:  Tyler Worthy is a  RIGHT  HANDED 74 y.o. male.      Cerebrovascular Accident  This is a chronic problem. Episode onset: RECURRENT    LEFT  MCA   AND   RIGHT  PCA    EMBOLIC  STROKES  IN  JAN. 2020. The problem has been gradually improving. Pertinent negatives include no abdominal pain, anorexia, arthralgias, change in bowel habit, chest pain, chills, congestion, coughing, diaphoresis, fatigue, fever, headaches, joint swelling, myalgias, nausea, neck pain, numbness, rash, sore throat, swollen glands, urinary symptoms, vertigo, visual change, vomiting or weakness. Nothing aggravates the symptoms. Treatments tried: ASA,  PRADAXA   The treatment provided significant relief.   Neurologic Problem  The patient's primary symptoms include clumsiness, a loss of balance and memory loss. The patient's pertinent negatives include no altered mental status, focal sensory loss, focal weakness, near-syncope, slurred speech, syncope, visual change or weakness. This is a recurrent problem. The current episode started more than 1 month ago. The neurological problem developed suddenly. The problem is unchanged. There was no focality noted. Associated symptoms include dizziness and light-headedness. Pertinent negatives include no abdominal pain, auditory change, aura, back pain, bladder incontinence, bowel incontinence, chest pain, confusion, diaphoresis, fatigue, fever, headaches, nausea, neck pain, palpitations, shortness of breath, vertigo or vomiting. Past treatments include bed rest, sleep and aspirin. The treatment provided no relief. His past medical history is significant for a CVA and dementia. There is no history of a bleeding disorder, a clotting disorder, head trauma, liver disease, mood changes or seizures.                 History obtained from  The patient

## 2024-08-06 ENCOUNTER — HOSPITAL ENCOUNTER (OUTPATIENT)
Dept: PHYSICAL THERAPY | Age: 74
Setting detail: THERAPIES SERIES
Discharge: HOME | End: 2024-08-06
Attending: PSYCHIATRY & NEUROLOGY
Payer: MEDICARE

## 2024-08-06 PROCEDURE — 97162 PT EVAL MOD COMPLEX 30 MIN: CPT

## 2024-08-06 NOTE — PROGRESS NOTES
Physical Therapy  Initial Assessment  Date: 2024  Patient Name: Tyler Worthy  MRN: [id number]  : 1950    Referring Physician: Jarett Salter MD     PCP: Arsen Ortiz MD     Medical Diagnosis: Balance problem [R26.89]  H/O ischemic left MCA stroke [Z86.73]  Chronic cerebral ischemia [I67.82]    No data recorded    Insurance: Payor: [facility] MEDICARE / Plan: [facility] MEDICARE ADVANTAGE / Product Type: *No Product type* /   Insurance ID: [id number] - (Medicare Managed)        Subjective:   General  Chart Reviewed: Yes  Patient Assessed for Rehabilitation Services: Yes  Subjective  Subjective: Patient noting CVA x 4 years ago.  Patient noting having a feeling of dizziness.  Patient noting no falls.  Patient noting weakness, bilateral LE's.  Wears a AFO on the R.       Vision/Hearing:  Vision  Vision: Impaired  Visual Deficits: Wears glasses    Orientation:  Orientation  Overall Orientation Status: Within Functional Limits    Social History:  Social History  Lives With: Spouse  Type of Home: House  Home Layout: One level  Home Access: Stairs to enter without rails  Entrance Stairs - Rails: None  Entrance Stairs - Number of Steps: 1  Bathroom Shower/Tub: Walk-in shower  Bathroom Toilet: Handicap height  Bathroom Equipment: Grab bars in shower  Home Equipment: Cane;Wheelchair - Manual;Walker - Rolling    Functional Status:  Functional Status  Occupation: Retired  ADL Assistance: Independent  Homemaking Assistance: Independent  Homemaking Responsibilities: Yes  Ambulation Assistance: Independent  Transfer Assistance: Independent  Active : No  Patient's  Info: wife assists, hasn't driven since CVA    Objective:               Strength RLE  Strength RLE: Exception  R Hip Flexion: 4+/5  R Hip ABduction: 5/5  R Hip ADduction: 5/5  R Knee Flexion: 4+/5  R Knee Extension: 4+/5  Strength LLE  Strength LLE: Exception  L Hip Flexion: 5/5  L Hip ABduction: 5/5  L Hip ADduction: 5/5  L Knee Flexion: 5/5  L Knee

## 2024-08-06 NOTE — PLAN OF CARE
[facility] [facility]  Rehabilitation and Sports Medicine    [x] [place]  Phone: [phone]  Fax: [phone]      [] [place]  Phone: [phone]  Fax: [phone]        To:   Jarett Salter MD       Patient: Tyler Worthy  : 1950   MRN: [id number]  Evaluation Date: 2024      Diagnosis Information:    Balance problem [R26.89]  H/O ischemic left MCA stroke [Z86.73]  Chronic cerebral ischemia [I67.82]          Physical Therapy Certification  Dear Jarett Salter MD    The following patient has been evaluated for physical therapy services and for therapy to continue, Medicare requires monthly physician review of the treatment plan. Please review the attached evaluation and/or summary of the patient's plan of care, and verify that you agree therapy should continue by signing the attached document and sending it back to our office.    Plan of Care/Treatment to date:  [x] Therapeutic Exercise    [] Modalities:  [x] Therapeutic Activity     [] Ultrasound  [] Electrical Stimulation  [x] Gait Training      [] Cervical Traction [] Lumbar Traction  [x] Neuromuscular Re-education    [] Cold/hotpack [] Iontophoresis   [x] Instruction in HEP     Other:  [] Manual Therapy      []             [] Aquatic Therapy      []                 Goals:  Short Term Goals  Time Frame for Short Term Goals: 3 weeks  Short Term Goal 1: Initiate HEP    Long Term Goals  Time Frame for Long Term Goals : 6 weeks  Long Term Goal 1: Independent in HEP  Long Term Goal 2: Improve LE strength to 5/5 to improve gait and balance.  Long Term Goal 3: Timed Stands 10x's in 30sec without use of UE's.  Long Term Goal 4: Improve Harris Balance to 40/56 or greater to decrease risk for falls    Frequency/Duration:24-24  # Days per week: [] 1 day # Weeks: [] 1 week [] 5 weeks     [x] 2 days   [] 2 weeks [x] 6 weeks     [x] 3 days   [] 3 weeks [] 7 weeks     [] 4 days   [] 4 weeks [] 8 weeks    Rehab Potential: []

## 2024-08-06 NOTE — FLOWSHEET NOTE
Physical Therapy Daily Treatment Note    Date:  2024    Patient Name:  Tyler Worthy    :  1950  MRN: [id number]  Restrictions/Precautions:     Medical/Treatment Diagnosis Information:    Balance problem [R26.89]  H/O ischemic left MCA stroke [Z86.73]  Chronic cerebral ischemia [I67.82]       Insurance/Certification information:   [facility] MEDICARE   Physician Information:   Jarett Salter MD    Plan of care signed (Y/N):  n  Visit# / total visits:  1/10  Pain level: /10       Time In:1235   Time Out:1315    Progress Note: [x]  Yes  []  No  Next due by: Visit #10      Subjective:   See Eval     Objective: See Eval   Observation:   Test measurements:      Exercises:   Exercise/Equipment Resistance/Repetitions Other comments   NuStep           Standing Balance           Counter ex     Tband TKE      Squats      Step ups     Seated hip abd/add     Seated HS curls     Seated marches     LAQ     STS         [] Provided verbal/tactile cueing for activities related to strengthening, flexibility, endurance, ROM. (02687)  [] Provided verbal/tactile cueing for activities related to improving balance, coordination, kinesthetic sense, posture, motor skill, proprioception. (04983)    Therapeutic Activities:     [] Therapeutic activities, direct (one-on-one) patient contact (use of dynamic activities to improve functional performance). (69228)    Gait:   [] Provided training and instruction to the patient for ambulation re-education. (41840)    Self-Care/ADL's  [] Self-care/home management training and compensatory training, meal preparation, safety procedures, and instructions in use of assistive technology devices/adaptive equipment, direct one-on-one contact. (76422)    Home Exercise Program:     [] Reviewed/Progressed HEP activities related to strengthening, flexibility, endurance, ROM. (29516)  [] Reviewed/Progressed HEP activities related to improving balance, coordination, kinesthetic sense, posture, motor

## 2024-08-07 ENCOUNTER — HOSPITAL ENCOUNTER (OUTPATIENT)
Dept: NEUROLOGY | Age: 74
Discharge: HOME | End: 2024-08-07
Payer: MEDICARE

## 2024-08-07 DIAGNOSIS — Z86.73 H/O ISCHEMIC LEFT MCA STROKE: ICD-10-CM

## 2024-08-07 DIAGNOSIS — G93.89 BRAIN DYSFUNCTION: ICD-10-CM

## 2024-08-07 DIAGNOSIS — I67.82 CHRONIC CEREBRAL ISCHEMIA: ICD-10-CM

## 2024-08-07 PROCEDURE — 93892 TCD EMBOLI DETECT W/O INJ: CPT

## 2024-08-07 PROCEDURE — 93886 INTRACRANIAL COMPLETE STUDY: CPT

## 2024-08-07 NOTE — PROGRESS NOTES
TCD Completed with Emboli Detection.    PCP: Arsen Ortiz MD    Ordering: Jarett Salter Neurologist    Interpreting Physician: Jarett Salter Neurologwalter    Electronically signed by Minnie Dotson RN on 8/7/2024 at 8:53 AM

## 2024-08-08 ENCOUNTER — HOSPITAL ENCOUNTER (OUTPATIENT)
Dept: PHYSICAL THERAPY | Age: 74
Setting detail: THERAPIES SERIES
Discharge: HOME | End: 2024-08-08
Attending: PSYCHIATRY & NEUROLOGY
Payer: MEDICARE

## 2024-08-08 ENCOUNTER — OFFICE VISIT (OUTPATIENT)
Dept: INTERNAL MEDICINE | Age: 74
End: 2024-08-08
Payer: MEDICARE

## 2024-08-08 VITALS
OXYGEN SATURATION: 96 % | BODY MASS INDEX: 27.78 KG/M2 | HEART RATE: 86 BPM | DIASTOLIC BLOOD PRESSURE: 74 MMHG | RESPIRATION RATE: 16 BRPM | SYSTOLIC BLOOD PRESSURE: 132 MMHG | HEIGHT: 67 IN | WEIGHT: 177 LBS

## 2024-08-08 DIAGNOSIS — F43.10 PTSD (POST-TRAUMATIC STRESS DISORDER): ICD-10-CM

## 2024-08-08 DIAGNOSIS — E78.5 HYPERLIPIDEMIA, UNSPECIFIED HYPERLIPIDEMIA TYPE: ICD-10-CM

## 2024-08-08 DIAGNOSIS — I48.91 ATRIAL FIBRILLATION, UNSPECIFIED TYPE (HCC): Primary | ICD-10-CM

## 2024-08-08 DIAGNOSIS — K21.9 GASTROESOPHAGEAL REFLUX DISEASE WITHOUT ESOPHAGITIS: ICD-10-CM

## 2024-08-08 PROCEDURE — G2211 COMPLEX E/M VISIT ADD ON: HCPCS | Performed by: INTERNAL MEDICINE

## 2024-08-08 PROCEDURE — 1123F ACP DISCUSS/DSCN MKR DOCD: CPT | Performed by: INTERNAL MEDICINE

## 2024-08-08 PROCEDURE — 99212 OFFICE O/P EST SF 10 MIN: CPT | Performed by: INTERNAL MEDICINE

## 2024-08-08 PROCEDURE — 99214 OFFICE O/P EST MOD 30 MIN: CPT | Performed by: INTERNAL MEDICINE

## 2024-08-08 PROCEDURE — 97112 NEUROMUSCULAR REEDUCATION: CPT

## 2024-08-08 NOTE — FLOWSHEET NOTE
Physical Therapy Daily Treatment Note    Date:  2024    Patient Name:  Tyler Worthy    :  1950  MRN: [id number]  Restrictions/Precautions:     Medical/Treatment Diagnosis Information:    Balance problem [R26.89]  H/O ischemic left MCA stroke [Z86.73]  Chronic cerebral ischemia [I67.82]       Insurance/Certification information:   [facility] MEDICARE   Physician Information:   Jarett Salter MD    Plan of care signed (Y/N):  n  Visit# / total visits:  2/10  Pain level: /10       Time In:157   Time Out:222    Progress Note: []  Yes  [x]  No  Next due by: Visit #10      Subjective:  Reports dealing with headache today. Went to doctor this morning and has hydrocephalus  No falls since evaluation.     Objective: Therex completed per flowsheet to increase strength, stability and improve balance for fall prevention and allow ease with daily tasks and ambulation. Gait belt donned for all exercises. Initiated several seated and standing exercises this date. Verbal cuing provided for proper exercise technique. Posterior lean occurred with first rep of STS. Educated on importance of pushing off chair arm rests for stability and fall prevention.   Observation:   Test measurements:      Exercises:   Exercise/Equipment Resistance/Repetitions Other comments   NuStep  6'         Standing Balance: FTEO 3x10\" FOAM  CGA         Counter ex 10x HS curls, marches, hip abd/ext   SBA   Tband TKE  10x red     Squats  5x    Step ups     Seated hip abd/add 10x red     Seated HS curls 10x red     Seated marches 10x     LAQ 10x     STS 5x Uses chair armrests for UE support, CGA/min A to prevent posterior fall    [x] Provided verbal/tactile cueing for activities related to strengthening, flexibility, endurance, ROM. (21629)  [x] Provided verbal/tactile cueing for activities related to improving balance, coordination, kinesthetic sense, posture, motor skill, proprioception. (46229)    Therapeutic Activities:     [] Therapeutic

## 2024-08-08 NOTE — PROGRESS NOTES
nonicteric. No lid lag or proptosis  HENT: External ears normal. No external lesions on the nose  Neck: No gross masses. Trachea visibly midline  Respiratory: Good air entry bilaterally.  No wheezing or crackles  Cardiovascular: Normal S1-S2.  No murmurs.  No lower extremity edema  Gastrointestinal: No visible masses. No visible hernias  Skin: No abnormal rashes. No abnormal masses  Neurologic: Cranial nerves grossly intact  Psychiatric: Normal affect. Alert and oriented    Medications  Current Outpatient Medications:     sertraline (ZOLOFT) 50 MG tablet, Take 1 tablet by mouth daily, Disp: , Rfl:     amLODIPine (NORVASC) 5 MG tablet, Take 1 tablet by mouth daily, Disp: 30 tablet, Rfl: 5    FOLIC ACID PO, Take 0.4 mg by mouth Daily, Disp: , Rfl:     zinc 50 MG CAPS, Take 1 tablet by mouth daily, Disp: , Rfl:     aspirin 81 MG EC tablet, Take 1 tablet by mouth daily, Disp: 90 tablet, Rfl: 3    dabigatran (PRADAXA) 150 MG capsule, Take 1 capsule by mouth 2 times daily, Disp: 180 capsule, Rfl: 3    Omega-3 Fatty Acids (FISH OIL) 1000 MG CAPS, Take 1 capsule by mouth 2 times daily, Disp: 180 capsule, Rfl: 3    atorvastatin (LIPITOR) 40 MG tablet, Take 1 tablet by mouth nightly, Disp: 90 tablet, Rfl: 3    famotidine (PEPCID) 20 MG tablet, Take 1 tablet by mouth 2 times daily, Disp: 60 tablet, Rfl: 0    latanoprost (XALATAN) 0.005 % ophthalmic solution, Place 1 drop into both eyes nightly, Disp: , Rfl:     Cholecalciferol (VITAMIN D3) 25 MCG (1000 UT) CAPS, Take 2 capsules by mouth daily, Disp: , Rfl:     VITAMIN B1-B12 IM, Inject into the muscle every 30 days , Disp: , Rfl:     Allergies  Patient has no known allergies.    Past Medical History:   Diagnosis Date    Atrial fibrillation (HCC) 01/2020    Blood in urine     Chronic kidney disease     CKD (chronic kidney disease)     History of ischemic stroke in prior three months     Hyperlipidemia     Ischemic stroke (HCC) 01/10/2020    Kidney stones     Nihss score 10

## 2024-08-09 NOTE — PROCEDURES
[facility]                [address] [place] [address]                    TRANSCRANIAL DOPPLER (TCD) STUDY      PATIENT NAME: LIZ PADILLA                 : 1950  MED REC NO: [id number]                         ROOM:   ACCOUNT NO: [id number]                       ADMIT DATE: 2024      PROVIDER: Jarett Salter MD      DATE OF STUDY:  2024      TECHNIQUE:  Transcranial Doppler study of intracranial arteries was performed using [employer] equipment with digital Doppler technology, with high resolution 250 [place] M-mode display and Multigate Spectral Windows and 2 MHz Doppler probes via temporal, suboccipital, and orbital approaches.    Transcranial Doppler study of the intracranial arteries was also performed for emboli detection without intravenous microbubble injection using continuous soundtrack, M-mode with multigate spectral displays and soundtrack displays with continuous bilateral monitoring.    CLINICAL DATA:      The patient is 74 years old with a history of stroke, carotid stenosis bilaterally, chronic cerebral ischemia, gait difficulty, balance problem, memory problem, dysphagia, atrial fibrillation, vertebrobasilar insufficiency.    The purpose of study is to evaluate for stroke, intracranial focal stenosis, flow abnormalities, vertebrobasilar insufficiency evaluations.    SUMMARY:  Mean flow velocities in the right and left middle cerebral arteries are low with elevated PI values.  Mean flow velocities in the left anterior cerebral artery appear within normal limits with normal PI values.    Rest of the anterior circulation could not be evaluated due to the absence of the corresponding temporal windows.    Mean flow velocities in the right and left vertebral arteries and basilar artery are low with elevated PI values.    TCD OF INTRACRANIAL ARTERIES FOR EMBOLI DETECTION:  Review and analysis of the waveforms and continuous soundtrack systems

## 2024-08-13 ENCOUNTER — HOSPITAL ENCOUNTER (OUTPATIENT)
Dept: PHYSICAL THERAPY | Age: 74
Setting detail: THERAPIES SERIES
Discharge: HOME | End: 2024-08-13
Attending: PSYCHIATRY & NEUROLOGY
Payer: MEDICARE

## 2024-08-13 PROCEDURE — 97112 NEUROMUSCULAR REEDUCATION: CPT

## 2024-08-13 NOTE — FLOWSHEET NOTE
skill, proprioception. (65087)    Therapeutic Activities:     [] Therapeutic activities, direct (one-on-one) patient contact (use of dynamic activities to improve functional performance). (32070)    Gait:   [] Provided training and instruction to the patient for ambulation re-education. (24762)    Self-Care/ADL's  [] Self-care/home management training and compensatory training, meal preparation, safety procedures, and instructions in use of assistive technology devices/adaptive equipment, direct one-on-one contact. (79106)    Home Exercise Program:     [] Reviewed/Progressed HEP activities related to strengthening, flexibility, endurance, ROM. (54731)  [] Reviewed/Progressed HEP activities related to improving balance, coordination, kinesthetic sense, posture, motor skill, proprioception.  (55541)    Manual Treatments:    [] Provided manual therapy to mobilize soft tissue/joints for the purpose of modulating pain, promoting relaxation,  increasing ROM, reducing/eliminating soft tissue swelling/inflammation/restriction, improving soft tissue extensibility. (58370)    Service Based Modalities:      Timed Code Treatment Minutes:   28' neuro     Total Treatment Minutes:   28'    Treatment/Activity Tolerance:  [x] Patient tolerated treatment well [] Patient limited by fatique  [] Patient limited by pain  [] Patient limited by other medical complications  [] Other:     Prognosis: [] Good [x] Fair  [] Poor    Patient Requires Follow-up: [x] Yes  [] No      Goals:  Short Term Goals  Time Frame for Short Term Goals: 3 weeks  Short Term Goal 1: Initiate HEP    Long Term Goals  Time Frame for Long Term Goals : 6 weeks  Long Term Goal 1: Independent in HEP  Long Term Goal 2: Improve LE strength to 5/5 to improve gait and balance.  Long Term Goal 3: Timed Stands 10x's in 30sec without use of UE's.  Long Term Goal 4: Improve Harris Balance to 40/56 or greater to decrease risk for falls      Plan:   [x] Continue per plan of care []

## 2024-08-16 ENCOUNTER — HOSPITAL ENCOUNTER (OUTPATIENT)
Dept: PHYSICAL THERAPY | Age: 74
Setting detail: THERAPIES SERIES
Discharge: HOME | End: 2024-08-16
Attending: PSYCHIATRY & NEUROLOGY
Payer: MEDICARE

## 2024-08-16 PROCEDURE — 97110 THERAPEUTIC EXERCISES: CPT

## 2024-08-16 NOTE — FLOWSHEET NOTE
Physical Therapy Daily Treatment Note    Date:  2024    Patient Name:  Tyler Worthy    :  1950  MRN: [id number]  Restrictions/Precautions:     Medical/Treatment Diagnosis Information:    Balance problem [R26.89]  H/O ischemic left MCA stroke [Z86.73]  Chronic cerebral ischemia [I67.82]       Insurance/Certification information:   [facility] MEDICARE   Physician Information:   Jarett Salter MD    Plan of care signed (Y/N):  y  Visit# / total visits:  4/10  Pain level: 10       Time In: 2:25   Time Out: 3:03    Progress Note: []  Yes  [x]  No  Next due by: Visit #10      Subjective: Pt reports doing well. Has minor headache. Pt reports fatigue after PT session usually.     Objective: Therex completed per flowsheet to increase strength, stability and improve balance for fall prevention and allow ease with daily tasks and ambulation. Gait belt donned for all exercises.  Verbal cuing provided for proper exercise technique. Improvements in STS this date. Cueing for slow controlled movements for improved eccentric control.     Educated on nose over toes.   Observation:   Test measurements:      Exercises:   Exercise/Equipment Resistance/Repetitions Other comments   NuStep  6'         Standing Balance: FTEO, FAEC EO: 3x15\" FOAM   EC: 3x10\"  CGA         Counter ex 15x   adv HS curls, marches, hip abd/ext   SBA   Tband TKE  10x red     Squats  15x adv   Step ups 10x4\"  F, L   Lunges 10x F    Seated hip abd/add 15x red  adv   Seated HS curls 15x red  adv   Seated marches 15x  adv   LAQ 15x  adv   STS 10x Uses chair armrests for UE support, CGA.   Emphasize nose over toes    Side stepping, tandem           [x] Provided verbal/tactile cueing for activities related to strengthening, flexibility, endurance, ROM. (93046)  [x] Provided verbal/tactile cueing for activities related to improving balance, coordination, kinesthetic sense, posture, motor skill, proprioception. (51042)    Therapeutic Activities:     []

## 2024-08-19 ENCOUNTER — APPOINTMENT (OUTPATIENT)
Dept: PHYSICAL THERAPY | Age: 74
End: 2024-08-19
Attending: PSYCHIATRY & NEUROLOGY
Payer: MEDICARE

## 2024-08-20 ENCOUNTER — HOSPITAL ENCOUNTER (OUTPATIENT)
Dept: MRI IMAGING | Age: 74
Discharge: HOME | End: 2024-08-22
Attending: PSYCHIATRY & NEUROLOGY
Payer: MEDICARE

## 2024-08-20 ENCOUNTER — APPOINTMENT (OUTPATIENT)
Dept: INTERVENTIONAL RADIOLOGY/VASCULAR | Age: 74
End: 2024-08-20
Attending: PSYCHIATRY & NEUROLOGY
Payer: MEDICARE

## 2024-08-20 ENCOUNTER — HOSPITAL ENCOUNTER (OUTPATIENT)
Dept: INTERVENTIONAL RADIOLOGY/VASCULAR | Age: 74
Discharge: HOME | End: 2024-08-22
Attending: PSYCHIATRY & NEUROLOGY
Payer: MEDICARE

## 2024-08-20 DIAGNOSIS — Z86.73 H/O ISCHEMIC LEFT MCA STROKE: ICD-10-CM

## 2024-08-20 DIAGNOSIS — I67.82 CHRONIC CEREBRAL ISCHEMIA: ICD-10-CM

## 2024-08-20 LAB
ECHO BSA: 1.95 M2
VAS LEFT BULB EDV: 13.7 CM/S
VAS LEFT BULB PSV: 59 CM/S
VAS LEFT CCA DIST EDV: 15 CM/S
VAS LEFT CCA DIST PSV: 64.2 CM/S
VAS LEFT CCA MID EDV: 18.89 CM/S
VAS LEFT CCA MID PSV: 91.37 CM/S
VAS LEFT CCA PROX EDV: 12.4 CM/S
VAS LEFT CCA PROX PSV: 72 CM/S
VAS LEFT ECA EDV: 8.54 CM/S
VAS LEFT ECA PSV: 65.5 CM/S
VAS LEFT ICA DIST EDV: 18.9 CM/S
VAS LEFT ICA DIST PSV: 61.6 CM/S
VAS LEFT ICA MID EDV: 24.1 CM/S
VAS LEFT ICA MID PSV: 69.4 CM/S
VAS LEFT ICA PROX EDV: 16.3 CM/S
VAS LEFT ICA PROX PSV: 40.9 CM/S
VAS LEFT ICA/CCA PSV: 0.76 NO UNITS
VAS LEFT VERTEBRAL EDV: 13.72 CM/S
VAS LEFT VERTEBRAL PSV: 60.3 CM/S
VAS RIGHT BULB EDV: 6 CM/S
VAS RIGHT BULB PSV: 47.4 CM/S
VAS RIGHT CCA DIST EDV: 12.4 CM/S
VAS RIGHT CCA DIST PSV: 51.3 CM/S
VAS RIGHT CCA MID EDV: 12.42 CM/S
VAS RIGHT CCA MID PSV: 59.01 CM/S
VAS RIGHT CCA PROX EDV: 11.1 CM/S
VAS RIGHT CCA PROX PSV: 61.6 CM/S
VAS RIGHT ECA EDV: 3.36 CM/S
VAS RIGHT ECA PSV: 66.8 CM/S
VAS RIGHT ICA DIST EDV: 12.4 CM/S
VAS RIGHT ICA DIST PSV: 39.6 CM/S
VAS RIGHT ICA MID EDV: 17.6 CM/S
VAS RIGHT ICA MID PSV: 52.5 CM/S
VAS RIGHT ICA PROX EDV: 15 CM/S
VAS RIGHT ICA PROX PSV: 56.4 CM/S
VAS RIGHT ICA/CCA PSV: 1 NO UNITS
VAS RIGHT VERTEBRAL EDV: 13.72 CM/S
VAS RIGHT VERTEBRAL PSV: 70.7 CM/S

## 2024-08-20 PROCEDURE — 93880 EXTRACRANIAL BILAT STUDY: CPT

## 2024-08-20 PROCEDURE — 70551 MRI BRAIN STEM W/O DYE: CPT

## 2024-08-22 ENCOUNTER — HOSPITAL ENCOUNTER (OUTPATIENT)
Dept: PHYSICAL THERAPY | Age: 74
Setting detail: THERAPIES SERIES
Discharge: HOME | End: 2024-08-22
Attending: PSYCHIATRY & NEUROLOGY
Payer: MEDICARE

## 2024-08-22 PROCEDURE — 97110 THERAPEUTIC EXERCISES: CPT

## 2024-08-22 NOTE — FLOWSHEET NOTE
Physical Therapy Daily Treatment Note    Date:  2024    Patient Name:  Tyler Worthy    :  1950  MRN: [id number]  Restrictions/Precautions:     Medical/Treatment Diagnosis Information:    Balance problem [R26.89]  H/O ischemic left MCA stroke [Z86.73]  Chronic cerebral ischemia [I67.82]       Insurance/Certification information:   [facility] MEDICARE   Physician Information:   Jarett Salter MD    Plan of care signed (Y/N):  y  Visit# / total visits:  5/10  Pain level: /10       Time In: 1:14   Time Out: 1:53    Progress Note: []  Yes  [x]  No  Next due by: Visit #10      Subjective: Pt reports doing well. Reports fatigue and soreness after previous PT session. Noticing random dizziness and \"haze over my head\" this week.     Objective: Therex completed per flowsheet to increase strength, stability and improve balance for fall prevention and allow ease with daily tasks and ambulation. Gait belt donned for all exercises. Verbal cuing provided for proper exercise technique. Improvements in STS this date however continue to educate on nose over toes. Cueing for slow controlled movements for improved eccentric control.     Observation:   Test measurements:    STS: 7x in 30\" with UE use     Exercises:   Exercise/Equipment Resistance/Repetitions Other comments   NuStep  6'         Standing Balance: FTEO, FAEC EO: 3x15\" FOAM   EC: 3x10\"  CGA         Counter ex 15x   HS curls, marches, hip abd/ext   SBA   Tband TKE  15x red  adv   Squats  15x adv   Step ups  15x4\"  F, L  adv   Lunges 10x  F    Seated hip abd/add 15x red     Seated HS curls 15x red     Seated marches 15x     LAQ 15x     STS 10x Uses chair armrests for UE support, CGA.   Emphasize nose over toes    Side stepping, tandem  2x ea At bar   Initiated         [x] Provided verbal/tactile cueing for activities related to strengthening, flexibility, endurance, ROM. (80065)  [x] Provided verbal/tactile cueing for activities related to improving balance,

## 2024-08-23 ENCOUNTER — OFFICE VISIT (OUTPATIENT)
Dept: NEUROSURGERY | Age: 74
End: 2024-08-23
Payer: MEDICARE

## 2024-08-23 VITALS
WEIGHT: 181 LBS | HEART RATE: 86 BPM | HEIGHT: 67 IN | RESPIRATION RATE: 16 BRPM | OXYGEN SATURATION: 96 % | BODY MASS INDEX: 28.41 KG/M2 | DIASTOLIC BLOOD PRESSURE: 66 MMHG | SYSTOLIC BLOOD PRESSURE: 112 MMHG

## 2024-08-23 DIAGNOSIS — Z86.73 HISTORY OF STROKE: ICD-10-CM

## 2024-08-23 DIAGNOSIS — G93.89 CEREBRAL VENTRICULOMEGALY: Primary | ICD-10-CM

## 2024-08-23 DIAGNOSIS — R26.81 GAIT INSTABILITY: ICD-10-CM

## 2024-08-23 PROCEDURE — 99213 OFFICE O/P EST LOW 20 MIN: CPT | Performed by: NURSE PRACTITIONER

## 2024-08-23 NOTE — PROGRESS NOTES
recommendations.    Followup: Return in about 4 weeks (around 9/20/2024), or if symptoms worsen or fail to improve.    Prescriptions Ordered:  No orders of the defined types were placed in this encounter.     Orders Placed:  No orders of the defined types were placed in this encounter.       Electronically signed by JIMBO Salguero CNP on 8/23/2024 at 10:53 AM    Please note that this chart was generated using voice recognition Dragon dictation software.  Although every effort was made to ensure the accuracy of this automated transcription, some errors in transcription may have occurred.

## 2024-08-27 ENCOUNTER — HOSPITAL ENCOUNTER (OUTPATIENT)
Dept: PHYSICAL THERAPY | Age: 74
Setting detail: THERAPIES SERIES
Discharge: HOME | End: 2024-08-27
Attending: PSYCHIATRY & NEUROLOGY
Payer: MEDICARE

## 2024-08-27 PROCEDURE — 97110 THERAPEUTIC EXERCISES: CPT | Performed by: PHYSICAL THERAPY ASSISTANT

## 2024-08-27 NOTE — FLOWSHEET NOTE
Physical Therapy Daily Treatment Note    Date:  2024    Patient Name:  Tyler Worthy    :  1950  MRN: [id number]  Restrictions/Precautions:     Medical/Treatment Diagnosis Information:    Balance problem [R26.89]  H/O ischemic left MCA stroke [Z86.73]  Chronic cerebral ischemia [I67.82]       Insurance/Certification information:   [facility] MEDICARE   Physician Information:   Jarett Salter MD    Plan of care signed (Y/N):  y  Visit# / total visits:  6/10  Pain level: /10       Time In: 134  Time Out: 215    Progress Note: []  Yes  [x]  No  Next due by: Visit #10      Subjective: Pt reports doing well. Notes feeling overall improvement since start of therapy.     Objective: Therex completed per flowsheet to increase strength, stability and improve balance for fall prevention and allow ease with daily tasks and ambulation. Gait belt donned for all exercises. Verbal cuing provided for proper exercise technique. Patient given written and verbal instruction for home exercises. Understanding noted. Added, advanced and progressed several exercises to improve strength and stability. Difficulty with advanced balance exercises noted.     Observation:   Test measurements:      Exercises:   Exercise/Equipment Resistance/Repetitions Other comments   NuStep  6' L2        Standing Balance: FTEO, FAEC EO: 3x15\" FOAM   EC: 3x10\"  CGA         Counter ex 15x   HS curls, marches, hip abd/ext   SBA   Tband TKE  15x red     Squats  15x    Step ups  15x4\"  F, L   Lunges 10x  F , L   Seated hip abd/add 20x red  ADV   Seated HS curls 20x red  ADV   Seated marches 15x     LAQ 15x     STS 10x No UE assist. CGA.   Emphasize nose over toes    Side stepping, tandem  HELD At bar   Initiated         [x] Provided verbal/tactile cueing for activities related to strengthening, flexibility, endurance, ROM. (61720)  [x] Provided verbal/tactile cueing for activities related to improving balance, coordination, kinesthetic sense, posture,  greater to decrease risk for falls      Plan:   [x] Continue per plan of care [] Alter current plan (see comments)  [] Plan of care initiated [] Hold pending MD visit [] Discharge    Plan for Next Session:  Monitor tolerance and advance as able. Discuss continuing vs DC with patient.     Electronically signed by:  Darek De La Cruz PTA

## 2024-08-29 ENCOUNTER — HOSPITAL ENCOUNTER (OUTPATIENT)
Dept: PHYSICAL THERAPY | Age: 74
Setting detail: THERAPIES SERIES
Discharge: HOME | End: 2024-08-29
Attending: PSYCHIATRY & NEUROLOGY
Payer: MEDICARE

## 2024-08-29 PROCEDURE — 97110 THERAPEUTIC EXERCISES: CPT

## 2024-08-29 NOTE — FLOWSHEET NOTE
Physical Therapy Daily Treatment Note    Date:  2024    Patient Name:  Tyler Worthy    :  1950  MRN: [id number]  Restrictions/Precautions:     Medical/Treatment Diagnosis Information:    Balance problem [R26.89]  H/O ischemic left MCA stroke [Z86.73]  Chronic cerebral ischemia [I67.82]       Insurance/Certification information:   [facility] MEDICARE   Physician Information:   Jarett Salter MD    Plan of care signed (Y/N):  y  Visit# / total visits:  7/10  Pain level: /10       Time In: 130  Time Out: 200    Progress Note: []  Yes  [x]  No  Next due by: Visit #10      Subjective: Arrives 12 min late, shortened session this date. Pt reports no complaints today. Feeling good overall and requests today to be last session.     Objective: Therex completed per flowsheet to increase strength, stability and improve balance for fall prevention and allow ease with daily tasks and ambulation. Gait belt donned for all exercises. Verbal cuing provided for proper exercise technique. Patient given written and verbal instruction for home exercises. Understanding noted. Difficulty with advanced balance exercises noted. Checked all goals, plan for DC this date. Instructed pt to continue completing HEP and call with any questions. Pt voices understanding.     Observation:   Test measurements:    STS 10x in 32\"   SAXENA/56    R hip flexion: 4+/5  R knee extension: 4+/5   R knee flexion: 5/5    L hip flexion: 5/5  L knee extension: 5/5  L knee flexion: 5/5      Exercises:   Exercise/Equipment Resistance/Repetitions Other comments   NuStep  5' L2        Standing Balance: FTEO, FAEC Held due to time CGA         Counter ex 15x   HS curls, marches, hip abd/ext   SBA   Tband TKE  15x red     Squats  15x    Step ups  15x4\"  F, L   Lunges    Held due to time  F , L   Seated hip abd/add 20x red     Seated HS curls 20x red     Seated marches 15x     LAQ 15x     STS 10x No UE assist. CGA.   Emphasize nose over toes    Side stepping,

## 2024-09-03 ENCOUNTER — HOSPITAL ENCOUNTER (OUTPATIENT)
Dept: GENERAL RADIOLOGY | Age: 74
Discharge: HOME | End: 2024-09-05
Payer: MEDICARE

## 2024-09-03 ENCOUNTER — HOSPITAL ENCOUNTER (OUTPATIENT)
Dept: SLEEP CENTER | Age: 74
Discharge: HOME | End: 2024-09-05
Payer: MEDICARE

## 2024-09-03 ENCOUNTER — OFFICE VISIT (OUTPATIENT)
Dept: PRIMARY CARE CLINIC | Age: 74
End: 2024-09-03
Payer: MEDICARE

## 2024-09-03 VITALS
RESPIRATION RATE: 16 BRPM | BODY MASS INDEX: 28.56 KG/M2 | OXYGEN SATURATION: 97 % | WEIGHT: 182 LBS | TEMPERATURE: 97.3 F | DIASTOLIC BLOOD PRESSURE: 70 MMHG | HEART RATE: 82 BPM | SYSTOLIC BLOOD PRESSURE: 110 MMHG | HEIGHT: 67 IN

## 2024-09-03 DIAGNOSIS — M25.521 PAIN AND SWELLING OF RIGHT ELBOW: ICD-10-CM

## 2024-09-03 DIAGNOSIS — G47.33 OBSTRUCTIVE SLEEP APNEA (ADULT) (PEDIATRIC): ICD-10-CM

## 2024-09-03 DIAGNOSIS — R06.83 SNORING: ICD-10-CM

## 2024-09-03 DIAGNOSIS — M25.421 PAIN AND SWELLING OF RIGHT ELBOW: ICD-10-CM

## 2024-09-03 DIAGNOSIS — R53.83 FATIGUE, UNSPECIFIED TYPE: ICD-10-CM

## 2024-09-03 DIAGNOSIS — L03.113 CELLULITIS OF RIGHT UPPER EXTREMITY: Primary | ICD-10-CM

## 2024-09-03 PROCEDURE — 95810 POLYSOM 6/> YRS 4/> PARAM: CPT

## 2024-09-03 PROCEDURE — 99213 OFFICE O/P EST LOW 20 MIN: CPT

## 2024-09-03 PROCEDURE — 1123F ACP DISCUSS/DSCN MKR DOCD: CPT

## 2024-09-03 PROCEDURE — 73080 X-RAY EXAM OF ELBOW: CPT

## 2024-09-03 PROCEDURE — A6449 LT COMPRES BAND >=3" <5"/YD: HCPCS

## 2024-09-03 RX ORDER — CEPHALEXIN 500 MG/1
500 CAPSULE ORAL 3 TIMES DAILY
Qty: 30 CAPSULE | Refills: 0 | Status: SHIPPED | OUTPATIENT
Start: 2024-09-03 | End: 2024-09-13

## 2024-09-03 ASSESSMENT — ENCOUNTER SYMPTOMS
COLOR CHANGE: 1
SHORTNESS OF BREATH: 0

## 2024-09-03 NOTE — PROGRESS NOTES
[facility]  [address] [facility] [address]  Phone: [phone]  Fax: [phone]      Tyler Worthy is a 74 y.o. male who presents to the [facility] today for his medical conditions/complaints as noted below. Tyler Worthy is c/o of Insect Bite (Pt c/o right elbow swelling, redness, soreness, and bruising from what they believe was an insect bite x 2days)          HPI:     Insect Bite  This is a new problem. The current episode started yesterday. The problem occurs constantly. The problem has been gradually worsening. Associated symptoms include arthralgias (right elbow) and joint swelling (right elbow). Pertinent negatives include no chest pain or fever. Nothing aggravates the symptoms. He has tried ice for the symptoms. The treatment provided mild relief.       Past Medical History:   Diagnosis Date   • Atrial fibrillation ([facility]) 01/2020   • Blood in urine    • Chronic kidney disease    • CKD (chronic kidney disease)    • History of ischemic stroke in prior three months    • Hyperlipidemia    • Ischemic stroke ([facility]) 01/10/2020   • Kidney stones    • Nihss score 10    • PTSD (post-traumatic stress disorder)     from war, [place]    • Skin tag 12/21/2018   • Stroke ([facility]) 01/16/2020    MRI Brain WO: New right PCA distribution infarct, multifocal left MCA distribution infarcts   • Stroke ([facility]) 01/11/2020    large vessel occlusion/left M2 occlusion.  He subsequently underwent emergent thrombectomy.  /  S/P TPA DONE        No Known Allergies    Wt Readings from Last 3 Encounters:   09/03/24 82.6 kg (182 lb)   08/23/24 82.1 kg (181 lb)   08/08/24 80.3 kg (177 lb)     BP Readings from Last 3 Encounters:   09/03/24 110/70   08/23/24 112/66   08/08/24 132/74      Temp Readings from Last 3 Encounters:   09/03/24 97.3 °F (36.3 °C)   06/17/24 97.9 °F (36.6 °C) (Tympanic)   06/13/24 98.9 °F (37.2 °C) (Tympanic)     Pulse Readings from Last

## 2024-09-03 NOTE — PATIENT INSTRUCTIONS
Keflex three times a day x 10 days  Keep elevated  Ice for 15 minutes three times a day  Return if no improvement in 3-4  days  Return sooner for acute concerns  Go to ER for acute worsening symptoms, fevers, nausea, chest pain or shortness of breath.

## 2024-09-05 LAB — STATUS: NORMAL

## 2024-09-06 ENCOUNTER — OFFICE VISIT (OUTPATIENT)
Dept: NEUROSURGERY | Age: 74
End: 2024-09-06
Payer: MEDICARE

## 2024-09-06 VITALS
DIASTOLIC BLOOD PRESSURE: 64 MMHG | HEART RATE: 85 BPM | HEIGHT: 67 IN | BODY MASS INDEX: 28.72 KG/M2 | SYSTOLIC BLOOD PRESSURE: 116 MMHG | WEIGHT: 183 LBS

## 2024-09-06 DIAGNOSIS — G82.20 PARAPARESIS (HCC): Primary | ICD-10-CM

## 2024-09-06 DIAGNOSIS — G93.89 CEREBRAL VENTRICULOMEGALY: ICD-10-CM

## 2024-09-06 PROCEDURE — 1123F ACP DISCUSS/DSCN MKR DOCD: CPT | Performed by: NEUROLOGICAL SURGERY

## 2024-09-06 PROCEDURE — 99214 OFFICE O/P EST MOD 30 MIN: CPT | Performed by: NEUROLOGICAL SURGERY

## 2024-09-06 PROCEDURE — 99213 OFFICE O/P EST LOW 20 MIN: CPT | Performed by: NEUROLOGICAL SURGERY

## 2024-09-06 NOTE — PROGRESS NOTES
[facility]  [address] [facility] [address]  Dept: [phone]    Patient:  Tyler Worthy  YOB: 1950  Date: 9/6/24    The patient is a 74 y.o. male who presents today for consult of the following problems:     Chief Complaint   Patient presents with    balance issues     Head pressure               HPI:     Tyler Worthy is a 74 y.o. male on whom neurosurgical consultation was requested by Arsen Ortiz MD for management of normal pressure hydrocephalus.  The patient has been evaluated by neurology for a myriad of concerns.  His patient's daughter as well as spouse arrived in the office today.  He is a  and has had multiple prior issues related to multiple strokes as well as PTSD.  Most recently over the course of the last 1 year he has had issues related to significant pressure and fullness of his head that he describes that is not distinctly pain that appears to come and go multiple times throughout the week and 1 or 2 times per day without any specific inciting event or temporal correlation.  No other associated symptoms however.  In addition he does have what sounds like difficulty with gait and ambulation mainly described as clumping when he walks.  Has been through physical therapy with some improvement but overall has had a slow decrease in terms of his driving motivation wanting to get out of bed.  Also some issues with memory and recall as well as generalized cognition.  No major issues with incontinence but does have the urge when he has to go and has minimal time to get to the bathroom..      History:     Past Medical History:   Diagnosis Date    Atrial fibrillation (HCC) 01/2020    Blood in urine     Chronic kidney disease     CKD (chronic kidney disease)     History of ischemic stroke in prior three months     Hyperlipidemia     Ischemic stroke (HCC)

## 2024-09-20 ENCOUNTER — HOSPITAL ENCOUNTER (OUTPATIENT)
Dept: MRI IMAGING | Age: 74
Discharge: HOME | End: 2024-09-22
Payer: MEDICARE

## 2024-09-20 DIAGNOSIS — G82.20 PARAPARESIS (HCC): ICD-10-CM

## 2024-09-20 PROCEDURE — 72148 MRI LUMBAR SPINE W/O DYE: CPT

## 2024-09-20 PROCEDURE — 72146 MRI CHEST SPINE W/O DYE: CPT

## 2024-09-26 ENCOUNTER — OFFICE VISIT (OUTPATIENT)
Dept: NEUROLOGY | Age: 74
End: 2024-09-26
Payer: MEDICARE

## 2024-09-26 VITALS
DIASTOLIC BLOOD PRESSURE: 70 MMHG | RESPIRATION RATE: 16 BRPM | SYSTOLIC BLOOD PRESSURE: 124 MMHG | HEART RATE: 56 BPM | WEIGHT: 182 LBS | OXYGEN SATURATION: 96 % | BODY MASS INDEX: 28.5 KG/M2

## 2024-09-26 DIAGNOSIS — G45.0 VBI (VERTEBROBASILAR INSUFFICIENCY): ICD-10-CM

## 2024-09-26 DIAGNOSIS — Z86.73 H/O: STROKE: ICD-10-CM

## 2024-09-26 DIAGNOSIS — G62.9 PERIPHERAL NERVE DISEASE: ICD-10-CM

## 2024-09-26 DIAGNOSIS — F41.0 PANIC DISORDER WITHOUT AGORAPHOBIA WITH MODERATE PANIC ATTACKS: ICD-10-CM

## 2024-09-26 DIAGNOSIS — Q63.1 HORSESHOE KIDNEY: ICD-10-CM

## 2024-09-26 DIAGNOSIS — R41.3 MEMORY PROBLEM: ICD-10-CM

## 2024-09-26 DIAGNOSIS — G91.2 NPH (NORMAL PRESSURE HYDROCEPHALUS) (HCC): ICD-10-CM

## 2024-09-26 DIAGNOSIS — I67.82 CHRONIC CEREBRAL ISCHEMIA: ICD-10-CM

## 2024-09-26 DIAGNOSIS — I48.91 ATRIAL FIBRILLATION, UNSPECIFIED TYPE (HCC): ICD-10-CM

## 2024-09-26 DIAGNOSIS — F43.12 CHRONIC POST-TRAUMATIC STRESS DISORDER: ICD-10-CM

## 2024-09-26 DIAGNOSIS — F32.A FATIGUE DUE TO DEPRESSION: ICD-10-CM

## 2024-09-26 DIAGNOSIS — Z91.89 AT HIGH RISK FOR STROKE: ICD-10-CM

## 2024-09-26 DIAGNOSIS — F43.10 PTSD (POST-TRAUMATIC STRESS DISORDER): ICD-10-CM

## 2024-09-26 DIAGNOSIS — Z86.73 HISTORY OF ISCHEMIC POSTERIOR CEREBRAL ARTERY STROKE: ICD-10-CM

## 2024-09-26 DIAGNOSIS — R53.83 FATIGUE DUE TO DEPRESSION: ICD-10-CM

## 2024-09-26 DIAGNOSIS — R51.9 PRESSURE IN HEAD: ICD-10-CM

## 2024-09-26 DIAGNOSIS — R42 DIZZINESS AND GIDDINESS: Primary | ICD-10-CM

## 2024-09-26 DIAGNOSIS — R26.89 BALANCE PROBLEM: ICD-10-CM

## 2024-09-26 DIAGNOSIS — Z91.81 RISK FOR FALLS: ICD-10-CM

## 2024-09-26 DIAGNOSIS — R26.9 GAIT DIFFICULTY: ICD-10-CM

## 2024-09-26 DIAGNOSIS — G31.9 DIFFUSE CEREBRAL ATROPHY (HCC): ICD-10-CM

## 2024-09-26 DIAGNOSIS — Z86.73 H/O ISCHEMIC LEFT MCA STROKE: ICD-10-CM

## 2024-09-26 DIAGNOSIS — I69.30 H/O: STROKE WITH RESIDUAL EFFECTS: ICD-10-CM

## 2024-09-26 DIAGNOSIS — R42 DIZZINESS: ICD-10-CM

## 2024-09-26 DIAGNOSIS — H53.462 LEFT HOMONYMOUS HEMIANOPSIA: ICD-10-CM

## 2024-09-26 DIAGNOSIS — G93.89 ENCEPHALOMALACIA ON IMAGING STUDY: ICD-10-CM

## 2024-09-26 PROCEDURE — 99214 OFFICE O/P EST MOD 30 MIN: CPT | Performed by: PSYCHIATRY & NEUROLOGY

## 2024-09-26 PROCEDURE — 1123F ACP DISCUSS/DSCN MKR DOCD: CPT | Performed by: PSYCHIATRY & NEUROLOGY

## 2024-09-26 PROCEDURE — 99215 OFFICE O/P EST HI 40 MIN: CPT | Performed by: PSYCHIATRY & NEUROLOGY

## 2024-09-26 ASSESSMENT — ENCOUNTER SYMPTOMS
FACIAL SWELLING: 0
APNEA: 0
CHOKING: 0
SINUS PRESSURE: 0
TROUBLE SWALLOWING: 0
SHORTNESS OF BREATH: 0
CHEST TIGHTNESS: 0
WHEEZING: 0
VOICE CHANGE: 0

## 2024-09-26 ASSESSMENT — PATIENT HEALTH QUESTIONNAIRE - PHQ9
SUM OF ALL RESPONSES TO PHQ QUESTIONS 1-9: 0
SUM OF ALL RESPONSES TO PHQ QUESTIONS 1-9: 0
SUM OF ALL RESPONSES TO PHQ9 QUESTIONS 1 & 2: 0
SUM OF ALL RESPONSES TO PHQ QUESTIONS 1-9: 0
SUM OF ALL RESPONSES TO PHQ QUESTIONS 1-9: 0
2. FEELING DOWN, DEPRESSED OR HOPELESS: NOT AT ALL
1. LITTLE INTEREST OR PLEASURE IN DOING THINGS: NOT AT ALL

## 2024-10-06 NOTE — PROGRESS NOTES
[facility] INTERNAL MEDICINE    Visit Date:  6/17/2024  Patient:  Tyler Worthy  YOB: 1950    Assessment & Plan     Chest pain: EKG shows A-fib.  Will send patient to the emergency department.    Hypertension: Blood pressure controlled in clinic.    PTSD: Will make appointment with psychologist.  Reassess next visit.    GERD:  Can continue Pepcid as needed.  Reassess next visit.    Hyperlipidemia: Continue atorvastatin.  We will continue to monitor..    Atrial fibrillation: Regular rate and rhythm at this time.  Continue Pradaxa.     Diagnosis Orders   1. Chest pain, unspecified type  EKG 12 Lead - Clinic Performed      2. Atrial fibrillation, unspecified type (HCC)        3. Hyperlipidemia, unspecified hyperlipidemia type        4. Gastroesophageal reflux disease without esophagitis                Chief Complaint  Follow-up (Patient was seen in Er 6/13/24 dx Nonintractable episodic headache, hypertension , //Patient is lossing his balance and feeling like there is a lot of pressure in his head , patient eyes are bloody shoot. Patient report feeling very tried all the time, has been sleep a lot more then his normal, Patient report having some chest pain this morning )      History of Present Illness   Says that he was recently treated in the emergency department for headache as well as hypertension, says that they did not find a specific reason for his headache, and says that he still has the same headache.  However his blood pressure is good today in clinic.  Moreover, he reports that he has been having some chest pain that started today.  Has a history of atrial fibrillation EKG shows atrial fibrillation as well, therefore I recommended that he go to the emergency department and he agrees.    Objective  /88 (Site: Left Upper Arm, Position: Sitting, Cuff Size: Large Adult)   Pulse 89   Resp 16   Ht 1.702 m (5' 7\")   Wt 83 kg (183 lb)   SpO2 99%   BMI 28.66 kg/m²  No

## 2024-10-25 ENCOUNTER — OFFICE VISIT (OUTPATIENT)
Dept: PRIMARY CARE CLINIC | Age: 74
End: 2024-10-25
Payer: MEDICARE

## 2024-10-25 VITALS
DIASTOLIC BLOOD PRESSURE: 74 MMHG | TEMPERATURE: 98.2 F | WEIGHT: 190 LBS | BODY MASS INDEX: 29.75 KG/M2 | HEART RATE: 74 BPM | OXYGEN SATURATION: 98 % | SYSTOLIC BLOOD PRESSURE: 138 MMHG

## 2024-10-25 DIAGNOSIS — D48.5 NEOPLASM OF UNCERTAIN BEHAVIOR OF SKIN: Primary | ICD-10-CM

## 2024-10-25 PROCEDURE — 99212 OFFICE O/P EST SF 10 MIN: CPT | Performed by: NURSE PRACTITIONER

## 2024-10-25 ASSESSMENT — ENCOUNTER SYMPTOMS
COLOR CHANGE: 0
DIARRHEA: 0
NAUSEA: 0
CHEST TIGHTNESS: 0
VOMITING: 0
ABDOMINAL DISTENTION: 0
ABDOMINAL PAIN: 0
CONSTIPATION: 0
COUGH: 0
SHORTNESS OF BREATH: 0
WHEEZING: 0

## 2024-10-25 ASSESSMENT — PATIENT HEALTH QUESTIONNAIRE - PHQ9
SUM OF ALL RESPONSES TO PHQ QUESTIONS 1-9: 0
SUM OF ALL RESPONSES TO PHQ QUESTIONS 1-9: 0
SUM OF ALL RESPONSES TO PHQ9 QUESTIONS 1 & 2: 0
2. FEELING DOWN, DEPRESSED OR HOPELESS: NOT AT ALL
1. LITTLE INTEREST OR PLEASURE IN DOING THINGS: NOT AT ALL
SUM OF ALL RESPONSES TO PHQ QUESTIONS 1-9: 0
SUM OF ALL RESPONSES TO PHQ QUESTIONS 1-9: 0

## 2024-10-25 NOTE — PROGRESS NOTES
[facility]  [address]  [facility] [address]  Dept: [phone]  Dept Fax: [phone]    Tyler Worthy is a 74 y.o. male who presents today for his medical conditions/complaintsas noted below.  Tyler Worthy is c/o of   Chief Complaint   Patient presents with    Wound Infection     Left arm thinks bit by something      HPI:     Patient present to the walk in clinic for an acute evaluation. Normally a patient of Dr. Ortiz. Accompanied by his significant other. Presents wanting to get a spot on his left arm looked at. Concerned that something may have bitten him. Spots has been there for weeks but was previously smaller and flatter. In the last 3 weeks have become raised and causing slight discomfort. Denies any other symptoms.         Hemoglobin A1C (%)   Date Value   06/25/2024 4.7   10/05/2023 4.1   05/20/2020 4.9             ( goal A1Cis < 7)   No components found for: \"LABMICR\"  No components found for: \"LDLCHOLESTEROL\", \"LDLCALC\"    (goal LDL is <100)   AST (U/L)   Date Value   06/25/2024 23     ALT (U/L)   Date Value   06/25/2024 18     BUN (mg/dL)   Date Value   06/25/2024 16     BP Readings from Last 3 Encounters:   10/25/24 138/74   09/26/24 124/70   09/06/24 116/64          (goal 120/80)    Past Medical History:   Diagnosis Date    Atrial fibrillation (HCC) 01/2020    Blood in urine     Chronic kidney disease     CKD (chronic kidney disease)     History of ischemic stroke in prior three months     Hyperlipidemia     Ischemic stroke ([facility]) 01/10/2020    Kidney stones     Nihss score 10     PTSD (post-traumatic stress disorder)     from war, Agent Orange     Skin tag 12/21/2018    Stroke ([facility]) 01/16/2020    MRI Brain WO: New right PCA distribution infarct, multifocal left MCA distribution infarcts    Stroke ([facility]) 01/11/2020    large vessel occlusion/left M2 occlusion.  He

## 2024-10-28 NOTE — PLAN OF CARE
Discharge instructions reviewed with patient including post Tilt care. MD to speak with patient. INT removed with cath intact.    Ongoing  8/4/2020 1045 by Barbara London, RN  Outcome: Met This Shift

## 2024-11-01 ENCOUNTER — OFFICE VISIT (OUTPATIENT)
Dept: NEUROSURGERY | Age: 74
End: 2024-11-01
Payer: MEDICARE

## 2024-11-01 VITALS
HEIGHT: 67 IN | BODY MASS INDEX: 30.54 KG/M2 | SYSTOLIC BLOOD PRESSURE: 112 MMHG | WEIGHT: 194.6 LBS | OXYGEN SATURATION: 98 % | HEART RATE: 68 BPM | RESPIRATION RATE: 12 BRPM | DIASTOLIC BLOOD PRESSURE: 60 MMHG

## 2024-11-01 DIAGNOSIS — G93.89 CEREBRAL VENTRICULOMEGALY: Primary | ICD-10-CM

## 2024-11-01 DIAGNOSIS — G82.20 PARAPARESIS (HCC): ICD-10-CM

## 2024-11-01 PROCEDURE — 99213 OFFICE O/P EST LOW 20 MIN: CPT | Performed by: NEUROLOGICAL SURGERY

## 2024-11-01 NOTE — PROGRESS NOTES
ventriculomegaly but seems to be appropriate and commensurate with the atrophy.    Assessment and Plan:      1. Cerebral ventriculomegaly    2. Paraparesis (HCC)         Assessment & Plan Plan: Overall the patient does not have any reason for the foot drop as far as stenosis.  Likely related to progressive neurodegenerative disease.  Considering the absence of a clinical triad and uncharacteristic gait pattern with unusual memory and cognitive issues I do not think this is necessarily in line with normal pressure hydrocephalus.  In any case I think the effective yield of shunting is fairly low.    PRN     Followup: No follow-ups on file.    Prescriptions Ordered:  No orders of the defined types were placed in this encounter.       Orders Placed:  No orders of the defined types were placed in this encounter.       Electronically signed by Eric Lozano DO on 11/1/2024 at 9:17 AM    Please note that this chart was generated using voice recognition Dragon dictation software.  Although every effort was made to ensure the accuracy of this automated transcription, some errors in transcription may have occurred.

## 2024-11-11 ENCOUNTER — OFFICE VISIT (OUTPATIENT)
Dept: INTERNAL MEDICINE | Age: 74
End: 2024-11-11
Payer: MEDICARE

## 2024-11-11 VITALS
RESPIRATION RATE: 16 BRPM | HEIGHT: 67 IN | OXYGEN SATURATION: 95 % | BODY MASS INDEX: 30.61 KG/M2 | DIASTOLIC BLOOD PRESSURE: 82 MMHG | HEART RATE: 68 BPM | WEIGHT: 195 LBS | SYSTOLIC BLOOD PRESSURE: 132 MMHG

## 2024-11-11 DIAGNOSIS — I48.91 ATRIAL FIBRILLATION, UNSPECIFIED TYPE (HCC): ICD-10-CM

## 2024-11-11 DIAGNOSIS — R73.01 IFG (IMPAIRED FASTING GLUCOSE): ICD-10-CM

## 2024-11-11 DIAGNOSIS — D69.6 THROMBOCYTOPENIA, UNSPECIFIED (HCC): ICD-10-CM

## 2024-11-11 DIAGNOSIS — K21.9 GASTROESOPHAGEAL REFLUX DISEASE WITHOUT ESOPHAGITIS: ICD-10-CM

## 2024-11-11 DIAGNOSIS — E78.5 HYPERLIPIDEMIA, UNSPECIFIED HYPERLIPIDEMIA TYPE: Primary | ICD-10-CM

## 2024-11-11 DIAGNOSIS — F33.2 SEVERE EPISODE OF RECURRENT MAJOR DEPRESSIVE DISORDER, WITHOUT PSYCHOTIC FEATURES (HCC): ICD-10-CM

## 2024-11-11 PROCEDURE — 99212 OFFICE O/P EST SF 10 MIN: CPT | Performed by: INTERNAL MEDICINE

## 2024-11-11 NOTE — PROGRESS NOTES
maintenance.      Electronically signed byTALITA LOVE MD on 11/11/2024 at 5:46 PM    This note has been created using the Epic electronic health record, and dictated in part by Dragon Medical One dictation system. Despite the documenting physician's best efforts, there may be errors in spelling, grammar or syntax.

## 2024-11-18 DIAGNOSIS — I10 PRIMARY HYPERTENSION: ICD-10-CM

## 2024-11-18 RX ORDER — AMLODIPINE BESYLATE 5 MG/1
5 TABLET ORAL DAILY
Qty: 90 TABLET | Refills: 3 | Status: SHIPPED | OUTPATIENT
Start: 2024-11-18

## 2024-12-26 ENCOUNTER — HOSPITAL ENCOUNTER (OUTPATIENT)
Dept: GENERAL RADIOLOGY | Age: 74
Discharge: HOME | End: 2024-12-28
Payer: MEDICARE

## 2024-12-26 ENCOUNTER — HOSPITAL ENCOUNTER (OUTPATIENT)
Age: 74
Discharge: HOME | End: 2024-12-26
Payer: MEDICARE

## 2024-12-26 DIAGNOSIS — N20.0 KIDNEY STONES: ICD-10-CM

## 2024-12-26 DIAGNOSIS — R97.20 ELEVATED PSA: ICD-10-CM

## 2024-12-26 LAB — PSA SERPL-MCNC: 2.81 NG/ML (ref 0–4)

## 2024-12-26 PROCEDURE — 84153 ASSAY OF PSA TOTAL: CPT

## 2024-12-26 PROCEDURE — 36415 COLL VENOUS BLD VENIPUNCTURE: CPT

## 2024-12-26 PROCEDURE — 74018 RADEX ABDOMEN 1 VIEW: CPT

## 2025-01-02 ENCOUNTER — OFFICE VISIT (OUTPATIENT)
Dept: UROLOGY | Age: 75
End: 2025-01-02
Payer: MEDICARE

## 2025-01-02 VITALS
DIASTOLIC BLOOD PRESSURE: 60 MMHG | HEART RATE: 72 BPM | BODY MASS INDEX: 31.23 KG/M2 | SYSTOLIC BLOOD PRESSURE: 130 MMHG | WEIGHT: 199 LBS | HEIGHT: 67 IN

## 2025-01-02 DIAGNOSIS — Q63.1 HORSESHOE KIDNEY: Primary | ICD-10-CM

## 2025-01-02 DIAGNOSIS — N20.0 LEFT NEPHROLITHIASIS: ICD-10-CM

## 2025-01-02 DIAGNOSIS — R97.20 ELEVATED PSA: ICD-10-CM

## 2025-01-02 DIAGNOSIS — N13.8 BENIGN PROSTATIC HYPERPLASIA WITH URINARY OBSTRUCTION: ICD-10-CM

## 2025-01-02 DIAGNOSIS — N40.1 BENIGN PROSTATIC HYPERPLASIA WITH URINARY OBSTRUCTION: ICD-10-CM

## 2025-01-02 PROCEDURE — G8427 DOCREV CUR MEDS BY ELIG CLIN: HCPCS

## 2025-01-02 PROCEDURE — G8417 CALC BMI ABV UP PARAM F/U: HCPCS

## 2025-01-02 PROCEDURE — 99213 OFFICE O/P EST LOW 20 MIN: CPT

## 2025-01-02 PROCEDURE — 1036F TOBACCO NON-USER: CPT

## 2025-01-02 PROCEDURE — 1123F ACP DISCUSS/DSCN MKR DOCD: CPT

## 2025-01-02 PROCEDURE — 3017F COLORECTAL CA SCREEN DOC REV: CPT

## 2025-01-02 PROCEDURE — 1159F MED LIST DOCD IN RCRD: CPT

## 2025-01-02 PROCEDURE — 99214 OFFICE O/P EST MOD 30 MIN: CPT

## 2025-01-02 NOTE — PROGRESS NOTES
with 3-4 stones > 1 cm. Total aggregate > 3.5 cm. Stable vs prior examination.  - Repeat KUB in 1 year.    4.   Elevated PSA  - WNL on most recent check. Down to 2.81 from typical baseline of ~3.1 to 3.9. Single acute elevation in July of 2020 (35.88).    *Follow-up in 1 year with KUB prior.    Winston Steve PA-C  Urology

## 2025-01-22 ENCOUNTER — OFFICE VISIT (OUTPATIENT)
Dept: CARDIOLOGY | Age: 75
End: 2025-01-22
Payer: MEDICARE

## 2025-01-22 VITALS
BODY MASS INDEX: 29.82 KG/M2 | HEIGHT: 67 IN | WEIGHT: 190 LBS | SYSTOLIC BLOOD PRESSURE: 122 MMHG | DIASTOLIC BLOOD PRESSURE: 78 MMHG | HEART RATE: 72 BPM

## 2025-01-22 DIAGNOSIS — I48.21 PERMANENT ATRIAL FIBRILLATION (HCC): Primary | ICD-10-CM

## 2025-01-22 PROCEDURE — 93010 ELECTROCARDIOGRAM REPORT: CPT | Performed by: NURSE PRACTITIONER

## 2025-01-22 PROCEDURE — G8427 DOCREV CUR MEDS BY ELIG CLIN: HCPCS | Performed by: NURSE PRACTITIONER

## 2025-01-22 PROCEDURE — 93005 ELECTROCARDIOGRAM TRACING: CPT | Performed by: NURSE PRACTITIONER

## 2025-01-22 PROCEDURE — 99212 OFFICE O/P EST SF 10 MIN: CPT | Performed by: NURSE PRACTITIONER

## 2025-01-22 PROCEDURE — 1123F ACP DISCUSS/DSCN MKR DOCD: CPT | Performed by: NURSE PRACTITIONER

## 2025-01-22 PROCEDURE — 1159F MED LIST DOCD IN RCRD: CPT | Performed by: NURSE PRACTITIONER

## 2025-01-22 PROCEDURE — 3017F COLORECTAL CA SCREEN DOC REV: CPT | Performed by: NURSE PRACTITIONER

## 2025-01-22 PROCEDURE — 99214 OFFICE O/P EST MOD 30 MIN: CPT | Performed by: NURSE PRACTITIONER

## 2025-01-22 PROCEDURE — 1036F TOBACCO NON-USER: CPT | Performed by: NURSE PRACTITIONER

## 2025-01-22 PROCEDURE — G8417 CALC BMI ABV UP PARAM F/U: HCPCS | Performed by: NURSE PRACTITIONER

## 2025-01-22 NOTE — PROGRESS NOTES
Omega-3 Fatty Acids (FISH OIL) 1000 MG CAPS Take 1 capsule by mouth 2 times daily 2/3/21   Del Ortiz MD   atorvastatin (LIPITOR) 40 MG tablet Take 1 tablet by mouth nightly 2/3/21   Del Ortiz MD   famotidine (PEPCID) 20 MG tablet Take 1 tablet by mouth 2 times daily 8/5/20   Thee Hernandez MD   latanoprost (XALATAN) 0.005 % ophthalmic solution Place 1 drop into both eyes nightly 4/15/20   Cristian Gay MD   Cholecalciferol (VITAMIN D3) 25 MCG (1000 UT) CAPS Take 2 capsules by mouth daily    Cristian Gay MD   VITAMIN B1-B12 IM Inject into the muscle every 30 days     Cristian Gay MD       Allergies:  Patient has no known allergies.    Social History:   reports that he has never smoked. He has never used smokeless tobacco. He reports current alcohol use. He reports that he does not use drugs.     Family History: family history includes Heart Disease in his father and mother; Stroke in his brother and father. No h/o sudden cardiac death.No for premature CAD    REVIEW OF SYSTEMS:    Constitutional: there has been no unanticipated weight loss. There's been No change in energy level, No change in activity level.     Eyes: No visual changes or diplopia. No scleral icterus.  ENT: yes Headaches, hearing loss or vertigo. No mouth sores or sore throat.  Cardiovascular: see above  Respiratory: see above  Gastrointestinal: No abdominal pain, appetite loss, blood in stools.   Genitourinary: No dysuria, trouble voiding, or hematuria.  Musculoskeletal:  No gait disturbance, No weakness or joint complaints.  Integumentary: No rash or pruritis.  Neurological: No headache or diplopia. No tingling  Psychiatric: No anxiety, or depression.  Endocrine: No temperature intolerance.   Hematologic/Lymphatic: No abnormal bruising or bleeding, blood clots or swollen lymph nodes.  Allergic/Immunologic: No nasal congestion or hives.      PHYSICAL EXAM:      There were no vitals filed for this

## 2025-02-11 ENCOUNTER — OFFICE VISIT (OUTPATIENT)
Dept: INTERNAL MEDICINE | Age: 75
End: 2025-02-11
Payer: MEDICARE

## 2025-02-11 VITALS
SYSTOLIC BLOOD PRESSURE: 136 MMHG | OXYGEN SATURATION: 98 % | HEIGHT: 67 IN | BODY MASS INDEX: 31.39 KG/M2 | WEIGHT: 200 LBS | RESPIRATION RATE: 16 BRPM | DIASTOLIC BLOOD PRESSURE: 78 MMHG | HEART RATE: 68 BPM

## 2025-02-11 DIAGNOSIS — G91.2 NPH (NORMAL PRESSURE HYDROCEPHALUS) (HCC): ICD-10-CM

## 2025-02-11 DIAGNOSIS — G82.20 PARAPARESIS (HCC): ICD-10-CM

## 2025-02-11 DIAGNOSIS — G47.33 OBSTRUCTIVE SLEEP APNEA (ADULT) (PEDIATRIC): ICD-10-CM

## 2025-02-11 DIAGNOSIS — F33.2 SEVERE EPISODE OF RECURRENT MAJOR DEPRESSIVE DISORDER, WITHOUT PSYCHOTIC FEATURES (HCC): ICD-10-CM

## 2025-02-11 DIAGNOSIS — Z00.00 MEDICARE ANNUAL WELLNESS VISIT, SUBSEQUENT: Primary | ICD-10-CM

## 2025-02-11 PROCEDURE — 99397 PER PM REEVAL EST PAT 65+ YR: CPT | Performed by: INTERNAL MEDICINE

## 2025-02-11 SDOH — ECONOMIC STABILITY: FOOD INSECURITY: WITHIN THE PAST 12 MONTHS, YOU WORRIED THAT YOUR FOOD WOULD RUN OUT BEFORE YOU GOT MONEY TO BUY MORE.: NEVER TRUE

## 2025-02-11 SDOH — ECONOMIC STABILITY: FOOD INSECURITY: WITHIN THE PAST 12 MONTHS, THE FOOD YOU BOUGHT JUST DIDN'T LAST AND YOU DIDN'T HAVE MONEY TO GET MORE.: NEVER TRUE

## 2025-02-11 ASSESSMENT — PATIENT HEALTH QUESTIONNAIRE - PHQ9
DEPRESSION UNABLE TO ASSESS: FUNCTIONAL CAPACITY MOTIVATION LIMITS ACCURACY
SUM OF ALL RESPONSES TO PHQ QUESTIONS 1-9: 4
7. TROUBLE CONCENTRATING ON THINGS, SUCH AS READING THE NEWSPAPER OR WATCHING TELEVISION: NOT AT ALL
3. TROUBLE FALLING OR STAYING ASLEEP: MORE THAN HALF THE DAYS
SUM OF ALL RESPONSES TO PHQ QUESTIONS 1-9: 4
10. IF YOU CHECKED OFF ANY PROBLEMS, HOW DIFFICULT HAVE THESE PROBLEMS MADE IT FOR YOU TO DO YOUR WORK, TAKE CARE OF THINGS AT HOME, OR GET ALONG WITH OTHER PEOPLE: NOT DIFFICULT AT ALL
2. FEELING DOWN, DEPRESSED OR HOPELESS: NOT AT ALL
8. MOVING OR SPEAKING SO SLOWLY THAT OTHER PEOPLE COULD HAVE NOTICED. OR THE OPPOSITE, BEING SO FIGETY OR RESTLESS THAT YOU HAVE BEEN MOVING AROUND A LOT MORE THAN USUAL: NOT AT ALL
SUM OF ALL RESPONSES TO PHQ QUESTIONS 1-9: 4
6. FEELING BAD ABOUT YOURSELF - OR THAT YOU ARE A FAILURE OR HAVE LET YOURSELF OR YOUR FAMILY DOWN: NOT AT ALL
SUM OF ALL RESPONSES TO PHQ QUESTIONS 1-9: 4
1. LITTLE INTEREST OR PLEASURE IN DOING THINGS: NOT AT ALL
5. POOR APPETITE OR OVEREATING: NOT AT ALL
9. THOUGHTS THAT YOU WOULD BE BETTER OFF DEAD, OR OF HURTING YOURSELF: NOT AT ALL
SUM OF ALL RESPONSES TO PHQ9 QUESTIONS 1 & 2: 0
4. FEELING TIRED OR HAVING LITTLE ENERGY: MORE THAN HALF THE DAYS

## 2025-02-11 ASSESSMENT — LIFESTYLE VARIABLES
HOW OFTEN DO YOU HAVE A DRINK CONTAINING ALCOHOL: MONTHLY OR LESS
HOW MANY STANDARD DRINKS CONTAINING ALCOHOL DO YOU HAVE ON A TYPICAL DAY: 1 OR 2

## 2025-02-11 NOTE — PROGRESS NOTES
Medicare Annual Wellness Visit    Tyler Worthy is here for Medicare AWV    Assessment & Plan        No follow-ups on file.     Subjective       Patient's complete Health Risk Assessment and screening values have been reviewed and are found in Flowsheets. The following problems were reviewed today and where indicated follow up appointments were made and/or referrals ordered.    Positive Risk Factor Screenings with Interventions:    Fall Risk:  Do you feel unsteady or are you worried about falling? : (!) yes  2 or more falls in past year?: (!) yes  Fall with injury in past year?: no     Interventions:    Reviewed medications, home hazards, visual acuity, and co-morbidities that can increase risk for falls     Depression:  Depression Unable to Assess: Functional capacity motivation limits accuracy  PHQ-2 Score: 0  PHQ-9 Total Score: 4                Inactivity:  On average, how many days per week do you engage in moderate to strenuous exercise (like a brisk walk)?: 0 days (!) Abnormal  On average, how many minutes do you engage in exercise at this level?: 0 min    Interventions:  Patient advised to follow up in the office for further evaluation and treatment      Dentist Screen:  Have you seen the dentist within the past year?: (!) No    Intervention:  Advised to schedule with their dentist    Hearing Screen:  Do you or your family notice any trouble with your hearing that hasn't been managed with hearing aids?: (!) Yes    Interventions:  Patient declines any further evaluation or treatment      ADL's:   Patient reports needing help with:  Select all that apply: (!) Taking Medications    Interventions:  Patient advised to follow up in the office for further evaluation and treatment    Advanced Directives:  Do you have a Living Will?: (!) No    Intervention:  has NO advanced directive - not interested in additional information                     Objective   There were no vitals filed for this visit.   There is no

## 2025-02-11 NOTE — PROGRESS NOTES
[facility] INTERNAL MEDICINE    Visit Date:  2/11/2025  Patient:  Tyler Worthy  YOB: 1950    Assessment & Plan     BALJINDER: Sleep study in 2024 showed severe sleep apnea.  Will order auto CPAP as recommended.    Ventriculomegaly: Was assessed by neurosurgery who does not think he has NPH.  Will continue to monitor.  Follows with neurology.    PTSD: Continue Zoloft.  [facility]  Will continue to monitor.    Hypertension: Blood pressure controlled in clinic.  Continue amlodipine 5 mg daily.    GERD:  Can continue Pepcid as needed.  Reassess next visit.    Hyperlipidemia: Continue atorvastatin.  We will continue to monitor..    Atrial fibrillation: Regular rate and rhythm at this time.  Continue Pradaxa.     Diagnosis Orders   1. Medicare annual wellness visit, subsequent        2. Obstructive sleep apnea (adult) (pediatric)  Respiratory Therapy Supplies MISC      3. Paraparesis ([facility])        4. NPH (normal pressure hydrocephalus) ([facility])        5. Severe episode of recurrent major depressive disorder, without psychotic features ([facility])            Chief Complaint  Medicare AWV, Fall ( Fall about a couple of week ago , hit head and had a big bump on his head . Has been fall down a lot , right leg feels weak right before his falls.), and Fatigue (See sleep study )      History of Present Illness   Presents to follow-up.  He comes in today with his wife.  He says that he has been extremely tired for the last several months.  Sleep study was done last year and showed severe BALJINDER, however he was hesitant to start a CPAP.  Says that due to his PTSD, he was afraid to have a mask on his face.  I advised that there are different types of masks some of which can be smaller than others, so he agrees to have me order a CPAP for him.    Objective  /78 (Site: Left Upper Arm, Position: Sitting, Cuff Size: Large Adult)   Pulse 68   Resp 16   Ht 1.702 m (5' 7\")   Wt 90.7 kg (200 lb)

## 2025-04-30 ENCOUNTER — OFFICE VISIT (OUTPATIENT)
Dept: PRIMARY CARE CLINIC | Age: 75
End: 2025-04-30
Payer: MEDICARE

## 2025-04-30 ENCOUNTER — RESULTS FOLLOW-UP (OUTPATIENT)
Dept: PRIMARY CARE CLINIC | Age: 75
End: 2025-04-30

## 2025-04-30 VITALS
RESPIRATION RATE: 24 BRPM | HEART RATE: 104 BPM | SYSTOLIC BLOOD PRESSURE: 120 MMHG | DIASTOLIC BLOOD PRESSURE: 70 MMHG | BODY MASS INDEX: 31.39 KG/M2 | OXYGEN SATURATION: 95 % | TEMPERATURE: 101.4 F | WEIGHT: 200 LBS | HEIGHT: 67 IN

## 2025-04-30 DIAGNOSIS — R05.1 ACUTE COUGH: ICD-10-CM

## 2025-04-30 DIAGNOSIS — J06.9 UPPER RESPIRATORY TRACT INFECTION, UNSPECIFIED TYPE: Primary | ICD-10-CM

## 2025-04-30 LAB
INFLUENZA A ANTIGEN, POC: NEGATIVE
INFLUENZA B ANTIGEN, POC: NEGATIVE
LOT EXPIRE DATE: NORMAL
LOT KIT NUMBER: NORMAL
SARS-COV-2, POC: NORMAL
VALID INTERNAL CONTROL: NORMAL
VENDOR AND KIT NAME POC: NORMAL

## 2025-04-30 PROCEDURE — G8427 DOCREV CUR MEDS BY ELIG CLIN: HCPCS | Performed by: PHYSICIAN ASSISTANT

## 2025-04-30 PROCEDURE — 1160F RVW MEDS BY RX/DR IN RCRD: CPT | Performed by: PHYSICIAN ASSISTANT

## 2025-04-30 PROCEDURE — 1159F MED LIST DOCD IN RCRD: CPT | Performed by: PHYSICIAN ASSISTANT

## 2025-04-30 PROCEDURE — G8417 CALC BMI ABV UP PARAM F/U: HCPCS | Performed by: PHYSICIAN ASSISTANT

## 2025-04-30 PROCEDURE — 99212 OFFICE O/P EST SF 10 MIN: CPT | Performed by: PHYSICIAN ASSISTANT

## 2025-04-30 PROCEDURE — 99213 OFFICE O/P EST LOW 20 MIN: CPT | Performed by: PHYSICIAN ASSISTANT

## 2025-04-30 PROCEDURE — 1123F ACP DISCUSS/DSCN MKR DOCD: CPT | Performed by: PHYSICIAN ASSISTANT

## 2025-04-30 PROCEDURE — PBSHW POCT COVID-19 & INFLUENZA A/B: Performed by: PHYSICIAN ASSISTANT

## 2025-04-30 PROCEDURE — 1036F TOBACCO NON-USER: CPT | Performed by: PHYSICIAN ASSISTANT

## 2025-04-30 PROCEDURE — 87428 SARSCOV & INF VIR A&B AG IA: CPT | Performed by: PHYSICIAN ASSISTANT

## 2025-04-30 PROCEDURE — 3017F COLORECTAL CA SCREEN DOC REV: CPT | Performed by: PHYSICIAN ASSISTANT

## 2025-04-30 ASSESSMENT — ENCOUNTER SYMPTOMS
PHOTOPHOBIA: 0
VOMITING: 0
SHORTNESS OF BREATH: 1
SINUS PAIN: 0
WHEEZING: 1
SINUS PRESSURE: 0
COUGH: 1
NAUSEA: 0
EYE PAIN: 0
SORE THROAT: 0
ABDOMINAL PAIN: 0
RHINORRHEA: 1
DIARRHEA: 0

## 2025-04-30 NOTE — PATIENT INSTRUCTIONS
Patient has signs and symptoms of upper respiratory infection / viral illness.   Antibiotics are not indicated at the present time.  Do not use medications containing NSAIDs such as Ibuprofen or Motrin  Claritin allergy medication, Flonase nasal spray, and Mucinex is safe to use for congestion  May be treated with over the counter medications. (Sudafed, cold/ sinus)  If high blood pressure may use Corcidin OTC cold medications  Patient can use Tylenol and/or OTC cough syrup.   Advised to follow up with family doctor or return to urgent care if does not get better or symptoms worsen.  Pt can go to ER if high fever >102 , vomiting, breathing difficulty, lethargy.   Patient/ parents understands this approach of home management and agrees with it.

## 2025-04-30 NOTE — PROGRESS NOTES
[facility]  [address]  [facility] [address]  Dept: [phone]  Dept Fax: [phone]    Tyler Worthy is a 74 y.o. male who presents today for his medical conditions/complaints as noted below. Tyler Worthy is c/o of   Chief Complaint   Patient presents with    Fever     Fever, cough and fatigue x 2 days. Dizzy and has fallen \"couple of times\"   .    HPI:     Patient is a 75 yo male presenting today due to cough productive of sputum, fever, dizziness, fatigue, and shortness of breath. His symptoms began a few days ago. He denies any known sick contacts.     Fever   This is a new problem. The current episode started in the past 7 days. The problem has been gradually worsening. The maximum temperature noted was 101 to 101.9 F. Associated symptoms include congestion, coughing, headaches, muscle aches and wheezing. Pertinent negatives include no abdominal pain, chest pain, diarrhea, ear pain, nausea, rash, sore throat or vomiting. He has tried nothing for the symptoms. The treatment provided no relief.         Past Medical History:   Diagnosis Date    Atrial fibrillation ([facility]) 01/2020    Blood in urine     Chronic kidney disease     CKD (chronic kidney disease)     History of ischemic stroke in prior three months     Hyperlipidemia     Ischemic stroke ([facility]) 01/10/2020    Kidney stones     Nihss score 10     PTSD (post-traumatic stress disorder)     from war, Agent Orange     Skin tag 12/21/2018    Stroke ([facility]) 01/16/2020    MRI Brain WO: New right PCA distribution infarct, multifocal left MCA distribution infarcts    Stroke ([facility]) 01/11/2020    large vessel occlusion/left M2 occlusion.  He subsequently underwent emergent thrombectomy.  /  S/P TPA DONE     Past Surgical History:   Procedure Laterality Date    APPENDECTOMY      COLONOSCOPY N/A 4/27/2021    Normal colon. Dr. Ferrer at [facility]

## 2025-05-15 ENCOUNTER — HOSPITAL ENCOUNTER (OUTPATIENT)
Age: 75
Discharge: HOME | End: 2025-05-15
Payer: MEDICARE

## 2025-05-15 ENCOUNTER — OFFICE VISIT (OUTPATIENT)
Dept: INTERNAL MEDICINE | Age: 75
End: 2025-05-15
Payer: MEDICARE

## 2025-05-15 VITALS
SYSTOLIC BLOOD PRESSURE: 132 MMHG | RESPIRATION RATE: 16 BRPM | HEART RATE: 60 BPM | OXYGEN SATURATION: 97 % | HEIGHT: 67 IN | BODY MASS INDEX: 31.71 KG/M2 | DIASTOLIC BLOOD PRESSURE: 80 MMHG | WEIGHT: 202 LBS

## 2025-05-15 DIAGNOSIS — R73.01 IFG (IMPAIRED FASTING GLUCOSE): ICD-10-CM

## 2025-05-15 DIAGNOSIS — E78.5 HYPERLIPIDEMIA, UNSPECIFIED HYPERLIPIDEMIA TYPE: ICD-10-CM

## 2025-05-15 DIAGNOSIS — E78.5 HYPERLIPIDEMIA, UNSPECIFIED HYPERLIPIDEMIA TYPE: Primary | ICD-10-CM

## 2025-05-15 LAB
ALBUMIN SERPL-MCNC: 4.3 G/DL (ref 3.5–5.2)
ALBUMIN/GLOB SERPL: 1.3 {RATIO} (ref 1–2.5)
ALP SERPL-CCNC: 113 U/L (ref 40–129)
ALT SERPL-CCNC: 29 U/L (ref 10–50)
ANION GAP SERPL CALCULATED.3IONS-SCNC: 11 MMOL/L (ref 9–16)
AST SERPL-CCNC: 33 U/L (ref 10–50)
BASOPHILS # BLD: 0.04 K/UL (ref 0–0.2)
BASOPHILS NFR BLD: 1 % (ref 0–2)
BILIRUB SERPL-MCNC: 0.5 MG/DL (ref 0–1.2)
BUN SERPL-MCNC: 19 MG/DL (ref 8–23)
BUN/CREAT SERPL: 17 (ref 9–20)
CALCIUM SERPL-MCNC: 9.3 MG/DL (ref 8.6–10.4)
CHLORIDE SERPL-SCNC: 105 MMOL/L (ref 98–107)
CHOLEST SERPL-MCNC: 127 MG/DL (ref 0–199)
CHOLESTEROL/HDL RATIO: 3
CO2 SERPL-SCNC: 25 MMOL/L (ref 20–31)
CREAT SERPL-MCNC: 1.1 MG/DL (ref 0.7–1.2)
EOSINOPHIL # BLD: 0.19 K/UL (ref 0–0.44)
EOSINOPHILS RELATIVE PERCENT: 2 % (ref 1–4)
ERYTHROCYTE [DISTWIDTH] IN BLOOD BY AUTOMATED COUNT: 13 % (ref 11.8–14.4)
EST. AVERAGE GLUCOSE BLD GHB EST-MCNC: 91 MG/DL
GFR, ESTIMATED: 70 ML/MIN/1.73M2
GLUCOSE SERPL-MCNC: 115 MG/DL (ref 74–99)
HBA1C MFR BLD: 4.8 % (ref 4–6)
HCT VFR BLD AUTO: 44.8 % (ref 40.7–50.3)
HDLC SERPL-MCNC: 43 MG/DL
HGB BLD-MCNC: 15.7 G/DL (ref 13–17)
IMM GRANULOCYTES # BLD AUTO: 0.03 K/UL (ref 0–0.3)
IMM GRANULOCYTES NFR BLD: 0 %
LDLC SERPL CALC-MCNC: 48 MG/DL (ref 0–100)
LYMPHOCYTES NFR BLD: 1.79 K/UL (ref 1.1–3.7)
LYMPHOCYTES RELATIVE PERCENT: 21 % (ref 24–43)
MCH RBC QN AUTO: 30.1 PG (ref 25.2–33.5)
MCHC RBC AUTO-ENTMCNC: 35 G/DL (ref 25.2–33.5)
MCV RBC AUTO: 85.8 FL (ref 82.6–102.9)
MONOCYTES NFR BLD: 0.63 K/UL (ref 0.1–1.2)
MONOCYTES NFR BLD: 8 % (ref 3–12)
NEUTROPHILS NFR BLD: 68 % (ref 36–65)
NEUTS SEG NFR BLD: 5.67 K/UL (ref 1.5–8.1)
NRBC BLD-RTO: 0 PER 100 WBC
PLATELET # BLD AUTO: 200 K/UL (ref 138–453)
PMV BLD AUTO: 9.9 FL (ref 8.1–13.5)
POTASSIUM SERPL-SCNC: 4.5 MMOL/L (ref 3.7–5.3)
PROT SERPL-MCNC: 7.6 G/DL (ref 6.6–8.7)
RBC # BLD AUTO: 5.22 M/UL (ref 4.21–5.77)
SODIUM SERPL-SCNC: 141 MMOL/L (ref 136–145)
TRIGL SERPL-MCNC: 181 MG/DL
VLDLC SERPL CALC-MCNC: 36 MG/DL (ref 1–30)
WBC OTHER # BLD: 8.4 K/UL (ref 3.5–11.3)

## 2025-05-15 PROCEDURE — 1036F TOBACCO NON-USER: CPT | Performed by: INTERNAL MEDICINE

## 2025-05-15 PROCEDURE — 99212 OFFICE O/P EST SF 10 MIN: CPT | Performed by: INTERNAL MEDICINE

## 2025-05-15 PROCEDURE — 80053 COMPREHEN METABOLIC PANEL: CPT

## 2025-05-15 PROCEDURE — G2211 COMPLEX E/M VISIT ADD ON: HCPCS | Performed by: INTERNAL MEDICINE

## 2025-05-15 PROCEDURE — 36415 COLL VENOUS BLD VENIPUNCTURE: CPT

## 2025-05-15 PROCEDURE — 1159F MED LIST DOCD IN RCRD: CPT | Performed by: INTERNAL MEDICINE

## 2025-05-15 PROCEDURE — 83036 HEMOGLOBIN GLYCOSYLATED A1C: CPT

## 2025-05-15 PROCEDURE — 80061 LIPID PANEL: CPT

## 2025-05-15 PROCEDURE — G8427 DOCREV CUR MEDS BY ELIG CLIN: HCPCS | Performed by: INTERNAL MEDICINE

## 2025-05-15 PROCEDURE — 3017F COLORECTAL CA SCREEN DOC REV: CPT | Performed by: INTERNAL MEDICINE

## 2025-05-15 PROCEDURE — 85025 COMPLETE CBC W/AUTO DIFF WBC: CPT

## 2025-05-15 PROCEDURE — 1123F ACP DISCUSS/DSCN MKR DOCD: CPT | Performed by: INTERNAL MEDICINE

## 2025-05-15 PROCEDURE — 99214 OFFICE O/P EST MOD 30 MIN: CPT | Performed by: INTERNAL MEDICINE

## 2025-05-15 PROCEDURE — G8417 CALC BMI ABV UP PARAM F/U: HCPCS | Performed by: INTERNAL MEDICINE

## 2025-05-15 NOTE — PROGRESS NOTES
PTSD (post-traumatic stress disorder)     from war, Agent Orange     Skin tag 12/21/2018    Stroke ([facility]) 01/16/2020    MRI Brain WO: New right PCA distribution infarct, multifocal left MCA distribution infarcts    Stroke ([facility]) 01/11/2020    large vessel occlusion/left M2 occlusion.  He subsequently underwent emergent thrombectomy.  /  S/P TPA DONE     Past Surgical History:   Procedure Laterality Date    APPENDECTOMY      COLONOSCOPY N/A 4/27/2021    Normal colon. Dr. Ferrer at [facility]    CYSTOSCOPY Left 10/19/2020    CYSTOSCOPY, LEFT URETEROSCOPY w/ HOMIUM LASER LITHOTRIPSY, LEFT URETERAL STENT PLACEMENT performed by Dave Shine MD at [facility] OR    CYSTOSCOPY Left 12/18/2020    cystoscopy, left stent placement, left retrograde pyelogram    CYSTOSCOPY Left 12/18/2020    CYSTOSCOPY, LEFT STENT REMOVAL, LEFT RETROGRADE PYELOGRAM, INSERTION OCCLUSIVE BALLOON - PT TO INTERV RADIOLOGY FOLLOWING performed by Dave Shine MD at [facility] OR    CYSTOSCOPY INSERTION / REMOVAL STENT / STONE Left 2/1/2021    CYSTO Left URETEROSCOPY w/ stent removal ET RETROGRADE performed by Dave Shine MD at [facility] OR    IR GUIDED NEPHROSTOMY CATH PLACEMENT LEFT  12/18/2020    IR NEPHROSTOMY PERCUTANEOUS LEFT 12/18/2020 Eran Wheat MD [facility] SPECIAL PROCEDURES    LITHOTRIPSY      NEPHROLITHOTOMY Left 12/18/2020    cystoscopy, left stent placement, left retrograde pyelogram performed by Dave Shine MD at [facility] OR    TRANSESOPHAGEAL ECHOCARDIOGRAM  01/17/2020    TURP N/A 08/10/2020    CYSTO w/ Greenlight Laser performed by Dave Shine MD at [facility] OR    UPPER GASTROINTESTINAL ENDOSCOPY N/A 4/27/2021    mild esophagitis. mild-mod. esophagitis. Dr. Ferrer at [facility]     Family History  This patient's family history includes Heart Disease in his father and mother; Stroke in his brother and father.  Social History  Tyler  reports that he has never smoked. He has never used smokeless tobacco. He reports current alcohol use. He reports that he does not use

## 2025-06-17 ENCOUNTER — RESULTS FOLLOW-UP (OUTPATIENT)
Dept: INTERNAL MEDICINE | Age: 75
End: 2025-06-17

## 2025-06-22 ENCOUNTER — HOSPITAL ENCOUNTER (EMERGENCY)
Age: 75
Discharge: HOME | End: 2025-06-22
Attending: EMERGENCY MEDICINE
Payer: MEDICARE

## 2025-06-22 ENCOUNTER — APPOINTMENT (OUTPATIENT)
Dept: GENERAL RADIOLOGY | Age: 75
End: 2025-06-22
Payer: MEDICARE

## 2025-06-22 VITALS
HEIGHT: 67 IN | SYSTOLIC BLOOD PRESSURE: 157 MMHG | DIASTOLIC BLOOD PRESSURE: 99 MMHG | TEMPERATURE: 97.8 F | HEART RATE: 101 BPM | OXYGEN SATURATION: 97 % | BODY MASS INDEX: 31.39 KG/M2 | RESPIRATION RATE: 18 BRPM | WEIGHT: 200 LBS

## 2025-06-22 DIAGNOSIS — S40.011A CONTUSION OF RIGHT SHOULDER, INITIAL ENCOUNTER: Primary | ICD-10-CM

## 2025-06-22 PROCEDURE — 73030 X-RAY EXAM OF SHOULDER: CPT

## 2025-06-22 PROCEDURE — 99283 EMERGENCY DEPT VISIT LOW MDM: CPT

## 2025-06-22 ASSESSMENT — PAIN DESCRIPTION - LOCATION: LOCATION: SHOULDER

## 2025-06-22 ASSESSMENT — PAIN SCALES - GENERAL: PAINLEVEL_OUTOF10: 9

## 2025-06-22 ASSESSMENT — PAIN DESCRIPTION - PAIN TYPE: TYPE: ACUTE PAIN

## 2025-06-22 ASSESSMENT — PAIN - FUNCTIONAL ASSESSMENT: PAIN_FUNCTIONAL_ASSESSMENT: 0-10

## 2025-06-22 ASSESSMENT — PAIN DESCRIPTION - ORIENTATION: ORIENTATION: RIGHT

## 2025-06-22 NOTE — DISCHARGE INSTRUCTIONS
Apply ice.  Tylenol as directed.  Continue other medications as directed.  See your doctor to follow-up.  Return for worsening pain, swelling, numb or cool fingers, or if worse in any way.    Please understand that at this time there is no evidence for a more serious underlying process, but that early in the process of an illness or injury, an emergency department workup can be falsely reassuring.  You should contact your family doctor within the next 48 hours for a follow up appointment    THANK YOU!!!    From [facility] and [facility]    On behalf of the Emergency Department staff at [facility], I would like to thank you for giving us the opportunity to address your health care needs and concerns.    We hope that during your visit, our service was delivered in a professional and caring manner. Please keep [facility] in mind as we walk with you down the path to your own personal wellness.     Please expect an automated text message or email from us so we can ask a few questions about your health and progress. Based on your answers, a clinician may call you back to offer help and instructions.    Please understand that early in the process of an illness or injury, an emergency department workup can be falsely reassuring.  If you notice any worsening, changing or persistent symptoms please call your family doctor or return to the ER immediately.     Tell us how we did during your visit at [email or web address]   and let us know about your experience

## 2025-06-22 NOTE — ED PROVIDER NOTES
[facility] Emergency Department  [address] [facility] [address]  Phone: [phone]      Patient Name:  Tyler Worthy  Medical Record Number:  [id number]  YOB: 1950  Date of Service:  6/22/2025  Primary Care Physician:  Arsen Ortiz MD      CHIEF COMPLAINT:       Chief Complaint   Patient presents with    Shoulder Injury       HISTORY OF PRESENT ILLNESS:    Tyler Worthy is a 74 y.o. male who presents with the complaint of right shoulder pain.  The patient was walking his dog yesterday and tripped.  He landed on his right shoulder.  He has been having pain.  He is able to move it in a normal range of motion but with some discomfort.  He denies numbness or tingling.  He denies striking his head or losing consciousness.  He has not had dizziness prior to or following the fall.  He denies neck or back discomfort.  The patient is on a blood thinner because he has A-fib.    CURRENT MEDICATIONS:      Previous Medications    AMLODIPINE (NORVASC) 5 MG TABLET    TAKE 1 TABLET BY MOUTH DAILY    ASPIRIN 81 MG EC TABLET    Take 1 tablet by mouth daily    ATORVASTATIN (LIPITOR) 40 MG TABLET    Take 1 tablet by mouth nightly    CHOLECALCIFEROL (VITAMIN D3) 25 MCG (1000 UT) CAPS    Take 2 capsules by mouth daily    DABIGATRAN (PRADAXA) 150 MG CAPSULE    Take 1 capsule by mouth 2 times daily    FAMOTIDINE (PEPCID) 20 MG TABLET    Take 1 tablet by mouth 2 times daily    FOLIC ACID PO    Take 0.4 mg by mouth Daily    LATANOPROST (XALATAN) 0.005 % OPHTHALMIC SOLUTION    Place 1 drop into both eyes nightly    OMEGA-3 FATTY ACIDS (FISH OIL) 1000 MG CAPS    Take 1 capsule by mouth 2 times daily    [facility]AT[facility]    CPAP with tubing and supplies. Auto CPAP 6-16 with EPR 3. With humidifcation    SERTRALINE (ZOLOFT) 50 MG TABLET    Take 1 tablet by mouth daily    VITAMIN B1-B12 IM    Inject into the muscle every 30 days     ZINC 50 MG CAPS    Take 1 tablet by mouth daily       ALLERGIES:   has no

## 2025-07-30 ENCOUNTER — OFFICE VISIT (OUTPATIENT)
Dept: CARDIOLOGY | Age: 75
End: 2025-07-30
Payer: MEDICARE

## 2025-07-30 VITALS
BODY MASS INDEX: 30.53 KG/M2 | WEIGHT: 194.5 LBS | OXYGEN SATURATION: 98 % | SYSTOLIC BLOOD PRESSURE: 122 MMHG | HEIGHT: 67 IN | DIASTOLIC BLOOD PRESSURE: 74 MMHG | HEART RATE: 94 BPM

## 2025-07-30 DIAGNOSIS — I10 PRIMARY HYPERTENSION: ICD-10-CM

## 2025-07-30 DIAGNOSIS — E78.5 HYPERLIPIDEMIA, UNSPECIFIED HYPERLIPIDEMIA TYPE: ICD-10-CM

## 2025-07-30 DIAGNOSIS — I48.21 PERMANENT ATRIAL FIBRILLATION (HCC): Primary | ICD-10-CM

## 2025-07-30 PROCEDURE — 3078F DIAST BP <80 MM HG: CPT | Performed by: NURSE PRACTITIONER

## 2025-07-30 PROCEDURE — 1123F ACP DISCUSS/DSCN MKR DOCD: CPT | Performed by: NURSE PRACTITIONER

## 2025-07-30 PROCEDURE — 93005 ELECTROCARDIOGRAM TRACING: CPT | Performed by: NURSE PRACTITIONER

## 2025-07-30 PROCEDURE — 93010 ELECTROCARDIOGRAM REPORT: CPT | Performed by: NURSE PRACTITIONER

## 2025-07-30 PROCEDURE — G8427 DOCREV CUR MEDS BY ELIG CLIN: HCPCS | Performed by: NURSE PRACTITIONER

## 2025-07-30 PROCEDURE — 99214 OFFICE O/P EST MOD 30 MIN: CPT | Performed by: NURSE PRACTITIONER

## 2025-07-30 PROCEDURE — 3074F SYST BP LT 130 MM HG: CPT | Performed by: NURSE PRACTITIONER

## 2025-07-30 PROCEDURE — 1159F MED LIST DOCD IN RCRD: CPT | Performed by: NURSE PRACTITIONER

## 2025-07-30 PROCEDURE — G8417 CALC BMI ABV UP PARAM F/U: HCPCS | Performed by: NURSE PRACTITIONER

## 2025-07-30 PROCEDURE — 99212 OFFICE O/P EST SF 10 MIN: CPT | Performed by: NURSE PRACTITIONER

## 2025-07-30 PROCEDURE — 1036F TOBACCO NON-USER: CPT | Performed by: NURSE PRACTITIONER

## 2025-07-30 NOTE — PROGRESS NOTES
Cardiology Consultation/Follow Up.  [facility]    Cardiology Follow Up.      Tyler Worthy  1950  [id number]    Today: 7/30/25    CC: Patient is here for follow up    HPI:   Tyler Worthy is here for a follow up. Patient with a history of atrial fibrillation and CVA. Patient is here for a 6 month follow up.     Patient seen and examined in office today. Denies anginal type chest pain. Denies shortness of breath or dyspnea on exertion. Reports lightheadedness and dizziness. No nausea/vomiting, diaphoresis, or palpitations. Reports complete medication compliance. Denies any noticeable peripheral edema or unexplained sudden weight gain. Medical history reviewed.       Past Medical:  Past Medical History:   Diagnosis Date    Atrial fibrillation ([facility]) 01/2020    Blood in urine     Chronic kidney disease     CKD (chronic kidney disease)     History of ischemic stroke in prior three months     Hyperlipidemia     Ischemic stroke ([facility]) 01/10/2020    Kidney stones     Nihss score 10     PTSD (post-traumatic stress disorder)     from war, Agent Orange     Skin tag 12/21/2018    Stroke ([facility]) 01/16/2020    MRI Brain WO: New right PCA distribution infarct, multifocal left MCA distribution infarcts    Stroke ([facility]) 01/11/2020    large vessel occlusion/left M2 occlusion.  He subsequently underwent emergent thrombectomy.  /  S/P TPA DONE         Past Surgical:  Past Surgical History:   Procedure Laterality Date    APPENDECTOMY      COLONOSCOPY N/A 4/27/2021    Normal colon. Dr. Ferrer at [facility]    CYSTOSCOPY Left 10/19/2020    CYSTOSCOPY, LEFT URETEROSCOPY w/ HOMIUM LASER LITHOTRIPSY, LEFT URETERAL STENT PLACEMENT performed by Dave Shine MD at [facility] OR    CYSTOSCOPY Left 12/18/2020    cystoscopy, left stent placement, left retrograde pyelogram    CYSTOSCOPY Left 12/18/2020    CYSTOSCOPY, LEFT STENT REMOVAL, LEFT RETROGRADE PYELOGRAM, INSERTION OCCLUSIVE BALLOON - PT TO INTERV RADIOLOGY FOLLOWING performed by Dave Shine MD at [facility]

## 2025-08-12 ENCOUNTER — TELEPHONE (OUTPATIENT)
Dept: INTERNAL MEDICINE | Age: 75
End: 2025-08-12

## 2025-08-12 DIAGNOSIS — R51.9 NONINTRACTABLE HEADACHE, UNSPECIFIED CHRONICITY PATTERN, UNSPECIFIED HEADACHE TYPE: Primary | ICD-10-CM

## 2025-08-14 ENCOUNTER — TELEPHONE (OUTPATIENT)
Dept: NEUROLOGY | Age: 75
End: 2025-08-14

## 2025-08-14 ENCOUNTER — OFFICE VISIT (OUTPATIENT)
Dept: NEUROLOGY | Age: 75
End: 2025-08-14
Payer: MEDICARE

## 2025-08-14 ENCOUNTER — HOSPITAL ENCOUNTER (OUTPATIENT)
Dept: CT IMAGING | Age: 75
Discharge: HOME | End: 2025-08-16
Payer: MEDICARE

## 2025-08-14 VITALS
HEART RATE: 93 BPM | BODY MASS INDEX: 30.32 KG/M2 | OXYGEN SATURATION: 97 % | DIASTOLIC BLOOD PRESSURE: 64 MMHG | SYSTOLIC BLOOD PRESSURE: 110 MMHG | WEIGHT: 193.6 LBS

## 2025-08-14 DIAGNOSIS — I48.91 ATRIAL FIBRILLATION, UNSPECIFIED TYPE (HCC): ICD-10-CM

## 2025-08-14 DIAGNOSIS — F41.0 PANIC DISORDER WITHOUT AGORAPHOBIA WITH MODERATE PANIC ATTACKS: ICD-10-CM

## 2025-08-14 DIAGNOSIS — I69.30 H/O: STROKE WITH RESIDUAL EFFECTS: ICD-10-CM

## 2025-08-14 DIAGNOSIS — F43.12 CHRONIC POST-TRAUMATIC STRESS DISORDER: ICD-10-CM

## 2025-08-14 DIAGNOSIS — R26.89 BALANCE PROBLEM: ICD-10-CM

## 2025-08-14 DIAGNOSIS — Z91.81 RISK FOR FALLS: ICD-10-CM

## 2025-08-14 DIAGNOSIS — Z86.73 H/O ISCHEMIC LEFT MCA STROKE: ICD-10-CM

## 2025-08-14 DIAGNOSIS — R41.3 MEMORY PROBLEM: ICD-10-CM

## 2025-08-14 DIAGNOSIS — R53.83 FATIGUE DUE TO DEPRESSION: ICD-10-CM

## 2025-08-14 DIAGNOSIS — G31.9 DIFFUSE CEREBRAL ATROPHY: ICD-10-CM

## 2025-08-14 DIAGNOSIS — S09.90XS MILD CLOSED HEAD INJURY, SEQUELA: ICD-10-CM

## 2025-08-14 DIAGNOSIS — Z86.73 HISTORY OF ISCHEMIC POSTERIOR CEREBRAL ARTERY STROKE: ICD-10-CM

## 2025-08-14 DIAGNOSIS — F43.10 PTSD (POST-TRAUMATIC STRESS DISORDER): ICD-10-CM

## 2025-08-14 DIAGNOSIS — I67.82 CHRONIC CEREBRAL ISCHEMIA: ICD-10-CM

## 2025-08-14 DIAGNOSIS — G91.2 NPH (NORMAL PRESSURE HYDROCEPHALUS) (HCC): ICD-10-CM

## 2025-08-14 DIAGNOSIS — Z91.89 AT HIGH RISK FOR STROKE: ICD-10-CM

## 2025-08-14 DIAGNOSIS — R42 DIZZINESS AND GIDDINESS: ICD-10-CM

## 2025-08-14 DIAGNOSIS — H53.462 LEFT HOMONYMOUS HEMIANOPSIA: ICD-10-CM

## 2025-08-14 DIAGNOSIS — Z86.73 H/O: STROKE: ICD-10-CM

## 2025-08-14 DIAGNOSIS — R26.9 GAIT DIFFICULTY: ICD-10-CM

## 2025-08-14 DIAGNOSIS — Z91.81 H/O FALLING: Primary | ICD-10-CM

## 2025-08-14 DIAGNOSIS — R42 DIZZINESS: ICD-10-CM

## 2025-08-14 DIAGNOSIS — Q63.1 HORSESHOE KIDNEY: ICD-10-CM

## 2025-08-14 DIAGNOSIS — R51.9 PRESSURE IN HEAD: ICD-10-CM

## 2025-08-14 DIAGNOSIS — G93.89 ENCEPHALOMALACIA ON IMAGING STUDY: ICD-10-CM

## 2025-08-14 DIAGNOSIS — F32.A FATIGUE DUE TO DEPRESSION: ICD-10-CM

## 2025-08-14 DIAGNOSIS — G45.0 VBI (VERTEBROBASILAR INSUFFICIENCY): ICD-10-CM

## 2025-08-14 PROBLEM — S09.90XA MILD CLOSED HEAD INJURY: Status: ACTIVE | Noted: 2025-08-14

## 2025-08-14 PROCEDURE — 99214 OFFICE O/P EST MOD 30 MIN: CPT | Performed by: PSYCHIATRY & NEUROLOGY

## 2025-08-14 PROCEDURE — 1036F TOBACCO NON-USER: CPT | Performed by: PSYCHIATRY & NEUROLOGY

## 2025-08-14 PROCEDURE — 99215 OFFICE O/P EST HI 40 MIN: CPT | Performed by: PSYCHIATRY & NEUROLOGY

## 2025-08-14 PROCEDURE — 1123F ACP DISCUSS/DSCN MKR DOCD: CPT | Performed by: PSYCHIATRY & NEUROLOGY

## 2025-08-14 PROCEDURE — 1159F MED LIST DOCD IN RCRD: CPT | Performed by: PSYCHIATRY & NEUROLOGY

## 2025-08-14 PROCEDURE — 72125 CT NECK SPINE W/O DYE: CPT

## 2025-08-14 PROCEDURE — G8417 CALC BMI ABV UP PARAM F/U: HCPCS | Performed by: PSYCHIATRY & NEUROLOGY

## 2025-08-14 PROCEDURE — G8427 DOCREV CUR MEDS BY ELIG CLIN: HCPCS | Performed by: PSYCHIATRY & NEUROLOGY

## 2025-08-14 PROCEDURE — 70450 CT HEAD/BRAIN W/O DYE: CPT

## 2025-08-14 ASSESSMENT — ENCOUNTER SYMPTOMS
SINUS PRESSURE: 0
TROUBLE SWALLOWING: 0
CHOKING: 0
WHEEZING: 0
VOICE CHANGE: 0
SHORTNESS OF BREATH: 0
APNEA: 0
FACIAL SWELLING: 0
CHEST TIGHTNESS: 0

## 2025-08-15 ENCOUNTER — HOSPITAL ENCOUNTER (OUTPATIENT)
Dept: MRI IMAGING | Age: 75
Discharge: HOME | End: 2025-08-17
Attending: INTERNAL MEDICINE
Payer: MEDICARE

## 2025-08-15 DIAGNOSIS — R51.9 NONINTRACTABLE HEADACHE, UNSPECIFIED CHRONICITY PATTERN, UNSPECIFIED HEADACHE TYPE: ICD-10-CM

## 2025-08-15 PROCEDURE — 70551 MRI BRAIN STEM W/O DYE: CPT

## 2025-08-18 ENCOUNTER — OFFICE VISIT (OUTPATIENT)
Dept: INTERNAL MEDICINE | Age: 75
End: 2025-08-18
Payer: MEDICARE

## 2025-08-18 VITALS
SYSTOLIC BLOOD PRESSURE: 112 MMHG | BODY MASS INDEX: 30.45 KG/M2 | WEIGHT: 194 LBS | HEART RATE: 74 BPM | DIASTOLIC BLOOD PRESSURE: 64 MMHG | OXYGEN SATURATION: 98 % | HEIGHT: 67 IN | RESPIRATION RATE: 16 BRPM

## 2025-08-18 DIAGNOSIS — R73.01 IFG (IMPAIRED FASTING GLUCOSE): Primary | ICD-10-CM

## 2025-08-18 DIAGNOSIS — E78.5 HYPERLIPIDEMIA, UNSPECIFIED HYPERLIPIDEMIA TYPE: ICD-10-CM

## 2025-08-18 PROCEDURE — 99212 OFFICE O/P EST SF 10 MIN: CPT | Performed by: INTERNAL MEDICINE

## 2025-08-21 ENCOUNTER — TELEPHONE (OUTPATIENT)
Dept: INTERNAL MEDICINE | Age: 75
End: 2025-08-21

## 2025-08-26 ENCOUNTER — TELEPHONE (OUTPATIENT)
Dept: INTERNAL MEDICINE | Age: 75
End: 2025-08-26

## (undated) DEVICE — GLOVE ORANGE PI 7 1/2   MSG9075

## (undated) DEVICE — Z INACTIVE USE 2660664 SOLUTION IRRIG 3000ML 0.9% SOD CHL USP UROMATIC PLAS CONT

## (undated) DEVICE — Z DUP USE 2565107 PACK SURG PROC LEG CYSTO T-DRAPE REINF TBL CVR HND TWL

## (undated) DEVICE — SOLUTION SCRB 4OZ 4% CHG CLN BASE FOR PT SKIN ANTISEPSIS

## (undated) DEVICE — COVER OR TBL W40XL90IN ABSRB STD AND GRIPPY BK SAHARA

## (undated) DEVICE — KIT NEPHSTMY CATH DIA7FR LINGEMAN 0.035IN SUP STIFF J DIL

## (undated) DEVICE — 1200CC GUARDIAN II: Brand: GUARDIAN

## (undated) DEVICE — GOWN,AURORA,NONRNF,XL,30/CS: Brand: MEDLINE

## (undated) DEVICE — JELLY LUBRICATING 4OZ FLIP TOP TB E Z

## (undated) DEVICE — STRAP,CATHETER,ELASTIC,HOOK&LOOP: Brand: MEDLINE

## (undated) DEVICE — POUCH DRNGE FLX BND INTEGR RAIL CLMP DISP EZ CTCH

## (undated) DEVICE — SHEATH URO 11/13FR L28CM URETRAL NAVIGATOR ACC

## (undated) DEVICE — GLOVE ORTHO 7 1/2   MSG9475

## (undated) DEVICE — PAD,ABDOMINAL,5"X9",ST,LF,25/BX: Brand: MEDLINE INDUSTRIES, INC.

## (undated) DEVICE — HIGH PRESSURE NEPHROSTOMY BALLOON CATHETER KIT: Brand: NEPHROMAX KIT

## (undated) DEVICE — Y-TYPE TUR/BLADDER IRRIGATION SET, REGULATING CLAMP

## (undated) DEVICE — CYSTO/BLADDER IRRIGATION SET, REGULATING CLAMP

## (undated) DEVICE — INTENDED FOR TISSUE SEPARATION, AND OTHER PROCEDURES THAT REQUIRE A SHARP SURGICAL BLADE TO PUNCTURE OR CUT.: Brand: BARD-PARKER ® CARBON RIB-BACK BLADES

## (undated) DEVICE — SINGLE ACTION PUMPING SYSTEM

## (undated) DEVICE — DRAINBAG,ANTI-REFLUX TOWER,L/F,2000ML,LL: Brand: MEDLINE

## (undated) DEVICE — DRAPE,REIN 53X77,STERILE: Brand: MEDLINE

## (undated) DEVICE — TUBING, SUCTION, 9/32" X 20', STRAIGHT: Brand: MEDLINE INDUSTRIES, INC.

## (undated) DEVICE — SUTURE ETHBND EXCEL SZ 1 L30IN NONABSORBABLE GRN L36MM CT-1 X425H

## (undated) DEVICE — DALE FOLEY CATHETER HOLDER, LEGBAND, FITS UP TO 30": Brand: DALE FOLEY CATHETER HOLDER

## (undated) DEVICE — VENTED/UNVENTED 60 GTT 2 SMRTSITE NDLLSS VLV PRT SET

## (undated) DEVICE — SOLUTION IV IRRIG WATER 1000ML POUR BRL 2F7114

## (undated) DEVICE — FORCEPS BX L240CM JAW DIA2.4MM ORNG L CAP W/ NDL DISP RAD

## (undated) DEVICE — MERCY DEFIANCE ENDO KIT: Brand: MEDLINE INDUSTRIES, INC.

## (undated) DEVICE — GUIDEWIRE URO L150CM DIA0.035IN STIFF NIT HYDRPHLC STR TIP

## (undated) DEVICE — DUAL LUMEN URETERAL CATHETER

## (undated) DEVICE — CANNULA NSL AD L2IN ETCO2 SAMP SFT CRUSH RESIST FEM AIRLFE

## (undated) DEVICE — Z DISCONTINUED NO SUB IDED TAPE UMB W1/8XL36IN WHT COT STRND NONRADIOPAQUE

## (undated) DEVICE — URINE LEG BAG: Brand: DOVER

## (undated) DEVICE — BITE BLOCK W/VELCRO STRAP

## (undated) DEVICE — SOLUTION SCRB 4OZ 4% CHG H2O AIDED FOR PREOPERATIVE SKIN

## (undated) DEVICE — GLOVE ORANGE PI 7   MSG9070

## (undated) DEVICE — SVMMC PEDS/UROLOGY MINOR PACK: Brand: MEDLINE INDUSTRIES, INC.

## (undated) DEVICE — PROTECTOR ULN NRV PUR FOAM HK LOOP STRP ANATOMICALLY

## (undated) DEVICE — GLOVE SURG SZ 65 THK91MIL LTX FREE SYN POLYISOPRENE

## (undated) DEVICE — ADAPTER URO SCP UROLOK LL

## (undated) DEVICE — BAG DRN UROLOGY ANTI REFLX 200ML

## (undated) DEVICE — SYRINGE MED 50ML LUERLOCK TIP

## (undated) DEVICE — DRAPE C ARM UNIV W41XL74IN CLR PLAS XR VELC CLSR POLY STRP

## (undated) DEVICE — 4-PORT MANIFOLD: Brand: NEPTUNE 2

## (undated) DEVICE — GOWN,AURORA,NONREINFORCED,LARGE: Brand: MEDLINE

## (undated) DEVICE — CATHETER URETH 16FR BLLN 5CC SIL ALLY W/ SIL HYDRGEL 2 W F

## (undated) DEVICE — GAUZE,SPONGE,FLUFF,6"X6.75",STRL,5/TRAY: Brand: MEDLINE

## (undated) DEVICE — MAT FLOOR ULTRA ABS 28X48IN

## (undated) DEVICE — 60 ML SYRINGE REGULAR TIP: Brand: MONOJECT

## (undated) DEVICE — 3M™ IOBAN™ 2 ANTIMICROBIAL INCISE DRAPE 6650EZ: Brand: IOBAN™ 2

## (undated) DEVICE — CRANIOTOMY DRAPE, STERILE: Brand: MEDLINE

## (undated) DEVICE — OCCLUSION BALLOON CATHETER: Brand: OCCLUDER

## (undated) DEVICE — CATHETER,FOLEY,3-WAY,22FR,30ML,100%SILI: Brand: MEDLINE

## (undated) DEVICE — PACK PROCEDURE SURG CYSTO SVMMC LF

## (undated) DEVICE — TUBING, SUCTION, 3/16" X 20', STRAIGHT: Brand: MEDLINE

## (undated) DEVICE — GARMENT,MEDLINE,DVT,INT,CALF,MED, GEN2: Brand: MEDLINE

## (undated) DEVICE — Z DISCONTINUED BY MEDLINE USE 2711682 TRAY SKIN PREP DRY W/ PREM GLV

## (undated) DEVICE — CONTAINER,SPECIMEN,4OZ,OR STRL: Brand: MEDLINE

## (undated) DEVICE — CONNECTOR TBNG AUX H2O JET DISP FOR OLY 160/180 SER